# Patient Record
Sex: MALE | Race: WHITE | Employment: OTHER | URBAN - METROPOLITAN AREA
[De-identification: names, ages, dates, MRNs, and addresses within clinical notes are randomized per-mention and may not be internally consistent; named-entity substitution may affect disease eponyms.]

---

## 2017-01-01 ENCOUNTER — APPOINTMENT (OUTPATIENT)
Dept: LAB | Facility: CLINIC | Age: 72
End: 2017-01-01
Attending: INTERNAL MEDICINE
Payer: COMMERCIAL

## 2017-01-01 ENCOUNTER — ONCOLOGY VISIT (OUTPATIENT)
Dept: ONCOLOGY | Facility: CLINIC | Age: 72
End: 2017-01-01
Attending: INTERNAL MEDICINE
Payer: COMMERCIAL

## 2017-01-01 ENCOUNTER — INFUSION THERAPY VISIT (OUTPATIENT)
Dept: ONCOLOGY | Facility: CLINIC | Age: 72
End: 2017-01-01
Attending: PHYSICIAN ASSISTANT
Payer: COMMERCIAL

## 2017-01-01 ENCOUNTER — CARE COORDINATION (OUTPATIENT)
Dept: ONCOLOGY | Facility: CLINIC | Age: 72
End: 2017-01-01

## 2017-01-01 ENCOUNTER — APPOINTMENT (OUTPATIENT)
Dept: LAB | Facility: CLINIC | Age: 72
End: 2017-01-01
Attending: PHYSICIAN ASSISTANT
Payer: COMMERCIAL

## 2017-01-01 ENCOUNTER — TELEPHONE (OUTPATIENT)
Dept: ONCOLOGY | Facility: CLINIC | Age: 72
End: 2017-01-01

## 2017-01-01 VITALS
DIASTOLIC BLOOD PRESSURE: 58 MMHG | WEIGHT: 129 LBS | RESPIRATION RATE: 14 BRPM | SYSTOLIC BLOOD PRESSURE: 95 MMHG | HEIGHT: 66 IN | BODY MASS INDEX: 20.73 KG/M2 | TEMPERATURE: 97.6 F | HEART RATE: 100 BPM | OXYGEN SATURATION: 100 %

## 2017-01-01 VITALS
OXYGEN SATURATION: 99 % | DIASTOLIC BLOOD PRESSURE: 70 MMHG | RESPIRATION RATE: 16 BRPM | TEMPERATURE: 98.8 F | SYSTOLIC BLOOD PRESSURE: 111 MMHG | HEART RATE: 76 BPM

## 2017-01-01 VITALS
DIASTOLIC BLOOD PRESSURE: 53 MMHG | OXYGEN SATURATION: 100 % | RESPIRATION RATE: 16 BRPM | SYSTOLIC BLOOD PRESSURE: 122 MMHG | HEART RATE: 98 BPM | TEMPERATURE: 98.5 F

## 2017-01-01 VITALS
WEIGHT: 127 LBS | HEART RATE: 72 BPM | SYSTOLIC BLOOD PRESSURE: 102 MMHG | DIASTOLIC BLOOD PRESSURE: 57 MMHG | TEMPERATURE: 98.9 F | RESPIRATION RATE: 18 BRPM | OXYGEN SATURATION: 98 % | BODY MASS INDEX: 20.5 KG/M2

## 2017-01-01 VITALS
OXYGEN SATURATION: 99 % | DIASTOLIC BLOOD PRESSURE: 58 MMHG | TEMPERATURE: 98.4 F | SYSTOLIC BLOOD PRESSURE: 86 MMHG | WEIGHT: 135 LBS | HEART RATE: 99 BPM | BODY MASS INDEX: 21.8 KG/M2 | RESPIRATION RATE: 18 BRPM

## 2017-01-01 VITALS
WEIGHT: 135 LBS | HEART RATE: 97 BPM | DIASTOLIC BLOOD PRESSURE: 46 MMHG | BODY MASS INDEX: 21.8 KG/M2 | OXYGEN SATURATION: 99 % | RESPIRATION RATE: 16 BRPM | TEMPERATURE: 98.2 F | SYSTOLIC BLOOD PRESSURE: 103 MMHG

## 2017-01-01 VITALS
SYSTOLIC BLOOD PRESSURE: 118 MMHG | DIASTOLIC BLOOD PRESSURE: 67 MMHG | HEART RATE: 74 BPM | RESPIRATION RATE: 16 BRPM | OXYGEN SATURATION: 98 % | WEIGHT: 127 LBS | BODY MASS INDEX: 20.5 KG/M2 | TEMPERATURE: 98 F

## 2017-01-01 VITALS
SYSTOLIC BLOOD PRESSURE: 120 MMHG | BODY MASS INDEX: 20.66 KG/M2 | HEART RATE: 85 BPM | RESPIRATION RATE: 14 BRPM | DIASTOLIC BLOOD PRESSURE: 76 MMHG | TEMPERATURE: 98 F | OXYGEN SATURATION: 100 % | WEIGHT: 128 LBS

## 2017-01-01 VITALS
WEIGHT: 130 LBS | TEMPERATURE: 98.1 F | BODY MASS INDEX: 20.89 KG/M2 | SYSTOLIC BLOOD PRESSURE: 108 MMHG | OXYGEN SATURATION: 100 % | DIASTOLIC BLOOD PRESSURE: 64 MMHG | HEIGHT: 66 IN | HEART RATE: 87 BPM

## 2017-01-01 VITALS
SYSTOLIC BLOOD PRESSURE: 115 MMHG | WEIGHT: 135 LBS | BODY MASS INDEX: 21.8 KG/M2 | TEMPERATURE: 98.1 F | HEART RATE: 78 BPM | OXYGEN SATURATION: 100 % | DIASTOLIC BLOOD PRESSURE: 61 MMHG

## 2017-01-01 VITALS
OXYGEN SATURATION: 100 % | WEIGHT: 135.5 LBS | BODY MASS INDEX: 21.8 KG/M2 | DIASTOLIC BLOOD PRESSURE: 62 MMHG | HEART RATE: 106 BPM | BODY MASS INDEX: 21.88 KG/M2 | RESPIRATION RATE: 16 BRPM | SYSTOLIC BLOOD PRESSURE: 119 MMHG | DIASTOLIC BLOOD PRESSURE: 62 MMHG | SYSTOLIC BLOOD PRESSURE: 113 MMHG | TEMPERATURE: 98.1 F | OXYGEN SATURATION: 100 % | TEMPERATURE: 98.2 F | HEART RATE: 105 BPM | RESPIRATION RATE: 16 BRPM | WEIGHT: 135 LBS

## 2017-01-01 VITALS
BODY MASS INDEX: 21.64 KG/M2 | SYSTOLIC BLOOD PRESSURE: 103 MMHG | WEIGHT: 134 LBS | HEART RATE: 108 BPM | DIASTOLIC BLOOD PRESSURE: 56 MMHG | RESPIRATION RATE: 18 BRPM | OXYGEN SATURATION: 98 % | TEMPERATURE: 97.8 F

## 2017-01-01 VITALS
WEIGHT: 135 LBS | RESPIRATION RATE: 16 BRPM | HEART RATE: 90 BPM | SYSTOLIC BLOOD PRESSURE: 114 MMHG | BODY MASS INDEX: 21.8 KG/M2 | DIASTOLIC BLOOD PRESSURE: 50 MMHG | OXYGEN SATURATION: 99 % | TEMPERATURE: 98.8 F

## 2017-01-01 VITALS
HEART RATE: 108 BPM | DIASTOLIC BLOOD PRESSURE: 56 MMHG | WEIGHT: 134 LBS | BODY MASS INDEX: 21.64 KG/M2 | RESPIRATION RATE: 18 BRPM | OXYGEN SATURATION: 98 % | TEMPERATURE: 97.8 F | SYSTOLIC BLOOD PRESSURE: 103 MMHG

## 2017-01-01 VITALS
DIASTOLIC BLOOD PRESSURE: 64 MMHG | WEIGHT: 130 LBS | RESPIRATION RATE: 20 BRPM | SYSTOLIC BLOOD PRESSURE: 108 MMHG | BODY MASS INDEX: 20.99 KG/M2 | TEMPERATURE: 98.1 F | OXYGEN SATURATION: 100 % | HEART RATE: 87 BPM

## 2017-01-01 DIAGNOSIS — C79.51 METASTATIC BONE TUMOR (H): ICD-10-CM

## 2017-01-01 DIAGNOSIS — D70.1 AGRANULOCYTOSIS SECONDARY TO CANCER CHEMOTHERAPY (H): ICD-10-CM

## 2017-01-01 DIAGNOSIS — C79.51 PROSTATE CANCER METASTATIC TO BONE (H): Primary | ICD-10-CM

## 2017-01-01 DIAGNOSIS — C61 PROSTATE CANCER METASTATIC TO BONE (H): ICD-10-CM

## 2017-01-01 DIAGNOSIS — C61 PROSTATE CANCER METASTATIC TO BONE (H): Primary | ICD-10-CM

## 2017-01-01 DIAGNOSIS — C79.51 PROSTATE CANCER METASTATIC TO BONE (H): ICD-10-CM

## 2017-01-01 DIAGNOSIS — T45.1X5A AGRANULOCYTOSIS SECONDARY TO CANCER CHEMOTHERAPY (H): ICD-10-CM

## 2017-01-01 DIAGNOSIS — D64.9 ANEMIA, UNSPECIFIED TYPE: ICD-10-CM

## 2017-01-01 DIAGNOSIS — R17 ELEVATED BILIRUBIN: Primary | ICD-10-CM

## 2017-01-01 DIAGNOSIS — R06.02 SOB (SHORTNESS OF BREATH): ICD-10-CM

## 2017-01-01 DIAGNOSIS — D64.9 ANEMIA, UNSPECIFIED TYPE: Primary | ICD-10-CM

## 2017-01-01 DIAGNOSIS — I50.22 CHRONIC SYSTOLIC HEART FAILURE (H): ICD-10-CM

## 2017-01-01 DIAGNOSIS — D70.1 AGRANULOCYTOSIS SECONDARY TO CANCER CHEMOTHERAPY (H): Primary | ICD-10-CM

## 2017-01-01 DIAGNOSIS — T45.1X5A AGRANULOCYTOSIS SECONDARY TO CANCER CHEMOTHERAPY (H): Primary | ICD-10-CM

## 2017-01-01 DIAGNOSIS — I27.82 OTHER CHRONIC PULMONARY EMBOLISM WITHOUT ACUTE COR PULMONALE (H): ICD-10-CM

## 2017-01-01 DIAGNOSIS — E83.51 HYPOCALCEMIA: ICD-10-CM

## 2017-01-01 LAB
ABO + RH BLD: NORMAL
ALBUMIN SERPL-MCNC: 3.3 G/DL (ref 3.4–5)
ALBUMIN SERPL-MCNC: 3.5 G/DL (ref 3.4–5)
ALBUMIN SERPL-MCNC: 3.5 G/DL (ref 3.4–5)
ALBUMIN SERPL-MCNC: 3.6 G/DL (ref 3.4–5)
ALBUMIN SERPL-MCNC: 3.6 G/DL (ref 3.4–5)
ALBUMIN SERPL-MCNC: 3.8 G/DL (ref 3.4–5)
ALBUMIN SERPL-MCNC: 4 G/DL (ref 3.4–5)
ALBUMIN SERPL-MCNC: 4 G/DL (ref 3.4–5)
ALP SERPL-CCNC: 178 U/L (ref 40–150)
ALP SERPL-CCNC: 220 U/L (ref 40–150)
ALP SERPL-CCNC: 271 U/L (ref 40–150)
ALP SERPL-CCNC: 276 U/L (ref 40–150)
ALP SERPL-CCNC: 284 U/L (ref 40–150)
ALP SERPL-CCNC: 288 U/L (ref 40–150)
ALP SERPL-CCNC: 292 U/L (ref 40–150)
ALP SERPL-CCNC: 312 U/L (ref 40–150)
ALT SERPL W P-5'-P-CCNC: 22 U/L (ref 0–70)
ALT SERPL W P-5'-P-CCNC: 26 U/L (ref 0–70)
ALT SERPL W P-5'-P-CCNC: 37 U/L (ref 0–70)
ALT SERPL W P-5'-P-CCNC: 38 U/L (ref 0–70)
ALT SERPL W P-5'-P-CCNC: 40 U/L (ref 0–70)
ALT SERPL W P-5'-P-CCNC: 44 U/L (ref 0–70)
ALT SERPL W P-5'-P-CCNC: 44 U/L (ref 0–70)
ALT SERPL W P-5'-P-CCNC: 51 U/L (ref 0–70)
ANION GAP SERPL CALCULATED.3IONS-SCNC: 11 MMOL/L (ref 3–14)
ANION GAP SERPL CALCULATED.3IONS-SCNC: 11 MMOL/L (ref 3–14)
ANION GAP SERPL CALCULATED.3IONS-SCNC: 12 MMOL/L (ref 3–14)
ANION GAP SERPL CALCULATED.3IONS-SCNC: 12 MMOL/L (ref 3–14)
ANION GAP SERPL CALCULATED.3IONS-SCNC: 8 MMOL/L (ref 3–14)
ANION GAP SERPL CALCULATED.3IONS-SCNC: 8 MMOL/L (ref 3–14)
ANION GAP SERPL CALCULATED.3IONS-SCNC: 9 MMOL/L (ref 3–14)
ANION GAP SERPL CALCULATED.3IONS-SCNC: 9 MMOL/L (ref 3–14)
ANISOCYTOSIS BLD QL SMEAR: ABNORMAL
AST SERPL W P-5'-P-CCNC: 24 U/L (ref 0–45)
AST SERPL W P-5'-P-CCNC: 24 U/L (ref 0–45)
AST SERPL W P-5'-P-CCNC: 28 U/L (ref 0–45)
AST SERPL W P-5'-P-CCNC: 30 U/L (ref 0–45)
AST SERPL W P-5'-P-CCNC: 35 U/L (ref 0–45)
AST SERPL W P-5'-P-CCNC: 35 U/L (ref 0–45)
AST SERPL W P-5'-P-CCNC: 39 U/L (ref 0–45)
AST SERPL W P-5'-P-CCNC: 40 U/L (ref 0–45)
BASOPHILS # BLD AUTO: 0 10E9/L (ref 0–0.2)
BASOPHILS # BLD AUTO: 0.1 10E9/L (ref 0–0.2)
BASOPHILS # BLD AUTO: 0.1 10E9/L (ref 0–0.2)
BASOPHILS NFR BLD AUTO: 0 %
BASOPHILS NFR BLD AUTO: 0.2 %
BASOPHILS NFR BLD AUTO: 0.5 %
BASOPHILS NFR BLD AUTO: 0.6 %
BASOPHILS NFR BLD AUTO: 1 %
BILIRUB DIRECT SERPL-MCNC: 0.1 MG/DL (ref 0–0.2)
BILIRUB DIRECT SERPL-MCNC: 0.2 MG/DL (ref 0–0.2)
BILIRUB SERPL-MCNC: 0.9 MG/DL (ref 0.2–1.3)
BILIRUB SERPL-MCNC: 0.9 MG/DL (ref 0.2–1.3)
BILIRUB SERPL-MCNC: 1 MG/DL (ref 0.2–1.3)
BILIRUB SERPL-MCNC: 1.1 MG/DL (ref 0.2–1.3)
BILIRUB SERPL-MCNC: 1.1 MG/DL (ref 0.2–1.3)
BILIRUB SERPL-MCNC: 1.2 MG/DL (ref 0.2–1.3)
BILIRUB SERPL-MCNC: 1.4 MG/DL (ref 0.2–1.3)
BILIRUB SERPL-MCNC: 1.6 MG/DL (ref 0.2–1.3)
BLD GP AB SCN SERPL QL: NORMAL
BLD PROD TYP BPU: NORMAL
BLD UNIT ID BPU: 0
BLOOD BANK CMNT PATIENT-IMP: NORMAL
BLOOD PRODUCT CODE: NORMAL
BPU ID: NORMAL
BUN SERPL-MCNC: 15 MG/DL (ref 7–30)
BUN SERPL-MCNC: 20 MG/DL (ref 7–30)
BUN SERPL-MCNC: 20 MG/DL (ref 7–30)
BUN SERPL-MCNC: 21 MG/DL (ref 7–30)
BUN SERPL-MCNC: 22 MG/DL (ref 7–30)
BUN SERPL-MCNC: 22 MG/DL (ref 7–30)
BUN SERPL-MCNC: 25 MG/DL (ref 7–30)
BUN SERPL-MCNC: 27 MG/DL (ref 7–30)
CALCIUM SERPL-MCNC: 7.7 MG/DL (ref 8.5–10.1)
CALCIUM SERPL-MCNC: 8.5 MG/DL (ref 8.5–10.1)
CALCIUM SERPL-MCNC: 8.5 MG/DL (ref 8.5–10.1)
CALCIUM SERPL-MCNC: 8.6 MG/DL (ref 8.5–10.1)
CALCIUM SERPL-MCNC: 9.1 MG/DL (ref 8.5–10.1)
CALCIUM SERPL-MCNC: 9.1 MG/DL (ref 8.5–10.1)
CALCIUM SERPL-MCNC: 9.3 MG/DL (ref 8.5–10.1)
CALCIUM SERPL-MCNC: 9.8 MG/DL (ref 8.5–10.1)
CHLORIDE SERPL-SCNC: 103 MMOL/L (ref 94–109)
CHLORIDE SERPL-SCNC: 104 MMOL/L (ref 94–109)
CHLORIDE SERPL-SCNC: 108 MMOL/L (ref 94–109)
CHLORIDE SERPL-SCNC: 108 MMOL/L (ref 94–109)
CHLORIDE SERPL-SCNC: 110 MMOL/L (ref 94–109)
CHLORIDE SERPL-SCNC: 110 MMOL/L (ref 94–109)
CHLORIDE SERPL-SCNC: 112 MMOL/L (ref 94–109)
CHLORIDE SERPL-SCNC: 114 MMOL/L (ref 94–109)
CO2 SERPL-SCNC: 19 MMOL/L (ref 20–32)
CO2 SERPL-SCNC: 21 MMOL/L (ref 20–32)
CO2 SERPL-SCNC: 22 MMOL/L (ref 20–32)
CO2 SERPL-SCNC: 24 MMOL/L (ref 20–32)
CREAT SERPL-MCNC: 0.72 MG/DL (ref 0.66–1.25)
CREAT SERPL-MCNC: 0.74 MG/DL (ref 0.66–1.25)
CREAT SERPL-MCNC: 0.76 MG/DL (ref 0.66–1.25)
CREAT SERPL-MCNC: 0.81 MG/DL (ref 0.66–1.25)
CREAT SERPL-MCNC: 0.84 MG/DL (ref 0.66–1.25)
CREAT SERPL-MCNC: 0.88 MG/DL (ref 0.66–1.25)
CREAT SERPL-MCNC: 0.9 MG/DL (ref 0.66–1.25)
CREAT SERPL-MCNC: 0.98 MG/DL (ref 0.66–1.25)
DACRYOCYTES BLD QL SMEAR: SLIGHT
DIFFERENTIAL METHOD BLD: ABNORMAL
EOSINOPHIL # BLD AUTO: 0 10E9/L (ref 0–0.7)
EOSINOPHIL NFR BLD AUTO: 0 %
EOSINOPHIL NFR BLD AUTO: 0.1 %
EOSINOPHIL NFR BLD AUTO: 0.2 %
EOSINOPHIL NFR BLD AUTO: 0.2 %
EOSINOPHIL NFR BLD AUTO: 0.7 %
ERYTHROCYTE [DISTWIDTH] IN BLOOD BY AUTOMATED COUNT: 17.3 % (ref 10–15)
ERYTHROCYTE [DISTWIDTH] IN BLOOD BY AUTOMATED COUNT: 17.3 % (ref 10–15)
ERYTHROCYTE [DISTWIDTH] IN BLOOD BY AUTOMATED COUNT: 18.6 % (ref 10–15)
ERYTHROCYTE [DISTWIDTH] IN BLOOD BY AUTOMATED COUNT: 19 % (ref 10–15)
ERYTHROCYTE [DISTWIDTH] IN BLOOD BY AUTOMATED COUNT: 19.2 % (ref 10–15)
ERYTHROCYTE [DISTWIDTH] IN BLOOD BY AUTOMATED COUNT: 19.5 % (ref 10–15)
ERYTHROCYTE [DISTWIDTH] IN BLOOD BY AUTOMATED COUNT: 19.5 % (ref 10–15)
ERYTHROCYTE [DISTWIDTH] IN BLOOD BY AUTOMATED COUNT: 19.9 % (ref 10–15)
ERYTHROCYTE [DISTWIDTH] IN BLOOD BY AUTOMATED COUNT: 20.3 % (ref 10–15)
ERYTHROCYTE [DISTWIDTH] IN BLOOD BY AUTOMATED COUNT: 20.4 % (ref 10–15)
ERYTHROCYTE [DISTWIDTH] IN BLOOD BY AUTOMATED COUNT: 21.7 % (ref 10–15)
GFR SERPL CREATININE-BSD FRML MDRD: 75 ML/MIN/1.7M2
GFR SERPL CREATININE-BSD FRML MDRD: 83 ML/MIN/1.7M2
GFR SERPL CREATININE-BSD FRML MDRD: 84 ML/MIN/1.7M2
GFR SERPL CREATININE-BSD FRML MDRD: 90 ML/MIN/1.7M2
GFR SERPL CREATININE-BSD FRML MDRD: >90 ML/MIN/1.7M2
GLUCOSE SERPL-MCNC: 105 MG/DL (ref 70–99)
GLUCOSE SERPL-MCNC: 109 MG/DL (ref 70–99)
GLUCOSE SERPL-MCNC: 121 MG/DL (ref 70–99)
GLUCOSE SERPL-MCNC: 124 MG/DL (ref 70–99)
GLUCOSE SERPL-MCNC: 134 MG/DL (ref 70–99)
GLUCOSE SERPL-MCNC: 134 MG/DL (ref 70–99)
GLUCOSE SERPL-MCNC: 178 MG/DL (ref 70–99)
GLUCOSE SERPL-MCNC: 204 MG/DL (ref 70–99)
HCT VFR BLD AUTO: 22.4 % (ref 40–53)
HCT VFR BLD AUTO: 25.3 % (ref 40–53)
HCT VFR BLD AUTO: 26.1 % (ref 40–53)
HCT VFR BLD AUTO: 28.5 % (ref 40–53)
HCT VFR BLD AUTO: 29.5 % (ref 40–53)
HCT VFR BLD AUTO: 30.4 % (ref 40–53)
HCT VFR BLD AUTO: 30.7 % (ref 40–53)
HCT VFR BLD AUTO: 31.2 % (ref 40–53)
HCT VFR BLD AUTO: 32.5 % (ref 40–53)
HCT VFR BLD AUTO: 33 % (ref 40–53)
HCT VFR BLD AUTO: 33.5 % (ref 40–53)
HGB BLD-MCNC: 10.1 G/DL (ref 13.3–17.7)
HGB BLD-MCNC: 10.1 G/DL (ref 13.3–17.7)
HGB BLD-MCNC: 10.2 G/DL (ref 13.3–17.7)
HGB BLD-MCNC: 10.3 G/DL (ref 13.3–17.7)
HGB BLD-MCNC: 10.9 G/DL (ref 13.3–17.7)
HGB BLD-MCNC: 11.1 G/DL (ref 13.3–17.7)
HGB BLD-MCNC: 11.5 G/DL (ref 13.3–17.7)
HGB BLD-MCNC: 7.8 G/DL (ref 13.3–17.7)
HGB BLD-MCNC: 8.3 G/DL (ref 13.3–17.7)
HGB BLD-MCNC: 8.5 G/DL (ref 13.3–17.7)
HGB BLD-MCNC: 9.4 G/DL (ref 13.3–17.7)
IMM GRANULOCYTES # BLD: 0 10E9/L (ref 0–0.4)
IMM GRANULOCYTES # BLD: 0.1 10E9/L (ref 0–0.4)
IMM GRANULOCYTES # BLD: 0.3 10E9/L (ref 0–0.4)
IMM GRANULOCYTES # BLD: 0.3 10E9/L (ref 0–0.4)
IMM GRANULOCYTES # BLD: 0.4 10E9/L (ref 0–0.4)
IMM GRANULOCYTES # BLD: 0.4 10E9/L (ref 0–0.4)
IMM GRANULOCYTES # BLD: 0.5 10E9/L (ref 0–0.4)
IMM GRANULOCYTES NFR BLD: 1 %
IMM GRANULOCYTES NFR BLD: 1 %
IMM GRANULOCYTES NFR BLD: 2.9 %
IMM GRANULOCYTES NFR BLD: 3.1 %
IMM GRANULOCYTES NFR BLD: 3.3 %
IMM GRANULOCYTES NFR BLD: 4.7 %
IMM GRANULOCYTES NFR BLD: 5 %
LYMPHOCYTES # BLD AUTO: 0.4 10E9/L (ref 0.8–5.3)
LYMPHOCYTES # BLD AUTO: 0.5 10E9/L (ref 0.8–5.3)
LYMPHOCYTES # BLD AUTO: 0.6 10E9/L (ref 0.8–5.3)
LYMPHOCYTES # BLD AUTO: 0.6 10E9/L (ref 0.8–5.3)
LYMPHOCYTES # BLD AUTO: 0.7 10E9/L (ref 0.8–5.3)
LYMPHOCYTES # BLD AUTO: 0.9 10E9/L (ref 0.8–5.3)
LYMPHOCYTES # BLD AUTO: 1 10E9/L (ref 0.8–5.3)
LYMPHOCYTES # BLD AUTO: 1 10E9/L (ref 0.8–5.3)
LYMPHOCYTES # BLD AUTO: 1.1 10E9/L (ref 0.8–5.3)
LYMPHOCYTES # BLD AUTO: 1.9 10E9/L (ref 0.8–5.3)
LYMPHOCYTES # BLD AUTO: 2 10E9/L (ref 0.8–5.3)
LYMPHOCYTES NFR BLD AUTO: 10 %
LYMPHOCYTES NFR BLD AUTO: 12.4 %
LYMPHOCYTES NFR BLD AUTO: 4.6 %
LYMPHOCYTES NFR BLD AUTO: 5.6 %
LYMPHOCYTES NFR BLD AUTO: 6 %
LYMPHOCYTES NFR BLD AUTO: 7.6 %
LYMPHOCYTES NFR BLD AUTO: 8.2 %
LYMPHOCYTES NFR BLD AUTO: 9 %
LYMPHOCYTES NFR BLD AUTO: 9 %
LYMPHOCYTES NFR BLD AUTO: 9.2 %
LYMPHOCYTES NFR BLD AUTO: 9.9 %
MACROCYTES BLD QL SMEAR: PRESENT
MCH RBC QN AUTO: 31.1 PG (ref 26.5–33)
MCH RBC QN AUTO: 31.3 PG (ref 26.5–33)
MCH RBC QN AUTO: 31.6 PG (ref 26.5–33)
MCH RBC QN AUTO: 32 PG (ref 26.5–33)
MCH RBC QN AUTO: 32.5 PG (ref 26.5–33)
MCH RBC QN AUTO: 32.6 PG (ref 26.5–33)
MCH RBC QN AUTO: 33.3 PG (ref 26.5–33)
MCH RBC QN AUTO: 33.7 PG (ref 26.5–33)
MCH RBC QN AUTO: 34.3 PG (ref 26.5–33)
MCH RBC QN AUTO: 34.6 PG (ref 26.5–33)
MCH RBC QN AUTO: 35.3 PG (ref 26.5–33)
MCHC RBC AUTO-ENTMCNC: 32.4 G/DL (ref 31.5–36.5)
MCHC RBC AUTO-ENTMCNC: 32.6 G/DL (ref 31.5–36.5)
MCHC RBC AUTO-ENTMCNC: 32.8 G/DL (ref 31.5–36.5)
MCHC RBC AUTO-ENTMCNC: 33 G/DL (ref 31.5–36.5)
MCHC RBC AUTO-ENTMCNC: 33.5 G/DL (ref 31.5–36.5)
MCHC RBC AUTO-ENTMCNC: 33.6 G/DL (ref 31.5–36.5)
MCHC RBC AUTO-ENTMCNC: 34.2 G/DL (ref 31.5–36.5)
MCHC RBC AUTO-ENTMCNC: 34.3 G/DL (ref 31.5–36.5)
MCHC RBC AUTO-ENTMCNC: 34.8 G/DL (ref 31.5–36.5)
MCV RBC AUTO: 103 FL (ref 78–100)
MCV RBC AUTO: 104 FL (ref 78–100)
MCV RBC AUTO: 105 FL (ref 78–100)
MCV RBC AUTO: 107 FL (ref 78–100)
MCV RBC AUTO: 91 FL (ref 78–100)
MCV RBC AUTO: 93 FL (ref 78–100)
MCV RBC AUTO: 94 FL (ref 78–100)
MCV RBC AUTO: 94 FL (ref 78–100)
MCV RBC AUTO: 96 FL (ref 78–100)
MCV RBC AUTO: 97 FL (ref 78–100)
MCV RBC AUTO: 99 FL (ref 78–100)
METAMYELOCYTES # BLD: 0.1 10E9/L
METAMYELOCYTES # BLD: 0.1 10E9/L
METAMYELOCYTES # BLD: 0.6 10E9/L
METAMYELOCYTES NFR BLD MANUAL: 1 %
METAMYELOCYTES NFR BLD MANUAL: 1 %
METAMYELOCYTES NFR BLD MANUAL: 3 %
MONOCYTES # BLD AUTO: 0.2 10E9/L (ref 0–1.3)
MONOCYTES # BLD AUTO: 0.3 10E9/L (ref 0–1.3)
MONOCYTES # BLD AUTO: 0.4 10E9/L (ref 0–1.3)
MONOCYTES # BLD AUTO: 0.4 10E9/L (ref 0–1.3)
MONOCYTES # BLD AUTO: 0.6 10E9/L (ref 0–1.3)
MONOCYTES # BLD AUTO: 0.8 10E9/L (ref 0–1.3)
MONOCYTES # BLD AUTO: 0.8 10E9/L (ref 0–1.3)
MONOCYTES # BLD AUTO: 1.3 10E9/L (ref 0–1.3)
MONOCYTES # BLD AUTO: 1.6 10E9/L (ref 0–1.3)
MONOCYTES NFR BLD AUTO: 1.7 %
MONOCYTES NFR BLD AUTO: 10.1 %
MONOCYTES NFR BLD AUTO: 2.5 %
MONOCYTES NFR BLD AUTO: 3 %
MONOCYTES NFR BLD AUTO: 4.7 %
MONOCYTES NFR BLD AUTO: 5 %
MONOCYTES NFR BLD AUTO: 5.2 %
MONOCYTES NFR BLD AUTO: 8 %
MONOCYTES NFR BLD AUTO: 9 %
MONOCYTES NFR BLD AUTO: 9.4 %
MONOCYTES NFR BLD AUTO: 9.8 %
MYELOCYTES # BLD: 0.1 10E9/L
MYELOCYTES # BLD: 0.2 10E9/L
MYELOCYTES # BLD: 0.4 10E9/L
MYELOCYTES # BLD: 0.6 10E9/L
MYELOCYTES NFR BLD MANUAL: 1 %
MYELOCYTES NFR BLD MANUAL: 1 %
MYELOCYTES NFR BLD MANUAL: 3 %
MYELOCYTES NFR BLD MANUAL: 3 %
NEUTROPHILS # BLD AUTO: 12 10E9/L (ref 1.6–8.3)
NEUTROPHILS # BLD AUTO: 12.5 10E9/L (ref 1.6–8.3)
NEUTROPHILS # BLD AUTO: 15.5 10E9/L (ref 1.6–8.3)
NEUTROPHILS # BLD AUTO: 18 10E9/L (ref 1.6–8.3)
NEUTROPHILS # BLD AUTO: 3.3 10E9/L (ref 1.6–8.3)
NEUTROPHILS # BLD AUTO: 5.2 10E9/L (ref 1.6–8.3)
NEUTROPHILS # BLD AUTO: 6.1 10E9/L (ref 1.6–8.3)
NEUTROPHILS # BLD AUTO: 6.5 10E9/L (ref 1.6–8.3)
NEUTROPHILS # BLD AUTO: 9 10E9/L (ref 1.6–8.3)
NEUTROPHILS # BLD AUTO: 9.3 10E9/L (ref 1.6–8.3)
NEUTROPHILS # BLD AUTO: 9.8 10E9/L (ref 1.6–8.3)
NEUTROPHILS NFR BLD AUTO: 74.1 %
NEUTROPHILS NFR BLD AUTO: 76.3 %
NEUTROPHILS NFR BLD AUTO: 78 %
NEUTROPHILS NFR BLD AUTO: 78.1 %
NEUTROPHILS NFR BLD AUTO: 81 %
NEUTROPHILS NFR BLD AUTO: 82.4 %
NEUTROPHILS NFR BLD AUTO: 83 %
NEUTROPHILS NFR BLD AUTO: 85 %
NEUTROPHILS NFR BLD AUTO: 86.7 %
NEUTROPHILS NFR BLD AUTO: 87.8 %
NEUTROPHILS NFR BLD AUTO: 89.6 %
NRBC # BLD AUTO: 0 10*3/UL
NRBC # BLD AUTO: 0.2 10*3/UL
NRBC BLD AUTO-RTO: 0 /100
NRBC BLD AUTO-RTO: 1 /100
NUM BPU REQUESTED: 2
PLATELET # BLD AUTO: 104 10E9/L (ref 150–450)
PLATELET # BLD AUTO: 105 10E9/L (ref 150–450)
PLATELET # BLD AUTO: 108 10E9/L (ref 150–450)
PLATELET # BLD AUTO: 73 10E9/L (ref 150–450)
PLATELET # BLD AUTO: 74 10E9/L (ref 150–450)
PLATELET # BLD AUTO: 79 10E9/L (ref 150–450)
PLATELET # BLD AUTO: 81 10E9/L (ref 150–450)
PLATELET # BLD AUTO: 85 10E9/L (ref 150–450)
PLATELET # BLD AUTO: 90 10E9/L (ref 150–450)
PLATELET # BLD AUTO: 93 10E9/L (ref 150–450)
PLATELET # BLD AUTO: 97 10E9/L (ref 150–450)
POIKILOCYTOSIS BLD QL SMEAR: SLIGHT
POLYCHROMASIA BLD QL SMEAR: SLIGHT
POLYCHROMASIA BLD QL SMEAR: SLIGHT
POTASSIUM SERPL-SCNC: 3.5 MMOL/L (ref 3.4–5.3)
POTASSIUM SERPL-SCNC: 4 MMOL/L (ref 3.4–5.3)
POTASSIUM SERPL-SCNC: 4 MMOL/L (ref 3.4–5.3)
POTASSIUM SERPL-SCNC: 4.1 MMOL/L (ref 3.4–5.3)
POTASSIUM SERPL-SCNC: 4.2 MMOL/L (ref 3.4–5.3)
POTASSIUM SERPL-SCNC: 4.4 MMOL/L (ref 3.4–5.3)
POTASSIUM SERPL-SCNC: 4.5 MMOL/L (ref 3.4–5.3)
POTASSIUM SERPL-SCNC: 4.6 MMOL/L (ref 3.4–5.3)
PROT SERPL-MCNC: 6.6 G/DL (ref 6.8–8.8)
PROT SERPL-MCNC: 6.9 G/DL (ref 6.8–8.8)
PROT SERPL-MCNC: 7.2 G/DL (ref 6.8–8.8)
PROT SERPL-MCNC: 7.3 G/DL (ref 6.8–8.8)
PROT SERPL-MCNC: 7.4 G/DL (ref 6.8–8.8)
PROT SERPL-MCNC: 7.5 G/DL (ref 6.8–8.8)
PROT SERPL-MCNC: 8.2 G/DL (ref 6.8–8.8)
PROT SERPL-MCNC: 8.3 G/DL (ref 6.8–8.8)
PSA SERPL-MCNC: 389 UG/L (ref 0–4)
PSA SERPL-MCNC: 479 UG/L (ref 0–4)
PSA SERPL-MCNC: 480 UG/L (ref 0–4)
PSA SERPL-MCNC: 580 UG/L (ref 0–4)
PSA SERPL-MCNC: 655 UG/L (ref 0–4)
PSA SERPL-MCNC: 741 UG/L (ref 0–4)
RBC # BLD AUTO: 2.39 10E12/L (ref 4.4–5.9)
RBC # BLD AUTO: 2.48 10E12/L (ref 4.4–5.9)
RBC # BLD AUTO: 2.65 10E12/L (ref 4.4–5.9)
RBC # BLD AUTO: 2.66 10E12/L (ref 4.4–5.9)
RBC # BLD AUTO: 2.98 10E12/L (ref 4.4–5.9)
RBC # BLD AUTO: 3 10E12/L (ref 4.4–5.9)
RBC # BLD AUTO: 3.06 10E12/L (ref 4.4–5.9)
RBC # BLD AUTO: 3.16 10E12/L (ref 4.4–5.9)
RBC # BLD AUTO: 3.35 10E12/L (ref 4.4–5.9)
RBC # BLD AUTO: 3.51 10E12/L (ref 4.4–5.9)
RBC # BLD AUTO: 3.7 10E12/L (ref 4.4–5.9)
RBC MORPH BLD: NORMAL
SODIUM SERPL-SCNC: 136 MMOL/L (ref 133–144)
SODIUM SERPL-SCNC: 137 MMOL/L (ref 133–144)
SODIUM SERPL-SCNC: 138 MMOL/L (ref 133–144)
SODIUM SERPL-SCNC: 139 MMOL/L (ref 133–144)
SODIUM SERPL-SCNC: 140 MMOL/L (ref 133–144)
SODIUM SERPL-SCNC: 141 MMOL/L (ref 133–144)
SODIUM SERPL-SCNC: 142 MMOL/L (ref 133–144)
SODIUM SERPL-SCNC: 142 MMOL/L (ref 133–144)
SPECIMEN EXP DATE BLD: NORMAL
TRANSFUSION STATUS PATIENT QL: NORMAL
WBC # BLD AUTO: 10.9 10E9/L (ref 4–11)
WBC # BLD AUTO: 11.2 10E9/L (ref 4–11)
WBC # BLD AUTO: 11.2 10E9/L (ref 4–11)
WBC # BLD AUTO: 14 10E9/L (ref 4–11)
WBC # BLD AUTO: 14.8 10E9/L (ref 4–11)
WBC # BLD AUTO: 19.9 10E9/L (ref 4–11)
WBC # BLD AUTO: 21.2 10E9/L (ref 4–11)
WBC # BLD AUTO: 4.2 10E9/L (ref 4–11)
WBC # BLD AUTO: 6 10E9/L (ref 4–11)
WBC # BLD AUTO: 8.1 10E9/L (ref 4–11)
WBC # BLD AUTO: 8.7 10E9/L (ref 4–11)

## 2017-01-01 PROCEDURE — 96375 TX/PRO/DX INJ NEW DRUG ADDON: CPT

## 2017-01-01 PROCEDURE — 80076 HEPATIC FUNCTION PANEL: CPT | Performed by: INTERNAL MEDICINE

## 2017-01-01 PROCEDURE — 80048 BASIC METABOLIC PNL TOTAL CA: CPT | Performed by: INTERNAL MEDICINE

## 2017-01-01 PROCEDURE — 40000141 ZZH STATISTIC PERIPHERAL IV START W/O US GUIDANCE: Mod: ZF

## 2017-01-01 PROCEDURE — 36415 COLL VENOUS BLD VENIPUNCTURE: CPT

## 2017-01-01 PROCEDURE — 36430 TRANSFUSION BLD/BLD COMPNT: CPT

## 2017-01-01 PROCEDURE — 86923 COMPATIBILITY TEST ELECTRIC: CPT | Performed by: INTERNAL MEDICINE

## 2017-01-01 PROCEDURE — 96377 APPLICATON ON-BODY INJECTOR: CPT | Mod: 59

## 2017-01-01 PROCEDURE — 85025 COMPLETE CBC W/AUTO DIFF WBC: CPT | Performed by: INTERNAL MEDICINE

## 2017-01-01 PROCEDURE — 25000128 H RX IP 250 OP 636: Mod: ZF | Performed by: PHYSICIAN ASSISTANT

## 2017-01-01 PROCEDURE — 25000128 H RX IP 250 OP 636: Mod: ZF | Performed by: INTERNAL MEDICINE

## 2017-01-01 PROCEDURE — 86901 BLOOD TYPING SEROLOGIC RH(D): CPT | Performed by: PHYSICIAN ASSISTANT

## 2017-01-01 PROCEDURE — 96402 CHEMO HORMON ANTINEOPL SQ/IM: CPT

## 2017-01-01 PROCEDURE — 40000556 ZZH STATISTIC PERIPHERAL IV START W US GUIDANCE: Mod: ZF

## 2017-01-01 PROCEDURE — 99214 OFFICE O/P EST MOD 30 MIN: CPT | Mod: ZP | Performed by: INTERNAL MEDICINE

## 2017-01-01 PROCEDURE — 96372 THER/PROPH/DIAG INJ SC/IM: CPT | Mod: 59

## 2017-01-01 PROCEDURE — 85025 COMPLETE CBC W/AUTO DIFF WBC: CPT | Performed by: PHYSICIAN ASSISTANT

## 2017-01-01 PROCEDURE — 36415 COLL VENOUS BLD VENIPUNCTURE: CPT | Performed by: INTERNAL MEDICINE

## 2017-01-01 PROCEDURE — 99212 OFFICE O/P EST SF 10 MIN: CPT

## 2017-01-01 PROCEDURE — 96413 CHEMO IV INFUSION 1 HR: CPT

## 2017-01-01 PROCEDURE — 25000125 ZZHC RX 250: Mod: ZF | Performed by: RADIOLOGY

## 2017-01-01 PROCEDURE — 86900 BLOOD TYPING SEROLOGIC ABO: CPT | Performed by: INTERNAL MEDICINE

## 2017-01-01 PROCEDURE — 99212 OFFICE O/P EST SF 10 MIN: CPT | Mod: ZF

## 2017-01-01 PROCEDURE — P9016 RBC LEUKOCYTES REDUCED: HCPCS | Performed by: INTERNAL MEDICINE

## 2017-01-01 PROCEDURE — 84153 ASSAY OF PSA TOTAL: CPT | Performed by: INTERNAL MEDICINE

## 2017-01-01 PROCEDURE — 36592 COLLECT BLOOD FROM PICC: CPT

## 2017-01-01 PROCEDURE — 86901 BLOOD TYPING SEROLOGIC RH(D): CPT | Performed by: INTERNAL MEDICINE

## 2017-01-01 PROCEDURE — 86850 RBC ANTIBODY SCREEN: CPT | Performed by: INTERNAL MEDICINE

## 2017-01-01 PROCEDURE — 80053 COMPREHEN METABOLIC PANEL: CPT | Performed by: INTERNAL MEDICINE

## 2017-01-01 PROCEDURE — 99214 OFFICE O/P EST MOD 30 MIN: CPT | Mod: ZP | Performed by: PHYSICIAN ASSISTANT

## 2017-01-01 PROCEDURE — 99215 OFFICE O/P EST HI 40 MIN: CPT | Mod: ZP | Performed by: INTERNAL MEDICINE

## 2017-01-01 PROCEDURE — 96374 THER/PROPH/DIAG INJ IV PUSH: CPT

## 2017-01-01 PROCEDURE — 86900 BLOOD TYPING SEROLOGIC ABO: CPT | Performed by: PHYSICIAN ASSISTANT

## 2017-01-01 PROCEDURE — 25000128 H RX IP 250 OP 636: Mod: ZF

## 2017-01-01 PROCEDURE — 86850 RBC ANTIBODY SCREEN: CPT | Performed by: PHYSICIAN ASSISTANT

## 2017-01-01 PROCEDURE — 96361 HYDRATE IV INFUSION ADD-ON: CPT

## 2017-01-01 PROCEDURE — 96367 TX/PROPH/DG ADDL SEQ IV INF: CPT

## 2017-01-01 PROCEDURE — 86923 COMPATIBILITY TEST ELECTRIC: CPT | Performed by: PHYSICIAN ASSISTANT

## 2017-01-01 PROCEDURE — 96360 HYDRATION IV INFUSION INIT: CPT

## 2017-01-01 PROCEDURE — P9016 RBC LEUKOCYTES REDUCED: HCPCS | Performed by: PHYSICIAN ASSISTANT

## 2017-01-01 RX ORDER — METHYLPREDNISOLONE SODIUM SUCCINATE 125 MG/2ML
125 INJECTION, POWDER, LYOPHILIZED, FOR SOLUTION INTRAMUSCULAR; INTRAVENOUS
Status: CANCELLED
Start: 2017-01-01

## 2017-01-01 RX ORDER — PREDNISONE 5 MG/1
5 TABLET ORAL 2 TIMES DAILY
Qty: 42 TABLET | Refills: 0 | Status: SHIPPED | OUTPATIENT
Start: 2017-01-01 | End: 2017-01-01

## 2017-01-01 RX ORDER — EPINEPHRINE 1 MG/ML
0.3 INJECTION, SOLUTION, CONCENTRATE INTRAVENOUS EVERY 5 MIN PRN
Status: CANCELLED | OUTPATIENT
Start: 2017-01-01

## 2017-01-01 RX ORDER — LORAZEPAM 2 MG/ML
0.5 INJECTION INTRAMUSCULAR EVERY 4 HOURS PRN
Status: CANCELLED
Start: 2017-01-01

## 2017-01-01 RX ORDER — EPINEPHRINE 0.3 MG/.3ML
0.3 INJECTION SUBCUTANEOUS EVERY 5 MIN PRN
Status: CANCELLED | OUTPATIENT
Start: 2017-01-01

## 2017-01-01 RX ORDER — ALBUTEROL SULFATE 0.83 MG/ML
2.5 SOLUTION RESPIRATORY (INHALATION)
Status: CANCELLED | OUTPATIENT
Start: 2017-01-01

## 2017-01-01 RX ORDER — EPINEPHRINE 1 MG/ML
0.3 INJECTION INTRAMUSCULAR; INTRAVENOUS; SUBCUTANEOUS EVERY 5 MIN PRN
Status: CANCELLED | OUTPATIENT
Start: 2017-01-01

## 2017-01-01 RX ORDER — ALBUTEROL SULFATE 90 UG/1
1-2 AEROSOL, METERED RESPIRATORY (INHALATION)
Status: CANCELLED
Start: 2017-01-01

## 2017-01-01 RX ORDER — DEXAMETHASONE 4 MG/1
4 TABLET ORAL
Qty: 12 TABLET | Refills: 0 | Status: SHIPPED | OUTPATIENT
Start: 2017-01-01 | End: 2018-01-01

## 2017-01-01 RX ORDER — LORAZEPAM 0.5 MG/1
0.5 TABLET ORAL EVERY 4 HOURS PRN
Qty: 30 TABLET | Refills: 5 | Status: SHIPPED | OUTPATIENT
Start: 2017-01-01 | End: 2017-01-01

## 2017-01-01 RX ORDER — DEXAMETHASONE 4 MG/1
TABLET ORAL
Qty: 6 TABLET | Refills: 3 | Status: SHIPPED | OUTPATIENT
Start: 2017-01-01 | End: 2017-01-01

## 2017-01-01 RX ORDER — LORAZEPAM 0.5 MG/1
0.5 TABLET ORAL EVERY 4 HOURS PRN
Qty: 30 TABLET | Refills: 5 | Status: SHIPPED | OUTPATIENT
Start: 2017-01-01 | End: 2018-01-01

## 2017-01-01 RX ORDER — MEPERIDINE HYDROCHLORIDE 25 MG/ML
25 INJECTION INTRAMUSCULAR; INTRAVENOUS; SUBCUTANEOUS EVERY 30 MIN PRN
Status: CANCELLED | OUTPATIENT
Start: 2017-01-01

## 2017-01-01 RX ORDER — FUROSEMIDE 10 MG/ML
10 INJECTION INTRAMUSCULAR; INTRAVENOUS ONCE
Status: CANCELLED
Start: 2017-01-01 | End: 2017-01-01

## 2017-01-01 RX ORDER — DEXAMETHASONE 4 MG/1
TABLET ORAL
Qty: 4 TABLET | Refills: 9 | Status: SHIPPED | OUTPATIENT
Start: 2017-01-01 | End: 2017-01-01

## 2017-01-01 RX ORDER — DIPHENHYDRAMINE HYDROCHLORIDE 50 MG/ML
50 INJECTION INTRAMUSCULAR; INTRAVENOUS
Status: CANCELLED
Start: 2017-01-01

## 2017-01-01 RX ORDER — SODIUM CHLORIDE 9 MG/ML
1000 INJECTION, SOLUTION INTRAVENOUS CONTINUOUS PRN
Status: CANCELLED
Start: 2017-01-01

## 2017-01-01 RX ORDER — DEXAMETHASONE 4 MG/1
4 TABLET ORAL
Qty: 12 TABLET | Refills: 0 | Status: SHIPPED | OUTPATIENT
Start: 2017-01-01 | End: 2017-01-01

## 2017-01-01 RX ORDER — PROCHLORPERAZINE MALEATE 10 MG
5 TABLET ORAL EVERY 6 HOURS PRN
Qty: 30 TABLET | Refills: 5 | Status: SHIPPED | OUTPATIENT
Start: 2017-01-01 | End: 2017-01-01

## 2017-01-01 RX ORDER — FUROSEMIDE 10 MG/ML
10 INJECTION INTRAMUSCULAR; INTRAVENOUS ONCE
Status: DISCONTINUED | OUTPATIENT
Start: 2017-01-01 | End: 2017-01-01 | Stop reason: HOSPADM

## 2017-01-01 RX ORDER — CALCIUM CARBONATE 500(1250)
1 TABLET ORAL 2 TIMES DAILY
Qty: 120 TABLET | Refills: 3 | Status: ON HOLD | OUTPATIENT
Start: 2017-01-01 | End: 2018-01-01

## 2017-01-01 RX ORDER — FUROSEMIDE 10 MG/ML
10 INJECTION INTRAMUSCULAR; INTRAVENOUS ONCE
Status: COMPLETED | OUTPATIENT
Start: 2017-01-01 | End: 2017-01-01

## 2017-01-01 RX ORDER — HYDROCODONE BITARTRATE AND ACETAMINOPHEN 5; 325 MG/1; MG/1
1 TABLET ORAL EVERY 6 HOURS PRN
Qty: 30 TABLET | Refills: 0 | Status: SHIPPED | OUTPATIENT
Start: 2017-01-01 | End: 2018-01-01

## 2017-01-01 RX ORDER — PREDNISONE 5 MG/1
TABLET ORAL
Qty: 60 TABLET | Refills: 2 | OUTPATIENT
Start: 2017-01-01

## 2017-01-01 RX ORDER — PREDNISONE 5 MG/1
5 TABLET ORAL 2 TIMES DAILY
Qty: 42 TABLET | Refills: 3 | Status: ON HOLD | OUTPATIENT
Start: 2017-01-01 | End: 2018-01-01

## 2017-01-01 RX ADMIN — DEXAMETHASONE SODIUM PHOSPHATE 20 MG: 10 INJECTION, SOLUTION INTRAMUSCULAR; INTRAVENOUS at 14:21

## 2017-01-01 RX ADMIN — SODIUM CHLORIDE 250 ML: 9 INJECTION, SOLUTION INTRAVENOUS at 15:29

## 2017-01-01 RX ADMIN — DEXAMETHASONE SODIUM PHOSPHATE 20 MG: 10 INJECTION, SOLUTION INTRAMUSCULAR; INTRAVENOUS at 15:29

## 2017-01-01 RX ADMIN — SODIUM CHLORIDE 250 ML: 9 INJECTION, SOLUTION INTRAVENOUS at 11:20

## 2017-01-01 RX ADMIN — LIDOCAINE HYDROCHLORIDE 0.5 ML: 10 INJECTION, SOLUTION EPIDURAL; INFILTRATION; INTRACAUDAL; PERINEURAL at 10:40

## 2017-01-01 RX ADMIN — DOCETAXEL 106 MG: 20 INJECTION, SOLUTION, CONCENTRATE INTRAVENOUS at 16:01

## 2017-01-01 RX ADMIN — LIDOCAINE HYDROCHLORIDE 0.5 ML: 10 INJECTION, SOLUTION INFILTRATION; PERINEURAL at 12:30

## 2017-01-01 RX ADMIN — DOCETAXEL 106 MG: 80 INJECTION, SOLUTION, CONCENTRATE INTRAVENOUS at 14:42

## 2017-01-01 RX ADMIN — SODIUM CHLORIDE 250 ML: 9 INJECTION, SOLUTION INTRAVENOUS at 14:19

## 2017-01-01 RX ADMIN — DEXAMETHASONE SODIUM PHOSPHATE 20 MG: 10 INJECTION, SOLUTION INTRAMUSCULAR; INTRAVENOUS at 14:11

## 2017-01-01 RX ADMIN — LIDOCAINE HYDROCHLORIDE 0.5 ML: 10 INJECTION, SOLUTION EPIDURAL; INFILTRATION; INTRACAUDAL; PERINEURAL at 11:00

## 2017-01-01 RX ADMIN — LEUPROLIDE ACETATE 22.5 MG: KIT at 14:11

## 2017-01-01 RX ADMIN — DENOSUMAB 120 MG: 120 INJECTION SUBCUTANEOUS at 14:00

## 2017-01-01 RX ADMIN — DENOSUMAB 120 MG: 120 INJECTION SUBCUTANEOUS at 14:07

## 2017-01-01 RX ADMIN — SODIUM CHLORIDE 250 ML: 9 INJECTION, SOLUTION INTRAVENOUS at 14:10

## 2017-01-01 RX ADMIN — DOCETAXEL 106 MG: 80 INJECTION, SOLUTION, CONCENTRATE INTRAVENOUS at 13:30

## 2017-01-01 RX ADMIN — DOCETAXEL 106 MG: 80 INJECTION, SOLUTION INTRAVENOUS at 11:40

## 2017-01-01 RX ADMIN — DEXAMETHASONE SODIUM PHOSPHATE 20 MG: 10 INJECTION, SOLUTION INTRAMUSCULAR; INTRAVENOUS at 13:16

## 2017-01-01 RX ADMIN — LIDOCAINE HYDROCHLORIDE 0.5 ML: 10 INJECTION, SOLUTION INFILTRATION; PERINEURAL at 14:46

## 2017-01-01 RX ADMIN — SODIUM CHLORIDE 106 MG: 9 INJECTION, SOLUTION INTRAVENOUS at 11:04

## 2017-01-01 RX ADMIN — SODIUM CHLORIDE 500 ML: 9 INJECTION, SOLUTION INTRAVENOUS at 08:19

## 2017-01-01 RX ADMIN — PEGFILGRASTIM 6 MG: KIT SUBCUTANEOUS at 16:05

## 2017-01-01 RX ADMIN — SODIUM CHLORIDE 250 ML: 9 INJECTION, SOLUTION INTRAVENOUS at 13:12

## 2017-01-01 RX ADMIN — PEGFILGRASTIM 6 MG: KIT SUBCUTANEOUS at 11:59

## 2017-01-01 RX ADMIN — DEXAMETHASONE SODIUM PHOSPHATE 20 MG: 10 INJECTION, SOLUTION INTRAMUSCULAR; INTRAVENOUS at 11:20

## 2017-01-01 RX ADMIN — LEUPROLIDE ACETATE 22.5 MG: KIT at 14:12

## 2017-01-01 RX ADMIN — PEGFILGRASTIM 6 MG: KIT SUBCUTANEOUS at 15:39

## 2017-01-01 RX ADMIN — DOCETAXEL 106 MG: 80 INJECTION, SOLUTION, CONCENTRATE INTRAVENOUS at 14:46

## 2017-01-01 RX ADMIN — PEGFILGRASTIM 6 MG: KIT SUBCUTANEOUS at 15:43

## 2017-01-01 RX ADMIN — LIDOCAINE HYDROCHLORIDE 0.5 ML: 10 INJECTION, SOLUTION INFILTRATION; PERINEURAL at 13:13

## 2017-01-01 RX ADMIN — DEXAMETHASONE SODIUM PHOSPHATE 20 MG: 10 INJECTION, SOLUTION INTRAMUSCULAR; INTRAVENOUS at 10:44

## 2017-01-01 RX ADMIN — LIDOCAINE HYDROCHLORIDE 0.5 ML: 10 INJECTION, SOLUTION EPIDURAL; INFILTRATION; INTRACAUDAL; PERINEURAL at 08:43

## 2017-01-01 RX ADMIN — SODIUM CHLORIDE 500 ML: 9 INJECTION, SOLUTION INTRAVENOUS at 10:44

## 2017-01-01 RX ADMIN — PEGFILGRASTIM 6 MG: KIT SUBCUTANEOUS at 14:31

## 2017-01-01 RX ADMIN — FUROSEMIDE 10 MG: 10 INJECTION, SOLUTION INTRAMUSCULAR; INTRAVENOUS at 17:03

## 2017-01-01 RX ADMIN — LIDOCAINE HYDROCHLORIDE 0.5 ML: 10 INJECTION, SOLUTION INFILTRATION; PERINEURAL at 13:17

## 2017-01-01 RX ADMIN — PEGFILGRASTIM 6 MG: KIT SUBCUTANEOUS at 12:37

## 2017-01-01 ASSESSMENT — PAIN SCALES - GENERAL
PAINLEVEL: NO PAIN (0)
PAINLEVEL: MODERATE PAIN (4)
PAINLEVEL: NO PAIN (0)
PAINLEVEL: MODERATE PAIN (5)
PAINLEVEL: MILD PAIN (2)
PAINLEVEL: NO PAIN (0)
PAINLEVEL: MILD PAIN (3)
PAINLEVEL: NO PAIN (0)
PAINLEVEL: MILD PAIN (3)
PAINLEVEL: MODERATE PAIN (4)

## 2017-01-06 ENCOUNTER — OFFICE VISIT (OUTPATIENT)
Dept: UROLOGY | Facility: CLINIC | Age: 72
End: 2017-01-06

## 2017-01-06 VITALS — BODY MASS INDEX: 23.99 KG/M2 | WEIGHT: 153.2 LBS

## 2017-01-06 VITALS
SYSTOLIC BLOOD PRESSURE: 116 MMHG | HEIGHT: 67 IN | WEIGHT: 153.2 LBS | HEART RATE: 80 BPM | BODY MASS INDEX: 24.04 KG/M2 | DIASTOLIC BLOOD PRESSURE: 63 MMHG

## 2017-01-06 DIAGNOSIS — N20.1 CALCULUS OF URETER: Primary | ICD-10-CM

## 2017-01-06 DIAGNOSIS — C61 PROSTATE CANCER METASTATIC TO BONE (H): ICD-10-CM

## 2017-01-06 DIAGNOSIS — C79.51 PROSTATE CANCER METASTATIC TO BONE (H): ICD-10-CM

## 2017-01-06 LAB
BASOPHILS # BLD AUTO: 0.1 10E9/L (ref 0–0.2)
BASOPHILS NFR BLD AUTO: 0.9 %
DIFFERENTIAL METHOD BLD: ABNORMAL
EOSINOPHIL # BLD AUTO: 0.1 10E9/L (ref 0–0.7)
EOSINOPHIL NFR BLD AUTO: 1.7 %
ERYTHROCYTE [DISTWIDTH] IN BLOOD BY AUTOMATED COUNT: 18.2 % (ref 10–15)
HCT VFR BLD AUTO: 32.3 % (ref 40–53)
HGB BLD-MCNC: 10.3 G/DL (ref 13.3–17.7)
IMM GRANULOCYTES # BLD: 0.2 10E9/L (ref 0–0.4)
IMM GRANULOCYTES NFR BLD: 2.3 %
LYMPHOCYTES # BLD AUTO: 1.6 10E9/L (ref 0.8–5.3)
LYMPHOCYTES NFR BLD AUTO: 19.9 %
MCH RBC QN AUTO: 28.3 PG (ref 26.5–33)
MCHC RBC AUTO-ENTMCNC: 31.9 G/DL (ref 31.5–36.5)
MCV RBC AUTO: 89 FL (ref 78–100)
MONOCYTES # BLD AUTO: 0.8 10E9/L (ref 0–1.3)
MONOCYTES NFR BLD AUTO: 10.3 %
NEUTROPHILS # BLD AUTO: 5 10E9/L (ref 1.6–8.3)
NEUTROPHILS NFR BLD AUTO: 64.9 %
NRBC # BLD AUTO: 0 10*3/UL
NRBC BLD AUTO-RTO: 0 /100
PLATELET # BLD AUTO: 217 10E9/L (ref 150–450)
RBC # BLD AUTO: 3.64 10E12/L (ref 4.4–5.9)
WBC # BLD AUTO: 7.8 10E9/L (ref 4–11)

## 2017-01-06 PROCEDURE — 85025 COMPLETE CBC W/AUTO DIFF WBC: CPT | Performed by: INTERNAL MEDICINE

## 2017-01-06 ASSESSMENT — PAIN SCALES - GENERAL: PAINLEVEL: NO PAIN (0)

## 2017-01-06 NOTE — Clinical Note
1/6/2017       RE: Oswaldo Win  [address]  [facility] [address]     Dear Colleague,    Thank you for referring your patient, Oswaldo Win, to the [facility] UROLOGY AND INST FOR PROSTATE AND UROLOGIC CANCERS at [facility]. Please see a copy of my visit note below.    Office Visit Note      UROLOGIC DIAGNOSES:       CURRENT INTERVENTIONS:       HISTORY:     Patient with prostate cancer found to have large obstructing ureteral calculus. Required stent placement and had delay to URS due to patient's multiple medical issues. Had URS.   CT scan showed no residual stones and no evidence of hydronephrosis.       PAST MEDICAL HISTORY:   Past Medical History   Diagnosis Date     ASCVD (arteriosclerotic cardiovascular disease) 4/10/2014     ST elevation MI (STEMI) (H) 4/5/2014     Closed L4 vertebral fracture (H) 2005     traumatic fracture     Fx tib/fib fol insrt ortho implnt/prosth/bone plt, right leg 2005     rt tib/fib fracture s/p ORIF     Musculoskeletal leg pain      from old leg injuries     Hyperlipidemia LDL goal < 70      Hypertension      Stented coronary artery      Dyspnea on exertion      Other chronic pain      Arthritis      Prostate cancer 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)      COPD (chronic obstructive pulmonary disease) (H)      Gastro-oesophageal reflux disease      Thrombosis of leg 10/2015     Influenza A 3/27/2016       PAST SURGICAL HISTORY:   Past Surgical History   Procedure Laterality Date     Tonsillectomy & adenoidectomy  age 18     Herniorrhaphy inguinal  1968     RT     Open reduction internal fixation tibia  2005     RT Tib/Fib ORIF     Colonoscopy       Davinci bypass artery coronary  7/1/2014     Procedure: DAVINCI BYPASS ARTERY CORONARY;  Surgeon: Kike Coley MD;  [facility]     Herniorrhaphy inguinal Left 11/18/2014     Procedure: HERNIORRHAPHY INGUINAL;  Surgeon: Max Garza MD;  [facility]     Laser holmium  lithotripsy ureter(s), insert stent, combined N/A 7/27/2015     Procedure: COMBINED CYSTOSCOPY, URETEROSCOPY, LASER HOLMIUM LITHOTRIPSY URETER(S), INSERT STENT;  Surgeon: Maame Ramirez MD;  [facility]     Anesthesia out of or percutaneous mitral valve repair N/A 10/16/2015     Procedure: ANESTHESIA OUT OF OR PERCUTANEOUS MITRAL VALVE REPAIR;  Surgeon: GENERIC ANESTHESIA PROVIDER;  [facility]     Combined cystoscopy, retrogrades, exchange stent ureter(s) Left 12/14/2015     Procedure: COMBINED CYSTOSCOPY, RETROGRADES, EXCHANGE STENT URETER(S);  Surgeon: Maame Ramirez MD;  [facility]     Excise mass lower extremity Right 2/5/2016     Procedure: EXCISE MASS LOWER EXTREMITY;  Surgeon: Miquel Figueredo MD;  [facility]     Laser holmium lithotripsy ureter(s), insert stent, combined Left 9/19/2016     Procedure: COMBINED CYSTOSCOPY, URETEROSCOPY, LASER HOLMIUM LITHOTRIPSY URETER(S), INSERT STENT;  Surgeon: Maame Ramirez MD;  [facility]       FAMILY HISTORY:   Family History   Problem Relation Age of Onset     HEART DISEASE Mother 50     CAB, pacemaker     HEART DISEASE Father 41     MI     HEART DISEASE Maternal Uncle      Breast Cancer Paternal Grandmother      Breast Cancer Paternal Aunt      CEREBROVASCULAR DISEASE No family hx of      DIABETES No family hx of        SOCIAL HISTORY:   Social History   Substance Use Topics     Smoking status: Former Smoker -- 0.50 packs/day for 40 years     Types: Cigarettes     Quit date: 02/01/2000     Smokeless tobacco: Former User     Alcohol Use: 0.0 oz/week     0 Standard drinks or equivalent per week      Comment: rare       Current Outpatient Prescriptions   Medication     metoprolol (TOPROL-XL) 25 MG 24 hr tablet     Cholecalciferol (VITAMIN D) 2000 UNITS tablet     rivaroxaban ANTICOAGULANT (XARELTO) 20 MG TABS tablet     simvastatin (ZOCOR) 40 MG tablet     abiraterone (ZYTIGA) 250 MG tablet     tamsulosin (FLOMAX)  "0.4 MG 24 hr capsule     predniSONE (DELTASONE) 5 MG tablet     HYDROcodone-acetaminophen (NORCO) 5-325 MG per tablet     mirtazapine (REMERON) 15 MG tablet     aspirin 81 MG tablet     calcium carbonate (OS-RODNEY 500 MG [place]. [place]) 500 MG tablet     multivitamin, therapeutic with minerals (MULTI-VITAMIN) TABS     [DISCONTINUED] simvastatin (ZOCOR) 40 MG tablet     No current facility-administered medications for this visit.     Facility-Administered Medications Ordered in Other Visits   Medication     hydrocortisone sodium succinate (solu-CORTEF) injection     glycopyrrolate (ROBINUL) injection     neostigmine (PROSTIGMINE) injection     protamine injection     albuterol (PROAIR HFA, PROVENTIL HFA, VENTOLIN HFA) inhaler         PHYSICAL EXAM:    /63 mmHg  Pulse 80  Ht 1.702 m (5' 7\")  Wt 69.49 kg (153 lb 3.2 oz)  BMI 23.99 kg/m2    HEENT: Normocephalic and atraumatic   Cardiac: Not done  Back/Flank: Not done  CNS/PNS: Not done  Respiratory: Normal non-labored breathing  Abdomen: Soft nontender and nondistended  Peripheral Vascular: Not done  Mental Status: Not done    Penis: Not done  Scrotal Skin: Not done  Testicles: Not done  Epididymis: Not done  Digital Rectal Exam:     Cystoscopy: Not done    Imaging: None    Urinalysis: [facility] RESULTS:  Recent Labs   Lab Test  09/14/16   1039   COLOR  Yellow   APPEARANCE  Cloudy   URINEGLC  Negative   URINEBILI  Negative   URINEKETONE  Negative   SG  1.013   UBLD  Large*   URINEPH  5.0   PROTEIN  100*   NITRITE  Negative   LEUKEST  Small*   RBCU  >182*   WBCU  21*       PSA:     Post Void Residual:     Other labs: None today      IMPRESSION:  Patient with prostate cancer with ureteral calculus, s/p ureteroscopy     PLAN:  CT scan in one year   Follow up in one year   Offered patient stone risk assessment but patient deferred     Total Time: 15 minutes                                       Total in Consultation: greater than 50%         Again, thank you for allowing me to " participate in the care of your patient.      Sincerely,    Maame Ramirez MD

## 2017-01-06 NOTE — NURSING NOTE
"Chief Complaint   Patient presents with     RECHECK     Kidney stone follow up       Blood pressure 116/63, pulse 80, height 1.702 m (5' 7\"), weight 69.49 kg (153 lb 3.2 oz). Body mass index is 23.99 kg/(m^2).    Patient Active Problem List   Diagnosis     Advanced directives, counseling/discussion     CARDIOVASCULAR SCREENING; LDL GOAL LESS THAN 160     HL (hearing loss)     ASCVD (arteriosclerotic cardiovascular disease)     Musculoskeletal leg pain     Hyperlipidemia with target LDL less than 70     S/P coronary artery bypass graft x 1     NSTEMI (non-ST elevated myocardial infarction) (H)     History of non-ST elevation myocardial infarction (NSTEMI)     Inguinal hernia, incarcerated     Metastatic bone tumor (H)     Prostate cancer 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)     SOB (shortness of breath)     Nausea vomiting and diarrhea     Chronic systolic heart failure (HCC)     HFrEF (heart failure with reduced ejection fraction) (H)     Mitral valve prolapse     Mitral regurgitation     Kidney stone     Long-term (current) use of anticoagulants [Z79.01]     Deep vein thrombosis (DVT) ([facility]) [I82.409]     Influenza     Examination of participant in clinical trial     Pulmonary embolism (H)       No Known Allergies    Current Outpatient Prescriptions   Medication Sig Dispense Refill     metoprolol (TOPROL-XL) 25 MG 24 hr tablet Take 1 tablet (25 mg) by mouth daily AM 90 tablet 4     Cholecalciferol (VITAMIN D) 2000 UNITS tablet Take 1 tablet by mouth daily 100 tablet 3     rivaroxaban ANTICOAGULANT (XARELTO) 20 MG TABS tablet Take 1 tablet (20 mg) by mouth daily (with dinner) 30 tablet 11     simvastatin (ZOCOR) 40 MG tablet TAKE ONE TABLET BY MOUTH AT BEDTIME 90 tablet 3     abiraterone (ZYTIGA) 250 MG tablet Take 4 tablets (1,000 mg) by mouth every morning (before breakfast) 120 tablet 1     tamsulosin (FLOMAX) 0.4 MG 24 hr capsule Take 1 capsule (0.4 mg) by mouth daily AM 90 capsule 3     predniSONE (DELTASONE) 5 MG tablet Take " 1 tablet (5 mg) by mouth 2 times daily 60 tablet 3     HYDROcodone-acetaminophen (NORCO) 5-325 MG per tablet Take 1 tablet by mouth every 6 hours as needed for moderate to severe pain       mirtazapine (REMERON) 15 MG tablet Take 1 tablet (15 mg) by mouth At Bedtime 30 tablet 2     aspirin 81 MG tablet Take 1 tablet (81 mg) by mouth daily AM 90 tablet 3     calcium carbonate (OS-RODNEY 500 MG [place]. [place]) 500 MG tablet Take 2 tablets (1,000 mg) by mouth 3 times daily (with meals) (Patient taking differently: Take 1,000 mg by mouth 2 times daily ) 90 tablet 1     multivitamin, therapeutic with minerals (MULTI-VITAMIN) TABS Take 1 tablet by mouth every morning  100 tablet 3     [DISCONTINUED] simvastatin (ZOCOR) 40 MG tablet Take 1 tablet (40 mg) by mouth At Bedtime 30 tablet 0       Social History   Substance Use Topics     Smoking status: Former Smoker -- 0.50 packs/day for 40 years     Types: Cigarettes     Quit date: 02/01/2000     Smokeless tobacco: Former User     Alcohol Use: 0.0 oz/week     0 Standard drinks or equivalent per week      Comment: CHRISTA Pillai  1/6/2017  10:27 AM

## 2017-01-06 NOTE — PATIENT INSTRUCTIONS
Return in one year with a CT.    It was a pleasure meeting with you today.  Thank you for allowing me and my team the privilege of caring for you today.  YOU are the reason we are here, and I truly hope we provided you with the excellent service you deserve.  Please let us know if there is anything else we can do for you so that we can be sure you are leaving completely satisfied with your care experience.

## 2017-01-06 NOTE — PROGRESS NOTES
Office Visit Note      UROLOGIC DIAGNOSES:       CURRENT INTERVENTIONS:       HISTORY:     Patient with prostate cancer found to have large obstructing ureteral calculus. Required stent placement and had delay to URS due to patient's multiple medical issues. Had URS.   CT scan showed no residual stones and no evidence of hydronephrosis.       PAST MEDICAL HISTORY:   Past Medical History   Diagnosis Date     ASCVD (arteriosclerotic cardiovascular disease) 4/10/2014     ST elevation MI (STEMI) (H) 4/5/2014     Closed L4 vertebral fracture (H) 2005     traumatic fracture     Fx tib/fib fol insrt ortho implnt/prosth/bone plt, right leg 2005     rt tib/fib fracture s/p ORIF     Musculoskeletal leg pain      from old leg injuries     Hyperlipidemia LDL goal < 70      Hypertension      Stented coronary artery      Dyspnea on exertion      Other chronic pain      Arthritis      Prostate cancer 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)      COPD (chronic obstructive pulmonary disease) (H)      Gastro-oesophageal reflux disease      Thrombosis of leg 10/2015     Influenza A 3/27/2016       PAST SURGICAL HISTORY:   Past Surgical History   Procedure Laterality Date     Tonsillectomy & adenoidectomy  age 18     Herniorrhaphy inguinal  1968     RT     Open reduction internal fixation tibia  2005     RT Tib/Fib ORIF     Colonoscopy       Davinci bypass artery coronary  7/1/2014     Procedure: DAVINCI BYPASS ARTERY CORONARY;  Surgeon: Kike Coley MD;  [facility]     Herniorrhaphy inguinal Left 11/18/2014     Procedure: HERNIORRHAPHY INGUINAL;  Surgeon: Max Garza MD;  [facility]     Laser holmium lithotripsy ureter(s), insert stent, combined N/A 7/27/2015     Procedure: COMBINED CYSTOSCOPY, URETEROSCOPY, LASER HOLMIUM LITHOTRIPSY URETER(S), INSERT STENT;  Surgeon: Maame Ramirez MD;  [facility]     Anesthesia out of or percutaneous mitral valve repair N/A 10/16/2015     Procedure: ANESTHESIA OUT OF  OR PERCUTANEOUS MITRAL VALVE REPAIR;  Surgeon: GENERIC ANESTHESIA PROVIDER;  [facility]     Combined cystoscopy, retrogrades, exchange stent ureter(s) Left 12/14/2015     Procedure: COMBINED CYSTOSCOPY, RETROGRADES, EXCHANGE STENT URETER(S);  Surgeon: Maame Ramirez MD;  [facility]     Excise mass lower extremity Right 2/5/2016     Procedure: EXCISE MASS LOWER EXTREMITY;  Surgeon: Miquel Figueredo MD;  [facility]     Laser holmium lithotripsy ureter(s), insert stent, combined Left 9/19/2016     Procedure: COMBINED CYSTOSCOPY, URETEROSCOPY, LASER HOLMIUM LITHOTRIPSY URETER(S), INSERT STENT;  Surgeon: Maame Ramirez MD;  [facility]       FAMILY HISTORY:   Family History   Problem Relation Age of Onset     HEART DISEASE Mother 50     CAB, pacemaker     HEART DISEASE Father 41     MI     HEART DISEASE Maternal Uncle      Breast Cancer Paternal Grandmother      Breast Cancer Paternal Aunt      CEREBROVASCULAR DISEASE No family hx of      DIABETES No family hx of        SOCIAL HISTORY:   Social History   Substance Use Topics     Smoking status: Former Smoker -- 0.50 packs/day for 40 years     Types: Cigarettes     Quit date: 02/01/2000     Smokeless tobacco: Former User     Alcohol Use: 0.0 oz/week     0 Standard drinks or equivalent per week      Comment: rare       Current Outpatient Prescriptions   Medication     metoprolol (TOPROL-XL) 25 MG 24 hr tablet     Cholecalciferol (VITAMIN D) 2000 UNITS tablet     rivaroxaban ANTICOAGULANT (XARELTO) 20 MG TABS tablet     simvastatin (ZOCOR) 40 MG tablet     abiraterone (ZYTIGA) 250 MG tablet     tamsulosin (FLOMAX) 0.4 MG 24 hr capsule     predniSONE (DELTASONE) 5 MG tablet     HYDROcodone-acetaminophen (NORCO) 5-325 MG per tablet     mirtazapine (REMERON) 15 MG tablet     aspirin 81 MG tablet     calcium carbonate (OS-RODNEY 500 MG [place]. [place]) 500 MG tablet     multivitamin, therapeutic with minerals (MULTI-VITAMIN) TABS      "[DISCONTINUED] simvastatin (ZOCOR) 40 MG tablet     No current facility-administered medications for this visit.     Facility-Administered Medications Ordered in Other Visits   Medication     hydrocortisone sodium succinate (solu-CORTEF) injection     glycopyrrolate (ROBINUL) injection     neostigmine (PROSTIGMINE) injection     protamine injection     albuterol (PROAIR HFA, PROVENTIL HFA, VENTOLIN HFA) inhaler         PHYSICAL EXAM:    /63 mmHg  Pulse 80  Ht 1.702 m (5' 7\")  Wt 69.49 kg (153 lb 3.2 oz)  BMI 23.99 kg/m2    HEENT: Normocephalic and atraumatic   Cardiac: Not done  Back/Flank: Not done  CNS/PNS: Not done  Respiratory: Normal non-labored breathing  Abdomen: Soft nontender and nondistended  Peripheral Vascular: Not done  Mental Status: Not done    Penis: Not done  Scrotal Skin: Not done  Testicles: Not done  Epididymis: Not done  Digital Rectal Exam:     Cystoscopy: Not done    Imaging: None    Urinalysis: [facility] RESULTS:  Recent Labs   Lab Test  09/14/16   1039   COLOR  Yellow   APPEARANCE  Cloudy   URINEGLC  Negative   URINEBILI  Negative   URINEKETONE  Negative   SG  1.013   UBLD  Large*   URINEPH  5.0   PROTEIN  100*   NITRITE  Negative   LEUKEST  Small*   RBCU  >182*   WBCU  21*       PSA:     Post Void Residual:     Other labs: None today      IMPRESSION:  Patient with prostate cancer with ureteral calculus, s/p ureteroscopy     PLAN:  CT scan in one year   Follow up in one year   Offered patient stone risk assessment but patient deferred     Total Time: 15 minutes                                       Total in Consultation: greater than 50%                     "

## 2017-01-09 ENCOUNTER — DOCUMENTATION ONLY (OUTPATIENT)
Dept: ONCOLOGY | Facility: CLINIC | Age: 72
End: 2017-01-09

## 2017-01-10 ENCOUNTER — TELEPHONE (OUTPATIENT)
Dept: ONCOLOGY | Facility: CLINIC | Age: 72
End: 2017-01-10

## 2017-01-10 DIAGNOSIS — Z79.01 LONG TERM CURRENT USE OF ANTICOAGULANT THERAPY: Primary | ICD-10-CM

## 2017-01-10 NOTE — TELEPHONE ENCOUNTER
Patient reporting: bloody sputum (when he clears his throat) and blood in his stool and blood when wiping after a BM. Denies any bruising or bleeding gums. Paged Dr. Robles, per Dr. Robles: hold Xarelto today and Oswaldo will see Jessie Kay tomorrow for further assessment/labs. Called Oswaldo back and he informed me at that time he had already taken his Xarelto (instead of taking at dinner per script). Sent a message to Jessie Kay in regards to any lab orders she would like placed.

## 2017-01-11 ENCOUNTER — MEDICAL CORRESPONDENCE (OUTPATIENT)
Dept: HEALTH INFORMATION MANAGEMENT | Facility: CLINIC | Age: 72
End: 2017-01-11

## 2017-01-11 ENCOUNTER — HOSPITAL ENCOUNTER (INPATIENT)
Facility: CLINIC | Age: 72
LOS: 3 days | Discharge: HOME | DRG: 378 | End: 2017-01-14
Attending: INTERNAL MEDICINE | Admitting: INTERNAL MEDICINE
Payer: COMMERCIAL

## 2017-01-11 ENCOUNTER — ONCOLOGY VISIT (OUTPATIENT)
Dept: ONCOLOGY | Facility: CLINIC | Age: 72
DRG: 378 | End: 2017-01-11
Attending: PHYSICIAN ASSISTANT
Payer: COMMERCIAL

## 2017-01-11 ENCOUNTER — INFUSION THERAPY VISIT (OUTPATIENT)
Dept: ONCOLOGY | Facility: CLINIC | Age: 72
DRG: 378 | End: 2017-01-11
Attending: INTERNAL MEDICINE
Payer: COMMERCIAL

## 2017-01-11 VITALS — DIASTOLIC BLOOD PRESSURE: 39 MMHG | HEART RATE: 82 BPM | OXYGEN SATURATION: 99 % | SYSTOLIC BLOOD PRESSURE: 114 MMHG

## 2017-01-11 VITALS
SYSTOLIC BLOOD PRESSURE: 97 MMHG | RESPIRATION RATE: 18 BRPM | BODY MASS INDEX: 24.44 KG/M2 | HEIGHT: 67 IN | HEART RATE: 100 BPM | WEIGHT: 155.7 LBS | TEMPERATURE: 97.3 F | DIASTOLIC BLOOD PRESSURE: 51 MMHG | OXYGEN SATURATION: 97 %

## 2017-01-11 DIAGNOSIS — C61 PROSTATE CANCER METASTATIC TO BONE (H): Primary | ICD-10-CM

## 2017-01-11 DIAGNOSIS — R07.9 CHEST PAIN, UNSPECIFIED TYPE: ICD-10-CM

## 2017-01-11 DIAGNOSIS — I26.99 OTHER ACUTE PULMONARY EMBOLISM WITHOUT ACUTE COR PULMONALE (H): ICD-10-CM

## 2017-01-11 DIAGNOSIS — C79.51 PROSTATE CANCER METASTATIC TO BONE (H): Primary | ICD-10-CM

## 2017-01-11 DIAGNOSIS — Z79.01 LONG TERM CURRENT USE OF ANTICOAGULANT THERAPY: ICD-10-CM

## 2017-01-11 DIAGNOSIS — Z86.718 PERSONAL HISTORY OF VENOUS THROMBOSIS AND EMBOLISM: ICD-10-CM

## 2017-01-11 DIAGNOSIS — E83.51 HYPOCALCEMIA: ICD-10-CM

## 2017-01-11 DIAGNOSIS — D62 ANEMIA DUE TO BLOOD LOSS, ACUTE: ICD-10-CM

## 2017-01-11 PROBLEM — K92.2 GI BLEED: Status: ACTIVE | Noted: 2017-01-11

## 2017-01-11 LAB
ALBUMIN SERPL-MCNC: 3.4 G/DL (ref 3.4–5)
ALP SERPL-CCNC: 630 U/L (ref 40–150)
ALT SERPL W P-5'-P-CCNC: 15 U/L (ref 0–70)
ANION GAP SERPL CALCULATED.3IONS-SCNC: 8 MMOL/L (ref 3–14)
AST SERPL W P-5'-P-CCNC: 20 U/L (ref 0–45)
BASOPHILS # BLD AUTO: 0 10E9/L (ref 0–0.2)
BASOPHILS NFR BLD AUTO: 0.5 %
BILIRUB SERPL-MCNC: 1.1 MG/DL (ref 0.2–1.3)
BUN SERPL-MCNC: 29 MG/DL (ref 7–30)
CALCIUM SERPL-MCNC: 8.5 MG/DL (ref 8.5–10.1)
CHLORIDE SERPL-SCNC: 110 MMOL/L (ref 94–109)
CO2 SERPL-SCNC: 22 MMOL/L (ref 20–32)
CREAT SERPL-MCNC: 0.94 MG/DL (ref 0.66–1.25)
DIFFERENTIAL METHOD BLD: ABNORMAL
EOSINOPHIL # BLD AUTO: 0.1 10E9/L (ref 0–0.7)
EOSINOPHIL NFR BLD AUTO: 1.5 %
ERYTHROCYTE [DISTWIDTH] IN BLOOD BY AUTOMATED COUNT: 19.6 % (ref 10–15)
GFR SERPL CREATININE-BSD FRML MDRD: 79 ML/MIN/1.7M2
GLUCOSE SERPL-MCNC: 118 MG/DL (ref 70–99)
HCT VFR BLD AUTO: 24.5 % (ref 40–53)
HGB BLD-MCNC: 7.6 G/DL (ref 13.3–17.7)
IMM GRANULOCYTES # BLD: 0.2 10E9/L (ref 0–0.4)
IMM GRANULOCYTES NFR BLD: 2.8 %
INR PPP: 1.25 (ref 0.86–1.14)
LYMPHOCYTES # BLD AUTO: 1.4 10E9/L (ref 0.8–5.3)
LYMPHOCYTES NFR BLD AUTO: 23.1 %
MCH RBC QN AUTO: 27.8 PG (ref 26.5–33)
MCHC RBC AUTO-ENTMCNC: 31 G/DL (ref 31.5–36.5)
MCV RBC AUTO: 90 FL (ref 78–100)
MONOCYTES # BLD AUTO: 0.6 10E9/L (ref 0–1.3)
MONOCYTES NFR BLD AUTO: 10.4 %
NEUTROPHILS # BLD AUTO: 3.7 10E9/L (ref 1.6–8.3)
NEUTROPHILS NFR BLD AUTO: 61.7 %
NRBC # BLD AUTO: 0.1 10*3/UL
NRBC BLD AUTO-RTO: 1 /100
PLATELET # BLD AUTO: 172 10E9/L (ref 150–450)
POTASSIUM SERPL-SCNC: 4.2 MMOL/L (ref 3.4–5.3)
PROT SERPL-MCNC: 6.6 G/DL (ref 6.8–8.8)
RBC # BLD AUTO: 2.73 10E12/L (ref 4.4–5.9)
SODIUM SERPL-SCNC: 139 MMOL/L (ref 133–144)
TROPONIN I SERPL-MCNC: 0.02 UG/L (ref 0–0.04)
TROPONIN I SERPL-MCNC: NORMAL UG/L (ref 0–0.04)
WBC # BLD AUTO: 6 10E9/L (ref 4–11)

## 2017-01-11 PROCEDURE — 99214 OFFICE O/P EST MOD 30 MIN: CPT | Mod: ZP | Performed by: PHYSICIAN ASSISTANT

## 2017-01-11 PROCEDURE — 86901 BLOOD TYPING SEROLOGIC RH(D): CPT | Performed by: PHYSICIAN ASSISTANT

## 2017-01-11 PROCEDURE — 99212 OFFICE O/P EST SF 10 MIN: CPT | Mod: ZF

## 2017-01-11 PROCEDURE — 40000556 ZZH STATISTIC PERIPHERAL IV START W US GUIDANCE: Mod: ZF

## 2017-01-11 PROCEDURE — 25000125 ZZHC RX 250: Performed by: INTERNAL MEDICINE

## 2017-01-11 PROCEDURE — 86923 COMPATIBILITY TEST ELECTRIC: CPT | Performed by: PHYSICIAN ASSISTANT

## 2017-01-11 PROCEDURE — 25000125 ZZHC RX 250: Mod: ZF | Performed by: INTERNAL MEDICINE

## 2017-01-11 PROCEDURE — 12000001 ZZH R&B MED SURG/OB UMMC

## 2017-01-11 PROCEDURE — 25000128 H RX IP 250 OP 636: Mod: ZF | Performed by: PHYSICIAN ASSISTANT

## 2017-01-11 PROCEDURE — 85025 COMPLETE CBC W/AUTO DIFF WBC: CPT | Performed by: PHYSICIAN ASSISTANT

## 2017-01-11 PROCEDURE — 25000132 ZZH RX MED GY IP 250 OP 250 PS 637: Performed by: STUDENT IN AN ORGANIZED HEALTH CARE EDUCATION/TRAINING PROGRAM

## 2017-01-11 PROCEDURE — 84484 ASSAY OF TROPONIN QUANT: CPT | Performed by: PHYSICIAN ASSISTANT

## 2017-01-11 PROCEDURE — 25000128 H RX IP 250 OP 636: Performed by: STUDENT IN AN ORGANIZED HEALTH CARE EDUCATION/TRAINING PROGRAM

## 2017-01-11 PROCEDURE — 36415 COLL VENOUS BLD VENIPUNCTURE: CPT | Performed by: STUDENT IN AN ORGANIZED HEALTH CARE EDUCATION/TRAINING PROGRAM

## 2017-01-11 PROCEDURE — 86850 RBC ANTIBODY SCREEN: CPT | Performed by: PHYSICIAN ASSISTANT

## 2017-01-11 PROCEDURE — 40000141 ZZH STATISTIC PERIPHERAL IV START W/O US GUIDANCE

## 2017-01-11 PROCEDURE — 85610 PROTHROMBIN TIME: CPT | Performed by: STUDENT IN AN ORGANIZED HEALTH CARE EDUCATION/TRAINING PROGRAM

## 2017-01-11 PROCEDURE — 86900 BLOOD TYPING SEROLOGIC ABO: CPT | Performed by: PHYSICIAN ASSISTANT

## 2017-01-11 PROCEDURE — 80053 COMPREHEN METABOLIC PANEL: CPT | Performed by: PHYSICIAN ASSISTANT

## 2017-01-11 PROCEDURE — 84484 ASSAY OF TROPONIN QUANT: CPT | Performed by: STUDENT IN AN ORGANIZED HEALTH CARE EDUCATION/TRAINING PROGRAM

## 2017-01-11 RX ORDER — SODIUM CHLORIDE 9 MG/ML
INJECTION, SOLUTION INTRAVENOUS CONTINUOUS
Status: DISCONTINUED | OUTPATIENT
Start: 2017-01-11 | End: 2017-01-12

## 2017-01-11 RX ORDER — TAMSULOSIN HYDROCHLORIDE 0.4 MG/1
0.4 CAPSULE ORAL DAILY
Status: DISCONTINUED | OUTPATIENT
Start: 2017-01-12 | End: 2017-01-14 | Stop reason: HOSPADM

## 2017-01-11 RX ORDER — PREDNISONE 5 MG/1
5 TABLET ORAL DAILY
Status: DISCONTINUED | OUTPATIENT
Start: 2017-01-12 | End: 2017-01-14 | Stop reason: HOSPADM

## 2017-01-11 RX ORDER — SIMVASTATIN 20 MG
40 TABLET ORAL AT BEDTIME
Status: DISCONTINUED | OUTPATIENT
Start: 2017-01-11 | End: 2017-01-14 | Stop reason: HOSPADM

## 2017-01-11 RX ORDER — HYDROCODONE BITARTRATE AND ACETAMINOPHEN 5; 325 MG/1; MG/1
1 TABLET ORAL EVERY 6 HOURS PRN
Status: DISCONTINUED | OUTPATIENT
Start: 2017-01-11 | End: 2017-01-14 | Stop reason: HOSPADM

## 2017-01-11 RX ORDER — NALOXONE HYDROCHLORIDE 0.4 MG/ML
.1-.4 INJECTION, SOLUTION INTRAMUSCULAR; INTRAVENOUS; SUBCUTANEOUS
Status: DISCONTINUED | OUTPATIENT
Start: 2017-01-11 | End: 2017-01-14 | Stop reason: HOSPADM

## 2017-01-11 RX ORDER — CALCIUM CARBONATE 500(1250)
1 TABLET ORAL 2 TIMES DAILY WITH MEALS
Status: DISCONTINUED | OUTPATIENT
Start: 2017-01-12 | End: 2017-01-14 | Stop reason: HOSPADM

## 2017-01-11 RX ORDER — ABIRATERONE ACETATE 250 MG/1
1000 TABLET ORAL
Status: DISCONTINUED | OUTPATIENT
Start: 2017-01-12 | End: 2017-01-14 | Stop reason: HOSPADM

## 2017-01-11 RX ORDER — MIRTAZAPINE 15 MG/1
15 TABLET, FILM COATED ORAL AT BEDTIME
Status: DISCONTINUED | OUTPATIENT
Start: 2017-01-11 | End: 2017-01-14 | Stop reason: HOSPADM

## 2017-01-11 RX ADMIN — SODIUM CHLORIDE: 9 INJECTION, SOLUTION INTRAVENOUS at 22:29

## 2017-01-11 RX ADMIN — LIDOCAINE HYDROCHLORIDE 0.5 ML: 10 INJECTION, SOLUTION INFILTRATION; PERINEURAL at 13:15

## 2017-01-11 RX ADMIN — SIMVASTATIN 40 MG: 20 TABLET, FILM COATED ORAL at 22:28

## 2017-01-11 RX ADMIN — SODIUM CHLORIDE 1000 ML: 9 INJECTION, SOLUTION INTRAVENOUS at 12:32

## 2017-01-11 RX ADMIN — LIDOCAINE HYDROCHLORIDE 0.5 ML: 10 INJECTION, SOLUTION EPIDURAL; INFILTRATION; INTRACAUDAL; PERINEURAL at 10:00

## 2017-01-11 RX ADMIN — MIRTAZAPINE 15 MG: 15 TABLET, FILM COATED ORAL at 22:28

## 2017-01-11 ASSESSMENT — PAIN SCALES - GENERAL: PAINLEVEL: MILD PAIN (2)

## 2017-01-11 NOTE — IP AVS SNAPSHOT
MRN:[id number]                      After Visit Summary   1/11/2017    Oswaldo Win    MRN: [id number]           Thank you!     Thank you for choosing [place] for your care. Our goal is always to provide you with excellent care. Hearing back from our patients is one way we can continue to improve our services. Please take a few minutes to complete the written survey that you may receive in the mail after you visit with us. Thank you!        Patient Information     Date Of Birth          1945        About your hospital stay     You were admitted on:  January 11, 2017 You last received care in the:  [facility]    You were discharged on:  January 14, 2017        Reason for your hospital stay       Low hemoglobin concerning for GI bleed                  Who to Call     For medical emergencies, please call 911.  For non-urgent questions about your medical care, please call your primary care provider or clinic, [phone]  For questions related to your surgery, please call your surgery clinic        Attending Provider     Provider    Susanna Prabhakar MD       Primary Care Provider Office Phone # Fax #    Lucrecia Collier [phone]       [facility] [address] [place] [address]         When to contact your care team       Please call the [facility] at [phone] (Monday - Friday; 8 AM - 4:30 PM) for temperature above 100.4, shortness of breath, chest pain, headaches, vision changes, bleeding, uncontrolled nausea, vomiting, diarrhea, or pain.    For weekends or after hours, call the main hospital number at [phone], and request to speak with the on-call hematology/oncology physician.                  After Care Instructions     Activity       Your activity upon discharge: activity as tolerated            Diet       Follow this diet upon discharge: normal                  Follow-up  Appointments     Follow Up and recommended labs and tests       Appointments as below.                  Your next 10 appointments already scheduled     Jan 20, 2017 11:15 AM   Masonic Lab Draw with  MASONIC LAB DRAW   [facility] Mason[facility] Lab Draw ([facility])    [address] [place] [address]   [phone]            Jan 20, 2017 11:40 AM   (Arrive by 11:25 AM)   Return Visit with POLI Bridges [facility] ([facility])    [address] [place] [address]   [phone]            Feb 01, 2017 10:30 AM   Masonic Lab Draw with  MASONIC LAB DRAW   [facility] Draw ([facility])    [address] [place] [address]   [phone]            Feb 08, 2017  8:15 AM   Masonic Lab Draw with  MASONIC LAB DRAW   [facility] Draw ([facility])    [address] [place] [address]   [phone]            Feb 08, 2017  8:40 AM   (Arrive by 8:25 AM)   Return Visit with POLI Bridges [facility] ([facility])    [address] [place] [address]   [phone]            Feb 08, 2017 11:00 AM   NM RADIUM 223 XOFIGO THERAPY with UUNMINJ2   [facility], [place], Nuclear Medicine ([facility], [facility])    [address] [facility] [address]   [phone]           Please bring a list of your medicines to the exam. (Include vitamins, minerals and over-the-counter drugs.) You should wear comfortable clothes. Leave your valuables at home. Please bring related prior results and films.  Tell your doctor:   If you are breastfeeding or may be pregnant.   If you have had a barium test within the past few days. Barium may change the results of certain exams.   If you  think you may need sedation (medicine to help you relax).  You may eat and drink as normal.  Please call your Imaging Department at your exam site with any questions.            Mar 01, 2017 10:30 AM   Masonic Lab Draw with  MASONIC LAB DRAW   [facility] Masonic Lab Draw ([facility])    [address] [place] [address]   [phone]            Mar 08, 2017  8:45 AM   Masonic Lab Draw with  MASONIC LAB DRAW   [facility] Masonic Lab Draw ([facility])    [address] [place] [address]   [phone]            Mar 08, 2017  9:15 AM   (Arrive by 9:00 AM)   Return Visit with Bentley Robles MD   [facility] ([facility])    [address] [place] [address]   [phone]            Mar 08, 2017 11:00 AM   NM RADIUM 223 XOFIGO THERAPY with UUNMINJ1   [facility] [place], Nuclear Medicine ([facility])    [address] [facility] [address] [phone]           Please bring a list of your medicines to the exam. (Include vitamins, minerals and over-the-counter drugs.) You should wear comfortable clothes. Leave your valuables at home. Please bring related prior results and films.  Tell your doctor:   If you are breastfeeding or may be pregnant.   If you have had a barium test within the past few days. Barium may change the results of certain exams.   If you think you may need sedation (medicine to help you relax).  You may eat and drink as normal.  Please call your Imaging Department at your exam site with any questions.              Additional Services     Medication Therapy Management Referral       Reason for referral:  on more than 5 medications and managing chronic disease    This service is designed to help you get the most from your medications.  A specially trained pharmacist will work  "closely with you and your doctors  to solve any problems related to your medications and to help you get the   best results from taking them.      The Medication Therapy Management staff will call you to schedule an appointment.                  Future tests that were ordered for you     CBC with platelets       Last Lab Result: HEMOGLOBIN (g/dL)       Date                     Value                 01/14/2017               8.4*             ----------            Comprehensive metabolic panel                 Pending Results     No orders found from 1/10/2017 to 1/12/2017.            Statement of Approval     Ordered          01/14/17 1234  I have reviewed and agree with all the recommendations and orders detailed in this document.   EFFECTIVE NOW     Approved and electronically signed by:  Hannah Haro PA-C             Admission Information        Provider Department Dept Phone    1/11/2017 Susanna Prabhakar MD  U5a [phone]      Your Vitals Were     Blood Pressure Pulse Temperature    127/52 mmHg 71 99.3  F (37.4  C) (Oral)    Respirations Height Weight    16 1.689 m (5' 6.5\") 69.4 kg (153 lb)    BMI (Body Mass Index) Pulse Oximetry       24.33 kg/m2 98%       MyChart Information     [facility] gives you secure access to your electronic health record. If you see a primary care provider, you can also send messages to your care team and make appointments. If you have questions, please call your primary care clinic.  If you do not have a primary care provider, please call [phone] and they will assist you.        Care EveryWhere ID     This is your Care EveryWhere ID. This could be used by other organizations to access your [place] medical records  [id number]           Review of your medicines      START taking        Dose / Directions    enoxaparin 120 MG/0.8ML injection   Commonly known as:  enoxaparin   Used for:  Personal history of venous thrombosis and embolism        Dose:  105 mg "   Inject 0.7 mLs (105 mg) Subcutaneous daily   Quantity:  21 mL   Refills:  0         CONTINUE these medicines which have NOT CHANGED        Dose / Directions    abiraterone 250 MG tablet   Commonly known as:  ZYTIGA   Used for:  Prostate cancer 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)        Dose:  1000 mg   Take 4 tablets (1,000 mg) by mouth every morning (before breakfast)   Quantity:  120 tablet   Refills:  1       aspirin 81 MG tablet        Dose:  81 mg   Take 81 mg by mouth daily   Refills:  0       calcium carbonate 500 MG tablet   Commonly known as:  OS-RODNEY 500 mg [place]. [place]   Used for:  Hypocalcemia        Dose:  1000 mg   Take 2 tablets (1,000 mg) by mouth 2 times daily   Refills:  0       HYDROcodone-acetaminophen 5-325 MG per tablet   Commonly known as:  NORCO        Dose:  1 tablet   Take 1 tablet by mouth every 6 hours as needed for moderate to severe pain   Refills:  0       metoprolol 25 MG 24 hr tablet   Commonly known as:  TOPROL-XL   Used for:  (HFpEF) heart failure with preserved ejection fraction (H)        Dose:  25 mg   Take 1 tablet (25 mg) by mouth daily AM   Quantity:  90 tablet   Refills:  4       mirtazapine 15 MG tablet   Commonly known as:  REMERON   Used for:  Insomnia        Dose:  15 mg   Take 1 tablet (15 mg) by mouth At Bedtime   Quantity:  30 tablet   Refills:  2       Multi-vitamin Tabs tablet        Dose:  1 tablet   Take 1 tablet by mouth every morning   Quantity:  100 tablet   Refills:  3       predniSONE 5 MG tablet   Commonly known as:  DELTASONE   Used for:  Prostate cancer 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), Other acute pulmonary embolism without acute cor pulmonale (H)        Dose:  5 mg   Take 1 tablet (5 mg) by mouth daily   Refills:  0       simvastatin 40 MG tablet   Commonly known as:  ZOCOR   Used for:  Hyperlipidemia LDL goal <130        TAKE ONE TABLET BY MOUTH AT BEDTIME   Quantity:  90 tablet   Refills:  3       tamsulosin 0.4 MG capsule   Commonly known as:  FLOMAX   Used for:  Ureteral  stone        Dose:  0.4 mg   Take 1 capsule (0.4 mg) by mouth daily AM   Quantity:  90 capsule   Refills:  3       vitamin D 2000 UNITS tablet   Used for:  Vitamin D deficiency        Dose:  1 tablet   Take 1 tablet by mouth daily   Quantity:  100 tablet   Refills:  3            Where to get your medicines      These medications were sent to [facility] - [place], [place] - [address] [facility]  [address] [facility], [facility] [address]     Phone:  [phone]    - enoxaparin 120 MG/0.8ML injection             Protect others around you: Learn how to safely use, store and throw away your medicines at www.disposemymeds.org.             Medication List: This is a list of all your medications and when to take them. Check marks below indicate your daily home schedule. Keep this list as a reference.      Medications           Morning Afternoon Evening Bedtime As Needed    abiraterone 250 MG tablet   Commonly known as:  ZYTIGA   Take 4 tablets (1,000 mg) by mouth every morning (before breakfast)   Last time this was given:  1,000 mg on 1/14/2017  7:01 AM                                aspirin 81 MG tablet   Take 81 mg by mouth daily                                calcium carbonate 500 MG tablet   Commonly known as:  OS-RODNEY 500 mg [place]. [place]   Take 2 tablets (1,000 mg) by mouth 2 times daily   Last time this was given:  500 mg on 1/14/2017  9:04 AM                                enoxaparin 120 MG/0.8ML injection   Commonly known as:  enoxaparin   Inject 0.7 mLs (105 mg) Subcutaneous daily                                HYDROcodone-acetaminophen 5-325 MG per tablet   Commonly known as:  NORCO   Take 1 tablet by mouth every 6 hours as needed for moderate to severe pain                                metoprolol 25 MG 24 hr tablet   Commonly known as:  TOPROL-XL   Take 1 tablet (25 mg) by mouth daily AM                                mirtazapine 15 MG tablet   Commonly known as:  REMERON   Take 1 tablet (15 mg) by  mouth At Bedtime   Last time this was given:  15 mg on 1/13/2017  9:25 PM                                Multi-vitamin Tabs tablet   Take 1 tablet by mouth every morning                                predniSONE 5 MG tablet   Commonly known as:  DELTASONE   Take 1 tablet (5 mg) by mouth daily   Last time this was given:  5 mg on 1/14/2017  9:04 AM                                simvastatin 40 MG tablet   Commonly known as:  ZOCOR   TAKE ONE TABLET BY MOUTH AT BEDTIME   Last time this was given:  40 mg on 1/13/2017  9:25 PM                                tamsulosin 0.4 MG capsule   Commonly known as:  FLOMAX   Take 1 capsule (0.4 mg) by mouth daily AM   Last time this was given:  0.4 mg on 1/14/2017  9:03 AM                                vitamin D 2000 UNITS tablet   Take 1 tablet by mouth daily   Last time this was given:  2,000 Units on 1/14/2017  9:04 AM

## 2017-01-11 NOTE — NURSING NOTE
Chief Complaint   Patient presents with     Blood Draw     Labs drawn and vital signs checked follow up for Prostate Ca   Tracy Juarez RN, OCN

## 2017-01-11 NOTE — Clinical Note
1/11/2017      RE: Oswaldo Win  [address]  [facility] [address]       Oncology/Hematology Visit Note  Jan 11, 2017    DIAGNOSIS:  Mr. Win is a 71 year old male with a hx of CHF, CKD, CAD w/ hx of STEMI and CABG. He met with Dr. Robles at the end of July for consultation regarding metastatic prostate cancer. His story begins in May 2015 when he saw his PCP for back pain, present since Feb. He also had poor appetite, weight loss of 8lb and poor energy.  CT showed diffuse bony mets. CT Chest showed sclerotic mets throughout the bones. He was given degarelix on 7/22 in Urology. He was having pelvic pain and was evaluated by Dr Cavazos would did not feel XRT was appropriate.  He started on docetaxel 7/31. From 8/7-8/14 he was admitted with colitis, CHAR, elevated BNP, lactis acidosis, and poor appetite. He was discharged to a TCU and unfortunately, it sounds like he was not given his Lasix.  His BUBBA worsened and his breathing became compromised.  He was seen by Chelsi Corral on 8/21 and eventually transferred to the ED and admitted 8/21-8/24 for acute on chronic CHF.  At our last visit we discussed not pursuing further Taxotere at this time and discussed starting Zytiga when he was ready.  We pursued PT, OT, home lymphedema for supportive care at home in attempt for further rehabilitation. He was again admitted and discharged - 10/20/15.  He never started the Zytiga as he was still actively recovering from his cardiac issues.  His PSA remained stable, thus no intervention was pursued.  December 2015, [facility] met with Dr Robles and since his ECOG was back to a 1, they discussed re-challenging the Taxotere at a lower dose. 9/26/2016 he started Provenge off study. He continues on lupron every 3 mos and XGEVA. 9/26/2016 he started Provenge off study. He received 3 doses of Provenge (last 10/28), on 3rd dose had fevers, chills, myalgias, was bedbound for a week due to these symptoms. During this time he held his coumadin for 5  days prior to a central line removal 10/31. Later than week he had sudden onset of right pleuritic chest pain and hemoptysis. Diagnosed with acute segmental/subsegmental PE on 11/8 and prescribed Lovenox. He was briefly on Zytiga and prednisone. Due to progressive disease, it was decided to switch him to Xofigo. He comes in today for routine follow up prior to starting on Xofigo. He called in yesterday with blood in his sputum and stool.    INTERVAL HISTORY:  Patient reports that his muscles feel fatigued and he wonders if he is dehydrated. He has some dyspnea on exertion. He has a mild headache and mild fatigue. He denies a racing heart. He feels his sleep quality has been fair lately. He is drinking about 30 oz fluid/day, which is typical for him. He did noticed dark red stools since 1/6 or 1/7. The last stool was yesterday and was loose. He denies any dark, tarry stools. He does not recall ever having a colonoscopy previously. He also has been coughing up blood mixed with mucus in the mornings. He denies any fevers, chills, nausea, vomiting, or current chest pain. He had 5 minutes of chest pressure that night that resolved on its own. He does not have nitro at home. He reports that his last MI was in September 2013. He denies any other bleeding, no nosebleeds, blood in urine, or vomiting up blood. He last took aspirin this morning. He last took Xarelto yesterday morning. He denies other concerns.     MEDICATIONS:   Current Outpatient Prescriptions   Medication Sig     metoprolol (TOPROL-XL) 25 MG 24 hr tablet Take 1 tablet (25 mg) by mouth daily AM     Cholecalciferol (VITAMIN D) 2000 UNITS tablet Take 1 tablet by mouth daily     simvastatin (ZOCOR) 40 MG tablet TAKE ONE TABLET BY MOUTH AT BEDTIME     abiraterone (ZYTIGA) 250 MG tablet Take 4 tablets (1,000 mg) by mouth every morning (before breakfast)     tamsulosin (FLOMAX) 0.4 MG 24 hr capsule Take 1 capsule (0.4 mg) by mouth daily AM     predniSONE  "(DELTASONE) 5 MG tablet Take 1 tablet (5 mg) by mouth 2 times daily (Patient taking differently: Take 5 mg by mouth daily )     mirtazapine (REMERON) 15 MG tablet Take 1 tablet (15 mg) by mouth At Bedtime     aspirin 81 MG tablet Take 1 tablet (81 mg) by mouth daily AM     calcium carbonate (OS-RODNEY 500 MG [place]. [place]) 500 MG tablet Take 2 tablets (1,000 mg) by mouth 3 times daily (with meals) (Patient taking differently: Take 1,000 mg by mouth 2 times daily )     rivaroxaban ANTICOAGULANT (XARELTO) 20 MG TABS tablet Take 1 tablet (20 mg) by mouth daily (with dinner)     HYDROcodone-acetaminophen (NORCO) 5-325 MG per tablet Take 1 tablet by mouth every 6 hours as needed for moderate to severe pain     multivitamin, therapeutic with minerals (MULTI-VITAMIN) TABS Take 1 tablet by mouth every morning      No current facility-administered medications for this visit.     Facility-Administered Medications Ordered in Other Visits   Medication     hydrocortisone sodium succinate (solu-CORTEF) injection     glycopyrrolate (ROBINUL) injection     neostigmine (PROSTIGMINE) injection     protamine injection     albuterol (PROAIR HFA, PROVENTIL HFA, VENTOLIN HFA) inhaler          ALLERGIES:  No Known Allergies    PHYSICAL EXAMINATION:  Vitals: BP 97/51 mmHg  Pulse 100  Temp(Src) 97.3  F (36.3  C) (Oral)  Resp 18  Ht 1.702 m (5' 7.01\")  Wt 70.625 kg (155 lb 11.2 oz)  BMI 24.38 kg/m2  SpO2 97%  GENERAL:  A pleasant person in no acute distress.   HEENT:  Sclerae are nonicteric.  Mouth is without mucositis or thrush.   NECK:  Supple.   LYMPH NODES:  No peripheral lymphadenopathy noted in the supraclavicular or cervical regions.   LUNGS:  Clear to auscultation bilaterally.   HEART:  Regular rate and rhythm   ABDOMEN:  Bowel sounds are active.  Soft and nontender.  No hepatosplenomegaly or other masses appreciated.  LOWER EXTREMITIES:  Without pitting edema to the knees bilaterally.   NEUROLOGICAL:  Alert/orientated/able to answer " all questions.  CN grossly intact.     LAB:    1/6/2017 09:07 1/11/2017 09:12   WBC 7.8 6.0   Hemoglobin 10.3 (L) 7.6 (L)   Hematocrit 32.3 (L) 24.5 (L)   Platelet Count 217 172   RBC Count 3.64 (L) 2.73 (L)   MCV 89 90   MCH 28.3 27.8   MCHC 31.9 31.0 (L)   RDW 18.2 (H) 19.6 (H)   Diff Method Automated Method Automated Method   % Neutrophils 64.9 61.7   % Lymphocytes 19.9 23.1   % Monocytes 10.3 10.4   % Eosinophils 1.7 1.5   % Basophils 0.9 0.5   % Immature Granulocytes 2.3 2.8   Nucleated RBCs 0 1 (H)   Absolute Neutrophil 5.0 3.7   Absolute Lymphocytes 1.6 1.4   Absolute Monocytes 0.8 0.6   Absolute Eosinophils 0.1 0.1   Absolute Basophils 0.1 0.0   Abs Immature Granulocytes 0.2 0.2   Absolute Nucleated RBC 0.0 0.1       IMPRESSION/PLAN:   1. Acute blood loss anemia. Patient is primarily losing blood through his stool, but also has some blood in his sputum. He is symptomatic from the anemia. He has been on Xarelto, for a history of DVT and more recently PE in November 2016. He developed the PE while on subtherapeutic Coumadin dosing. He was placed on Lovenox, but transitioned to Xarelto due to the high out of pocket cost of Lovenox. He last took Xarelto yesterday morning. He last took aspirin this morning. I have discussed his case with Dr. Robles and will plan to direct admit the patient to a tele bed. He does have a strong cardiac history. Patient does not recall ever having a colonoscopy. Will plan to send to the ED if he becomes unstable at any point while waiting for a bed.      2. Metastatic prostate cancer.  -Was due to start Xofigo today, discussed with nuclear medicine team and with Dr. Robles. Will hold today to manage acute issues, as above. If hospitalized for a short period of time, could consider giving Xofigo immediately after hospital discharge. Xofigo has a half life of 11 days, so the dose we have available for him could be given in a couple of days.     3.  Bone metastases.  We plan to continue the  Xgeva every 6 weeks and his next injection will be due in February.     4. Mild dehydration.   -Will give 1L IV NS while waiting for a bed. CMP is pending.    Jessie Kay PA-C  [facility] [address] [address]  [phone]    Addendum: Patient developed chest pain/feeling of indigestion while in infusion. An EKG was performed, which shows possible ischemia in the lateral leads and a troponin was ordered. Pain resolved on its own after 10 minutes. Discussed if pain returns and persists or if troponin returns elevated, will send to the ED.    Addendum: Troponin is normal. Will continue to monitor while waiting for a bed.    Jessie Kay PA-C

## 2017-01-11 NOTE — PROGRESS NOTES
Infusion Nursing Note:  Oswaldo Win presents today for IVF, awaiting hospital admission.    Patient seen by provider today: Yes: ISABELLE Paulino    Note: At 1420 when IVF was running, patient called out stating he was having epigastric pain similar to the past two times that he has had heart attacks. ECG and Troponin ordered and ISABELLE Paulino came to assess patient. VSS. Within approximately 10 minutes patient's symptoms subsided. Troponin level came back normal. Patient to continue to await hospital bed. If he experiences pain again he will again notify us.     Intravenous Access:  Peripheral IV placed via ultrasound machine by WATSON Cardoso -- first one infiltrated.     Treatment Conditions:  HGB      7.6   1/11/2017  WBC      6.0   1/11/2017   ANEU      3.7   1/11/2017  PLT      172   1/11/2017  PLT      119   8/19/2015     NA      139   1/11/2017                POTASSIUM      4.2   1/11/2017        MAG      2.1   9/7/2016         CR     0.94   1/11/2017                RODNEY      8.5   1/11/2017             BILITOTAL      1.1   1/11/2017        ALBUMIN      3.4   1/11/2017                 ALT       15   1/11/2017        AST       20   1/11/2017      Post Infusion Assessment:  Patient tolerated infusion without incident.  Blood return noted pre and post infusion.  Site patent and intact, free from redness, edema or discomfort.  No evidence of extravasations.    Discharge Plan:   Patient declined prescription refills.  Patient and/or family verbalized understanding of discharge instructions and all questions answered.  Patient discharged in stable condition accompanied by: self.  Departure Mode: Wheelchair.    S-(situation): Acute blood loss anemia. Patient is primarily losing blood through his stool, but also has some blood in his sputum. He is symptomatic from the anemia. He has been on Xarelto, for a history of DVT and more recently PE in November 2016.    B-(background): Hx of prostate cancer. Strong cardiac  history.     A-(assessment): Patient resting comfortable while waiting for a hospital bed. VSS. PIV intact in left forearm.     R-(recommendations): Admit to hospital. If there is not a bed ready, patient to transfer from clinic to ER. To be discussed with ISABELLE López at 1815.      Jennie Milligan RN

## 2017-01-11 NOTE — NURSING NOTE
"Oswaldo Win is a 71 year old male who presents for:  Chief Complaint   Patient presents with     Blood Draw     Labs drawn and vital signs checked follow up for Prostate Ca     Oncology Clinic Visit     Prostate Ca        Initial Vitals:  BP 97/51 mmHg  Pulse 100  Temp(Src) 97.3  F (36.3  C) (Oral)  Resp 18  Ht 1.702 m (5' 7.01\")  Wt 70.625 kg (155 lb 11.2 oz)  BMI 24.38 kg/m2  SpO2 97% Estimated body mass index is 24.38 kg/(m^2) as calculated from the following:    Height as of this encounter: 1.702 m (5' 7.01\").    Weight as of this encounter: 70.625 kg (155 lb 11.2 oz).. Body surface area is 1.83 meters squared. BP completed using cuff size: regular  Mild Pain (2) No LMP for male patient. Allergies and medications reviewed.     Medications: Medication refills not needed today.  Pharmacy name entered into [facility]: [facility] [place], [place] - [address]    Comments: Pt c/o muscle fatigue, lightheadedness and sob. He believed he was possibly dehydrated. Provided Pt with a glass of water. Pt also reported that he has had some bleeding issues that he was going to discuss with the provider.  Because of these symptoms, he has not taken his Xarelto today.     7inutes for nursing intake (face to face time)   Lynette Turner LPN          "

## 2017-01-11 NOTE — Clinical Note
1/11/2017       RE: Oswaldo Win  [address]  [facility] [address]     Dear Colleague,    Thank you for referring your patient, Oswaldo Win, to the [facility]. Please see a copy of my visit note below.    Oncology/Hematology Visit Note  Jan 11, 2017    DIAGNOSIS:  Mr. Win is a 71 year old male with a hx of CHF, CKD, CAD w/ hx of STEMI and CABG. He met with Dr. Robles at the end of July for consultation regarding metastatic prostate cancer. His story begins in May 2015 when he saw his PCP for back pain, present since Feb. He also had poor appetite, weight loss of 8lb and poor energy.  CT showed diffuse bony mets. CT Chest showed sclerotic mets throughout the bones. He was given degarelix on 7/22 in Urology. He was having pelvic pain and was evaluated by Dr Cavazos would did not feel XRT was appropriate.  He started on docetaxel 7/31. From 8/7-8/14 he was admitted with colitis, CHAR, elevated BNP, lactis acidosis, and poor appetite. He was discharged to a TCU and unfortunately, it sounds like he was not given his Lasix.  His BUBBA worsened and his breathing became compromised.  He was seen by Chelsi Corral on 8/21 and eventually transferred to the ED and admitted 8/21-8/24 for acute on chronic CHF.  At our last visit we discussed not pursuing further Taxotere at this time and discussed starting Zytiga when he was ready.  We pursued PT, OT, home lymphedema for supportive care at home in attempt for further rehabilitation. He was again admitted and discharged - 10/20/15.  He never started the Zytiga as he was still actively recovering from his cardiac issues.  His PSA remained stable, thus no intervention was pursued.  December 2015, Mainor met with Dr Robles and since his ECOG was back to a 1, they discussed re-challenging the Taxotere at a lower dose. 9/26/2016 he started Provenge off study. He continues on lupron every 3 mos and XGEVA. 9/26/2016 he started Provenge off study. He received 3 doses of  Provenge (last 10/28), on 3rd dose had fevers, chills, myalgias, was bedbound for a week due to these symptoms. During this time he held his coumadin for 5 days prior to a central line removal 10/31. Later than week he had sudden onset of right pleuritic chest pain and hemoptysis. Diagnosed with acute segmental/subsegmental PE on 11/8 and prescribed Lovenox.     INTERVAL HISTORY:    MEDICATIONS:   Current Outpatient Prescriptions   Medication Sig     metoprolol (TOPROL-XL) 25 MG 24 hr tablet Take 1 tablet (25 mg) by mouth daily AM     Cholecalciferol (VITAMIN D) 2000 UNITS tablet Take 1 tablet by mouth daily     rivaroxaban ANTICOAGULANT (XARELTO) 20 MG TABS tablet Take 1 tablet (20 mg) by mouth daily (with dinner)     simvastatin (ZOCOR) 40 MG tablet TAKE ONE TABLET BY MOUTH AT BEDTIME     abiraterone (ZYTIGA) 250 MG tablet Take 4 tablets (1,000 mg) by mouth every morning (before breakfast)     tamsulosin (FLOMAX) 0.4 MG 24 hr capsule Take 1 capsule (0.4 mg) by mouth daily AM     predniSONE (DELTASONE) 5 MG tablet Take 1 tablet (5 mg) by mouth 2 times daily     HYDROcodone-acetaminophen (NORCO) 5-325 MG per tablet Take 1 tablet by mouth every 6 hours as needed for moderate to severe pain     mirtazapine (REMERON) 15 MG tablet Take 1 tablet (15 mg) by mouth At Bedtime     aspirin 81 MG tablet Take 1 tablet (81 mg) by mouth daily AM     calcium carbonate (OS-RODNEY 500 MG [place]. [place]) 500 MG tablet Take 2 tablets (1,000 mg) by mouth 3 times daily (with meals) (Patient taking differently: Take 1,000 mg by mouth 2 times daily )     multivitamin, therapeutic with minerals (MULTI-VITAMIN) TABS Take 1 tablet by mouth every morning      No current facility-administered medications for this visit.     Facility-Administered Medications Ordered in Other Visits   Medication     hydrocortisone sodium succinate (solu-CORTEF) injection     glycopyrrolate (ROBINUL) injection     neostigmine (PROSTIGMINE) injection     protamine  injection     albuterol (PROAIR HFA, PROVENTIL HFA, VENTOLIN HFA) inhaler          ALLERGIES:  No Known Allergies    PHYSICAL EXAMINATION:  Vitals: There were no vitals taken for this visit.  GENERAL:  A pleasant person in no acute distress.   HEENT:  Sclerae are nonicteric.  Mouth is without mucositis or thrush.   NECK:  Supple.   LYMPH NODES:  No peripheral lymphadenopathy noted in the axillary, supraclavicular, or cervical regions.   LUNGS:  Clear to auscultation bilaterally.   HEART:  Regular rate and rhythm, with no murmur appreciated.   ABDOMEN:  Bowel sounds are active.  Soft and nontender.  No hepatosplenomegaly or other masses appreciated.  LOWER EXTREMITIES:  Without pitting edema to the knees bilaterally.   NEUROLOGICAL:  Alert/orientated/able to answer all questions.  CN grossly intact.     LAB:       IMPRESSION/PLAN:   1.  Metastatic prostate carcinoma.    - Rapidly progressive disease with mild pain  - No visceral disease  - Extensive skeletal metastasis with rapid elevation of his alkaline phosphatase  I will recommend radium 223 IV 50 kBq per kg body weight.      I reviewed the following options for him:  Chemotherapy with docetaxel  [place] 223    Alpharadin /Ra-223 /Xofigo (Clint FINE., Robyn COOK., Prosper BUITRAGO, et al. N Engl J Med 2013; 369:213-223) is a radioisotope that has chemical structure similar to calcium. Administered intravenously it gets deposited at the sites of bony disease. It releases high energy alpha particles that deliver fatal radiation dose over a very short distance. This treatment is similar to previously administered radioactive isotopes like samarium and strontium. In this phase III clinical trial it significantly improved OS in pts with CRPC with bone mets vs placebo (two-sided P = 0.48238; HR = 0.695; 95% CI, 0.552-0.875; median OS 14.0 mo vs 11.2 mo, respectively) and was very well tolerated. This is a significant improvement over previous agents like samarium used for  palliation which failed to improve survival. Patients can have nausea, vomiting, fatigue and diarrhea. It is excreted in stools. But since it emits alpha particle which have a pathlength of only 1 mm, it is remarkably safe and cannot cross the skin barrier. Care is needed for management of body fluids, but again any significant exposure to care givers from this route is not expected.     He has already completed chemotherapy with docetaxel in March.  We did this as a first line therapy as part of chemotherapy hormonal therapy.  I would be reluctant to use the same agent within the year.    My nurse Jessie Mancilla is not in today.  I have done extensive counseling on radium and additional teaching done by Pham in the absence of Jessie.    2.  Bone metastases.  We plan to continue the Xgeva every 6 weeks and his next injection will be due on 12/21.     3.  Pulmonary embolism.  Diagnosed in November 2016.  He has been on the Lovenox 60 mg twice daily for now.  He is getting tired of injecting himself with Lovenox.  I will try to get rivaroxaban approved for him.  I will not load him with 15 mg twice a day and rather start with 20 mg once a day dosing.    Jessie Kay PA-C  [facility]  [address] [place] [id number] [phone]      Again, thank you for allowing me to participate in the care of your patient.      Sincerely,    Jessie Kay PA-C

## 2017-01-11 NOTE — PROGRESS NOTES
Oncology/Hematology Visit Note  Jan 11, 2017    DIAGNOSIS:  Mr. Win is a 71 year old male with a hx of CHF, CKD, CAD w/ hx of STEMI and CABG. He met with Dr. Robles at the end of July for consultation regarding metastatic prostate cancer. His story begins in May 2015 when he saw his PCP for back pain, present since Feb. He also had poor appetite, weight loss of 8lb and poor energy.  CT showed diffuse bony mets. CT Chest showed sclerotic mets throughout the bones. He was given degarelix on 7/22 in Urology. He was having pelvic pain and was evaluated by Dr Cavazos would did not feel XRT was appropriate.  He started on docetaxel 7/31. From 8/7-8/14 he was admitted with colitis, CHAR, elevated BNP, lactis acidosis, and poor appetite. He was discharged to a TCU and unfortunately, it sounds like he was not given his Lasix.  His BUBBA worsened and his breathing became compromised.  He was seen by Chelsi Corral on 8/21 and eventually transferred to the ED and admitted 8/21-8/24 for acute on chronic CHF.  At our last visit we discussed not pursuing further Taxotere at this time and discussed starting Zytiga when he was ready.  We pursued PT, OT, home lymphedema for supportive care at home in attempt for further rehabilitation. He was again admitted and discharged - 10/20/15.  He never started the Zytiga as he was still actively recovering from his cardiac issues.  His PSA remained stable, thus no intervention was pursued.  December 2015, Mainor met with Dr Robles and since his ECOG was back to a 1, they discussed re-challenging the Taxotere at a lower dose. 9/26/2016 he started Provenge off study. He continues on lupron every 3 mos and XGEVA. 9/26/2016 he started Provenge off study. He received 3 doses of Provenge (last 10/28), on 3rd dose had fevers, chills, myalgias, was bedbound for a week due to these symptoms. During this time he held his coumadin for 5 days prior to a central line removal 10/31. Later than week he had sudden onset  of right pleuritic chest pain and hemoptysis. Diagnosed with acute segmental/subsegmental PE on 11/8 and prescribed Lovenox. He was briefly on Zytiga and prednisone. Due to progressive disease, it was decided to switch him to Xofigo. He comes in today for routine follow up prior to starting on Xofigo. He called in yesterday with blood in his sputum and stool.    INTERVAL HISTORY:  Patient reports that his muscles feel fatigued and he wonders if he is dehydrated. He has some dyspnea on exertion. He has a mild headache and mild fatigue. He denies a racing heart. He feels his sleep quality has been fair lately. He is drinking about 30 oz fluid/day, which is typical for him. He did noticed dark red stools since 1/6 or 1/7. The last stool was yesterday and was loose. He denies any dark, tarry stools. He does not recall ever having a colonoscopy previously. He also has been coughing up blood mixed with mucus in the mornings. He denies any fevers, chills, nausea, vomiting, or current chest pain. He had 5 minutes of chest pressure that night that resolved on its own. He does not have nitro at home. He reports that his last MI was in September 2013. He denies any other bleeding, no nosebleeds, blood in urine, or vomiting up blood. He last took aspirin this morning. He last took Xarelto yesterday morning. He denies other concerns.     MEDICATIONS:   Current Outpatient Prescriptions   Medication Sig     metoprolol (TOPROL-XL) 25 MG 24 hr tablet Take 1 tablet (25 mg) by mouth daily AM     Cholecalciferol (VITAMIN D) 2000 UNITS tablet Take 1 tablet by mouth daily     simvastatin (ZOCOR) 40 MG tablet TAKE ONE TABLET BY MOUTH AT BEDTIME     abiraterone (ZYTIGA) 250 MG tablet Take 4 tablets (1,000 mg) by mouth every morning (before breakfast)     tamsulosin (FLOMAX) 0.4 MG 24 hr capsule Take 1 capsule (0.4 mg) by mouth daily AM     predniSONE (DELTASONE) 5 MG tablet Take 1 tablet (5 mg) by mouth 2 times daily (Patient taking  "differently: Take 5 mg by mouth daily )     mirtazapine (REMERON) 15 MG tablet Take 1 tablet (15 mg) by mouth At Bedtime     aspirin 81 MG tablet Take 1 tablet (81 mg) by mouth daily AM     calcium carbonate (OS-RODNEY 500 MG [place]. [place]) 500 MG tablet Take 2 tablets (1,000 mg) by mouth 3 times daily (with meals) (Patient taking differently: Take 1,000 mg by mouth 2 times daily )     rivaroxaban ANTICOAGULANT (XARELTO) 20 MG TABS tablet Take 1 tablet (20 mg) by mouth daily (with dinner)     HYDROcodone-acetaminophen (NORCO) 5-325 MG per tablet Take 1 tablet by mouth every 6 hours as needed for moderate to severe pain     multivitamin, therapeutic with minerals (MULTI-VITAMIN) TABS Take 1 tablet by mouth every morning      No current facility-administered medications for this visit.     Facility-Administered Medications Ordered in Other Visits   Medication     hydrocortisone sodium succinate (solu-CORTEF) injection     glycopyrrolate (ROBINUL) injection     neostigmine (PROSTIGMINE) injection     protamine injection     albuterol (PROAIR HFA, PROVENTIL HFA, VENTOLIN HFA) inhaler          ALLERGIES:  No Known Allergies    PHYSICAL EXAMINATION:  Vitals: BP 97/51 mmHg  Pulse 100  Temp(Src) 97.3  F (36.3  C) (Oral)  Resp 18  Ht 1.702 m (5' 7.01\")  Wt 70.625 kg (155 lb 11.2 oz)  BMI 24.38 kg/m2  SpO2 97%  GENERAL:  A pleasant person in no acute distress.   HEENT:  Sclerae are nonicteric.  Mouth is without mucositis or thrush.   NECK:  Supple.   LYMPH NODES:  No peripheral lymphadenopathy noted in the supraclavicular or cervical regions.   LUNGS:  Clear to auscultation bilaterally.   HEART:  Regular rate and rhythm   ABDOMEN:  Bowel sounds are active.  Soft and nontender.  No hepatosplenomegaly or other masses appreciated.  LOWER EXTREMITIES:  Without pitting edema to the knees bilaterally.   NEUROLOGICAL:  Alert/orientated/able to answer all questions.  CN grossly intact.     LAB:    1/6/2017 09:07 1/11/2017 09:12   WBC " 7.8 6.0   Hemoglobin 10.3 (L) 7.6 (L)   Hematocrit 32.3 (L) 24.5 (L)   Platelet Count 217 172   RBC Count 3.64 (L) 2.73 (L)   MCV 89 90   MCH 28.3 27.8   MCHC 31.9 31.0 (L)   RDW 18.2 (H) 19.6 (H)   Diff Method Automated Method Automated Method   % Neutrophils 64.9 61.7   % Lymphocytes 19.9 23.1   % Monocytes 10.3 10.4   % Eosinophils 1.7 1.5   % Basophils 0.9 0.5   % Immature Granulocytes 2.3 2.8   Nucleated RBCs 0 1 (H)   Absolute Neutrophil 5.0 3.7   Absolute Lymphocytes 1.6 1.4   Absolute Monocytes 0.8 0.6   Absolute Eosinophils 0.1 0.1   Absolute Basophils 0.1 0.0   Abs Immature Granulocytes 0.2 0.2   Absolute Nucleated RBC 0.0 0.1       IMPRESSION/PLAN:   1. Acute blood loss anemia. Patient is primarily losing blood through his stool, but also has some blood in his sputum. He is symptomatic from the anemia. He has been on Xarelto, for a history of DVT and more recently PE in November 2016. He developed the PE while on subtherapeutic Coumadin dosing. He was placed on Lovenox, but transitioned to Xarelto due to the high out of pocket cost of Lovenox. He last took Xarelto yesterday morning. He last took aspirin this morning. I have discussed his case with Dr. Robles and will plan to direct admit the patient to a tele bed. He does have a strong cardiac history. Patient does not recall ever having a colonoscopy. Will plan to send to the ED if he becomes unstable at any point while waiting for a bed.      2. Metastatic prostate cancer.  -Was due to start Xofigo today, discussed with nuclear medicine team and with Dr. Robles. Will hold today to manage acute issues, as above. If hospitalized for a short period of time, could consider giving Xofigo immediately after hospital discharge. Xofigo has a half life of 11 days, so the dose we have available for him could be given in a couple of days.     3.  Bone metastases.  We plan to continue the Xgeva every 6 weeks and his next injection will be due in February.     4. Mild  dehydration.   -Will give 1L IV NS while waiting for a bed. CMP is pending.    Jessie Kay PA-C  [facility]  [address] [place] [address]  [phone]    Addendum: Patient developed chest pain/feeling of indigestion while in infusion. An EKG was performed, which shows possible ischemia in the lateral leads and a troponin was ordered. Pain resolved on its own after 10 minutes. Discussed if pain returns and persists or if troponin returns elevated, will send to the ED.    Addendum: Troponin is normal. Will continue to monitor while waiting for a bed.

## 2017-01-11 NOTE — IP AVS SNAPSHOT
[address] [address]    Phone:  [phone]                                       After Visit Summary   1/11/2017    Oswaldo Win    MRN: [id number]           After Visit Summary Signature Page     I have received my discharge instructions, and my questions have been answered. I have discussed any challenges I see with this plan with the nurse or doctor.    ..........................................................................................................................................  Patient/Patient Representative Signature      ..........................................................................................................................................  Patient Representative Print Name and Relationship to Patient    ..................................................               ................................................  Date                                            Time    ..........................................................................................................................................  Reviewed by Signature/Title    ...................................................              ..............................................  Date                                                            Time

## 2017-01-12 LAB
ABO + RH BLD: NORMAL
ABO + RH BLD: NORMAL
ANION GAP SERPL CALCULATED.3IONS-SCNC: 11 MMOL/L (ref 3–14)
ANION GAP SERPL CALCULATED.3IONS-SCNC: 8 MMOL/L (ref 3–14)
BLD GP AB SCN SERPL QL: NORMAL
BLD PROD TYP BPU: NORMAL
BLD UNIT ID BPU: 0
BLD UNIT ID BPU: 0
BLOOD BANK CMNT PATIENT-IMP: NORMAL
BLOOD PRODUCT CODE: NORMAL
BLOOD PRODUCT CODE: NORMAL
BPU ID: NORMAL
BPU ID: NORMAL
BUN SERPL-MCNC: 19 MG/DL (ref 7–30)
BUN SERPL-MCNC: 22 MG/DL (ref 7–30)
CALCIUM SERPL-MCNC: 7.3 MG/DL (ref 8.5–10.1)
CALCIUM SERPL-MCNC: 7.7 MG/DL (ref 8.5–10.1)
CHLORIDE SERPL-SCNC: 114 MMOL/L (ref 94–109)
CHLORIDE SERPL-SCNC: 114 MMOL/L (ref 94–109)
CO2 SERPL-SCNC: 19 MMOL/L (ref 20–32)
CO2 SERPL-SCNC: 19 MMOL/L (ref 20–32)
CREAT SERPL-MCNC: 0.86 MG/DL (ref 0.66–1.25)
CREAT SERPL-MCNC: 0.87 MG/DL (ref 0.66–1.25)
ERYTHROCYTE [DISTWIDTH] IN BLOOD BY AUTOMATED COUNT: 18.1 % (ref 10–15)
ERYTHROCYTE [DISTWIDTH] IN BLOOD BY AUTOMATED COUNT: 18.3 % (ref 10–15)
ERYTHROCYTE [DISTWIDTH] IN BLOOD BY AUTOMATED COUNT: 18.9 % (ref 10–15)
GFR SERPL CREATININE-BSD FRML MDRD: 86 ML/MIN/1.7M2
GFR SERPL CREATININE-BSD FRML MDRD: 87 ML/MIN/1.7M2
GLUCOSE SERPL-MCNC: 127 MG/DL (ref 70–99)
GLUCOSE SERPL-MCNC: 97 MG/DL (ref 70–99)
HCT VFR BLD AUTO: 23.1 % (ref 40–53)
HCT VFR BLD AUTO: 24.9 % (ref 40–53)
HCT VFR BLD AUTO: 27.1 % (ref 40–53)
HGB BLD-MCNC: 6 G/DL (ref 13.3–17.7)
HGB BLD-MCNC: 7.6 G/DL (ref 13.3–17.7)
HGB BLD-MCNC: 7.9 G/DL (ref 13.3–17.7)
HGB BLD-MCNC: 8.9 G/DL (ref 13.3–17.7)
INTERPRETATION ECG - MUSE: NORMAL
MAGNESIUM SERPL-MCNC: 2.2 MG/DL (ref 1.6–2.3)
MAGNESIUM SERPL-MCNC: 2.3 MG/DL (ref 1.6–2.3)
MCH RBC QN AUTO: 28.7 PG (ref 26.5–33)
MCH RBC QN AUTO: 28.8 PG (ref 26.5–33)
MCH RBC QN AUTO: 29.2 PG (ref 26.5–33)
MCHC RBC AUTO-ENTMCNC: 31.7 G/DL (ref 31.5–36.5)
MCHC RBC AUTO-ENTMCNC: 32.8 G/DL (ref 31.5–36.5)
MCHC RBC AUTO-ENTMCNC: 32.9 G/DL (ref 31.5–36.5)
MCV RBC AUTO: 87 FL (ref 78–100)
MCV RBC AUTO: 89 FL (ref 78–100)
MCV RBC AUTO: 91 FL (ref 78–100)
NUM BPU REQUESTED: 2
PHOSPHATE SERPL-MCNC: 1.9 MG/DL (ref 2.5–4.5)
PHOSPHATE SERPL-MCNC: 2.5 MG/DL (ref 2.5–4.5)
PHOSPHATE SERPL-MCNC: 3 MG/DL (ref 2.5–4.5)
PLATELET # BLD AUTO: 100 10E9/L (ref 150–450)
PLATELET # BLD AUTO: 106 10E9/L (ref 150–450)
PLATELET # BLD AUTO: 108 10E9/L (ref 150–450)
POTASSIUM SERPL-SCNC: 4 MMOL/L (ref 3.4–5.3)
POTASSIUM SERPL-SCNC: 4.1 MMOL/L (ref 3.4–5.3)
RBC # BLD AUTO: 2.6 10E12/L (ref 4.4–5.9)
RBC # BLD AUTO: 2.74 10E12/L (ref 4.4–5.9)
RBC # BLD AUTO: 3.1 10E12/L (ref 4.4–5.9)
SODIUM SERPL-SCNC: 142 MMOL/L (ref 133–144)
SODIUM SERPL-SCNC: 144 MMOL/L (ref 133–144)
SPECIMEN EXP DATE BLD: NORMAL
TRANSFUSION STATUS PATIENT QL: NORMAL
TROPONIN I SERPL-MCNC: 0.11 UG/L (ref 0–0.04)
TROPONIN I SERPL-MCNC: 0.11 UG/L (ref 0–0.04)
TROPONIN I SERPL-MCNC: 0.13 UG/L (ref 0–0.04)
WBC # BLD AUTO: 5.6 10E9/L (ref 4–11)
WBC # BLD AUTO: 5.8 10E9/L (ref 4–11)
WBC # BLD AUTO: 6 10E9/L (ref 4–11)

## 2017-01-12 PROCEDURE — 85027 COMPLETE CBC AUTOMATED: CPT | Performed by: NURSE PRACTITIONER

## 2017-01-12 PROCEDURE — 40000556 ZZH STATISTIC PERIPHERAL IV START W US GUIDANCE

## 2017-01-12 PROCEDURE — 25000125 ZZHC RX 250: Performed by: NURSE PRACTITIONER

## 2017-01-12 PROCEDURE — 84100 ASSAY OF PHOSPHORUS: CPT | Performed by: INTERNAL MEDICINE

## 2017-01-12 PROCEDURE — 84484 ASSAY OF TROPONIN QUANT: CPT | Performed by: INTERNAL MEDICINE

## 2017-01-12 PROCEDURE — 25000125 ZZHC RX 250: Performed by: STUDENT IN AN ORGANIZED HEALTH CARE EDUCATION/TRAINING PROGRAM

## 2017-01-12 PROCEDURE — 84484 ASSAY OF TROPONIN QUANT: CPT | Performed by: NURSE PRACTITIONER

## 2017-01-12 PROCEDURE — 99223 1ST HOSP IP/OBS HIGH 75: CPT | Mod: AI | Performed by: INTERNAL MEDICINE

## 2017-01-12 PROCEDURE — 80048 BASIC METABOLIC PNL TOTAL CA: CPT | Performed by: NURSE PRACTITIONER

## 2017-01-12 PROCEDURE — 25000132 ZZH RX MED GY IP 250 OP 250 PS 637: Performed by: NURSE PRACTITIONER

## 2017-01-12 PROCEDURE — 85027 COMPLETE CBC AUTOMATED: CPT | Performed by: STUDENT IN AN ORGANIZED HEALTH CARE EDUCATION/TRAINING PROGRAM

## 2017-01-12 PROCEDURE — 80048 BASIC METABOLIC PNL TOTAL CA: CPT | Performed by: STUDENT IN AN ORGANIZED HEALTH CARE EDUCATION/TRAINING PROGRAM

## 2017-01-12 PROCEDURE — 85018 HEMOGLOBIN: CPT | Performed by: STUDENT IN AN ORGANIZED HEALTH CARE EDUCATION/TRAINING PROGRAM

## 2017-01-12 PROCEDURE — 36415 COLL VENOUS BLD VENIPUNCTURE: CPT | Performed by: NURSE PRACTITIONER

## 2017-01-12 PROCEDURE — 25000132 ZZH RX MED GY IP 250 OP 250 PS 637: Performed by: STUDENT IN AN ORGANIZED HEALTH CARE EDUCATION/TRAINING PROGRAM

## 2017-01-12 PROCEDURE — 84100 ASSAY OF PHOSPHORUS: CPT | Performed by: NURSE PRACTITIONER

## 2017-01-12 PROCEDURE — 25000128 H RX IP 250 OP 636: Performed by: NURSE PRACTITIONER

## 2017-01-12 PROCEDURE — 36415 COLL VENOUS BLD VENIPUNCTURE: CPT | Performed by: INTERNAL MEDICINE

## 2017-01-12 PROCEDURE — 83735 ASSAY OF MAGNESIUM: CPT | Performed by: STUDENT IN AN ORGANIZED HEALTH CARE EDUCATION/TRAINING PROGRAM

## 2017-01-12 PROCEDURE — 84100 ASSAY OF PHOSPHORUS: CPT | Performed by: STUDENT IN AN ORGANIZED HEALTH CARE EDUCATION/TRAINING PROGRAM

## 2017-01-12 PROCEDURE — P9016 RBC LEUKOCYTES REDUCED: HCPCS | Performed by: PHYSICIAN ASSISTANT

## 2017-01-12 PROCEDURE — 12000001 ZZH R&B MED SURG/OB UMMC

## 2017-01-12 PROCEDURE — 83735 ASSAY OF MAGNESIUM: CPT | Performed by: NURSE PRACTITIONER

## 2017-01-12 PROCEDURE — 36415 COLL VENOUS BLD VENIPUNCTURE: CPT | Performed by: STUDENT IN AN ORGANIZED HEALTH CARE EDUCATION/TRAINING PROGRAM

## 2017-01-12 RX ORDER — POTASSIUM CHLORIDE 750 MG/1
20-40 TABLET, EXTENDED RELEASE ORAL
Status: DISCONTINUED | OUTPATIENT
Start: 2017-01-12 | End: 2017-01-14 | Stop reason: HOSPADM

## 2017-01-12 RX ORDER — HEPARIN SODIUM 10000 [USP'U]/100ML
0-3500 INJECTION, SOLUTION INTRAVENOUS CONTINUOUS
Status: DISCONTINUED | OUTPATIENT
Start: 2017-01-12 | End: 2017-01-12

## 2017-01-12 RX ORDER — MAGNESIUM SULFATE HEPTAHYDRATE 40 MG/ML
4 INJECTION, SOLUTION INTRAVENOUS EVERY 4 HOURS PRN
Status: DISCONTINUED | OUTPATIENT
Start: 2017-01-12 | End: 2017-01-14 | Stop reason: HOSPADM

## 2017-01-12 RX ORDER — POTASSIUM CHLORIDE 1.5 G/1.58G
20-40 POWDER, FOR SOLUTION ORAL
Status: DISCONTINUED | OUTPATIENT
Start: 2017-01-12 | End: 2017-01-14 | Stop reason: HOSPADM

## 2017-01-12 RX ORDER — POTASSIUM CHLORIDE 7.45 MG/ML
10 INJECTION INTRAVENOUS
Status: DISCONTINUED | OUTPATIENT
Start: 2017-01-12 | End: 2017-01-14 | Stop reason: HOSPADM

## 2017-01-12 RX ORDER — POTASSIUM CHLORIDE 29.8 MG/ML
20 INJECTION INTRAVENOUS
Status: DISCONTINUED | OUTPATIENT
Start: 2017-01-12 | End: 2017-01-14 | Stop reason: HOSPADM

## 2017-01-12 RX ADMIN — ABIRATERONE ACETATE 1000 MG: 250 TABLET ORAL at 06:33

## 2017-01-12 RX ADMIN — SODIUM PHOSPHATE, MONOBASIC, MONOHYDRATE AND SODIUM PHOSPHATE, DIBASIC, ANHYDROUS 20 MMOL: 276; 142 INJECTION, SOLUTION INTRAVENOUS at 15:09

## 2017-01-12 RX ADMIN — CALCIUM 500 MG: 500 TABLET ORAL at 09:11

## 2017-01-12 RX ADMIN — VITAMIN D, TAB 1000IU (100/BT) 2000 UNITS: 25 TAB at 09:10

## 2017-01-12 RX ADMIN — TAMSULOSIN HYDROCHLORIDE 0.4 MG: 0.4 CAPSULE ORAL at 09:11

## 2017-01-12 RX ADMIN — MIRTAZAPINE 15 MG: 15 TABLET, FILM COATED ORAL at 21:18

## 2017-01-12 RX ADMIN — CALCIUM 500 MG: 500 TABLET ORAL at 17:55

## 2017-01-12 RX ADMIN — PANTOPRAZOLE SODIUM 40 MG: 40 INJECTION, POWDER, FOR SOLUTION INTRAVENOUS at 15:01

## 2017-01-12 RX ADMIN — PANTOPRAZOLE SODIUM 40 MG: 40 INJECTION, POWDER, FOR SOLUTION INTRAVENOUS at 19:56

## 2017-01-12 RX ADMIN — PREDNISONE 5 MG: 5 TABLET ORAL at 09:11

## 2017-01-12 RX ADMIN — SIMVASTATIN 40 MG: 20 TABLET, FILM COATED ORAL at 21:18

## 2017-01-12 RX ADMIN — POLYETHYLENE GLYCOL 3350, SODIUM SULFATE ANHYDROUS, SODIUM BICARBONATE, SODIUM CHLORIDE, POTASSIUM CHLORIDE 4000 ML: 236; 22.74; 6.74; 5.86; 2.97 POWDER, FOR SOLUTION ORAL at 17:10

## 2017-01-12 RX ADMIN — SODIUM CHLORIDE: 9 INJECTION, SOLUTION INTRAVENOUS at 11:40

## 2017-01-12 NOTE — PLAN OF CARE
"Problem: Goal Outcome Summary  Goal: Goal Outcome Summary    /55 mmHg  Pulse 77  Temp(Src) 98.4  F (36.9  C) (Oral)  Resp 18  Ht 1.689 m (5' 6.5\")  Wt 69.854 kg (154 lb)  BMI 24.49 kg/m2  SpO2 98%    Pt A&Ox4, VSS on RA, up ab ina. Denies pain and/or nausea. NS infusing via PIV at 75mL/hr, 2nd PIV SL. No BM this shift. Hgb recheck was 7.9, per orders to transfuse if under 8, 1u PRBCs administered and recheck at 6pm. Gi consulted and plan for Endo & Colonoscopy tomorrow tentatively around 0900, GoLytely ordered and initiated, goal for pt to have clear output by 1900. Pt on clear liquid diet for procedure tomorrow. Phos low at 1.9 and replaced per protocol. Continue to monitor and with POC.         "

## 2017-01-12 NOTE — PLAN OF CARE
Problem: Goal Outcome Summary  Goal: Goal Outcome Summary  Pt A&O. VSS on RA, except low diastolic BP, ranging from 40s-50s.  No c/o pain this shift.  Independent, chest pain and dyspnea on exertion, resolved with rest.  Last BM 1/11.  L PIV infusing NS @ 125 ml/hr.  R PIV added this shift for blood transfusing.  Pt received 1 unit this shift for Hgb = 6.0, recheck in am.  NPO for possible procedure tomorrow.  Continue to monitor and follow the POC.    Pt admitted this deon from [facility] for concern of GI bleed. A&O. VSS on RA besides low diastolic BP, ranging high 30s-40s. C/o some midsternal & pain in ankles after ambulating. Denies need for pain meds. Denies nausea. Up indp, also reporting some dyspnea w/ exertion, resolved w/ rest. Last BM reported yesterday, urinating w/out difficulty. LPIV infusing NS@ 125ml/hr. On tele. NPO for possible procedure tmrw. Calls appropriately. Continue to monitor and follow POC.

## 2017-01-12 NOTE — H&P
[facility] History and Physical    Oswaldo Win MRN# [id number]   Age: 71 year old YOB: 1945     Date of Admission:  1/11/2017    Primary care provider: Lucrecia Collier          Assessment and Plan:   Assessment:   Mr. Win is a 71 year old male with a hx of CHF, CKD, CAD w/ hx of STEMI, CABG, and metastatic prostate cancer admitted from clinic with concern for GIB on rivaroxaban.      Plan:   1. GIB and hemoptysis - hgb drop of 3g and initial symptoms of chest pain, shortness of breath.  Patient reports hematochezia and hemoptysis have stopped with no recurrence of chest pain.  He has been holding rivaroxaban for 2 days now.  - repeat Hgb, transfuse < 7 or if recurrence of chest pain  - serial hgbs overnight  - obtain second IV given tachycardia, hold antihypertensives (metoprolol)  - discussed with GI, will hold off on PPI for now given absence of melena, resolving symptoms, and findings of hemorrhoids on exam.  Full consult in AM.  - NPO overnight in case scope is needed  - consider chest imaging if hemoptysis is not resolved in 1-2 days    2. Chest pain - typical for patient and similar to past pain, EKG with possible lateral ischemia in clinic.  Troponin negative and has not had recurrence of CP  - repeat troponin now  - stat EKG if recurrence of pain    3. Metastatic prostate cancer - followed by oncology.  Planning for xofigo in near future  - continue prednisone and zytiga    FEN: NS at 125 overnight  Code: Full  PPx: SCDs    To be staffed in AM.    Andre Bond MD  [facility]            Chief Complaint:   Bloody stools     History is obtained from the patient          History of Present Illness:   Mr. Win is a 71 year old male with a hx of CHF, CKD, CAD w/ hx of STEMI, CABG, and metastatic prostate cancer admitted from clinic with concern for GIB on rivaroxaban.  The patient reports that he started taking rivaroxaban for the last few weeks.  Shortly thereafter, he developed  bright red stools that were happening every 1-2 days.  He also had small amounts of bright red blood in his sputum.  He reports that he has felt more fatigued over the last week.  This morning, he reports some mild shortness of breath.  He did develop some chest pain in clinic, but work up with labs and EKG were negative and his pain resolved.  He has not had recurrence.  He denies any history of melena or ulcers in the past.  He has never had an endoscopy or colonoscopy in the past.  He denies fevers, chills, sweats, rash, abdominal pain, vomiting, or dysuria.  No hematuria.  Has had some lightheadedness when standing, no syncope.            Past Medical History:     Past Medical History   Diagnosis Date     ASCVD (arteriosclerotic cardiovascular disease) 4/10/2014     ST elevation MI (STEMI) (H) 4/5/2014     Closed L4 vertebral fracture (H) 2005     traumatic fracture     Fx tib/fib fol insrt ortho implnt/prosth/bone plt, right leg 2005     rt tib/fib fracture s/p ORIF     Musculoskeletal leg pain      from old leg injuries     Hyperlipidemia LDL goal < 70      Hypertension      Stented coronary artery      Dyspnea on exertion      Other chronic pain      Arthritis      Prostate cancer 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)      COPD (chronic obstructive pulmonary disease) (H)      Gastro-oesophageal reflux disease      Thrombosis of leg 10/2015     Influenza A 3/27/2016             Past Surgical History:      Past Surgical History   Procedure Laterality Date     Tonsillectomy & adenoidectomy  age 18     Herniorrhaphy inguinal  1968     RT     Open reduction internal fixation tibia  2005     RT Tib/Fib ORIF     Colonoscopy       Davinci bypass artery coronary  7/1/2014     Procedure: DAVINCI BYPASS ARTERY CORONARY;  Surgeon: Kike Coley MD;  Location: [facility]     Herniorrhaphy inguinal Left 11/18/2014     Procedure: HERNIORRHAPHY INGUINAL;  Surgeon: Max Garza MD;  [facility]: [facility]     Laser holmium  lithotripsy ureter(s), insert stent, combined N/A 7/27/2015     Procedure: COMBINED CYSTOSCOPY, URETEROSCOPY, LASER HOLMIUM LITHOTRIPSY URETER(S), INSERT STENT;  Surgeon: Maame Ramirez MD;  Location: [facility]     Anesthesia out of or percutaneous mitral valve repair N/A 10/16/2015     Procedure: ANESTHESIA OUT OF OR PERCUTANEOUS MITRAL VALVE REPAIR;  Surgeon: GENERIC ANESTHESIA PROVIDER;  Location: [facility]     Combined cystoscopy, retrogrades, exchange stent ureter(s) Left 12/14/2015     Procedure: COMBINED CYSTOSCOPY, RETROGRADES, EXCHANGE STENT URETER(S);  Surgeon: Maame Ramirez MD;  Location: [facility]     Excise mass lower extremity Right 2/5/2016     Procedure: EXCISE MASS LOWER EXTREMITY;  Surgeon: Miquel Figueredo MD;  [facility]     Laser holmium lithotripsy ureter(s), insert stent, combined Left 9/19/2016     Procedure: COMBINED CYSTOSCOPY, URETEROSCOPY, LASER HOLMIUM LITHOTRIPSY URETER(S), INSERT STENT;  Surgeon: Maame Ramirez MD;  Location: [facility]             Social History:     Social History   Substance Use Topics     Smoking status: Former Smoker -- 0.50 packs/day for 40 years     Types: Cigarettes     Quit date: 02/01/2000     Smokeless tobacco: Former User     Alcohol Use: 0.0 oz/week     0 Standard drinks or equivalent per week      Comment: rare             Family History:     Family History   Problem Relation Age of Onset     HEART DISEASE Mother 50     CAB, pacemaker     HEART DISEASE Father 41     MI     HEART DISEASE Maternal Uncle      Breast Cancer Paternal Grandmother      Breast Cancer Paternal Aunt      CEREBROVASCULAR DISEASE No family hx of      DIABETES No family hx of      Family history reviewed and updated in [facility]          Immunizations:   Immunization status is unknown          Allergies:   No Known Allergies          Medications:     Prescriptions prior to admission   Medication Sig Dispense Refill Last Dose     metoprolol (TOPROL-XL) 25 MG 24 hr  tablet Take 1 tablet (25 mg) by mouth daily AM 90 tablet 4 1/11/2017 at Unknown time     Cholecalciferol (VITAMIN D) 2000 UNITS tablet Take 1 tablet by mouth daily 100 tablet 3 1/10/2017 at Unknown time     simvastatin (ZOCOR) 40 MG tablet TAKE ONE TABLET BY MOUTH AT BEDTIME 90 tablet 3 1/10/2017 at Unknown time     abiraterone (ZYTIGA) 250 MG tablet Take 4 tablets (1,000 mg) by mouth every morning (before breakfast) 120 tablet 1 1/11/2017 at Unknown time     tamsulosin (FLOMAX) 0.4 MG 24 hr capsule Take 1 capsule (0.4 mg) by mouth daily AM 90 capsule 3 1/11/2017 at Unknown time     predniSONE (DELTASONE) 5 MG tablet Take 1 tablet (5 mg) by mouth 2 times daily (Patient taking differently: Take 5 mg by mouth daily ) 60 tablet 3 1/11/2017 at Unknown time     mirtazapine (REMERON) 15 MG tablet Take 1 tablet (15 mg) by mouth At Bedtime 30 tablet 2 1/10/2017 at Unknown time     aspirin 81 MG tablet Take 1 tablet (81 mg) by mouth daily AM 90 tablet 3 1/11/2017 at Unknown time     calcium carbonate (OS-RODNEY 500 MG [place]. CA) 500 MG tablet Take 2 tablets (1,000 mg) by mouth 3 times daily (with meals) (Patient taking differently: Take 1,000 mg by mouth 2 times daily ) 90 tablet 1 1/11/2017 at Unknown time     rivaroxaban ANTICOAGULANT (XARELTO) 20 MG TABS tablet Take 1 tablet (20 mg) by mouth daily (with dinner) 30 tablet 11 Not Taking     HYDROcodone-acetaminophen (NORCO) 5-325 MG per tablet Take 1 tablet by mouth every 6 hours as needed for moderate to severe pain   More than a month at Unknown time     multivitamin, therapeutic with minerals (MULTI-VITAMIN) TABS Take 1 tablet by mouth every morning  100 tablet 3 Unknown at Unknown time             Review of Systems:   The Review of Systems is negative other than noted in the HPI           Physical Exam:   Vitals were reviewed  Temp: 97.9  F (36.6  C) Temp src: Oral BP: 111/40 mmHg Pulse: 95   Resp: 18 SpO2: 98 % O2 Device: None (Room air)    Constitutional:   awake, alert,  cooperative, no apparent distress, and appears stated age     Eyes:   Lids and lashes normal, pupils equal, round and reactive to light, extra ocular muscles intact, sclera clear, conjunctiva normal     Hematologic / Lymphatic:   no cervical lymphadenopathy     Lungs:   No increased work of breathing, good air exchange, clear to auscultation bilaterally, no crackles, rare wheeze     Cardiovascular:   Normal apical impulse, regular rate and rhythm, normal S1 and S2, no S3 or S4, and no murmur noted     Abdomen:   normal bowel sounds, soft, non-distended, non-tender, no masses palpated, no hepatosplenomegally     Musculoskeletal:   There is no redness, warmth, or swelling of the joints.  Full range of motion noted.  Motor strength is 5 out of 5 all extremities bilaterally.  Tone is normal.     Skin:   no bruising or bleeding and no rashes     Additional findings:   Rectal examination with internal hemorrhoids and scant blood, multiple demi-anal skin tags present             Data:   All laboratory data reviewed   All cardiac studies reviewed by me.   -   Andre Bond MD

## 2017-01-12 NOTE — PLAN OF CARE
Problem: Goal Outcome Summary  Goal: Goal Outcome Summary  Pt admitted this deon from [facility] for concern of GI bleed. A&O. VSS on RA besides low diastolic BP, ranging high 30s-40s. C/o some midsternal & pain in ankles after ambulating. Denies need for pain meds. Denies nausea. Up indp, also reporting some dyspnea w/ exertion, resolved w/ rest. Last BM reported yesterday, urinating w/out difficulty. LPIV infusing NS@ 125ml/hr. On tele. NPO for possible procedure tmrw. Calls appropriately. Continue to monitor and follow POC.

## 2017-01-12 NOTE — PLAN OF CARE
Problem: Goal Outcome Summary  Goal: Goal Outcome Summary  Critical lab troponin 0.128. MD paged.

## 2017-01-12 NOTE — CONSULTS
GASTROENTEROLOGY CONSULTATION      Date of Admission:  1/11/2017          Chief Complaint:   We were asked by Lakia Cavanaugh CNP from oncology to evaluate this patient with hematochezia and hgb drop of 4 grams         ASSESSMENT AND RECOMMENDATIONS:   Assessment:  Oswaldo Win is a 71 year old man with PMHx significant for CKD, CHF, CAD, STEMI, CABG, DVT 10/2015, PE in 11/2016, former smoker, metastatic prostate cancer on prednisone and zytiga. Now admitted with maroon stools and hgb drop of 4grams in the setting of being on Xarelto that was started on 1/5/17. Hgb is 7.9 and 1.25; anticoagulation is currently on hold. Never had colonoscopy or EGD. Troponin is trending up, 0.106.    Given red to maroon stools, the patient likely has lower GI bleed s/t diverticula, angiodysplasia or hemorrhoids. Given his BUN it is unlikely that he has upper GI bleed, but maroon stools can be related to fast upper GI bleed s/t angiodysplasia, dieulafoy, Cristina ballard tears, or from malignancy.       Recommendations  -- Monitor troponin   -- Given cardiac history, monitor electrolytes closely and replace as needed   -- Clear liquid and no red color, NPO after MN  -- 4 liters Golytely and please give additional prep if not clear by 7 pm (no straw w/prep)  -- PPI twice daily    Gastroenterology follow up recommendations: EGD/Colonoscopy tomorrow 1/13/17    Pt care plan discussed with Dr. Hawkins, GI staff physician. Thank you for involving us in this patient's care. Please do not hesitate to contact the GI service with any questions or concerns.     Tricia Barton CNP  -------------------------------------------------------------------------------------------------------------------     History is obtained from the patient and the medical record.          History of Present Illness:   Oswaldo Win is a 71 year old man with PMHx significant for CKD, CHF, CAD, STEMI, CABG, DVT 10/2015, PE in 11/2016, former smoker,  metastatic prostate cancer on prednisone and zytiga. Now admitted with maroon stools and hgb drop of 4grams in the setting of being on Xarelto that was started on 1/5/17. Hgb is 7.9 and 1.25; anticoagulation is currently on hold. Never had colonoscopy or EGD. Troponin is trending up, 0.106.    Patient was placed on xarelto on Thursday and started bleeding this past Saturday. Stools have been alternating red to maroon, and he has been spitting small amount of red blood occasionally. Stool has slowed down since his xarelto has been held. Patient is former smoker of 1/2 to 1ppd and stopped tobacco use 15 years ago. He was also a heavy drinker in his twenties but has not used any ETOH for many years. No NSAID use. No history of GI bleed. No family history of colon cancer but sister and paternal grandmother had breast cancer.         Past Medical History:   Reviewed and edited as appropriate  Past Medical History   Diagnosis Date     ASCVD (arteriosclerotic cardiovascular disease) 4/10/2014     ST elevation MI (STEMI) (H) 4/5/2014     Closed L4 vertebral fracture (H) 2005     traumatic fracture     Fx tib/fib fol insrt ortho implnt/prosth/bone plt, right leg 2005     rt tib/fib fracture s/p ORIF     Musculoskeletal leg pain      from old leg injuries     Hyperlipidemia LDL goal < 70      Hypertension      Stented coronary artery      Dyspnea on exertion      Other chronic pain      Arthritis      Prostate cancer 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)      COPD (chronic obstructive pulmonary disease) (H)      Gastro-oesophageal reflux disease      Thrombosis of leg 10/2015     October 2015     Influenza A 3/27/2016     Pulmonary embolism (H) 11/8/16 November 2016            Past Surgical History:   Reviewed and edited as appropriate   Past Surgical History   Procedure Laterality Date     Tonsillectomy & adenoidectomy  age 18     Herniorrhaphy inguinal Left 1968     Open reduction internal fixation tibia  2005     RT Tib/Fib ORIF      Colonoscopy       Davinci bypass artery coronary  7/1/2014     Procedure: DAVINCI BYPASS ARTERY CORONARY;  Surgeon: Kike Coley MD;  [facility]: [facility]     Herniorrhaphy inguinal Left 11/18/2014     Procedure: HERNIORRHAPHY INGUINAL;  Surgeon: Max Garza MD;  [facility]     Laser holmium lithotripsy ureter(s), insert stent, combined N/A 7/27/2015     Procedure: COMBINED CYSTOSCOPY, URETEROSCOPY, LASER HOLMIUM LITHOTRIPSY URETER(S), INSERT STENT;  Surgeon: Maame Ramirez MD;  [facility]     Anesthesia out of or percutaneous mitral valve repair N/A 10/16/2015     Procedure: ANESTHESIA OUT OF OR PERCUTANEOUS MITRAL VALVE REPAIR;  Surgeon: GENERIC ANESTHESIA PROVIDER;  [facility]: [facility]     Combined cystoscopy, retrogrades, exchange stent ureter(s) Left 12/14/2015     Procedure: COMBINED CYSTOSCOPY, RETROGRADES, EXCHANGE STENT URETER(S);  Surgeon: Maame Ramirez MD;  [facility]     Excise mass lower extremity Right 2/5/2016     Procedure: EXCISE MASS LOWER EXTREMITY;  Surgeon: Miquel iFgueredo MD;  [facility]     Laser holmium lithotripsy ureter(s), insert stent, combined Left 9/19/2016     Procedure: COMBINED CYSTOSCOPY, URETEROSCOPY, LASER HOLMIUM LITHOTRIPSY URETER(S), INSERT STENT;  Surgeon: Maame Ramirez MD;  [facility]            Previous Endoscopy:   No results found for this or any previous visit.         Social History:   Reviewed and edited as appropriate  Social History     Social History     Marital Status:      Spouse Name: N/A     Number of Children: 2     Years of Education: N/A     Occupational History      Retired           Social History Main Topics     Smoking status: Former Smoker -- 0.50 packs/day for 40 years     Types: Cigarettes     Quit date: 02/01/2000     Smokeless tobacco: Never Used     Alcohol Use: 0.0 oz/week     0 Standard drinks or equivalent per week      Comment: rare      Drug Use: No     Sexual Activity:     Partners: Female     Other Topics Concern     Parent/Sibling W/ Cabg, Mi Or Angioplasty Before 65f 55m? Yes     father at age 44yrs      Social History Narrative    Mom  at age 93 from CHF    Dad  at age 41 from congenital heart defect    Sibling:  Sister 74 years ago in good health    Children two adult sons alive and well        Occupation:  , taught electronics and , and .    Musician             Family History:   Reviewed and edited as appropriate  Family History   Problem Relation Age of Onset     HEART DISEASE Mother 50     CAB, pacemaker     HEART DISEASE Father 41     MI     HEART DISEASE Maternal Uncle      Breast Cancer Paternal Grandmother      Breast Cancer Paternal Aunt      CEREBROVASCULAR DISEASE No family hx of      DIABETES No family hx of            Allergies:   Reviewed and edited as appropriate   No Known Allergies         Medications:     Prescriptions prior to admission   Medication Sig Dispense Refill Last Dose     aspirin 81 MG tablet Take 81 mg by mouth daily   2017 at Unknown time     metoprolol (TOPROL-XL) 25 MG 24 hr tablet Take 1 tablet (25 mg) by mouth daily AM 90 tablet 4 2017 at Unknown time     Cholecalciferol (VITAMIN D) 2000 UNITS tablet Take 1 tablet by mouth daily 100 tablet 3 1/10/2017 at Unknown time     rivaroxaban ANTICOAGULANT (XARELTO) 20 MG TABS tablet Take 1 tablet (20 mg) by mouth daily (with dinner) 30 tablet 11 1/10/2017 at Unknown time     simvastatin (ZOCOR) 40 MG tablet TAKE ONE TABLET BY MOUTH AT BEDTIME 90 tablet 3 1/10/2017 at Unknown time     abiraterone (ZYTIGA) 250 MG tablet Take 4 tablets (1,000 mg) by mouth every morning (before breakfast) 120 tablet 1 2017 at Unknown time     tamsulosin (FLOMAX) 0.4 MG 24 hr capsule Take 1 capsule (0.4 mg) by mouth daily AM 90 capsule 3 2017 at Unknown time     predniSONE (DELTASONE) 5 MG tablet Take 1 tablet (5 mg) by  "mouth 2 times daily (Patient taking differently: Take 5 mg by mouth daily ) 60 tablet 3 1/11/2017 at Unknown time     mirtazapine (REMERON) 15 MG tablet Take 1 tablet (15 mg) by mouth At Bedtime 30 tablet 2 1/10/2017 at Unknown time     calcium carbonate (OS-RODNEY 500 MG [place]. CA) 500 MG tablet Take 2 tablets (1,000 mg) by mouth 3 times daily (with meals) (Patient taking differently: Take 1,000 mg by mouth 2 times daily ) 90 tablet 1 1/11/2017 at Unknown time     HYDROcodone-acetaminophen (NORCO) 5-325 MG per tablet Take 1 tablet by mouth every 6 hours as needed for moderate to severe pain   More than a month at Unknown time     multivitamin, therapeutic with minerals (MULTI-VITAMIN) TABS Take 1 tablet by mouth every morning  100 tablet 3 Unknown at Unknown time             Review of Systems:   A complete review of systems was performed and is negative except as noted in the HPI           Physical Exam:   /49 mmHg  Pulse 86  Temp(Src) 98  F (36.7  C) (Oral)  Resp 18  Ht 1.689 m (5' 6.5\")  Wt 69.854 kg (154 lb)  BMI 24.49 kg/m2  SpO2 99%  Wt:   Wt Readings from Last 2 Encounters:   01/11/17 69.854 kg (154 lb)   01/11/17 70.625 kg (155 lb 11.2 oz)      Constitutional: cooperative, pleasant, not dyspneic/diaphoretic, no acute distress  Eyes: Sclera anicteric/injected  Ears/nose/mouth/throat: Normal oropharynx without ulcers or exudate, mucus membranes moist, hearing intact  Neck: supple, thyroid normal size  CV: RRR. No edema in LE.   Respiratory: Unlabored breathing. CTA bilaterally.  Lymph: No axillary, submandibular, supraclavicular or inguinal lymphadenopathy  Abd: Nondistended, +bs, no hepatosplenomegaly, nontender, no peritoneal signs  Rectal exam: Normal sphincter tone. Maroon stool with small blood clot; and internal hemorrhoids noted.   Skin: warm, perfused, no jaundice  Neuro: AAO x 3, No asterixis  Psych: Normal affect  MSK: Normal gait         Data:   Labs and imaging below were independently " reviewed and interpreted    BMP  Recent Labs  Lab 01/12/17  0758 01/11/17  1300    139   POTASSIUM 4.0 4.2   CHLORIDE 114* 110*   RODNEY 7.3* 8.5   CO2 19* 22   BUN 22 29   CR 0.87 0.94   GLC 97 118*     CBC  Recent Labs  Lab 01/12/17  0927 01/12/17  0758  01/11/17  0912 01/06/17  0907   WBC 5.8 5.6  --  6.0 7.8   RBC 2.74* 2.60*  --  2.73* 3.64*   HGB 7.9* 7.6*  < > 7.6* 10.3*   HCT 24.9* 23.1*  --  24.5* 32.3*   MCV 91 89  --  90 89   MCH 28.8 29.2  --  27.8 28.3   MCHC 31.7 32.9  --  31.0* 31.9   RDW 18.3* 18.1*  --  19.6* 18.2*   * 106*  --  172 217   < > = values in this interval not displayed.  INR  Recent Labs  Lab 01/11/17  2207   INR 1.25*     LFTs  Recent Labs  Lab 01/11/17  1300   ALKPHOS 630*   AST 20   ALT 15   BILITOTAL 1.1   PROTTOTAL 6.6*   ALBUMIN 3.4      PANCNo lab results found in last 7 days.

## 2017-01-12 NOTE — PROGRESS NOTES
[facility], [place]    Hematology / Oncology Progress Note    Date of Admission: 1/11/2017  Hospital Day #: 1   Date of Service (when I saw the patient): 01/12/2017     Assessment and Plan  Oswaldo Win is a 71 year old man with PMHx significant for CKD, CHF, CAD, STEMI, CABG, DVT (10/2015), PE (11/2016), and metastatic prostate cancer. He is admitted with concern for GI or other bleeding as e/b 3g drop in hgb and hematochezia.     #Acute anemia.  #Hematochezia.   Mainor has been on baby aspirin for cardiac hx and therapeutic lovenox for recent PE. Lovenox was stopped last week on 1/4 and he started xarelto on 1/5. Three days after starting xarelto, he noted blood in phlegm in mornings and some hematochezia/maroon stools. He stopped the xarelto on 1/10 and has not had bleeding since that day. He also has not had a BM since that day. He presented with SOB, general and muscle fatigue, and chest pain. He was found to have Hgb 6.0. He received 1U RBC on admission and hgb is now up to 7.6. He is feeling much better with improved fatigue, SOB, and resolution of CP. Suspect anemia and other sx 2/2 GI bleed but that seems to have stopped now.  -Hold baby ASA for now. D/c'ed xarelto.   -Repeat CBC this AM. Transfuse prn hgb <8 d/t cardiac hx.  -Appreciate GI following. No plan for scope at this time unless pt were to have further active bleeding.   -Continue IVF, decrease rate to 75cc/hr given CHF hx.   -Suspect blood in phlegm is 2/2 dryness in setting of AC. Monitor, can try saline nasal spray or mouth rinses if needed.   -Would like to trial heparin drip while inpt and monitor for bleeding. If no bleeding, would plan to transition to lovenox for discharge to continue to tx PE.     #Chest pain. Suspect 2/2 anemia. Pain has now resolved. EKG done in clinic with possible lateral ischemia and this is in setting of cardiac hx so will monitor closely.   -Trend troponins: <0.015--> 0.023--> 0.106 -->  recheck at 1330.  -Transfuse to keep hgb >8.      #H/o PE in 11/2016. Developed PE on subtherapeutic coumadin. Was managed with lovenox until last week, at which point pt requested switch to other agent as did not like bid self injections (d/t bruising etc.). Was switched to xarelto on 1/5 but then stopped on 1/10 d/t bleeding noted above.   -As above, would like to trial heparin drip then transition back to tx dose lovenox if no further bleeding and hgb stable.     #Metastatic prostate cancer. Continue prednisone and zytiga. Planning for xofigo in near future.     FEN:  -NS at 75cc/hr  -PRN lyte replacement  -NPO for now    Code status: FULL   Disposition: Unclear at this time. Pending resolution of bleeding, stable hgb, and AC plan.     Lakia Cavanaugh DNP, APRN, CNP  Hematology/Oncology  Pager: [phone]    Interval History  Mainor reports feeling better today than yesterday. Was already up walking around halls this AM. General and muscle fatigue and SOB/MOURA much improved. Denies chest pain. Last blood in phlegm and stool noted day before yesterday when he also stopped xarelto. Denies fever, leg swelling, other pain or issues at this time. Last BM was Tuesday, which is not unusual for him. Feels he may have a BM today and is anxious to see if there's blood in stool this time.     Physical Exam  Temp: 98  F (36.7  C) Temp src: Oral BP: 117/49 mmHg Pulse: 86   Resp: 18 SpO2: 99 % O2 Device: None (Room air)    Filed Vitals:    01/11/17 1929   Weight: 69.854 kg (154 lb)     Vital Signs with Ranges  Temp:  [97.9  F (36.6  C)-99.4  F (37.4  C)] 98  F (36.7  C)  Pulse:  [82-97] 86  Resp:  [16-18] 18  BP: ()/(39-62) 117/49 mmHg  SpO2:  [95 %-100 %] 99 %  I/O last 3 completed shifts:  In: 240 [P.O.:240]  Out: -     Constitutional: Pleasant man seen resting comfortably in bed in NAD. Alert and interactive.   HEENT: NCAT. PERRL, EOMI, anicteric sclera. MMM.  Hematologic / Lymphatic: No overt bleeding at this time.    Respiratory: Non-labored breathing on RA, good air exchange, lungs clear to auscultation bilaterally other than very faint scattered wheeze. No cough or crackles.   Cardiovascular: Regular rate and rhythm. No murmur or rub.   GI: Normoactive bowel sounds. Abdomen soft, non-distended, and non-tender. Per o/n MD exam: rectal exam with internal hemorrhoids and scant blood, multiple perianal skin tags present.   Skin: Pale, warm and dry.  Musculoskeletal: Extremities grossly normal, non-tender, no edema. Strong peripheral pulses. Good strength and ROM in bed.   Neurologic: A&O, speech normal, no focal deficits.   Neuropsychiatric: Mentation and affect appear normal/appropriate.    Medications  Current Facility-Administered Medications   Medication     potassium chloride SA (K-DUR/KLOR-CON M) CR tablet 20-40 mEq     potassium chloride (KLOR-CON) Packet 20-40 mEq     potassium chloride 10 mEq in 100 mL intermittent infusion     potassium chloride 10 mEq in 100 mL intermittent infusion with 10 mg lidocaine     potassium chloride 20 mEq in 50 mL intermittent infusion     magnesium sulfate 4 g in 100 mL sterile water (premade)     sodium phosphate 15 mmol in D5W intermittent infusion     sodium phosphate 20 mmol in D5W intermittent infusion     sodium phosphate 25 mmol in D5W intermittent infusion     influenza Vac Split High-Dose (FLUZONE) injection 0.5 mL     abiraterone (ZYTIGA) tablet 1,000 mg     calcium carbonate (OS-RODNEY 500 mg [place]. Ca) tablet 500 mg     cholecalciferol (vitamin D) tablet 2,000 Units     HYDROcodone-acetaminophen (NORCO) 5-325 MG per tablet 1 tablet     mirtazapine (REMERON) tablet 15 mg     predniSONE (DELTASONE) tablet 5 mg     simvastatin (ZOCOR) tablet 40 mg     tamsulosin (FLOMAX) capsule 0.4 mg     Medication Instruction     0.9% sodium chloride infusion     naloxone (NARCAN) injection 0.1-0.4 mg     Facility-Administered Medications Ordered in Other Encounters   Medication     INFUSION  HYPERSENSITIVITY     hydrocortisone sodium succinate (solu-CORTEF) injection     glycopyrrolate (ROBINUL) injection     neostigmine (PROSTIGMINE) injection     protamine injection     albuterol (PROAIR HFA, PROVENTIL HFA, VENTOLIN HFA) inhaler       Data  CBC  Recent Labs  Lab 01/12/17  0927 01/12/17  0758 01/12/17  0030 01/11/17  0912 01/06/17  0907   WBC 5.8 5.6  --  6.0 7.8   RBC 2.74* 2.60*  --  2.73* 3.64*   HGB 7.9* 7.6* 6.0* 7.6* 10.3*   HCT 24.9* 23.1*  --  24.5* 32.3*   MCV 91 89  --  90 89   MCH 28.8 29.2  --  27.8 28.3   MCHC 31.7 32.9  --  31.0* 31.9   RDW 18.3* 18.1*  --  19.6* 18.2*   * 106*  --  172 217     CMP  Recent Labs  Lab 01/12/17  0758 01/11/17  1300    139   POTASSIUM 4.0 4.2   CHLORIDE 114* 110*   CO2 19* 22   ANIONGAP 11 8   GLC 97 118*   BUN 22 29   CR 0.87 0.94   GFRESTIMATED 86 79   GFRESTBLACK >90African American GFR Calc >90African American GFR Calc   RODNEY 7.3* 8.5   MAG 2.3  --    PHOS 1.9*  --    PROTTOTAL  --  6.6*   ALBUMIN  --  3.4   BILITOTAL  --  1.1   ALKPHOS  --  630*   AST  --  20   ALT  --  15     INR  Recent Labs  Lab 01/11/17  2207   INR 1.25*       Results for orders placed or performed in visit on 01/06/17   CT Abdomen pelvis w/o contrast    Narrative    CT ABDOMEN PELVIS W/O CONTRAST 1/6/2017 8:33 AM    History: Calculus of ureter    Comparison: CT of the chest, abdomen, and pelvis from 9/7/2016    Technique: Helical CT images were obtained from the dome of the liver  to the symphysis pubis without contrast.  Images were reconstructed in  multiple planes using multiple reconstruction algorithms. Low dose  renal stone protocol was employed.    Findings:  Lung bases: Mild posterior atelectasis or fibrosis in the right middle  lobe. Mild atelectasis or scarring in the posterior right lower lobe.    Abdomen and pelvis: Previously visualized left double-J ureteral stent  has been removed. No hydronephrosis or hydroureter. No nephrolithiasis  is identified. No  dilated loops of small or large bowel. Noncontrast  evaluation of the remainder of the abdominal and pelvic viscera is  unremarkable. Postoperative evidence of left inguinal hernia repair.  No adenopathy in the abdomen or pelvis.    Subcutaneous soft tissues: Soft tissue density nodules over the  anterior suprapubic region, favored to be related to subcutaneous  injections.    Bones: Extensive sclerotic metastatic disease involving almost the  entire visualized axial and appendicular skeleton. Overall metastatic  burden appears similar to slightly increased when compared to  9/7/2016. There is a severe compression deformity of the L4 vertebral  body, with some associated retropulsion and destruction of the central  portion of the vertebral body in the setting of severe metastatic  disease, which does not appear significantly changed when compared to  9/7/2016. This is also similar when compared to 4/20/2016.      Impression    Impression:   1. No evidence for nephrolithiasis, hydronephrosis, hydroureter, or  bladder stone. Previously visualized left double-J ureteral stent has  been removed, with apparent resolution of the stone visualized in the  proximal left ureter on 9/7/2016.  2. Extensive bony metastatic disease in the entire visualized  skeleton, not significantly changed in overall extent since 9/7/2016,  consistent with history of prostate cancer. This includes a severe  compression deformity of the L4 vertebral body, which appears similar.         I have personally reviewed the examination and initial interpretation  and I agree with the findings.    MARIANNE JIMÉNEZ MD

## 2017-01-12 NOTE — PHARMACY-ADMISSION MEDICATION HISTORY
Pharmacy Admission Medication History    Admission medication history interview status for the 1/11/2017 admission is complete.   See EPIC admission navigator for allergy information, prior to admission medications and immunization status.   Medication history interview source(s): Patient    Medication history resources (including written lists, pill bottles, clinic record): None    Medication history source reliability: Good    Primary pharmacy: [facility], [place], [place]. [phone]    Pneumococcal and influenza vaccine history documented: no    Actions taken by pharmacist (provider contacted, medication changes, etc):None     Changes made to medication history:None    Additional medication history information: None    Medication reconciliation/reorder completed by provider prior to medication history? No    Time spent in this activity: 20 minutes    Prior to Admission medications    Medication Sig Last Dose Taking? Auth Provider   aspirin 81 MG tablet Take 81 mg by mouth daily 1/11/2017 at Unknown time Yes Unknown, Entered By History   metoprolol (TOPROL-XL) 25 MG 24 hr tablet Take 1 tablet (25 mg) by mouth daily AM 1/11/2017 at Unknown time Yes Jill Juarez, NP   Cholecalciferol (VITAMIN D) 2000 UNITS tablet Take 1 tablet by mouth daily 1/10/2017 at Unknown time Yes Bentley Robles MD   rivaroxaban ANTICOAGULANT (XARELTO) 20 MG TABS tablet Take 1 tablet (20 mg) by mouth daily (with dinner) 1/10/2017 at Unknown time Yes Bentley Robles MD   simvastatin (ZOCOR) 40 MG tablet TAKE ONE TABLET BY MOUTH AT BEDTIME 1/10/2017 at Unknown time Yes Rich Kim MD   abiraterone (ZYTIGA) 250 MG tablet Take 4 tablets (1,000 mg) by mouth every morning (before breakfast) 1/11/2017 at Unknown time Yes Robyn Rao PA-C   tamsulosin (FLOMAX) 0.4 MG 24 hr capsule Take 1 capsule (0.4 mg) by mouth daily AM 1/11/2017 at Unknown time Yes Wegener, Joel Daniel Irwin, MD   predniSONE (DELTASONE) 5  MG tablet Take 1 tablet (5 mg) by mouth 2 times daily  Patient taking differently: Take 5 mg by mouth daily  1/11/2017 at Unknown time Yes Bentley Robles MD   mirtazapine (REMERON) 15 MG tablet Take 1 tablet (15 mg) by mouth At Bedtime 1/10/2017 at Unknown time Yes Bentley Robles MD   calcium carbonate (OS-RODNEY 500 MG [place]. [place]) 500 MG tablet Take 2 tablets (1,000 mg) by mouth 3 times daily (with meals)  Patient taking differently: Take 1,000 mg by mouth 2 times daily  1/11/2017 at Unknown time Yes Ricardo Garcia MD   HYDROcodone-acetaminophen (NORCO) 5-325 MG per tablet Take 1 tablet by mouth every 6 hours as needed for moderate to severe pain More than a month at Unknown time  Reported, Patient   multivitamin, therapeutic with minerals (MULTI-VITAMIN) TABS Take 1 tablet by mouth every morning  Unknown at Unknown time  Lucrecia Collier MD

## 2017-01-13 ENCOUNTER — HOSPITAL ENCOUNTER (INPATIENT)
Dept: NUCLEAR MEDICINE | Facility: CLINIC | Age: 72
DRG: 378 | End: 2017-01-13
Attending: INTERNAL MEDICINE | Admitting: INTERNAL MEDICINE
Payer: COMMERCIAL

## 2017-01-13 DIAGNOSIS — C79.51 PROSTATE CANCER METASTATIC TO BONE (H): ICD-10-CM

## 2017-01-13 DIAGNOSIS — C61 PROSTATE CANCER METASTATIC TO BONE (H): ICD-10-CM

## 2017-01-13 DIAGNOSIS — C79.51 METASTATIC BONE TUMOR (H): ICD-10-CM

## 2017-01-13 LAB
ALBUMIN SERPL-MCNC: 3 G/DL (ref 3.4–5)
ALP SERPL-CCNC: 589 U/L (ref 40–150)
ALT SERPL W P-5'-P-CCNC: 14 U/L (ref 0–70)
ANION GAP SERPL CALCULATED.3IONS-SCNC: 10 MMOL/L (ref 3–14)
ANION GAP SERPL CALCULATED.3IONS-SCNC: 11 MMOL/L (ref 3–14)
AST SERPL W P-5'-P-CCNC: 21 U/L (ref 0–45)
BILIRUB DIRECT SERPL-MCNC: 0.3 MG/DL (ref 0–0.2)
BILIRUB SERPL-MCNC: 1.8 MG/DL (ref 0.2–1.3)
BUN SERPL-MCNC: 15 MG/DL (ref 7–30)
BUN SERPL-MCNC: 15 MG/DL (ref 7–30)
CALCIUM SERPL-MCNC: 6.7 MG/DL (ref 8.5–10.1)
CALCIUM SERPL-MCNC: 6.7 MG/DL (ref 8.5–10.1)
CHLORIDE SERPL-SCNC: 114 MMOL/L (ref 94–109)
CHLORIDE SERPL-SCNC: 115 MMOL/L (ref 94–109)
CO2 SERPL-SCNC: 21 MMOL/L (ref 20–32)
CO2 SERPL-SCNC: 22 MMOL/L (ref 20–32)
COLONOSCOPY: NORMAL
CREAT SERPL-MCNC: 0.97 MG/DL (ref 0.66–1.25)
CREAT SERPL-MCNC: 1.02 MG/DL (ref 0.66–1.25)
ERYTHROCYTE [DISTWIDTH] IN BLOOD BY AUTOMATED COUNT: 19.5 % (ref 10–15)
ERYTHROCYTE [DISTWIDTH] IN BLOOD BY AUTOMATED COUNT: 19.6 % (ref 10–15)
GFR SERPL CREATININE-BSD FRML MDRD: 72 ML/MIN/1.7M2
GFR SERPL CREATININE-BSD FRML MDRD: 76 ML/MIN/1.7M2
GLUCOSE SERPL-MCNC: 100 MG/DL (ref 70–99)
GLUCOSE SERPL-MCNC: 82 MG/DL (ref 70–99)
HCT VFR BLD AUTO: 25.8 % (ref 40–53)
HCT VFR BLD AUTO: 25.8 % (ref 40–53)
HGB BLD-MCNC: 8.4 G/DL (ref 13.3–17.7)
HGB BLD-MCNC: 8.5 G/DL (ref 13.3–17.7)
INTERPRETATION ECG - MUSE: NORMAL
MAGNESIUM SERPL-MCNC: 2 MG/DL (ref 1.6–2.3)
MAGNESIUM SERPL-MCNC: 2 MG/DL (ref 1.6–2.3)
MCH RBC QN AUTO: 28.4 PG (ref 26.5–33)
MCH RBC QN AUTO: 28.5 PG (ref 26.5–33)
MCHC RBC AUTO-ENTMCNC: 32.6 G/DL (ref 31.5–36.5)
MCHC RBC AUTO-ENTMCNC: 32.9 G/DL (ref 31.5–36.5)
MCV RBC AUTO: 86 FL (ref 78–100)
MCV RBC AUTO: 88 FL (ref 78–100)
PHOSPHATE SERPL-MCNC: 1.8 MG/DL (ref 2.5–4.5)
PHOSPHATE SERPL-MCNC: 2.9 MG/DL (ref 2.5–4.5)
PLATELET # BLD AUTO: 92 10E9/L (ref 150–450)
PLATELET # BLD AUTO: 94 10E9/L (ref 150–450)
POTASSIUM SERPL-SCNC: 3.5 MMOL/L (ref 3.4–5.3)
POTASSIUM SERPL-SCNC: 3.7 MMOL/L (ref 3.4–5.3)
PROT SERPL-MCNC: 5.8 G/DL (ref 6.8–8.8)
RBC # BLD AUTO: 2.95 10E12/L (ref 4.4–5.9)
RBC # BLD AUTO: 2.99 10E12/L (ref 4.4–5.9)
SODIUM SERPL-SCNC: 146 MMOL/L (ref 133–144)
SODIUM SERPL-SCNC: 146 MMOL/L (ref 133–144)
TROPONIN I SERPL-MCNC: 0.11 UG/L (ref 0–0.04)
UPPER GI ENDOSCOPY: NORMAL
WBC # BLD AUTO: 4.1 10E9/L (ref 4–11)
WBC # BLD AUTO: 5.5 10E9/L (ref 4–11)

## 2017-01-13 PROCEDURE — 0DJ08ZZ INSPECTION OF UPPER INTESTINAL TRACT, VIA NATURAL OR ARTIFICIAL OPENING ENDOSCOPIC: ICD-10-PCS | Performed by: INTERNAL MEDICINE

## 2017-01-13 PROCEDURE — 90662 IIV NO PRSV INCREASED AG IM: CPT | Performed by: INTERNAL MEDICINE

## 2017-01-13 PROCEDURE — 25000132 ZZH RX MED GY IP 250 OP 250 PS 637: Performed by: STUDENT IN AN ORGANIZED HEALTH CARE EDUCATION/TRAINING PROGRAM

## 2017-01-13 PROCEDURE — 36415 COLL VENOUS BLD VENIPUNCTURE: CPT | Performed by: NURSE PRACTITIONER

## 2017-01-13 PROCEDURE — 27210995 ZZH RX 272

## 2017-01-13 PROCEDURE — 25000128 H RX IP 250 OP 636: Performed by: INTERNAL MEDICINE

## 2017-01-13 PROCEDURE — 99232 SBSQ HOSP IP/OBS MODERATE 35: CPT | Performed by: INTERNAL MEDICINE

## 2017-01-13 PROCEDURE — 34400006 ZZH RX 344: Performed by: INTERNAL MEDICINE

## 2017-01-13 PROCEDURE — 80048 BASIC METABOLIC PNL TOTAL CA: CPT | Performed by: NURSE PRACTITIONER

## 2017-01-13 PROCEDURE — 25000132 ZZH RX MED GY IP 250 OP 250 PS 637: Performed by: INTERNAL MEDICINE

## 2017-01-13 PROCEDURE — 77790 RADIATION HANDLING: CPT

## 2017-01-13 PROCEDURE — 80076 HEPATIC FUNCTION PANEL: CPT | Performed by: NURSE PRACTITIONER

## 2017-01-13 PROCEDURE — 40000104 ZZH STATISTIC MODERATE SEDATION < 10 MIN: Performed by: INTERNAL MEDICINE

## 2017-01-13 PROCEDURE — 12000001 ZZH R&B MED SURG/OB UMMC

## 2017-01-13 PROCEDURE — 25000125 ZZHC RX 250: Performed by: INTERNAL MEDICINE

## 2017-01-13 PROCEDURE — 84100 ASSAY OF PHOSPHORUS: CPT | Performed by: NURSE PRACTITIONER

## 2017-01-13 PROCEDURE — 83735 ASSAY OF MAGNESIUM: CPT | Performed by: NURSE PRACTITIONER

## 2017-01-13 PROCEDURE — 85027 COMPLETE CBC AUTOMATED: CPT | Performed by: NURSE PRACTITIONER

## 2017-01-13 PROCEDURE — 43235 EGD DIAGNOSTIC BRUSH WASH: CPT | Performed by: INTERNAL MEDICINE

## 2017-01-13 PROCEDURE — 84484 ASSAY OF TROPONIN QUANT: CPT | Performed by: NURSE PRACTITIONER

## 2017-01-13 PROCEDURE — 0DJD8ZZ INSPECTION OF LOWER INTESTINAL TRACT, VIA NATURAL OR ARTIFICIAL OPENING ENDOSCOPIC: ICD-10-PCS | Performed by: INTERNAL MEDICINE

## 2017-01-13 PROCEDURE — 45378 DIAGNOSTIC COLONOSCOPY: CPT | Performed by: INTERNAL MEDICINE

## 2017-01-13 PROCEDURE — A9606 RADIUM RA223 DICHLORIDE THER: HCPCS | Performed by: INTERNAL MEDICINE

## 2017-01-13 PROCEDURE — 25000125 ZZHC RX 250: Performed by: NURSE PRACTITIONER

## 2017-01-13 RX ORDER — ACETAMINOPHEN 325 MG/1
650 TABLET ORAL EVERY 4 HOURS PRN
Status: DISCONTINUED | OUTPATIENT
Start: 2017-01-13 | End: 2017-01-14 | Stop reason: HOSPADM

## 2017-01-13 RX ORDER — FENTANYL CITRATE 50 UG/ML
INJECTION, SOLUTION INTRAMUSCULAR; INTRAVENOUS PRN
Status: DISCONTINUED | OUTPATIENT
Start: 2017-01-13 | End: 2017-01-13 | Stop reason: HOSPADM

## 2017-01-13 RX ORDER — PANTOPRAZOLE SODIUM 40 MG/1
40 TABLET, DELAYED RELEASE ORAL EVERY MORNING
Status: DISCONTINUED | OUTPATIENT
Start: 2017-01-14 | End: 2017-01-14 | Stop reason: HOSPADM

## 2017-01-13 RX ORDER — CALCIUM CARBONATE 500(1250)
1000 TABLET ORAL 2 TIMES DAILY
COMMUNITY
Start: 2017-01-13 | End: 2017-02-08

## 2017-01-13 RX ORDER — PREDNISONE 5 MG/1
5 TABLET ORAL DAILY
COMMUNITY
Start: 2017-01-13 | End: 2017-02-24

## 2017-01-13 RX ORDER — CALCIUM GLUCONATE 94 MG/ML
2 INJECTION, SOLUTION INTRAVENOUS ONCE
Status: DISCONTINUED | OUTPATIENT
Start: 2017-01-13 | End: 2017-01-13 | Stop reason: CLARIF

## 2017-01-13 RX ADMIN — MIRTAZAPINE 15 MG: 15 TABLET, FILM COATED ORAL at 21:25

## 2017-01-13 RX ADMIN — SIMVASTATIN 40 MG: 20 TABLET, FILM COATED ORAL at 21:25

## 2017-01-13 RX ADMIN — CALCIUM GLUCONATE 2 G: 94 INJECTION, SOLUTION INTRAVENOUS at 11:08

## 2017-01-13 RX ADMIN — PANTOPRAZOLE SODIUM 40 MG: 40 INJECTION, POWDER, FOR SOLUTION INTRAVENOUS at 07:52

## 2017-01-13 RX ADMIN — ABIRATERONE ACETATE 1000 MG: 250 TABLET ORAL at 05:55

## 2017-01-13 RX ADMIN — SODIUM PHOSPHATE, MONOBASIC, MONOHYDRATE AND SODIUM PHOSPHATE, DIBASIC, ANHYDROUS 20 MMOL: 276; 142 INJECTION, SOLUTION INTRAVENOUS at 12:12

## 2017-01-13 RX ADMIN — ENOXAPARIN SODIUM 70 MG: 80 INJECTION SUBCUTANEOUS at 12:08

## 2017-01-13 RX ADMIN — RADIUM RA 223 DICHLORIDE 95.1 UCI.: 30 INJECTION INTRAVENOUS at 10:41

## 2017-01-13 RX ADMIN — CALCIUM 500 MG: 500 TABLET ORAL at 18:16

## 2017-01-13 RX ADMIN — INFLUENZA A VIRUS A/CALIFORNIA/7/2009 X-179A (H1N1) ANTIGEN (FORMALDEHYDE INACTIVATED), INFLUENZA A VIRUS A/HONG KONG/4801/2014 X-263B (H3N2) ANTIGEN (FORMALDEHYDE INACTIVATED), AND INFLUENZA B VIRUS B/BRISBANE/60/2008 ANTIGEN (FORMALDEHYDE INACTIVATED) 0.5 ML: 60; 60; 60 INJECTION, SUSPENSION INTRAMUSCULAR at 11:07

## 2017-01-13 RX ADMIN — ENOXAPARIN SODIUM 70 MG: 80 INJECTION SUBCUTANEOUS at 22:46

## 2017-01-13 RX ADMIN — ACETAMINOPHEN 650 MG: 325 TABLET, FILM COATED ORAL at 23:11

## 2017-01-13 NOTE — PLAN OF CARE
Problem: Goal Outcome Summary  Goal: Goal Outcome Summary  Outcome: No Change  VSS, on RA. Pt had EGD and colonoscopy this AM. Flu shot given upon return and phosphorus replacement currently infusing. Phosphorus recheck scheduled at 1900. No complaints of pain, continue to monitor for s/s of bleeding.

## 2017-01-13 NOTE — PLAN OF CARE
Problem: Goal Outcome Summary  Goal: Goal Outcome Summary  Pt A&O. VSS on RA, low diastolic BPs in 50s.  No c/o pain.  Independent.  IVF d/c'ed, R PIV and L PIV are SL.  Troponin 0.114, EKG showed no changes. 1 unit of blood transfused yesterday, Hgb stable @ 8.9.  Phos: 2.5, replaced yesterday.  NPO @ midnight for EGD and colonoscopy today, bowel prep completed.  Continue to monitor and follow POC.

## 2017-01-13 NOTE — PLAN OF CARE
Problem: Goal Outcome Summary  Goal: Goal Outcome Summary  Pt A&O. VSS on RA, besides low diastolic in 50s. Denies pain/nausea. Up indp. NS d/c'ed, RPIV&LPIV SL. Critical lab, troponin 0.128 this deon, no chest pain/SOB per pt & no changes EKG. Hgb recheck stable@ 8.9. Phos recheck stable @3.0. CLD, completed bowel prep (polyethylene glycol) for EGD tmrw. NPO @midnight. Pt reported dark brown/maroon stools @beginning of prep, now clear. Calls appropriately &makes needs known. Continue to monitor and follow POC.    Addendum:  Lastest phos 2.5, still stable & no need to replace at this time.   Latest troponin 0.114.

## 2017-01-13 NOTE — PROGRESS NOTES
Followed Trop.  He has no cardiac symptoms today.  He has good exercise tolerance, can take a flight of stairs with minimal dyspnea and can walk few blocks without symptoms.   D/w cardiology, suspect this troponin leak represents subendocardial ischemia yesterday during severe anemia, and lack of any symptoms or EKG changes is reassuring.   Will simply monitor troponins to ensure the trend goes down. Recheck at 10 pm is lower.

## 2017-01-13 NOTE — OR NURSING
In patient EGD completed.  Pt tolerated well.  Colonoscopy completed.  Pt tolerated well.  Called report to pts floor.

## 2017-01-13 NOTE — PROGRESS NOTES
[facility], [place]    Hematology / Oncology Progress Note    Date of Admission: 1/11/2017  Hospital Day #: 2   Date of Service (when I saw the patient): 01/13/2017     Assessment and Plan  Oswaldo Win is a 71 year old man with PMHx significant for CKD, CHF, CAD, STEMI, CABG, DVT (10/2015), PE (11/2016), and metastatic prostate cancer. He was admitted with 4g drop in hgb and hematochezia concerning for GI bleeding.      #Acute anemia.  #Hematochezia/maroon stools.   Mainor has been on baby aspirin for cardiac hx and therapeutic lovenox for recent PE. Lovenox was stopped last week on 1/4 and he started xarelto on 1/5. Three days after starting xarelto, he noted blood in phlegm in mornings and some hematochezia/maroon stools. He stopped the xarelto on 1/10 and has not had bleeding since that day. He also has not had a BM since that day. He presented with SOB, general and muscle fatigue, and chest pain. He was found to have Hgb 6.0 down from ~10. He received RBC transfusion on admission and has had stable hgb and no bleeding since then. He is feeling much better with improved fatigue, SOB, and resolution of CP. Suspect anemia and other sx 2/2 GI bleed (?possible AVM as no nother source identified) but that seems to have stopped now.  -Appreciate GI consult. They did EGD and colonoscopy on 1/13; these did not identify source of bleeding. Feel it's possible pt had AVM that bled on anticoagulation.   -Resume AC with lovenox 1mg/kg sq bid. Will monitor overnight for recurrence of bleeding or hgb drop.   -D/c'ed IVF. Regular diet. PPI daily.   -Suspect blood in phlegm is 2/2 dryness in setting of AC. Monitor, can try saline nasal spray or mouth rinses if needed.     #Troponin leak. Suspect subendocardial ischemia 2/2 anemia. Chest pain and dyspnea have resolve. Pt ambulatory, back to physical baseline. EKG unremarkable. Troponins now trending down. Monitor hgb as above.     #Electrolyte imbalance.  Likely 2/2 stool losses with colon prep. Replace Ca and phos today. Recheck in afternoon.     #H/o PE in 11/2016. Developed PE on subtherapeutic coumadin. Was managed with lovenox until last week, at which point pt requested switch to other agent as did not like bid self injections (d/t bruising etc.). Was switched to xarelto on 1/5 but then stopped on 1/10 d/t bleeding noted above.   -As above, resume AC with lovenox 1mg/kg sq bid.     #Metastatic prostate cancer. Continue prednisone and zytiga. Received xofigo this AM 1/13.     FEN:  -PRN lyte replacement  -RDAT    Code status: FULL   Disposition: D/c home tomorrow if no bleeding and hgb stable with resumption of lovenox.    Lakia Cavanaugh DNP, APRN, CNP  Hematology/Oncology  Pager: [phone]    Interval History  Mainor reports feeling well. Just had EGD, colonoscopy, and xofigo and is doing fine, no complications. Fatigue, weakness, SOB/MOURA, and chest pain have all resolved. Good OOB activity. No active bleeding noted since 1/10 prior to admission. Agreeable with plan to restart lovenox today.     Physical Exam  Temp: 96.5  F (35.8  C) Temp src: Oral BP: 127/45 mmHg Pulse: 71 Heart Rate: 88 Resp: 16 SpO2: 98 % O2 Device: None (Room air) Oxygen Delivery: 2 LPM  Filed Vitals:    01/11/17 1929   Weight: 69.854 kg (154 lb)     Vital Signs with Ranges  Temp:  [96.5  F (35.8  C)-99  F (37.2  C)] 96.5  F (35.8  C)  Pulse:  [71-91] 71  Heart Rate:  [70-97] 88  Resp:  [9-26] 16  BP: ()/(43-64) 127/45 mmHg  SpO2:  [94 %-100 %] 98 %  I/O last 3 completed shifts:  In: 4802.92 [P.O.:3680; I.V.:1122.92]  Out: -     Constitutional: Pleasant man seen resting comfortably in bed in NAD. Alert and interactive.   HEENT: NCAT. PERRL, EOMI, anicteric sclera. MMM.  Hematologic / Lymphatic: No overt bleeding or major bruising.   Respiratory: Non-labored breathing on RA, good air exchange, lungs clear to auscultation bilaterally other than very faint scattered wheeze. No cough or  crackles.   Cardiovascular: Regular rate and rhythm. No murmur or rub.   GI: Normoactive bowel sounds. Abdomen soft, non-distended, and non-tender.   Skin: Warm, dry, well perfused.   Musculoskeletal: Extremities grossly normal, non-tender, no edema. Strong peripheral pulses. Good strength and ROM in bed.   Neurologic: A&O, speech normal, no focal deficits.   Neuropsychiatric: Mentation and affect appear normal/appropriate.    Medications  Current Facility-Administered Medications   Medication     enoxaparin (LOVENOX) injection 70 mg     [START ON 1/14/2017] pantoprazole (PROTONIX) EC tablet 40 mg     potassium chloride SA (K-DUR/KLOR-CON M) CR tablet 20-40 mEq     potassium chloride (KLOR-CON) Packet 20-40 mEq     potassium chloride 10 mEq in 100 mL intermittent infusion     potassium chloride 10 mEq in 100 mL intermittent infusion with 10 mg lidocaine     potassium chloride 20 mEq in 50 mL intermittent infusion     magnesium sulfate 4 g in 100 mL sterile water (premade)     sodium phosphate 15 mmol in D5W intermittent infusion     sodium phosphate 20 mmol in D5W intermittent infusion     sodium phosphate 25 mmol in D5W intermittent infusion     polyethylene glycol (GoLYTELY/NuLYTELY) suspension 4,000 mL     abiraterone (ZYTIGA) tablet 1,000 mg     calcium carbonate (OS-RODNEY 500 mg [place]. Ca) tablet 500 mg     cholecalciferol (vitamin D) tablet 2,000 Units     HYDROcodone-acetaminophen (NORCO) 5-325 MG per tablet 1 tablet     mirtazapine (REMERON) tablet 15 mg     predniSONE (DELTASONE) tablet 5 mg     simvastatin (ZOCOR) tablet 40 mg     tamsulosin (FLOMAX) capsule 0.4 mg     Medication Instruction     naloxone (NARCAN) injection 0.1-0.4 mg     Facility-Administered Medications Ordered in Other Encounters   Medication     INFUSION HYPERSENSITIVITY     hydrocortisone sodium succinate (solu-CORTEF) injection     glycopyrrolate (ROBINUL) injection     neostigmine (PROSTIGMINE) injection     protamine injection      albuterol (PROAIR HFA, PROVENTIL HFA, VENTOLIN HFA) inhaler       Data  CBC    Recent Labs  Lab 01/13/17  0801 01/12/17  1745 01/12/17  0927 01/12/17  0758   WBC 5.5 6.0 5.8 5.6   RBC 2.99* 3.10* 2.74* 2.60*   HGB 8.5* 8.9* 7.9* 7.6*   HCT 25.8* 27.1* 24.9* 23.1*   MCV 86 87 91 89   MCH 28.4 28.7 28.8 29.2   MCHC 32.9 32.8 31.7 32.9   RDW 19.6* 18.9* 18.3* 18.1*   PLT 92* 100* 108* 106*     CMP    Recent Labs  Lab 01/13/17  0801 01/12/17  2137 01/12/17  1745 01/12/17  0758 01/11/17  1300   *  --  142 144 139   POTASSIUM 3.7  --  4.1 4.0 4.2   CHLORIDE 114*  --  114* 114* 110*   CO2 21  --  19* 19* 22   ANIONGAP 11  --  8 11 8   GLC 82  --  127* 97 118*   BUN 15  --  19 22 29   CR 0.97  --  0.86 0.87 0.94   GFRESTIMATED 76  --  87 86 79   GFRESTBLACK >90African American GFR Calc  --  >90African American GFR Calc >90African American GFR Calc >90African American GFR Calc   RODNEY 6.7*  --  7.7* 7.3* 8.5   MAG 2.0  --  2.2 2.3  --    PHOS 1.8* 2.5 3.0 1.9*  --    PROTTOTAL 5.8*  --   --   --  6.6*   ALBUMIN 3.0*  --   --   --  3.4   BILITOTAL 1.8*  --   --   --  1.1   ALKPHOS 589*  --   --   --  630*   AST 21  --   --   --  20   ALT 14  --   --   --  15     INR    Recent Labs  Lab 01/11/17  2207   INR 1.25*       Results for orders placed or performed during the hospital encounter of 01/13/17   NM [place] 223 Xofigo Therapy    Narrative    Examination:  NM RADIUM 223 XOFIGO THERAPY  1/13/2017 11:06 AM     Comparison:  None.    History:  Painful bony metastasis, Malignant neoplasm of prostate,  Secondary malignant neoplasm of bone, Secondary malignant neoplasm of  bone    Additional Information: None.    Procedure: After confirming the identity of the patient, and after  consultation with the ordering physician Dr. Robles the risks and  benefits of [place]-223 therapy were discussed at length with the  patient, and all questions were answered. 95.1 uCi of Radium-223 were  given by IV to the patient without complications.  Radiation safety was  involved. The patient had been in the hospital for a GI bleed and  nursing radiation precautions were also taken.      Impression    Impression:    95.1 uCi Radium-223 therapeutic treatment for  metastatic prostate cancer.    MARIANNE JIMÉNEZ MD

## 2017-01-14 VITALS
HEIGHT: 67 IN | SYSTOLIC BLOOD PRESSURE: 114 MMHG | HEART RATE: 89 BPM | TEMPERATURE: 96.7 F | BODY MASS INDEX: 24.01 KG/M2 | OXYGEN SATURATION: 95 % | DIASTOLIC BLOOD PRESSURE: 53 MMHG | RESPIRATION RATE: 16 BRPM | WEIGHT: 153 LBS

## 2017-01-14 LAB
ALBUMIN SERPL-MCNC: 2.9 G/DL (ref 3.4–5)
ALP SERPL-CCNC: 528 U/L (ref 40–150)
ALT SERPL W P-5'-P-CCNC: 12 U/L (ref 0–70)
ANION GAP SERPL CALCULATED.3IONS-SCNC: 8 MMOL/L (ref 3–14)
AST SERPL W P-5'-P-CCNC: 18 U/L (ref 0–45)
BILIRUB DIRECT SERPL-MCNC: 0.3 MG/DL (ref 0–0.2)
BILIRUB SERPL-MCNC: 1.4 MG/DL (ref 0.2–1.3)
BUN SERPL-MCNC: 12 MG/DL (ref 7–30)
CA-I BLD-MCNC: 3.8 MG/DL (ref 4.4–5.2)
CALCIUM SERPL-MCNC: 6.8 MG/DL (ref 8.5–10.1)
CHLORIDE SERPL-SCNC: 113 MMOL/L (ref 94–109)
CO2 SERPL-SCNC: 21 MMOL/L (ref 20–32)
CREAT SERPL-MCNC: 0.91 MG/DL (ref 0.66–1.25)
ERYTHROCYTE [DISTWIDTH] IN BLOOD BY AUTOMATED COUNT: 19.4 % (ref 10–15)
GFR SERPL CREATININE-BSD FRML MDRD: 82 ML/MIN/1.7M2
GLUCOSE SERPL-MCNC: 88 MG/DL (ref 70–99)
HCT VFR BLD AUTO: 25.3 % (ref 40–53)
HGB BLD-MCNC: 8.4 G/DL (ref 13.3–17.7)
MAGNESIUM SERPL-MCNC: 2.1 MG/DL (ref 1.6–2.3)
MCH RBC QN AUTO: 28.9 PG (ref 26.5–33)
MCHC RBC AUTO-ENTMCNC: 33.2 G/DL (ref 31.5–36.5)
MCV RBC AUTO: 87 FL (ref 78–100)
PHOSPHATE SERPL-MCNC: 2 MG/DL (ref 2.5–4.5)
PLATELET # BLD AUTO: 77 10E9/L (ref 150–450)
POTASSIUM SERPL-SCNC: 3.5 MMOL/L (ref 3.4–5.3)
PROT SERPL-MCNC: 5.8 G/DL (ref 6.8–8.8)
RBC # BLD AUTO: 2.91 10E12/L (ref 4.4–5.9)
SODIUM SERPL-SCNC: 142 MMOL/L (ref 133–144)
WBC # BLD AUTO: 3.7 10E9/L (ref 4–11)

## 2017-01-14 PROCEDURE — 84100 ASSAY OF PHOSPHORUS: CPT | Performed by: NURSE PRACTITIONER

## 2017-01-14 PROCEDURE — 25000125 ZZHC RX 250: Performed by: NURSE PRACTITIONER

## 2017-01-14 PROCEDURE — 25000125 ZZHC RX 250: Performed by: STUDENT IN AN ORGANIZED HEALTH CARE EDUCATION/TRAINING PROGRAM

## 2017-01-14 PROCEDURE — 80076 HEPATIC FUNCTION PANEL: CPT | Performed by: NURSE PRACTITIONER

## 2017-01-14 PROCEDURE — 80048 BASIC METABOLIC PNL TOTAL CA: CPT | Performed by: NURSE PRACTITIONER

## 2017-01-14 PROCEDURE — 25000125 ZZHC RX 250: Performed by: PHYSICIAN ASSISTANT

## 2017-01-14 PROCEDURE — 25000132 ZZH RX MED GY IP 250 OP 250 PS 637: Performed by: NURSE PRACTITIONER

## 2017-01-14 PROCEDURE — 83735 ASSAY OF MAGNESIUM: CPT | Performed by: NURSE PRACTITIONER

## 2017-01-14 PROCEDURE — 82330 ASSAY OF CALCIUM: CPT | Performed by: NURSE PRACTITIONER

## 2017-01-14 PROCEDURE — 36415 COLL VENOUS BLD VENIPUNCTURE: CPT | Performed by: NURSE PRACTITIONER

## 2017-01-14 PROCEDURE — 25000132 ZZH RX MED GY IP 250 OP 250 PS 637: Performed by: STUDENT IN AN ORGANIZED HEALTH CARE EDUCATION/TRAINING PROGRAM

## 2017-01-14 PROCEDURE — 25000125 ZZHC RX 250: Performed by: INTERNAL MEDICINE

## 2017-01-14 PROCEDURE — 99238 HOSP IP/OBS DSCHRG MGMT 30/<: CPT | Performed by: INTERNAL MEDICINE

## 2017-01-14 PROCEDURE — 85027 COMPLETE CBC AUTOMATED: CPT | Performed by: NURSE PRACTITIONER

## 2017-01-14 RX ADMIN — ABIRATERONE ACETATE 1000 MG: 250 TABLET ORAL at 07:01

## 2017-01-14 RX ADMIN — CALCIUM 500 MG: 500 TABLET ORAL at 09:04

## 2017-01-14 RX ADMIN — TAMSULOSIN HYDROCHLORIDE 0.4 MG: 0.4 CAPSULE ORAL at 09:03

## 2017-01-14 RX ADMIN — VITAMIN D, TAB 1000IU (100/BT) 2000 UNITS: 25 TAB at 09:04

## 2017-01-14 RX ADMIN — PREDNISONE 5 MG: 5 TABLET ORAL at 09:04

## 2017-01-14 RX ADMIN — ENOXAPARIN SODIUM 70 MG: 80 INJECTION SUBCUTANEOUS at 12:00

## 2017-01-14 RX ADMIN — POTASSIUM PHOSPHATE, MONOBASIC AND POTASSIUM PHOSPHATE, DIBASIC 15 MMOL: 224; 236 INJECTION, SOLUTION INTRAVENOUS at 13:36

## 2017-01-14 RX ADMIN — PANTOPRAZOLE SODIUM 40 MG: 40 TABLET, DELAYED RELEASE ORAL at 09:04

## 2017-01-14 RX ADMIN — CALCIUM GLUCONATE 1 G: 94 INJECTION, SOLUTION INTRAVENOUS at 12:00

## 2017-01-14 NOTE — PLAN OF CARE
Problem: Goal Outcome Summary  Goal: Goal Outcome Summary  Outcome: Improving  [id number]:    Pt A&Ox4, VSS on RA - BPs running soft. No c/o pain. Pt had EGD and colonoscopy today to determine source of GI bleed - likely source is AVM.  L PIV SL. Troponin 0.111 today, pt on tele running NSR. Hgb 8.4 today. No reports of bloody stools/blood loss. Phos recheck up to 2.9. Pt independent, has been sleeping majority of shift. Regular diet.     Plan: d/c home tomorrow if no bleeding and hgb stable with resumption of lovenox    Continue to monitor and follow POC.

## 2017-01-14 NOTE — DISCHARGE SUMMARY
General acute hospital, [place]    Discharge Summary  Hematology / Oncology    Date of Admission:  1/11/2017  Date of Discharge:  01/14/2017  Discharging Provider: Hannah Haro  Date of Service (when I saw the patient): 01/14/2017    Discharge Diagnoses  Acute anemia  Electrolyte imbalance  Metastaic prostate cancer    History of Present Illness  Adapted from H&P:    Mr. Win is a 71 year old male with a hx of CHF, CKD, CAD w/ hx of STEMI, CABG, and metastatic prostate cancer admitted from clinic with concern for GIB on rivaroxaban.  The patient reports that he started taking rivaroxaban for the last few weeks.  Shortly thereafter, he developed bright red stools that were happening every 1-2 days.  He also had small amounts of bright red blood in his sputum.  He reports that he has felt more fatigued over the last week.  This morning, he reports some mild shortness of breath.  He did develop some chest pain in clinic, but work up with labs and EKG were negative and his pain resolved.  He has not had recurrence.  He denies any history of melena or ulcers in the past.  He has never had an endoscopy or colonoscopy in the past.  He denies fevers, chills, sweats, rash, abdominal pain, vomiting, or dysuria.  No hematuria.  Has had some lightheadedness when standing, no syncope.    Hospital Course  Oswaldo Win was admitted on 1/11/2017.  The following problems were addressed during his hospitalization:    #Acute anemia.  #Hematochezia/maroon stools.    Mainor has been on baby aspirin for cardiac hx and therapeutic lovenox for recent PE. Lovenox was stopped last week on 1/4 and he started xarelto on 1/5. Three days after starting xarelto, he noted blood in phlegm in mornings and some hematochezia/maroon stools. He stopped the xarelto on 1/10 and has not had bleeding since that day. He also has not had a BM since that day. He presented with SOB, general and muscle fatigue, and chest pain. He was found  to have Hgb 6.0 down from ~10. He received RBC transfusion on admission and has had stable hgb and no bleeding since then. He is feeling much better with improved fatigue, SOB, and resolution of CP. Suspect anemia and other sx 2/2 GI bleed (?possible AVM as no other source identified) but that seems to have stopped now. EGD and colonoscopy on 1/13; these did not identify source of bleeding. Feel it's possible pt had AVM that bled on anticoagulation. Hgb stable on the morning of discharge. Will continue lovenox at home.    #Troponin leak. Suspect subendocardial ischemia 2/2 anemia. Chest pain and dyspnea have resolve. Pt ambulatory, back to physical baseline. EKG unremarkable. Troponins now trending down.      #Electrolyte imbalance. Likely 2/2 stool losses with colon prep. Calcium and phosphorous replaced in the hospital. Increased dose of Ca supplement to 1,000 mg BID.    #H/o PE in 11/2016. Developed PE on subtherapeutic coumadin. Was managed with lovenox until last week, at which point pt requested switch to other agent as did not like bid self injections (d/t bruising etc.). Was switched to xarelto on 1/5 but then stopped on 1/10 d/t bleeding noted above.    -As above, resume AC with lovenox 1mg/kg sq bid.     #Metastatic prostate cancer. Continue prednisone and zytiga. Received xofigo 1/13.       Hannah Haro PA-C  Hematology/Oncology  Pager: [phone]    Code Status  Full Code    Primary Care Physician  Lucrecia Collier    Vital Signs with Ranges  Temp:  [96.7  F (35.9  C)-100.5  F (38.1  C)] 96.7  F (35.9  C)  Pulse:  [89] 89  Heart Rate:  [78-93] 78  Resp:  [16-18] 16  BP: ()/(41-53) 114/53 mmHg  SpO2:  [91 %-98 %] 95 %  153 lbs 0 oz    Physical Exam  General:  Pleasant and cooperative male lying in bed.  HEENT:  NC/AT.  Moist mucous membranes.   CV:  RRR, S1/S2. No murmurs, rubs, or extra sounds.  Lungs:  comfortable on room air, CTAB  Abdomen:  Soft, normoactive BS, non-distended,  non-tender upon palpation.  Skin: No bruising, bleeding, rashes or lesions.  MSK:  No extremity edema.     Discharge Disposition  Discharged to home  Condition at discharge: Stable    Consultations This Hospital Stay  VASCULAR ACCESS CARE ADULT IP CONSULT  VASCULAR ACCESS CARE ADULT IP CONSULT  MEDICATION HISTORY IP PHARMACY CONSULT  GI LUMINAL ADULT IP CONSULT  VASCULAR ACCESS CARE ADULT IP CONSULT    Discharge Orders    Comprehensive metabolic panel     CBC with platelets   Last Lab Result: HEMOGLOBIN (g/dL)      Date                     Value                01/14/2017               8.4*             ----------     Medication Therapy Management Referral     Reason for your hospital stay   Low hemoglobin concerning for GI bleed     Follow Up and recommended labs and tests   Appointments as below.     Activity   Your activity upon discharge: activity as tolerated     When to contact your care team   Please call the [facility] at [phone] (Monday - Friday; 8 AM - 4:30 PM) for temperature above 100.4, shortness of breath, chest pain, headaches, vision changes, bleeding, uncontrolled nausea, vomiting, diarrhea, or pain.    For weekends or after hours, call the main hospital number at [phone], and request to speak with the on-call hematology/oncology physician.     Full Code     Diet   Follow this diet upon discharge: normal       Discharge Medications  Current Discharge Medication List      START taking these medications    Details   enoxaparin (ENOXAPARIN) 120 MG/0.8ML injection Inject 0.7 mLs (105 mg) Subcutaneous daily  Qty: 21 mL, Refills: 0    Associated Diagnoses: Personal history of venous thrombosis and embolism         CONTINUE these medications which have CHANGED    Details   predniSONE (DELTASONE) 5 MG tablet Take 1 tablet (5 mg) by mouth daily    Associated Diagnoses: Prostate cancer 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); Other acute pulmonary embolism without acute  cor pulmonale (H)      calcium carbonate (OS-RODNEY 500 MG [place]. [place]) 500 MG tablet Take 2 tablets (1,000 mg) by mouth 2 times daily    Associated Diagnoses: Hypocalcemia         CONTINUE these medications which have NOT CHANGED    Details   aspirin 81 MG tablet Take 81 mg by mouth daily      metoprolol (TOPROL-XL) 25 MG 24 hr tablet Take 1 tablet (25 mg) by mouth daily AM  Qty: 90 tablet, Refills: 4    Associated Diagnoses: (HFpEF) heart failure with preserved ejection fraction (H)      Cholecalciferol (VITAMIN D) 2000 UNITS tablet Take 1 tablet by mouth daily  Qty: 100 tablet, Refills: 3    Comments: Please update your records to reflect new contact info for this provider: [facility] [address] [place] [address] [id number] [address]  Phone: [phone]  Fax: [phone]  Associated Diagnoses: Vitamin D deficiency      simvastatin (ZOCOR) 40 MG tablet TAKE ONE TABLET BY MOUTH AT BEDTIME  Qty: 90 tablet, Refills: 3    Associated Diagnoses: Hyperlipidemia LDL goal <130      abiraterone (ZYTIGA) 250 MG tablet Take 4 tablets (1,000 mg) by mouth every morning (before breakfast)  Qty: 120 tablet, Refills: 1    Associated Diagnoses: Prostate cancer 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)      tamsulosin (FLOMAX) 0.4 MG 24 hr capsule Take 1 capsule (0.4 mg) by mouth daily AM  Qty: 90 capsule, Refills: 3    Associated Diagnoses: Ureteral stone      mirtazapine (REMERON) 15 MG tablet Take 1 tablet (15 mg) by mouth At Bedtime  Qty: 30 tablet, Refills: 2    Associated Diagnoses: Insomnia      HYDROcodone-acetaminophen (NORCO) 5-325 MG per tablet Take 1 tablet by mouth every 6 hours as needed for moderate to severe pain      multivitamin, therapeutic with minerals (MULTI-VITAMIN) TABS Take 1 tablet by mouth every morning   Qty: 100 tablet, Refills: 3           Allergies  No Known Allergies    Data  Most Recent 3 CBC's:  Recent Labs   Lab Test  01/14/17   0808  01/13/17   1815  01/13/17   0801   WBC  3.7*  4.1  5.5   HGB   8.4*  8.4*  8.5*   MCV  87  88  86   PLT  77*  94*  92*      Most Recent 3 BMP's:  Recent Labs   Lab Test  01/14/17   0808  01/13/17   1815  01/13/17   0801   NA  142  146*  146*   POTASSIUM  3.5  3.5  3.7   CHLORIDE  113*  115*  114*   CO2  21  22  21   BUN  12  15  15   CR  0.91  1.02  0.97   ANIONGAP  8  10  11   RODNEY  6.8*  6.7*  6.7*   GLC  88  100*  82     Most Recent 2 LFT's:  Recent Labs   Lab Test  01/14/17   0808  01/13/17   0801   AST  18  21   ALT  12  14   ALKPHOS  528*  589*   BILITOTAL  1.4*  1.8*     Most Recent INR's and Anticoagulation Dosing History:  Anticoagulation Dose History     Recent Dosing and Labs Latest Ref Rng 9/19/2016 9/26/2016 10/5/2016 10/20/2016 10/28/2016 11/8/2016 1/11/2017    INR 0.86 - 1.14 1.26(H) 1.11 - - 2.45(H) 1.73(H) 1.25(H)    INR 0.86 - 1.14 - - 1.6(A) 2.0(A) - - -        Most Recent 3 Troponin's:  Recent Labs   Lab Test  01/13/17   0801  01/12/17   2137  01/12/17   1634   03/12/16   2026   04/05/14   1436   TROPI  0.111*  0.114*  0.128*   < >   --    < >   --    TROPONIN   --    --    --    --   0.02   --   0.06    < > = values in this interval not displayed.

## 2017-01-14 NOTE — PLAN OF CARE
Problem: Goal Outcome Summary  Goal: Goal Outcome Summary  Outcome: No Change  Pt. A/Ox4, VSS on RA. Temp 98.9 after PRN PO Tylenol. Tele NSR. Denies pain/discomfort. Pt up independently. Voiding, no BMs reported. L PIV SL. No s/s bleeding noted overnight. Mg and Phos recheck placed for AM. Plan for possible D/C home today if Hgb stable with resumption of lovenox. Nursing will continue to monitor and follow POC.

## 2017-01-14 NOTE — PLAN OF CARE
Problem: Individualization  Goal: Patient Preferences  A&Ox4. T max 99.3 orally. Hgb 8.4 today, the same as yesterday. Raúl 6.8, pt received 1 gm calcium gluconate IV. Phos is 2.0, pt is receiving potassium phos 20 mmol IV. Denied pain. Up independent to bathroom. Pt is eating lunch currently. Pt will be discharged once phos replacement is done( will be done by 5:30 pm).   Discharge  Discharge instruction was  revised with pt. Discharge paper work given to pt. Pt stated that he understood discharge instruction.Pt is waiting for phos replacement to be done so that he can go home.  Addendum   Phos replacement was completed. PIV removed,tip of catheter was intact. Pt left floor walking. Pt will  discharge meds from discharge pharmacy. Pt stated that he will drive his car that was [place] the [place] to his [place].

## 2017-01-14 NOTE — PLAN OF CARE
Problem: Individualization  Goal: Patient Preferences  Pt will have 1 gm calcium gluconate IV for calcium level of 6.8 and potassium phosphate 15 mmol IV for phos level of 2.0.

## 2017-01-16 ENCOUNTER — TELEPHONE (OUTPATIENT)
Dept: PHARMACY | Facility: OTHER | Age: 72
End: 2017-01-16

## 2017-01-16 ENCOUNTER — CARE COORDINATION (OUTPATIENT)
Dept: CARDIOLOGY | Facility: CLINIC | Age: 72
End: 2017-01-16

## 2017-01-16 ENCOUNTER — ALLIED HEALTH/NURSE VISIT (OUTPATIENT)
Dept: PHARMACY | Facility: CLINIC | Age: 72
End: 2017-01-16
Payer: COMMERCIAL

## 2017-01-16 DIAGNOSIS — I82.499 DEEP VEIN THROMBOSIS (DVT) OF OTHER VEIN OF LOWER EXTREMITY: ICD-10-CM

## 2017-01-16 DIAGNOSIS — I27.82 OTHER CHRONIC PULMONARY EMBOLISM WITHOUT ACUTE COR PULMONALE (H): Primary | ICD-10-CM

## 2017-01-16 DIAGNOSIS — K92.2 GASTROINTESTINAL HEMORRHAGE, UNSPECIFIED GASTROINTESTINAL HEMORRHAGE TYPE: ICD-10-CM

## 2017-01-16 LAB
BLD PROD TYP BPU: NORMAL
BLD UNIT ID BPU: 0
BLOOD PRODUCT CODE: NORMAL
BPU ID: NORMAL
TRANSFUSION STATUS PATIENT QL: NORMAL
TRANSFUSION STATUS PATIENT QL: NORMAL

## 2017-01-16 PROCEDURE — 99605 MTMS BY PHARM NP 15 MIN: CPT | Performed by: PHARMACIST

## 2017-01-16 NOTE — PROGRESS NOTES
SUBJECTIVE/OBJECTIVE:                                                    Oswaldo Win is a 71 year old male called for a transitions of care visit.  He was dischared from [facility] on 1/11/7 for GI bleed.     Chief Complaint: how to inject Lovenox - with or without air bubble    Allergies/ADRs: Reviewed in Epic  Tobacco: History of tobacco dependence - quit 2000   Alcohol: Less than 1 beverage / month    PMH: Reviewed in Epic    Medication Adherence: no issues reported    DVT/PE: Taking Lovenox 0.7 ml daily and ASA 81 mg daily.  Has been on this before and was advised to expel air bubble; this time someone told him to inject air bubble and he wonders why.      GI Bleed: Admitted for dark stools and Hgb 7.9.  Was on Xarelto at the time of admission.  The anticoagulant was stopped and symptoms quickly resolved.  Colonoscopy was negative.  PPI was determined to be not needed.    No other medication changes while admitted and he does not want to review his other medications.      Current labs include:  BP Readings from Last 3 Encounters:   01/14/17 114/53   01/11/17 114/39   01/11/17 97/51     A1C      5.7   6/2/2014.  CHOL      196   4/20/2016  TRIG      137   4/20/2016  HDL       56   4/20/2016  LDL      112   4/20/2016  ALT       12   1/14/2017  Lab Results   Component Value Date    UCRR 135 08/10/2015       Last Basic Metabolic Panel:  NA      142   1/14/2017   POTASSIUM      3.5   1/14/2017  CHLORIDE      113   1/14/2017  BUN       12   1/14/2017  CR     0.91   1/14/2017  GFR ESTIMATE   Date Value Ref Range Status   01/14/2017 82 >60 mL/min/1.7m2 Final     Comment:     Non  GFR Calc   01/13/2017 72 >60 mL/min/1.7m2 Final     Comment:     Non  GFR Calc   01/13/2017 76 >60 mL/min/1.7m2 Final     Comment:     Non  GFR Calc     GFR ESTIMATE IF BLACK   Date Value Ref Range Status   01/14/2017 >90   GFR Calc   >60 mL/min/1.7m2 Final   01/13/2017 87 >60  mL/min/1.7m2 Final     Comment:      GFR Calc   01/13/2017 >90   GFR Calc   >60 mL/min/1.7m2 Final     TSH   Date Value Ref Range Status   09/16/2015 3.18 0.40 - 4.00 mU/L Final   ]      Most Recent Immunizations   Administered Date(s) Administered     Influenza (High Dose) 3 valent vaccine 01/13/2017     Pneumococcal 23 valent 04/10/2014     ASSESSMENT:                                                       Current medications were reviewed today.      Medication Adherence: no issues identified      DVT/PE: per PI the air bubble should be injected, this is intentional to minimize medication leaking and bruising.  Pt understood directions and will continue with daily dosing.    GI Bleed: improved.  No symptoms at this time time.    PLAN:                                                      Post Discharge Medication Reconciliation Status: unable to reconcile discharge medications due to patient not interested.    1.  Lovenox injection review    I spent 15 minutes with this patient today.  I offer these suggestions with the understanding that I don't fully understand Oswaldo's past medical history and the complexity of his health conditions. Oswaldo should make no changes without the approval of his physician. A copy of the visit note was provided to the patient's primary care provider.    Will follow up as needed.    The patient declined a summary of these recommendations as an after visit summary.    Sidra Altamirano , Pharm D  [phone] (phone)  [phone] (pager)  Medication Therapy Management Pharmacist

## 2017-01-16 NOTE — TELEPHONE ENCOUNTER
MTM referral from: Transitions of Care (recent hospital discharge or ED visit)    MTM referral outreach attempt #1 on January 16, 2017 at 1:56 PM      Outcome: Patient scheduled for MTM appointment on 1/16/17    Amber Velazquez MTM Coordinator

## 2017-01-16 NOTE — PROGRESS NOTES
"[facility]  \"Hello, my name is Shelby Gold , and I am calling from the [facility].  I want to check in and see how you are doing, after leaving the hospital.  You may also receive a call from your Care Coordinator (care team), but I want to make sure you don t have any urgent needs.  I have a couple questions to review with you:     Post-Discharge Outreach                                                    Oswaldo Win is a 71 year old male         Follow-up Appointments      Follow Up and recommended labs and tests        Appointments as below.                       Your next 10 appointments already scheduled      Jan 20, 2017 11:15 AM   Masonic Lab Draw with  MASONIC LAB DRAW   [facility] Masonic Lab Draw ([facility])      [address] [place] [address]   [phone]               Jan 20, 2017 11:40 AM   (Arrive by 11:25 AM)   Return Visit with POLI Bridges [facility] ([facility])      [address] [place] [address]   [phone]               Feb 01, 2017 10:30 AM   Masonic Lab Draw with  MASONIC LAB DRAW   [facility] Masonic Lab Draw ([facility])      [address] [place] [address]   [phone]               Feb 08, 2017  8:15 AM   Masonic Lab Draw with  MASONIC LAB DRAW   [facility] Masonic Lab Draw ([facility])      [address] [place] [address]   [phone]               Feb 08, 2017  8:40 AM   (Arrive by 8:25 AM)   Return Visit with POLI Bridges [facility] ([facility])      [address] [place] [address]   [phone]               Feb 08, 2017 11:00 AM   NM RADIUM 223 XOFIGO THERAPY with [id number]   [facility] [place], Nuclear Medicine " ([facility])      [address] [facility] [address]   [phone]               Please bring a list of your medicines to the exam. (Include vitamins, minerals and over-the-counter drugs.) You should wear comfortable clothes. Leave your valuables at home. Please bring related prior results and films.  Tell your doctor:   If you are breastfeeding or may be pregnant.   If you have had a barium test within the past few days. Barium may change the results of certain exams.   If you think you may need sedation (medicine to help you relax).  You may eat and drink as normal.  Please call your Imaging Department at your exam site with any questions.               Mar 01, 2017 10:30 AM   Masonic Lab Draw with  [facility] LAB DRAW   [facility] ([facility])      [address] [place] [address]   [phone]               Mar 08, 2017  8:45 AM   Masonic Lab Draw with  [facility] LAB DRAW   [facility] ([facility])      [address] [place] [address]   [phone]               Mar 08, 2017  9:15 AM   (Arrive by 9:00 AM)   Return Visit with Bentley Robles MD   [facility] ([facility])      [address] [place] [address]   [phone]               Mar 08, 2017 11:00 AM   NM RADIUM 223 XOFIGO THERAPY with [id number]   [facility] [place], Nuclear Medicine ([facility])                Care Team:    Patient Care Team       Relationship Specialty Notifications Start End    Lucrecia Collier MD PCP - General Family Practice  5/2/14     Phone: [phone] Fax: [phone]         [facility] [address] [facility] [address]    Carmelo Ling MD MD Cardiology  6/8/15     Phone: [phone] Fax:  [phone]          [facility] [address] [facility] [address] [facility] [address] [facility] [address]    Idanai Hirsch, RN Nurse Coordinator Cardiology Admissions 6/10/15     Comment:  [phone] pager [phone]    Phone: [phone]         Sourav Gregory MD MD Urology  7/7/15     Phone: [phone] Fax: [phone]          [facility] [address] [facility] [address] [facility] [address]    Jessie Mancilla, RN Nurse Coordinator Oncology Admissions 7/15/15     Phone: [phone] Pager: [phone]        Alexia Carrasco, RN Nurse Coordinator Cardiology Admissions 9/18/15     Comment:  [facility] Nurse Coordinator    Phone: [phone] Pager: [phone]        Rich Kim MD MD Internal Medicine  10/8/15     Phone: [phone] Fax: [phone]          [facility] [address] [facility] [address] [facility] [address]    Bentley Robles MD MD Oncology Admissions 10/21/15     Phone: [phone] Fax: [phone]          [facility] [address] [facility] [address] [facility] [address]    Maame Ramirez MD MD Urology  12/1/15     Phone: [phone] Fax: [phone]         [address] [facility] [address]    Lucrecia Collier MD Referring Physician Family Practice  12/1/15     Phone: [phone] Fax: [phone]         [facility] [address] [facility] [address]    Amrita Mcclendon, RN Registered Nurse Urology  8/25/16             Transition of Care Review                                                      Did you have a surgery or procedure during your hospital visit? Yes   If yes, do you have any of the following:     Signs of infection:  No    Pain:  No     Pain Scale (0-10) 0/10     Location: NA    Wound/incision concerns? No    Do you have all of your medications/refills?  Yes    Are you having any side effects or questions about your medication(s)? Yes- Patient is wondering, there is an air bubble in his Lovenox and he is wondering if he is supposed to  inject that into him as well as med, and if yes, why would he want to inject himself with this air bubble?    Do you have any new or worsening symptoms?  No    Do you have any future appointments scheduled?   yes             Plan                                                      Thanks for your time.  Your Care Coordinator will follow-up within the next couple days.  In the meantime if you have questions, concerns or problems call your care team.        Shelby Gold

## 2017-01-17 DIAGNOSIS — G47.00 INSOMNIA: Primary | ICD-10-CM

## 2017-01-17 NOTE — TELEPHONE ENCOUNTER
Remeron  15 mg Tab      Last Written Prescription Date: 6/29/6  Last Fill Quantity: 30; # refills: 2  Last Office Visit with [facility], [facility] or  Health prescribing provider:  1/11/2017        Last PHQ-9 score on record=   PHQ-9 SCORE 9/3/2015   Total Score 4       AST       18   1/14/2017  ALT       12   1/14/2017

## 2017-01-19 RX ORDER — MIRTAZAPINE 15 MG/1
15 TABLET, FILM COATED ORAL AT BEDTIME
Qty: 30 TABLET | Refills: 2 | Status: SHIPPED | OUTPATIENT
Start: 2017-01-19 | End: 2017-02-08

## 2017-01-20 ENCOUNTER — ONCOLOGY VISIT (OUTPATIENT)
Dept: ONCOLOGY | Facility: CLINIC | Age: 72
End: 2017-01-20
Attending: PHYSICIAN ASSISTANT
Payer: COMMERCIAL

## 2017-01-20 ENCOUNTER — APPOINTMENT (OUTPATIENT)
Dept: LAB | Facility: CLINIC | Age: 72
End: 2017-01-20
Attending: PHYSICIAN ASSISTANT
Payer: COMMERCIAL

## 2017-01-20 VITALS
SYSTOLIC BLOOD PRESSURE: 105 MMHG | OXYGEN SATURATION: 100 % | WEIGHT: 146 LBS | BODY MASS INDEX: 22.91 KG/M2 | DIASTOLIC BLOOD PRESSURE: 40 MMHG | RESPIRATION RATE: 18 BRPM | HEIGHT: 67 IN | HEART RATE: 89 BPM | TEMPERATURE: 98.8 F

## 2017-01-20 DIAGNOSIS — E83.51 HYPOCALCEMIA: ICD-10-CM

## 2017-01-20 DIAGNOSIS — C61 PROSTATE CANCER METASTATIC TO BONE (H): ICD-10-CM

## 2017-01-20 DIAGNOSIS — C79.51 PROSTATE CANCER METASTATIC TO BONE (H): ICD-10-CM

## 2017-01-20 LAB
ALBUMIN SERPL-MCNC: 3.6 G/DL (ref 3.4–5)
ALP SERPL-CCNC: 712 U/L (ref 40–150)
ALT SERPL W P-5'-P-CCNC: 16 U/L (ref 0–70)
ANION GAP SERPL CALCULATED.3IONS-SCNC: 8 MMOL/L (ref 3–14)
AST SERPL W P-5'-P-CCNC: 19 U/L (ref 0–45)
BILIRUB SERPL-MCNC: 1 MG/DL (ref 0.2–1.3)
BUN SERPL-MCNC: 18 MG/DL (ref 7–30)
CALCIUM SERPL-MCNC: 8.3 MG/DL (ref 8.5–10.1)
CHLORIDE SERPL-SCNC: 110 MMOL/L (ref 94–109)
CO2 SERPL-SCNC: 24 MMOL/L (ref 20–32)
CREAT SERPL-MCNC: 0.98 MG/DL (ref 0.66–1.25)
ERYTHROCYTE [DISTWIDTH] IN BLOOD BY AUTOMATED COUNT: 18.3 % (ref 10–15)
GFR SERPL CREATININE-BSD FRML MDRD: 75 ML/MIN/1.7M2
GLUCOSE SERPL-MCNC: 127 MG/DL (ref 70–99)
HCT VFR BLD AUTO: 31.1 % (ref 40–53)
HGB BLD-MCNC: 10.4 G/DL (ref 13.3–17.7)
MCH RBC QN AUTO: 30.2 PG (ref 26.5–33)
MCHC RBC AUTO-ENTMCNC: 33.4 G/DL (ref 31.5–36.5)
MCV RBC AUTO: 90 FL (ref 78–100)
PLATELET # BLD AUTO: 127 10E9/L (ref 150–450)
POTASSIUM SERPL-SCNC: 3.8 MMOL/L (ref 3.4–5.3)
PROT SERPL-MCNC: 7.3 G/DL (ref 6.8–8.8)
RBC # BLD AUTO: 3.44 10E12/L (ref 4.4–5.9)
SODIUM SERPL-SCNC: 142 MMOL/L (ref 133–144)
WBC # BLD AUTO: 4.4 10E9/L (ref 4–11)

## 2017-01-20 PROCEDURE — 80053 COMPREHEN METABOLIC PANEL: CPT | Performed by: PHYSICIAN ASSISTANT

## 2017-01-20 PROCEDURE — 36415 COLL VENOUS BLD VENIPUNCTURE: CPT

## 2017-01-20 PROCEDURE — 85027 COMPLETE CBC AUTOMATED: CPT | Performed by: PHYSICIAN ASSISTANT

## 2017-01-20 PROCEDURE — 99214 OFFICE O/P EST MOD 30 MIN: CPT | Mod: ZP | Performed by: PHYSICIAN ASSISTANT

## 2017-01-20 PROCEDURE — 99212 OFFICE O/P EST SF 10 MIN: CPT

## 2017-01-20 RX ORDER — RIVAROXABAN 20 MG/1
TABLET, FILM COATED ORAL
COMMUNITY
Start: 2016-12-21 | End: 2017-01-20

## 2017-01-20 ASSESSMENT — PAIN SCALES - GENERAL: PAINLEVEL: MILD PAIN (3)

## 2017-01-20 NOTE — NURSING NOTE
"Oswaldo Win is a 71 year old male who presents for:  Chief Complaint   Patient presents with     Lab Only     blood draw     Oncology Clinic Visit     prostate cancer- follow up        Initial Vitals:  /40 mmHg  Pulse 89  Temp(Src) 98.8  F (37.1  C) (Oral)  Resp 18  Ht 1.689 m (5' 6.5\")  Wt 66.225 kg (146 lb)  BMI 23.21 kg/m2  SpO2 100% Estimated body mass index is 23.21 kg/(m^2) as calculated from the following:    Height as of this encounter: 1.689 m (5' 6.5\").    Weight as of this encounter: 66.225 kg (146 lb).. Body surface area is 1.76 meters squared. BP completed using cuff size: NA (Not Taken)  Mild Pain (3) No LMP for male patient. Allergies and medications reviewed.     Medications: Medication refills not needed today.  Pharmacy name entered into I-frontdesk: [facility] [place], [place] - [address]    Comments: vitals taken in lab today. Patient confirms med list as correct    7 minutes for nursing intake (face to face time)   Cassidy Burnham CMA          "

## 2017-01-20 NOTE — NURSING NOTE
Chief Complaint   Patient presents with     Lab Only     blood draw   Vitals done and labs drawn peripherally by RN from right hand

## 2017-01-20 NOTE — PROGRESS NOTES
Oncology/Hematology Visit Note  Jan 20, 2017    DIAGNOSIS:  Mr. Win is a 71 year old male with a hx of CHF, CKD, CAD w/ hx of STEMI and CABG. He met with Dr. Robles at the end of July for consultation regarding metastatic prostate cancer. His story begins in May 2015 when he saw his PCP for back pain, present since Feb. He also had poor appetite, weight loss of 8lb and poor energy.  CT showed diffuse bony mets. CT Chest showed sclerotic mets throughout the bones. He was given degarelix on 7/22 in Urology. He was having pelvic pain and was evaluated by Dr Cavazos would did not feel XRT was appropriate.  He started on docetaxel 7/31. From 8/7-8/14 he was admitted with colitis, CHAR, elevated BNP, lactis acidosis, and poor appetite. He was discharged to a TCU and unfortunately, it sounds like he was not given his Lasix.  His BUBBA worsened and his breathing became compromised.  He was seen by Chelsi Corral on 8/21 and eventually transferred to the ED and admitted 8/21-8/24 for acute on chronic CHF.  At our last visit we discussed not pursuing further Taxotere at this time and discussed starting Zytiga when he was ready.  We pursued PT, OT, home lymphedema for supportive care at home in attempt for further rehabilitation. He was again admitted and discharged - 10/20/15.  He never started the Zytiga as he was still actively recovering from his cardiac issues.  His PSA remained stable, thus no intervention was pursued.  December 2015, Mainor met with Dr Robles and since his ECOG was back to a 1, they discussed re-challenging the Taxotere at a lower dose. 9/26/2016 he started Provenge off study. He continues on lupron every 3 mos and XGEVA. 9/26/2016 he started Provenge off study. He received 3 doses of Provenge (last 10/28), on 3rd dose had fevers, chills, myalgias, was bedbound for a week due to these symptoms. During this time he held his coumadin for 5 days prior to a central line removal 10/31. Later than week he had sudden onset  of right pleuritic chest pain and hemoptysis. Diagnosed with acute segmental/subsegmental PE on 11/8 and prescribed Lovenox. He was briefly on Zytiga and prednisone. Due to progressive disease, it was decided to switch him to Xofigo.     On 12/28 he started xaralto and took his last pill on 1/10/17.  He stopped as he noticed melanotic stools and had some hemoptysis.  He was seen in clinic the next day with a hgb in th 6 range and was admitted.  Pan-scopes showed no active bleeding.  He was able to get his Xofigo in patient as well.  He was transitioned back to lovenox BID.     INTERVAL HISTORY:Mainor comes in today for hospital follow up today.  He has been feeling much better after his blood transfusions inpatient.  He reports no hemoptysis and no melanotic stools.  He is feeling just fine with no new issues today.       MEDICATIONS:   Current Outpatient Prescriptions   Medication Sig     mirtazapine (REMERON) 15 MG tablet Take 1 tablet (15 mg) by mouth At Bedtime     enoxaparin (ENOXAPARIN) 120 MG/0.8ML injection Inject 0.7 mLs (105 mg) Subcutaneous daily     predniSONE (DELTASONE) 5 MG tablet Take 1 tablet (5 mg) by mouth daily     calcium carbonate (OS-RODNEY 500 MG [place]. CA) 500 MG tablet Take 2 tablets (1,000 mg) by mouth 2 times daily     aspirin 81 MG tablet Take 81 mg by mouth daily     metoprolol (TOPROL-XL) 25 MG 24 hr tablet Take 1 tablet (25 mg) by mouth daily AM     Cholecalciferol (VITAMIN D) 2000 UNITS tablet Take 1 tablet by mouth daily     simvastatin (ZOCOR) 40 MG tablet TAKE ONE TABLET BY MOUTH AT BEDTIME     abiraterone (ZYTIGA) 250 MG tablet Take 4 tablets (1,000 mg) by mouth every morning (before breakfast)     tamsulosin (FLOMAX) 0.4 MG 24 hr capsule Take 1 capsule (0.4 mg) by mouth daily AM     HYDROcodone-acetaminophen (NORCO) 5-325 MG per tablet Take 1 tablet by mouth every 6 hours as needed for moderate to severe pain     multivitamin, therapeutic with minerals (MULTI-VITAMIN) TABS Take 1  "tablet by mouth every morning      No current facility-administered medications for this visit.     Facility-Administered Medications Ordered in Other Visits   Medication     hydrocortisone sodium succinate (solu-CORTEF) injection     glycopyrrolate (ROBINUL) injection     neostigmine (PROSTIGMINE) injection     protamine injection     albuterol (PROAIR HFA, PROVENTIL HFA, VENTOLIN HFA) inhaler          ALLERGIES:  No Known Allergies    PHYSICAL EXAMINATION:  Vitals: /40 mmHg  Pulse 89  Temp(Src) 98.8  F (37.1  C) (Oral)  Resp 18  Ht 1.689 m (5' 6.5\")  Wt 66.225 kg (146 lb)  BMI 23.21 kg/m2  SpO2 100%  GENERAL:  A pleasant person in no acute distress.   HEENT:  Sclerae are nonicteric.  Mouth is without mucositis or thrush.   NECK:  Supple.   LYMPH NODES:  No peripheral lymphadenopathy noted in the supraclavicular or cervical regions.   LUNGS:  Clear to auscultation bilaterally.   HEART:  Regular rate and rhythm   ABDOMEN:  Bowel sounds are active.  Soft and nontender.  No hepatosplenomegaly or other masses appreciated.  LOWER EXTREMITIES:  Without pitting edema to the knees bilaterally.   NEUROLOGICAL:  Alert/orientated/able to answer all questions.  CN grossly intact.     LAB:      1/13/2017 18:15 1/14/2017 08:08 1/20/2017 11:28   Sodium 146 (H) 142 142   Potassium 3.5 3.5 3.8   Chloride 115 (H) 113 (H) 110 (H)   Carbon Dioxide 22 21 24   Urea Nitrogen 15 12 18   Creatinine 1.02 0.91 0.98   GFR Estimate 72 82 75   GFR Estimate If Black 87 >90... >90...   Calcium 6.7 (L) 6.8 (L) 8.3 (L)   Anion Gap 10 8 8   Magnesium 2.0 2.1    Phosphorus 2.9 2.0 (L)    Albumin  2.9 (L) 3.6   Protein Total  5.8 (L) 7.3   Bilirubin Total  1.4 (H) 1.0   Alkaline Phosphatase  528 (H) 712 (H)   ALT  12 16   AST  18 19   Bilirubin Direct  0.3 (H)    Calcium Ionized Whole Blood  3.8 (L)    Glucose 100 (H) 88 127 (H)   WBC 4.1 3.7 (L) 4.4   Hemoglobin 8.4 (L) 8.4 (L) 10.4 (L)   Hematocrit 25.8 (L) 25.3 (L) 31.1 (L)   Platelet " Count 94 (L) 77 (L) 127 (L)   RBC Count 2.95 (L) 2.91 (L) 3.44 (L)   MCV 88 87 90     IMPRESSION/PLAN:   1. Acute blood loss anemia. -stopped Xarelto and transitioned back to lovenox.  They gave him daily lovenox at discharge, although with a PE last November he should be on BID dosing. I will discuss with the discharge physician and give Mainor a call.     2. Metastatic prostate cancer-continues with Zytiga/pred and Xofigo.  Was able to have his Xofigo dose inpatient.  Alk phos continues to rise.  Will see Mainor back on 2/8/17.     3.  Bone metastases.  We plan to continue the Xgeva every 6 weeks and his next injection will be due in February.     Sabrina Mcguire PA-C

## 2017-02-01 VITALS — BODY MASS INDEX: 22.74 KG/M2 | WEIGHT: 143 LBS

## 2017-02-01 DIAGNOSIS — C61 PROSTATE CANCER METASTATIC TO BONE (H): Primary | ICD-10-CM

## 2017-02-01 DIAGNOSIS — C79.51 PROSTATE CANCER METASTATIC TO BONE (H): Primary | ICD-10-CM

## 2017-02-01 LAB
ALBUMIN SERPL-MCNC: 3.8 G/DL (ref 3.4–5)
ALP SERPL-CCNC: 588 U/L (ref 40–150)
ALT SERPL W P-5'-P-CCNC: 17 U/L (ref 0–70)
ANION GAP SERPL CALCULATED.3IONS-SCNC: 12 MMOL/L (ref 3–14)
AST SERPL W P-5'-P-CCNC: 23 U/L (ref 0–45)
BASOPHILS # BLD AUTO: 0 10E9/L (ref 0–0.2)
BASOPHILS NFR BLD AUTO: 0.8 %
BILIRUB SERPL-MCNC: 1 MG/DL (ref 0.2–1.3)
BUN SERPL-MCNC: 16 MG/DL (ref 7–30)
CALCIUM SERPL-MCNC: 9 MG/DL (ref 8.5–10.1)
CHLORIDE SERPL-SCNC: 109 MMOL/L (ref 94–109)
CO2 SERPL-SCNC: 21 MMOL/L (ref 20–32)
CREAT SERPL-MCNC: 0.91 MG/DL (ref 0.66–1.25)
DIFFERENTIAL METHOD BLD: ABNORMAL
EOSINOPHIL # BLD AUTO: 0 10E9/L (ref 0–0.7)
EOSINOPHIL NFR BLD AUTO: 0.8 %
ERYTHROCYTE [DISTWIDTH] IN BLOOD BY AUTOMATED COUNT: 17.4 % (ref 10–15)
GFR SERPL CREATININE-BSD FRML MDRD: 81 ML/MIN/1.7M2
GLUCOSE SERPL-MCNC: 113 MG/DL (ref 70–99)
HCT VFR BLD AUTO: 32 % (ref 40–53)
HGB BLD-MCNC: 10.8 G/DL (ref 13.3–17.7)
IMM GRANULOCYTES # BLD: 0 10E9/L (ref 0–0.4)
IMM GRANULOCYTES NFR BLD: 1.2 %
LYMPHOCYTES # BLD AUTO: 0.6 10E9/L (ref 0.8–5.3)
LYMPHOCYTES NFR BLD AUTO: 24.9 %
MCH RBC QN AUTO: 30.3 PG (ref 26.5–33)
MCHC RBC AUTO-ENTMCNC: 33.8 G/DL (ref 31.5–36.5)
MCV RBC AUTO: 90 FL (ref 78–100)
MONOCYTES # BLD AUTO: 0.3 10E9/L (ref 0–1.3)
MONOCYTES NFR BLD AUTO: 11.8 %
NEUTROPHILS # BLD AUTO: 1.5 10E9/L (ref 1.6–8.3)
NEUTROPHILS NFR BLD AUTO: 60.5 %
NRBC # BLD AUTO: 0 10*3/UL
NRBC BLD AUTO-RTO: 0 /100
PLATELET # BLD AUTO: 189 10E9/L (ref 150–450)
POTASSIUM SERPL-SCNC: 3.8 MMOL/L (ref 3.4–5.3)
PROT SERPL-MCNC: 7.7 G/DL (ref 6.8–8.8)
PSA SERPL-MCNC: 748.87 UG/L (ref 0–4)
RBC # BLD AUTO: 3.56 10E12/L (ref 4.4–5.9)
SODIUM SERPL-SCNC: 142 MMOL/L (ref 133–144)
WBC # BLD AUTO: 2.5 10E9/L (ref 4–11)

## 2017-02-01 PROCEDURE — 36415 COLL VENOUS BLD VENIPUNCTURE: CPT

## 2017-02-01 PROCEDURE — 85025 COMPLETE CBC W/AUTO DIFF WBC: CPT | Performed by: INTERNAL MEDICINE

## 2017-02-01 PROCEDURE — 80053 COMPREHEN METABOLIC PANEL: CPT | Performed by: INTERNAL MEDICINE

## 2017-02-01 PROCEDURE — 84153 ASSAY OF PSA TOTAL: CPT | Performed by: INTERNAL MEDICINE

## 2017-02-01 NOTE — NURSING NOTE
Chief Complaint   Patient presents with     Labs Only     Blood Drawn for prostate cancer     Weight documented per patient (he weighed himself at home) per appointment notes - labs drawn peripherally from left hand by ultrasound RN

## 2017-02-03 ENCOUNTER — TELEPHONE (OUTPATIENT)
Dept: ONCOLOGY | Facility: CLINIC | Age: 72
End: 2017-02-03

## 2017-02-03 NOTE — TELEPHONE ENCOUNTER
"Oral Chemotherapy Monitoring Program    Primary Oncologist: Dr. Robles  Primary Oncology Clinic: [facility]  Cancer Diagnosis: Prostate    Therapy History: Confirmed patient is taking Zytiga 1000mg (4 x 250mg) QD continuously.  Start Date: 11/12/16    Drug Interaction Assessment: Clinically significant drug interactions found    Zytiga + Remeron = potential increased serum concentrations of Remeron via CYP2D6 inhibition (Category \"D\"). Patient has decided to discontinue Remeron as he did not see any benefit from the medication.    Zytiga + Metoprolol = potential increased serum concentrations of metoprolol via CYP2D6 inhibition (Category \"D\"). Per Dr. Robles, monitor BP's and for increased side effects of metoprolol.     Mainor is aware of these drug interactions and knows what changes to monitor for.     Lab Monitoring Plan  C1D1+   CMP, BP C2D1+ Call, CMP, BP C3D1+ Call, CMP, BP C4D1+ Call, CMP, BP C5D1+ Call, CMP, BP C6D1+ Call, CMP, BP   C1D8+  C2D8+  C3D8+  C4D8+  C5D8+  C6D8+    C1D15+ Call, CMP C2D15+ Call, CMP C3D15+  C4D15+  C5D15+  C6D15+    C1D22+  C2D22+  C3D22+  C4D22+  C5D22+  C6D22+      Subjective/Objective:  Oswaldo Win is a 71 year old male contacted by phone for a follow-up visit for oral chemotherapy. Mainor reports chronic diarrhea, which he attributes to his infusions. His appetite is good. He has hot flashes every once and a while. He denies edema, skin changes, rash, and nausea/vomiting.    ORAL CHEMOTHERAPY 11/14/2016 12/16/2016 2/3/2017   Drug Name Zytiga (Abiraterone) Zytiga (Abiraterone) Zytiga (Abiraterone)   Current Dosage 1000mg 1000mg 1000mg   Current Schedule Daily Daily Daily   Cycle Details Continuous Continuous Continuous   Start Date of Last Cycle 11/12/2016 12/12/2016 -   Planned next cycle start date - 1/11/2017 -   Doses missed in last 2 weeks 0 0 0   Adherence Assessment - - Adherent   Adverse Effects - Arthralgias -   Arthralgias - Grade 1 -   Pharmacist " "Intervention(arthralgias) - Yes -   Home BPs not done all BPs<140/90 -   Any new drug interactions? Yes No -     BP Readings from Last 1 Encounters:   01/20/17 105/40     Wt Readings from Last 1 Encounters:   02/01/17 64.864 kg (143 lb)     Estimated body surface area is 1.74 meters squared as calculated from the following:    Height as of 1/20/17: 1.689 m (5' 6.5\").    Weight as of 2/1/17: 64.864 kg (143 lb).    Labs:  NA      142   2/1/2017   POTASSIUM      3.8   2/1/2017  RODNEY      9.0   2/1/2017  ALBUMIN      3.8   2/1/2017  MAG      2.1   1/14/2017  PHOS      2.0   1/14/2017  BUN       16   2/1/2017  CR     0.91   2/1/2017    AST       23   2/1/2017  ALT       17   2/1/2017  BILITOTAL      1.0   2/1/2017    WBC      2.5   2/1/2017  HGB     10.8   2/1/2017  PLT      189   2/1/2017  PLT      119   8/19/2015  ANEU      1.5   2/1/2017    Assessment:  Mainor is tolerating Zytiga well with mild side effects.    Plan:  Continue Zytiga. Recommended Imodium PRN and staying adequately hydrated for diarrhea.     Follow-Up:  We will follow up in about 4 weeks to assess side effects. Mainor is scheduled to be seen at the [facility] with ISABELLE Cline on 2/8/17.    Ghazal Funes, Pharmacy Intern  Oral Chemotherapy Monitoring Program  [facility]  [phone]  "

## 2017-02-06 ENCOUNTER — TELEPHONE (OUTPATIENT)
Dept: ONCOLOGY | Facility: CLINIC | Age: 72
End: 2017-02-06

## 2017-02-08 ENCOUNTER — HOSPITAL ENCOUNTER (OUTPATIENT)
Facility: CLINIC | Age: 72
Setting detail: SPECIMEN
Discharge: HOME | End: 2017-02-08
Admitting: INTERNAL MEDICINE
Payer: COMMERCIAL

## 2017-02-08 ENCOUNTER — ONCOLOGY VISIT (OUTPATIENT)
Dept: ONCOLOGY | Facility: CLINIC | Age: 72
End: 2017-02-08
Attending: INTERNAL MEDICINE
Payer: COMMERCIAL

## 2017-02-08 ENCOUNTER — HOSPITAL ENCOUNTER (OUTPATIENT)
Dept: NUCLEAR MEDICINE | Facility: CLINIC | Age: 72
Setting detail: NUCLEAR MEDICINE
Discharge: HOME | End: 2017-02-08
Attending: INTERNAL MEDICINE | Admitting: INTERNAL MEDICINE
Payer: COMMERCIAL

## 2017-02-08 VITALS
SYSTOLIC BLOOD PRESSURE: 104 MMHG | RESPIRATION RATE: 14 BRPM | TEMPERATURE: 97.4 F | BODY MASS INDEX: 23.06 KG/M2 | OXYGEN SATURATION: 93 % | HEART RATE: 81 BPM | DIASTOLIC BLOOD PRESSURE: 56 MMHG | WEIGHT: 145 LBS

## 2017-02-08 DIAGNOSIS — C79.51 PROSTATE CANCER METASTATIC TO BONE (H): ICD-10-CM

## 2017-02-08 DIAGNOSIS — E83.51 HYPOCALCEMIA: ICD-10-CM

## 2017-02-08 DIAGNOSIS — C61 PROSTATE CANCER METASTATIC TO BONE (H): Primary | ICD-10-CM

## 2017-02-08 DIAGNOSIS — C79.51 METASTATIC BONE TUMOR (H): ICD-10-CM

## 2017-02-08 DIAGNOSIS — C61 PROSTATE CANCER METASTATIC TO BONE (H): ICD-10-CM

## 2017-02-08 DIAGNOSIS — C79.51 PROSTATE CANCER METASTATIC TO BONE (H): Primary | ICD-10-CM

## 2017-02-08 DIAGNOSIS — R19.7 DIARRHEA, UNSPECIFIED TYPE: ICD-10-CM

## 2017-02-08 LAB
BASOPHILS # BLD AUTO: 0 10E9/L (ref 0–0.2)
BASOPHILS NFR BLD AUTO: 0.5 %
DIFFERENTIAL METHOD BLD: ABNORMAL
EOSINOPHIL # BLD AUTO: 0 10E9/L (ref 0–0.7)
EOSINOPHIL NFR BLD AUTO: 0.3 %
ERYTHROCYTE [DISTWIDTH] IN BLOOD BY AUTOMATED COUNT: 17.2 % (ref 10–15)
HCT VFR BLD AUTO: 30.6 % (ref 40–53)
HGB BLD-MCNC: 10.1 G/DL (ref 13.3–17.7)
IMM GRANULOCYTES # BLD: 0.1 10E9/L (ref 0–0.4)
IMM GRANULOCYTES NFR BLD: 2.1 %
LYMPHOCYTES # BLD AUTO: 1.1 10E9/L (ref 0.8–5.3)
LYMPHOCYTES NFR BLD AUTO: 29.6 %
MCH RBC QN AUTO: 29.4 PG (ref 26.5–33)
MCHC RBC AUTO-ENTMCNC: 33 G/DL (ref 31.5–36.5)
MCV RBC AUTO: 89 FL (ref 78–100)
MONOCYTES # BLD AUTO: 0.5 10E9/L (ref 0–1.3)
MONOCYTES NFR BLD AUTO: 13 %
NEUTROPHILS # BLD AUTO: 2.1 10E9/L (ref 1.6–8.3)
NEUTROPHILS NFR BLD AUTO: 54.5 %
NRBC # BLD AUTO: 0 10*3/UL
NRBC BLD AUTO-RTO: 0 /100
PLATELET # BLD AUTO: 176 10E9/L (ref 150–450)
RBC # BLD AUTO: 3.43 10E12/L (ref 4.4–5.9)
WBC # BLD AUTO: 3.9 10E9/L (ref 4–11)

## 2017-02-08 PROCEDURE — 96402 CHEMO HORMON ANTINEOPL SQ/IM: CPT

## 2017-02-08 PROCEDURE — 99214 OFFICE O/P EST MOD 30 MIN: CPT | Mod: 25 | Performed by: PHYSICIAN ASSISTANT

## 2017-02-08 PROCEDURE — 96372 THER/PROPH/DIAG INJ SC/IM: CPT | Mod: 59

## 2017-02-08 PROCEDURE — 77790 RADIATION HANDLING: CPT

## 2017-02-08 PROCEDURE — 34400006 ZZH RX 344: Performed by: INTERNAL MEDICINE

## 2017-02-08 PROCEDURE — A9606 RADIUM RA223 DICHLORIDE THER: HCPCS | Performed by: INTERNAL MEDICINE

## 2017-02-08 PROCEDURE — 36592 COLLECT BLOOD FROM PICC: CPT

## 2017-02-08 RX ORDER — DIPHENOXYLATE HCL/ATROPINE 2.5-.025MG
1 TABLET ORAL 4 TIMES DAILY PRN
COMMUNITY
End: 2017-01-01

## 2017-02-08 RX ORDER — CALCIUM CARBONATE 500(1250)
1000 TABLET ORAL 2 TIMES DAILY
Qty: 120 TABLET | Refills: 3 | Status: SHIPPED | OUTPATIENT
Start: 2017-02-08 | End: 2017-01-01

## 2017-02-08 RX ORDER — LOPERAMIDE HYDROCHLORIDE 2 MG/1
TABLET ORAL
Qty: 60 TABLET | Refills: 1 | Status: SHIPPED | OUTPATIENT
Start: 2017-02-08 | End: 2017-02-10

## 2017-02-08 RX ADMIN — LEUPROLIDE ACETATE 22.5 MG: KIT at 09:42

## 2017-02-08 RX ADMIN — DENOSUMAB 120 MG: 120 INJECTION SUBCUTANEOUS at 09:42

## 2017-02-08 RX ADMIN — LIDOCAINE HYDROCHLORIDE 0.5 ML: 10 INJECTION, SOLUTION EPIDURAL; INFILTRATION; INTRACAUDAL; PERINEURAL at 08:30

## 2017-02-08 RX ADMIN — RADIUM RA 223 DICHLORIDE 101.6 UCI.: 30 INJECTION INTRAVENOUS at 11:02

## 2017-02-08 NOTE — NURSING NOTE
Chief Complaint   Patient presents with     Blood Draw     Labs Drawn      Peripheral IV started via vascular access nurse. Labs drawn from IV.     Lauren Schoen, RN

## 2017-02-08 NOTE — PROGRESS NOTES
Oncology/Hematology Visit Note  Feb 8, 2017    DIAGNOSIS:  Mr. Wni is a 71 year old male with a hx of CHF, CKD, CAD w/ hx of STEMI and CABG. He met with Dr. Robles at the end of July for consultation regarding metastatic prostate cancer. His story begins in May 2015 when he saw his PCP for back pain, present since Feb. He also had poor appetite, weight loss of 8lb and poor energy.  CT showed diffuse bony mets. CT Chest showed sclerotic mets throughout the bones. He was given degarelix on 7/22 in Urology. He was having pelvic pain and was evaluated by Dr Cavazos would did not feel XRT was appropriate.  He started on docetaxel 7/31. From 8/7-8/14 he was admitted with colitis, CHAR, elevated BNP, lactis acidosis, and poor appetite. He was discharged to a TCU and unfortunately, it sounds like he was not given his Lasix.  His BUBBA worsened and his breathing became compromised.  He was seen by Chelsi Corral on 8/21 and eventually transferred to the ED and admitted 8/21-8/24 for acute on chronic CHF.  At our last visit we discussed not pursuing further Taxotere at this time and discussed starting Zytiga when he was ready.  We pursued PT, OT, home lymphedema for supportive care at home in attempt for further rehabilitation. He was again admitted and discharged - 10/20/15.  He never started the Zytiga as he was still actively recovering from his cardiac issues.  His PSA remained stable, thus no intervention was pursued.  December 2015, Mainor met with Dr Robles and since his ECOG was back to a 1, they discussed re-challenging the Taxotere at a lower dose. 9/26/2016 he started Provenge off study. He continues on lupron every 3 mos and XGEVA. 9/26/2016 he started Provenge off study. He received 3 doses of Provenge (last 10/28), on 3rd dose had fevers, chills, myalgias, was bedbound for a week due to these symptoms. During this time he held his coumadin for 5 days prior to a central line removal 10/31. Later than week he had sudden onset of  right pleuritic chest pain and hemoptysis. Diagnosed with acute segmental/subsegmental PE on 11/8 and prescribed Lovenox.     Mainor was briefly on Zytiga and prednisone in November-December, however after one month his PSA went up, thus it was decided to add in Xofigo (1/11/17-- first).     On 12/28 he started xaralto and took his last pill on 1/10/17.  He stopped as he noticed melanotic stools and had some hemoptysis.  He was seen in clinic the next day with a hgb in th 6 range and was admitted.  Pan-scopes showed no active bleeding.  He was able to get his Xofigo in patient as well.  He was transitioned back to lovenox BID.     INTERVAL HISTORY:Mainor comes in today for hospital follow up today.  He has been feeling much better after stent in the hospital.  He reports no hemoptysis and no melanotic stools. He has been doing his lovenox BID, occasionally missing a dose.  He is feeling just fine with no new issues today. He is trying to cut back on eating as his weight has been climbing. He continues to have chronic intermittent joint issues from arthritis, but no new bone pains.  He feels his stamina has been stable- no new concerns in the last few months.     No new f/s/c. No chest pain/cough/dyspnea. No /GI issues. No rash.       MEDICATIONS:   Current Outpatient Prescriptions   Medication Sig     diphenoxylate-atropine (LOMOTIL) 2.5-0.025 MG per tablet Take 1 tablet by mouth 4 times daily as needed for diarrhea     enoxaparin (ENOXAPARIN) 120 MG/0.8ML injection Inject 0.7 mLs (105 mg) Subcutaneous daily     predniSONE (DELTASONE) 5 MG tablet Take 1 tablet (5 mg) by mouth daily     calcium carbonate (OS-RODNEY 500 MG [place]. CA) 500 MG tablet Take 2 tablets (1,000 mg) by mouth 2 times daily     aspirin 81 MG tablet Take 81 mg by mouth daily     metoprolol (TOPROL-XL) 25 MG 24 hr tablet Take 1 tablet (25 mg) by mouth daily AM     Cholecalciferol (VITAMIN D) 2000 UNITS tablet Take 1 tablet by mouth daily     simvastatin  (ZOCOR) 40 MG tablet TAKE ONE TABLET BY MOUTH AT BEDTIME     abiraterone (ZYTIGA) 250 MG tablet Take 4 tablets (1,000 mg) by mouth every morning (before breakfast)     tamsulosin (FLOMAX) 0.4 MG 24 hr capsule Take 1 capsule (0.4 mg) by mouth daily AM     HYDROcodone-acetaminophen (NORCO) 5-325 MG per tablet Take 1 tablet by mouth every 6 hours as needed for moderate to severe pain     multivitamin, therapeutic with minerals (MULTI-VITAMIN) TABS Take 1 tablet by mouth every morning      mirtazapine (REMERON) 15 MG tablet Take 1 tablet (15 mg) by mouth At Bedtime     Current Facility-Administered Medications   Medication     denosumab (XGEVA) injection 120 mg     leuprolide (LUPRON DEPOT) kit 22.5 mg     Facility-Administered Medications Ordered in Other Visits   Medication     hydrocortisone sodium succinate (solu-CORTEF) injection     glycopyrrolate (ROBINUL) injection     neostigmine (PROSTIGMINE) injection     protamine injection     albuterol (PROAIR HFA, PROVENTIL HFA, VENTOLIN HFA) inhaler          ALLERGIES:  No Known Allergies    PHYSICAL EXAMINATION:  Vitals: /56 mmHg  Pulse 81  Temp(Src) 97.4  F (36.3  C) (Oral)  Resp 14  Wt 65.772 kg (145 lb)  SpO2 93%  GENERAL:  A pleasant person in no acute distress.   HEENT:  Sclerae are nonicteric.  Mouth is without mucositis or thrush.   NECK:  Supple.   LYMPH NODES:  No peripheral lymphadenopathy noted in the supraclavicular or cervical regions.   LUNGS:  Clear to auscultation bilaterally.   HEART:  Regular rate and rhythm   ABDOMEN:  Bowel sounds are active.  Soft and nontender.  No hepatosplenomegaly or other masses appreciated.  LOWER EXTREMITIES:  Without pitting edema to the knees bilaterally.   NEUROLOGICAL:  Alert/orientated/able to answer all questions.  CN grossly intact.     LAB:      1/20/2017 11:28 2/1/2017 10:40 2/8/2017 08:33   WBC 4.4 2.5 (L) 3.9 (L)   Hemoglobin 10.4 (L) 10.8 (L) 10.1 (L)   Hematocrit 31.1 (L) 32.0 (L) 30.6 (L)   Platelet  Count 127 (L) 189 176   RBC Count 3.44 (L) 3.56 (L) 3.43 (L)   MCV 90 90 89   MCH 30.2 30.3 29.4   MCHC 33.4 33.8 33.0   RDW 18.3 (H) 17.4 (H) 17.2 (H)   Diff Method  Automated Method Automated Method   % Neutrophils  60.5 54.5   % Lymphocytes  24.9 29.6   % Monocytes  11.8 13.0   % Eosinophils  0.8 0.3   % Basophils  0.8 0.5   % Immature Granulocytes  1.2 2.1      1/20/2017 11:28 2/1/2017 10:40   Sodium 142 142   Potassium 3.8 3.8   Chloride 110 (H) 109   Carbon Dioxide 24 21   Urea Nitrogen 18 16   Creatinine 0.98 0.91   GFR Estimate 75 81   GFR Estimate If Black >90... >90...   Calcium 8.3 (L) 9.0   Anion Gap 8 12   Albumin 3.6 3.8   Protein Total 7.3 7.7   Bilirubin Total 1.0 1.0   Alkaline Phosphatase 712 (H) 588 (H)   ALT 16 17   AST 19 23      12/1/2016 12:47 12/21/2016 11:49 2/1/2017 10:40   .65 (H) 424.22 (H) 748.87 (H)     IMPRESSION/PLAN:   1.  Metastatic prostate cancer to the bone-continues with Zytiga/pred (started mid-November) and Xofigo (1/11, 2/8).    -reviewed Mainor's increasing PSA but decreasing alk phos, will continue same plan for now as clinically he is stable and alk phos likely early indication that the Xofigo is working.  -labs OK for C2 Xofigo today   -labs/recheck Dr Robles for C3 of Xofigo  -Lupron last 2/8    2.HEME: Mainor is on anti-coagulation for a PE diagnosed 11/8/16.  Had inpatient stent for GI bleed 1/11-1/14. -stopped Xarelto and transitioned back to lovenox BID.  No bleeding concerns    3.  Bone metastases.  We plan to continue the Xgeva every 6 weeks.  No new bone pains/concerns  -xgeva last 2/8  -oral calcium/vit D    4. CV: stable BP/pulse  -continue metoprolol, ASA, zocor      Sabrina Mcguire PA-C

## 2017-02-08 NOTE — Clinical Note
2/8/2017       RE: Oswaldo Win  [address]  [facility] [address]     Dear Colleague,    Thank you for referring your patient, Oswaldo Win, to the [facility]. Please see a copy of my visit note below.    Oncology/Hematology Visit Note  Feb 8, 2017    DIAGNOSIS:  Mr. Win is a 71 year old male with a hx of CHF, CKD, CAD w/ hx of STEMI and CABG. He met with Dr. Robles at the end of July for consultation regarding metastatic prostate cancer. His story begins in May 2015 when he saw his PCP for back pain, present since Feb. He also had poor appetite, weight loss of 8lb and poor energy.  CT showed diffuse bony mets. CT Chest showed sclerotic mets throughout the bones. He was given degarelix on 7/22 in Urology. He was having pelvic pain and was evaluated by Dr Cavazos would did not feel XRT was appropriate.  He started on docetaxel 7/31. From 8/7-8/14 he was admitted with colitis, CHAR, elevated BNP, lactis acidosis, and poor appetite. He was discharged to a TCU and unfortunately, it sounds like he was not given his Lasix.  His BUBBA worsened and his breathing became compromised.  He was seen by Chelsi Corral on 8/21 and eventually transferred to the ED and admitted 8/21-8/24 for acute on chronic CHF.  At our last visit we discussed not pursuing further Taxotere at this time and discussed starting Zytiga when he was ready.  We pursued PT, OT, home lymphedema for supportive care at home in attempt for further rehabilitation. He was again admitted and discharged - 10/20/15.  He never started the Zytiga as he was still actively recovering from his cardiac issues.  His PSA remained stable, thus no intervention was pursued.  December 2015, Mainor met with Dr Robles and since his ECOG was back to a 1, they discussed re-challenging the Taxotere at a lower dose. 9/26/2016 he started Provenge off study. He continues on lupron every 3 mos and XGEVA. 9/26/2016 he started Provenge off study. He received 3 doses of  Provenge (last 10/28), on 3rd dose had fevers, chills, myalgias, was bedbound for a week due to these symptoms. During this time he held his coumadin for 5 days prior to a central line removal 10/31. Later than week he had sudden onset of right pleuritic chest pain and hemoptysis. Diagnosed with acute segmental/subsegmental PE on 11/8 and prescribed Lovenox. He was briefly on Zytiga and prednisone. Due to progressive disease, it was decided to switch him to Xofigo (1/11/17-- first).     On 12/28 he started xaralto and took his last pill on 1/10/17.  He stopped as he noticed melanotic stools and had some hemoptysis.  He was seen in clinic the next day with a hgb in th 6 range and was admitted.  Pan-scopes showed no active bleeding.  He was able to get his Xofigo in patient as well.  He was transitioned back to lovenox BID.     INTERVAL HISTORY:Mainor comes in today for hospital follow up today.  He has been feeling much better after his blood transfusions inpatient.  He reports no hemoptysis and no melanotic stools.  He is feeling just fine with no new issues today.       MEDICATIONS:   Current Outpatient Prescriptions   Medication Sig     diphenoxylate-atropine (LOMOTIL) 2.5-0.025 MG per tablet Take 1 tablet by mouth 4 times daily as needed for diarrhea     enoxaparin (ENOXAPARIN) 120 MG/0.8ML injection Inject 0.7 mLs (105 mg) Subcutaneous daily     predniSONE (DELTASONE) 5 MG tablet Take 1 tablet (5 mg) by mouth daily     calcium carbonate (OS-RODNEY 500 MG [place]. [place]) 500 MG tablet Take 2 tablets (1,000 mg) by mouth 2 times daily     aspirin 81 MG tablet Take 81 mg by mouth daily     metoprolol (TOPROL-XL) 25 MG 24 hr tablet Take 1 tablet (25 mg) by mouth daily AM     Cholecalciferol (VITAMIN D) 2000 UNITS tablet Take 1 tablet by mouth daily     simvastatin (ZOCOR) 40 MG tablet TAKE ONE TABLET BY MOUTH AT BEDTIME     abiraterone (ZYTIGA) 250 MG tablet Take 4 tablets (1,000 mg) by mouth every morning (before breakfast)      tamsulosin (FLOMAX) 0.4 MG 24 hr capsule Take 1 capsule (0.4 mg) by mouth daily AM     HYDROcodone-acetaminophen (NORCO) 5-325 MG per tablet Take 1 tablet by mouth every 6 hours as needed for moderate to severe pain     multivitamin, therapeutic with minerals (MULTI-VITAMIN) TABS Take 1 tablet by mouth every morning      mirtazapine (REMERON) 15 MG tablet Take 1 tablet (15 mg) by mouth At Bedtime     Current Facility-Administered Medications   Medication     denosumab (XGEVA) injection 120 mg     leuprolide (LUPRON DEPOT) kit 22.5 mg     Facility-Administered Medications Ordered in Other Visits   Medication     hydrocortisone sodium succinate (solu-CORTEF) injection     glycopyrrolate (ROBINUL) injection     neostigmine (PROSTIGMINE) injection     protamine injection     albuterol (PROAIR HFA, PROVENTIL HFA, VENTOLIN HFA) inhaler          ALLERGIES:  No Known Allergies    PHYSICAL EXAMINATION:  Vitals: /56 mmHg  Pulse 81  Temp(Src) 97.4  F (36.3  C) (Oral)  Resp 14  Wt 65.772 kg (145 lb)  SpO2 93%  GENERAL:  A pleasant person in no acute distress.   HEENT:  Sclerae are nonicteric.  Mouth is without mucositis or thrush.   NECK:  Supple.   LYMPH NODES:  No peripheral lymphadenopathy noted in the supraclavicular or cervical regions.   LUNGS:  Clear to auscultation bilaterally.   HEART:  Regular rate and rhythm   ABDOMEN:  Bowel sounds are active.  Soft and nontender.  No hepatosplenomegaly or other masses appreciated.  LOWER EXTREMITIES:  Without pitting edema to the knees bilaterally.   NEUROLOGICAL:  Alert/orientated/able to answer all questions.  CN grossly intact.     LAB:      1/20/2017 11:28 2/1/2017 10:40 2/8/2017 08:33   WBC 4.4 2.5 (L) 3.9 (L)   Hemoglobin 10.4 (L) 10.8 (L) 10.1 (L)   Hematocrit 31.1 (L) 32.0 (L) 30.6 (L)   Platelet Count 127 (L) 189 176   RBC Count 3.44 (L) 3.56 (L) 3.43 (L)   MCV 90 90 89   MCH 30.2 30.3 29.4   MCHC 33.4 33.8 33.0   RDW 18.3 (H) 17.4 (H) 17.2 (H)   Diff Method   Automated Method Automated Method   % Neutrophils  60.5 54.5   % Lymphocytes  24.9 29.6   % Monocytes  11.8 13.0   % Eosinophils  0.8 0.3   % Basophils  0.8 0.5   % Immature Granulocytes  1.2 2.1     IMPRESSION/PLAN:   1. Acute blood loss anemia. -stopped Xarelto and transitioned back to lovenox.  They gave him daily lovenox at discharge, although with a PE last November he should be on BID dosing. I will discuss with the discharge physician and give Mainor a call.     2. Metastatic prostate cancer-continues with Zytiga/pred and Xofigo.  Was able to have his Xofigo dose inpatient.  Alk phos continues to rise.  Will see Mainor back on 2/8/17.     3.  Bone metastases.  We plan to continue the Xgeva every 6 weeks and his next injection will be due in February.     Sabrina Mcguire PA-C    Again, thank you for allowing me to participate in the care of your patient.      Sincerely,    Gabriela Mcguire PA-C

## 2017-02-08 NOTE — NURSING NOTE
"Oswaldo Win is a 71 year old male who presents for:  Chief Complaint   Patient presents with     Blood Draw     Labs Drawn      Oncology Clinic Visit     Prostate CA        Initial Vitals:  /56 mmHg  Pulse 81  Temp(Src) 97.4  F (36.3  C) (Oral)  Resp 14  Wt 65.772 kg (145 lb)  SpO2 93% Estimated body mass index is 23.06 kg/(m^2) as calculated from the following:    Height as of 1/20/17: 1.689 m (5' 6.5\").    Weight as of this encounter: 65.772 kg (145 lb).. There is no height on file to calculate BSA. BP completed using cuff size: regular  Data Unavailable No LMP for male patient. Allergies and medications reviewed.     Medications: MEDICATION REFILLS NEEDED TODAY.  Pharmacy name entered into [facility]: [facility] [place], [place] - [address]    Comments: Would like Rx for Lomotil    5 minutes for nursing intake (face to face time)   Netta Duran CMA          "

## 2017-02-09 ENCOUNTER — TELEPHONE (OUTPATIENT)
Dept: ONCOLOGY | Facility: CLINIC | Age: 72
End: 2017-02-09

## 2017-02-09 NOTE — TELEPHONE ENCOUNTER
Hello,    Please send a new rx for Mr. Win's Imodium.  Capsules are covered by his insurance.  The tablets that were ordered are not covered.    Thank you,  Catherine Randolph CPhT  On behalf of [facility]

## 2017-02-10 RX ORDER — ABIRATERONE ACETATE 250 MG/1
1000 TABLET ORAL DAILY
Qty: 120 TABLET | Refills: 2 | Status: SHIPPED | OUTPATIENT
Start: 2017-02-10 | End: 2017-04-05

## 2017-02-10 RX ORDER — PREDNISONE 5 MG/1
5 TABLET ORAL 2 TIMES DAILY
Qty: 60 TABLET | Refills: 2 | Status: SHIPPED | OUTPATIENT
Start: 2017-02-10 | End: 2017-01-01

## 2017-02-10 RX ORDER — LOPERAMIDE HCL 2 MG
CAPSULE ORAL
Qty: 60 CAPSULE | Refills: 1 | Status: SHIPPED | OUTPATIENT
Start: 2017-02-10 | End: 2017-01-01

## 2017-02-15 ENCOUNTER — CARE COORDINATION (OUTPATIENT)
Dept: ONCOLOGY | Facility: CLINIC | Age: 72
End: 2017-02-15

## 2017-02-15 DIAGNOSIS — Z79.01 LONG-TERM (CURRENT) USE OF ANTICOAGULANTS: ICD-10-CM

## 2017-02-15 DIAGNOSIS — I82.499 DEEP VEIN THROMBOSIS (DVT) OF OTHER VEIN OF LOWER EXTREMITY: Primary | ICD-10-CM

## 2017-02-15 NOTE — PROGRESS NOTES
Received phone call from pt requesting a refill on Lovenox. Pt states he is taking Lovenox 120mg/0.8ml: 0.7ml sq BID.   Sabrina Mcguire PA-C notified. Lovenox rx for 60mg BID sent to pt's [facility] pharmacy.  Advised pt to start taking new Lovenox dose. Will check heparin 10a level on 3/1 when he comes in for labs to check that Lovenox is therapeutic. Advised pt heparin 10a level will needs to be checked 4-6 hours after he takes his Lovenox on 3/1.  Pt states he will take his Lovenox sometime between 5AM-6AM on 3/1. Is scheduled to have labs drawn at 1030.

## 2017-02-23 ENCOUNTER — APPOINTMENT (OUTPATIENT)
Dept: GENERAL RADIOLOGY | Facility: CLINIC | Age: 72
End: 2017-02-23
Attending: FAMILY MEDICINE
Payer: COMMERCIAL

## 2017-02-23 ENCOUNTER — HOSPITAL ENCOUNTER (OUTPATIENT)
Facility: CLINIC | Age: 72
Setting detail: OBSERVATION
Discharge: HOME | End: 2017-02-24
Attending: FAMILY MEDICINE | Admitting: FAMILY MEDICINE
Payer: COMMERCIAL

## 2017-02-23 DIAGNOSIS — R07.9 CHEST PAIN, UNSPECIFIED: ICD-10-CM

## 2017-02-23 DIAGNOSIS — R07.89 CHEST DISCOMFORT: ICD-10-CM

## 2017-02-23 DIAGNOSIS — I50.22 CHRONIC SYSTOLIC HEART FAILURE (H): ICD-10-CM

## 2017-02-23 DIAGNOSIS — I25.10 ATHEROSCLEROSIS OF NATIVE CORONARY ARTERY OF NATIVE HEART WITHOUT ANGINA PECTORIS: ICD-10-CM

## 2017-02-23 DIAGNOSIS — Z79.01 LONG-TERM (CURRENT) USE OF ANTICOAGULANTS: ICD-10-CM

## 2017-02-23 DIAGNOSIS — Z95.1 POSTSURGICAL AORTOCORONARY BYPASS STATUS: ICD-10-CM

## 2017-02-23 DIAGNOSIS — Z79.01 LONG TERM (CURRENT) USE OF ANTICOAGULANTS: ICD-10-CM

## 2017-02-23 DIAGNOSIS — Z87.891 PERSONAL HISTORY OF TOBACCO USE, PRESENTING HAZARDS TO HEALTH: ICD-10-CM

## 2017-02-23 LAB
ALBUMIN SERPL-MCNC: 3.6 G/DL (ref 3.4–5)
ALP SERPL-CCNC: 514 U/L (ref 40–150)
ALT SERPL W P-5'-P-CCNC: 18 U/L (ref 0–70)
ANION GAP SERPL CALCULATED.3IONS-SCNC: 8 MMOL/L (ref 3–14)
AST SERPL W P-5'-P-CCNC: 18 U/L (ref 0–45)
BASOPHILS # BLD AUTO: 0 10E9/L (ref 0–0.2)
BASOPHILS NFR BLD AUTO: 0.4 %
BILIRUB SERPL-MCNC: 1.1 MG/DL (ref 0.2–1.3)
BUN SERPL-MCNC: 18 MG/DL (ref 7–30)
CALCIUM SERPL-MCNC: 8.2 MG/DL (ref 8.5–10.1)
CHLORIDE SERPL-SCNC: 110 MMOL/L (ref 94–109)
CO2 SERPL-SCNC: 21 MMOL/L (ref 20–32)
CREAT SERPL-MCNC: 0.9 MG/DL (ref 0.66–1.25)
DIFFERENTIAL METHOD BLD: ABNORMAL
EOSINOPHIL # BLD AUTO: 0 10E9/L (ref 0–0.7)
EOSINOPHIL NFR BLD AUTO: 0.8 %
ERYTHROCYTE [DISTWIDTH] IN BLOOD BY AUTOMATED COUNT: 17.1 % (ref 10–15)
GFR SERPL CREATININE-BSD FRML MDRD: 83 ML/MIN/1.7M2
GLUCOSE SERPL-MCNC: 95 MG/DL (ref 70–99)
HCT VFR BLD AUTO: 28.6 % (ref 40–53)
HGB BLD-MCNC: 9.7 G/DL (ref 13.3–17.7)
IMM GRANULOCYTES # BLD: 0 10E9/L (ref 0–0.4)
IMM GRANULOCYTES NFR BLD: 0.8 %
LYMPHOCYTES # BLD AUTO: 0.8 10E9/L (ref 0.8–5.3)
LYMPHOCYTES NFR BLD AUTO: 29.4 %
MCH RBC QN AUTO: 30 PG (ref 26.5–33)
MCHC RBC AUTO-ENTMCNC: 33.9 G/DL (ref 31.5–36.5)
MCV RBC AUTO: 89 FL (ref 78–100)
MONOCYTES # BLD AUTO: 0.2 10E9/L (ref 0–1.3)
MONOCYTES NFR BLD AUTO: 8.2 %
NEUTROPHILS # BLD AUTO: 1.5 10E9/L (ref 1.6–8.3)
NEUTROPHILS NFR BLD AUTO: 60.4 %
NRBC # BLD AUTO: 0 10*3/UL
NRBC BLD AUTO-RTO: 0 /100
PLATELET # BLD AUTO: 104 10E9/L (ref 150–450)
POTASSIUM SERPL-SCNC: 4.2 MMOL/L (ref 3.4–5.3)
PROT SERPL-MCNC: 7.1 G/DL (ref 6.8–8.8)
RBC # BLD AUTO: 3.23 10E12/L (ref 4.4–5.9)
SODIUM SERPL-SCNC: 140 MMOL/L (ref 133–144)
TROPONIN I BLD-MCNC: 0.01 UG/L (ref 0–0.1)
TROPONIN I SERPL-MCNC: NORMAL UG/L (ref 0–0.04)
WBC # BLD AUTO: 2.6 10E9/L (ref 4–11)

## 2017-02-23 PROCEDURE — 71020 XR CHEST 2 VW: CPT

## 2017-02-23 PROCEDURE — 84484 ASSAY OF TROPONIN QUANT: CPT | Performed by: FAMILY MEDICINE

## 2017-02-23 PROCEDURE — 93005 ELECTROCARDIOGRAM TRACING: CPT | Performed by: FAMILY MEDICINE

## 2017-02-23 PROCEDURE — 40000556 ZZH STATISTIC PERIPHERAL IV START W US GUIDANCE

## 2017-02-23 PROCEDURE — 27210995 ZZH RX 272

## 2017-02-23 PROCEDURE — 25000132 ZZH RX MED GY IP 250 OP 250 PS 637: Performed by: FAMILY MEDICINE

## 2017-02-23 PROCEDURE — 84484 ASSAY OF TROPONIN QUANT: CPT | Mod: 91

## 2017-02-23 PROCEDURE — 99285 EMERGENCY DEPT VISIT HI MDM: CPT | Mod: 25 | Performed by: FAMILY MEDICINE

## 2017-02-23 PROCEDURE — 93010 ELECTROCARDIOGRAM REPORT: CPT | Mod: Z6 | Performed by: FAMILY MEDICINE

## 2017-02-23 PROCEDURE — 85025 COMPLETE CBC W/AUTO DIFF WBC: CPT | Performed by: FAMILY MEDICINE

## 2017-02-23 PROCEDURE — 80053 COMPREHEN METABOLIC PANEL: CPT | Performed by: FAMILY MEDICINE

## 2017-02-23 RX ORDER — ASPIRIN 81 MG/1
324 TABLET, CHEWABLE ORAL ONCE
Status: COMPLETED | OUTPATIENT
Start: 2017-02-23 | End: 2017-02-23

## 2017-02-23 RX ADMIN — ASPIRIN 81 MG CHEWABLE TABLET 324 MG: 81 TABLET CHEWABLE at 20:28

## 2017-02-23 NOTE — IP AVS SNAPSHOT
MRN:[id number]                      After Visit Summary   2/23/2017    Oswaldo Win    MRN: [id number]           Thank you!     Thank you for choosing [facility] for your care. Our goal is always to provide you with excellent care. Hearing back from our patients is one way we can continue to improve our services. Please take a few minutes to complete the written survey that you may receive in the mail after you visit with us. Thank you!        Patient Information     Date Of Birth          1945        About your hospital stay     You were admitted on:  February 24, 2017 You last received care in the:  Unit 6D Observation [facility]    You were discharged on:  February 24, 2017        Reason for your hospital stay       The dizziness you came into the emergency room for does not appear to be cardiac cause. Your heart enzymes were normal which tells us there was no damage to the heart muscle. You stress test was abnormal but not different from 2014. I reviewed this with Cardiology who recommended you continue your home medications.      I recommend continuing your home medications and it will be very important to follow up with your primary care doctor early next week.    Your symptoms could be related to dehydration and we recommend you drink sufficient water. Go from sit to stand slowly and sit down immediately if you feel lightheaded/dizzy. Given that you are on Lovenox a fall could cause life thretening bleeding.     Your hemoglobin is low and I recommend you have your labs check early next week.                  Who to Call     For medical emergencies, please call 911.  For non-urgent questions about your medical care, please call your primary care provider or clinic, [phone]          Attending Provider     Provider Specialty    Deana, Dwayne SALES MD Emergency Medicine    [facility] real [facility]       Primary Care Provider Office Phone # Fax #    Lucrecia Collier MD  [phone]       [facility] [address]  [facility] [address]         When to contact your care team       Return to the ED with fever, uncontrolled nausea, vomiting, unrelieved pain, bleeding not relieved with pressure, dizziness, chest pain, shortness of breath, loss of consciousness, and any new or concerning symptoms.      Please go to your nearest emergency room If you were to have a change in the type of chest pain i.e more severe, lasting longer or radiating to your shoulder, arm, neck, jaw or back, shortness of breath or increased pain with breathing, coughing up blood, feel dizzy or lightheaded, or notice swelling in one leg.                  After Care Instructions     Activity       Your activity upon discharge: activity as tolerated and no driving for today            Diet       Follow this diet upon discharge:      Low Saturated Fat Na <2400 mg                  Follow-up Appointments     Adult [facility] Follow-up and recommended labs and tests       1. Follow up with primary care provider, Lucrecia Collier, within 2 days for hospital follow- up.  The following labs/tests are recommended: CBC .    2. Cardiology within 2 weeks         Appointments on [place] and/or [facility] (with [facility] or [facility] provider or service). Call [phone] if you haven't heard regarding these appointments within 7 days of discharge.                  Your next 10 appointments already scheduled     Mar 01, 2017 10:30 AM CST   Masonic Lab Draw with  MASONIC LAB DRAW   [facility] Lab Draw ([facility])    [address] [address]   [phone]            Mar 08, 2017  8:45 AM CST   Masonic Lab Draw with  MASONIC LAB DRAW   [facility] Lab Draw ([facility])    [address] [address]   [phone]            Mar 08, 2017  9:15 AM CST   (Arrive by 9:00 AM)    Return Visit with Bentley Robles MD   [facility] ([facility])    [address] [address]   [phone]            Mar 08, 2017 11:00 AM CST   NM RADIUM 223 XOFIGO THERAPY with [id number]   [facility], Nuclear Medicine ([facility])    [address] [facility] [id number] [phone]           Please bring a list of your medicines to the exam. (Include vitamins, minerals and over-the-counter drugs.) You should wear comfortable clothes. Leave your valuables at home. Please bring related prior results and films.  Tell your doctor:   If you are breastfeeding or may be pregnant.   If you have had a barium test within the past few days. Barium may change the results of certain exams.   If you think you may need sedation (medicine to help you relax).  You may eat and drink as normal.  Please call your Imaging Department at your exam site with any questions.            Mar 29, 2017 10:30 AM CDT   [facility] Lab Draw with  M[facility]oto LAB DRAW   [facility])    [address] [address]   [phone]            Apr 05, 2017  9:00 AM CDT   Masonic Lab Draw with  MASONIC LAB DRAW   [facility])    [address] [address]   [phone]            Apr 05, 2017  9:30 AM CDT   (Arrive by 9:15 AM)   Return Visit with Gabriela Mcguire PA-C   [facility] ([facility])    [address] [facility]  [address] [facility] [address]   [phone]            Apr 05, 2017 11:00 AM CDT   NM RADIUM 223 XOFIGO THERAPY with UUNMINJ1   [facility] ([facility])    [address] [facility] [address]  "  [phone]           Please bring a list of your medicines to the exam. (Include vitamins, minerals and over-the-counter drugs.) You should wear comfortable clothes. Leave your valuables at home. Please bring related prior results and films.  Tell your doctor:   If you are breastfeeding or may be pregnant.   If you have had a barium test within the past few days. Barium may change the results of certain exams.   If you think you may need sedation (medicine to help you relax).  You may eat and drink as normal.  Please call your Imaging Department at your exam site with any questions.            Apr 20, 2017  1:30 PM CDT   Lab with  LAB   [facility] ([facility])    [address] [place] [address] [phone]            Apr 20, 2017  2:00 PM CDT   (Arrive by 1:45 PM)   CORE RETURN with Jill Juarez NP   [facility] ([facility])    [address] [place] [address] [phone]              Pending Results     No orders found from 2/21/2017 to 2/24/2017.            Statement of Approval     Ordered          02/24/17 1748  I have reviewed and agree with all the recommendations and orders detailed in this document.  EFFECTIVE NOW     Approved and electronically signed by:  Nandnii Cruz APRN CNP             Admission Information     Date & Time Department Dept. Phone    2/23/2017 Unit 6D Observation [facility] [place] [phone]      Your Vitals Were     Blood Pressure Pulse Temperature Respirations Height Weight    110/81 (BP Location: Right arm) 71 98.1  F (36.7  C) (Oral) 16 1.689 m (5' 6.5\") 64 kg (141 lb)    Pulse Oximetry BMI (Body Mass Index)                98% 22.42 kg/m2          [facility] Information     [facility] gives you secure access to your electronic health record. If you see a primary care provider, you can also send messages to your care team and make appointments. If you " have questions, please call your primary care clinic.  If you do not have a primary care provider, please call [phone] and they will assist you.        Care EveryWhere ID     This is your Care EveryWhere ID. This could be used by other organizations to access your [facility] medical records  [id number]           Review of your medicines      CONTINUE these medicines which have NOT CHANGED        Dose / Directions    abiraterone 250 MG tablet   Commonly known as:  ZYTIGA   Used for:  Prostate cancer 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), Metastatic bone tumor (H)        Dose:  1000 mg   Take 4 tablets (1,000 mg) by mouth daily Take on empty stomach.   Quantity:  120 tablet   Refills:  2       aspirin 81 MG tablet        Dose:  81 mg   Take 81 mg by mouth daily   Refills:  0       calcium carbonate 500 MG tablet   Commonly known as:  OS-RODNEY 500 mg [place]. [place]   Used for:  Hypocalcemia        Dose:  1000 mg   Take 2 tablets (1,000 mg) by mouth 2 times daily   Quantity:  120 tablet   Refills:  3       diphenoxylate-atropine 2.5-0.025 MG per tablet   Commonly known as:  LOMOTIL        Dose:  1 tablet   Take 1 tablet by mouth 4 times daily as needed for diarrhea   Refills:  0       enoxaparin 60 MG/0.6ML injection   Commonly known as:  LOVENOX   Used for:  Deep vein thrombosis (DVT) of other vein of lower extremity (H)        Dose:  60 mg   Inject 0.6 mLs (60 mg) Subcutaneous 2 times daily   Quantity:  60 Syringe   Refills:  3       HYDROcodone-acetaminophen 5-325 MG per tablet   Commonly known as:  NORCO        Dose:  1 tablet   Take 1 tablet by mouth every 6 hours as needed for moderate to severe pain   Refills:  0       loperamide 2 MG capsule   Commonly known as:  IMODIUM   Used for:  Diarrhea, unspecified type        Use 2 caps PO after first loose stool.  Repeat 1 cap after each subsequent loose stool.  Max 8/24 hours.   Quantity:  60 capsule   Refills:  1       metoprolol 25 MG 24 hr tablet   Commonly known as:  TOPROL-XL    Used for:  (HFpEF) heart failure with preserved ejection fraction (H)        Dose:  25 mg   Take 1 tablet (25 mg) by mouth daily AM   Quantity:  90 tablet   Refills:  4       Multi-vitamin Tabs tablet        Dose:  1 tablet   Take 1 tablet by mouth every morning   Quantity:  100 tablet   Refills:  3       predniSONE 5 MG tablet   Commonly known as:  DELTASONE   Used for:  Prostate cancer 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), Metastatic bone tumor (H)        Dose:  5 mg   Take 1 tablet (5 mg) by mouth 2 times daily   Quantity:  60 tablet   Refills:  2       simvastatin 40 MG tablet   Commonly known as:  ZOCOR   Used for:  Hyperlipidemia LDL goal <130        TAKE ONE TABLET BY MOUTH AT BEDTIME   Quantity:  90 tablet   Refills:  3       tamsulosin 0.4 MG capsule   Commonly known as:  FLOMAX   Used for:  Ureteral stone        Dose:  0.4 mg   Take 1 capsule (0.4 mg) by mouth daily AM   Quantity:  90 capsule   Refills:  3       vitamin D 2000 UNITS tablet   Used for:  Vitamin D deficiency        Dose:  1 tablet   Take 1 tablet by mouth daily   Quantity:  100 tablet   Refills:  3                Protect others around you: Learn how to safely use, store and throw away your medicines at www.disposemymeds.org.             Medication List: This is a list of all your medications and when to take them. Check marks below indicate your daily home schedule. Keep this list as a reference.      Medications           Morning Afternoon Evening Bedtime As Needed    abiraterone 250 MG tablet   Commonly known as:  ZYTIGA   Take 4 tablets (1,000 mg) by mouth daily Take on empty stomach.                                aspirin 81 MG tablet   Take 81 mg by mouth daily                                calcium carbonate 500 MG tablet   Commonly known as:  OS-RODNEY 500 mg [place]. Ca   Take 2 tablets (1,000 mg) by mouth 2 times daily   Last time this was given:  500 mg on 2/24/2017  8:16 AM                                diphenoxylate-atropine 2.5-0.025 MG per  tablet   Commonly known as:  LOMOTIL   Take 1 tablet by mouth 4 times daily as needed for diarrhea                                enoxaparin 60 MG/0.6ML injection   Commonly known as:  LOVENOX   Inject 0.6 mLs (60 mg) Subcutaneous 2 times daily   Last time this was given:  60 mg on 2/24/2017  5:23 PM                                HYDROcodone-acetaminophen 5-325 MG per tablet   Commonly known as:  NORCO   Take 1 tablet by mouth every 6 hours as needed for moderate to severe pain                                loperamide 2 MG capsule   Commonly known as:  IMODIUM   Use 2 caps PO after first loose stool.  Repeat 1 cap after each subsequent loose stool.  Max 8/24 hours.                                metoprolol 25 MG 24 hr tablet   Commonly known as:  TOPROL-XL   Take 1 tablet (25 mg) by mouth daily AM                                Multi-vitamin Tabs tablet   Take 1 tablet by mouth every morning                                predniSONE 5 MG tablet   Commonly known as:  DELTASONE   Take 1 tablet (5 mg) by mouth 2 times daily   Last time this was given:  5 mg on 2/24/2017  8:16 AM                                simvastatin 40 MG tablet   Commonly known as:  ZOCOR   TAKE ONE TABLET BY MOUTH AT BEDTIME   Last time this was given:  40 mg on 2/24/2017  2:43 AM                                tamsulosin 0.4 MG capsule   Commonly known as:  FLOMAX   Take 1 capsule (0.4 mg) by mouth daily AM   Last time this was given:  0.4 mg on 2/24/2017  8:16 AM                                vitamin D 2000 UNITS tablet   Take 1 tablet by mouth daily

## 2017-02-24 ENCOUNTER — APPOINTMENT (OUTPATIENT)
Dept: NUCLEAR MEDICINE | Facility: CLINIC | Age: 72
End: 2017-02-24
Attending: PHYSICIAN ASSISTANT
Payer: COMMERCIAL

## 2017-02-24 ENCOUNTER — TRANSFERRED RECORDS (OUTPATIENT)
Dept: CARDIOLOGY | Facility: CLINIC | Age: 72
End: 2017-02-24

## 2017-02-24 VITALS
BODY MASS INDEX: 22.13 KG/M2 | OXYGEN SATURATION: 98 % | SYSTOLIC BLOOD PRESSURE: 110 MMHG | DIASTOLIC BLOOD PRESSURE: 81 MMHG | HEART RATE: 71 BPM | WEIGHT: 141 LBS | HEIGHT: 67 IN | RESPIRATION RATE: 16 BRPM | TEMPERATURE: 98.1 F

## 2017-02-24 LAB
BASOPHILS # BLD AUTO: 0 10E9/L (ref 0–0.2)
BASOPHILS NFR BLD AUTO: 0.5 %
DIFFERENTIAL METHOD BLD: ABNORMAL
EOSINOPHIL # BLD AUTO: 0 10E9/L (ref 0–0.7)
EOSINOPHIL NFR BLD AUTO: 1.5 %
ERYTHROCYTE [DISTWIDTH] IN BLOOD BY AUTOMATED COUNT: 17.1 % (ref 10–15)
HBA1C MFR BLD: 4.8 % (ref 4.3–6)
HCT VFR BLD AUTO: 25.9 % (ref 40–53)
HGB BLD-MCNC: 8.6 G/DL (ref 13.3–17.7)
IMM GRANULOCYTES # BLD: 0 10E9/L (ref 0–0.4)
IMM GRANULOCYTES NFR BLD: 0.5 %
INTERPRETATION ECG - MUSE: NORMAL
LYMPHOCYTES # BLD AUTO: 0.6 10E9/L (ref 0.8–5.3)
LYMPHOCYTES NFR BLD AUTO: 30.5 %
MCH RBC QN AUTO: 29.6 PG (ref 26.5–33)
MCHC RBC AUTO-ENTMCNC: 33.2 G/DL (ref 31.5–36.5)
MCV RBC AUTO: 89 FL (ref 78–100)
MONOCYTES # BLD AUTO: 0.3 10E9/L (ref 0–1.3)
MONOCYTES NFR BLD AUTO: 12.8 %
NEUTROPHILS # BLD AUTO: 1.1 10E9/L (ref 1.6–8.3)
NEUTROPHILS NFR BLD AUTO: 54.2 %
NRBC # BLD AUTO: 0.1 10*3/UL
NRBC BLD AUTO-RTO: 3 /100
PLATELET # BLD AUTO: 119 10E9/L (ref 150–450)
RBC # BLD AUTO: 2.91 10E12/L (ref 4.4–5.9)
TROPONIN I SERPL-MCNC: NORMAL UG/L (ref 0–0.04)
TROPONIN I SERPL-MCNC: NORMAL UG/L (ref 0–0.04)
WBC # BLD AUTO: 2 10E9/L (ref 4–11)

## 2017-02-24 PROCEDURE — G0378 HOSPITAL OBSERVATION PER HR: HCPCS

## 2017-02-24 PROCEDURE — 25000128 H RX IP 250 OP 636: Performed by: INTERNAL MEDICINE

## 2017-02-24 PROCEDURE — 93017 CV STRESS TEST TRACING ONLY: CPT

## 2017-02-24 PROCEDURE — 34300033 ZZH RX 343: Performed by: FAMILY MEDICINE

## 2017-02-24 PROCEDURE — 83036 HEMOGLOBIN GLYCOSYLATED A1C: CPT | Performed by: PHYSICIAN ASSISTANT

## 2017-02-24 PROCEDURE — 93016 CV STRESS TEST SUPVJ ONLY: CPT | Performed by: INTERNAL MEDICINE

## 2017-02-24 PROCEDURE — 84484 ASSAY OF TROPONIN QUANT: CPT | Performed by: PHYSICIAN ASSISTANT

## 2017-02-24 PROCEDURE — 25000128 H RX IP 250 OP 636: Performed by: PHYSICIAN ASSISTANT

## 2017-02-24 PROCEDURE — 99219 ZZC INITIAL OBSERVATION CARE,LEVL II: CPT | Mod: Z6 | Performed by: PHYSICIAN ASSISTANT

## 2017-02-24 PROCEDURE — 93018 CV STRESS TEST I&R ONLY: CPT | Performed by: INTERNAL MEDICINE

## 2017-02-24 PROCEDURE — 25000128 H RX IP 250 OP 636: Performed by: NURSE PRACTITIONER

## 2017-02-24 PROCEDURE — 25000125 ZZHC RX 250: Performed by: PHYSICIAN ASSISTANT

## 2017-02-24 PROCEDURE — 96372 THER/PROPH/DIAG INJ SC/IM: CPT

## 2017-02-24 PROCEDURE — 36415 COLL VENOUS BLD VENIPUNCTURE: CPT | Performed by: PHYSICIAN ASSISTANT

## 2017-02-24 PROCEDURE — 78452 HT MUSCLE IMAGE SPECT MULT: CPT

## 2017-02-24 PROCEDURE — A9502 TC99M TETROFOSMIN: HCPCS | Performed by: FAMILY MEDICINE

## 2017-02-24 PROCEDURE — 25000132 ZZH RX MED GY IP 250 OP 250 PS 637: Performed by: PHYSICIAN ASSISTANT

## 2017-02-24 PROCEDURE — 85025 COMPLETE CBC W/AUTO DIFF WBC: CPT | Performed by: FAMILY MEDICINE

## 2017-02-24 RX ORDER — LIDOCAINE 40 MG/G
CREAM TOPICAL
Status: DISCONTINUED | OUTPATIENT
Start: 2017-02-24 | End: 2017-02-24 | Stop reason: HOSPADM

## 2017-02-24 RX ORDER — HYDROCODONE BITARTRATE AND ACETAMINOPHEN 5; 325 MG/1; MG/1
1 TABLET ORAL EVERY 6 HOURS PRN
Status: DISCONTINUED | OUTPATIENT
Start: 2017-02-24 | End: 2017-02-24 | Stop reason: HOSPADM

## 2017-02-24 RX ORDER — METOPROLOL SUCCINATE 25 MG/1
25 TABLET, EXTENDED RELEASE ORAL DAILY
Status: DISCONTINUED | OUTPATIENT
Start: 2017-02-24 | End: 2017-02-24

## 2017-02-24 RX ORDER — CALCIUM CARBONATE 500(1250)
1 TABLET ORAL 2 TIMES DAILY
Status: DISCONTINUED | OUTPATIENT
Start: 2017-02-24 | End: 2017-02-24 | Stop reason: HOSPADM

## 2017-02-24 RX ORDER — NALOXONE HYDROCHLORIDE 0.4 MG/ML
.1-.4 INJECTION, SOLUTION INTRAMUSCULAR; INTRAVENOUS; SUBCUTANEOUS
Status: DISCONTINUED | OUTPATIENT
Start: 2017-02-24 | End: 2017-02-24 | Stop reason: HOSPADM

## 2017-02-24 RX ORDER — ABIRATERONE ACETATE 250 MG/1
1000 TABLET ORAL DAILY
Status: DISCONTINUED | OUTPATIENT
Start: 2017-02-24 | End: 2017-02-24 | Stop reason: HOSPADM

## 2017-02-24 RX ORDER — TAMSULOSIN HYDROCHLORIDE 0.4 MG/1
0.4 CAPSULE ORAL DAILY
Status: DISCONTINUED | OUTPATIENT
Start: 2017-02-24 | End: 2017-02-24 | Stop reason: HOSPADM

## 2017-02-24 RX ORDER — METOPROLOL SUCCINATE 25 MG/1
25 TABLET, EXTENDED RELEASE ORAL DAILY
Status: DISCONTINUED | OUTPATIENT
Start: 2017-02-25 | End: 2017-02-24 | Stop reason: HOSPADM

## 2017-02-24 RX ORDER — NITROGLYCERIN 0.4 MG/1
0.4 TABLET SUBLINGUAL EVERY 5 MIN PRN
Status: DISCONTINUED | OUTPATIENT
Start: 2017-02-24 | End: 2017-02-24 | Stop reason: HOSPADM

## 2017-02-24 RX ORDER — ALUMINA, MAGNESIA, AND SIMETHICONE 2400; 2400; 240 MG/30ML; MG/30ML; MG/30ML
15-30 SUSPENSION ORAL EVERY 4 HOURS PRN
Status: DISCONTINUED | OUTPATIENT
Start: 2017-02-24 | End: 2017-02-24 | Stop reason: HOSPADM

## 2017-02-24 RX ORDER — ACETAMINOPHEN 650 MG/1
650 SUPPOSITORY RECTAL EVERY 4 HOURS PRN
Status: DISCONTINUED | OUTPATIENT
Start: 2017-02-24 | End: 2017-02-24 | Stop reason: HOSPADM

## 2017-02-24 RX ORDER — AMINOPHYLLINE 25 MG/ML
50 INJECTION, SOLUTION INTRAVENOUS
Status: DISCONTINUED | OUTPATIENT
Start: 2017-02-24 | End: 2017-02-24 | Stop reason: HOSPADM

## 2017-02-24 RX ORDER — REGADENOSON 0.08 MG/ML
0.4 INJECTION, SOLUTION INTRAVENOUS ONCE
Status: COMPLETED | OUTPATIENT
Start: 2017-02-24 | End: 2017-02-24

## 2017-02-24 RX ORDER — SIMVASTATIN 40 MG
40 TABLET ORAL AT BEDTIME
Status: DISCONTINUED | OUTPATIENT
Start: 2017-02-24 | End: 2017-02-24 | Stop reason: HOSPADM

## 2017-02-24 RX ORDER — ACETAMINOPHEN 325 MG/1
650 TABLET ORAL EVERY 4 HOURS PRN
Status: DISCONTINUED | OUTPATIENT
Start: 2017-02-24 | End: 2017-02-24 | Stop reason: HOSPADM

## 2017-02-24 RX ORDER — PREDNISONE 5 MG/1
5 TABLET ORAL 2 TIMES DAILY
Status: DISCONTINUED | OUTPATIENT
Start: 2017-02-24 | End: 2017-02-24 | Stop reason: HOSPADM

## 2017-02-24 RX ORDER — ASPIRIN 81 MG/1
81 TABLET ORAL DAILY
Status: DISCONTINUED | OUTPATIENT
Start: 2017-02-24 | End: 2017-02-24 | Stop reason: HOSPADM

## 2017-02-24 RX ADMIN — TETROFOSMIN 36 MCI.: 0.23 INJECTION, POWDER, LYOPHILIZED, FOR SOLUTION INTRAVENOUS at 11:05

## 2017-02-24 RX ADMIN — TETROFOSMIN 9.6 MCI.: 0.23 INJECTION, POWDER, LYOPHILIZED, FOR SOLUTION INTRAVENOUS at 09:15

## 2017-02-24 RX ADMIN — ENOXAPARIN SODIUM 60 MG: 60 INJECTION SUBCUTANEOUS at 17:23

## 2017-02-24 RX ADMIN — CALCIUM 500 MG: 500 TABLET ORAL at 08:16

## 2017-02-24 RX ADMIN — SODIUM CHLORIDE 1000 ML: 9 INJECTION, SOLUTION INTRAVENOUS at 12:37

## 2017-02-24 RX ADMIN — ENOXAPARIN SODIUM 60 MG: 60 INJECTION SUBCUTANEOUS at 02:43

## 2017-02-24 RX ADMIN — ASPIRIN 81 MG: 81 TABLET, COATED ORAL at 08:16

## 2017-02-24 RX ADMIN — TAMSULOSIN HYDROCHLORIDE 0.4 MG: 0.4 CAPSULE ORAL at 08:16

## 2017-02-24 RX ADMIN — SIMVASTATIN 40 MG: 40 TABLET, FILM COATED ORAL at 02:43

## 2017-02-24 RX ADMIN — AMINOPHYLLINE 50 MG: 25 INJECTION, SOLUTION INTRAVENOUS at 11:15

## 2017-02-24 RX ADMIN — PREDNISONE 5 MG: 5 TABLET ORAL at 08:16

## 2017-02-24 RX ADMIN — REGADENOSON 0.4 MG: 0.08 INJECTION, SOLUTION INTRAVENOUS at 11:05

## 2017-02-24 ASSESSMENT — ENCOUNTER SYMPTOMS
DIFFICULTY URINATING: 0
LIGHT-HEADEDNESS: 1
EYE REDNESS: 0
NECK STIFFNESS: 0
COLOR CHANGE: 0
SHORTNESS OF BREATH: 0
FEVER: 0
CONFUSION: 0
ABDOMINAL PAIN: 0
HEADACHES: 0
ARTHRALGIAS: 0

## 2017-02-24 NOTE — H&P
"[facility]   History & Physical    Oswaldo Win MRN# [id number]   Age: 71 year old YOB: 1945     Date of Admission: 2/23/2017    Primary Care Provider: Lucrecia Collier         Chief Complaint:   Pre-syncope         History of Present Illness:   Oswaldo Win is a 71 year old male with history of CAD with STEMI s/p BMS to pRCA in 4/2014, robotic LIMA-LAD in 7/2014, NSTEMI with late in-stent thrombosis of BMS in RCA s/p KIN, ischemic cardiomyopathy with most recent EF 45-50% in 12/2016, mitral valve regurgitation s/p mitral clip, HLD, and metastatic prostate cancer admitted for pre-syncope and diaphoresis concerning for anginal equivalent.  Mr. Win was pushing a cart in the grocery store today when he noticed feeling light-headed, diaphoretic, and \"off\" associated with L>R extremity tingling. Episode also associated with dyspnea, weakness, and nausea.  Subsequently sat down for 5-10 minutes with symptoms slowly subsiding but not disappearing completely.  Continues to feel weak and \"off\" but cannot further clarify these symptoms.  He does report these symptoms are identical to those presenting when he had a STEMI in 4/2014.  Denies significant weight changes, LE edema, orthopnea, anais chest pain or pressure.  Admits to increased dyspnea over past 2 months.      ED interventions: Aspirin 324 mg         Review of Systems:   A 10 point review of systems was performed and is negative unless otherwise noted in HPI.          Past Medical History:     Past Medical History   Diagnosis Date     Arthritis      ASCVD (arteriosclerotic cardiovascular disease) 4/10/2014     Closed L4 vertebral fracture (H) 2005     traumatic fracture     COPD (chronic obstructive pulmonary disease) (H)      Dyspnea on exertion      Fx tib/fib fol insrt ortho implnt/prosth/bone plt, right leg 2005     rt tib/fib fracture s/p ORIF     Gastro-oesophageal reflux disease      Hx of right coronary " artery stent placement 2014 4/2014 and redone 9/2014     Hyperlipidemia LDL goal < 70      Hypertension      Influenza A 3/27/2016     Musculoskeletal leg pain      from old leg injuries     Other chronic pain      Prostate cancer 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)      Pulmonary embolism (H) 11/8/16 November 2016     ST elevation MI (STEMI) (H) 4/5/2014     Stented coronary artery      Thrombosis of leg 10/2015     October 2015             Past Surgical History:      Past Surgical History   Procedure Laterality Date     Tonsillectomy & adenoidectomy  age 18     Herniorrhaphy inguinal Left 1968     Open reduction internal fixation tibia  2005     RT Tib/Fib ORIF, crushed LT calc     Colonoscopy       Davinci bypass artery coronary  7/1/2014     Procedure: DAVINCI BYPASS ARTERY CORONARY;  Surgeon: Kike Coley MD;  [facility]     Herniorrhaphy inguinal Left 11/18/2014     Procedure: HERNIORRHAPHY INGUINAL;  Surgeon: Max Garza MD;  [facility]     Laser holmium lithotripsy ureter(s), insert stent, combined N/A 7/27/2015     Procedure: COMBINED CYSTOSCOPY, URETEROSCOPY, LASER HOLMIUM LITHOTRIPSY URETER(S), INSERT STENT;  Surgeon: Maame Ramirez MD;  [facility]     Anesthesia out of or percutaneous mitral valve repair N/A 10/16/2015     Procedure: ANESTHESIA OUT OF OR PERCUTANEOUS MITRAL VALVE REPAIR;  Surgeon: GENERIC ANESTHESIA PROVIDER;  [facility]     Combined cystoscopy, retrogrades, exchange stent ureter(s) Left 12/14/2015     Procedure: COMBINED CYSTOSCOPY, RETROGRADES, EXCHANGE STENT URETER(S);  Surgeon: Maame Ramirez MD;  [facility]     Excise mass lower extremity Right 2/5/2016     Procedure: EXCISE MASS LOWER EXTREMITY;  Surgeon: Miquel Figueredo MD;  [facility]     Laser holmium lithotripsy ureter(s), insert stent, combined Left 9/19/2016     Procedure: COMBINED CYSTOSCOPY, URETEROSCOPY, LASER HOLMIUM LITHOTRIPSY URETER(S), INSERT  STENT;  Surgeon: Maame Ramirez MD;  [facility]     Esophagoscopy, gastroscopy, duodenoscopy (egd), combined N/A 1/13/2017     Procedure: COMBINED ESOPHAGOSCOPY, GASTROSCOPY, DUODENOSCOPY (EGD);  Surgeon: Telly Anderson MD;  Location:  GI     Colonoscopy N/A 1/13/2017     Procedure: COLONOSCOPY;  Surgeon: Telly Anderson MD;  [facility] GI             Family History:     Family History   Problem Relation Age of Onset     HEART DISEASE Mother 50     CAB, pacemaker     HEART DISEASE Father 41     MI     HEART DISEASE Maternal Uncle      Breast Cancer Paternal Grandmother      Breast Cancer Paternal Aunt      CEREBROVASCULAR DISEASE No family hx of      DIABETES No family hx of              Social History:     Social History   Substance Use Topics     Smoking status: Former Smoker     Packs/day: 0.50     Years: 40.00     Types: Cigarettes     Quit date: 2/1/2000     Smokeless tobacco: Never Used     Alcohol use 0.0 oz/week     0 Standard drinks or equivalent per week      Comment: rare             Medications:     Current Facility-Administered Medications on File Prior to Encounter:  hydrocortisone sodium succinate (solu-CORTEF) injection   glycopyrrolate (ROBINUL) injection   neostigmine (PROSTIGMINE) injection   protamine injection   albuterol (PROAIR HFA, PROVENTIL HFA, VENTOLIN HFA) inhaler     Current Outpatient Prescriptions on File Prior to Encounter:  enoxaparin (LOVENOX) 60 MG/0.6ML injection Inject 0.6 mLs (60 mg) Subcutaneous 2 times daily   loperamide (IMODIUM) 2 MG capsule Use 2 caps PO after first loose stool.  Repeat 1 cap after each subsequent loose stool.  Max 8/24 hours.   predniSONE (DELTASONE) 5 MG tablet Take 1 tablet (5 mg) by mouth 2 times daily   abiraterone (ZYTIGA) 250 MG tablet Take 4 tablets (1,000 mg) by mouth daily Take on empty stomach.   diphenoxylate-atropine (LOMOTIL) 2.5-0.025 MG per tablet Take 1 tablet by mouth 4 times daily as needed for diarrhea  "  calcium carbonate (OS-RODNEY 500 MG [place]. [place]) 500 MG tablet Take 2 tablets (1,000 mg) by mouth 2 times daily   predniSONE (DELTASONE) 5 MG tablet Take 1 tablet (5 mg) by mouth daily   aspirin 81 MG tablet Take 81 mg by mouth daily   metoprolol (TOPROL-XL) 25 MG 24 hr tablet Take 1 tablet (25 mg) by mouth daily AM   Cholecalciferol (VITAMIN D) 2000 UNITS tablet Take 1 tablet by mouth daily   simvastatin (ZOCOR) 40 MG tablet TAKE ONE TABLET BY MOUTH AT BEDTIME   tamsulosin (FLOMAX) 0.4 MG 24 hr capsule Take 1 capsule (0.4 mg) by mouth daily AM   HYDROcodone-acetaminophen (NORCO) 5-325 MG per tablet Take 1 tablet by mouth every 6 hours as needed for moderate to severe pain   multivitamin, therapeutic with minerals (MULTI-VITAMIN) TABS Take 1 tablet by mouth every morning             Allergies:   No Known Allergies          Physical Exam:   /59  Temp 97.4  F (36.3  C) (Oral)  Resp 12  Ht 1.689 m (5' 6.5\")  Wt 62.6 kg (138 lb)  SpO2 100%  BMI 21.94 kg/m2   GENERAL: Nontoxic appearing, no acute distress.     HEENT: Anicteric sclera. PERRL. Mucous dry.   CV: RRR. S1, S2. No murmurs appreciated.   RESPIRATORY: Effort normal. Lungs CTAB with no wheezing, rales, rhonchi.   GI: Abdomen soft and non distended with normoactive bowel sounds present in all quadrants. No tenderness, rebound, guarding.   MUSCULOSKELETAL: No joint swelling or tenderness. Moves all extremities.   NEUROLOGICAL: No focal deficits. Strength 5/5 bilaterally in upper and lower extremities.   EXTREMITIES: No peripheral edema. Intact bilateral pedal pulses.   SKIN: No jaundice. No rashes.   MENTAL: Alert and oriented to person, place, time.         Labs:   CBC:  Recent Labs   Lab Test  02/23/17 2203   WBC  2.6*   RBC  3.23*   HGB  9.7*   HCT  28.6*   MCV  89   MCH  30.0   MCHC  33.9   RDW  17.1*   PLT  104*       CMP:  Recent Labs   Lab Test  02/23/17 2203   NA  140   POTASSIUM  4.2   CHLORIDE  110*   RODNEY  8.2*   CO2  21   BUN  18   CR  0.90   GLC  " 95   AST  18   ALT  18   BILITOTAL  1.1   ALBUMIN  3.6   PROTTOTAL  7.1   ALKPHOS  514*       INR:   Recent Labs   Lab Test  01/11/17   2207   INR  1.25*   EKG: NSR, rate 77, T wave flattening in V4-V5.  Reviewed by myself.         Imaging:   Chest xray:  Findings:   EKG leads project over the chest. The cardiomediastinal silhouette and pulmonary vasculature are within normal limits. Mitral clips project over the heart. The lungs are clear. The costophrenic angles are sharp. No pneumothorax. Diffuse sclerotic metastases throughout the skeleton. These appear mildly increased in the humeral heads bilaterally.         Impression:   1. No acute pulmonary abnormality.  2. Diffuse sclerotic metastases, which are increased in the humeral heads.            Assessment and Plan:   Oswaldo Win is a 71 year old male with history of CAD with STEMI s/p BMS to pRCA in 4/2014, robotic LIMA-LAD in 7/2014, NSTEMI with late in-stent thrombosis of BMS in RCA s/p KIN, ischemic cardiomyopathy with most recent EF 45-50% in 12/2016, mitral valve regurgitation s/p mitral clip, HLD, and metastatic prostate cancer admitted for pre-syncope and diaphoresis concerning for anginal equivalent.    1. Pre-syncope, query anginal equivalent  2. CAD s/p BMS to pRCA 4/2014, robotic LIMA-LAD 7/2014, KIN to in-stent thrombosis of RCA  3. Ischemic cardiomyopathy with EF 45-50%  4. Mitral valve regurgitation s/p mitral clip  5. Dyslipidemia  --symptoms similar to previous MI in 2014 including pre-syncope, diaphoresis, arm tingling and with exertional component. EKG with possible flattening of T waves in V4-V5 (nonspecific and largely unchanged from previous).  Initial troponin negative    PLAN:  1. Serial troponins, telemetry  2. Continue ASA 81 mg/day,  Toprol XL, simvastatin  3. Cardiology consult and Lexiscan for AM (ER staff discussed with on-call cardiology staff)  4. Fasting lipid panel  5. Hemoglobin A1c  6. NTG PRN chest pain  7. Repeat EKG at  return of symptoms    6. Recurrent PE, provoked in setting of underlying malignancy  --no hypoxia or tachycardia and no missed doses of enoxaparin to suggest PE as cause of symptoms.  Continue PTA enoxaparin BID    7. Metastatic prostate cancer with mets to bones  --continue PTA Zytiga and prednisone.  Last received lupron injection and Xofigo (radium 223) on 2/8.  Will check with nuclear medicine to ensure no CI to Lexiscan with concurrent Xofigo use.  Elevated alkaline phosphatase consistent with known mets to bone  --PRN Norco for pain - patient rarely uses    8. BPH: continue tamsulosin    FEN: Cardiac, no caffeine, NPO p MN for stress test  Prophylaxis: on therapeutic enoxaparin for PE  Code Status: FULL, discussed.  Alternate decision-maker identified as his son if the patient were to be incapacitated.        Jennie Murphy PA-C

## 2017-02-24 NOTE — ED NOTES
"Pt presents with c/o an episode of nausea, dizziness, lightheadedness, sweating and \"chest tingling but not pain\" of sudden onset while at the grocery store [employer] at 1900. Pt came immediately to the ED and symptoms resolved before he left the grocery store. Pt reports he had similar symptoms prior to his MI in 2014. Pt has a hx of a stent at the time of MI 4/2014 to the RCA and it reoccluded and was replaced 9/2014.    Pt alert and oriented, in no acute distress. Denies pain or further symptoms.    Vital Signs - BP: 109/49 Patient Position: Sitting Heart Rate: 74 Pulse/Heart Rate Source: Monitor; Left SpO2: 100 % O2 Device: None (Room air) Temp: 97.4  F (36.3  C) Temp src: Oral General Capillary Refill: less than/equal to 3 secs Patient Currently in Pain: Denies HR/Pulse Review: 74  "

## 2017-02-24 NOTE — PROGRESS NOTES
Pt here for Lexiscan. Test, medication and side effects reviewed with patient. Lung sounds clear. Pt denies caffeine use. Lexiscan 0.4mg IV given over 15 seconds followed by 5cc NS flush. Pt reported nausea, chest pressure, and ekg changes noted. Given 50mg IV Aminophylline 9 minutes post Charisse injection with relief from chest pressure/nausea and EKG back to baseline. Escorted back to nuclear medicine via wheelchair for follow up imaging.

## 2017-02-24 NOTE — PROGRESS NOTES
[facility]  Daily Progress Note          Assessment & Plan:   Oswaldo Win is a 71 year old male with history of CAD with STEMI s/p BMS to pRCA in 4/2014, robotic LIMA-LAD in 7/2014, NSTEMI with late in-stent thrombosis of BMS in RCA s/p KIN, ischemic cardiomyopathy with most recent EF 45-50% in 12/2016, mitral valve regurgitation s/p mitral clip, HLD, and metastatic prostate cancer admitted for pre-syncope and diaphoresis concerning for anginal equivalent.     1. Pre-syncope, anginal equivalent:  History significant for CAD s/p BMS to pRCA 4/2014, robotic LIMA-LAD 7/2014, KIN to in-stent thrombosis of RCA, IIschemic cardiomyopathy with EF 45-50%,  Mitral valve regurgitation s/p mitral clip, and  Dyslipidemia. Presenting with pre-syncope, diaphoresis, arm tinging similar to MI in the past. EKG with possible flattening of T waves in V4-V5 (nonspecific and largely unchanged from previous). Troponin negative x 3. No chest pain this am.   -Cardiology consult   -TELE  -Continue ASA 81 mg/day. Hold Toprol XL given plan for stress test.   -Continue simvastatin  - Lexiscan for AM   -Check Fasting lipid panel an d  Hemoglobin A1c  -NTG PRN chest pain  -Check orthostatic vital signs        2. Recurrent PE, provoked in setting of underlying malignancy: no hypoxia or tachycardia and has not missed any doses of enoxaparin to suggest PE as cause of symptoms. Continue PTA enoxaparin BID     3. Metastatic prostate cancer with mets to bones  -continue PTA Zytiga and prednisone. Last received lupron injection and Xofigo (radium 223) on 2/8. Discussed with with nuclear medicine and safe to do Lexiscan with current therapy. Elevated alkaline phosphatase consistent with known mets to bone. Pancytopenia consistent with malignancy and current therapy.   -PRN Norco for pain    FEN: NPO  Lines: PIV  Prophylaxis: Home Lovenox          Consults:   Cardiology          Discharge Planning:   Pending  "Lexiscan results         Interval History:   Feeling back to baseline. No further pre-syncope or diaphoresis. No chest pain/pressure or palpitations.     ROS:   Constitutional: No fevers/chills. Tolerating diet.   Cardiovascular: No chest pain or palpitations.   Respiratory: No cough or SOB.   GI: No abdominal pain. No N/V.      : No urinary complaints.   Musculoskeletal: Denies pain.           Physical Exam:   BP 99/57  Pulse 71  Temp 98.3  F (36.8  C) (Oral)  Resp 16  Ht 1.689 m (5' 6.5\")  Wt 64 kg (141 lb)  SpO2 98%  BMI 22.42 kg/m2     GENERAL: Alert and oriented x 3. NAD.   HEENT: Anicteric sclera. Mucous membranes moist.   CV: RRR. S1, S2. No murmurs appreciated.   RESPIRATORY: Effort normal. Lungs CTAB with no wheezing, rales, rhonchi.   GI: Abdomen soft and non distended with normoactive bowel sounds present in all quadrants. No tenderness, rebound, guarding.   NEUROLOGICAL: No focal deficits. Moves all extremities.    EXTREMITIES: No peripheral edema. Intact bilateral pedal pulses.   SKIN: No jaundice. No rashes.     Medication list reviewed.   Today's labs and imaging were reviewed.     STEVE Silva, CNP  Emergency Department Observation Unit      "

## 2017-02-24 NOTE — CONSULTS
Cardiology History and Physical      Patient Name: Oswaldo Win MRN# [id number]   Age: 71 year old YOB: 1945     Date of Admission:2/23/2017             Assessment and Plan:     72 yo [facility] [place] [facility], robotic KING to LAD 7/214, NSTEMI with late stent thrombosis of BMS in RCA s/p KIN, Ischemic CM with recovered EF 45% Dec 2016, MVP s/p mitraclip 2015 with residual moderate MR, PE on lovenox now, GI bleeding in January, metastatic prostate cancer currently undergoing treatment with prednisone and Zytiga is admitted for constellation of atypical symptoms    1. Constellation of atypical symptoms  -I don't think this is cardiac in nature,  -primary team is getting a Lexiscan which showed no ischemia and nontransmural myocardial injury of apical inferior segment of LV (which is old). Based on this, there is no MI.     Mark Montes  General Cardiology Fellow, PGY4  Pager [phone]    Staffed with Dr. Stuart           Chief Complaint:   Atypical symptoms         HPI:   72 yo man with [facility] [place] [facility], robotic KING to LAD 7/214, NSTEMI with late stent thrombosis of BMS in RCA s/p KIN, Ischemic CM with recovered EF 45% Dec 2016, MVP s/p mitraclip 2015 with residual moderate MR, PE on lovenox now, GI bleeding in January, metastatic prostate cancer currently undergoing treatment with prednisone and Zytiga is admitted constellation of atypical symptoms.  While walking in grocery store yesterday he had malaise, nausea, tingling in shoulders bilaterally and mild chest discomfort. He had it a week before at restaurant. He denies chest pain with activity, dyspnea, orthopnea, wt gain or leg swelling.    BP /60, ECG old T wave flattening in lateral leads. tropx3 negative. Hgb 8.6 now from 10.4 in January. Last echo in Dec showed EF 45% and residual moderate MR         Past Medical History:     Past Medical History   Diagnosis Date      Arthritis      ASCVD (arteriosclerotic cardiovascular disease) 4/10/2014     Closed L4 vertebral fracture (H) 2005     traumatic fracture     COPD (chronic obstructive pulmonary disease) (H)      Dyspnea on exertion      Fx tib/fib fol insrt ortho implnt/prosth/bone plt, right leg 2005     rt tib/fib fracture s/p ORIF     Gastro-oesophageal reflux disease      Hx of right coronary artery stent placement 2014 4/2014 and redone 9/2014     Hyperlipidemia LDL goal < 70      Hypertension      Influenza A 3/27/2016     Musculoskeletal leg pain      from old leg injuries     Other chronic pain      Prostate cancer 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)      Pulmonary embolism (H) 11/8/16 November 2016     ST elevation MI (STEMI) (H) 4/5/2014     Stented coronary artery      Thrombosis of leg 10/2015     October 2015              Past Surgical History:     Past Surgical History   Procedure Laterality Date     Tonsillectomy & adenoidectomy  age 18     Herniorrhaphy inguinal Left 1968     Open reduction internal fixation tibia  2005     RT Tib/Fib ORIF, crushed LT calc     Colonoscopy       Davinci bypass artery coronary  7/1/2014     Procedure: DAVINCI BYPASS ARTERY CORONARY;  Surgeon: Kike Coley MD;  [facility]     Herniorrhaphy inguinal Left 11/18/2014     Procedure: HERNIORRHAPHY INGUINAL;  Surgeon: Max Garza MD;  [facility]     Laser holmium lithotripsy ureter(s), insert stent, combined N/A 7/27/2015     Procedure: COMBINED CYSTOSCOPY, URETEROSCOPY, LASER HOLMIUM LITHOTRIPSY URETER(S), INSERT STENT;  Surgeon: Maame Ramirez MD;  [facility]     Anesthesia out of or percutaneous mitral valve repair N/A 10/16/2015     Procedure: ANESTHESIA OUT OF OR PERCUTANEOUS MITRAL VALVE REPAIR;  Surgeon: GENERIC ANESTHESIA PROVIDER;  [facility]     Combined cystoscopy, retrogrades, exchange stent ureter(s) Left 12/14/2015     Procedure: COMBINED CYSTOSCOPY, RETROGRADES, EXCHANGE  STENT URETER(S);  Surgeon: Maame Ramirez MD;  [facility]     Excise mass lower extremity Right 2016     Procedure: EXCISE MASS LOWER EXTREMITY;  Surgeon: Miquel Figueredo MD;  [facility]     Laser holmium lithotripsy ureter(s), insert stent, combined Left 2016     Procedure: COMBINED CYSTOSCOPY, URETEROSCOPY, LASER HOLMIUM LITHOTRIPSY URETER(S), INSERT STENT;  Surgeon: Maame Ramirez MD;  [facility]     Esophagoscopy, gastroscopy, duodenoscopy (egd), combined N/A 2017     Procedure: COMBINED ESOPHAGOSCOPY, GASTROSCOPY, DUODENOSCOPY (EGD);  Surgeon: Telly Anderson MD;  [facility]     Colonoscopy N/A 2017     Procedure: COLONOSCOPY;  Surgeon: Telly Anderson MD;  [facility]              Social History:     Social History     Social History     Marital status:      Spouse name: N/A     Number of children: 2     Years of education: N/A     Occupational History      Retired           Social History Main Topics     Smoking status: Former Smoker     Packs/day: 0.50     Years: 40.00     Types: Cigarettes     Quit date: 2000     Smokeless tobacco: Never Used     Alcohol use 0.0 oz/week     0 Standard drinks or equivalent per week      Comment: rare     Drug use: No     Sexual activity: Yes     Partners: Female     Other Topics Concern     Parent/Sibling W/ Cabg, Mi Or Angioplasty Before 65f 55m? Yes     father at age 44yrs      Social History Narrative    Mom  at age 93 from CHF    Dad  at age 41 from congenital heart defect    Sibling:  Sister 74 years ago in good health    Children two adult sons alive and well        Occupation:  , taught electronics and , and .    Musician             Family History:     Family History   Problem Relation Age of Onset     HEART DISEASE Mother 50     CAB, pacemaker     HEART DISEASE Father 41     MI     HEART DISEASE Maternal  Uncle      Breast Cancer Paternal Grandmother      Breast Cancer Paternal Aunt      CEREBROVASCULAR DISEASE No family hx of      DIABETES No family hx of                 Allergies:    No Known Allergies         Medications:     Prescriptions Prior to Admission   Medication Sig Dispense Refill Last Dose     enoxaparin (LOVENOX) 60 MG/0.6ML injection Inject 0.6 mLs (60 mg) Subcutaneous 2 times daily 60 Syringe 3      loperamide (IMODIUM) 2 MG capsule Use 2 caps PO after first loose stool.  Repeat 1 cap after each subsequent loose stool.  Max 8/24 hours. 60 capsule 1      predniSONE (DELTASONE) 5 MG tablet Take 1 tablet (5 mg) by mouth 2 times daily 60 tablet 2      abiraterone (ZYTIGA) 250 MG tablet Take 4 tablets (1,000 mg) by mouth daily Take on empty stomach. 120 tablet 2      diphenoxylate-atropine (LOMOTIL) 2.5-0.025 MG per tablet Take 1 tablet by mouth 4 times daily as needed for diarrhea   Taking     calcium carbonate (OS-RODNEY 500 MG [place]. [place]) 500 MG tablet Take 2 tablets (1,000 mg) by mouth 2 times daily 120 tablet 3      aspirin 81 MG tablet Take 81 mg by mouth daily   Taking     metoprolol (TOPROL-XL) 25 MG 24 hr tablet Take 1 tablet (25 mg) by mouth daily AM 90 tablet 4 Taking     Cholecalciferol (VITAMIN D) 2000 UNITS tablet Take 1 tablet by mouth daily 100 tablet 3 Taking     simvastatin (ZOCOR) 40 MG tablet TAKE ONE TABLET BY MOUTH AT BEDTIME 90 tablet 3 Taking     tamsulosin (FLOMAX) 0.4 MG 24 hr capsule Take 1 capsule (0.4 mg) by mouth daily AM 90 capsule 3 Taking     HYDROcodone-acetaminophen (NORCO) 5-325 MG per tablet Take 1 tablet by mouth every 6 hours as needed for moderate to severe pain   Taking     multivitamin, therapeutic with minerals (MULTI-VITAMIN) TABS Take 1 tablet by mouth every morning  100 tablet 3 Taking             Review of Systems:   A 14 point ROS was completed and is negative other than what is stated in the HPI.          Physical Exam:   Blood pressure 99/57, pulse 71,  "temperature 98.3  F (36.8  C), temperature source Oral, resp. rate 16, height 1.689 m (5' 6.5\"), weight 64 kg (141 lb), SpO2 98 %.  Gen: in no acute distress   Head: atraumatic   Eyes: EOM intact   Mouth: no pharyngeal exudate   Lymph node: not enlarged on head or neck   CV: RRR, S1 & S2, holosystolic murmur at LLB and apex (more prominent at apex), JVP 6cm, no peripheral edema  Lungs: CTAB   Abd: soft, nontender, BS present   Pysch: alert and oriented      Tubes/Lines/Devices:   Peripheral IV 02/23/17 Left Lower forearm (Active)   Site Assessment WDL 2/24/2017 10:00 AM   Line Status Saline locked 2/24/2017 10:00 AM   Phlebitis Scale 0-->no symptoms 2/24/2017 10:00 AM   Infiltration Scale 0 2/24/2017 10:00 AM   Extravasation? No 2/24/2017 10:00 AM   Number of days:1            Data:   Laboratory data reviewed.     Cultures:   Invalid input(s): BC     No results for input(s): CULT in the last 168 hours.    No results for input(s): URC in the last 168 hours.    Unresulted Labs Ordered in the Past 30 Days of this Admission     No orders found for last 61 day(s).          Imaging/Studies reviewed.              I have seen and examined the patient with the [facility] team. I agree with the assessment and plan of the note above.I have reviewed pertinent labs.     Moiz Stuart MD  Interventional Cardiology  Pager: [phone]    "

## 2017-02-24 NOTE — PLAN OF CARE
"Problem: Discharge Planning  Goal: Discharge Planning (Adult, OB, Behavioral, Peds)  - Serial troponins complete: yes negative x 3  - Stress test complete: No, scheduled for this morning  - Seen and cleared by consultant if applicable: No  - Adequate pain control on oral analgesia: Yes, patient denies pain   - Vital signs normal or at patient baseline: Yes   - Safe disposition plan has been identified: Yes  BP 99/57  Pulse 71  Temp 98.3  F (36.8  C) (Oral)  Resp 16  Ht 1.689 m (5' 6.5\")  Wt 64 kg (141 lb)  SpO2 98%  BMI 22.42 kg/m2            "

## 2017-02-24 NOTE — PLAN OF CARE
"Problem: Discharge Planning  Goal: Discharge Planning (Adult, OB, Behavioral, Peds)  - Serial troponins complete: yes negative x 3  - Stress test complete: Yes  - Seen and cleared by consultant if applicable: No  - Adequate pain control on oral analgesia: Yes, patient denies pain   - Vital signs normal or at patient baseline: Yes, positive for orthostatic BP, MARTY notified   - Safe disposition plan has been identified: Yes  /54  Pulse 71  Temp 98.1  F (36.7  C)  Resp 16  Ht 1.689 m (5' 6.5\")  Wt 64 kg (141 lb)  SpO2 100%  BMI 22.42 kg/m2                                         "

## 2017-02-24 NOTE — DISCHARGE SUMMARY
"ED Observation Discharge Summary    Oswaldo Win   MRN# [id number]  Age: 71 year old   YOB: 1945            Date of Admission:  2/23/2017    Date of Discharge:  2/24/2017  Admitting Physician:  Dwayne Leblanc MD  Discharge Physician: Dr. Hope MD/Nandini Cruz CNP      DISCHARGE DIAGNOSIS:   1. Pre-syncope. Anginal equivalent; resolved   2. HFrEF, Chronic systolic HF, STEMI  3. Mitral regurgitation  4. DVT/PE  5. GI Bleed  6. Prostate Cancer, mets to bones    INTERVAL HISTORY: VSS, afebrile. No further lightheaded/dizziness, diaphoresis. Feeling back to baseline. Reviewed stress test. Patient feels ready to discharge to home. No fevers, chills, headaches, dizziness,  cough, SOB, wheezing, chest pain/pressure, abdominal pain, N/V, melena/hematochezia, diarrhea, extremity swelling/weakness/numbness/tingling, or signs of active bleeding. Patient seen by Cardiology and cleared for discharge to home.           PHYSICAL EXAM:   Blood pressure 110/81, pulse 71, temperature 98.1  F (36.7  C), temperature source Oral, resp. rate 16, height 1.689 m (5' 6.5\"), weight 64 kg (141 lb), SpO2 98 %.   GENERAL: Alert and oriented x 3. NAD.   HEENT: Anicteric sclera. Mucous membranes moist.   CV: RRR. S1, S2. No murmurs appreciated.   RESPIRATORY: Effort normal. Lungs CTAB with no wheezing, rales, rhonchi.   GI: Abdomen soft and non distended with normoactive bowel sounds present in all quadrants. No tenderness, rebound, guarding.   NEUROLOGICAL: No focal deficits. Moves all extremities.   EXTREMITIES: No peripheral edema. Intact bilateral pedal pulses.   SKIN: No jaundice. No rashes.     PROCEDURES AND IMAGING:   Results for orders placed or performed during the hospital encounter of 02/23/17 (from the past 24 hour(s))   EKG 12-lead, tracing only   Result Value Ref Range    Interpretation ECG Click View Image link to view waveform and result    Chest XR,  PA & LAT    Narrative    Study: XR CHEST 2 VW 2/23/2017 9:48 " PM    History: chest pain - look for pneumonia or pneumothorax    Comparison: Chest CT on 11/8/2016. Chest x-ray 11/8/2016.    Findings:   EKG leads project over the chest. The cardiomediastinal silhouette and  pulmonary vasculature are within normal limits. Mitral clips project  over the heart. The lungs are clear. The costophrenic angles are  sharp. No pneumothorax. Diffuse sclerotic metastases throughout the  skeleton. These appear mildly increased in the humeral heads  bilaterally.      Impression    Impression:   1. No acute pulmonary abnormality.  2. Diffuse sclerotic metastases, which are increased in the humeral  heads.    I have personally reviewed the examination and initial interpretation  and I agree with the findings.    ALONDRA VU MD   Troponin POCT   Result Value Ref Range    Troponin I 0.01 0.00 - 0.10 ug/L   CBC with platelets differential   Result Value Ref Range    WBC 2.6 (L) 4.0 - 11.0 10e9/L    RBC Count 3.23 (L) 4.4 - 5.9 10e12/L    Hemoglobin 9.7 (L) 13.3 - 17.7 g/dL    Hematocrit 28.6 (L) 40.0 - 53.0 %    MCV 89 78 - 100 fl    MCH 30.0 26.5 - 33.0 pg    MCHC 33.9 31.5 - 36.5 g/dL    RDW 17.1 (H) 10.0 - 15.0 %    Platelet Count 104 (L) 150 - 450 10e9/L    Diff Method Automated Method     % Neutrophils 60.4 %    % Lymphocytes 29.4 %    % Monocytes 8.2 %    % Eosinophils 0.8 %    % Basophils 0.4 %    % Immature Granulocytes 0.8 %    Nucleated RBCs 0 0 /100    Absolute Neutrophil 1.5 (L) 1.6 - 8.3 10e9/L    Absolute Lymphocytes 0.8 0.8 - 5.3 10e9/L    Absolute Monocytes 0.2 0.0 - 1.3 10e9/L    Absolute Eosinophils 0.0 0.0 - 0.7 10e9/L    Absolute Basophils 0.0 0.0 - 0.2 10e9/L    Abs Immature Granulocytes 0.0 0 - 0.4 10e9/L    Absolute Nucleated RBC 0.0    Comprehensive metabolic panel   Result Value Ref Range    Sodium 140 133 - 144 mmol/L    Potassium 4.2 3.4 - 5.3 mmol/L    Chloride 110 (H) 94 - 109 mmol/L    Carbon Dioxide 21 20 - 32 mmol/L    Anion Gap 8 3 - 14 mmol/L    Glucose 95 70  - 99 mg/dL    Urea Nitrogen 18 7 - 30 mg/dL    Creatinine 0.90 0.66 - 1.25 mg/dL    GFR Estimate 83 >60 mL/min/1.7m2    GFR Estimate If Black >90   GFR Calc   >60 mL/min/1.7m2    Calcium 8.2 (L) 8.5 - 10.1 mg/dL    Bilirubin Total 1.1 0.2 - 1.3 mg/dL    Albumin 3.6 3.4 - 5.0 g/dL    Protein Total 7.1 6.8 - 8.8 g/dL    Alkaline Phosphatase 514 (H) 40 - 150 U/L    ALT 18 0 - 70 U/L    AST 18 0 - 45 U/L   Troponin I   Result Value Ref Range    Troponin I ES  0.000 - 0.045 ug/L     <0.015  The 99th percentile for upper reference range is 0.045 ug/L.  Troponin values in   the range of 0.045 - 0.120 ug/L may be associated with risks of adverse   clinical events.     Hemoglobin A1c   Result Value Ref Range    Hemoglobin A1C 4.8 4.3 - 6.0 %   Troponin I - Now then in 6 hours x 2     Result Value Ref Range    Troponin I ES  0.000 - 0.045 ug/L     <0.015  The 99th percentile for upper reference range is 0.045 ug/L.  Troponin values in   the range of 0.045 - 0.120 ug/L may be associated with risks of adverse   clinical events.     Troponin I - Now then in 6 hours x 2     Result Value Ref Range    Troponin I ES  0.000 - 0.045 ug/L     <0.015  The 99th percentile for upper reference range is 0.045 ug/L.  Troponin values in   the range of 0.045 - 0.120 ug/L may be associated with risks of adverse   clinical events.     CBC with platelets differential   Result Value Ref Range    WBC 2.0 (L) 4.0 - 11.0 10e9/L    RBC Count 2.91 (L) 4.4 - 5.9 10e12/L    Hemoglobin 8.6 (L) 13.3 - 17.7 g/dL    Hematocrit 25.9 (L) 40.0 - 53.0 %    MCV 89 78 - 100 fl    MCH 29.6 26.5 - 33.0 pg    MCHC 33.2 31.5 - 36.5 g/dL    RDW 17.1 (H) 10.0 - 15.0 %    Platelet Count 119 (L) 150 - 450 10e9/L    Diff Method Automated Method     % Neutrophils 54.2 %    % Lymphocytes 30.5 %    % Monocytes 12.8 %    % Eosinophils 1.5 %    % Basophils 0.5 %    % Immature Granulocytes 0.5 %    Nucleated RBCs 3 (H) 0 /100    Absolute Neutrophil 1.1 (L) 1.6 -  8.3 10e9/L    Absolute Lymphocytes 0.6 (L) 0.8 - 5.3 10e9/L    Absolute Monocytes 0.3 0.0 - 1.3 10e9/L    Absolute Eosinophils 0.0 0.0 - 0.7 10e9/L    Absolute Basophils 0.0 0.0 - 0.2 10e9/L    Abs Immature Granulocytes 0.0 0 - 0.4 10e9/L    Absolute Nucleated RBC 0.1    Cardiology IP Consult: Patient to be seen: Routine - within 24 hours; CP, history of STEMI; Consultant may enter orders: Yes    Mark Shay MD     2/24/2017  3:40 PM  Cardiology History and Physical      Patient Name: Oswaldo Win MRN# [id number]   Age: 71 year old YOB: 1945     Date of Admission:2/23/2017             Assessment and Plan:     72 [facility] [place] [facility]   [facility], robotic KING to LAD 7/214, NSTEMI with late stent   thrombosis of BMS in RCA s/p KIN, Ischemic CM with recovered EF   45% Dec 2016, MVP s/p mitraclip 2015 with residual moderate MR,   PE on lovenox now, GI bleeding in January, metastatic prostate   cancer currently undergoing treatment with prednisone and Zytiga   is admitted for constellation of atypical symptoms    1. Constellation of atypical symptoms  -I don't think this is cardiac in nature,  -primary team is getting a Lexiscan which showed no ischemia and   nontransmural myocardial injury of apical inferior segment of LV   (which is old). Based on this, there is no MI.     Mark Montes  General Cardiology Fellow, PGY4  Pager [phone]    Staffed with Dr. Stuart           Chief Complaint:   Atypical symptoms         HPI:   72 yo man with PMH of STEMI s/p BMS in pRCA4/2014, [place] at that   time, robotic KING to LAD 7/214, NSTEMI with late stent   thrombosis of BMS in RCA s/p KIN, Ischemic CM with recovered EF   45% Dec 2016, MVP s/p mitraclip 2015 with residual moderate MR,   PE on lovenox now, GI bleeding in January, metastatic prostate   cancer currently undergoing treatment with prednisone and Zytiga   is admitted constellation of atypical  symptoms.  While walking in grocery store yesterday he had malaise, nausea,   tingling in shoulders bilaterally and mild chest discomfort. He   had it a week before at restaurant. He denies chest pain with   activity, dyspnea, orthopnea, wt gain or leg swelling.    BP /60, ECG old T wave flattening in lateral leads. tropx3   negative. Hgb 8.6 now from 10.4 in January. Last echo in Dec   showed EF 45% and residual moderate MR         Past Medical History:     Past Medical History   Diagnosis Date     Arthritis      ASCVD (arteriosclerotic cardiovascular disease) 4/10/2014     Closed L4 vertebral fracture (H) 2005     traumatic fracture     COPD (chronic obstructive pulmonary disease) (H)      Dyspnea on exertion      Fx tib/fib fol insrt ortho implnt/prosth/bone plt, right leg   2005     rt tib/fib fracture s/p ORIF     Gastro-oesophageal reflux disease      Hx of right coronary artery stent placement 2014 4/2014 and redone 9/2014     Hyperlipidemia LDL goal < 70      Hypertension      Influenza A 3/27/2016     Musculoskeletal leg pain      from old leg injuries     Other chronic pain      Prostate cancer 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)      Pulmonary embolism (H) 11/8/16 November 2016     ST elevation MI (STEMI) (H) 4/5/2014     Stented coronary artery      Thrombosis of leg 10/2015     October 2015              Past Surgical History:     Past Surgical History   Procedure Laterality Date     Tonsillectomy & adenoidectomy  age 18     Herniorrhaphy inguinal Left 1968     Open reduction internal fixation tibia  2005     RT Tib/Fib ORIF, crushed LT calc     Colonoscopy       Davinci bypass artery coronary  7/1/2014     Procedure: DAVINCI BYPASS ARTERY CORONARY;  Surgeon: Kike Coley MD;  Location: [facility]     Herniorrhaphy inguinal Left 11/18/2014     Procedure: HERNIORRHAPHY INGUINAL;  Surgeon: Max Garza MD;  Location: [facility]     Laser holmium lithotripsy ureter(s), insert stent,  combined N/A   7/27/2015     Procedure: COMBINED CYSTOSCOPY, URETEROSCOPY, LASER HOLMIUM   LITHOTRIPSY URETER(S), INSERT STENT;  Surgeon: Maame Ramirez MD;  Location: [facility] OR     Anesthesia out of or percutaneous mitral valve repair N/A   10/16/2015     Procedure: ANESTHESIA OUT OF OR PERCUTANEOUS MITRAL VALVE   REPAIR;  Surgeon: GENERIC ANESTHESIA PROVIDER;  Location: [facility]     Combined cystoscopy, retrogrades, exchange stent ureter(s) Left   12/14/2015     Procedure: COMBINED CYSTOSCOPY, RETROGRADES, EXCHANGE STENT   URETER(S);  Surgeon: Maame Ramirez MD;  Location: [facility]   OR     Excise mass lower extremity Right 2/5/2016     Procedure: EXCISE MASS LOWER EXTREMITY;  Surgeon: Miquel Figueredo MD;  [facility]     Laser holmium lithotripsy ureter(s), insert stent, combined   Left 9/19/2016     Procedure: COMBINED CYSTOSCOPY, URETEROSCOPY, LASER HOLMIUM   LITHOTRIPSY URETER(S), INSERT STENT;  Surgeon: Maame Ramirez MD;  Location: [facility]     Esophagoscopy, gastroscopy, duodenoscopy (egd), combined N/A   1/13/2017     Procedure: COMBINED ESOPHAGOSCOPY, GASTROSCOPY, DUODENOSCOPY   (EGD);  Surgeon: Telly Anderson MD;  [facility]     Colonoscopy N/A 1/13/2017     Procedure: COLONOSCOPY;  Surgeon: Telly Anderson MD;    Location: [facility]              Social History:     Social History     Social History     Marital status:      Spouse name: N/A     Number of children: 2     Years of education: N/A     Occupational History      Retired           Social History Main Topics     Smoking status: Former Smoker     Packs/day: 0.50     Years: 40.00     Types: Cigarettes     Quit date: 2/1/2000     Smokeless tobacco: Never Used     Alcohol use 0.0 oz/week     0 Standard drinks or equivalent per week      Comment: rare     Drug use: No     Sexual activity: Yes     Partners: Female     Other Topics Concern     Parent/Sibling W/ Cabg, Mi Or  Angioplasty Before 65f 55m? Yes     father at age 44yrs      Social History Narrative    Mom  at age 93 from CHF    Dad  at age 41 from congenital heart defect    Sibling:  Sister 74 years ago in good health    Children two adult sons alive and well        Occupation:  , taught electronics and , and   .    Musician             Family History:     Family History   Problem Relation Age of Onset     HEART DISEASE Mother 50     CAB, pacemaker     HEART DISEASE Father 41     MI     HEART DISEASE Maternal Uncle      Breast Cancer Paternal Grandmother      Breast Cancer Paternal Aunt      CEREBROVASCULAR DISEASE No family hx of      DIABETES No family hx of                 Allergies:    No Known Allergies         Medications:     Prescriptions Prior to Admission   Medication Sig Dispense Refill Last Dose     enoxaparin (LOVENOX) 60 MG/0.6ML injection Inject 0.6 mLs (60   mg) Subcutaneous 2 times daily 60 Syringe 3      loperamide (IMODIUM) 2 MG capsule Use 2 caps PO after first   loose stool.  Repeat 1 cap after each subsequent loose stool.    Max 8/24 hours. 60 capsule 1      predniSONE (DELTASONE) 5 MG tablet Take 1 tablet (5 mg) by   mouth 2 times daily 60 tablet 2      abiraterone (ZYTIGA) 250 MG tablet Take 4 tablets (1,000 mg) by   mouth daily Take on empty stomach. 120 tablet 2      diphenoxylate-atropine (LOMOTIL) 2.5-0.025 MG per tablet Take 1   tablet by mouth 4 times daily as needed for diarrhea   Taking     calcium carbonate (OS-RODNEY 500 MG [place]. [place]) 500 MG tablet Take 2   tablets (1,000 mg) by mouth 2 times daily 120 tablet 3      aspirin 81 MG tablet Take 81 mg by mouth daily   Taking     metoprolol (TOPROL-XL) 25 MG 24 hr tablet Take 1 tablet (25 mg)   by mouth daily AM 90 tablet 4 Taking     Cholecalciferol (VITAMIN D) 2000 UNITS tablet Take 1 tablet by   mouth daily 100 tablet 3 Taking     simvastatin (ZOCOR) 40 MG tablet TAKE ONE TABLET BY MOUTH AT   BEDTIME 90  "tablet 3 Taking     tamsulosin (FLOMAX) 0.4 MG 24 hr capsule Take 1 capsule (0.4   mg) by mouth daily AM 90 capsule 3 Taking     HYDROcodone-acetaminophen (NORCO) 5-325 MG per tablet Take 1   tablet by mouth every 6 hours as needed for moderate to severe   pain   Taking     multivitamin, therapeutic with minerals (MULTI-VITAMIN) TABS   Take 1 tablet by mouth every morning  100 tablet 3 Taking             Review of Systems:   A 14 point ROS was completed and is negative other than what is   stated in the HPI.          Physical Exam:   Blood pressure 99/57, pulse 71, temperature 98.3  F (36.8  C),   temperature source Oral, resp. rate 16, height 1.689 m (5' 6.5\"),   weight 64 kg (141 lb), SpO2 98 %.  Gen: in no acute distress   Head: atraumatic   Eyes: EOM intact   Mouth: no pharyngeal exudate   Lymph node: not enlarged on head or neck   CV: RRR, S1 & S2, holosystolic murmur at LLB and apex (more   prominent at apex), JVP 6cm, no peripheral edema  Lungs: CTAB   Abd: soft, nontender, BS present   Pysch: alert and oriented      Tubes/Lines/Devices:   Peripheral IV 02/23/17 Left Lower forearm (Active)   Site Assessment WDL 2/24/2017 10:00 AM   Line Status Saline locked 2/24/2017 10:00 AM   Phlebitis Scale 0-->no symptoms 2/24/2017 10:00 AM   Infiltration Scale 0 2/24/2017 10:00 AM   Extravasation? No 2/24/2017 10:00 AM   Number of days:1            Data:   Laboratory data reviewed.     Cultures:   Invalid input(s): BC     No results for input(s): CULT in the last 168 hours.    No results for input(s): URC in the last 168 hours.    Unresulted Labs Ordered in the Past 30 Days of this Admission     No orders found for last 61 day(s).          Imaging/Studies reviewed.                 NM Lexiscan stress test (nuc card)    Narrative    Examination:   NM MPI WITH LEXISCAN   Code:   [id number]   Date:  2/24/2017 12:14 PM     Indication:  pre-syncope, coronary artery disease     Previous Study: No previous Myocardial Perfusion " studies for  comparison.    Additional Information:  Previous myocardial perfusion study from  11/12/2014 was available for comparison..     Protocol:    Rest and stress myocardial perfusion imaging was performed using 9.6  and 36 mCi of Tc-99m tetrofosmin. Pharmacological stress was performed  with 0.4  mg of Lexiscan.     Findings:  1. Overall quality of the study: Limited due to motion.   2. Left ventricular cavity is normal in size on the rest and stress  studies.  3. SPECT images demonstrate a small area of perfusion reduction  involving the apical inferior segment of the left ventricle. When  compared to the previous study this is no different than in 2014..  These results suggest a high post-scan likelihood of angiographically  significant coronary artery disease. The summed stress score is 5,  (previously 4).   4. Left ventricular ejection fraction is 58%. Left ventricular  end-diastolic volume is 155 mL. End-systolic volume is 65 mL.  5. Baseline EKG  findings reported separately in EPIC.      Impression    Impression:  1. Abnormal myocardial SPECT study with a summed stress score of 5.  This score and pattern is no different than on the study of  11/12/2014.  2. No evidence of ischemia. Nontransmural myocardial injury of the  apical inferior segment of the left ventricle.  3. Findings are consistent with at least single vessel coronary artery  disease.    DONIS ERAZO MD     DISCHARGE MEDICATIONS:   Current Discharge Medication List      CONTINUE these medications which have NOT CHANGED    Details   enoxaparin (LOVENOX) 60 MG/0.6ML injection Inject 0.6 mLs (60 mg) Subcutaneous 2 times daily  Qty: 60 Syringe, Refills: 3    Associated Diagnoses: Deep vein thrombosis (DVT) of other vein of lower extremity (H)      loperamide (IMODIUM) 2 MG capsule Use 2 caps PO after first loose stool.  Repeat 1 cap after each subsequent loose stool.  Max 8/24 hours.  Qty: 60 capsule, Refills: 1    Associated Diagnoses:  Diarrhea, unspecified type      predniSONE (DELTASONE) 5 MG tablet Take 1 tablet (5 mg) by mouth 2 times daily  Qty: 60 tablet, Refills: 2    Associated Diagnoses: Prostate cancer 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); Metastatic bone tumor (H)      abiraterone (ZYTIGA) 250 MG tablet Take 4 tablets (1,000 mg) by mouth daily Take on empty stomach.  Qty: 120 tablet, Refills: 2    Associated Diagnoses: Prostate cancer 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); Metastatic bone tumor (H)      diphenoxylate-atropine (LOMOTIL) 2.5-0.025 MG per tablet Take 1 tablet by mouth 4 times daily as needed for diarrhea      calcium carbonate (OS-RODNEY 500 MG [place]. [place]) 500 MG tablet Take 2 tablets (1,000 mg) by mouth 2 times daily  Qty: 120 tablet, Refills: 3    Associated Diagnoses: Hypocalcemia      aspirin 81 MG tablet Take 81 mg by mouth daily      metoprolol (TOPROL-XL) 25 MG 24 hr tablet Take 1 tablet (25 mg) by mouth daily AM  Qty: 90 tablet, Refills: 4    Associated Diagnoses: (HFpEF) heart failure with preserved ejection fraction (H)      Cholecalciferol (VITAMIN D) 2000 UNITS tablet Take 1 tablet by mouth daily  Qty: 100 tablet, Refills: 3    Comments: Please update your records to reflect new contact info for this provider: [facility] [address] [place] [address] Mail code [id number] [place] [address]  Phone: [phone]  Fax: [phone]  Associated Diagnoses: Vitamin D deficiency      simvastatin (ZOCOR) 40 MG tablet TAKE ONE TABLET BY MOUTH AT BEDTIME  Qty: 90 tablet, Refills: 3    Associated Diagnoses: Hyperlipidemia LDL goal <130      tamsulosin (FLOMAX) 0.4 MG 24 hr capsule Take 1 capsule (0.4 mg) by mouth daily AM  Qty: 90 capsule, Refills: 3    Associated Diagnoses: Ureteral stone      HYDROcodone-acetaminophen (NORCO) 5-325 MG per tablet Take 1 tablet by mouth every 6 hours as needed for moderate to severe pain      multivitamin, therapeutic with minerals (MULTI-VITAMIN) TABS Take 1 tablet by mouth every morning   Qty: 100 tablet, Refills:  "3               CONSULTATIONS:   Consultation during this admission received from:  Cardiology     BRIEF HISTORY OF PRESENT ILLNESS:   (Adopted from admission H&P).    \"Oswaldo Win is a 71 year old male with history of CAD with STEMI s/p BMS to pRCA in 4/2014, robotic LIMA-LAD in 7/2014, NSTEMI with late in-stent thrombosis of BMS in RCA s/p KIN, ischemic cardiomyopathy with most recent EF 45-50% in 12/2016, mitral valve regurgitation s/p mitral clip, HLD, and metastatic prostate cancer admitted for pre-syncope and diaphoresis concerning for anginal equivalent. Mr. Win was pushing a cart in the grocery store today when he noticed feeling light-headed, diaphoretic, and \"off\" associated with L>R extremity tingling. Episode also associated with dyspnea, weakness, and nausea. Subsequently sat down for 5-10 minutes with symptoms slowly subsiding but not disappearing completely. Continues to feel weak and \"off\" but cannot further clarify these symptoms. He does report these symptoms are identical to those presenting when he had a STEMI in 4/2014. Denies significant weight changes, LE edema, orthopnea, anais chest pain or pressure. Admits to increased dyspnea over past 2 months.      ED interventions: Aspirin 324 mg\"     ED OBSERVATION COURSE: Oswaldo Win is a 71 year old male with history of CAD with STEMI s/p BMS to pRCA in 4/2014, robotic LIMA-LAD in 7/2014, NSTEMI with late in-stent thrombosis of BMS in RCA s/p KIN, ischemic cardiomyopathy with most recent EF 45-50% in 12/2016, mitral valve regurgitation s/p mitral clip, HLD, and metastatic prostate cancer admitted for pre-syncope and diaphoresis concerning for anginal equivalent.      1. Pre-syncope, anginal equivalent: Seems more consistent with dehydration as he does admit to poor PO intake prior to admission. History significant for CAD s/p BMS to pRCA 4/2014, robotic LIMA-LAD 7/2014, KIN to in-stent thrombosis of RCA, IIschemic cardiomyopathy with EF 45-50%,  " "Mitral valve regurgitation s/p mitral clip, and  Dyslipidemia. Presenting with pre-syncope, diaphoresis, arm tinging similar to MI in the past. EKG with possible flattening of T waves in V4-V5 (nonspecific and largely unchanged from previous). Troponin negative x 3. No chest pain, diaphoresis during admission. TELE shows NSR. He underwent Lexiscan this am, which showed  \"1. Abnormal myocardial SPECT study with a summed stress score of 5. This score and pattern is no different than on the study of 11/12/2014. 2. No evidence of ischemia. Nontransmural myocardial injury of the apical inferior segment of the left ventricle. 3. Findings are consistent with at least single vessel coronary artery disease.\" This was discussed with Cardiology and they do not believe this represents MI. They recommended discharge to home with PCP/Oncology follow-up and Cardiology PRN. Orthostatics were checked and positive. He was given gentle hydration given his history of heart failure. Prior to discharge he was able to ambulate without difficulty. Discussed need to go from sit to stand slowly.  He will resume continue ASA 81 mg/day, Toprol XL, and Simvastatin. He was instructed to return to the ED with recurrence of symptoms, chest pain/pressure, SOB, as discussed below.          2. Recurrent PE, provoked in setting of underlying malignancy: no hypoxia or tachycardia and has not missed any doses of enoxaparin to suggest PE as cause of symptoms. Continue PTA enoxaparin BID      3. Metastatic prostate cancer with mets to bones  -continue PTA Zytiga and prednisone. Last received lupron injection and Xofigo (radium 223) on 2/8. Discussed with with nuclear medicine and safe to do Lexiscan with current therapy. Elevated alkaline phosphatase consistent with known mets to bone. Pancytopenia consistent with malignancy and current therapy.   -PRN [place] for pain  -Close follow-up with Oncology.     DISCHARGE DISPOSITION:   Discharged to home. "     DISCHARGE INSTRUCTIONS AND FOLLOW-UP:    Discharge Procedure Orders  Activity   Order Comments: Your activity upon discharge: activity as tolerated and no driving for today   Order Specific Question Answer Comments   Is discharge order? Yes      When to contact your care team   Order Comments: Return to the ED with fever, uncontrolled nausea, vomiting, unrelieved pain, bleeding not relieved with pressure, dizziness, chest pain, shortness of breath, loss of consciousness, and any new or concerning symptoms.     Please go to your nearest emergency room If you were to have a change in the type of chest pain i.e more severe, lasting longer or radiating to your shoulder, arm, neck, jaw or back, shortness of breath or increased pain with breathing, coughing up blood, feel dizzy or lightheaded, or notice swelling in one leg.     Adult [facility] Follow-up and recommended labs and tests   Order Comments: 1. Follow up with primary care provider, Lucrecia Collier, within 2 days for hospital follow- up.  The following labs/tests are recommended: CBC .    2. Cardiology within 2 weeks        Appointments on [place] and/or [facility] (with [facility] or [facility] provider or service). Call [phone] if you haven't heard regarding these appointments within 7 days of discharge.     Reason for your hospital stay   Order Comments: The dizziness you came into the emergency room for does not appear to be cardiac cause. Your heart enzymes were normal which tells us there was no damage to the heart muscle. You stress test was abnormal but not different from 2014. I reviewed this with Cardiology who recommended you continue your home medications.     I recommend continuing your home medications and it will be very important to follow up with your primary care doctor early next week.    Your symptoms could be related to dehydration and we recommend you drink sufficient water. Go from sit to stand slowly and sit down immediately if you  feel lightheaded/dizzy. Given that you are on Lovenox a fall could cause life thretening bleeding.     Your hemoglobin is low and I recommend you have your labs check early next week.     Diet   Order Comments: Follow this diet upon discharge:     Low Saturated Fat Na <2400 mg   Order Specific Question Answer Comments   Is discharge order? Yes         Attestation:   I have reviewed today's vital signs, notes, medications, labs and imaging.      STEVE Silva, CNP  Emergency Department Observation Unit

## 2017-02-24 NOTE — PROGRESS NOTES
Pt admitted from [facility] ED for ACS r/o; MARTY notified of admission.  Pt placed on telemetry; pt monitor tech notified of admission.

## 2017-02-24 NOTE — PLAN OF CARE
Problem: Discharge Planning  Goal: Discharge Planning (Adult, OB, Behavioral, Peds)  - Serial troponins complete: No, 2/3 normal   - Stress test complete: No, scheduled for this morning  - Seen and cleared by consultant if applicable: No  - Adequate pain control on oral analgesia: Yes, patient denies pain   - Vital signs normal or at patient baseline: Yes   - Safe disposition plan has been identified: Yes

## 2017-02-24 NOTE — ED PROVIDER NOTES
"    [place] EMERGENCY DEPARTMENT ([facility])  February 23, 2017  ED 6 8:10 PM   History     Chief Complaint   Patient presents with     Chest Pain     sweating and \"chest tingling not pain\" hx of MI     Nausea     Dizziness     HPI  Oswaldo Win is a 71 year old male with complex medical history significant for ASCVD, ST elevation MI, stented coronary artery in 2014, dyspnea on exertion, prostate cancer to bone, COPD,and PE who presents to the ED today complaining of chest tingling, nausea, and dizziness. Patient states he was at the grocery store today when he developed sudden onset of chest tingling, light headedness with \" floating sensation\", some mild shortness of breath and syncopal sensation. Patient notes dizziness was not vertiginous in nature. He also reports some associated tingling in arms as well as sweats and nausea during the episode. Patient states he sat down and symptoms resolved after about 10 minutes. Patient reports a similar episode last week lasting 10 minutes and notes he has been abnormally fatigued since then. Patient doesn't have any active chest pain currently and no ongoing complaints of SOB.  Patient is on warfarin for PE in October 2016 after Coumadin was withheld for a central line removal.  Patient was then started on Xaralto, but then developed a GI bleed and was switched to Lovenox twice a day since.        I have reviewed the Medications, Allergies, Past Medical and Surgical History, and Social History in the [employer] system.    PAST MEDICAL HISTORY:   Past Medical History   Diagnosis Date     Arthritis      ASCVD (arteriosclerotic cardiovascular disease) 4/10/2014     Closed L4 vertebral fracture (H) 2005     traumatic fracture     COPD (chronic obstructive pulmonary disease) (H)      Dyspnea on exertion      Fx tib/fib fol insrt ortho implnt/prosth/bone plt, right leg 2005     rt tib/fib fracture s/p ORIF     Gastro-oesophageal reflux disease      Hx of right coronary " artery stent placement 2014 4/2014 and redone 9/2014     Hyperlipidemia LDL goal < 70      Hypertension      Influenza A 3/27/2016     Musculoskeletal leg pain      from old leg injuries     Other chronic pain      Prostate cancer 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)      Pulmonary embolism (H) 11/8/16 November 2016     ST elevation MI (STEMI) (H) 4/5/2014     Stented coronary artery      Thrombosis of leg 10/2015     October 2015       PAST SURGICAL HISTORY:   Past Surgical History   Procedure Laterality Date     Tonsillectomy & adenoidectomy  age 18     Herniorrhaphy inguinal Left 1968     Open reduction internal fixation tibia  2005     RT Tib/Fib ORIF, crushed LT calc     Colonoscopy       Davinci bypass artery coronary  7/1/2014     Procedure: DAVINCI BYPASS ARTERY CORONARY;  Surgeon: Kike Coley MD;  Location: [facility] OR     Herniorrhaphy inguinal Left 11/18/2014     Procedure: HERNIORRHAPHY INGUINAL;  Surgeon: Max Garza MD;  Location: [facility] OR     Laser holmium lithotripsy ureter(s), insert stent, combined N/A 7/27/2015     Procedure: COMBINED CYSTOSCOPY, URETEROSCOPY, LASER HOLMIUM LITHOTRIPSY URETER(S), INSERT STENT;  Surgeon: Maame Ramirez MD;  Location: [facility] OR     Anesthesia out of or percutaneous mitral valve repair N/A 10/16/2015     Procedure: ANESTHESIA OUT OF OR PERCUTANEOUS MITRAL VALVE REPAIR;  Surgeon: GENERIC ANESTHESIA PROVIDER;  Location: [facility] OR     Combined cystoscopy, retrogrades, exchange stent ureter(s) Left 12/14/2015     Procedure: COMBINED CYSTOSCOPY, RETROGRADES, EXCHANGE STENT URETER(S);  Surgeon: Maame Ramirez MD;  Location: [facility] OR     Excise mass lower extremity Right 2/5/2016     Procedure: EXCISE MASS LOWER EXTREMITY;  Surgeon: Miquel Figueredo MD;  [facility] OR     Laser holmium lithotripsy ureter(s), insert stent, combined Left 9/19/2016     Procedure: COMBINED CYSTOSCOPY, URETEROSCOPY, LASER HOLMIUM LITHOTRIPSY URETER(S), INSERT STENT;   Surgeon: Maame Ramirez MD;  Location:  OR     Esophagoscopy, gastroscopy, duodenoscopy (egd), combined N/A 1/13/2017     Procedure: COMBINED ESOPHAGOSCOPY, GASTROSCOPY, DUODENOSCOPY (EGD);  Surgeon: Telly Anderson MD;  Location:  GI     Colonoscopy N/A 1/13/2017     Procedure: COLONOSCOPY;  Surgeon: Telly Anderson MD;  [facility]       FAMILY HISTORY:   Family History   Problem Relation Age of Onset     HEART DISEASE Mother 50     CAB, pacemaker     HEART DISEASE Father 41     MI     HEART DISEASE Maternal Uncle      Breast Cancer Paternal Grandmother      Breast Cancer Paternal Aunt      CEREBROVASCULAR DISEASE No family hx of      DIABETES No family hx of        SOCIAL HISTORY:   Social History   Substance Use Topics     Smoking status: Former Smoker     Packs/day: 0.50     Years: 40.00     Types: Cigarettes     Quit date: 2/1/2000     Smokeless tobacco: Never Used     Alcohol use 0.0 oz/week     0 Standard drinks or equivalent per week      Comment: rare     Current Facility-Administered Medications   Medication     aspirin tablet 81 mg     enoxaparin (LOVENOX) injection 60 mg     HYDROcodone-acetaminophen (NORCO) 5-325 MG per tablet 1 tablet     metoprolol (TOPROL-XL) 24 hr tablet 25 mg     predniSONE (DELTASONE) tablet 5 mg     simvastatin (ZOCOR) tablet 40 mg     tamsulosin (FLOMAX) capsule 0.4 mg     abiraterone (ZYTIGA) tablet 1,000 mg     lidocaine 1 % 1 mL     lidocaine (LMX4) kit     sodium chloride (PF) 0.9% PF flush 3 mL     sodium chloride (PF) 0.9% PF flush 3 mL     nitroglycerin (NITROSTAT) sublingual tablet 0.4 mg     alum & mag hydroxide-simethicone (MYLANTA ES/MAALOX  ES) suspension 15-30 mL     acetaminophen (TYLENOL) tablet 650 mg     acetaminophen (TYLENOL) Suppository 650 mg     naloxone (NARCAN) injection 0.1-0.4 mg     sodium chloride (PF) 0.9% PF flush 1-10 mL     sodium chloride (PF) 0.9% PF flush 10 mL     HOLD: Caffeine containing medications 12 hours  "prior to the procedure     HOLD: dipyridamole (PERSANTINE) or aspirin/dipyridamole (AGGRENOX) 48 hours prior to the procedure     HOLD: Phosphodiesterase-5 (PDE-5) inhibitor medications -vardenafil (LEVITRA/STAXYN), sildenafil (VIAGRA/REVATIO), tadalafil (CIALIS/ADCIRCA), avanafil (STENDRA) - 48 hours prior to the procedure     HOLD: theophylline or aminophylline 12 hours prior to the procedure     IF patient diabetic - HOLD: ALL ORAL HYPOGLYCEMICS and include: glipizide, glyburide, glimepiride, gliclazide, metformin, any metformin containing medication, on day of the procedure     Facility-Administered Medications Ordered in Other Encounters   Medication     hydrocortisone sodium succinate (solu-CORTEF) injection     glycopyrrolate (ROBINUL) injection     neostigmine (PROSTIGMINE) injection     protamine injection     albuterol (PROAIR HFA, PROVENTIL HFA, VENTOLIN HFA) inhaler      No Known Allergies      Review of Systems   Constitutional: Negative for fever.   HENT: Negative for congestion.    Eyes: Negative for redness.   Respiratory: Negative for shortness of breath.    Cardiovascular: Positive for chest pain (tingling).   Gastrointestinal: Negative for abdominal pain.   Genitourinary: Negative for difficulty urinating.   Musculoskeletal: Negative for arthralgias and neck stiffness.   Skin: Negative for color change.   Neurological: Positive for light-headedness. Negative for headaches.   Psychiatric/Behavioral: Negative for confusion.       Physical Exam   BP: 109/49  Heart Rate: 74  Temp: 97.4  F (36.3  C)  Height: 168.9 cm (5' 6.5\")  Weight: 62.6 kg (138 lb)  SpO2: 100 %  Physical Exam   Constitutional: He is oriented to person, place, and time. He appears well-developed. No distress.   HENT:   Head: Normocephalic and atraumatic.   Mouth/Throat: Oropharynx is clear and moist. No oropharyngeal exudate.   Eyes: Conjunctivae and EOM are normal. Pupils are equal, round, and reactive to light. No scleral icterus. "   Neck: Normal range of motion. Neck supple.   Cardiovascular: Normal rate, regular rhythm, normal heart sounds and intact distal pulses.    Pulmonary/Chest: Effort normal and breath sounds normal. No respiratory distress.   Abdominal: Soft. Bowel sounds are normal. There is no tenderness.   Musculoskeletal: Normal range of motion. He exhibits no edema or tenderness.   Neurological: He is alert and oriented to person, place, and time.   Skin: Skin is warm. No rash noted. He is not diaphoretic.       ED Course     ED Course     Procedures       8:15 PM  The patient was seen and examined by Dr. Leblanc in Room 6.          EKG Interpretation:      Interpreted by Dwayne Leblanc  Time reviewed: 20:02:04  Symptoms at time of EKG: chest pain, nausea, dizziness  Rhythm: normal sinus   Rate: 77  Axis: Normal  Ectopy: none  Conduction: normal  ST Segments/ T Waves: Non-specific ST-T wave changes  Q Waves: none  Comparison to prior: Unchanged from 01/12/2017    Clinical Impression: no acute changes                 Critical Care time:  none               Labs Ordered and Resulted from Time of ED Arrival Up to the Time of Departure from the ED   CBC WITH PLATELETS DIFFERENTIAL - Abnormal; Notable for the following:        Result Value    WBC 2.6 (*)     RBC Count 3.23 (*)     Hemoglobin 9.7 (*)     Hematocrit 28.6 (*)     RDW 17.1 (*)     Platelet Count 104 (*)     Absolute Neutrophil 1.5 (*)     All other components within normal limits   COMPREHENSIVE METABOLIC PANEL - Abnormal; Notable for the following:     Chloride 110 (*)     Calcium 8.2 (*)     Alkaline Phosphatase 514 (*)     All other components within normal limits   TROPONIN I   ISTAT TROPONIN NURSING POCT   TROPONIN POCT       Assessments & Plan (with Medical Decision Making)   1. Chest discomfort/ Tingling  2.  Coronary artery disease by history 3.  Mitraclip repair 10/15/15  Patient is a 71-year-old male with a history of coronary artery disease with STEMI status post  BMS to the posterior RCA in April 2014, had a robotic LIMA to LAD in July 2014, a non-STEMI late stent thrombosis of venous RCA status post drug-eluting stent, ischemic cardiomyopathy, mitral valve prolapse, and recent diagnosis of metastatic prostate cancer.  This patient presented with lightheadedness, chest tingling, bilateral hand numbness and diaphoresis very similar to the type of chest tingling he had when he had his 1st MI.  He had an episode 1 week ago that was very similar.  EKG here shows no acute ST segment changes.  Patient s i-STAT troponin was normal and lab troponin was less than 0.015.  EKG shows a normal sinus rhythm without any acute changes and no change compared to EKGs from 01/12/2017.  Patient s chemistries show a normal creatinine 0.83 with GFR greater than 90.  He does have what appears to be persistent leukopenia at 2600 along with thrombocytopenia.  Patient s currently on Lovenox injections.  Chest x-ray shows no acute pulmonary infiltrates.  He does have diffuse sclerotic metastasis throughout the bony structure especially at the humeral and shoulder areas.  During the ER stay the patient was given 4 baby aspirin.  I spoke to Dr. Td Pulido (cardiology) regarding the patient s exam findings as well as x-ray and lab results.  Patient was found to have a Lexiscan done in November 2014 which didn t show any ischemic changes.  It was felt that the patient was more appropriate for a Lexiscan for tomorrow.  It was felt that the patient was appropriate for admission to Obs at this time.  Patient is currently on Lovenox twice a day.  Given patient's presenting symptoms and discussion with cardiology Lexiscan scheduled for tomorrow.  Currently I doubt that the patient has a PE given the fact he is on Lovenox and not presenting with hypoxia, sinus tachycardia or significant shortness of breath.  Patient will be admitted to Obs 6D under emergency medicine. Cardiology to be contacted if Lexiscan is  abnormal, otherwise discharge to home if negative.   This part of the document was transcribed by Sully Edwards, Medical Scribe.       I have reviewed the nursing notes.    I have reviewed the findings, diagnosis, plan and need for follow up with the patient.    Current Discharge Medication List          Final diagnoses:   Chest discomfort   Long-term (current) use of anticoagulants [Z79.01]   Chronic systolic heart failure (HCC)     I,Sully Edwards , am serving as a trained medical scribe to document services personally performed by Dwayne Leblanc MD, based on the provider's statements to me.   IDwayne MD, was physically present and have reviewed and verified the accuracy of this note documented by Sully Edwards.     2/23/2017   [facility], [place], EMERGENCY DEPARTMENT     Dwayne Leblanc MD  02/24/17 0116

## 2017-02-24 NOTE — PLAN OF CARE
"Problem: Discharge Planning  Goal: Discharge Planning (Adult, OB, Behavioral, Peds)  Problem: Discharge Planning  Goal: Discharge Planning (Adult, OB, Behavioral, Peds)  - Serial troponins complete: yes negative x 3  - Stress test complete: Yes  - Seen and cleared by consultant if applicable: No  - Adequate pain control on oral analgesia: Yes, patient denies pain   - Vital signs normal or at patient baseline: Yes, positive for orthostatic BP, MARTY notified   - Safe disposition plan has been identified: Yes  /81 (BP Location: Right arm)  Pulse 71  Temp 98.1  F (36.7  C) (Oral)  Resp 16  Ht 1.689 m (5' 6.5\")  Wt 64 kg (141 lb)  SpO2 98%  BMI 22.42 kg/m2   Ambulated in swain with staff, denies dizziness. Tolerated regular diet . No nausea.Voiding without difficulty, no BM today.                      "

## 2017-02-25 NOTE — PROGRESS NOTES
Discharge instructions reviewed, understood, and signed by patient. VSS, PIV removed, new medications reviewed and understood, patient has all belongings. Patient waiting on transportation with a wheelchair.

## 2017-03-01 DIAGNOSIS — Z79.01 LONG-TERM (CURRENT) USE OF ANTICOAGULANTS: ICD-10-CM

## 2017-03-01 DIAGNOSIS — C79.51 PROSTATE CANCER METASTATIC TO BONE (H): ICD-10-CM

## 2017-03-01 DIAGNOSIS — I82.499 DEEP VEIN THROMBOSIS (DVT) OF OTHER VEIN OF LOWER EXTREMITY: ICD-10-CM

## 2017-03-01 DIAGNOSIS — C61 PROSTATE CANCER METASTATIC TO BONE (H): ICD-10-CM

## 2017-03-01 LAB
BASOPHILS # BLD AUTO: 0 10E9/L (ref 0–0.2)
BASOPHILS NFR BLD AUTO: 0.9 %
DIFFERENTIAL METHOD BLD: ABNORMAL
EOSINOPHIL # BLD AUTO: 0 10E9/L (ref 0–0.7)
EOSINOPHIL NFR BLD AUTO: 1.8 %
ERYTHROCYTE [DISTWIDTH] IN BLOOD BY AUTOMATED COUNT: 16.9 % (ref 10–15)
HCT VFR BLD AUTO: 27.7 % (ref 40–53)
HGB BLD-MCNC: 9.3 G/DL (ref 13.3–17.7)
IMM GRANULOCYTES # BLD: 0 10E9/L (ref 0–0.4)
IMM GRANULOCYTES NFR BLD: 0.4 %
LMWH PPP CHRO-ACNC: 1.19 IU/ML
LYMPHOCYTES # BLD AUTO: 0.7 10E9/L (ref 0.8–5.3)
LYMPHOCYTES NFR BLD AUTO: 30.7 %
MCH RBC QN AUTO: 30 PG (ref 26.5–33)
MCHC RBC AUTO-ENTMCNC: 33.6 G/DL (ref 31.5–36.5)
MCV RBC AUTO: 89 FL (ref 78–100)
MONOCYTES # BLD AUTO: 0.3 10E9/L (ref 0–1.3)
MONOCYTES NFR BLD AUTO: 14.7 %
NEUTROPHILS # BLD AUTO: 1.2 10E9/L (ref 1.6–8.3)
NEUTROPHILS NFR BLD AUTO: 51.5 %
NRBC # BLD AUTO: 0 10*3/UL
NRBC BLD AUTO-RTO: 0 /100
PLATELET # BLD AUTO: 134 10E9/L (ref 150–450)
RBC # BLD AUTO: 3.1 10E12/L (ref 4.4–5.9)
WBC # BLD AUTO: 2.3 10E9/L (ref 4–11)

## 2017-03-01 PROCEDURE — 85025 COMPLETE CBC W/AUTO DIFF WBC: CPT | Performed by: INTERNAL MEDICINE

## 2017-03-01 PROCEDURE — 85520 HEPARIN ASSAY: CPT | Performed by: INTERNAL MEDICINE

## 2017-03-01 NOTE — NURSING NOTE
Chief Complaint   Patient presents with     Blood Draw     Labs drawn peripherally from right hand    Kristen Thomas LPN

## 2017-03-02 ENCOUNTER — TELEPHONE (OUTPATIENT)
Dept: ONCOLOGY | Facility: CLINIC | Age: 72
End: 2017-03-02

## 2017-03-02 NOTE — TELEPHONE ENCOUNTER
I called pt's cell with no answer, left a message asking him to call back with an update on his status since ED visit 2/23-2/24/17 for dizziness/lightheadedness, diaphoresis. ED r/o cardiac issues, DC'ed to home.    Pt had labs drawn 3/1/17, Hep 10a 1.19 (Sabrina Mcguire PA-C recommends that pt change dose to 50mg BID Lovenox - but we need to confirm if Hep 10a was drawn within 4-6hr window after last Lovenox injection - awaiting pt's confirmation).    Per Sabrina, due to ED visit, we would like to see pt sooner than the current 3/8 return visit. Awaiting pt's call back, left my name & [facility] Triage#. If I'm not available, pt instructed to speak to Triage RN.

## 2017-03-03 ENCOUNTER — CARE COORDINATION (OUTPATIENT)
Dept: ONCOLOGY | Facility: CLINIC | Age: 72
End: 2017-03-03

## 2017-03-03 NOTE — PROGRESS NOTES
LVM request call back to determine the time he administered lovenox prior to lab draw.  Left detailed call back information.

## 2017-03-04 ENCOUNTER — TELEPHONE (OUTPATIENT)
Dept: ONCOLOGY | Facility: CLINIC | Age: 72
End: 2017-03-04

## 2017-03-04 NOTE — TELEPHONE ENCOUNTER
"Oral Chemotherapy Monitoring Program    Primary Oncologist: Dr. Robles  Primary Oncology Clinic: [facility]  Cancer Diagnosis: Prostate Cancer    Therapy History:   Confirmed patient is taking Zytiga 1000mg (4 x 250mg) QD continuously, Start Date 11/12/16     Drug Interaction Assessment:   1. Zytiga + Metoprolol = potential increased serum concentrations of metoprolol via CYP2D6 inhibition (Category \"D\"). Per Dr. Robles, monitor BP's and for increased side effects of metoprolol.      Mainor is aware of these drug interactions and knows what changes to monitor for.    Lab Monitoring Plan  C1D1+   CMP, BP C2D1+ Call, CMP, BP C3D1+ Call, CMP, BP C4D1+ Call, CMP, BP C5D1+ Call, CMP, BP C6D1+ Call, CMP, BP   C1D8+  C2D8+  C3D8+  C4D8+  C5D8+  C6D8+    C1D15+ Call, CMP C2D15+ Call, CMP C3D15+  C4D15+  C5D15+  C6D15+    C1D22+  C2D22+  C3D22+  C4D22+  C5D22+  C6D22+      Subjective/Objective:  Oswaldo Win is a 71 year old male contacted by phone for a follow-up visit for oral chemotherapy.  Patient states that he is doing well on abiraterone (Zytiga). He notes that he has been experiencing some nausea and vomiting. States it only happens if he waits too long to eat food and then after a bite of food, he throws up. After he vomits is he able to resume eating the rest of his meal within 5-10 minutes with no difficulties. Does not have nausea or vomiting at any other time. States that this will occur between 3-4 times a week. He does not take any anti-nausea medication and states he 'just let's nature run its course.' Patient also notes that about 1-2 times per day he will get a hot flash but that it is manageable and does not interfere with his quality of life. Patient notes that he is trying to increase his fluid intake, states he drinks about 40oz a day. Notes that he has not been monitoring his blood pressure. Patient denies edema, rash and diarrhea/constipation.     When asked about his recent ED visit, patient noted that he is " "feeling much better. States he takes his enoxaparin at 9am and 9pm. When we went to the ED on 2/23/17 around 7pm, he had taken his 9am dose of enoxaparin but missed his 9pm dose.     ORAL CHEMOTHERAPY 11/14/2016 12/16/2016 2/3/2017 3/4/2017   Drug Name Zytiga (Abiraterone) Zytiga (Abiraterone) Zytiga (Abiraterone) Zytiga (Abiraterone)   Current Dosage 1000mg 1000mg 1000mg 1000mg   Current Schedule Daily Daily Daily Daily   Cycle Details Continuous Continuous Continuous Continuous   Start Date of Last Cycle 11/12/2016 12/12/2016 - 11/12/2016   Planned next cycle start date - 1/11/2017 - -   Doses missed in last 2 weeks 0 0 0 0   Adherence Assessment - - Adherent Adherent   Adverse Effects - Arthralgias - -   Arthralgias - Grade 1 - -   Pharmacist Intervention(arthralgias) - Yes - -   Home BPs not done all BPs<140/90 - -   Any new drug interactions? Yes No - -       Vitals:  BP:   BP Readings from Last 1 Encounters:   02/24/17 110/81     Wt Readings from Last 1 Encounters:   02/24/17 64 kg (141 lb)     Estimated body surface area is 1.73 meters squared as calculated from the following:    Height as of 2/23/17: 1.689 m (5' 6.5\").    Weight as of 2/24/17: 64 kg (141 lb).    Labs:  Lab Results   Component Value Date     02/23/2017      Lab Results   Component Value Date    POTASSIUM 4.2 02/23/2017     Lab Results   Component Value Date    RODNEY 8.2 02/23/2017     Lab Results   Component Value Date    ALBUMIN 3.6 02/23/2017     Lab Results   Component Value Date    MAG 2.1 01/14/2017     Lab Results   Component Value Date    PHOS 2.0 01/14/2017     Lab Results   Component Value Date    BUN 18 02/23/2017     Lab Results   Component Value Date    CR 0.90 02/23/2017       Lab Results   Component Value Date    AST 18 02/23/2017     Lab Results   Component Value Date    ALT 18 02/23/2017     Lab Results   Component Value Date    BILITOTAL 1.1 02/23/2017       Lab Results   Component Value Date    WBC 2.3 03/01/2017     Lab " Results   Component Value Date    HGB 9.3 03/01/2017     Lab Results   Component Value Date     03/01/2017     08/19/2015     Lab Results   Component Value Date    ANEU 1.2 03/01/2017       Assessment:  Oswaldo is tolerating abiraterone 1000mg PO QD therapy well. Nausea and vomiting uncontrolled, however, is not currently bothering Oswaldo. Could utilize pharmacological options to control nausea and vomiting if it begins to bother him. Hot flashes are controlled and are not negatively impacting Oswaldo.   Given recent diagnosis of dehydration, it would be beneficial for Oswaldo to increase his fluid intake.    Plan:  1. Continue abiraterone 1000mg PO QD; monitor for worsening of nausea/ vomiting and hot flashes or new side effects.  2. Encouraged adequate fluid intake of 8 glasses of 8oz of water a day.    Follow-Up:  Follow up by phone after lab draw on 3/8 with results.    Kelvin Jay, Pharmacy Intern  [facility]

## 2017-03-06 ENCOUNTER — PRE VISIT (OUTPATIENT)
Dept: CARDIOLOGY | Facility: CLINIC | Age: 72
End: 2017-03-06

## 2017-03-06 DIAGNOSIS — I50.22 CHRONIC SYSTOLIC HEART FAILURE (H): Primary | ICD-10-CM

## 2017-03-08 ENCOUNTER — APPOINTMENT (OUTPATIENT)
Dept: LAB | Facility: CLINIC | Age: 72
End: 2017-03-08
Attending: PHYSICIAN ASSISTANT
Payer: COMMERCIAL

## 2017-03-08 ENCOUNTER — HOSPITAL ENCOUNTER (OUTPATIENT)
Dept: NUCLEAR MEDICINE | Facility: CLINIC | Age: 72
Setting detail: NUCLEAR MEDICINE
Discharge: HOME | End: 2017-03-08
Attending: INTERNAL MEDICINE | Admitting: INTERNAL MEDICINE
Payer: COMMERCIAL

## 2017-03-08 ENCOUNTER — ONCOLOGY VISIT (OUTPATIENT)
Dept: ONCOLOGY | Facility: CLINIC | Age: 72
End: 2017-03-08
Attending: PHYSICIAN ASSISTANT
Payer: COMMERCIAL

## 2017-03-08 VITALS
HEART RATE: 77 BPM | OXYGEN SATURATION: 98 % | DIASTOLIC BLOOD PRESSURE: 39 MMHG | BODY MASS INDEX: 24.54 KG/M2 | TEMPERATURE: 97.6 F | WEIGHT: 152.7 LBS | HEIGHT: 66 IN | RESPIRATION RATE: 16 BRPM | SYSTOLIC BLOOD PRESSURE: 110 MMHG

## 2017-03-08 DIAGNOSIS — C61 PROSTATE CANCER METASTATIC TO BONE (H): ICD-10-CM

## 2017-03-08 DIAGNOSIS — I82.499 DEEP VEIN THROMBOSIS (DVT) OF OTHER VEIN OF LOWER EXTREMITY: Primary | ICD-10-CM

## 2017-03-08 DIAGNOSIS — I50.22 CHRONIC SYSTOLIC HEART FAILURE (H): ICD-10-CM

## 2017-03-08 DIAGNOSIS — C79.51 PROSTATE CANCER METASTATIC TO BONE (H): ICD-10-CM

## 2017-03-08 DIAGNOSIS — C79.51 METASTATIC BONE TUMOR (H): ICD-10-CM

## 2017-03-08 LAB
ANION GAP SERPL CALCULATED.3IONS-SCNC: 12 MMOL/L (ref 3–14)
BASOPHILS # BLD AUTO: 0 10E9/L (ref 0–0.2)
BASOPHILS NFR BLD AUTO: 1.3 %
BUN SERPL-MCNC: 13 MG/DL (ref 7–30)
CALCIUM SERPL-MCNC: 8.5 MG/DL (ref 8.5–10.1)
CHLORIDE SERPL-SCNC: 110 MMOL/L (ref 94–109)
CO2 SERPL-SCNC: 21 MMOL/L (ref 20–32)
CREAT SERPL-MCNC: 0.92 MG/DL (ref 0.66–1.25)
DIFFERENTIAL METHOD BLD: ABNORMAL
EOSINOPHIL # BLD AUTO: 0 10E9/L (ref 0–0.7)
EOSINOPHIL NFR BLD AUTO: 1.3 %
ERYTHROCYTE [DISTWIDTH] IN BLOOD BY AUTOMATED COUNT: 16.7 % (ref 10–15)
GFR SERPL CREATININE-BSD FRML MDRD: 81 ML/MIN/1.7M2
GLUCOSE SERPL-MCNC: 149 MG/DL (ref 70–99)
HCT VFR BLD AUTO: 30.1 % (ref 40–53)
HGB BLD-MCNC: 9.8 G/DL (ref 13.3–17.7)
IMM GRANULOCYTES # BLD: 0 10E9/L (ref 0–0.4)
IMM GRANULOCYTES NFR BLD: 1.8 %
LYMPHOCYTES # BLD AUTO: 0.7 10E9/L (ref 0.8–5.3)
LYMPHOCYTES NFR BLD AUTO: 30.7 %
MCH RBC QN AUTO: 30.2 PG (ref 26.5–33)
MCHC RBC AUTO-ENTMCNC: 32.6 G/DL (ref 31.5–36.5)
MCV RBC AUTO: 93 FL (ref 78–100)
MONOCYTES # BLD AUTO: 0.2 10E9/L (ref 0–1.3)
MONOCYTES NFR BLD AUTO: 9.6 %
NEUTROPHILS # BLD AUTO: 1.3 10E9/L (ref 1.6–8.3)
NEUTROPHILS NFR BLD AUTO: 55.3 %
NRBC # BLD AUTO: 0 10*3/UL
NRBC BLD AUTO-RTO: 0 /100
PLATELET # BLD AUTO: 158 10E9/L (ref 150–450)
POTASSIUM SERPL-SCNC: 3.6 MMOL/L (ref 3.4–5.3)
RBC # BLD AUTO: 3.25 10E12/L (ref 4.4–5.9)
SODIUM SERPL-SCNC: 144 MMOL/L (ref 133–144)
WBC # BLD AUTO: 2.3 10E9/L (ref 4–11)

## 2017-03-08 PROCEDURE — A9606 RADIUM RA223 DICHLORIDE THER: HCPCS | Performed by: INTERNAL MEDICINE

## 2017-03-08 PROCEDURE — 77790 RADIATION HANDLING: CPT

## 2017-03-08 PROCEDURE — 34400006 ZZH RX 344: Performed by: INTERNAL MEDICINE

## 2017-03-08 PROCEDURE — 99214 OFFICE O/P EST MOD 30 MIN: CPT | Mod: ZP | Performed by: INTERNAL MEDICINE

## 2017-03-08 RX ADMIN — RADIUM RA 223 DICHLORIDE 97.6 UCI.: 30 INJECTION INTRAVENOUS at 11:05

## 2017-03-08 ASSESSMENT — PAIN SCALES - GENERAL: PAINLEVEL: NO PAIN (0)

## 2017-03-08 NOTE — NURSING NOTE
"Oswaldo Win is a 72 year old male who presents for:  Chief Complaint   Patient presents with     Blood Draw     venipuncture     Oncology Clinic Visit     Prostate Ca        Initial Vitals:  BP (!) 110/39  Pulse 77  Temp 97.6  F (36.4  C) (Oral)  Resp 16  Ht 1.689 m (5' 6.5\")  Wt 69.3 kg (152 lb 11.2 oz)  SpO2 98%  BMI 24.28 kg/m2 Estimated body mass index is 24.28 kg/(m^2) as calculated from the following:    Height as of this encounter: 1.689 m (5' 6.5\").    Weight as of this encounter: 69.3 kg (152 lb 11.2 oz).. Body surface area is 1.8 meters squared. BP completed using cuff size: NA (Not Taken)  No Pain (0) No LMP for male patient. Allergies and medications reviewed.     Medications: Medication refills not needed today.  Pharmacy name entered into [employer] System: [facility] [place], [place] - [id number]    Comments: Vitals completed in lab.    7 minutes for nursing intake (face to face time)   Lynette Turner LPN        "

## 2017-03-08 NOTE — LETTER
3/8/2017       RE: Oswaldo Win  [address]  [facility] [address]     Dear Colleague,    Thank you for referring your patient, Oswaldo Win, to the [facility]. Please see a copy of my visit note below.    Oncology/Hematology Visit Note  Mar 8, 2017    DIAGNOSIS:  Mr. Win is a 71 year old male with a hx of CHF, CKD, CAD w/ hx of STEMI and CABG. He met with Dr. Robles at the end of July for consultation regarding metastatic prostate cancer. His story begins in May 2015 when he saw his PCP for back pain, present since Feb. He also had poor appetite, weight loss of 8lb and poor energy.  CT showed diffuse bony mets. CT Chest showed sclerotic mets throughout the bones. He was given degarelix on 7/22 in Urology. He was having pelvic pain and was evaluated by Dr Cavazos would did not feel XRT was appropriate.  He started on docetaxel 7/31. From 8/7-8/14 he was admitted with colitis, CHAR, elevated BNP, lactis acidosis, and poor appetite. He was discharged to a TCU and unfortunately, it sounds like he was not given his Lasix.  His BUBBA worsened and his breathing became compromised.  He was seen by Chelsi Corral on 8/21 and eventually transferred to the ED and admitted 8/21-8/24 for acute on chronic CHF.  At our last visit we discussed not pursuing further Taxotere at this time and discussed starting Zytiga when he was ready.  We pursued PT, OT, home lymphedema for supportive care at home in attempt for further rehabilitation. He was again admitted and discharged - 10/20/15.  He never started the Zytiga as he was still actively recovering from his cardiac issues.  His PSA remained stable, thus no intervention was pursued.  December 2015, Mainor met with Dr Robles and since his ECOG was back to a 1, they discussed re-challenging the Taxotere at a lower dose. 9/26/2016 he started Provenge off study. He continues on lupron every 3 mos and XGEVA. 9/26/2016 he started Provenge off study. He received 3 doses of  Provenge (last 10/28), on 3rd dose had fevers, chills, myalgias, was bedbound for a week due to these symptoms. During this time he held his coumadin for 5 days prior to a central line removal 10/31. Later than week he had sudden onset of right pleuritic chest pain and hemoptysis. Diagnosed with acute segmental/subsegmental PE on 11/8 and prescribed Lovenox.     Mainor was briefly on Zytiga and prednisone in November-December, however after one month his PSA went up, thus it was decided to add in Xofigo (1/11/17-- first).     On 12/28 he started xaralto and took his last pill on 1/10/17.  He stopped as he noticed melanotic stools and had some hemoptysis.  He was seen in clinic the next day with a hgb in th 6 range and was admitted.  Pan-scopes showed no active bleeding.  He was able to get his Xofigo in patient as well.  He was transitioned back to lovenox BID.     INTERVAL HISTORY:Mainor comes in today for follow up today.      He notes that last time he received lupron, denosumab, the same day as his radium. He felt poorly for next few days. He is not sure if it was due to radium or the combination.     He does not drink fluids enough and was dehydrated when he recently presented to the ED on 2/23 with presyncope.     No new f/s/c. No chest pain/cough/dyspnea. No /GI issues. No rash.       MEDICATIONS:   Current Outpatient Prescriptions   Medication Sig     enoxaparin (LOVENOX) 60 MG/0.6ML injection Inject 0.6 mLs (60 mg) Subcutaneous 2 times daily     predniSONE (DELTASONE) 5 MG tablet Take 1 tablet (5 mg) by mouth 2 times daily     abiraterone (ZYTIGA) 250 MG tablet Take 4 tablets (1,000 mg) by mouth daily Take on empty stomach.     calcium carbonate (OS-RODNEY 500 MG [place]. [place]) 500 MG tablet Take 2 tablets (1,000 mg) by mouth 2 times daily     aspirin 81 MG tablet Take 81 mg by mouth daily     metoprolol (TOPROL-XL) 25 MG 24 hr tablet Take 1 tablet (25 mg) by mouth daily AM     Cholecalciferol (VITAMIN D) 2000 UNITS  "tablet Take 1 tablet by mouth daily     simvastatin (ZOCOR) 40 MG tablet TAKE ONE TABLET BY MOUTH AT BEDTIME     tamsulosin (FLOMAX) 0.4 MG 24 hr capsule Take 1 capsule (0.4 mg) by mouth daily AM     loperamide (IMODIUM) 2 MG capsule Use 2 caps PO after first loose stool.  Repeat 1 cap after each subsequent loose stool.  Max 8/24 hours. (Patient not taking: Reported on 3/8/2017)     diphenoxylate-atropine (LOMOTIL) 2.5-0.025 MG per tablet Take 1 tablet by mouth 4 times daily as needed for diarrhea Reported on 3/8/2017     HYDROcodone-acetaminophen (NORCO) 5-325 MG per tablet Take 1 tablet by mouth every 6 hours as needed for moderate to severe pain Reported on 3/8/2017     multivitamin, therapeutic with minerals (MULTI-VITAMIN) TABS Take 1 tablet by mouth every morning Reported on 3/8/2017     No current facility-administered medications for this visit.      Facility-Administered Medications Ordered in Other Visits   Medication     hydrocortisone sodium succinate (solu-CORTEF) injection     glycopyrrolate (ROBINUL) injection     neostigmine (PROSTIGMINE) injection     protamine injection     albuterol (PROAIR HFA, PROVENTIL HFA, VENTOLIN HFA) inhaler          ALLERGIES:  No Known Allergies    PHYSICAL EXAMINATION:  Vitals: BP (!) 110/39  Pulse 77  Temp 97.6  F (36.4  C) (Oral)  Resp 16  Ht 1.689 m (5' 6.5\")  Wt 69.3 kg (152 lb 11.2 oz)  SpO2 98%  BMI 24.28 kg/m2  GENERAL:  A pleasant person in no acute distress.   HEENT:  Sclerae are nonicteric.  Mouth is without mucositis or thrush.   NECK:  Supple.   LYMPH NODES:  No peripheral lymphadenopathy noted in the supraclavicular or cervical regions.   LUNGS:  Clear to auscultation bilaterally.   HEART:  Regular rate and rhythm   ABDOMEN:  Bowel sounds are active.  Soft and nontender.  No hepatosplenomegaly or other masses appreciated.  LOWER EXTREMITIES:  Without pitting edema to the knees bilaterally.   NEUROLOGICAL:  Alert/orientated/able to answer all questions.  CN " grossly intact.     LAB:   Recent Labs   Lab Test  02/23/17   2203  02/01/17   1040  01/20/17   1128  01/14/17   0808  01/13/17   1815   NA  140  142  142  142  146*   POTASSIUM  4.2  3.8  3.8  3.5  3.5   CHLORIDE  110*  109  110*  113*  115*   CO2  21  21  24  21  22   ANIONGAP  8  12  8  8  10   BUN  18  16  18  12  15   CR  0.90  0.91  0.98  0.91  1.02   GLC  95  113*  127*  88  100*   RODNEY  8.2*  9.0  8.3*  6.8*  6.7*     Recent Labs   Lab Test  01/14/17   0808  01/13/17   1815  01/13/17   0801  01/12/17   2137  01/12/17   1745  01/12/17   0758   MAG  2.1  2.0  2.0   --   2.2  2.3   PHOS  2.0*  2.9  1.8*  2.5  3.0  1.9*     Recent Labs   Lab Test  03/08/17   0842  03/01/17   1034  02/24/17   0550  02/23/17   2203  02/08/17   0833   WBC  2.3*  2.3*  2.0*  2.6*  3.9*   HGB  9.8*  9.3*  8.6*  9.7*  10.1*   PLT  158  134*  119*  104*  176   MCV  93  89  89  89  89   NEUTROPHIL  55.3  51.5  54.2  60.4  54.5     Recent Labs   Lab Test  02/23/17 2203  02/01/17   1040  01/20/17   1128   12/21/16   1149  12/01/16   1247   10/28/16   0942   BILITOTAL  1.1  1.0  1.0   < >  0.6  0.8   < >  0.9   ALKPHOS  514*  588*  712*   < >  673*  649*   < >  299*   ALT  18  17  16   < >  18  16   < >  13   AST  18  23  19   < >  24  22   < >  24   ALBUMIN  3.6  3.8  3.6   < >  3.5  3.8   < >  3.4   LDH   --    --    --    --   321*  348*   --   386*    < > = values in this interval not displayed.     TSH   Date Value Ref Range Status   09/16/2015 3.18 0.40 - 4.00 mU/L Final     No results for input(s): CEA in the last 05703 hours.  Results for orders placed or performed during the hospital encounter of 02/23/17   Chest XR,  PA & LAT    Narrative    Study: XR CHEST 2 VW 2/23/2017 9:48 PM    History: chest pain - look for pneumonia or pneumothorax    Comparison: Chest CT on 11/8/2016. Chest x-ray 11/8/2016.    Findings:   EKG leads project over the chest. The cardiomediastinal silhouette and  pulmonary vasculature are within normal  limits. Mitral clips project  over the heart. The lungs are clear. The costophrenic angles are  sharp. No pneumothorax. Diffuse sclerotic metastases throughout the  skeleton. These appear mildly increased in the humeral heads  bilaterally.      Impression    Impression:   1. No acute pulmonary abnormality.  2. Diffuse sclerotic metastases, which are increased in the humeral  heads.    I have personally reviewed the examination and initial interpretation  and I agree with the findings.    MD CARROL SALAZAR Lexiscan stress test (nuc card)    Narrative    Examination:   NM MPI WITH LEXISCAN   Code:   [id number]   Date:  2/24/2017 12:14 PM     Indication:  pre-syncope, coronary artery disease     Previous Study: No previous Myocardial Perfusion studies for  comparison.    Additional Information:  Previous myocardial perfusion study from  11/12/2014 was available for comparison..     Protocol:    Rest and stress myocardial perfusion imaging was performed using 9.6  and 36 mCi of Tc-99m tetrofosmin. Pharmacological stress was performed  with 0.4  mg of Lexiscan.     Findings:  1. Overall quality of the study: Limited due to motion.   2. Left ventricular cavity is normal in size on the rest and stress  studies.  3. SPECT images demonstrate a small area of perfusion reduction  involving the apical inferior segment of the left ventricle. When  compared to the previous study this is no different than in 2014..  These results suggest a high post-scan likelihood of angiographically  significant coronary artery disease. The summed stress score is 5,  (previously 4).   4. Left ventricular ejection fraction is 58%. Left ventricular  end-diastolic volume is 155 mL. End-systolic volume is 65 mL.  5. Baseline EKG  findings reported separately in EPIC.      Impression    Impression:  1. Abnormal myocardial SPECT study with a summed stress score of 5.  This score and pattern is no different than on the study of  11/12/2014.  2. No  evidence of ischemia. Nontransmural myocardial injury of the  apical inferior segment of the left ventricle.  3. Findings are consistent with at least single vessel coronary artery  disease.    DONIS ERAZO MD         IMPRESSION/PLAN:   1.  Metastatic prostate cancer to the bone-continues with Zytiga/pred (started mid-November) and Xofigo (1/11, 2/8).    -reviewed Mainor's increasing PSA but decreasing alk phos, will continue same plan for now as clinically he is stable and alk phos was declining. This has correlated with response on the clinical trial. I will try and get a PSA added to his labs from today.   I explained him that we would not be too worried by a single PSA value. He has been feeling well and has no discomfort for now.   However if he truly has progressive disease, we would have to consider enzalutamide. I reviewed this medication with him. I would still proceed to complete therapy with radium.   -Lupron last 2/8/17    He had questions on genetic testing. We had several approved and effective agents so far. I would get foundation one testing done as part of the study for rucaparib. The study would then cover the cost of genetic testing and will also provide with the drug if he does indeed have one of the mutations involving the DNA repair enzymes.     2.HEME: Mainor is on anti-coagulation for a PE diagnosed 11/8/16.  Had inpatient stent for GI bleed 1/11-1/14. -stopped Xarelto and transitioned back to lovenox BID.  No bleeding concerns    3.  Bone metastases.  We plan to continue the Xgeva every 6 weeks.  No new bone pains/concerns  -xgeva last 2/8  -oral calcium/vit D  * He will be due at the next visit in 4 weeks.     4. CV:  His blood pressures have been low  I would check with Dr. Kim as he has low pressures and feels dizzy possibly also contributed by dehydration; but metoprolol limits the compensatory response. It has been recommended for his CAD. He is meeting Jill in cardiology tomorrow and I  will have him review it at that visit.     Over 25 min of direct face to face time spent with patient with more than 50% time spent in counseling and coordinating care.      Again, thank you for allowing me to participate in the care of your patient.      Sincerely,    Bentley Robles MD

## 2017-03-08 NOTE — PROGRESS NOTES
Oncology/Hematology Visit Note  Mar 8, 2017    DIAGNOSIS:  Mr. Win is a 71 year old male with a hx of CHF, CKD, CAD w/ hx of STEMI and CABG. He met with Dr. Robles at the end of July for consultation regarding metastatic prostate cancer. His story begins in May 2015 when he saw his PCP for back pain, present since Feb. He also had poor appetite, weight loss of 8lb and poor energy.  CT showed diffuse bony mets. CT Chest showed sclerotic mets throughout the bones. He was given degarelix on 7/22 in Urology. He was having pelvic pain and was evaluated by Dr Cavazos would did not feel XRT was appropriate.  He started on docetaxel 7/31. From 8/7-8/14 he was admitted with colitis, CHAR, elevated BNP, lactis acidosis, and poor appetite. He was discharged to a TCU and unfortunately, it sounds like he was not given his Lasix.  His BUBBA worsened and his breathing became compromised.  He was seen by Chelsi Corral on 8/21 and eventually transferred to the ED and admitted 8/21-8/24 for acute on chronic CHF.  At our last visit we discussed not pursuing further Taxotere at this time and discussed starting Zytiga when he was ready.  We pursued PT, OT, home lymphedema for supportive care at home in attempt for further rehabilitation. He was again admitted and discharged - 10/20/15.  He never started the Zytiga as he was still actively recovering from his cardiac issues.  His PSA remained stable, thus no intervention was pursued.  December 2015, Mainor met with Dr Robles and since his ECOG was back to a 1, they discussed re-challenging the Taxotere at a lower dose. 9/26/2016 he started Provenge off study. He continues on lupron every 3 mos and XGEVA. 9/26/2016 he started Provenge off study. He received 3 doses of Provenge (last 10/28), on 3rd dose had fevers, chills, myalgias, was bedbound for a week due to these symptoms. During this time he held his coumadin for 5 days prior to a central line removal 10/31. Later than week he had sudden onset of  right pleuritic chest pain and hemoptysis. Diagnosed with acute segmental/subsegmental PE on 11/8 and prescribed Lovenox.     Mainor was briefly on Zytiga and prednisone in November-December, however after one month his PSA went up, thus it was decided to add in Xofigo (1/11/17-- first).     On 12/28 he started xaralto and took his last pill on 1/10/17.  He stopped as he noticed melanotic stools and had some hemoptysis.  He was seen in clinic the next day with a hgb in th 6 range and was admitted.  Pan-scopes showed no active bleeding.  He was able to get his Xofigo in patient as well.  He was transitioned back to lovenox BID.     INTERVAL HISTORY:Mainor comes in today for follow up today.      He notes that last time he received lupron, denosumab, the same day as his radium. He felt poorly for next few days. He is not sure if it was due to radium or the combination.     He does not drink fluids enough and was dehydrated when he recently presented to the ED on 2/23 with presyncope.     No new f/s/c. No chest pain/cough/dyspnea. No /GI issues. No rash.       MEDICATIONS:   Current Outpatient Prescriptions   Medication Sig     enoxaparin (LOVENOX) 60 MG/0.6ML injection Inject 0.6 mLs (60 mg) Subcutaneous 2 times daily     predniSONE (DELTASONE) 5 MG tablet Take 1 tablet (5 mg) by mouth 2 times daily     abiraterone (ZYTIGA) 250 MG tablet Take 4 tablets (1,000 mg) by mouth daily Take on empty stomach.     calcium carbonate (OS-RODNEY 500 MG [place]. [place]) 500 MG tablet Take 2 tablets (1,000 mg) by mouth 2 times daily     aspirin 81 MG tablet Take 81 mg by mouth daily     metoprolol (TOPROL-XL) 25 MG 24 hr tablet Take 1 tablet (25 mg) by mouth daily AM     Cholecalciferol (VITAMIN D) 2000 UNITS tablet Take 1 tablet by mouth daily     simvastatin (ZOCOR) 40 MG tablet TAKE ONE TABLET BY MOUTH AT BEDTIME     tamsulosin (FLOMAX) 0.4 MG 24 hr capsule Take 1 capsule (0.4 mg) by mouth daily AM     loperamide (IMODIUM) 2 MG capsule Use 2  "caps PO after first loose stool.  Repeat 1 cap after each subsequent loose stool.  Max 8/24 hours. (Patient not taking: Reported on 3/8/2017)     diphenoxylate-atropine (LOMOTIL) 2.5-0.025 MG per tablet Take 1 tablet by mouth 4 times daily as needed for diarrhea Reported on 3/8/2017     HYDROcodone-acetaminophen (NORCO) 5-325 MG per tablet Take 1 tablet by mouth every 6 hours as needed for moderate to severe pain Reported on 3/8/2017     multivitamin, therapeutic with minerals (MULTI-VITAMIN) TABS Take 1 tablet by mouth every morning Reported on 3/8/2017     No current facility-administered medications for this visit.      Facility-Administered Medications Ordered in Other Visits   Medication     hydrocortisone sodium succinate (solu-CORTEF) injection     glycopyrrolate (ROBINUL) injection     neostigmine (PROSTIGMINE) injection     protamine injection     albuterol (PROAIR HFA, PROVENTIL HFA, VENTOLIN HFA) inhaler          ALLERGIES:  No Known Allergies    PHYSICAL EXAMINATION:  Vitals: BP (!) 110/39  Pulse 77  Temp 97.6  F (36.4  C) (Oral)  Resp 16  Ht 1.689 m (5' 6.5\")  Wt 69.3 kg (152 lb 11.2 oz)  SpO2 98%  BMI 24.28 kg/m2  GENERAL:  A pleasant person in no acute distress.   HEENT:  Sclerae are nonicteric.  Mouth is without mucositis or thrush.   NECK:  Supple.   LYMPH NODES:  No peripheral lymphadenopathy noted in the supraclavicular or cervical regions.   LUNGS:  Clear to auscultation bilaterally.   HEART:  Regular rate and rhythm   ABDOMEN:  Bowel sounds are active.  Soft and nontender.  No hepatosplenomegaly or other masses appreciated.  LOWER EXTREMITIES:  Without pitting edema to the knees bilaterally.   NEUROLOGICAL:  Alert/orientated/able to answer all questions.  CN grossly intact.     LAB:   Recent Labs   Lab Test  02/23/17   2203  02/01/17   1040  01/20/17   1128  01/14/17   0808  01/13/17   1815   NA  140  142  142  142  146*   POTASSIUM  4.2  3.8  3.8  3.5  3.5   CHLORIDE  110*  109  110*  113*  " 115*   CO2  21  21  24  21  22   ANIONGAP  8  12  8  8  10   BUN  18  16  18  12  15   CR  0.90  0.91  0.98  0.91  1.02   GLC  95  113*  127*  88  100*   RODNEY  8.2*  9.0  8.3*  6.8*  6.7*     Recent Labs   Lab Test  01/14/17   0808  01/13/17   1815  01/13/17   0801  01/12/17   2137  01/12/17   1745  01/12/17   0758   MAG  2.1  2.0  2.0   --   2.2  2.3   PHOS  2.0*  2.9  1.8*  2.5  3.0  1.9*     Recent Labs   Lab Test  03/08/17   0842  03/01/17   1034  02/24/17   0550  02/23/17   2203  02/08/17   0833   WBC  2.3*  2.3*  2.0*  2.6*  3.9*   HGB  9.8*  9.3*  8.6*  9.7*  10.1*   PLT  158  134*  119*  104*  176   MCV  93  89  89  89  89   NEUTROPHIL  55.3  51.5  54.2  60.4  54.5     Recent Labs   Lab Test  02/23/17   2203  02/01/17   1040  01/20/17   1128   12/21/16   1149  12/01/16   1247   10/28/16   0942   BILITOTAL  1.1  1.0  1.0   < >  0.6  0.8   < >  0.9   ALKPHOS  514*  588*  712*   < >  673*  649*   < >  299*   ALT  18  17  16   < >  18  16   < >  13   AST  18  23  19   < >  24  22   < >  24   ALBUMIN  3.6  3.8  3.6   < >  3.5  3.8   < >  3.4   LDH   --    --    --    --   321*  348*   --   386*    < > = values in this interval not displayed.     TSH   Date Value Ref Range Status   09/16/2015 3.18 0.40 - 4.00 mU/L Final     No results for input(s): CEA in the last 20777 hours.  Results for orders placed or performed during the hospital encounter of 02/23/17   Chest XR,  PA & LAT    Narrative    Study: XR CHEST 2 VW 2/23/2017 9:48 PM    History: chest pain - look for pneumonia or pneumothorax    Comparison: Chest CT on 11/8/2016. Chest x-ray 11/8/2016.    Findings:   EKG leads project over the chest. The cardiomediastinal silhouette and  pulmonary vasculature are within normal limits. Mitral clips project  over the heart. The lungs are clear. The costophrenic angles are  sharp. No pneumothorax. Diffuse sclerotic metastases throughout the  skeleton. These appear mildly increased in the humeral heads  bilaterally.       Impression    Impression:   1. No acute pulmonary abnormality.  2. Diffuse sclerotic metastases, which are increased in the humeral  heads.    I have personally reviewed the examination and initial interpretation  and I agree with the findings.    ALONDRA VU MD   NM Lexiscan stress test (nuc card)    Narrative    Examination:   NM MPI WITH LEXISCAN   Code:   [id number]   Date:  2/24/2017 12:14 PM     Indication:  pre-syncope, coronary artery disease     Previous Study: No previous Myocardial Perfusion studies for  comparison.    Additional Information:  Previous myocardial perfusion study from  11/12/2014 was available for comparison..     Protocol:    Rest and stress myocardial perfusion imaging was performed using 9.6  and 36 mCi of Tc-99m tetrofosmin. Pharmacological stress was performed  with 0.4  mg of Lexiscan.     Findings:  1. Overall quality of the study: Limited due to motion.   2. Left ventricular cavity is normal in size on the rest and stress  studies.  3. SPECT images demonstrate a small area of perfusion reduction  involving the apical inferior segment of the left ventricle. When  compared to the previous study this is no different than in 2014..  These results suggest a high post-scan likelihood of angiographically  significant coronary artery disease. The summed stress score is 5,  (previously 4).   4. Left ventricular ejection fraction is 58%. Left ventricular  end-diastolic volume is 155 mL. End-systolic volume is 65 mL.  5. Baseline EKG  findings reported separately in EPIC.      Impression    Impression:  1. Abnormal myocardial SPECT study with a summed stress score of 5.  This score and pattern is no different than on the study of  11/12/2014.  2. No evidence of ischemia. Nontransmural myocardial injury of the  apical inferior segment of the left ventricle.  3. Findings are consistent with at least single vessel coronary artery  disease.    DONIS ERAZO MD         IMPRESSION/PLAN:   1.   Metastatic prostate cancer to the bone-continues with Zytiga/pred (started mid-November) and Xofigo (1/11, 2/8).    -reviewed Mainor's increasing PSA but decreasing alk phos, will continue same plan for now as clinically he is stable and alk phos was declining. This has correlated with response on the clinical trial. I will try and get a PSA added to his labs from today.   I explained him that we would not be too worried by a single PSA value. He has been feeling well and has no discomfort for now.   However if he truly has progressive disease, we would have to consider enzalutamide. I reviewed this medication with him. I would still proceed to complete therapy with radium.   -Lupron last 2/8/17    He had questions on genetic testing. We had several approved and effective agents so far. I would get foundation one testing done as part of the study for rucaparib. The study would then cover the cost of genetic testing and will also provide with the drug if he does indeed have one of the mutations involving the DNA repair enzymes.     2.HEME: Mainor is on anti-coagulation for a PE diagnosed 11/8/16.  Had inpatient stent for GI bleed 1/11-1/14. -stopped Xarelto and transitioned back to lovenox BID.  No bleeding concerns    3.  Bone metastases.  We plan to continue the Xgeva every 6 weeks.  No new bone pains/concerns  -xgeva last 2/8  -oral calcium/vit D  * He will be due at the next visit in 4 weeks.     4. CV:  His blood pressures have been low  I would check with Dr. Kim as he has low pressures and feels dizzy possibly also contributed by dehydration; but metoprolol limits the compensatory response. It has been recommended for his CAD. He is meeting Jill in cardiology tomorrow and I will have him review it at that visit.     Over 25 min of direct face to face time spent with patient with more than 50% time spent in counseling and coordinating care.

## 2017-03-08 NOTE — NURSING NOTE
Chief Complaint   Patient presents with     Blood Draw     venipuncture     Vitals done, IV placed by vascular access and labs drawn, see flow sheets.  NABEEL PERALES, CMA

## 2017-03-08 NOTE — MR AVS SNAPSHOT
After Visit Summary   3/8/2017    Oswaldo Win    MRN: [id number]           Patient Information     Date Of Birth          1945        Visit Information        Provider Department      3/8/2017 9:15 AM Bentley Robles MD [facility]        Today's Diagnoses     Deep vein thrombosis (DVT) of other vein of lower extremity (H)    -  1    Prostate cancer 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)        Chronic systolic heart failure (H)           Follow-ups after your visit        Your next 10 appointments already scheduled     Mar 08, 2017 11:00 AM CST   NM RADIUM 223 XOFIGO THERAPY with UUNMINJ1   [facility] [place], Nuclear Medicine ([facility], [facility])    [address] [facility] [id number] [phone]           Please bring a list of your medicines to the exam. (Include vitamins, minerals and over-the-counter drugs.) You should wear comfortable clothes. Leave your valuables at home. Please bring related prior results and films.  Tell your doctor:   If you are breastfeeding or may be pregnant.   If you have had a barium test within the past few days. Barium may change the results of certain exams.   If you think you may need sedation (medicine to help you relax).  You may eat and drink as normal.  Please call your Imaging Department at your exam site with any questions.            Mar 09, 2017  1:00 PM CST   (Arrive by 12:45 PM)   CORE RETURN with Jill Juarez NP   [facility] ([facility])    [address] [place] [address]  [address] [facility] [address] [phone]            Mar 29, 2017 10:30 AM [facility]   Masonic Lab Draw with  MASONIC LAB DRAW   [facility] Masonic Lab Draw ([facility])    [address] [place] [address]   [phone]            Apr 05, 2017  9:00 AM CDT   Masonic Lab Draw with  MASONIC LAB DRAW   [facility] Masonic Lab Draw ([facility]  [facility])    [address] [place] [address]  [address] [facility] [address]   [phone]            Apr 05, 2017  9:30 AM CDT   (Arrive by 9:15 AM)   Return Visit with Gabriela Mcguire PA-C   [facility] ([facility])    [address] [place] [address]  [address] [facility] [address]   [phone]            Apr 05, 2017 11:00 AM CDT   NM RADIUM 223 XOFIGO THERAPY with UUNMINJ1   [facility], Nuclear Medicine ([facility], [facility])    [address] [facility] [address]   [phone]           Please bring a list of your medicines to the exam. (Include vitamins, minerals and over-the-counter drugs.) You should wear comfortable clothes. Leave your valuables at home. Please bring related prior results and films.  Tell your doctor:   If you are breastfeeding or may be pregnant.   If you have had a barium test within the past few days. Barium may change the results of certain exams.   If you think you may need sedation (medicine to help you relax).  You may eat and drink as normal.  Please call your Imaging Department at your exam site with any questions.            Apr 20, 2017  1:30 PM CDT   Lab with  LAB   [facility] ([facility])    [address] [place] [address]  [address] [facility] [address]   [phone]            Apr 20, 2017  2:00 PM CDT   (Arrive by 1:45 PM)   CORE RETURN with Jill Juarez NP   [facility] ([facility])    [address] [place] [address]  [address] [facility] [address]   [phone]            Apr 26, 2017 10:30 AM CDT   Masonic Lab Draw with  MASONIC LAB DRAW   [facility] Masonic Lab Draw ([facility])    [address] [place] [address]  [address] [facility] [address]   [phone]            May 03, 2017 11:00 AM CDT   NM RADIUM 223 XOFIGO THERAPY with UUNMINJ2   [facility], Nuclear Medicine  ([facility])    [address] [facility] [address] [phone]           Please bring a list of your medicines to the exam. (Include vitamins, minerals and over-the-counter drugs.) You should wear comfortable clothes. Leave your valuables at home. Please bring related prior results and films.  Tell your doctor:   If you are breastfeeding or may be pregnant.   If you have had a barium test within the past few days. Barium may change the results of certain exams.   If you think you may need sedation (medicine to help you relax).  You may eat and drink as normal.  Please call your Imaging Department at your exam site with any questions.              Future tests that were ordered for you today     Open Standing Orders        Priority Remaining Interval Expires Ordered    PSA tumor marker Routine 25/25  3/8/2018 3/8/2017    Comprehensive metabolic panel Routine 25/25  3/8/2018 3/8/2017            Who to contact     If you have questions or need follow up information about today's clinic visit or your schedule please contact [facility] directly at [phone].  Normal or non-critical lab and imaging results will be communicated to you by "[employer]hart, letter or phone within 4 business days after the clinic has received the results. If you do not hear from us within 7 days, please contact the clinic through [employer] or phone. If you have a critical or abnormal lab result, we will notify you by phone as soon as possible.  Submit refill requests through [employer] or call your pharmacy and they will forward the refill request to us. Please allow 3 business days for your refill to be completed.          Additional Information About Your Visit        "[employer]hart Information     [employer] gives you secure access to your electronic health record. If you see a primary care provider, you can also send messages to your care team and make appointments. If you have  "questions, please call your primary care clinic.  If you do not have a primary care provider, please call [phone] and they will assist you.        Care EveryWhere ID     This is your Care EveryWhere ID. This could be used by other organizations to access your [facility] medical records  [id number]        Your Vitals Were     Pulse Temperature Respirations Height Pulse Oximetry BMI (Body Mass Index)    77 97.6  F (36.4  C) (Oral) 16 1.689 m (5' 6.5\") 98% 24.28 kg/m2       Blood Pressure from Last 3 Encounters:   03/08/17 (!) 110/39   02/24/17 110/81   02/08/17 104/56    Weight from Last 3 Encounters:   03/08/17 69.3 kg (152 lb 11.2 oz)   02/24/17 64 kg (141 lb)   02/08/17 65.8 kg (145 lb)              We Performed the Following     *CBC with platelets differential     Basic metabolic panel     PSA tumor marker        Primary Care Provider Office Phone # Fax #    Lucrecia Collier [phone]       [facility] [address] [facility] [address]        Thank you!     Thank you for choosing [facility]  for your care. Our goal is always to provide you with excellent care. Hearing back from our patients is one way we can continue to improve our services. Please take a few minutes to complete the written survey that you may receive in the mail after your visit with us. Thank you!             Your Updated Medication List - Protect others around you: Learn how to safely use, store and throw away your medicines at www.disposemymeds.org.          This list is accurate as of: 3/8/17  9:37 AM.  Always use your most recent med list.                   Brand Name Dispense Instructions for use    abiraterone 250 MG tablet    ZYTIGA    120 tablet    Take 4 tablets (1,000 mg) by mouth daily Take on empty stomach.       aspirin 81 MG tablet      Take 81 mg by mouth daily       calcium carbonate 500 MG tablet    OS-RODNEY 500 mg [place]. Ca    120 tablet    Take 2 tablets (1,000 mg) by " mouth 2 times daily       diphenoxylate-atropine 2.5-0.025 MG per tablet    LOMOTIL     Take 1 tablet by mouth 4 times daily as needed for diarrhea Reported on 3/8/2017       enoxaparin 60 MG/0.6ML injection    LOVENOX    60 Syringe    Inject 0.6 mLs (60 mg) Subcutaneous 2 times daily       HYDROcodone-acetaminophen 5-325 MG per tablet    NORCO     Take 1 tablet by mouth every 6 hours as needed for moderate to severe pain Reported on 3/8/2017       loperamide 2 MG capsule    IMODIUM    60 capsule    Use 2 caps PO after first loose stool.  Repeat 1 cap after each subsequent loose stool.  Max 8/24 hours.       metoprolol 25 MG 24 hr tablet    TOPROL-XL    90 tablet    Take 1 tablet (25 mg) by mouth daily AM       Multi-vitamin Tabs tablet     100 tablet    Take 1 tablet by mouth every morning Reported on 3/8/2017       predniSONE 5 MG tablet    DELTASONE    60 tablet    Take 1 tablet (5 mg) by mouth 2 times daily       simvastatin 40 MG tablet    ZOCOR    90 tablet    TAKE ONE TABLET BY MOUTH AT BEDTIME       tamsulosin 0.4 MG capsule    FLOMAX    90 capsule    Take 1 capsule (0.4 mg) by mouth daily AM       vitamin D 2000 UNITS tablet     100 tablet    Take 1 tablet by mouth daily

## 2017-03-09 ENCOUNTER — OFFICE VISIT (OUTPATIENT)
Dept: CARDIOLOGY | Facility: CLINIC | Age: 72
End: 2017-03-09
Attending: NURSE PRACTITIONER
Payer: COMMERCIAL

## 2017-03-09 VITALS
OXYGEN SATURATION: 99 % | WEIGHT: 139 LBS | BODY MASS INDEX: 21.82 KG/M2 | HEIGHT: 67 IN | HEART RATE: 65 BPM | SYSTOLIC BLOOD PRESSURE: 114 MMHG | DIASTOLIC BLOOD PRESSURE: 72 MMHG

## 2017-03-09 DIAGNOSIS — I50.30 (HFPEF) HEART FAILURE WITH PRESERVED EJECTION FRACTION (H): ICD-10-CM

## 2017-03-09 PROCEDURE — 99212 OFFICE O/P EST SF 10 MIN: CPT | Mod: ZF

## 2017-03-09 PROCEDURE — 99214 OFFICE O/P EST MOD 30 MIN: CPT | Mod: ZP | Performed by: NURSE PRACTITIONER

## 2017-03-09 RX ORDER — METOPROLOL SUCCINATE 25 MG/1
12.5 TABLET, EXTENDED RELEASE ORAL DAILY
Qty: 45 TABLET | Refills: 3 | Status: SHIPPED | OUTPATIENT
Start: 2017-03-09 | End: 2017-01-01

## 2017-03-09 ASSESSMENT — PAIN SCALES - GENERAL: PAINLEVEL: NO PAIN (0)

## 2017-03-09 NOTE — MR AVS SNAPSHOT
"              After Visit Summary   3/9/2017    Oswaldo Win    MRN: [id number]           Patient Information     Date Of Birth          1945        Visit Information        Provider Department      3/9/2017 1:00 PM Jill Juarez NP [facility]        Today's Diagnoses     (HFpEF) heart failure with preserved ejection fraction (H)          Care Instructions    You were seen today in the Cardiovascular Clinic at the [facility].     Cardiology Providers you saw during your visit: STEVE Vance      1.  Decrease your Metoprolol XL to 12.5 mg daily.  2.  Please drink more fluid.  Your fluid restriction is 2 1/2 Liters daily.      Please limit your salt intake to 2 grams a day or less.    If you gain 2# in 24 hours or 5# in one week call Alexia Carrasco RN so we can adjust your medications as needed over the phone.    Please feel free to call me with any questions or concerns.      Alexia Carrasco RN BSN [facility]  [facility]  Cardiology Care Coordinator-Heart Failure Clinic    Questions and [phone].   First press #1 for the [employer] and then press #3 for \"Medical Questions\" to reach us Cardiology Nurses.     On Call Cardiologist for after hours or on weekends: [phone]   option #4 and ask to speak to the on-call Cardiologist. Inform them you are a CORE/heart failure patient at the [place].        If you need a medication refill please contact your pharmacy.  Please allow 3 business days for your refill to be completed.  _______________________________________________________  C.O.R.E. CLINIC Cardiomyopathy, Optimization, Rehabilitation, Education   The C.O.R.E. CLINIC is a heart failure specialty clinic within the [facility] where you will work with specialized nurse practitioners dedicated to helping patients with heart failure carefully adjust medications, receive education, and learn who and when " to call if symptoms develop. They specialize in helping you better understand your condition, slow the progression of your disease, improve the length and quality of your life, help you detect future heart problems before they become life threatening, and avoid hospitalizations.  As always, thank you for trusting us with your health care needs!              Follow-ups after your visit        Your next 10 appointments already scheduled     Mar 29, 2017 10:30 AM CDT   Masonic Lab Draw with  MASONIC LAB DRAW   [facility] Masonic Lab Draw ([facility])    [address] [place] [address]   [phone]            Apr 05, 2017  9:00 AM CDT   Masonic Lab Draw with  MASONIC LAB DRAW   [facility] Masonic Lab Draw ([facility])    [address] [place] [address]   [phone]            Apr 05, 2017  9:30 AM CDT   (Arrive by 9:15 AM)   Return Visit with Gabriela Mcguire PA-C   [facility] ([facility])    [address] [place] [address]   [phone]            Apr 05, 2017 11:00 AM CDT   NM RADIUM 223 XOFIGO THERAPY with UUNMINJ1   [facility] [place], Nuclear Medicine ([facility] [place])    [address] [facility] [address] [phone]           Please bring a list of your medicines to the exam. (Include vitamins, minerals and over-the-counter drugs.) You should wear comfortable clothes. Leave your valuables at home. Please bring related prior results and films.  Tell your doctor:   If you are breastfeeding or may be pregnant.   If you have had a barium test within the past few days. Barium may change the results of certain exams.   If you think you may need sedation (medicine to help you relax).  You may eat and drink as normal.  Please call your Imaging Department at your exam site with any  questions.            Apr 20, 2017  1:30 PM CDT   Lab with  LAB    [facility] ([facility])    [address]  1st Floor  [facility] [address]   [phone]            Apr 20, 2017  2:00 PM CDT   (Arrive by 1:45 PM)   CORE RETURN with Jill Juarez NP   [facility] ([facility])    [address]  3rd Floor  [facility] [address]   [phone]            Apr 26, 2017 10:30 AM CDT   Masonic Lab Draw with  MASONIC LAB DRAW   [facility] Masonic Lab Draw ([facility])    [address]  2nd Floor  [facility] [address]   [phone]            May 03, 2017  9:00 AM CDT   Masonic Lab Draw with  MASONIC LAB DRAW   [facility] Masonic Lab Draw ([facility])    [address]  [address]nd [employer] [address]   [phone]            May 03, 2017  9:30 AM CDT   (Arrive by 9:15 AM)   Return Visit with Gabriela Mcguire PA-C   [facility] ([facility])    [address]  2nd Floor  [facility] [address]   [phone]            May 03, 2017 11:00 AM CDT   NM RADIUM 223 XOFIGO THERAPY with UUNMINJ2   [facility] [place], Nuclear Medicine ([facility])    [address] [facility] [address]   [phone]           Please bring a list of your medicines to the exam. (Include vitamins, minerals and over-the-counter drugs.) You should wear comfortable clothes. Leave your valuables at home. Please bring related prior results and films.  Tell your doctor:   If you are breastfeeding or may be pregnant.   If you have had a barium test within the past few days. Barium may change the results of certain exams.   If you think you may need sedation (medicine to help you relax).  You may eat and drink as normal.  Please call your Imaging Department at your exam site with any  "questions.              Future tests that were ordered for you today     Open Standing Orders        Priority Remaining Interval Expires Ordered    PSA tumor marker Routine 25/25  3/8/2018 3/8/2017    Comprehensive metabolic panel Routine 25/25  3/8/2018 3/8/2017            Who to contact     If you have questions or need follow up information about today's clinic visit or your schedule please contact [facility] directly at [phone].  Normal or non-critical lab and imaging results will be communicated to you by [employer], letter or phone within 4 business days after the clinic has received the results. If you do not hear from us within 7 days, please contact the clinic through "[employer] or phone. If you have a critical or abnormal lab result, we will notify you by phone as soon as possible.  Submit refill requests through "[employer] or call your pharmacy and they will forward the refill request to us. Please allow 3 business days for your refill to be completed.          Additional Information About Your Visit        [employer] Information     "[employer] gives you secure access to your electronic health record. If you see a primary care provider, you can also send messages to your care team and make appointments. If you have questions, please call your primary care clinic.  If you do not have a primary care provider, please call [phone] and they will assist you.        Care EveryWhere ID     This is your Care EveryWhere ID. This could be used by other organizations to access your [place] medical records  [id number]        Your Vitals Were     Pulse Height Pulse Oximetry BMI (Body Mass Index)          65 1.689 m (5' 6.5\") 99% 22.1 kg/m2         Blood Pressure from Last 3 Encounters:   03/09/17 114/72   03/08/17 (!) 110/39   02/24/17 110/81    Weight from Last 3 Encounters:   03/09/17 63 kg (139 lb)   03/08/17 69.3 kg (152 lb 11.2 oz)   02/24/17 64 kg (141 lb)              Today, you had the following     No " orders found for display         Today's Medication Changes          These changes are accurate as of: 3/9/17  1:31 PM.  If you have any questions, ask your nurse or doctor.               These medicines have changed or have updated prescriptions.        Dose/Directions    metoprolol 25 MG 24 hr tablet   Commonly known as:  TOPROL-XL   This may have changed:  how much to take   Used for:  (HFpEF) heart failure with preserved ejection fraction (H)   Changed by:  Jill Juarez NP        Dose:  12.5 mg   Take 0.5 tablets (12.5 mg) by mouth daily AM   Quantity:  45 tablet   Refills:  3            Where to get your medicines      These medications were sent to [facility] [address]  [address] [address]     Phone:  [phone]     metoprolol 25 MG 24 hr tablet                Primary Care Provider Office Phone # Fax #    Lucrecia Collier [phone]       [facility] [address]  [facility] [address]        Thank you!     Thank you for choosing [facility]  for your care. Our goal is always to provide you with excellent care. Hearing back from our patients is one way we can continue to improve our services. Please take a few minutes to complete the written survey that you may receive in the mail after your visit with us. Thank you!             Your Updated Medication List - Protect others around you: Learn how to safely use, store and throw away your medicines at www.disposemymeds.org.          This list is accurate as of: 3/9/17  1:31 PM.  Always use your most recent med list.                   Brand Name Dispense Instructions for use    abiraterone 250 MG tablet    ZYTIGA    120 tablet    Take 4 tablets (1,000 mg) by mouth daily Take on empty stomach.       aspirin 81 MG tablet      Take 81 mg by mouth daily       calcium carbonate 500 MG tablet    OS-RODNEY 500 mg [place]. Ca    120 tablet    Take 2 tablets (1,000 mg)  by mouth 2 times daily       diphenoxylate-atropine 2.5-0.025 MG per tablet    LOMOTIL     Take 1 tablet by mouth 4 times daily as needed for diarrhea Reported on 3/8/2017       enoxaparin 60 MG/0.6ML injection    LOVENOX    60 Syringe    Inject 0.6 mLs (60 mg) Subcutaneous 2 times daily       HYDROcodone-acetaminophen 5-325 MG per tablet    NORCO     Take 1 tablet by mouth every 6 hours as needed for moderate to severe pain Reported on 3/8/2017       loperamide 2 MG capsule    IMODIUM    60 capsule    Use 2 caps PO after first loose stool.  Repeat 1 cap after each subsequent loose stool.  Max 8/24 hours.       metoprolol 25 MG 24 hr tablet    TOPROL-XL    45 tablet    Take 0.5 tablets (12.5 mg) by mouth daily AM       Multi-vitamin Tabs tablet     100 tablet    Take 1 tablet by mouth every morning Reported on 3/8/2017       predniSONE 5 MG tablet    DELTASONE    60 tablet    Take 1 tablet (5 mg) by mouth 2 times daily       simvastatin 40 MG tablet    ZOCOR    90 tablet    TAKE ONE TABLET BY MOUTH AT BEDTIME       tamsulosin 0.4 MG capsule    FLOMAX    90 capsule    Take 1 capsule (0.4 mg) by mouth daily AM       vitamin D 2000 UNITS tablet     100 tablet    Take 1 tablet by mouth daily

## 2017-03-09 NOTE — LETTER
3/9/2017      RE: Oswaldo Win  [address]  [facility] [address]       Dear Colleague,    Thank you for the opportunity to participate in the care of your patient, Oswaldo Win, at the [facility]. Please see a copy of my visit note below.    HPI:   Mr. Oswaldo Win is a 71 year old man with a past medical history including CAD with inferior STEMI (4/15) for which he received a BMS to posterior RCA and required IABP and vasopressor support for cardiogenic shock and amiodarone for VT. His EF by MRI at this point was down to 42% with mild MR. 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 Unfortunately, he returned with NSTEMI in September 2014 with in-stent thrombosis of the RCA BMS. A KIN was placed. He stabilized from a cardiac perspective but was diagnosed with metastatic prostate cancer for which he's undergoing hormone therapy. He returns to [facility] clinic for follow up.  Mainor has noted recent lightheaded and was seen in oncology where he appeared orthostatic and dehydrated. His blood pressures have been hovering around 100 systolic. He is short of breath walking quickly, climbing any incline. No orthopnea, PND, chest pain, palpitations, or edema. Appetite is fair though he notes intermittent nausea.    PMH  Past Medical History:   Diagnosis Date     Arthritis      ASCVD (arteriosclerotic cardiovascular disease) 4/10/2014     Closed L4 vertebral fracture (H) 2005    traumatic fracture     COPD (chronic obstructive pulmonary disease) (H)      Dyspnea on exertion      Fx tib/fib fol insrt ortho implnt/prosth/bone plt, right leg 2005    rt tib/fib fracture s/p ORIF     Gastro-oesophageal reflux disease      Hx of right coronary artery stent placement 2014 4/2014 and redone 9/2014     Hyperlipidemia LDL goal < 70      Hypertension      Influenza A 3/27/2016      Musculoskeletal leg pain     from old leg injuries     Other chronic pain      Prostate cancer 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)      Pulmonary embolism (H) 16     ST elevation MI (STEMI) (H) 2014     Stented coronary artery      Thrombosis of leg 10/2015    2015     Social History     Social History     Marital status:      Spouse name: N/A     Number of children: 2     Years of education: N/A     Occupational History      Retired           Social History Main Topics     Smoking status: Former Smoker     Packs/day: 0.50     Years: 40.00     Types: Cigarettes     Quit date: 2000     Smokeless tobacco: Never Used     Alcohol use 0.0 oz/week     0 Standard drinks or equivalent per week      Comment: rare     Drug use: No     Sexual activity: Yes     Partners: Female     Other Topics Concern     Parent/Sibling W/ Cabg, Mi Or Angioplasty Before 65f 55m? Yes     father at age 44yrs      Social History Narrative    Mom  at age 93 from CHF    Dad  at age 41 from congenital heart defect    Sibling:  Sister 74 years ago in good health    Children two adult sons alive and well        Occupation:  , taught electronics and , and .    Musician      Family History   Problem Relation Age of Onset     HEART DISEASE Mother 50     CAB, pacemaker     HEART DISEASE Father 41     MI     HEART DISEASE Maternal Uncle      Breast Cancer Paternal Grandmother      Breast Cancer Paternal Aunt      CEREBROVASCULAR DISEASE No family hx of      DIABETES No family hx of      CURRENT MEDICATIONS:  Outpatient Medications Prior to Visit   Medication Sig Dispense Refill     enoxaparin (LOVENOX) 60 MG/0.6ML injection Inject 0.6 mLs (60 mg) Subcutaneous 2 times daily 60 Syringe 3     loperamide (IMODIUM) 2 MG capsule Use 2 caps PO after first loose stool.  Repeat 1 cap after each subsequent loose stool.  Max 8/24 hours. (Patient not taking:  Reported on 6/19/2017) 60 capsule 1     predniSONE (DELTASONE) 5 MG tablet Take 1 tablet (5 mg) by mouth 2 times daily 60 tablet 2     abiraterone (ZYTIGA) 250 MG tablet Take 4 tablets (1,000 mg) by mouth daily Take on empty stomach. (Patient not taking: Reported on 4/5/2017) 120 tablet 2     diphenoxylate-atropine (LOMOTIL) 2.5-0.025 MG per tablet Take 1 tablet by mouth 4 times daily as needed for diarrhea Reported on 5/22/2017       calcium carbonate (OS-RODNEY 500 MG [place]. [place]) 500 MG tablet Take 2 tablets (1,000 mg) by mouth 2 times daily 120 tablet 3     aspirin 81 MG tablet Take 81 mg by mouth daily       Cholecalciferol (VITAMIN D) 2000 UNITS tablet Take 1 tablet by mouth daily 100 tablet 3     simvastatin (ZOCOR) 40 MG tablet TAKE ONE TABLET BY MOUTH AT BEDTIME 90 tablet 3     tamsulosin (FLOMAX) 0.4 MG 24 hr capsule Take 1 capsule (0.4 mg) by mouth daily AM 90 capsule 3     HYDROcodone-acetaminophen (NORCO) 5-325 MG per tablet Take 1 tablet by mouth every 6 hours as needed for moderate to severe pain Reported on 5/22/2017       multivitamin, therapeutic with minerals (MULTI-VITAMIN) TABS Take 1 tablet by mouth every morning Reported on 4/5/2017 100 tablet 3     metoprolol (TOPROL-XL) 25 MG 24 hr tablet Take 1 tablet (25 mg) by mouth daily AM 90 tablet 4     Facility-Administered Medications Prior to Visit   Medication Dose Route Frequency Provider Last Rate Last Dose     hydrocortisone sodium succinate (solu-CORTEF) injection    PRN Krissy Foss APRN CRNA   150 mg at 02/05/16 0942     glycopyrrolate (ROBINUL) injection   Intravenous PRN Rd Diamond MD   0.6 mg at 10/16/15 1300     neostigmine (PROSTIGMINE) injection    PRN Rd Diamond MD   3 mg at 10/16/15 1300     protamine injection   Intravenous PRN Rd Diamond MD   25 mg at 10/16/15 1216     albuterol (PROAIR HFA, PROVENTIL HFA, VENTOLIN HFA) inhaler    PRN Rd Diamond MD   5 puff at 10/16/15 1303     ROS:  Constitutional: No fever,  "chills, or sweats. Weights loss of almost 10 pounds in last 3-4 months  ENT: No visual disturbance, ear ache, epistaxis, sore throat.   Allergies/Immunologic: Negative.   Respiratory: per HPI, recent PE and hemoptysis.   Cardiovascular: Per HPI.   GI: No nausea, vomiting, hematemesis, melena, or hematochezia.    Psychiatric: Negative.   Neuro: Negative.   Musculoskeletal: No myalgias      PHYSICAL EXAM  /72 (BP Location: Left arm, Patient Position: Chair, Cuff Size: Adult Regular)  Pulse 65  Ht 1.689 m (5' 6.5\")  Wt 63 kg (139 lb)  SpO2 99%  BMI 22.1 kg/m2   General: pale man in no acute distress, ambulatory, alert, and articulate  HEENT: Sclera anicteric, EOMI. Dentition intact. Right jaw prominence along the mandible is soft, nontender and without redness or warmth  Chest: Respirations even and unlabored. Lungs clear throughout  CV: Regular rate and rhythm with normal S1 and S2 with 2/6 systolic murmur at the apex. No JVD   Abd: Flat, nondistended, nontender  Extremities: warm without edema   Psych: appropriate and intact    Last Basic Metabolic Panel:  Lab Results   Component Value Date     03/08/2017      Lab Results   Component Value Date    POTASSIUM 3.6 03/08/2017     Lab Results   Component Value Date    CHLORIDE 110 03/08/2017     Lab Results   Component Value Date    RODNEY 8.5 03/08/2017     Lab Results   Component Value Date    CO2 21 03/08/2017     Lab Results   Component Value Date    BUN 13 03/08/2017     Lab Results   Component Value Date    CR 0.92 03/08/2017     Lab Results   Component Value Date     03/08/2017       ASSESSMENT AND PLAN:  Mr. Win is a pleasant 71 year old man with a history of CAD s/p robotic CABG, BMS to RCA with in-stent thrombosis and subsequent KIN placement, ICM (EF 30-35%), metastatic prostate cancer, and status post MitraClip placement and recent PE who appears well from a heart failure standpoint. He will reduce Toprol to 12.5 mg daily and push " fluids.  1. Chronic Systolic heart failure; NYHA Class II  ACE/ARB: NO-stopped d/t hypotension and declining renal function  Beta blocker: Yes--reducing today  Aldost Antag: No- h/o hyperkalemia  ICD / CRT: No-Not indicated. Echo 5/5/16 showed EF 45-50%  Volume Status: euvolemic  2. CAD: Continue statin. Continue beta. Plavix (Mitraclip) has been discontinued with the addition of warfarin. Continues aspirin  3. Mitral Regurgitation S/P MitraClip placement. On ASA and follow up with Dr. Kim 1 year  4. Provoked DVT (cancer) and recent PE: Continues Lovenox for now.  5. Prostate CA with bony mets: per oncology.     20 minutes was spent in direct patient contact of which >50% was spent counseling.    Jill Juarez NP

## 2017-03-09 NOTE — PROGRESS NOTES
HPI:   Mr. Oswaldo Win is a 71 year old man with a past medical history including CAD with inferior STEMI (4/15) for which he received a BMS to posterior RCA and required IABP and vasopressor support for cardiogenic shock and amiodarone for VT. His EF by MRI at this point was down to 42% with mild MR. 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 Unfortunately, he returned with NSTEMI in September 2014 with in-stent thrombosis of the RCA BMS. A KIN was placed. He stabilized from a cardiac perspective but was diagnosed with metastatic prostate cancer for which he's undergoing hormone therapy. He returns to [facility] clinic for follow up.  Mainor has noted recent lightheaded and was seen in oncology where he appeared orthostatic and dehydrated. His blood pressures have been hovering around 100 systolic. He is short of breath walking quickly, climbing any incline. No orthopnea, PND, chest pain, palpitations, or edema. Appetite is fair though he notes intermittent nausea.    PMH  Past Medical History:   Diagnosis Date     Arthritis      ASCVD (arteriosclerotic cardiovascular disease) 4/10/2014     Closed L4 vertebral fracture (H) 2005    traumatic fracture     COPD (chronic obstructive pulmonary disease) (H)      Dyspnea on exertion      Fx tib/fib fol insrt ortho implnt/prosth/bone plt, right leg 2005    rt tib/fib fracture s/p ORIF     Gastro-oesophageal reflux disease      Hx of right coronary artery stent placement 2014 4/2014 and redone 9/2014     Hyperlipidemia LDL goal < 70      Hypertension      Influenza A 3/27/2016     Musculoskeletal leg pain     from old leg injuries     Other chronic pain      Prostate cancer 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)      Pulmonary embolism (H) 11/8/16 November 2016     ST elevation MI (STEMI) (H) 4/5/2014     Stented coronary artery      Thrombosis of leg 10/2015    October 2015     Social History     Social  History     Marital status:      Spouse name: N/A     Number of children: 2     Years of education: N/A     Occupational History      Retired           Social History Main Topics     Smoking status: Former Smoker     Packs/day: 0.50     Years: 40.00     Types: Cigarettes     Quit date: 2000     Smokeless tobacco: Never Used     Alcohol use 0.0 oz/week     0 Standard drinks or equivalent per week      Comment: rare     Drug use: No     Sexual activity: Yes     Partners: Female     Other Topics Concern     Parent/Sibling W/ Cabg, Mi Or Angioplasty Before 65f 55m? Yes     father at age 44yrs      Social History Narrative    Mom  at age 93 from CHF    Dad  at age 41 from congenital heart defect    Sibling:  Sister 74 years ago in good health    Children two adult sons alive and well        Occupation:  , taught electronics and , and .    Musician      Family History   Problem Relation Age of Onset     HEART DISEASE Mother 50     CAB, pacemaker     HEART DISEASE Father 41     MI     HEART DISEASE Maternal Uncle      Breast Cancer Paternal Grandmother      Breast Cancer Paternal Aunt      CEREBROVASCULAR DISEASE No family hx of      DIABETES No family hx of      CURRENT MEDICATIONS:  Outpatient Medications Prior to Visit   Medication Sig Dispense Refill     enoxaparin (LOVENOX) 60 MG/0.6ML injection Inject 0.6 mLs (60 mg) Subcutaneous 2 times daily 60 Syringe 3     loperamide (IMODIUM) 2 MG capsule Use 2 caps PO after first loose stool.  Repeat 1 cap after each subsequent loose stool.  Max 8/24 hours. (Patient not taking: Reported on 2017) 60 capsule 1     predniSONE (DELTASONE) 5 MG tablet Take 1 tablet (5 mg) by mouth 2 times daily 60 tablet 2     abiraterone (ZYTIGA) 250 MG tablet Take 4 tablets (1,000 mg) by mouth daily Take on empty stomach. (Patient not taking: Reported on 2017) 120 tablet 2     diphenoxylate-atropine  (LOMOTIL) 2.5-0.025 MG per tablet Take 1 tablet by mouth 4 times daily as needed for diarrhea Reported on 5/22/2017       calcium carbonate (OS-RODNEY 500 MG [place]. [place]) 500 MG tablet Take 2 tablets (1,000 mg) by mouth 2 times daily 120 tablet 3     aspirin 81 MG tablet Take 81 mg by mouth daily       Cholecalciferol (VITAMIN D) 2000 UNITS tablet Take 1 tablet by mouth daily 100 tablet 3     simvastatin (ZOCOR) 40 MG tablet TAKE ONE TABLET BY MOUTH AT BEDTIME 90 tablet 3     tamsulosin (FLOMAX) 0.4 MG 24 hr capsule Take 1 capsule (0.4 mg) by mouth daily AM 90 capsule 3     HYDROcodone-acetaminophen (NORCO) 5-325 MG per tablet Take 1 tablet by mouth every 6 hours as needed for moderate to severe pain Reported on 5/22/2017       multivitamin, therapeutic with minerals (MULTI-VITAMIN) TABS Take 1 tablet by mouth every morning Reported on 4/5/2017 100 tablet 3     metoprolol (TOPROL-XL) 25 MG 24 hr tablet Take 1 tablet (25 mg) by mouth daily AM 90 tablet 4     Facility-Administered Medications Prior to Visit   Medication Dose Route Frequency Provider Last Rate Last Dose     hydrocortisone sodium succinate (solu-CORTEF) injection    PRN Krissy Foss APRN CRNA   150 mg at 02/05/16 0942     glycopyrrolate (ROBINUL) injection   Intravenous PRN Rd Diamond MD   0.6 mg at 10/16/15 1300     neostigmine (PROSTIGMINE) injection    PRN Rd Diamond MD   3 mg at 10/16/15 1300     protamine injection   Intravenous PRN Rd Diamond MD   25 mg at 10/16/15 1216     albuterol (PROAIR HFA, PROVENTIL HFA, VENTOLIN HFA) inhaler    PRN Rd Diamond MD   5 puff at 10/16/15 1303     ROS:  Constitutional: No fever, chills, or sweats. Weights loss of almost 10 pounds in last 3-4 months  ENT: No visual disturbance, ear ache, epistaxis, sore throat.   Allergies/Immunologic: Negative.   Respiratory: per HPI, recent PE and hemoptysis.   Cardiovascular: Per HPI.   GI: No nausea, vomiting, hematemesis, melena, or hematochezia.   "  Psychiatric: Negative.   Neuro: Negative.   Musculoskeletal: No myalgias      PHYSICAL EXAM  /72 (BP Location: Left arm, Patient Position: Chair, Cuff Size: Adult Regular)  Pulse 65  Ht 1.689 m (5' 6.5\")  Wt 63 kg (139 lb)  SpO2 99%  BMI 22.1 kg/m2   General: pale man in no acute distress, ambulatory, alert, and articulate  HEENT: Sclera anicteric, EOMI. Dentition intact. Right jaw prominence along the mandible is soft, nontender and without redness or warmth  Chest: Respirations even and unlabored. Lungs clear throughout  CV: Regular rate and rhythm with normal S1 and S2 with 2/6 systolic murmur at the apex. No JVD   Abd: Flat, nondistended, nontender  Extremities: warm without edema   Psych: appropriate and intact    Last Basic Metabolic Panel:  Lab Results   Component Value Date     03/08/2017      Lab Results   Component Value Date    POTASSIUM 3.6 03/08/2017     Lab Results   Component Value Date    CHLORIDE 110 03/08/2017     Lab Results   Component Value Date    RODNEY 8.5 03/08/2017     Lab Results   Component Value Date    CO2 21 03/08/2017     Lab Results   Component Value Date    BUN 13 03/08/2017     Lab Results   Component Value Date    CR 0.92 03/08/2017     Lab Results   Component Value Date     03/08/2017       ASSESSMENT AND PLAN:  Mr. Win is a pleasant 71 year old man with a history of CAD s/p robotic CABG, BMS to RCA with in-stent thrombosis and subsequent KIN placement, ICM (EF 30-35%), metastatic prostate cancer, and status post MitraClip placement and recent PE who appears well from a heart failure standpoint. He will reduce Toprol to 12.5 mg daily and push fluids.  1. Chronic Systolic heart failure; NYHA Class II  ACE/ARB: NO-stopped d/t hypotension and declining renal function  Beta blocker: Yes--reducing today  Aldost Antag: No- h/o hyperkalemia  ICD / CRT: No-Not indicated. Echo 5/5/16 showed EF 45-50%  Volume Status: euvolemic  2. CAD: Continue statin. Continue " beta. Plavix (Mitraclip) has been discontinued with the addition of warfarin. Continues aspirin  3. Mitral Regurgitation S/P MitraClip placement. On ASA and follow up with Dr. Kim 1 year  4. Provoked DVT (cancer) and recent PE: Continues Lovenox for now.  5. Prostate CA with bony mets: per oncology.     20 minutes was spent in direct patient contact of which >50% was spent counseling.

## 2017-03-09 NOTE — NURSING NOTE
Med Reconcile: Reviewed and verified all current medications with the patient. The updated medication list was printed and given to the patient.    Return Appointment: Patient given instructions regarding scheduling next clinic visit. Patient demonstrated understanding of this information and agreed to call with further questions or concerns.    Medication Change: Metoprolol Xl decreased to 12.5 mg daily. Patient was educated regarding prescribed medication change, including discussion of the indication, administration, side effects, and when to report to MD or RN. Patient demonstrated understanding of this information and agreed to call with further questions or concerns.    Patient stated he understood all health information given and agreed to call with further questions or concerns.    Kelvin Mcclendon, RN  RN Care Coordinator  [facility]  [phone]

## 2017-03-09 NOTE — PATIENT INSTRUCTIONS
"You were seen today in the Cardiovascular Clinic at the [facility].     Cardiology Providers you saw during your visit: Jill Fairbanks, STEVE      1.  Decrease your Metoprolol XL to 12.5 mg daily.  2.  Please drink more fluid.  Your fluid restriction is 2 1/2 Liters daily.      Please limit your salt intake to 2 grams a day or less.    If you gain 2# in 24 hours or 5# in one week call Alexia Carrasco RN so we can adjust your medications as needed over the phone.    Please feel free to call me with any questions or concerns.      Alexia Carrasco RN BSN CHFN  [facility]  Cardiology Care Coordinator-Heart Failure Clinic    Questions and [phone].   First press #1 for the University and then press #3 for \"Medical Questions\" to reach us Cardiology Nurses.     On Call Cardiologist for after hours or on weekends: [phone]   option #4 and ask to speak to the on-call Cardiologist. Inform them you are a [facility]/heart failure patient at the [facility].        If you need a medication refill please contact your pharmacy.  Please allow 3 business days for your refill to be completed.  _______________________________________________________  [facility]IN[facility] Cardiomyopathy, Optimization, Rehabilitation, Education   The C.[facility].R.E. CLINIC is a heart failure specialty clinic within the [facility] where you will work with specialized nurse practitioners dedicated to helping patients with heart failure carefully adjust medications, receive education, and learn who and when to call if symptoms develop. They specialize in helping you better understand your condition, slow the progression of your disease, improve the length and quality of your life, help you detect future heart problems before they become life threatening, and avoid hospitalizations.  As always, thank you for trusting us with your health care needs!        "

## 2017-03-09 NOTE — NURSING NOTE
Chief Complaint   Patient presents with     Follow Up For     Return CORE appt; 71yr old male with a h/o chronic systolic heart failure presenting for follow-up post hospitalization with labs the day before.

## 2017-03-15 ENCOUNTER — CARE COORDINATION (OUTPATIENT)
Dept: ONCOLOGY | Facility: CLINIC | Age: 72
End: 2017-03-15

## 2017-03-15 ENCOUNTER — ONCOLOGY VISIT (OUTPATIENT)
Dept: ONCOLOGY | Facility: CLINIC | Age: 72
End: 2017-03-15
Attending: INTERNAL MEDICINE
Payer: COMMERCIAL

## 2017-03-15 VITALS
OXYGEN SATURATION: 100 % | RESPIRATION RATE: 16 BRPM | WEIGHT: 147.3 LBS | TEMPERATURE: 97.7 F | SYSTOLIC BLOOD PRESSURE: 76 MMHG | DIASTOLIC BLOOD PRESSURE: 51 MMHG | BODY MASS INDEX: 23.42 KG/M2 | HEART RATE: 98 BPM

## 2017-03-15 VITALS — SYSTOLIC BLOOD PRESSURE: 112 MMHG | HEART RATE: 88 BPM | DIASTOLIC BLOOD PRESSURE: 54 MMHG

## 2017-03-15 DIAGNOSIS — C79.51 PROSTATE CANCER METASTATIC TO BONE (H): Primary | ICD-10-CM

## 2017-03-15 DIAGNOSIS — C79.51 PROSTATE CANCER METASTATIC TO BONE (H): ICD-10-CM

## 2017-03-15 DIAGNOSIS — I50.22 CHRONIC SYSTOLIC HEART FAILURE (H): ICD-10-CM

## 2017-03-15 DIAGNOSIS — E86.0 DEHYDRATION: ICD-10-CM

## 2017-03-15 DIAGNOSIS — C61 PROSTATE CANCER METASTATIC TO BONE (H): Primary | ICD-10-CM

## 2017-03-15 DIAGNOSIS — R19.7 DIARRHEA, UNSPECIFIED TYPE: ICD-10-CM

## 2017-03-15 DIAGNOSIS — C61 PROSTATE CANCER METASTATIC TO BONE (H): ICD-10-CM

## 2017-03-15 LAB
ALBUMIN SERPL-MCNC: 3.9 G/DL (ref 3.4–5)
ALP SERPL-CCNC: 546 U/L (ref 40–150)
ALT SERPL W P-5'-P-CCNC: 17 U/L (ref 0–70)
ANION GAP SERPL CALCULATED.3IONS-SCNC: 12 MMOL/L (ref 3–14)
AST SERPL W P-5'-P-CCNC: 24 U/L (ref 0–45)
BASOPHILS # BLD AUTO: 0 10E9/L (ref 0–0.2)
BASOPHILS NFR BLD AUTO: 0.6 %
BILIRUB SERPL-MCNC: 1.4 MG/DL (ref 0.2–1.3)
BUN SERPL-MCNC: 18 MG/DL (ref 7–30)
CALCIUM SERPL-MCNC: 9 MG/DL (ref 8.5–10.1)
CHLORIDE SERPL-SCNC: 108 MMOL/L (ref 94–109)
CO2 SERPL-SCNC: 19 MMOL/L (ref 20–32)
CREAT SERPL-MCNC: 0.9 MG/DL (ref 0.66–1.25)
DIFFERENTIAL METHOD BLD: ABNORMAL
EOSINOPHIL # BLD AUTO: 0 10E9/L (ref 0–0.7)
EOSINOPHIL NFR BLD AUTO: 0.3 %
ERYTHROCYTE [DISTWIDTH] IN BLOOD BY AUTOMATED COUNT: 16.5 % (ref 10–15)
GFR SERPL CREATININE-BSD FRML MDRD: 83 ML/MIN/1.7M2
GLUCOSE SERPL-MCNC: 128 MG/DL (ref 70–99)
HCT VFR BLD AUTO: 28.1 % (ref 40–53)
HGB BLD-MCNC: 9.7 G/DL (ref 13.3–17.7)
IMM GRANULOCYTES # BLD: 0 10E9/L (ref 0–0.4)
IMM GRANULOCYTES NFR BLD: 0.9 %
LYMPHOCYTES # BLD AUTO: 0.6 10E9/L (ref 0.8–5.3)
LYMPHOCYTES NFR BLD AUTO: 16.5 %
MAGNESIUM SERPL-MCNC: 2.4 MG/DL (ref 1.6–2.3)
MCH RBC QN AUTO: 30.8 PG (ref 26.5–33)
MCHC RBC AUTO-ENTMCNC: 34.5 G/DL (ref 31.5–36.5)
MCV RBC AUTO: 89 FL (ref 78–100)
MONOCYTES # BLD AUTO: 0.3 10E9/L (ref 0–1.3)
MONOCYTES NFR BLD AUTO: 9.3 %
NEUTROPHILS # BLD AUTO: 2.4 10E9/L (ref 1.6–8.3)
NEUTROPHILS NFR BLD AUTO: 72.4 %
NRBC # BLD AUTO: 0 10*3/UL
NRBC BLD AUTO-RTO: 0 /100
PLATELET # BLD AUTO: 148 10E9/L (ref 150–450)
POTASSIUM SERPL-SCNC: 4 MMOL/L (ref 3.4–5.3)
PROT SERPL-MCNC: 7.5 G/DL (ref 6.8–8.8)
PSA SERPL-MCNC: 676.74 UG/L (ref 0–4)
RBC # BLD AUTO: 3.15 10E12/L (ref 4.4–5.9)
SODIUM SERPL-SCNC: 139 MMOL/L (ref 133–144)
WBC # BLD AUTO: 3.3 10E9/L (ref 4–11)

## 2017-03-15 PROCEDURE — 25000125 ZZHC RX 250: Performed by: RADIOLOGY

## 2017-03-15 PROCEDURE — 83735 ASSAY OF MAGNESIUM: CPT | Performed by: INTERNAL MEDICINE

## 2017-03-15 PROCEDURE — 99214 OFFICE O/P EST MOD 30 MIN: CPT | Mod: ZP | Performed by: PHYSICIAN ASSISTANT

## 2017-03-15 PROCEDURE — 87506 IADNA-DNA/RNA PROBE TQ 6-11: CPT | Performed by: PHYSICIAN ASSISTANT

## 2017-03-15 PROCEDURE — 84153 ASSAY OF PSA TOTAL: CPT | Performed by: INTERNAL MEDICINE

## 2017-03-15 PROCEDURE — 40000141 ZZH STATISTIC PERIPHERAL IV START W/O US GUIDANCE

## 2017-03-15 PROCEDURE — 87493 C DIFF AMPLIFIED PROBE: CPT | Performed by: PHYSICIAN ASSISTANT

## 2017-03-15 PROCEDURE — 80053 COMPREHEN METABOLIC PANEL: CPT | Performed by: INTERNAL MEDICINE

## 2017-03-15 PROCEDURE — 85025 COMPLETE CBC W/AUTO DIFF WBC: CPT | Performed by: INTERNAL MEDICINE

## 2017-03-15 PROCEDURE — 96360 HYDRATION IV INFUSION INIT: CPT

## 2017-03-15 PROCEDURE — 25000128 H RX IP 250 OP 636: Mod: ZF | Performed by: PHYSICIAN ASSISTANT

## 2017-03-15 RX ADMIN — SODIUM CHLORIDE 1000 ML: 9 INJECTION, SOLUTION INTRAVENOUS at 14:27

## 2017-03-15 RX ADMIN — LIDOCAINE HYDROCHLORIDE 0.5 ML: 10 INJECTION, SOLUTION EPIDURAL; INFILTRATION; INTRACAUDAL; PERINEURAL at 14:42

## 2017-03-15 ASSESSMENT — PAIN SCALES - GENERAL: PAINLEVEL: NO PAIN (0)

## 2017-03-15 NOTE — PROGRESS NOTES
Oncology/Hematology Visit Note  Mar 15, 2017    DIAGNOSIS:  Mr. Win is a 71 year old male with a hx of CHF, CKD, CAD w/ hx of STEMI and CABG. He met with Dr. Robles at the end of July for consultation regarding metastatic prostate cancer. His story begins in May 2015 when he saw his PCP for back pain, present since Feb. He also had poor appetite, weight loss of 8lb and poor energy.  CT showed diffuse bony mets. CT Chest showed sclerotic mets throughout the bones. He was given degarelix on 7/22 in Urology. He was having pelvic pain and was evaluated by Dr Cavazos would did not feel XRT was appropriate.  He started on docetaxel 7/31. From 8/7-8/14 he was admitted with colitis, CHAR, elevated BNP, lactis acidosis, and poor appetite. He was discharged to a TCU and unfortunately, it sounds like he was not given his Lasix.  His BUBBA worsened and his breathing became compromised.  He was seen by Chelsi Corral on 8/21 and eventually transferred to the ED and admitted 8/21-8/24 for acute on chronic CHF.  At our last visit we discussed not pursuing further Taxotere at this time and discussed starting Zytiga when he was ready.  We pursued PT, OT, home lymphedema for supportive care at home in attempt for further rehabilitation. He was again admitted and discharged - 10/20/15.  He never started the Zytiga as he was still actively recovering from his cardiac issues.  His PSA remained stable, thus no intervention was pursued.  December 2015, Mainor met with Dr Robles and since his ECOG was back to a 1, they discussed re-challenging the Taxotere at a lower dose. 9/26/2016 he started Provenge off study. He continues on lupron every 3 mos and XGEVA. 9/26/2016 he started Provenge off study. He received 3 doses of Provenge (last 10/28), on 3rd dose had fevers, chills, myalgias, was bedbound for a week due to these symptoms. During this time he held his coumadin for 5 days prior to a central line removal 10/31. Later than week he had sudden onset  of right pleuritic chest pain and hemoptysis. Diagnosed with acute segmental/subsegmental PE on 11/8 and prescribed Lovenox.     Mainor was briefly on Zytiga and prednisone in November-December, however after one month his PSA went up, thus it was decided to add in Xofigo (1/11/17-- first).     On 12/28 he started xaralto and took his last pill on 1/10/17.  He stopped as he noticed melanotic stools and had some hemoptysis.  He was seen in clinic the next day with a hgb in th 6 range and was admitted.  Pan-scopes showed no active bleeding.  He was able to get his Xofigo in patient as well.  He was transitioned back to lovenox BID.     INTERVAL HISTORY:Mainor comes in today as our RN called him to follow up on some labs and he reported he was having nausea and a few episodes of emesis this last ten days, but mostly 4-5 episodes of diarrhea daily.  He was feeling tired and washed out.     He does not drink fluids enough and was dehydrated when he recently presented to the ED on 2/23 with presyncope.     No new f/s/c. No chest pain/cough/dyspnea. No /GI issues. No rash.       MEDICATIONS:   Current Outpatient Prescriptions   Medication Sig     metoprolol (TOPROL-XL) 25 MG 24 hr tablet Take 0.5 tablets (12.5 mg) by mouth daily AM     enoxaparin (LOVENOX) 60 MG/0.6ML injection Inject 0.6 mLs (60 mg) Subcutaneous 2 times daily     loperamide (IMODIUM) 2 MG capsule Use 2 caps PO after first loose stool.  Repeat 1 cap after each subsequent loose stool.  Max 8/24 hours.     predniSONE (DELTASONE) 5 MG tablet Take 1 tablet (5 mg) by mouth 2 times daily     abiraterone (ZYTIGA) 250 MG tablet Take 4 tablets (1,000 mg) by mouth daily Take on empty stomach.     diphenoxylate-atropine (LOMOTIL) 2.5-0.025 MG per tablet Take 1 tablet by mouth 4 times daily as needed for diarrhea Reported on 3/8/2017     calcium carbonate (OS-RODNEY 500 MG [place]. [place]) 500 MG tablet Take 2 tablets (1,000 mg) by mouth 2 times daily     aspirin 81 MG tablet Take  81 mg by mouth daily     Cholecalciferol (VITAMIN D) 2000 UNITS tablet Take 1 tablet by mouth daily     simvastatin (ZOCOR) 40 MG tablet TAKE ONE TABLET BY MOUTH AT BEDTIME     tamsulosin (FLOMAX) 0.4 MG 24 hr capsule Take 1 capsule (0.4 mg) by mouth daily AM     HYDROcodone-acetaminophen (NORCO) 5-325 MG per tablet Take 1 tablet by mouth every 6 hours as needed for moderate to severe pain Reported on 3/8/2017     multivitamin, therapeutic with minerals (MULTI-VITAMIN) TABS Take 1 tablet by mouth every morning Reported on 3/8/2017     No current facility-administered medications for this visit.      Facility-Administered Medications Ordered in Other Visits   Medication     0.9% sodium chloride BOLUS     hydrocortisone sodium succinate (solu-CORTEF) injection     glycopyrrolate (ROBINUL) injection     neostigmine (PROSTIGMINE) injection     protamine injection     albuterol (PROAIR HFA, PROVENTIL HFA, VENTOLIN HFA) inhaler          ALLERGIES:  No Known Allergies    PHYSICAL EXAMINATION:  Vitals: BP (!) 76/51 (BP Location: Right arm, Patient Position: Chair, Cuff Size: Adult Regular)  Pulse 98  Temp 97.7  F (36.5  C) (Oral)  Resp 16  Wt 66.8 kg (147 lb 4.8 oz)  SpO2 100%  BMI 23.42 kg/m2  GENERAL:  A pleasant person in no acute distress.   HEENT:  Sclerae are nonicteric.  Mouth is without mucositis or thrush.   NECK:  Supple.   LYMPH NODES:  No peripheral lymphadenopathy noted in the supraclavicular or cervical regions.   LUNGS:  Clear to auscultation bilaterally.   HEART:  Regular rate and rhythm   ABDOMEN:  Bowel sounds are active.  Soft and nontender.  No hepatosplenomegaly or other masses appreciated.  LOWER EXTREMITIES:  Without pitting edema to the knees bilaterally.   NEUROLOGICAL:  Alert/orientated/able to answer all questions.  CN grossly intact.     LAB:      3/15/2017 14:10   Sodium 139   Potassium 4.0   Chloride 108   Carbon Dioxide 19 (L)   Urea Nitrogen 18   Creatinine 0.90   GFR Estimate 83   GFR  Estimate If Black >90...   Calcium 9.0   Anion Gap 12   Magnesium 2.4 (H)   Albumin 3.9   Protein Total 7.5   Bilirubin Total 1.4 (H)   Alkaline Phosphatase 546 (H)   ALT 17   AST 24   .74 (H)   Glucose 128 (H)   WBC 3.3 (L)   Hemoglobin 9.7 (L)   Hematocrit 28.1 (L)   Platelet Count 148 (L)   RBC Count 3.15 (L)   MCV 89      12/1/2016 12:47 12/21/2016 11:49 2/1/2017 10:40 3/15/2017 14:10   .65 (H) 424.22 (H) 748.87 (H) 676.74 (H)     IMPRESSION/PLAN:   1.  Metastatic prostate cancer to the bone-continues with Zytiga/pred (started mid-November) and Xofigo (1/11, 2/8).    -reviewed Mainor's PSA which finally has declined and his decreasing alk phos, will continue same plan for now as clinically he is stable and alk phos was declining. He is doing well with no new bone pains.  -Lupron last 2/8/17    2. Diarrhea- new last ten days. He felt it was from Xofigo- however I have not had patients that have had this intense of diarrhea. I think we need to look for infection.  Will order C Diff and SSCE today. Lytes/creat ok, was given 1 L of hydration as he was hypotensive although minimally symptomatic.  Encouraged Powerade at home.   -RTC on Friday for IVF if diarrhea continues     3.HEME: Mainor is on anti-coagulation for a PE diagnosed 11/8/16.  Had inpatient stent for GI bleed 1/11-1/14. -stopped Xarelto and transitioned back to lovenox BID.  No bleeding concerns  -need to get a Xa on Friday    3.  Bone metastases.  We plan to continue the Xgeva every 6 weeks.  No new bone pains/concerns  -xgeva last 2/8  -oral calcium/vit D      4. CV: Low BP today, but was better after decreasing BB at last cards visit.      Over 25 min of direct face to face time spent with patient with more than 50% time spent in counseling and coordinating care.      Mainor will RTC Friday for repeat IVF.     Sabrina Kerr PA-C

## 2017-03-15 NOTE — NURSING NOTE
Labs drawn peripherally.  VS done.  IV inserted.  Pt brought directly to infusion due to low BPs and Sabrina Mcguire notified of this.      Dimple Nieto  RN

## 2017-03-15 NOTE — PATIENT INSTRUCTIONS
Contact Numbers  [facility]: [phone]  Triage: [phone] (Monday-Friday 8-4:30)  After Hours:  [phone]    Please call the [facility] Triage line if you experience a temperature greater than or equal to 100.5, shaking chills, have uncontrolled nausea, vomiting and/or diarrhea, dizziness, shortness of breath, chest pain, bleeding, unexplained bruising, or if you have any other new/concerning symptoms, questions or concerns.     If it is after hours, weekends, or holidays, please call [phone] to speak to someone regarding your questions or concerns.    If you are having any concerning symptoms or wish to speak to a provider before your next infusion visit, please call your care coordinator or triage to notify them so we can adequately serve you.     If you need a refill on a narcotic prescription or other medication, please call triage before your infusion appointment.             March 2017 Sunday Monday Tuesday Wednesday Thursday Friday Saturday                  1     [facility] DRAW   10:30 AM   (15 min.)   [facility]   [facility] Lab Draw 2     3     4       5     6     7  Happy Birthday!     8     [facility] DRAW    8:45 AM   (15 min.)   [facility] D[facility]   [facility] Lab Draw     P RETURN    9:00 AM   (30 min.)   Bentley Robles MD   [facility]     NM RADIUM 223 XOFIGO THERAPY   11:00 AM   (30 min.)   [id number]   [facility], [place], Nuclear Medicine 9     P CORE RETURN   12:45 PM   (30 min.)   Jill Juarez NP   [facility] 10     11       12     13     14     15     [facility] DRAW    1:45 PM   (15 min.)    [facility] DRAW   [facility] Lab Draw     [facility] RETURN    1:55 PM   (50 min.)   Gabriela Mcguire PA-C   [facility] ONC INFUSION 120    3:30 PM   (120 min.)    ONCOLOGY INFUSION   [facility] 16     17     [facility] ONC INFUSION 120    2:30 PM   (120 min.)     ONCOLOGY INFUSION   [facility] 18       19     20     21     22     23     24     25       26     27     28     29     [facility] DRAW   10:30 AM   (15 min.)   [facility] DRAW   [facility] Lab Draw 30 31 April 2017 Sunday Monday Tuesday Wednesday Thursday Friday Saturday                                 1       2     3     4     5     [facility] DRAW    9:00 AM   (15 min.)   [facility] DRAW   [facility] Lab Draw     [facility] RETURN    9:15 AM   (50 min.)   Gabriela Mcguire PA-C   [facility]     NM RADIUM 223 XOFIGO THERAPY   11:00 AM   (30 min.)   [id number]   [facility], [place], Nuclear Medicine 6     7     8       9     10     11     12     13     14     15       16     17     18     19     20     LAB WITH  CLINIC    1:30 PM   (15 min.)    LAB   [facility] Lab     [facility] CORE RETURN    1:45 PM   (30 min.)   Jill Juarez NP   [facility] 21     22       23     24     25     26     [facility] DRAW   10:30 AM   (15 min.)   [facility] DRAW   [facility] Lab Draw 27 28     29       30                                                Lab Results:  Recent Results (from the past 12 hour(s))   PSA tumor marker    Collection Time: 03/15/17  2:10 PM   Result Value Ref Range    .74 (H) 0 - 4 ug/L   Comprehensive metabolic panel    Collection Time: 03/15/17  2:10 PM   Result Value Ref Range    Sodium 139 133 - 144 mmol/L    Potassium 4.0 3.4 - 5.3 mmol/L    Chloride 108 94 - 109 mmol/L    Carbon Dioxide 19 (L) 20 - 32 mmol/L    Anion Gap 12 3 - 14 mmol/L    Glucose 128 (H) 70 - 99 mg/dL    Urea Nitrogen 18 7 - 30 mg/dL    Creatinine 0.90 0.66 - 1.25 mg/dL    GFR Estimate 83 >60 mL/min/1.7m2    GFR Estimate If Black >90   GFR Calc   >60 mL/min/1.7m2    Calcium 9.0 8.5 - 10.1 mg/dL    Bilirubin Total 1.4 (H) 0.2 - 1.3 mg/dL    Albumin 3.9 3.4 - 5.0 g/dL    Protein Total 7.5 6.8 - 8.8 g/dL     Alkaline Phosphatase 546 (H) 40 - 150 U/L    ALT 17 0 - 70 U/L    AST 24 0 - 45 U/L   *CBC with platelets differential    Collection Time: 03/15/17  2:10 PM   Result Value Ref Range    WBC 3.3 (L) 4.0 - 11.0 10e9/L    RBC Count 3.15 (L) 4.4 - 5.9 10e12/L    Hemoglobin 9.7 (L) 13.3 - 17.7 g/dL    Hematocrit 28.1 (L) 40.0 - 53.0 %    MCV 89 78 - 100 fl    MCH 30.8 26.5 - 33.0 pg    MCHC 34.5 31.5 - 36.5 g/dL    RDW 16.5 (H) 10.0 - 15.0 %    Platelet Count 148 (L) 150 - 450 10e9/L    Diff Method Automated Method     % Neutrophils 72.4 %    % Lymphocytes 16.5 %    % Monocytes 9.3 %    % Eosinophils 0.3 %    % Basophils 0.6 %    % Immature Granulocytes 0.9 %    Nucleated RBCs 0 0 /100    Absolute Neutrophil 2.4 1.6 - 8.3 10e9/L    Absolute Lymphocytes 0.6 (L) 0.8 - 5.3 10e9/L    Absolute Monocytes 0.3 0.0 - 1.3 10e9/L    Absolute Eosinophils 0.0 0.0 - 0.7 10e9/L    Absolute Basophils 0.0 0.0 - 0.2 10e9/L    Abs Immature Granulocytes 0.0 0 - 0.4 10e9/L    Absolute Nucleated RBC 0.0    Magnesium    Collection Time: 03/15/17  2:10 PM   Result Value Ref Range    Magnesium 2.4 (H) 1.6 - 2.3 mg/dL

## 2017-03-15 NOTE — MR AVS SNAPSHOT
After Visit Summary   3/15/2017    Oswaldo Win    MRN: [id number]           Patient Information     Date Of Birth          1945        Visit Information        Provider Department      3/15/2017 3:30 PM  25 ATC;  ONCOLOGY INFUSION [facility]        Today's Diagnoses     Prostate cancer 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)    -  1    Diarrhea, unspecified type          Care Instructions    Contact Numbers  [facility]: [phone]  Triage: [phone] (Monday-Friday 8-4:30)  After Hours:  [phone]    Please call the [facility] Triage line if you experience a temperature greater than or equal to 100.5, shaking chills, have uncontrolled nausea, vomiting and/or diarrhea, dizziness, shortness of breath, chest pain, bleeding, unexplained bruising, or if you have any other new/concerning symptoms, questions or concerns.     If it is after hours, weekends, or holidays, please call [phone] to speak to someone regarding your questions or concerns.    If you are having any concerning symptoms or wish to speak to a provider before your next infusion visit, please call your care coordinator or triage to notify them so we can adequately serve you.     If you need a refill on a narcotic prescription or other medication, please call triage before your infusion appointment.             March 2017 Sunday Monday Tuesday Wednesday Thursday Friday Saturday                  1     [facility] MASONIC LAB DRAW   10:30 AM   (15 min.)   [facility]   [facility] 2     3     4       5     6     7  Happy Birthday!     8     [facility] MASONIC LAB DRAW    8:45 AM   (15 min.)    [facility] LAB [facility]RAW   [facility]     [facility] RETURN    9:00 AM   (30 min.)   Bentley Robles MD   [facility]     NM RADIUM 223 XOFIGO THERAPY   11:00 AM   (30 min.)   UUNMINJ1   [facility] [place], Nuclear Medicine 9     [facility] CORE RETURN   12:45 PM   (30 min.)   Jill Juarez NP     [facility] Heart [facility] 10     11       12     13     14     15     [facility] MASONIC LAB DRAW    1:45 PM   (15 min.)    MASONIC LAB DRAW   [facility] Masonic Lab Draw     [facility] RETURN    1:55 PM   (50 min.)   Gabriela Mcguire PA-C [facility]     [facility] ONC INFUSION 120    3:30 PM   (120 min.)    ONCOLOGY INFUSION   [facility] 16     17     [facility] ONC INFUSION 120    2:30 PM   (120 min.)    ONCOLOGY INFUSION   [facility] 18       19     20     21     22     23     24     25       26     27     28     29     [facility] MASONIC LAB DRAW   10:30 AM   (15 min.)    MASONIC LAB DRAW   [facility] Draw 30     31 April 2017 Sunday Monday Tuesday Wednesday Thursday Friday Saturday                                 1       2     3     4     5     P MASONIC LAB DRAW    9:00 AM   (15 min.)    [facility]   [facility]     [facility] RETURN    9:15 AM   (50 min.)   Gabriela Mcguire PA-C [facility]     NM RADIUM 223 XOFIGO THERAPY   11:00 AM   (30 min.)   UUNMINJ1   [facility] [place], Nuclear Medicine 6     7     8       9     10     11     12     13     14     15       16     17     18     19     20     LAB WITH  CLINIC    1:30 PM   (15 min.)    LAB   [facility] Lab     P CORE RETURN    1:45 PM   (30 min.)   Jill Juarez NP   [facility] 21     22       23     24     25     26     [facility] MASONIC LAB DRAW   10:30 AM   (15 min.)    MASONIC LAB DRAW   [facility] Masonic Lab Draw 27     28     29       30                                                Lab Results:  Recent Results (from the past 12 hour(s))   PSA tumor marker    Collection Time: 03/15/17  2:10 PM   Result Value Ref Range    .74 (H) 0 - 4 ug/L   Comprehensive metabolic panel    Collection Time: 03/15/17  2:10 PM   Result Value Ref Range    Sodium 139 133 - 144 mmol/L    Potassium 4.0 3.4 - 5.3 mmol/L    Chloride 108 94 - 109 mmol/L     Carbon Dioxide 19 (L) 20 - 32 mmol/L    Anion Gap 12 3 - 14 mmol/L    Glucose 128 (H) 70 - 99 mg/dL    Urea Nitrogen 18 7 - 30 mg/dL    Creatinine 0.90 0.66 - 1.25 mg/dL    GFR Estimate 83 >60 mL/min/1.7m2    GFR Estimate If Black >90   GFR Calc   >60 mL/min/1.7m2    Calcium 9.0 8.5 - 10.1 mg/dL    Bilirubin Total 1.4 (H) 0.2 - 1.3 mg/dL    Albumin 3.9 3.4 - 5.0 g/dL    Protein Total 7.5 6.8 - 8.8 g/dL    Alkaline Phosphatase 546 (H) 40 - 150 U/L    ALT 17 0 - 70 U/L    AST 24 0 - 45 U/L   *CBC with platelets differential    Collection Time: 03/15/17  2:10 PM   Result Value Ref Range    WBC 3.3 (L) 4.0 - 11.0 10e9/L    RBC Count 3.15 (L) 4.4 - 5.9 10e12/L    Hemoglobin 9.7 (L) 13.3 - 17.7 g/dL    Hematocrit 28.1 (L) 40.0 - 53.0 %    MCV 89 78 - 100 fl    MCH 30.8 26.5 - 33.0 pg    MCHC 34.5 31.5 - 36.5 g/dL    RDW 16.5 (H) 10.0 - 15.0 %    Platelet Count 148 (L) 150 - 450 10e9/L    Diff Method Automated Method     % Neutrophils 72.4 %    % Lymphocytes 16.5 %    % Monocytes 9.3 %    % Eosinophils 0.3 %    % Basophils 0.6 %    % Immature Granulocytes 0.9 %    Nucleated RBCs 0 0 /100    Absolute Neutrophil 2.4 1.6 - 8.3 10e9/L    Absolute Lymphocytes 0.6 (L) 0.8 - 5.3 10e9/L    Absolute Monocytes 0.3 0.0 - 1.3 10e9/L    Absolute Eosinophils 0.0 0.0 - 0.7 10e9/L    Absolute Basophils 0.0 0.0 - 0.2 10e9/L    Abs Immature Granulocytes 0.0 0 - 0.4 10e9/L    Absolute Nucleated RBC 0.0    Magnesium    Collection Time: 03/15/17  2:10 PM   Result Value Ref Range    Magnesium 2.4 (H) 1.6 - 2.3 mg/dL            Follow-ups after your visit        Your next 10 appointments already scheduled     Mar 17, 2017  2:30 PM CDT   Infusion 120 with  ONCOLOGY INFUSION, UC 19 ATC   [facility] ([facility])    [address] [place] [address]   [phone]            Mar 29, 2017 10:30 AM CDT   [facility]   [facility]  Lab Draw ([facility])    [address]  2nd [facility] [address]   [phone]            Apr 05, 2017  9:00 AM CDT   [employer] Lab Draw with  [employer] LAB DRAW   [facility]    [address]  [address] [facility] [address]   [phone]            Apr 05, 2017  9:30 AM CDT   (Arrive by 9:15 AM)   Return Visit with Gabriela Mcguire PA-C   [facility] ([facility])    [address] [place] [address]  2nd [facility] [address]   [phone]            Apr 05, 2017 11:00 AM CDT   NM RADIUM 223 XOFIGO THERAPY with UUNMINJ1   [facility] [place], Nuclear Medicine ([facility])    [address] [facility] [address] [phone]           Please bring a list of your medicines to the exam. (Include vitamins, minerals and over-the-counter drugs.) You should wear comfortable clothes. Leave your valuables at home. Please bring related prior results and films.  Tell your doctor:   If you are breastfeeding or may be pregnant.   If you have had a barium test within the past few days. Barium may change the results of certain exams.   If you think you may need sedation (medicine to help you relax).  You may eat and drink as normal.  Please call your Imaging Department at your exam site with any questions.            Apr 20, 2017  1:30 PM CDT   Lab with  LAB    [facility] ([facility])    [address]  1st Floor  [facility] [address]   [phone]            Apr 20, 2017  2:00 PM CDT   (Arrive by 1:45 PM)   CORE RETURN with Jill Juarez NP   [facility] ([facility])    [address] [place] [address]  3rd [facility] [address]   [phone]            Apr 26, 2017 10:30 AM CDT   Masonic Lab Draw with  MASONIC LAB DRAW   [facility] [employer] [facility]  Draw ([facility])    [address] [facility] [facility]  2nd [facility] [id number] [phone]            May 03, 2017  9:00 AM T   Masonic Lab Draw with  MASONIC LAB DRAW   [facility] Lab Draw ([facility])    [address] [address] [phone]              Future tests that were ordered for you today     Open Future Orders        Priority Expected Expires Ordered    Enteric Bacteria and Virus Panel by BRUNO Stool Routine  3/15/2018 3/15/2017    Clostridium difficile toxin B PCR Routine  3/15/2018 3/15/2017            Who to contact     If you have questions or need follow up information about today's clinic visit or your schedule please contact [facility] directly at [phone].  Normal or non-critical lab and imaging results will be communicated to you by MyChart, letter or phone within 4 business days after the clinic has received the results. If you do not hear from us within 7 days, please contact the clinic through EasyCopayhart or phone. If you have a critical or abnormal lab result, we will notify you by phone as soon as possible.  Submit refill requests through [employer] or call your pharmacy and they will forward the refill request to us. Please allow 3 business days for your refill to be completed.          Additional Information About Your Visit        Easy[employer] Information     [employer] gives you secure access to your electronic health record. If you see a primary care provider, you can also send messages to your care team and make appointments. If you have questions, please call your primary care clinic.  If you do not have a primary care provider, please call [phone] and they will assist you.        Care EveryWhere ID     This is your Care EveryWhere ID. This could be used by other organizations to access your [facility] medical records  [id number]        Your Vitals Were     Pulse                    88            Blood Pressure from Last 3 Encounters:   03/15/17 112/54   03/15/17 (!) 76/51   03/09/17 114/72    Weight from Last 3 Encounters:   03/15/17 66.8 kg (147 lb 4.8 oz)   03/09/17 63 kg (139 lb)   03/08/17 69.3 kg (152 lb 11.2 oz)               Primary Care Provider Office Phone # Fax #    Lucrecia Collier [phone]       [facility] [address] [facility] [address]        Thank you!     Thank you for choosing [facility] CANCER [facility]  for your care. Our goal is always to provide you with excellent care. Hearing back from our patients is one way we can continue to improve our services. Please take a few minutes to complete the written survey that you may receive in the mail after your visit with us. Thank you!             Your Updated Medication List - Protect others around you: Learn how to safely use, store and throw away your medicines at www.disposemymeds.org.          This list is accurate as of: 3/15/17  4:41 PM.  Always use your most recent med list.                   Brand Name Dispense Instructions for use    abiraterone 250 MG tablet    ZYTIGA    120 tablet    Take 4 tablets (1,000 mg) by mouth daily Take on empty stomach.       aspirin 81 MG tablet      Take 81 mg by mouth daily       calcium carbonate 500 MG tablet    OS-RODNEY 500 mg [place]. Ca    120 tablet    Take 2 tablets (1,000 mg) by mouth 2 times daily       diphenoxylate-atropine 2.5-0.025 MG per tablet    LOMOTIL     Take 1 tablet by mouth 4 times daily as needed for diarrhea Reported on 3/8/2017       enoxaparin 60 MG/0.6ML injection    LOVENOX    60 Syringe    Inject 0.6 mLs (60 mg) Subcutaneous 2 times daily       HYDROcodone-acetaminophen 5-325 MG per tablet    NORCO     Take 1 tablet by mouth every 6 hours as needed for moderate to severe pain Reported on 3/8/2017       loperamide 2 MG capsule    IMODIUM    60 capsule    Use 2 caps PO after first loose stool.  Repeat 1 cap after  each subsequent loose stool.  Max 8/24 hours.       metoprolol 25 MG 24 hr tablet    TOPROL-XL    45 tablet    Take 0.5 tablets (12.5 mg) by mouth daily AM       Multi-vitamin Tabs tablet     100 tablet    Take 1 tablet by mouth every morning Reported on 3/8/2017       predniSONE 5 MG tablet    DELTASONE    60 tablet    Take 1 tablet (5 mg) by mouth 2 times daily       simvastatin 40 MG tablet    ZOCOR    90 tablet    TAKE ONE TABLET BY MOUTH AT BEDTIME       tamsulosin 0.4 MG capsule    FLOMAX    90 capsule    Take 1 capsule (0.4 mg) by mouth daily AM       vitamin D 2000 UNITS tablet     100 tablet    Take 1 tablet by mouth daily

## 2017-03-15 NOTE — MR AVS SNAPSHOT
After Visit Summary   3/15/2017    Oswaldo Win    MRN: [id number]           Patient Information     Date Of Birth          1945        Visit Information        Provider Department      3/15/2017 2:10 PM Gabriela Mcguire PA-C [facility]        Today's Diagnoses     Prostate cancer 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)        Chronic systolic heart failure (H)        Dehydration           Follow-ups after your visit        Your next 10 appointments already scheduled     Mar 17, 2017  2:30 PM CDT   Infusion 120 with  ONCOLOGY INFUSION, UC 19 ATC   [facility] ([facility])    [address] [place] [address]   [phone]            Mar 29, 2017 10:30 AM CDT   Masonic Lab Draw with  MASONIC LAB DRAW   [facility] Masonic Lab Draw ([facility])    [address] [place] [address]   [phone]            Apr 05, 2017  9:00 AM CDT   Masonic Lab Draw with  MASONIC LAB DRAW   [facility] Masonic Lab Draw ([facility])    [address] [place] [address]   [phone]            Apr 05, 2017  9:30 AM CDT   (Arrive by 9:15 AM)   Return Visit with POLI Bridges [facility] ([facility])    [address] [place] [address]   [phone]            Apr 05, 2017 11:00 AM CDT   NM RADIUM 223 XOFIGO THERAPY with UUNMINJ1   [facility] [place], Nuclear Medicine ([facility] [place])    [address] [facility] [address]   [phone]           Please bring a list of your medicines to the exam. (Include vitamins, minerals and over-the-counter drugs.) You should wear comfortable clothes. Leave your valuables at home. Please bring related prior results and films.  Tell your doctor:   If you are breastfeeding or may  be pregnant.   If you have had a barium test within the past few days. Barium may change the results of certain exams.   If you think you may need sedation (medicine to help you relax).  You may eat and drink as normal.  Please call your Imaging Department at your exam site with any questions.            Apr 20, 2017  1:30 PM CDT   Lab with  LAB   [facility] ([facility])    [address]  1st Floor  [facility] [address] [phone]            Apr 20, 2017  2:00 PM CDT   (Arrive by 1:45 PM)   CORE RETURN with Jill Juarez NP   [facility] Care ([facility])    [address]  3rd Floor  [facility] [address] [phone]            Apr 26, 2017 10:30 AM CDT   Masonic Lab Draw with  MASONIC LAB DRAW   [facility] Masonic Lab Draw ([facility])    [address]  2nd [facility] [address] [phone]            May 03, 2017  9:00 AM CDT   Masonic Lab Draw with  MASONIC LAB DRAW   [facility] Masonic Lab Draw ([facility])    [address]  2nd Floor  [facility] [address] [phone]              Future tests that were ordered for you today     Open Future Orders        Priority Expected Expires Ordered    Enteric Bacteria and Virus Panel by BRUNO Stool Routine  3/15/2018 3/15/2017    Clostridium difficile toxin B PCR Routine  3/15/2018 3/15/2017            Who to contact     If you have questions or need follow up information about today's clinic visit or your schedule please contact [facility] directly at [phone].  Normal or non-critical lab and imaging results will be communicated to you by MyChart, letter or phone within 4 business days after the clinic has received the results. If you do not hear from us within 7 days, please contact the clinic through MyChart or phone. If you have a critical or abnormal lab result, we will  notify you by phone as soon as possible.  Submit refill requests through [employer]uServe or call your pharmacy and they will forward the refill request to us. Please allow 3 business days for your refill to be completed.          Additional Information About Your Visit        [employer] Information     [employer] gives you secure access to your electronic health record. If you see a primary care provider, you can also send messages to your care team and make appointments. If you have questions, please call your primary care clinic.  If you do not have a primary care provider, please call [phone] and they will assist you.        Care EveryWhere ID     This is your Care EveryWhere ID. This could be used by other organizations to access your [facility] medical records  [id number]        Your Vitals Were     Pulse Temperature Respirations Pulse Oximetry BMI (Body Mass Index)       98 97.7  F (36.5  C) (Oral) 16 100% 23.42 kg/m2        Blood Pressure from Last 3 Encounters:   03/15/17 112/54   03/15/17 (!) 76/51   03/09/17 114/72    Weight from Last 3 Encounters:   03/15/17 66.8 kg (147 lb 4.8 oz)   03/09/17 63 kg (139 lb)   03/08/17 69.3 kg (152 lb 11.2 oz)              We Performed the Following     *CBC with platelets differential     Comprehensive metabolic panel     Magnesium     PSA tumor marker        Primary Care Provider Office Phone # Fax #    Lucrecia Collier [phone]       [facility] [address] [facility] [address]        Thank you!     Thank you for choosing [facility] CANCER [facility]  for your care. Our goal is always to provide you with excellent care. Hearing back from our patients is one way we can continue to improve our services. Please take a few minutes to complete the written survey that you may receive in the mail after your visit with us. Thank you!             Your Updated Medication List - Protect others around you: Learn how to safely use, store and throw  away your medicines at www.disposemymeds.org.          This list is accurate as of: 3/15/17 11:59 PM.  Always use your most recent med list.                   Brand Name Dispense Instructions for use    abiraterone 250 MG tablet    ZYTIGA    120 tablet    Take 4 tablets (1,000 mg) by mouth daily Take on empty stomach.       aspirin 81 MG tablet      Take 81 mg by mouth daily       calcium carbonate 500 MG tablet    OS-RODNEY 500 mg [place]. Ca    120 tablet    Take 2 tablets (1,000 mg) by mouth 2 times daily       diphenoxylate-atropine 2.5-0.025 MG per tablet    LOMOTIL     Take 1 tablet by mouth 4 times daily as needed for diarrhea Reported on 3/8/2017       enoxaparin 60 MG/0.6ML injection    LOVENOX    60 Syringe    Inject 0.6 mLs (60 mg) Subcutaneous 2 times daily       HYDROcodone-acetaminophen 5-325 MG per tablet    NORCO     Take 1 tablet by mouth every 6 hours as needed for moderate to severe pain Reported on 3/8/2017       loperamide 2 MG capsule    IMODIUM    60 capsule    Use 2 caps PO after first loose stool.  Repeat 1 cap after each subsequent loose stool.  Max 8/24 hours.       metoprolol 25 MG 24 hr tablet    TOPROL-XL    45 tablet    Take 0.5 tablets (12.5 mg) by mouth daily AM       Multi-vitamin Tabs tablet     100 tablet    Take 1 tablet by mouth every morning Reported on 3/8/2017       predniSONE 5 MG tablet    DELTASONE    60 tablet    Take 1 tablet (5 mg) by mouth 2 times daily       simvastatin 40 MG tablet    ZOCOR    90 tablet    TAKE ONE TABLET BY MOUTH AT BEDTIME       tamsulosin 0.4 MG capsule    FLOMAX    90 capsule    Take 1 capsule (0.4 mg) by mouth daily AM       vitamin D 2000 UNITS tablet     100 tablet    Take 1 tablet by mouth daily

## 2017-03-15 NOTE — PROGRESS NOTES
Infusion Nursing Note:  Oswaldo Win presents today for IV fluids.    Patient seen by provider today: Yes: ISABELLE Cline saw patient prior to infusion   present during visit today: Not Applicable.    Note:  Patient blood pressure 76/51 in lab today.  TORB ISABELLE Cline/Sabiha Main RN at 1600  OK for patient to discharge once systolic BP is at least 95.  Send patient home with stool collection supplies if he is unable to produce specimen while here.  Schedule patient for IV fluids on Friday.  Patient does not need labs.  Nurse to page Sabrina with an update regarding patient status on Friday.    Patient unable to produce stool specimen today.  Sent with supplies to collect specimen.  Intravenous Access:  Peripheral IV placed.    Treatment Conditions:  Lab Results   Component Value Date    HGB 9.7 03/15/2017     Lab Results   Component Value Date    WBC 3.3 03/15/2017      Lab Results   Component Value Date    ANEU 2.4 03/15/2017     Lab Results   Component Value Date     03/15/2017     08/19/2015      Lab Results   Component Value Date     03/15/2017                   Lab Results   Component Value Date    POTASSIUM 4.0 03/15/2017           Lab Results   Component Value Date    MAG 2.4 03/15/2017            Lab Results   Component Value Date    CR 0.90 03/15/2017                   Lab Results   Component Value Date    RODNEY 9.0 03/15/2017                Lab Results   Component Value Date    BILITOTAL 1.4 03/15/2017           Lab Results   Component Value Date    ALBUMIN 3.9 03/15/2017                    Lab Results   Component Value Date    ALT 17 03/15/2017           Lab Results   Component Value Date    AST 24 03/15/2017         Post Infusion Assessment:  Patient tolerated infusion without incident.  Blood pressure 112/54 at the end of infusion.  Blood return noted pre and post infusion.  Site patent and intact, free from redness, edema or discomfort.  No evidence of  extravasations.  Access discontinued per protocol.    Discharge Plan:   Patient declined prescription refills.  Discharge instructions reviewed with: Patient.  Patient and/or family verbalized understanding of discharge instructions and all questions answered.  AVS to patient via [employer].  Patient will return 3/17/17 for next appointment.   Patient discharged in stable condition accompanied by: self.  Departure Mode: Ambulatory.    Sabiha Main RN

## 2017-03-15 NOTE — PROGRESS NOTES
Called pt to follow up how he is doing.    Pt states he has been having nausea and diarrhea since he received the radium last Wednesday. Having 3-4 episodes of diarrhea per day. Diarrhea is worse in the morning. Has not been taking imodium and does not have any anti-emetics at home.  Reports muscle fatigue and occasional lightheadedness.  Is trying to drink 40oz of fluids daily. Feels dehydrated and skin is tenting.  Denies any fevers or chills; does have occasional hot flashes.  States will vomit after he takes a few bites of food then will be able to eat his entire meal after he vomits.  Has had vomiting for the past 3-4 days. Last emesis was last night.    Sabrina Mcguire PA-C notified. VO given for pt to see her today with CBC, CMP, Mg and 1 liter NS.  Called pt. He will check in around 1:45pm for labs and see Sabrina Mcguire PA-C at 2:10pm with IVFs at 3:30pm.

## 2017-03-17 ENCOUNTER — INFUSION THERAPY VISIT (OUTPATIENT)
Dept: ONCOLOGY | Facility: CLINIC | Age: 72
End: 2017-03-17
Attending: INTERNAL MEDICINE
Payer: COMMERCIAL

## 2017-03-17 VITALS
SYSTOLIC BLOOD PRESSURE: 120 MMHG | TEMPERATURE: 97.4 F | OXYGEN SATURATION: 100 % | HEART RATE: 79 BPM | RESPIRATION RATE: 18 BRPM | DIASTOLIC BLOOD PRESSURE: 43 MMHG

## 2017-03-17 DIAGNOSIS — R11.2 NAUSEA VOMITING AND DIARRHEA: Primary | ICD-10-CM

## 2017-03-17 DIAGNOSIS — R19.7 NAUSEA VOMITING AND DIARRHEA: Primary | ICD-10-CM

## 2017-03-17 DIAGNOSIS — C79.51 PROSTATE CANCER METASTATIC TO BONE (H): ICD-10-CM

## 2017-03-17 DIAGNOSIS — C61 PROSTATE CANCER METASTATIC TO BONE (H): ICD-10-CM

## 2017-03-17 DIAGNOSIS — R19.7 DIARRHEA, UNSPECIFIED TYPE: ICD-10-CM

## 2017-03-17 LAB
C DIFF TOX B STL QL: NORMAL
CAMPYLOBACTER GROUP BY NAT: NOT DETECTED
ENTERIC PATHOGEN COMMENT: NORMAL
NOROVIRUS I AND II BY NAT: NOT DETECTED
ROTAVIRUS A BY NAT: NOT DETECTED
SALMONELLA SPECIES BY NAT: NOT DETECTED
SHIGA TOXIN 1 GENE BY NAT: NOT DETECTED
SHIGA TOXIN 2 GENE BY NAT: NOT DETECTED
SHIGELLA SP+EIEC IPAH STL QL NAA+PROBE: NOT DETECTED
SPECIMEN SOURCE: NORMAL
VIBRIO GROUP BY NAT: NOT DETECTED
YERSINIA ENTEROCOLITICA BY NAT: NOT DETECTED

## 2017-03-17 PROCEDURE — 25000128 H RX IP 250 OP 636: Mod: ZF | Performed by: PHYSICIAN ASSISTANT

## 2017-03-17 PROCEDURE — 96361 HYDRATE IV INFUSION ADD-ON: CPT

## 2017-03-17 PROCEDURE — 96360 HYDRATION IV INFUSION INIT: CPT

## 2017-03-17 PROCEDURE — 40000141 ZZH STATISTIC PERIPHERAL IV START W/O US GUIDANCE: Mod: ZF

## 2017-03-17 PROCEDURE — 25000125 ZZHC RX 250: Mod: ZF | Performed by: RADIOLOGY

## 2017-03-17 RX ADMIN — SODIUM CHLORIDE 1000 ML: 9 INJECTION, SOLUTION INTRAVENOUS at 15:02

## 2017-03-17 RX ADMIN — LIDOCAINE HYDROCHLORIDE 0.5 ML: 10 INJECTION, SOLUTION INFILTRATION; PERINEURAL at 15:00

## 2017-03-17 NOTE — PROGRESS NOTES
Infusion Nursing Note:  Oswaldo Win presents today for IVF.    Patient seen by provider today: No    Note: Patient reports dizziness on ambulation today but does report feeling slightly better since Wednesday.  Reports improvement with his loose stools reporting having only one per day both yesterday and today.  Patient aloso reports stools have been more soft and formed.  Patient did drop of stool samples today in lab.  Sabrina WALTON updated on patient's status today.  Sabrina did give an order for prn iv Zofran and p.o. Zofran for home but patient denies need for either form of Zofran at this time.  patient reminded to call clinic or go to the ED if dizziness, hypotension and dehydration increase.  Patient verbalizes understanding.    Intravenous Access:  Peripheral IV placed by vascular access.      Treatment Conditions:  Lab Results   Component Value Date    HGB 9.7 03/15/2017     Lab Results   Component Value Date    WBC 3.3 03/15/2017      Lab Results   Component Value Date    ANEU 2.4 03/15/2017     Lab Results   Component Value Date     03/15/2017     08/19/2015      Lab Results   Component Value Date     03/15/2017                   Lab Results   Component Value Date    POTASSIUM 4.0 03/15/2017           Lab Results   Component Value Date    MAG 2.4 03/15/2017            Lab Results   Component Value Date    CR 0.90 03/15/2017                   Lab Results   Component Value Date    RODNEY 9.0 03/15/2017                Lab Results   Component Value Date    BILITOTAL 1.4 03/15/2017           Lab Results   Component Value Date    ALBUMIN 3.9 03/15/2017                    Lab Results   Component Value Date    ALT 17 03/15/2017           Lab Results   Component Value Date    AST 24 03/15/2017     Results reviewed, labs MET treatment parameters, ok to proceed with treatment.      Post Infusion Assessment:  Patient tolerated infusion without incident.  Blood return noted pre and post  infusion.  Site patent and intact, free from redness, edema or discomfort.  No evidence of extravasations.  Access discontinued per protocol.    Discharge Plan:   Patient declined prescription refills.  Discharge instructions reviewed with: Patient.  Patient and/or family verbalized understanding of discharge instructions and all questions answered.  AVS to patient via [employer]The[employer].  Patient will return 3/29/17 for next appointment.   Patient discharged in stable condition accompanied by: self.  Departure Mode: Ambulatory.    Kristen Sherwood RN

## 2017-03-17 NOTE — MR AVS SNAPSHOT
After Visit Summary   3/17/2017    Oswaldo Win    MRN: [id number]           Patient Information     Date Of Birth          1945        Visit Information        Provider Department      3/17/2017 2:30 PM  19 ATC;  ONCOLOGY INFUSION [facility]        Today's Diagnoses     Nausea vomiting and diarrhea    -  1      Care Instructions    Contact Numbers    [facility] Main Line: [phone]    Triage Nurse Line: [phone]      Please call the [facility] Nurse Triage line if you experience a temperature greater than or equal to 100.5, shaking chills, have uncontrolled nausea, vomiting and/or diarrhea, dizziness, shortness of breath, bleeding not relieved with pressure, or if you have any other questions or concerns.     If it is after hours, weekends, or holidays, please call the main hospital  at  [phone] and ask to speak to the adult Oncology doctor on call.     If you are having any concerning symptoms or wish to speak to a provider before your next infusion visit, please call your care coordinator or triage them so we can adequately serve you.      If you need to refill your narcotic prescription or other medication, please call triage before your infusion appointment.        March 2017 Sunday Monday Tuesday Wednesday Thursday Friday Saturday                  1     [facility]ON[facility] LAB DRAW   10:30 AM   (15 min.)   [facility] LAB DRAW   [facility] Lab Draw 2     3     4       5     6     7  Happy Birthday!     8     [facility] MAS[facility] LAB DRAW    8:45 AM   (15 min.)   [facility] DRAW   [facility] Lab Draw     [facility] RETURN    9:00 AM   (30 min.)   Bentley Robles MD   [facility] Cancer [facility]     NM RADIUM 223 XOFIGO THERAPY   11:00 AM   (30 min.)   [email or web address]   [facility] [place], Nuclear Medicine 9     [facility] CORE RETURN   12:45 PM   (30 min.)   Jill Juarez NP   [facility] 10     11       12     13     14     15     [facility]  MASONIC LAB DRAW    1:45 PM   (15 min.)    M[facility]ONIC LAB DRAW   [facility] Masonic Lab Draw     [facility] RETURN    1:55 PM   (50 min.)   Gabriela Mcguire PA-C [facility] ONC INFUSION 120    3:30 PM   (120 min.)   UC ONCOLOGY INFUSION   [facility] 16     17     LAB WITH HB CLINIC    2:15 PM   (15 min.)    LAB   [facility] Lab     [facility] ONC INFUSION 120    2:30 PM   (120 min.)    ONCOLOGY INFUSION   [facility] 18       19     20     21     22     23     24     25       26     27     28     29     [facility] MASONIC LAB DRAW   10:30 AM   (15 min.)    [facility] LAB [facility]RAW   [facility] 30 31 April 2017 Sunday Monday Tuesday Wednesday Thursday Friday Saturday                                 1       2     3     4     5     [facility] MASONIC LAB DRAW    9:00 AM   (15 min.)    [facility] LAB DRAW   [facility]     [facility] RETURN    9:15 AM   (50 min.)   Gabriela Mcguire PA-C   [facility]     NM RADIUM 223 XOFIGO THERAPY   11:00 AM   (30 min.)   [email or web address]   [facility] [place], Nuclear Medicine 6     7     8       9     10     11     12     13     14     15       16     17     18     19     20     LAB WITH  CLINIC    1:30 PM   (15 min.)    LAB   [facility] Lab     [facility] CORE RETURN    1:45 PM   (30 min.)   Jill Juarez NP   [facility] 21     22       23     24     25     26     [facility] MASONIC LAB DRAW   10:30 AM   (15 min.)    MASONIC LAB DRAW   [facility] Masonic Lab Draw 27     28     29       30                                             No results found for this or any previous visit (from the past 24 hour(s)).            Follow-ups after your visit        Your next 10 appointments already scheduled     Mar 29, 2017 10:30 AM CDT   M[facility]ic Lab Draw with  MASONIC LAB DRAW   [facility] Masonic Lab Draw ([facility])    [address]  [address]   [phone]            Apr 05, 2017  9:00 AM CDT   Masonic Lab Draw with  MASONIC LAB DRAW   [facility] Masonic Lab Draw ([facility])    [address] [facility]  2nd [facility] [address]   [phone]            Apr 05, 2017  9:30 AM CDT   (Arrive by 9:15 AM)   Return Visit with Gabriela Mcguire PA-C   [facility] ([facility])    [address] [place] [address]   [phone]            Apr 05, 2017 11:00 AM CDT   NM RADIUM 223 XOFIGO THERAPY with UUNMINJ1   [facility] [place], Nuclear Medicine ([facility])    [address] [facility] [address]   [phone]           Please bring a list of your medicines to the exam. (Include vitamins, minerals and over-the-counter drugs.) You should wear comfortable clothes. Leave your valuables at home. Please bring related prior results and films.  Tell your doctor:   If you are breastfeeding or may be pregnant.   If you have had a barium test within the past few days. Barium may change the results of certain exams.   If you think you may need sedation (medicine to help you relax).  You may eat and drink as normal.  Please call your Imaging Department at your exam site with any questions.            Apr 20, 2017  1:30 PM CDT   Lab with  LAB    "[employer] Lab ([facility])    [address] [place] [address]  1st Floor  [facility] [address]   [phone]            Apr 20, 2017  2:00 PM CDT   (Arrive by 1:45 PM)   CORE RETURN with Jill Juarez NP   [facility] ([facility])    [address] [place] [address]  3rd [facility] [address]   [phone]            Apr 26, 2017 10:30 AM CDT   Masonic Lab Draw with  MASONIC LAB DRAW   [facility] Masonic Lab Draw ([facility])    [address] [place] [address]  [address]   [phone]            May 03, 2017  9:00 AM CDT   Masonic Lab Draw with  MASONIC LAB DRAW   [facility] Lab Draw ([facility])    [address] [place] [address]   [phone]            May 03, 2017  9:30 AM CDT   (Arrive by 9:15 AM)   Return Visit with Gabriela Mcguire PA-C   [facility] ([facility])    [address] [place] [address]   [phone]            May 03, 2017 11:00 AM CDT   NM RADIUM 223 XOFIGO THERAPY with UUNMINJ2   [facility] [place], Nuclear Medicine ([facility])    [address] [facility] [address] [phone]           Please bring a list of your medicines to the exam. (Include vitamins, minerals and over-the-counter drugs.) You should wear comfortable clothes. Leave your valuables at home. Please bring related prior results and films.  Tell your doctor:   If you are breastfeeding or may be pregnant.   If you have had a barium test within the past few days. Barium may change the results of certain exams.   If you think you may need sedation (medicine to help you relax).  You may eat and drink as normal.  Please call your Imaging Department at your exam site with any questions.              Who to contact     If you have questions or need follow up information about today's clinic visit or your schedule please contact [facility] directly at [phone].  Normal or non-critical lab and imaging results will be communicated to you by MyChart, letter or phone within 4 business days after the clinic has received the results. If you do not hear from us within 7 days, please contact the clinic through MyChart or phone. If you have a critical or abnormal lab result, we will notify you by phone as soon as possible.  Submit refill requests through [employer] or call your pharmacy and  they will forward the refill request to us. Please allow 3 business days for your refill to be completed.          Additional Information About Your Visit        Audit Verifyhart Information     [employer] gives you secure access to your electronic health record. If you see a primary care provider, you can also send messages to your care team and make appointments. If you have questions, please call your primary care clinic.  If you do not have a primary care provider, please call [phone] and they will assist you.        Care EveryWhere ID     This is your Care EveryWhere ID. This could be used by other organizations to access your [place] medical records  [id number]        Your Vitals Were     Pulse Temperature Respirations Pulse Oximetry          79 97.4  F (36.3  C) (Tympanic) 18 100%         Blood Pressure from Last 3 Encounters:   03/17/17 120/43   03/15/17 112/54   03/15/17 (!) 76/51    Weight from Last 3 Encounters:   03/15/17 66.8 kg (147 lb 4.8 oz)   03/09/17 63 kg (139 lb)   03/08/17 69.3 kg (152 lb 11.2 oz)              Today, you had the following     No orders found for display       Primary Care Provider Office Phone # Fax #    Lucrecia Collier [phone]       [facility] [address] [facility] [address]        Thank you!     Thank you for choosing [facility]  for your care. Our goal is always to provide you with excellent care. Hearing back from our patients is one way we can continue to improve our services. Please take a few minutes to complete the written survey that you may receive in the mail after your visit with us. Thank you!             Your Updated Medication List - Protect others around you: Learn how to safely use, store and throw away your medicines at www.disposemymeds.org.          This list is accurate as of: 3/17/17  5:33 PM.  Always use your most recent med list.                   Brand Name Dispense Instructions for use     abiraterone 250 MG tablet    ZYTIGA    120 tablet    Take 4 tablets (1,000 mg) by mouth daily Take on empty stomach.       aspirin 81 MG tablet      Take 81 mg by mouth daily       calcium carbonate 500 MG tablet    OS-RODNEY 500 mg [place]. Ca    120 tablet    Take 2 tablets (1,000 mg) by mouth 2 times daily       diphenoxylate-atropine 2.5-0.025 MG per tablet    LOMOTIL     Take 1 tablet by mouth 4 times daily as needed for diarrhea Reported on 3/8/2017       enoxaparin 60 MG/0.6ML injection    LOVENOX    60 Syringe    Inject 0.6 mLs (60 mg) Subcutaneous 2 times daily       HYDROcodone-acetaminophen 5-325 MG per tablet    NORCO     Take 1 tablet by mouth every 6 hours as needed for moderate to severe pain Reported on 3/8/2017       loperamide 2 MG capsule    IMODIUM    60 capsule    Use 2 caps PO after first loose stool.  Repeat 1 cap after each subsequent loose stool.  Max 8/24 hours.       metoprolol 25 MG 24 hr tablet    TOPROL-XL    45 tablet    Take 0.5 tablets (12.5 mg) by mouth daily AM       Multi-vitamin Tabs tablet     100 tablet    Take 1 tablet by mouth every morning Reported on 3/8/2017       predniSONE 5 MG tablet    DELTASONE    60 tablet    Take 1 tablet (5 mg) by mouth 2 times daily       simvastatin 40 MG tablet    ZOCOR    90 tablet    TAKE ONE TABLET BY MOUTH AT BEDTIME       tamsulosin 0.4 MG capsule    FLOMAX    90 capsule    Take 1 capsule (0.4 mg) by mouth daily AM       vitamin D 2000 UNITS tablet     100 tablet    Take 1 tablet by mouth daily

## 2017-03-17 NOTE — PATIENT INSTRUCTIONS
Contact Numbers    [facility] Main Line: [phone]    Triage Nurse Line: [phone]      Please call the [facility] Nurse Triage line if you experience a temperature greater than or equal to 100.5, shaking chills, have uncontrolled nausea, vomiting and/or diarrhea, dizziness, shortness of breath, bleeding not relieved with pressure, or if you have any other questions or concerns.     If it is after hours, weekends, or holidays, please call the main hospital  at  [phone] and ask to speak to the adult Oncology doctor on call.     If you are having any concerning symptoms or wish to speak to a provider before your next infusion visit, please call your care coordinator or triage them so we can adequately serve you.      If you need to refill your narcotic prescription or other medication, please call triage before your infusion appointment.        March 2017 Sunday Monday Tuesday Wednesday Thursday Friday Saturday                  1     [facility] MASONIC LAB DRAW   10:30 AM   (15 min.)   [facility] LAB DRAW   [facility] Lab Draw 2     3     4       5     6     7  Happy Birthday!     8     [facility] MASONIC LAB DRAW    8:45 AM   (15 min.)   [facility] LAB DRAW   [facility] Lab Draw     [facility] RETURN    9:00 AM   (30 min.)   Bentley Robles MD   [facility]     NM RADIUM 223 XOFIGO THERAPY   11:00 AM   (30 min.)   [email or web address]   [facility] [place], Nuclear Medicine 9     P CORE RETURN   12:45 PM   (30 min.)   Jill Juarez NP   [facility] 10     11       12     13     14     15     [facility] MASONIC LAB DRAW    1:45 PM   (15 min.)   [facility] LAB DRAW   [facility] Lab Draw     [facility] RETURN    1:55 PM   (50 min.)   Gabriela Mcguire PA-C   [facility] ONC INFUSION 120    3:30 PM   (120 min.)    ONCOLOGY INFUSION   [facility] 16     17     LAB WITH  CLINIC    2:15 PM   (15 min.)    LAB   [facility] Lab     [facility]  ONC INFUSION 120    2:30 PM   (120 min.)    ONCOLOGY INFUSION   [facility] 18       19     20     21     22     23     24     25       26     27     28     29     [facility] MASONIC LAB DRAW   10:30 AM   (15 min.)    MASONIC LAB DRAW   [facility] Lab Draw 30 31 April 2017 Sunday Monday Tuesday Wednesday Thursday Friday Saturday                                 1       2     3     4     5     [facility] MASONIC LAB DRAW    9:00 AM   (15 min.)   [facility] LAB DRAW   [facility] Lab Draw     [facility] RETURN    9:15 AM   (50 min.)   Gabriela Mcguire PA-C   [facility]     NM RADIUM 223 XOFIGO THERAPY   11:00 AM   (30 min.)   [email or web address]   [facility] [place], Nuclear Medicine 6     7     8       9     10     11     12     13     14     15       16     17     18     19     20     LAB WITH  CLINIC    1:30 PM   (15 min.)    LAB   [facility] Lab     P CORE RETURN    1:45 PM   (30 min.)   Jill Juarez NP   [facility] 21     22       23     24     25     26     [facility] MASONIC LAB DRAW   10:30 AM   (15 min.)   [facility] LAB DRAW   [facility] Lab Draw 27     28     29       30                                             No results found for this or any previous visit (from the past 24 hour(s)).

## 2017-03-23 ENCOUNTER — CARE COORDINATION (OUTPATIENT)
Dept: ONCOLOGY | Facility: CLINIC | Age: 72
End: 2017-03-23

## 2017-03-23 DIAGNOSIS — C61 PROSTATE CANCER METASTATIC TO BONE (H): Primary | ICD-10-CM

## 2017-03-23 DIAGNOSIS — C79.51 PROSTATE CANCER METASTATIC TO BONE (H): Primary | ICD-10-CM

## 2017-03-23 NOTE — PROGRESS NOTES
Informed pt Sabrina Mcguire PA-C is OK refilling Lovenox. Would like to check heparin 10a level next Wednesday when he comes in for labs. Pt will take lovenox injection at 6 AM on Wednesday and have labs drawn at 1030. Informed pt he has refills left on his current rx for Lovenox. Will call [facility] pharmacy and request medication is refilled instead of sending new rx. Pt verbalized understanding.     Called [facility] pharmacy and requested Lovenox refill. Pharmacy will contact pt once rx is ready for .

## 2017-03-23 NOTE — PROGRESS NOTES
"Called pt to follow up how he is doing. Stool cultures from 3/15 were negative.  Pt states his diarrhea has improved. Is having one loose stool daily. Stool is not as watery as it was before. Has been pushing fluids at home and does not feel dehydrated. Yesterday his stomach felt a little \"queasy.\" This lasted about 5 minutes and then improved. Denies any N/V, lightheadedness or dizziness. Encouraged him to continue to push fluids and contact clinic with any new sx. Pt verbalized understanding. Requesting refill on Lovenox 60mg BID. Message sent to Sabrina Mcguire PA-C with refill request.  "

## 2017-03-24 ENCOUNTER — TELEPHONE (OUTPATIENT)
Dept: ONCOLOGY | Facility: CLINIC | Age: 72
End: 2017-03-24

## 2017-03-24 NOTE — TELEPHONE ENCOUNTER
Prior Authorization Approval    Authorization Effective Date: 3/24/2017  Authorization Expiration Date: 3/24/2018  Medication: enoxaparin (LOVENOX) 60 MG/0.6ML injection - Approved  Approved Dose/Quantity:   Reference #:     Insurance Company: [employer] - Phone [phone] Fax [phone]  Expected CoPay: $359.94   Tier exception requested will know determination within 72hrs.  CoPay Card Available:      Foundation Assistance Needed:    Which Pharmacy is filling the prescription (Not needed for infusion/clinic administered): [facility] [place], [place] - [address]  Pharmacy Notified: Yes  Patient Notified: not yet

## 2017-03-24 NOTE — TELEPHONE ENCOUNTER
[facility] Prior Authorization Team   Phone: [phone]  Fax: [phone]    PA Initiation    Medication: enoxaparin (LOVENOX) 60 MG/0.6ML injection -   Insurance Company: [employer] - Phone [phone] Fax [phone]  Pharmacy Filling the Rx: [place], [place] - [address]  Filling Pharmacy Phone: [phone]  Filling Pharmacy Fax:    Start Date: 3/24/2017

## 2017-03-27 NOTE — TELEPHONE ENCOUNTER
Tier Exception Approval    Authorization Effective Date: 3/24/2017  Authorization Expiration Date: 3/24/2018  Medication: enoxaparin (LOVENOX) 60 MG/0.6ML injection - Tier Exception Approved  Approved Dose/Quantity:   Reference #:     Insurance Company: [employer] - Phone [phone] Fax [phone]  Expected CoPay: Tier Exception Approved $47.00     CoPay Card Available:      Foundation Assistance Needed:    Which Pharmacy is filling the prescription (Not needed for infusion/clinic administered): [facility] [place], [place] - [address]  Pharmacy Notified: Yes  Patient Notified: yes

## 2017-03-29 ENCOUNTER — CARE COORDINATION (OUTPATIENT)
Dept: ONCOLOGY | Facility: CLINIC | Age: 72
End: 2017-03-29

## 2017-03-29 ENCOUNTER — INFUSION THERAPY VISIT (OUTPATIENT)
Dept: ONCOLOGY | Facility: CLINIC | Age: 72
End: 2017-03-29
Attending: INTERNAL MEDICINE
Payer: COMMERCIAL

## 2017-03-29 ENCOUNTER — TELEPHONE (OUTPATIENT)
Dept: ONCOLOGY | Facility: CLINIC | Age: 72
End: 2017-03-29

## 2017-03-29 VITALS
OXYGEN SATURATION: 96 % | HEART RATE: 82 BPM | SYSTOLIC BLOOD PRESSURE: 85 MMHG | DIASTOLIC BLOOD PRESSURE: 31 MMHG | WEIGHT: 149.6 LBS | RESPIRATION RATE: 16 BRPM | TEMPERATURE: 97.6 F | BODY MASS INDEX: 23.78 KG/M2

## 2017-03-29 VITALS — SYSTOLIC BLOOD PRESSURE: 112 MMHG | HEART RATE: 72 BPM | DIASTOLIC BLOOD PRESSURE: 46 MMHG

## 2017-03-29 DIAGNOSIS — C79.51 PROSTATE CANCER METASTATIC TO BONE (H): Primary | ICD-10-CM

## 2017-03-29 DIAGNOSIS — I50.22 CHRONIC SYSTOLIC HEART FAILURE (H): ICD-10-CM

## 2017-03-29 DIAGNOSIS — C61 PROSTATE CANCER METASTATIC TO BONE (H): Primary | ICD-10-CM

## 2017-03-29 DIAGNOSIS — C79.51 PROSTATE CANCER METASTATIC TO BONE (H): ICD-10-CM

## 2017-03-29 DIAGNOSIS — C61 PROSTATE CANCER METASTATIC TO BONE (H): ICD-10-CM

## 2017-03-29 DIAGNOSIS — I26.99 PULMONARY EMBOLISM (H): Primary | ICD-10-CM

## 2017-03-29 DIAGNOSIS — I82.499 DEEP VEIN THROMBOSIS (DVT) OF OTHER VEIN OF LOWER EXTREMITY: ICD-10-CM

## 2017-03-29 LAB
ALBUMIN SERPL-MCNC: 3.7 G/DL (ref 3.4–5)
ALP SERPL-CCNC: 531 U/L (ref 40–150)
ALT SERPL W P-5'-P-CCNC: 18 U/L (ref 0–70)
ANION GAP SERPL CALCULATED.3IONS-SCNC: 10 MMOL/L (ref 3–14)
AST SERPL W P-5'-P-CCNC: 20 U/L (ref 0–45)
BASOPHILS # BLD AUTO: 0 10E9/L (ref 0–0.2)
BASOPHILS NFR BLD AUTO: 0.5 %
BILIRUB SERPL-MCNC: 1.2 MG/DL (ref 0.2–1.3)
BUN SERPL-MCNC: 13 MG/DL (ref 7–30)
CALCIUM SERPL-MCNC: 8.6 MG/DL (ref 8.5–10.1)
CHLORIDE SERPL-SCNC: 110 MMOL/L (ref 94–109)
CO2 SERPL-SCNC: 22 MMOL/L (ref 20–32)
CREAT SERPL-MCNC: 0.9 MG/DL (ref 0.66–1.25)
DIFFERENTIAL METHOD BLD: ABNORMAL
EOSINOPHIL # BLD AUTO: 0 10E9/L (ref 0–0.7)
EOSINOPHIL NFR BLD AUTO: 1.5 %
ERYTHROCYTE [DISTWIDTH] IN BLOOD BY AUTOMATED COUNT: 17 % (ref 10–15)
GFR SERPL CREATININE-BSD FRML MDRD: 83 ML/MIN/1.7M2
GLUCOSE SERPL-MCNC: 116 MG/DL (ref 70–99)
HCT VFR BLD AUTO: 27.1 % (ref 40–53)
HGB BLD-MCNC: 9.2 G/DL (ref 13.3–17.7)
IMM GRANULOCYTES # BLD: 0 10E9/L (ref 0–0.4)
IMM GRANULOCYTES NFR BLD: 1 %
LMWH PPP CHRO-ACNC: 1.38 IU/ML
LYMPHOCYTES # BLD AUTO: 0.6 10E9/L (ref 0.8–5.3)
LYMPHOCYTES NFR BLD AUTO: 30.1 %
MCH RBC QN AUTO: 31.2 PG (ref 26.5–33)
MCHC RBC AUTO-ENTMCNC: 33.9 G/DL (ref 31.5–36.5)
MCV RBC AUTO: 92 FL (ref 78–100)
MONOCYTES # BLD AUTO: 0.2 10E9/L (ref 0–1.3)
MONOCYTES NFR BLD AUTO: 11.2 %
NEUTROPHILS # BLD AUTO: 1.1 10E9/L (ref 1.6–8.3)
NEUTROPHILS NFR BLD AUTO: 55.7 %
NRBC # BLD AUTO: 0 10*3/UL
NRBC BLD AUTO-RTO: 0 /100
PLATELET # BLD AUTO: 127 10E9/L (ref 150–450)
POTASSIUM SERPL-SCNC: 3.7 MMOL/L (ref 3.4–5.3)
PROT SERPL-MCNC: 7.3 G/DL (ref 6.8–8.8)
PSA SERPL-MCNC: 574.98 UG/L (ref 0–4)
RBC # BLD AUTO: 2.95 10E12/L (ref 4.4–5.9)
SODIUM SERPL-SCNC: 142 MMOL/L (ref 133–144)
WBC # BLD AUTO: 2 10E9/L (ref 4–11)

## 2017-03-29 PROCEDURE — 85520 HEPARIN ASSAY: CPT | Performed by: INTERNAL MEDICINE

## 2017-03-29 PROCEDURE — 84153 ASSAY OF PSA TOTAL: CPT | Performed by: INTERNAL MEDICINE

## 2017-03-29 PROCEDURE — 25000128 H RX IP 250 OP 636: Mod: ZF | Performed by: PHYSICIAN ASSISTANT

## 2017-03-29 PROCEDURE — 25000125 ZZHC RX 250: Mod: ZF | Performed by: RADIOLOGY

## 2017-03-29 PROCEDURE — 85025 COMPLETE CBC W/AUTO DIFF WBC: CPT | Performed by: INTERNAL MEDICINE

## 2017-03-29 PROCEDURE — 96360 HYDRATION IV INFUSION INIT: CPT

## 2017-03-29 PROCEDURE — 40000141 ZZH STATISTIC PERIPHERAL IV START W/O US GUIDANCE: Mod: ZF

## 2017-03-29 PROCEDURE — 80053 COMPREHEN METABOLIC PANEL: CPT | Performed by: INTERNAL MEDICINE

## 2017-03-29 RX ADMIN — SODIUM CHLORIDE 1000 ML: 9 INJECTION, SOLUTION INTRAVENOUS at 11:57

## 2017-03-29 RX ADMIN — LIDOCAINE HYDROCHLORIDE 0.5 ML: 10 INJECTION, SOLUTION INFILTRATION; PERINEURAL at 12:30

## 2017-03-29 NOTE — TELEPHONE ENCOUNTER
"Call from Kristen in lab.  Pt with low BP, lightheadedness.  Writer assessed pt in person- he is lightheaded \"but not so much that I'm falling down.\" He has had some diarrhea issues in the past and thought they were getting better, but \"today, they came back with a vengeance.\"  He has had 3 episodes so far today, and one yesterday.  Denies fever.      Per ISABELLE Cline, ok for 1 L IVF today.  Test for C diff and stool culture ordered.  Pt to return Friday for labs, MARTY, and possible infusion.      Pt informed of plan.  Message sent to scheduling to add him on to schedule on Friday.   "

## 2017-03-29 NOTE — PROGRESS NOTES
Informed pt heparin 10a is not therapeutic (1.38). Sabrina Mcguire PA-C would like him to decrease his Lovenox from 60mg BID to 50mg BID and check heparin 10a level on 4/4.   Pt verbalized understanding. Has Lovenox 60mg syringes at home. Will start giving himself 50mg (0.5mls) BID.  Pt will call the [facility] clinic to schedule lab appointment on 4/4 for 4-6 hours after he takes his Lovenox that day.

## 2017-03-29 NOTE — PATIENT INSTRUCTIONS
Contact Numbers  [facility]: [phone]    After Hours:  [phone]  Triage: [phone]    Please call the [facility] Triage line if you experience a temperature greater than or equal to 100.5, shaking chills, have uncontrolled nausea, vomiting and/or diarrhea, dizziness, shortness of breath, chest pain, bleeding, unexplained bruising, or if you have any other new/concerning symptoms, questions or concerns.     If it is after hours, weekends, or holidays, please call the main hospital  at  [phone] and ask to speak to the Oncology doctor on call.     If you are having any concerning symptoms or wish to speak to a provider before your next infusion visit, please call your care coordinator or triage to notify them so we can adequately serve you.     If you need a refill on a narcotic prescription or other medication, please call triage before your infusion appointment.         March 2017 Sunday Monday Tuesday Wednesday Thursday Friday Saturday                  1     [facility] LAB DRAW   10:30 AM   (15 min.)   [facility] LAB DRAW   [facility] Lab Draw 2     3     4       5     6     7  Happy Birthday!     8     [facility] LAB DRAW    8:45 AM   (15 min.)   [facility] LAB DRAW   [facility] Lab Draw     [facility] RETURN    9:00 AM   (30 min.)   Bentley Robles MD   [facility]     NM RADIUM 223 XOFIGO THERAPY   11:00 AM   (30 min.)   [email or web address]   [facility] [place], Nuclear Medicine 9     P CORE RETURN   12:45 PM   (30 min.)   Jill Juarez NP   [facility] 10     11       12     13     14     15     [facility] LAB DRAW    1:45 PM   (15 min.)    [facility] LAB DRAW   [facility] Lab Draw     [facility] RETURN    1:55 PM   (50 min.)   Gabriela Mcguire PA-C   [facility] ONC INFUSION 120    3:30 PM   (120 min.)    ONCOLOGY INFUSION   [facility] 16     17     LAB WITH  CLINIC    2:15 PM   (15 min.)     LAB   [facility]     [facility] ONC INFUSION 120    2:30 PM   (120 min.)    ONCOLOGY INFUSION   [facility] 18       19     20     21     22     23     24     25       26     27     28     29     [facility] LAB DRAW   10:30 AM   (15 min.)   [facility] DRAW   [facility] Lab Draw     [facility] ONC INFUSION 120   11:30 AM   (120 min.)    ONCOLOGY INFUSION   [facility] 30 31 April 2017 Sunday Monday Tuesday Wednesday Thursday Friday Saturday                                 1       2     3     4     5     [facility] LAB DRAW    9:00 AM   (15 min.)   [facility] LAB DRAW   [facility] Lab Draw     [facility] RETURN    9:15 AM   (50 min.)   Gabriela Mcguire PA-C   [facility] Cancer [facility]     NM RADIUM 223 XOFIGO THERAPY   11:00 AM   (30 min.)   [email or web address]   [facility] [place], Nuclear Medicine 6     7     8       9     10     11     12     13     14     15       16     17     18     19     20     LAB WITH  CLINIC    1:30 PM   (15 min.)    LAB   [facility]     [facility] CORE RETURN    1:45 PM   (30 min.)   Jill Juarez NP   [facility] 21     22       23     24     25     26     [facility] LAB DRAW   10:30 AM   (15 min.)   [facility] LAB DRAW   [facility] Lab Draw 27     28     29       30                                               Recent Results (from the past 24 hour(s))   *CBC with platelets differential    Collection Time: 03/29/17 10:54 AM   Result Value Ref Range    WBC 2.0 (L) 4.0 - 11.0 10e9/L    RBC Count 2.95 (L) 4.4 - 5.9 10e12/L    Hemoglobin 9.2 (L) 13.3 - 17.7 g/dL    Hematocrit 27.1 (L) 40.0 - 53.0 %    MCV 92 78 - 100 fl    MCH 31.2 26.5 - 33.0 pg    MCHC 33.9 31.5 - 36.5 g/dL    RDW 17.0 (H) 10.0 - 15.0 %    Platelet Count 127 (L) 150 - 450 10e9/L    Diff Method Automated Method     % Neutrophils 55.7 %    % Lymphocytes 30.1 %    % Monocytes 11.2 %    % Eosinophils 1.5 %    % Basophils 0.5 %    % Immature  Granulocytes 1.0 %    Nucleated RBCs 0 0 /100    Absolute Neutrophil 1.1 (L) 1.6 - 8.3 10e9/L    Absolute Lymphocytes 0.6 (L) 0.8 - 5.3 10e9/L    Absolute Monocytes 0.2 0.0 - 1.3 10e9/L    Absolute Eosinophils 0.0 0.0 - 0.7 10e9/L    Absolute Basophils 0.0 0.0 - 0.2 10e9/L    Abs Immature Granulocytes 0.0 0 - 0.4 10e9/L    Absolute Nucleated RBC 0.0    PSA tumor marker    Collection Time: 03/29/17 10:54 AM   Result Value Ref Range    .98 (H) 0 - 4 ug/L   Comprehensive metabolic panel    Collection Time: 03/29/17 10:54 AM   Result Value Ref Range    Sodium 142 133 - 144 mmol/L    Potassium 3.7 3.4 - 5.3 mmol/L    Chloride 110 (H) 94 - 109 mmol/L    Carbon Dioxide 22 20 - 32 mmol/L    Anion Gap 10 3 - 14 mmol/L    Glucose 116 (H) 70 - 99 mg/dL    Urea Nitrogen 13 7 - 30 mg/dL    Creatinine 0.90 0.66 - 1.25 mg/dL    GFR Estimate 83 >60 mL/min/1.7m2    GFR Estimate If Black >90   GFR Calc   >60 mL/min/1.7m2    Calcium 8.6 8.5 - 10.1 mg/dL    Bilirubin Total 1.2 0.2 - 1.3 mg/dL    Albumin 3.7 3.4 - 5.0 g/dL    Protein Total 7.3 6.8 - 8.8 g/dL    Alkaline Phosphatase 531 (H) 40 - 150 U/L    ALT 18 0 - 70 U/L    AST 20 0 - 45 U/L

## 2017-03-29 NOTE — NURSING NOTE
Chief Complaint   Patient presents with     Blood Draw     Vitals done and labs drawn peripherally from right arm.    Kristen Thomas, KIPN

## 2017-03-29 NOTE — MR AVS SNAPSHOT
After Visit Summary   3/29/2017    Oswaldo Win    MRN: [id number]           Patient Information     Date Of Birth          1945        Visit Information        Provider Department      3/29/2017 11:30 AM  30 ATC;  ONCOLOGY INFUSION [facility]        Today's Diagnoses     Prostate cancer 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)    -  1      Care Instructions    Contact Numbers  [facility]: [phone]    After Hours:  [phone]  Triage: [phone]    Please call the [facility] Triage line if you experience a temperature greater than or equal to 100.5, shaking chills, have uncontrolled nausea, vomiting and/or diarrhea, dizziness, shortness of breath, chest pain, bleeding, unexplained bruising, or if you have any other new/concerning symptoms, questions or concerns.     If it is after hours, weekends, or holidays, please call the main hospital  at  [phone] and ask to speak to the Oncology doctor on call.     If you are having any concerning symptoms or wish to speak to a provider before your next infusion visit, please call your care coordinator or triage to notify them so we can adequately serve you.     If you need a refill on a narcotic prescription or other medication, please call triage before your infusion appointment.         March 2017 Sunday Monday Tuesday Wednesday Thursday Friday Saturday                  1     [facility] LAB DRAW   10:30 AM   (15 min.)   [facility] DRAW   [facility] Lab Draw 2     3     4       5     6     7  Happy Birthday!     8     [facility] LAB DRAW    8:45 AM   (15 min.)   [facility] DRAW   [facility] Lab Draw     [facility] RETURN    9:00 AM   (30 min.)   Bentley Robles MD   [facility]     NM RADIUM 223 XOFIGO THERAPY   11:00 AM   (30 min.)   [email or web address]   [facility] [place], Nuclear Medicine 9     [facility] CORE RETURN   12:45 PM   (30 min.)   Jill Juarez NP   [facility] 10     11        12     13     14     15     [facility] LAB DRAW    1:45 PM   (15 min.)    [facility] LAB DRAW   [facility] Lab Draw     [facility] RETURN    1:55 PM   (50 min.)   Gabriela Mcguire PA-C [facility]     [facility] ONC INFUSION 120    3:30 PM   (120 min.)   [facility] ONCOLOGY INFUSION   [facility] 16     17     LAB WITH [facility] CLINIC    2:15 PM   (15 min.)    LAB   [facility]     [facility] ONC INFUSION 120    2:30 PM   (120 min.)    ONCOLOGY INFUSION   [facility] 18       19     20     21     22     23     24     25       26     27     28     29     [facility] LAB DRAW   10:30 AM   (15 min.)    [facility] LAB DRAW   [facility] Lab Draw     [facility] ONC INFUSION 120   11:30 AM   (120 min.)    ONCOLOGY INFUSION   [facility] 30 31 April 2017 Sunday Monday Tuesday Wednesday Thursday Friday Saturday                                 1       2     3     4     5     P [facility] LAB DRAW    9:00 AM   (15 min.)    [facility] LAB DRAW   [facility] Lab Draw     [facility] RETURN    9:15 AM   (50 min.)   Gabriela Mcguire PA-C   [facility]     NM RADIUM 223 XOFIGO THERAPY   11:00 AM   (30 min.)   [email or web address]   [facility] [place], Nuclear Medicine 6     7     8       9     10     11     12     13     14     15       16     17     18     19     20     LAB WITH  CLINIC    1:30 PM   (15 min.)    LAB   [facility]     [facility] CORE RETURN    1:45 PM   (30 min.)   Jill Juarez NP   [facility] 21     22       23     24     25     26     [facility] LAB DRAW   10:30 AM   (15 min.)    [facility] LAB DRAW   [facility] Lab Draw 27     28     29       30                                               Recent Results (from the past 24 hour(s))   *CBC with platelets differential    Collection Time: 03/29/17 10:54 AM   Result Value Ref Range    WBC 2.0 (L) 4.0 - 11.0 10e9/L    RBC Count 2.95 (L) 4.4 - 5.9 10e12/L     Hemoglobin 9.2 (L) 13.3 - 17.7 g/dL    Hematocrit 27.1 (L) 40.0 - 53.0 %    MCV 92 78 - 100 fl    MCH 31.2 26.5 - 33.0 pg    MCHC 33.9 31.5 - 36.5 g/dL    RDW 17.0 (H) 10.0 - 15.0 %    Platelet Count 127 (L) 150 - 450 10e9/L    Diff Method Automated Method     % Neutrophils 55.7 %    % Lymphocytes 30.1 %    % Monocytes 11.2 %    % Eosinophils 1.5 %    % Basophils 0.5 %    % Immature Granulocytes 1.0 %    Nucleated RBCs 0 0 /100    Absolute Neutrophil 1.1 (L) 1.6 - 8.3 10e9/L    Absolute Lymphocytes 0.6 (L) 0.8 - 5.3 10e9/L    Absolute Monocytes 0.2 0.0 - 1.3 10e9/L    Absolute Eosinophils 0.0 0.0 - 0.7 10e9/L    Absolute Basophils 0.0 0.0 - 0.2 10e9/L    Abs Immature Granulocytes 0.0 0 - 0.4 10e9/L    Absolute Nucleated RBC 0.0    PSA tumor marker    Collection Time: 03/29/17 10:54 AM   Result Value Ref Range    .98 (H) 0 - 4 ug/L   Comprehensive metabolic panel    Collection Time: 03/29/17 10:54 AM   Result Value Ref Range    Sodium 142 133 - 144 mmol/L    Potassium 3.7 3.4 - 5.3 mmol/L    Chloride 110 (H) 94 - 109 mmol/L    Carbon Dioxide 22 20 - 32 mmol/L    Anion Gap 10 3 - 14 mmol/L    Glucose 116 (H) 70 - 99 mg/dL    Urea Nitrogen 13 7 - 30 mg/dL    Creatinine 0.90 0.66 - 1.25 mg/dL    GFR Estimate 83 >60 mL/min/1.7m2    GFR Estimate If Black >90   GFR Calc   >60 mL/min/1.7m2    Calcium 8.6 8.5 - 10.1 mg/dL    Bilirubin Total 1.2 0.2 - 1.3 mg/dL    Albumin 3.7 3.4 - 5.0 g/dL    Protein Total 7.3 6.8 - 8.8 g/dL    Alkaline Phosphatase 531 (H) 40 - 150 U/L    ALT 18 0 - 70 U/L    AST 20 0 - 45 U/L               Follow-ups after your visit        Your next 10 appointments already scheduled     Apr 05, 2017  9:00 AM CDT   [facility] Lab Draw with  [facility] LAB DRAW   [facility] Lab Draw ([facility] [place])    [address] [place] [address]   [phone]            Apr 05, 2017  9:30 AM CDT   (Arrive by 9:15 AM)   Return Visit with Gabriela MAHARAJ  POLI Mcguire   [facility] ([facility])    [address] [place] [address]  [address] [facility] [address]   [phone]            Apr 05, 2017 11:00 AM CDT   NM RADIUM 223 XOFIGO THERAPY with UUNMINJ1   [facility] [place], Nuclear Medicine ([facility] [place])    [address] [facility] [address] [phone]           Please bring a list of your medicines to the exam. (Include vitamins, minerals and over-the-counter drugs.) You should wear comfortable clothes. Leave your valuables at home. Please bring related prior results and films.  Tell your doctor:   If you are breastfeeding or may be pregnant.   If you have had a barium test within the past few days. Barium may change the results of certain exams.   If you think you may need sedation (medicine to help you relax).  You may eat and drink as normal.  Please call your Imaging Department at your exam site with any questions.            Apr 20, 2017  1:30 PM CDT   Lab with  LAB   [facility] Lab ([facility])    [address] [place] [address]  [address]st [facility] [address]   [phone]            Apr 20, 2017  2:00 PM CDT   (Arrive by 1:45 PM)   CORE RETURN with Jill Juarez NP   [facility] ([facility])    [address] [place] [address]  3rd [facility] [address]   [phone]            Apr 26, 2017 10:30 AM CDT   [facility] Lab Draw with  M[facility] LAB DRAW   [facility] Lab Draw ([facility])    [address] [place] [address]   [phone]            May 03, 2017  9:00 AM CDT   [facility] Lab Draw with  MASONIC LAB DRAW   [facility] Lab Draw ([facility])    [address] [place] [address]   [phone]            May 03, 2017  9:30 AM CDT   (Arrive by 9:15 AM)   Return Visit with Gabriela MAHARAJ  POLI Mcguire   [facility] ([facility])    [address] [id number] [phone]            May 03, 2017 11:00 AM CDT   NM RADIUM 223 XOFIGO THERAPY with UUNMINJ2   [facility] [place], Nuclear Medicine ([facility] [place])    [address] [facility] [id number] [phone]           Please bring a list of your medicines to the exam. (Include vitamins, minerals and over-the-counter drugs.) You should wear comfortable clothes. Leave your valuables at home. Please bring related prior results and films.  Tell your doctor:   If you are breastfeeding or may be pregnant.   If you have had a barium test within the past few days. Barium may change the results of certain exams.   If you think you may need sedation (medicine to help you relax).  You may eat and drink as normal.  Please call your Imaging Department at your exam site with any questions.            May 24, 2017 10:30 AM CDT   [facility] Lab Draw with  [employer] LAB DRAW   [facility] Lab Draw ([facility])    [address] [place] [id number] [phone]              Future tests that were ordered for you today     Open Future Orders        Priority Expected Expires Ordered    Clostridium difficile toxin B PCR Routine 3/29/2017 3/29/2018 3/29/2017    Enteric Bacteria and Virus Panel by BRUNO Stool Routine 3/29/2017 3/29/2018 3/29/2017            Who to contact     If you have questions or need follow up information about today's clinic visit or your schedule please contact [facility] directly at [phone].  Normal or non-critical lab and imaging results will be communicated to you by MyChart, letter or phone within 4 business days after the clinic has received the results. If you do not hear from us within 7 days, please contact the clinic through [employer] or  phone. If you have a critical or abnormal lab result, we will notify you by phone as soon as possible.  Submit refill requests through [employer] or call your pharmacy and they will forward the refill request to us. Please allow 3 business days for your refill to be completed.          Additional Information About Your Visit        [employer] Information     [employer] gives you secure access to your electronic health record. If you see a primary care provider, you can also send messages to your care team and make appointments. If you have questions, please call your primary care clinic.  If you do not have a primary care provider, please call [phone] and they will assist you.        Care EveryWhere ID     This is your Care EveryWhere ID. This could be used by other organizations to access your [place] medical records  [id number]        Your Vitals Were     Pulse                   70            Blood Pressure from Last 3 Encounters:   03/29/17 (!) 111/34   03/29/17 (!) 85/31   03/17/17 120/43    Weight from Last 3 Encounters:   03/29/17 67.9 kg (149 lb 9.6 oz)   03/15/17 66.8 kg (147 lb 4.8 oz)   03/09/17 63 kg (139 lb)              Today, you had the following     No orders found for display       Primary Care Provider Office Phone # Fax #    Lucrecia Collier [phone]       [facility] [address] [facility] [address]        Thank you!     Thank you for choosing [facility]  for your care. Our goal is always to provide you with excellent care. Hearing back from our patients is one way we can continue to improve our services. Please take a few minutes to complete the written survey that you may receive in the mail after your visit with us. Thank you!             Your Updated Medication List - Protect others around you: Learn how to safely use, store and throw away your medicines at www.disposemymeds.org.          This list is accurate as of: 3/29/17  1:11 PM.   Always use your most recent med list.                   Brand Name Dispense Instructions for use    abiraterone 250 MG tablet    ZYTIGA    120 tablet    Take 4 tablets (1,000 mg) by mouth daily Take on empty stomach.       aspirin 81 MG tablet      Take 81 mg by mouth daily       calcium carbonate 500 MG tablet    OS-RODNEY 500 mg [place]. Ca    120 tablet    Take 2 tablets (1,000 mg) by mouth 2 times daily       diphenoxylate-atropine 2.5-0.025 MG per tablet    LOMOTIL     Take 1 tablet by mouth 4 times daily as needed for diarrhea Reported on 3/8/2017       enoxaparin 60 MG/0.6ML injection    LOVENOX    60 Syringe    Inject 0.6 mLs (60 mg) Subcutaneous 2 times daily       HYDROcodone-acetaminophen 5-325 MG per tablet    NORCO     Take 1 tablet by mouth every 6 hours as needed for moderate to severe pain Reported on 3/8/2017       loperamide 2 MG capsule    IMODIUM    60 capsule    Use 2 caps PO after first loose stool.  Repeat 1 cap after each subsequent loose stool.  Max 8/24 hours.       metoprolol 25 MG 24 hr tablet    TOPROL-XL    45 tablet    Take 0.5 tablets (12.5 mg) by mouth daily AM       Multi-vitamin Tabs tablet     100 tablet    Take 1 tablet by mouth every morning Reported on 3/8/2017       predniSONE 5 MG tablet    DELTASONE    60 tablet    Take 1 tablet (5 mg) by mouth 2 times daily       simvastatin 40 MG tablet    ZOCOR    90 tablet    TAKE ONE TABLET BY MOUTH AT BEDTIME       tamsulosin 0.4 MG capsule    FLOMAX    90 capsule    Take 1 capsule (0.4 mg) by mouth daily AM       vitamin D 2000 UNITS tablet     100 tablet    Take 1 tablet by mouth daily

## 2017-03-29 NOTE — PROGRESS NOTES
"  Infusion Nursing Note:  Oswaldo Win presents today for 1 L NS.    Patient seen by provider today: No    Treatment Conditions:  Lab Results   Component Value Date    HGB 9.2 03/29/2017     Lab Results   Component Value Date    WBC 2.0 03/29/2017      Lab Results   Component Value Date    ANEU 1.1 03/29/2017     Lab Results   Component Value Date     03/29/2017      Lab Results   Component Value Date     03/29/2017                   Lab Results   Component Value Date    POTASSIUM 3.7 03/29/2017           Lab Results   Component Value Date                  Lab Results   Component Value Date    CR 0.90 03/29/2017                   Lab Results   Component Value Date    RODNEY 8.6 03/29/2017                Lab Results   Component Value Date    BILITOTAL 1.2 03/29/2017           Lab Results   Component Value Date    ALBUMIN 3.7 03/29/2017                    Lab Results   Component Value Date    ALT 18 03/29/2017           Lab Results   Component Value Date    AST 20 03/29/2017       Intravenous Access:  Peripheral IV placed by Vascular Access.  Access dc'd at time of discharge.      Note:   Pt was scheduled for a lab draw today and was hypotensive upon arrival.  Pt stating he has had diarrhea \"for 3-4 weeks and it comes and goes, not necessarily worse today\".  Per ISABELLE Cline pt to receive IVF and stool CX.  BP and HR improved post 1 L NS.  Pt reporting \"I only want one bag\".  Unable to provide a stool CX, pt aware to  a stool CX kit in the lab on the way out today.  Results reviewed, copy given to patient.    Copy of AVS given to patient. + Blood return from PIV pre and post infusion.  Tolerated infusion without incident. No Prescriptions filled today.   D/C in care of self.  Pt will return 3/31 for labs/provider/possible infusion.       Aydee Luis RN    Administrations This Visit     0.9% sodium chloride BOLUS     Admin Date Action Dose Rate Route Administered By          03/29/2017 " New Bag 1000 mL 500 mL/hr Intravenous Aydee Luis RN                   lidocaine 1 % 0.5 mL     Admin Date Action Dose Route Administered By             03/29/2017 Given 0.5 mL Intradermal Janae Mcginnis, RN

## 2017-03-31 ENCOUNTER — INFUSION THERAPY VISIT (OUTPATIENT)
Dept: ONCOLOGY | Facility: CLINIC | Age: 72
End: 2017-03-31
Attending: INTERNAL MEDICINE
Payer: COMMERCIAL

## 2017-03-31 VITALS
DIASTOLIC BLOOD PRESSURE: 43 MMHG | RESPIRATION RATE: 16 BRPM | WEIGHT: 136 LBS | BODY MASS INDEX: 21.62 KG/M2 | OXYGEN SATURATION: 97 % | SYSTOLIC BLOOD PRESSURE: 80 MMHG | HEART RATE: 97 BPM

## 2017-03-31 VITALS
DIASTOLIC BLOOD PRESSURE: 40 MMHG | WEIGHT: 136 LBS | HEART RATE: 77 BPM | RESPIRATION RATE: 16 BRPM | OXYGEN SATURATION: 100 % | SYSTOLIC BLOOD PRESSURE: 136 MMHG | TEMPERATURE: 97.6 F | BODY MASS INDEX: 21.62 KG/M2

## 2017-03-31 DIAGNOSIS — R19.5 LOOSE STOOLS: ICD-10-CM

## 2017-03-31 DIAGNOSIS — E83.39 HYPOPHOSPHATEMIA: ICD-10-CM

## 2017-03-31 DIAGNOSIS — D64.9 ANEMIA, UNSPECIFIED TYPE: ICD-10-CM

## 2017-03-31 DIAGNOSIS — C79.51 PROSTATE CANCER METASTATIC TO BONE (H): Primary | ICD-10-CM

## 2017-03-31 DIAGNOSIS — R11.0 NAUSEA: ICD-10-CM

## 2017-03-31 DIAGNOSIS — C61 PROSTATE CANCER METASTATIC TO BONE (H): Primary | ICD-10-CM

## 2017-03-31 DIAGNOSIS — I05.9 MITRAL VALVE DISORDER: ICD-10-CM

## 2017-03-31 DIAGNOSIS — R17 ELEVATED BILIRUBIN: ICD-10-CM

## 2017-03-31 LAB
ALBUMIN SERPL-MCNC: 3.7 G/DL (ref 3.4–5)
ALP SERPL-CCNC: 472 U/L (ref 40–150)
ALT SERPL W P-5'-P-CCNC: 16 U/L (ref 0–70)
ANION GAP SERPL CALCULATED.3IONS-SCNC: 13 MMOL/L (ref 3–14)
AST SERPL W P-5'-P-CCNC: 22 U/L (ref 0–45)
BASOPHILS # BLD AUTO: 0 10E9/L (ref 0–0.2)
BASOPHILS NFR BLD AUTO: 0.5 %
BILIRUB DIRECT SERPL-MCNC: 0.3 MG/DL (ref 0–0.2)
BILIRUB SERPL-MCNC: 2.6 MG/DL (ref 0.2–1.3)
BUN SERPL-MCNC: 9 MG/DL (ref 7–30)
CALCIUM SERPL-MCNC: 7.7 MG/DL (ref 8.5–10.1)
CHLORIDE SERPL-SCNC: 108 MMOL/L (ref 94–109)
CO2 SERPL-SCNC: 19 MMOL/L (ref 20–32)
CREAT SERPL-MCNC: 0.88 MG/DL (ref 0.66–1.25)
DAT POLY-SP REAG RBC QL: NORMAL
DIFFERENTIAL METHOD BLD: ABNORMAL
EOSINOPHIL # BLD AUTO: 0 10E9/L (ref 0–0.7)
EOSINOPHIL NFR BLD AUTO: 1.4 %
ERYTHROCYTE [DISTWIDTH] IN BLOOD BY AUTOMATED COUNT: 16.8 % (ref 10–15)
GFR SERPL CREATININE-BSD FRML MDRD: 85 ML/MIN/1.7M2
GLUCOSE SERPL-MCNC: 98 MG/DL (ref 70–99)
HCT VFR BLD AUTO: 24.3 % (ref 40–53)
HGB BLD-MCNC: 8.2 G/DL (ref 13.3–17.7)
IMM GRANULOCYTES # BLD: 0 10E9/L (ref 0–0.4)
IMM GRANULOCYTES NFR BLD: 0.5 %
LDH SERPL L TO P-CCNC: 409 U/L (ref 85–227)
LYMPHOCYTES # BLD AUTO: 0.6 10E9/L (ref 0.8–5.3)
LYMPHOCYTES NFR BLD AUTO: 30 %
MAGNESIUM SERPL-MCNC: 2.2 MG/DL (ref 1.6–2.3)
MCH RBC QN AUTO: 31.1 PG (ref 26.5–33)
MCHC RBC AUTO-ENTMCNC: 33.7 G/DL (ref 31.5–36.5)
MCV RBC AUTO: 92 FL (ref 78–100)
MONOCYTES # BLD AUTO: 0.4 10E9/L (ref 0–1.3)
MONOCYTES NFR BLD AUTO: 17.4 %
NEUTROPHILS # BLD AUTO: 1 10E9/L (ref 1.6–8.3)
NEUTROPHILS NFR BLD AUTO: 50.2 %
NRBC # BLD AUTO: 0 10*3/UL
NRBC BLD AUTO-RTO: 0 /100
PHOSPHATE SERPL-MCNC: 1.6 MG/DL (ref 2.5–4.5)
PLATELET # BLD AUTO: 122 10E9/L (ref 150–450)
POTASSIUM SERPL-SCNC: 3.8 MMOL/L (ref 3.4–5.3)
PROT SERPL-MCNC: 7.1 G/DL (ref 6.8–8.8)
RBC # BLD AUTO: 2.64 10E12/L (ref 4.4–5.9)
RETICS # AUTO: 106.9 10E9/L (ref 25–95)
RETICS/RBC NFR AUTO: 4.1 % (ref 0.5–2)
SODIUM SERPL-SCNC: 140 MMOL/L (ref 133–144)
WBC # BLD AUTO: 2.1 10E9/L (ref 4–11)

## 2017-03-31 PROCEDURE — 80053 COMPREHEN METABOLIC PANEL: CPT | Performed by: PHYSICIAN ASSISTANT

## 2017-03-31 PROCEDURE — 85025 COMPLETE CBC W/AUTO DIFF WBC: CPT | Performed by: PHYSICIAN ASSISTANT

## 2017-03-31 PROCEDURE — 82248 BILIRUBIN DIRECT: CPT | Performed by: PHYSICIAN ASSISTANT

## 2017-03-31 PROCEDURE — 96361 HYDRATE IV INFUSION ADD-ON: CPT

## 2017-03-31 PROCEDURE — 84100 ASSAY OF PHOSPHORUS: CPT | Performed by: PHYSICIAN ASSISTANT

## 2017-03-31 PROCEDURE — 99214 OFFICE O/P EST MOD 30 MIN: CPT | Mod: ZP | Performed by: PHYSICIAN ASSISTANT

## 2017-03-31 PROCEDURE — 40000611 ZZHCL STATISTIC MORPHOLOGY W/INTERP HEMEPATH TC 85060: Performed by: PHYSICIAN ASSISTANT

## 2017-03-31 PROCEDURE — 83615 LACTATE (LD) (LDH) ENZYME: CPT | Performed by: PHYSICIAN ASSISTANT

## 2017-03-31 PROCEDURE — 25000125 ZZHC RX 250: Mod: ZF | Performed by: RADIOLOGY

## 2017-03-31 PROCEDURE — 86880 COOMBS TEST DIRECT: CPT | Performed by: PHYSICIAN ASSISTANT

## 2017-03-31 PROCEDURE — 96360 HYDRATION IV INFUSION INIT: CPT

## 2017-03-31 PROCEDURE — 25000125 ZZHC RX 250: Mod: ZF | Performed by: PHYSICIAN ASSISTANT

## 2017-03-31 PROCEDURE — 40000556 ZZH STATISTIC PERIPHERAL IV START W US GUIDANCE: Mod: ZF

## 2017-03-31 PROCEDURE — 25000128 H RX IP 250 OP 636: Mod: ZF | Performed by: PHYSICIAN ASSISTANT

## 2017-03-31 PROCEDURE — 83735 ASSAY OF MAGNESIUM: CPT | Performed by: PHYSICIAN ASSISTANT

## 2017-03-31 PROCEDURE — 85045 AUTOMATED RETICULOCYTE COUNT: CPT | Performed by: PHYSICIAN ASSISTANT

## 2017-03-31 RX ORDER — PROCHLORPERAZINE MALEATE 5 MG
5 TABLET ORAL EVERY 6 HOURS PRN
Qty: 30 TABLET | Refills: 1 | Status: SHIPPED | OUTPATIENT
Start: 2017-03-31 | End: 2017-01-01

## 2017-03-31 RX ORDER — ONDANSETRON 8 MG/1
8 TABLET, ORALLY DISINTEGRATING ORAL ONCE
Status: COMPLETED | OUTPATIENT
Start: 2017-03-31 | End: 2017-03-31

## 2017-03-31 RX ADMIN — SODIUM CHLORIDE 1000 ML: 9 INJECTION, SOLUTION INTRAVENOUS at 14:20

## 2017-03-31 RX ADMIN — ONDANSETRON 8 MG: 8 TABLET, ORALLY DISINTEGRATING ORAL at 17:52

## 2017-03-31 RX ADMIN — LIDOCAINE HYDROCHLORIDE 0.5 ML: 10 INJECTION, SOLUTION INFILTRATION; PERINEURAL at 14:00

## 2017-03-31 ASSESSMENT — PAIN SCALES - GENERAL: PAINLEVEL: NO PAIN (0)

## 2017-03-31 NOTE — PATIENT INSTRUCTIONS
Contact Numbers    [facility] [place]: [phone]  [facility] [place]:  [phone]    Call triage with chills and/or temperature greater than or equal to 100.5, uncontrolled nausea/vomiting, diarrhea, constipation, dizziness, shortness of breath, chest pain, bleeding, unexplained bruising, or any new/concerning symptoms, questions/concerns.     If you are having any concerning symptoms or wish to speak to a provider before your next infusion visit, please call your care coordinator or triage to notify them so we can adequately serve you.       After Hours: [phone]    If after hours, weekends, or holidays, call main hospital  and ask for Oncology doctor on call.         March 2017 Sunday Monday Tuesday Wednesday Thursday Friday Saturday                  1     [facility] LAB DRAW   10:30 AM   (15 min.)    [facility]   [facility] 2     3     4       5     6     7  Happy Birthday!     8     P [facility] LAB DRAW    8:45 AM   (15 min.)    [facility]   [facility]     UMP RETURN    9:00 AM   (30 min.)   Bentley Robles MD   [facility]     NM RADIUM 223 XOFIGO THERAPY   11:00 AM   (30 min.)   [email or web address]   [facility] [place], Nuclear Medicine 9     P CORE RETURN   12:45 PM   (30 min.)   Jill Juarez NP   [facility] 10     11       12     13     14     15     P [facility] LAB DRAW    1:45 PM   (15 min.)    [facility]RAW   [facility]     UMP RETURN    1:55 PM   (50 min.)   Gabriela Mcguire PA-C   [facility]     UMP ONC INFUSION 120    3:30 PM   (120 min.)   UC ONCOLOGY INFUSION   [facility] 16     17     LAB WITH  CLINIC    2:15 PM   (15 min.)   UC LAB   [facility] Lab     UMP ONC INFUSION 120    2:30 PM   (120 min.)    ONCOLOGY INFUSION   [facility] 18       19     20     21     22     23     24     25       26     27     28     29     [facility] LAB  DRAW   10:30 AM   (15 min.)   UC [facility] LAB DRAW   [facility] Lab Draw     UMP ONC INFUSION 120   11:30 AM   (120 min.)    ONCOLOGY INFUSION   [facility] 30     31     UMP [facility] LAB DRAW    2:00 PM   (15 min.)    [facility] LAB DRAW   [facility] Lab Draw     UMP RETURN    2:15 PM   (50 min.)   Chelsi Knapp PA-C   [facility]     UMP ONC INFUSION 120    3:00 PM   (120 min.)    ONCOLOGY INFUSION   [facility]                 April 2017 Sunday Monday Tuesday Wednesday Thursday Friday Saturday                                 1       2     3     [facility] LAB DRAW   10:45 AM   (15 min.)   UC [facility] LAB DRAW   [facility] Lab Draw     P ONC INFUSION 240   11:30 AM   (240 min.)    ONCOLOGY INFUSION   [facility] 4     LAB   12:00 PM   (15 min.)    LAB   [facility] 5     UMP RETURN    9:15 AM   (50 min.)   Gabriela Mcguire PA-C   [facility]     NM RADIUM 223 XOFIGO THERAPY   11:00 AM   (30 min.)   [email or web address]   [facility] [place], Nuclear Medicine 6     7     8       9     10     11     12     13     14     15       16     17     18     19     20     LAB WITH HB CLINIC    1:30 PM   (15 min.)    LAB   [facility] Lab     P CORE RETURN    1:45 PM   (30 min.)   Jill Juarez NP   [facility] 21     22       23     24     25     26     [facility] LAB DRAW   10:30 AM   (15 min.)   UC [facility] LAB DRAW   [facility] Lab Draw 27     28     29       30                                                Lab Results:  Recent Results (from the past 12 hour(s))   *CBC with platelets differential    Collection Time: 03/31/17  2:11 PM   Result Value Ref Range    WBC 2.1 (L) 4.0 - 11.0 10e9/L    RBC Count 2.64 (L) 4.4 - 5.9 10e12/L    Hemoglobin 8.2 (L) 13.3 - 17.7 g/dL    Hematocrit 24.3 (L) 40.0 - 53.0 %    MCV 92 78 - 100 fl    MCH 31.1 26.5 - 33.0 pg    MCHC 33.7 31.5  - 36.5 g/dL    RDW 16.8 (H) 10.0 - 15.0 %    Platelet Count 122 (L) 150 - 450 10e9/L    Diff Method Automated Method     % Neutrophils 50.2 %    % Lymphocytes 30.0 %    % Monocytes 17.4 %    % Eosinophils 1.4 %    % Basophils 0.5 %    % Immature Granulocytes 0.5 %    Nucleated RBCs 0 0 /100    Absolute Neutrophil 1.0 (L) 1.6 - 8.3 10e9/L    Absolute Lymphocytes 0.6 (L) 0.8 - 5.3 10e9/L    Absolute Monocytes 0.4 0.0 - 1.3 10e9/L    Absolute Eosinophils 0.0 0.0 - 0.7 10e9/L    Absolute Basophils 0.0 0.0 - 0.2 10e9/L    Abs Immature Granulocytes 0.0 0 - 0.4 10e9/L    Absolute Nucleated RBC 0.0    Comprehensive metabolic panel    Collection Time: 03/31/17  2:11 PM   Result Value Ref Range    Sodium 140 133 - 144 mmol/L    Potassium 3.8 3.4 - 5.3 mmol/L    Chloride 108 94 - 109 mmol/L    Carbon Dioxide 19 (L) 20 - 32 mmol/L    Anion Gap 13 3 - 14 mmol/L    Glucose 98 70 - 99 mg/dL    Urea Nitrogen 9 7 - 30 mg/dL    Creatinine 0.88 0.66 - 1.25 mg/dL    GFR Estimate 85 >60 mL/min/1.7m2    GFR Estimate If Black >90   GFR Calc   >60 mL/min/1.7m2    Calcium 7.7 (L) 8.5 - 10.1 mg/dL    Bilirubin Total 2.6 (H) 0.2 - 1.3 mg/dL    Albumin 3.7 3.4 - 5.0 g/dL    Protein Total 7.1 6.8 - 8.8 g/dL    Alkaline Phosphatase 472 (H) 40 - 150 U/L    ALT 16 0 - 70 U/L    AST 22 0 - 45 U/L   Magnesium    Collection Time: 03/31/17  2:11 PM   Result Value Ref Range    Magnesium 2.2 1.6 - 2.3 mg/dL   Phosphorus    Collection Time: 03/31/17  2:11 PM   Result Value Ref Range    Phosphorus 1.6 (L) 2.5 - 4.5 mg/dL   Bilirubin direct    Collection Time: 03/31/17  2:11 PM   Result Value Ref Range    Bilirubin Direct 0.3 (H) 0.0 - 0.2 mg/dL   Reticulocyte count    Collection Time: 03/31/17  2:11 PM   Result Value Ref Range    % Retic 4.1 (H) 0.5 - 2.0 %    Absolute Retic 106.9 (H) 25 - 95 10e9/L   Direct antiglobulin test    Collection Time: 03/31/17  2:11 PM   Result Value Ref Range    IRIS  Broad Spectrum Pending    Lactate  Dehydrogenase    Collection Time: 03/31/17  2:11 PM   Result Value Ref Range    Lactate Dehydrogenase 409 (H) 85 - 227 U/L

## 2017-03-31 NOTE — MR AVS SNAPSHOT
After Visit Summary   3/31/2017    Oswaldo Win    MRN: [id number]           Patient Information     Date Of Birth          1945        Visit Information        Provider Department      3/31/2017 3:00 PM  32 ATC;  ONCOLOGY INFUSION [facility]        Today's Diagnoses     Prostate cancer 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)    -  1      Care Instructions    Contact Numbers    [facility] [place]: [phone]  [facility] [place]:  [phone]    Call triage with chills and/or temperature greater than or equal to 100.5, uncontrolled nausea/vomiting, diarrhea, constipation, dizziness, shortness of breath, chest pain, bleeding, unexplained bruising, or any new/concerning symptoms, questions/concerns.     If you are having any concerning symptoms or wish to speak to a provider before your next infusion visit, please call your care coordinator or triage to notify them so we can adequately serve you.       After Hours: [phone]    If after hours, weekends, or holidays, call main hospital  and ask for Oncology doctor on call.         March 2017 Sunday Monday Tuesday Wednesday Thursday Friday Saturday                  1     [facility] LAB DRAW   10:30 AM   (15 min.)    MASONIC LAB DRAW   [facility] Lab Draw 2     3     4       5     6     7  Happy Birthday!     8     P [facility] LAB DRAW    8:45 AM   (15 min.)    [facility] LAB DRAW   [facility] Lab Draw     P RETURN    9:00 AM   (30 min.)   Bentley Robles MD   [facility]     NM RADIUM 223 XOFIGO THERAPY   11:00 AM   (30 min.)   [email or web address]   [facility] [place], Nuclear Medicine 9     P CORE RETURN   12:45 PM   (30 min.)   Jill Juarez NP   [facility] 10     11       12     13     14     15     [facility] LAB DRAW    1:45 PM   (15 min.)    M[facility] LAB DRAW   [facility] Lab Draw     P RETURN    1:55 PM   (50 min.)   Gabriela Mcguire PA-C   [facility]  ONC INFUSION 120    3:30 PM   (120 min.)   UC ONCOLOGY INFUSION   [facility] 16     17     LAB WITH  CLINIC    2:15 PM   (15 min.)    LAB   [facility] Lab     UMP ONC INFUSION 120    2:30 PM   (120 min.)    ONCOLOGY INFUSION   [facility] 18       19     20     21     22     23     24     25       26     27     28     29     UMP [facility] LAB DRAW   10:30 AM   (15 min.)   UC [facility] LAB DRAW   [facility] Lab Draw     UMP ONC INFUSION 120   11:30 AM   (120 min.)    ONCOLOGY INFUSION   [facility] 30     31     UMP [facility] LAB DRAW    2:00 PM   (15 min.)   UC [facility] LAB DRAW   [facility] Lab Draw     UMP RETURN    2:15 PM   (50 min.)   Chelsi Knapp PA-C   [facility]     UMP ONC INFUSION 120    3:00 PM   (120 min.)    ONCOLOGY INFUSION   [facility]                 April 2017 Sunday Monday Tuesday Wednesday Thursday Friday Saturday                                 1       2     3     P [facility] LAB DRAW   10:45 AM   (15 min.)   UC [facility] LAB DRAW   [facility] Lab Draw     [email or web address] ONC INFUSION 240   11:30 AM   (240 min.)    ONCOLOGY INFUSION   [facility] 4     LAB   12:00 PM   (15 min.)    LAB   [facility] 5     UMP RETURN    9:15 AM   (50 min.)   Gabriela Mcguire PA-C   [facility]     NM RADIUM 223 XOFIGO THERAPY   11:00 AM   (30 min.)   [email or web address]   [facility] [place], Nuclear Medicine 6     7     8       9     10     11     12     13     14     15       16     17     18     19     20     LAB WITH HB CLINIC    1:30 PM   (15 min.)    LAB   [facility] Lab     P CORE RETURN    1:45 PM   (30 min.)   iJll Juarez NP   [facility] 21     22       23     24     25     26     P [facility] LAB DRAW   10:30 AM   (15 min.)    [facility] LAB DRAW   [facility] Lab Draw 27     28     29       30                                                 Lab Results:  Recent Results (from the past 12 hour(s))   *CBC with platelets differential    Collection Time: 03/31/17  2:11 PM   Result Value Ref Range    WBC 2.1 (L) 4.0 - 11.0 10e9/L    RBC Count 2.64 (L) 4.4 - 5.9 10e12/L    Hemoglobin 8.2 (L) 13.3 - 17.7 g/dL    Hematocrit 24.3 (L) 40.0 - 53.0 %    MCV 92 78 - 100 fl    MCH 31.1 26.5 - 33.0 pg    MCHC 33.7 31.5 - 36.5 g/dL    RDW 16.8 (H) 10.0 - 15.0 %    Platelet Count 122 (L) 150 - 450 10e9/L    Diff Method Automated Method     % Neutrophils 50.2 %    % Lymphocytes 30.0 %    % Monocytes 17.4 %    % Eosinophils 1.4 %    % Basophils 0.5 %    % Immature Granulocytes 0.5 %    Nucleated RBCs 0 0 /100    Absolute Neutrophil 1.0 (L) 1.6 - 8.3 10e9/L    Absolute Lymphocytes 0.6 (L) 0.8 - 5.3 10e9/L    Absolute Monocytes 0.4 0.0 - 1.3 10e9/L    Absolute Eosinophils 0.0 0.0 - 0.7 10e9/L    Absolute Basophils 0.0 0.0 - 0.2 10e9/L    Abs Immature Granulocytes 0.0 0 - 0.4 10e9/L    Absolute Nucleated RBC 0.0    Comprehensive metabolic panel    Collection Time: 03/31/17  2:11 PM   Result Value Ref Range    Sodium 140 133 - 144 mmol/L    Potassium 3.8 3.4 - 5.3 mmol/L    Chloride 108 94 - 109 mmol/L    Carbon Dioxide 19 (L) 20 - 32 mmol/L    Anion Gap 13 3 - 14 mmol/L    Glucose 98 70 - 99 mg/dL    Urea Nitrogen 9 7 - 30 mg/dL    Creatinine 0.88 0.66 - 1.25 mg/dL    GFR Estimate 85 >60 mL/min/1.7m2    GFR Estimate If Black >90   GFR Calc   >60 mL/min/1.7m2    Calcium 7.7 (L) 8.5 - 10.1 mg/dL    Bilirubin Total 2.6 (H) 0.2 - 1.3 mg/dL    Albumin 3.7 3.4 - 5.0 g/dL    Protein Total 7.1 6.8 - 8.8 g/dL    Alkaline Phosphatase 472 (H) 40 - 150 U/L    ALT 16 0 - 70 U/L    AST 22 0 - 45 U/L   Magnesium    Collection Time: 03/31/17  2:11 PM   Result Value Ref Range    Magnesium 2.2 1.6 - 2.3 mg/dL   Phosphorus    Collection Time: 03/31/17  2:11 PM   Result Value Ref Range    Phosphorus 1.6 (L) 2.5 - 4.5 mg/dL   Bilirubin direct    Collection Time:  03/31/17  2:11 PM   Result Value Ref Range    Bilirubin Direct 0.3 (H) 0.0 - 0.2 mg/dL   Reticulocyte count    Collection Time: 03/31/17  2:11 PM   Result Value Ref Range    % Retic 4.1 (H) 0.5 - 2.0 %    Absolute Retic 106.9 (H) 25 - 95 10e9/L   Direct antiglobulin test    Collection Time: 03/31/17  2:11 PM   Result Value Ref Range    IRIS  Broad Spectrum Pending    Lactate Dehydrogenase    Collection Time: 03/31/17  2:11 PM   Result Value Ref Range    Lactate Dehydrogenase 409 (H) 85 - 227 U/L             Follow-ups after your visit        Your next 10 appointments already scheduled     Apr 03, 2017 10:45 AM CDT   [facility]   [facility] Lab Draw ([facility])    [address] [place] [id number] [phone]            Apr 03, 2017 11:30 AM CDT   Infusion 240 with  ONCOLOGY INFUSION, UC 11 ATC   [facility] ([facility])    [address] [place] [id number] [phone]            Apr 04, 2017 12:00 PM CDT   LAB with  LAB   [facility]    [address] [place] [id number] [phone]           Patient must bring picture ID.  Patient should be prepared to give a urine specimen  Please do not eat 10-12 hours before your appointment if you are coming in fasting for labs on lipids, cholesterol, or glucose (sugar).  Pregnant women should follow their Care Team instructions. Water with medications is okay. Do not drink coffee or other fluids.   If you have concerns about taking  your medications, please ask at office or if scheduling via [employer], send a message by clicking on Secure Messaging, Message Your Care Team.            Apr 05, 2017  9:30 AM CDT   (Arrive by 9:15 AM)   Return Visit with Gabriela Mcguire PA-C   [facility] ([facility])    [address] [place] [address]  [facility]  2nd [facility] [address]   [phone]            Apr 05, 2017 11:00 AM CDT   NM RADIUM 223 XOFIGO THERAPY with [email or web address]   [facility] [place], Nuclear Medicine ([facility])    [address] [facility] [address]   [phone]           Please bring a list of your medicines to the exam. (Include vitamins, minerals and over-the-counter drugs.) You should wear comfortable clothes. Leave your valuables at home. Please bring related prior results and films.  Tell your doctor:   If you are breastfeeding or may be pregnant.   If you have had a barium test within the past few days. Barium may change the results of certain exams.   If you think you may need sedation (medicine to help you relax).  You may eat and drink as normal.  Please call your Imaging Department at your exam site with any questions.            Apr 20, 2017  1:30 PM CDT   Lab with  LAB   [facility] ([facility])    [address] [place] [address]  1st [facility] [id number] [phone]            Apr 20, 2017  2:00 PM CDT   (Arrive by 1:45 PM)   CORE RETURN with Jill Juarez NP   [facility] ([facility])    [address] [place] [address]  3rd [facility] [address]   [phone]            Apr 26, 2017 10:30 AM CDT   [facility] Draw with  [facility] LAB DRAW   [facility] Draw ([facility])    [address] [place] [address]   [phone]            May 03, 2017  9:00 AM CDT   [facility] Lab Draw with  [facility] LAB DRAW   [facility] Lab Draw ([facility])    [address] [address]   [phone]              Future tests that were ordered for you today     Open Future Orders        Priority Expected Expires Ordered    Haptoglobin Routine  3/31/2018 3/31/2017    Direct antiglobulin test  Routine  3/31/2018 3/31/2017    Reticulocyte count Routine  3/31/2018 3/31/2017            Who to contact     If you have questions or need follow up information about today's clinic visit or your schedule please contact [facility] directly at [phone].  Normal or non-critical lab and imaging results will be communicated to you by MyChart, letter or phone within 4 business days after the clinic has received the results. If you do not hear from us within 7 days, please contact the clinic through [employer] or phone. If you have a critical or abnormal lab result, we will notify you by phone as soon as possible.  Submit refill requests through [employer] or call your pharmacy and they will forward the refill request to us. Please allow 3 business days for your refill to be completed.          Additional Information About Your Visit        MyChart Information     [employer] gives you secure access to your electronic health record. If you see a primary care provider, you can also send messages to your care team and make appointments. If you have questions, please call your primary care clinic.  If you do not have a primary care provider, please call [phone] and they will assist you.        Care EveryWhere ID     This is your Care EveryWhere ID. This could be used by other organizations to access your [place] medical records  [id number]        Your Vitals Were     Pulse Temperature Respirations Pulse Oximetry BMI (Body Mass Index)       77 97.6  F (36.4  C) (Tympanic) 16 100% 21.62 kg/m2        Blood Pressure from Last 3 Encounters:   03/31/17 136/40   03/31/17 (!) 80/43   03/29/17 112/46    Weight from Last 3 Encounters:   03/31/17 61.7 kg (136 lb)   03/31/17 61.7 kg (136 lb)   03/29/17 67.9 kg (149 lb 9.6 oz)              Today, you had the following     No orders found for display       Primary Care Provider Office Phone # Fax #    Lucrecia Collier [phone]       [place]  [facility] [address]  [facility] [address]        Thank you!     Thank you for choosing [facility]  for your care. Our goal is always to provide you with excellent care. Hearing back from our patients is one way we can continue to improve our services. Please take a few minutes to complete the written survey that you may receive in the mail after your visit with us. Thank you!             Your Updated Medication List - Protect others around you: Learn how to safely use, store and throw away your medicines at www.disposemymeds.org.          This list is accurate as of: 3/31/17  5:07 PM.  Always use your most recent med list.                   Brand Name Dispense Instructions for use    abiraterone 250 MG tablet    ZYTIGA    120 tablet    Take 4 tablets (1,000 mg) by mouth daily Take on empty stomach.       aspirin 81 MG tablet      Take 81 mg by mouth daily       calcium carbonate 500 MG tablet    OS-RODNEY 500 mg [place]. Ca    120 tablet    Take 2 tablets (1,000 mg) by mouth 2 times daily       diphenoxylate-atropine 2.5-0.025 MG per tablet    LOMOTIL     Take 1 tablet by mouth 4 times daily as needed for diarrhea Reported on 3/8/2017       HYDROcodone-acetaminophen 5-325 MG per tablet    NORCO     Take 1 tablet by mouth every 6 hours as needed for moderate to severe pain Reported on 3/8/2017       loperamide 2 MG capsule    IMODIUM    60 capsule    Use 2 caps PO after first loose stool.  Repeat 1 cap after each subsequent loose stool.  Max 8/24 hours.       LOVENOX 60 MG/0.6ML injection   Generic drug:  enoxaparin      Inject 0.5 mLs (50 mg) Subcutaneous 2 times daily       metoprolol 25 MG 24 hr tablet    TOPROL-XL    45 tablet    Take 0.5 tablets (12.5 mg) by mouth daily AM       Multi-vitamin Tabs tablet     100 tablet    Take 1 tablet by mouth every morning Reported on 3/8/2017       predniSONE 5 MG tablet    DELTASONE    60 tablet    Take 1 tablet (5 mg) by mouth 2 times daily        simvastatin 40 MG tablet    ZOCOR    90 tablet    TAKE ONE TABLET BY MOUTH AT BEDTIME       tamsulosin 0.4 MG capsule    FLOMAX    90 capsule    Take 1 capsule (0.4 mg) by mouth daily AM       vitamin D 2000 UNITS tablet     100 tablet    Take 1 tablet by mouth daily

## 2017-03-31 NOTE — PROGRESS NOTES
Oncology/Hematology Visit Note  Mar 31, 2017    DIAGNOSIS:  Mr. Win is a 71 year old male with a hx of CHF, CKD, CAD w/ hx of STEMI and CABG. He met with Dr. Robles at the end of July for consultation regarding metastatic prostate cancer. His story begins in May 2015 when he saw his PCP for back pain, present since Feb. He also had poor appetite, weight loss of 8lb and poor energy.  CT showed diffuse bony mets. CT Chest showed sclerotic mets throughout the bones. He was given degarelix on 7/22 in Urology. He was having pelvic pain and was evaluated by Dr Cavazos would did not feel XRT was appropriate.  He started on docetaxel 7/31. From 8/7-8/14 he was admitted with colitis, CHAR, elevated BNP, lactis acidosis, and poor appetite. He was discharged to a TCU and unfortunately, it sounds like he was not given his Lasix.  His BUBBA worsened and his breathing became compromised.  He was seen by Chelsi Corral on 8/21 and eventually transferred to the ED and admitted 8/21-8/24 for acute on chronic CHF.  At our last visit we discussed not pursuing further Taxotere at this time and discussed starting Zytiga when he was ready.  We pursued PT, OT, home lymphedema for supportive care at home in attempt for further rehabilitation. He was again admitted and discharged - 10/20/15.  He never started the Zytiga as he was still actively recovering from his cardiac issues.  His PSA remained stable, thus no intervention was pursued.  December 2015, Mainor met with Dr Robles and since his ECOG was back to a 1, they discussed re-challenging the Taxotere at a lower dose. 9/26/2016 he started Provenge off study. He continues on lupron every 3 mos and XGEVA. 9/26/2016 he started Provenge off study. He received 3 doses of Provenge (last 10/28), on 3rd dose had fevers, chills, myalgias, was bedbound for a week due to these symptoms. During this time he held his coumadin for 5 days prior to a central line removal 10/31. Later than week he had sudden onset  of right pleuritic chest pain and hemoptysis. Diagnosed with acute segmental/subsegmental PE on 11/8 and prescribed Lovenox.     Mainor was briefly on Zytiga and prednisone in November-December, however after one month his PSA went up, thus it was decided to add in Xofigo (1/11/17-- first).     On 12/28 he started xaralto and took his last pill on 1/10/17.  He stopped as he noticed melanotic stools and had some hemoptysis.  He was seen in clinic the next day with a hgb in th 6 range and was admitted.  Pan-scopes showed no active bleeding.  He was able to get his Xofigo in patient as well.  He was transitioned back to lovenox BID.     INTERVAL HISTORY:  Mr. Win is here today for f/u. He has been having on/off diarrhea now for about 4 weeks. He notes his stools were improving, but then seemed to acutely worsen earlier in the week. He had up to 10 stools yesterday-they have been watery. However, today he has only had 3 so far. He has not had any abdominal pain, cramping, bloating. No fevers/chills. He has had some on/off nausea as well. He has not been taking anything to slow down his stools or for his nausea. He has been drinking fluids but states he has had some positional dizziness. No falls. He takes meoprolol in the morning and often checks his BP. This morning it was 106/50s. He was surprised to hear it was much lower in clinic. He generally feels well outside of diarrhea, some nausea and fatigue secondary to this process. He feels weak. He is without any black/tarry or bloody stools. He remains on zytiga, but has not taken his prednisone for one week as he ran out. He has not been eating much due to the intermittent nausea. He ate some crackers earlier today. Denies any urinary changes.     MEDICATIONS:   Current Outpatient Prescriptions   Medication Sig     enoxaparin (LOVENOX) 60 MG/0.6ML injection Inject 0.5 mLs (50 mg) Subcutaneous 2 times daily     metoprolol (TOPROL-XL) 25 MG 24 hr tablet Take 0.5 tablets  (12.5 mg) by mouth daily AM     loperamide (IMODIUM) 2 MG capsule Use 2 caps PO after first loose stool.  Repeat 1 cap after each subsequent loose stool.  Max 8/24 hours.     predniSONE (DELTASONE) 5 MG tablet Take 1 tablet (5 mg) by mouth 2 times daily     abiraterone (ZYTIGA) 250 MG tablet Take 4 tablets (1,000 mg) by mouth daily Take on empty stomach.     diphenoxylate-atropine (LOMOTIL) 2.5-0.025 MG per tablet Take 1 tablet by mouth 4 times daily as needed for diarrhea Reported on 3/8/2017     calcium carbonate (OS-RODNEY 500 MG [place]) 500 MG tablet Take 2 tablets (1,000 mg) by mouth 2 times daily     aspirin 81 MG tablet Take 81 mg by mouth daily     Cholecalciferol (VITAMIN D) 2000 UNITS tablet Take 1 tablet by mouth daily     simvastatin (ZOCOR) 40 MG tablet TAKE ONE TABLET BY MOUTH AT BEDTIME     tamsulosin (FLOMAX) 0.4 MG 24 hr capsule Take 1 capsule (0.4 mg) by mouth daily AM     HYDROcodone-acetaminophen (NORCO) 5-325 MG per tablet Take 1 tablet by mouth every 6 hours as needed for moderate to severe pain Reported on 3/8/2017     multivitamin, therapeutic with minerals (MULTI-VITAMIN) TABS Take 1 tablet by mouth every morning Reported on 3/8/2017     No current facility-administered medications for this visit.      Facility-Administered Medications Ordered in Other Visits   Medication     hydrocortisone sodium succinate (solu-CORTEF) injection     glycopyrrolate (ROBINUL) injection     neostigmine (PROSTIGMINE) injection     protamine injection     albuterol (PROAIR HFA, PROVENTIL HFA, VENTOLIN HFA) inhaler          ALLERGIES:  No Known Allergies    PHYSICAL EXAMINATION:  Vitals: BP (!) 80/43 (BP Location: Right arm, Patient Position: Chair, Cuff Size: Adult Regular)  Pulse 97  Resp 16  Wt 61.7 kg (136 lb)  SpO2 97%  BMI 21.62 kg/m2   Wt Readings from Last 10 Encounters:   03/31/17 61.7 kg (136 lb)   03/31/17 61.7 kg (136 lb)   03/29/17 67.9 kg (149 lb 9.6 oz)   03/15/17 66.8 kg (147 lb 4.8 oz)    03/09/17 63 kg (139 lb)   03/08/17 69.3 kg (152 lb 11.2 oz)   02/24/17 64 kg (141 lb)   02/08/17 65.8 kg (145 lb)   02/01/17 64.9 kg (143 lb)   01/20/17 66.2 kg (146 lb)       GENERAL:  A pleasant person in no acute distress. Appears quite well.   HEENT:  Sclerae are nonicteric.  Mouth is without mucositis or thrush.   NECK:  Supple.   LYMPH NODES:  No peripheral lymphadenopathy noted in the supraclavicular or cervical regions.   LUNGS:  Clear to auscultation bilaterally.   HEART:  Regular rate and rhythm   ABDOMEN:  Bowel sounds are active.  Soft and nontender.  No hepatosplenomegaly or other masses appreciated.  LOWER EXTREMITIES:  Without pitting edema to the knees bilaterally.   NEUROLOGICAL:  Alert/orientated/able to answer all questions.  CN grossly intact.     LAB:   Results for USAMA MANRIQUE (MRN [id number]) as of 4/2/2017 20:30   Ref. Range 3/29/2017 10:54 3/31/2017 14:11   Sodium Latest Ref Range: 133 - 144 mmol/L 142 140   Potassium Latest Ref Range: 3.4 - 5.3 mmol/L 3.7 3.8   Chloride Latest Ref Range: 94 - 109 mmol/L 110 (H) 108   Carbon Dioxide Latest Ref Range: 20 - 32 mmol/L 22 19 (L)   Urea Nitrogen Latest Ref Range: 7 - 30 mg/dL 13 9   Creatinine Latest Ref Range: 0.66 - 1.25 mg/dL 0.90 0.88   GFR Estimate Latest Ref Range: >60 mL/min/1.7m2 83 85   GFR Estimate If Black Latest Ref Range: >60 mL/min/1.7m2 >90... >90...   Calcium Latest Ref Range: 8.5 - 10.1 mg/dL 8.6 7.7 (L)   Anion Gap Latest Ref Range: 3 - 14 mmol/L 10 13   Magnesium Latest Ref Range: 1.6 - 2.3 mg/dL  2.2   Phosphorus Latest Ref Range: 2.5 - 4.5 mg/dL  1.6 (L)   Albumin Latest Ref Range: 3.4 - 5.0 g/dL 3.7 3.7   Protein Total Latest Ref Range: 6.8 - 8.8 g/dL 7.3 7.1   Bilirubin Total Latest Ref Range: 0.2 - 1.3 mg/dL 1.2 2.6 (H)   Alkaline Phosphatase Latest Ref Range: 40 - 150 U/L 531 (H) 472 (H)   ALT Latest Ref Range: 0 - 70 U/L 18 16   AST Latest Ref Range: 0 - 45 U/L 20 22   Bilirubin Direct Latest Ref Range: 0.0 - 0.2  mg/dL  0.3 (H)   Lactate Dehydrogenase Latest Ref Range: 85 - 227 U/L  409 (H)   Results for USAMA MANRIQUE (MRN [id number]) as of 4/2/2017 20:30   Ref. Range 3/29/2017 10:54 3/31/2017 14:11   WBC Latest Ref Range: 4.0 - 11.0 10e9/L 2.0 (L) 2.1 (L)   Hemoglobin Latest Ref Range: 13.3 - 17.7 g/dL 9.2 (L) 8.2 (L)   Hematocrit Latest Ref Range: 40.0 - 53.0 % 27.1 (L) 24.3 (L)   Platelet Count Latest Ref Range: 150 - 450 10e9/L 127 (L) 122 (L)   RBC Count Latest Ref Range: 4.4 - 5.9 10e12/L 2.95 (L) 2.64 (L)   MCV Latest Ref Range: 78 - 100 fl 92 92   MCH Latest Ref Range: 26.5 - 33.0 pg 31.2 31.1   MCHC Latest Ref Range: 31.5 - 36.5 g/dL 33.9 33.7   RDW Latest Ref Range: 10.0 - 15.0 % 17.0 (H) 16.8 (H)   Diff Method Unknown Automated Method Automated Method   % Neutrophils Latest Units: % 55.7 50.2   % Lymphocytes Latest Units: % 30.1 30.0   % Monocytes Latest Units: % 11.2 17.4   % Eosinophils Latest Units: % 1.5 1.4   % Basophils Latest Units: % 0.5 0.5   % Immature Granulocytes Latest Units: % 1.0 0.5   Nucleated RBCs Latest Ref Range: 0 /100 0 0   Absolute Neutrophil Latest Ref Range: 1.6 - 8.3 10e9/L 1.1 (L) 1.0 (L)   Absolute Lymphocytes Latest Ref Range: 0.8 - 5.3 10e9/L 0.6 (L) 0.6 (L)   Absolute Monocytes Latest Ref Range: 0.0 - 1.3 10e9/L 0.2 0.4   Absolute Eosinophils Latest Ref Range: 0.0 - 0.7 10e9/L 0.0 0.0   Absolute Basophils Latest Ref Range: 0.0 - 0.2 10e9/L 0.0 0.0   Abs Immature Granulocytes Latest Ref Range: 0 - 0.4 10e9/L 0.0 0.0   Absolute Nucleated RBC Unknown 0.0 0.0   % Retic Latest Ref Range: 0.5 - 2.0 %  4.1 (H)   Absolute Retic Latest Ref Range: 25 - 95 10e9/L  106.9 (H)       IMPRESSION/PLAN:   Metastatic prostate cancer: on zytiga/pred and xofigo and xgeva. Having on/off loose stools for about 4 weeks. Infectious studies were recently negative-but we will repeat. I am concerned, however, that these are related to zytiga. He will place this drug on hold at this time. He will return on  Monday for lab recheck and possible fluids and/or blood. He will return again next Wednesday for a f/u visit with Sabrina Mcguire.   -his PSA has declined on this regimen. He has no aches/pains.     Loose stools: on/off for almost 4 weeks. Previous C diff and SSCE negative. Now again with runny loose stools this week-up to 10x yesterday, but much better today with only 3 stools. No cramping/abdominal pain/fevers. Has some nausea.   -Recommended we check again for c diff and enteric and viral bacteria. He was not able to provide a sample in clinic so he was given a kit to take home. Because his stools are much better today I will not empirically start him on anything for the stools, but he is neutropenic with ANC 1.0-so if stools worsen again over the weekend he is to call and we may consider empirically treating for c diff. Due to chronicity I suspect his may be due to zytiga. I have asked him to hold this for now.   -His BP is low today-combo lesser fluid intake from nausea and fluid loss through stools. He was given 1L of fluid with improvement in his BP. I asked he hold metoprolol for BP with systolic less than 120 in the AM. He will push fluids over the weekend and I provided him compazine for nausea.       Anemia: elevated bili at 2.6 with elevated retic at 4.1 and hgb drop of 1 g in past two days. No bleeding complaints. Seems to have some element of hemolysis (bili is indirect primarily). No clear reason. He is usually on 5mg pred BID for prostate cancer but ran out this past week. He will resume this at this time. IRIS is negative. Smear in process. Haptoglobin to be drawn on Monday. LD elevated-but at typical level historically. Monitor. Will repeat hgb and retic on Monday with possible blood transfusion. Consent form signed.   -ANC at 1.0-discussed neutropenic precautions.     FEN: 1L NS given with improvement in BP. His fluid intake has been okay, but low appetite with intermittent nausea. Prescribed  compazine. Discussed BRAT diet and bland foods. Calcium slightly low-to resume 2 tabs BID-he has not taken this the past few days. Also phos low-combo of stools and prior xgeva. Will start phos supplementation. To recheck Monday.     Heme: on lovenox for PE dx 11/2016. Had inpatient stay for GI bleed 1/11-1/14. Stopped xarelto and transitioned back to lovenox BID. No bleeding concerns. Recently dropped from 60mg BID to 50mg BID due to high Xa level. Should plan to recheck on Wed 4/5.     Bone mets: on xgeva Q 6 weeks. Last given on 2/8. Hold with low calcium and phos at this time.     CV: hx of CHF. On metoprolol 12.5mg daily, but with ongoing recent low BPs provided parameters <120 systolic in the morning to hold dose.         It is my privilege to be involved in the care of the above patient.     Chelsi Knapp PA-C  [facility]  [address] [place] [id number] [phone]

## 2017-03-31 NOTE — MR AVS SNAPSHOT
After Visit Summary   3/31/2017    Oswaldo Win    MRN: [id number]           Patient Information     Date Of Birth          1945        Visit Information        Provider Department      3/31/2017 2:30 PM Chelsi Knapp PA-C [facility]        Today's Diagnoses     Prostate cancer 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)    -  1    Mitral valve disorder        Elevated bilirubin        Anemia, unspecified type        Nausea        Hypophosphatemia        Loose stools           Follow-ups after your visit        Your next 10 appointments already scheduled     Apr 04, 2017 12:00 PM CDT   LAB with  LAB   [facility] ([facility])    [address] [place] [address] [phone]           Patient must bring picture ID.  Patient should be prepared to give a urine specimen  Please do not eat 10-12 hours before your appointment if you are coming in fasting for labs on lipids, cholesterol, or glucose (sugar).  Pregnant women should follow their Care Team instructions. Water with medications is okay. Do not drink coffee or other fluids.   If you have concerns about taking  your medications, please ask at office or if scheduling via Office Center, send a message by clicking on Secure Messaging, Message Your Care Team.            Apr 05, 2017  9:30 AM CDT   (Arrive by 9:15 AM)   Return Visit with POLI Bridges [facility] ([facility])    [address] [id number] [phone]            Apr 05, 2017 11:00 AM CDT   NM RADIUM 223 XOFIGO THERAPY with UUNMINJ1   [facility], Nuclear Medicine ([facility] [place])    [address] [facility] [id number] [phone]           Please bring a list of your medicines to the exam. (Include vitamins, minerals and over-the-counter drugs.) You should wear comfortable clothes.  Leave your valuables at home. Please bring related prior results and films.  Tell your doctor:   If you are breastfeeding or may be pregnant.   If you have had a barium test within the past few days. Barium may change the results of certain exams.   If you think you may need sedation (medicine to help you relax).  You may eat and drink as normal.  Please call your Imaging Department at your exam site with any questions.            Apr 20, 2017  1:30 PM CDT   Lab with  LAB   [facility] ([facility])    [address] [place] [address]  [address] [facility] [address]   [phone]            Apr 20, 2017  2:00 PM CDT   (Arrive by 1:45 PM)   CORE RETURN with Jill Juarez NP   [facility] ([facility])    [address] [place] [address]  3rd [facility] [address]   [phone]            Apr 26, 2017 10:30 AM CDT   [facility] Lab Draw with  MASONIC LAB DRAW   [facility] Draw ([facility])    [address] [place] [address]   [phone]            May 03, 2017  9:00 AM CDT   [facility] Lab Draw with  M[facility] LAB DRAW   [facility] Draw ([facility])    [address] [place] [address]   [phone]            May 03, 2017  9:30 AM CDT   (Arrive by 9:15 AM)   Return Visit with Gabriela Mcguire PA-C   [facility] ([facility])    [address] [place] [address]   [phone]            May 03, 2017 11:00 AM CDT   NM RADIUM 223 XOFIGO THERAPY with UUNMINJ2   [facility] [place], Nuclear Medicine ([facility] [place])    [address] [facility] [address] [phone]           Please bring a list of your medicines to the exam. (Include vitamins, minerals and over-the-counter drugs.) You should wear comfortable clothes. Leave  your valuables at home. Please bring related prior results and films.  Tell your doctor:   If you are breastfeeding or may be pregnant.   If you have had a barium test within the past few days. Barium may change the results of certain exams.   If you think you may need sedation (medicine to help you relax).  You may eat and drink as normal.  Please call your Imaging Department at your exam site with any questions.            May 24, 2017 10:30 AM CDT   [facility] Lab Draw with  [employer] (PRIMP) LAB DRAW   [facility] Draw ([facility])    [address] [facility]  2nd Floor  [facility] [address] [phone]              Future tests that were ordered for you today     Open Standing Orders        Priority Remaining Interval Expires Ordered    Red blood cell prepare order unit Routine 99/100 CONDITIONAL (SPECIFY) BLOOD  4/3/2017            Who to contact     If you have questions or need follow up information about today's clinic visit or your schedule please contact [facility] directly at [phone].  Normal or non-critical lab and imaging results will be communicated to you by [employer], letter or phone within 4 business days after the clinic has received the results. If you do not hear from us within 7 days, please contact the clinic through [employer] or phone. If you have a critical or abnormal lab result, we will notify you by phone as soon as possible.  Submit refill requests through [employer] or call your pharmacy and they will forward the refill request to us. Please allow 3 business days for your refill to be completed.          Additional Information About Your Visit        [employer] Information     [employer] gives you secure access to your electronic health record. If you see a primary care provider, you can also send messages to your care team and make appointments. If you have questions, please call your primary care clinic.  If you do not have a primary care  provider, please call [phone] and they will assist you.        Care EveryWhere ID     This is your Care EveryWhere ID. This could be used by other organizations to access your [facility] medical records  [id number]        Your Vitals Were     Pulse Respirations Pulse Oximetry BMI (Body Mass Index)          97 16 97% 21.62 kg/m2         Blood Pressure from Last 3 Encounters:   03/31/17 136/40   03/31/17 (!) 80/43   03/29/17 112/46    Weight from Last 3 Encounters:   03/31/17 61.7 kg (136 lb)   03/31/17 61.7 kg (136 lb)   03/29/17 67.9 kg (149 lb 9.6 oz)              We Performed the Following     *CBC with platelets differential     Bilirubin direct     Blood morphology pathology review     Comprehensive metabolic panel     Direct antiglobulin test     Echocardiogram Complete     Lactate Dehydrogenase     Magnesium     Phosphorus     Reticulocyte count          Today's Medication Changes          These changes are accurate as of: 3/31/17 11:59 PM.  If you have any questions, ask your nurse or doctor.               Start taking these medicines.        Dose/Directions    phosphorus tablet 250 mg 250 MG per tablet   Commonly known as:  K PHOS NEUTRAL   Used for:  Hypophosphatemia   Started by:  Chelsi Knapp PA-C        Dose:  500 mg   Take 2 tablets (500 mg) by mouth 3 times daily   Quantity:  30 tablet   Refills:  1       prochlorperazine 5 MG tablet   Commonly known as:  COMPAZINE   Used for:  Nausea   Started by:  Chelsi Knapp PA-C        Dose:  5 mg   Take 1 tablet (5 mg) by mouth every 6 hours as needed for nausea or vomiting   Quantity:  30 tablet   Refills:  1            Where to get your medicines      These medications were sent to [facility] [place], [place] - [address] [facility] [address]  [address] [place] [address] [facility] [address]    Hours:  TRANSPLANT PHONE NUMBER [phone] Phone:  [phone]     phosphorus tablet 250 mg 250 MG per tablet     prochlorperazine 5 MG tablet                Primary Care Provider Office Phone # Fax #    Lucrecia Collier [phone]       [facility] [address] [facility] [address]        Thank you!     Thank you for choosing [facility]  for your care. Our goal is always to provide you with excellent care. Hearing back from our patients is one way we can continue to improve our services. Please take a few minutes to complete the written survey that you may receive in the mail after your visit with us. Thank you!             Your Updated Medication List - Protect others around you: Learn how to safely use, store and throw away your medicines at www.disposemymeds.org.          This list is accurate as of: 3/31/17 11:59 PM.  Always use your most recent med list.                   Brand Name Dispense Instructions for use    abiraterone 250 MG tablet    ZYTIGA    120 tablet    Take 4 tablets (1,000 mg) by mouth daily Take on empty stomach.       aspirin 81 MG tablet      Take 81 mg by mouth daily       calcium carbonate 500 MG tablet    OS-RODNEY 500 mg [place]    120 tablet    Take 2 tablets (1,000 mg) by mouth 2 times daily       diphenoxylate-atropine 2.5-0.025 MG per tablet    LOMOTIL     Take 1 tablet by mouth 4 times daily as needed for diarrhea Reported on 3/8/2017       HYDROcodone-acetaminophen 5-325 MG per tablet    NORCO     Take 1 tablet by mouth every 6 hours as needed for moderate to severe pain Reported on 3/8/2017       loperamide 2 MG capsule    IMODIUM    60 capsule    Use 2 caps PO after first loose stool.  Repeat 1 cap after each subsequent loose stool.  Max 8/24 hours.       LOVENOX 60 MG/0.6ML injection   Generic drug:  enoxaparin      Inject 0.5 mLs (50 mg) Subcutaneous 2 times daily       metoprolol 25 MG 24 hr tablet    TOPROL-XL    45 tablet    Take 0.5 tablets (12.5 mg) by mouth daily AM       Multi-vitamin Tabs tablet     100 tablet    Take 1 tablet  by mouth every morning Reported on 3/8/2017       phosphorus tablet 250 mg 250 MG per tablet    K PHOS NEUTRAL    30 tablet    Take 2 tablets (500 mg) by mouth 3 times daily       predniSONE 5 MG tablet    DELTASONE    60 tablet    Take 1 tablet (5 mg) by mouth 2 times daily       prochlorperazine 5 MG tablet    COMPAZINE    30 tablet    Take 1 tablet (5 mg) by mouth every 6 hours as needed for nausea or vomiting       simvastatin 40 MG tablet    ZOCOR    90 tablet    TAKE ONE TABLET BY MOUTH AT BEDTIME       tamsulosin 0.4 MG capsule    FLOMAX    90 capsule    Take 1 capsule (0.4 mg) by mouth daily AM       vitamin D 2000 UNITS tablet     100 tablet    Take 1 tablet by mouth daily

## 2017-03-31 NOTE — LETTER
3/31/2017      RE: Oswaldo Win  [address]  [facility] [address]       Oncology/Hematology Visit Note  Mar 31, 2017    DIAGNOSIS:  Mr. Win is a 71 year old male with a hx of CHF, CKD, CAD w/ hx of STEMI and CABG. He met with Dr. Robles at the end of July for consultation regarding metastatic prostate cancer. His story begins in May 2015 when he saw his PCP for back pain, present since Feb. He also had poor appetite, weight loss of 8lb and poor energy.  CT showed diffuse bony mets. CT Chest showed sclerotic mets throughout the bones. He was given degarelix on 7/22 in Urology. He was having pelvic pain and was evaluated by Dr Cavazos would did not feel XRT was appropriate.  He started on docetaxel 7/31. From 8/7-8/14 he was admitted with colitis, CHAR, elevated BNP, lactis acidosis, and poor appetite. He was discharged to a TCU and unfortunately, it sounds like he was not given his Lasix.  His BUBBA worsened and his breathing became compromised.  He was seen by Chelsi Corral on 8/21 and eventually transferred to the ED and admitted 8/21-8/24 for acute on chronic CHF.  At our last visit we discussed not pursuing further Taxotere at this time and discussed starting Zytiga when he was ready.  We pursued PT, OT, home lymphedema for supportive care at home in attempt for further rehabilitation. He was again admitted and discharged - 10/20/15.  He never started the Zytiga as he was still actively recovering from his cardiac issues.  His PSA remained stable, thus no intervention was pursued.  December 2015, Mainor met with Dr Robles and since his ECOG was back to a 1, they discussed re-challenging the Taxotere at a lower dose. 9/26/2016 he started Provenge off study. He continues on lupron every 3 mos and XGEVA. 9/26/2016 he started Provenge off study. He received 3 doses of Provenge (last 10/28), on 3rd dose had fevers, chills, myalgias, was bedbound for a week due to these symptoms. During this time he held his coumadin for 5  days prior to a central line removal 10/31. Later than week he had sudden onset of right pleuritic chest pain and hemoptysis. Diagnosed with acute segmental/subsegmental PE on 11/8 and prescribed Lovenox.     Mainor was briefly on Zytiga and prednisone in November-December, however after one month his PSA went up, thus it was decided to add in Xofigo (1/11/17-- first).     On 12/28 he started xaralto and took his last pill on 1/10/17.  He stopped as he noticed melanotic stools and had some hemoptysis.  He was seen in clinic the next day with a hgb in th 6 range and was admitted.  Pan-scopes showed no active bleeding.  He was able to get his Xofigo in patient as well.  He was transitioned back to lovenox BID.     INTERVAL HISTORY:  Mr. Win is here today for f/u. He has been having on/off diarrhea now for about 4 weeks. He notes his stools were improving, but then seemed to acutely worsen earlier in the week. He had up to 10 stools yesterday-they have been watery. However, today he has only had 3 so far. He has not had any abdominal pain, cramping, bloating. No fevers/chills. He has had some on/off nausea as well. He has not been taking anything to slow down his stools or for his nausea. He has been drinking fluids but states he has had some positional dizziness. No falls. He takes meoprolol in the morning and often checks his BP. This morning it was 106/50s. He was surprised to hear it was much lower in clinic. He generally feels well outside of diarrhea, some nausea and fatigue secondary to this process. He feels weak. He is without any black/tarry or bloody stools. He remains on zytiga, but has not taken his prednisone for one week as he ran out. He has not been eating much due to the intermittent nausea. He ate some crackers earlier today. Denies any urinary changes.     MEDICATIONS:   Current Outpatient Prescriptions   Medication Sig     enoxaparin (LOVENOX) 60 MG/0.6ML injection Inject 0.5 mLs (50 mg) Subcutaneous  2 times daily     metoprolol (TOPROL-XL) 25 MG 24 hr tablet Take 0.5 tablets (12.5 mg) by mouth daily AM     loperamide (IMODIUM) 2 MG capsule Use 2 caps PO after first loose stool.  Repeat 1 cap after each subsequent loose stool.  Max 8/24 hours.     predniSONE (DELTASONE) 5 MG tablet Take 1 tablet (5 mg) by mouth 2 times daily     abiraterone (ZYTIGA) 250 MG tablet Take 4 tablets (1,000 mg) by mouth daily Take on empty stomach.     diphenoxylate-atropine (LOMOTIL) 2.5-0.025 MG per tablet Take 1 tablet by mouth 4 times daily as needed for diarrhea Reported on 3/8/2017     calcium carbonate (OS-RODNEY 500 MG [place]. [place]) 500 MG tablet Take 2 tablets (1,000 mg) by mouth 2 times daily     aspirin 81 MG tablet Take 81 mg by mouth daily     Cholecalciferol (VITAMIN D) 2000 UNITS tablet Take 1 tablet by mouth daily     simvastatin (ZOCOR) 40 MG tablet TAKE ONE TABLET BY MOUTH AT BEDTIME     tamsulosin (FLOMAX) 0.4 MG 24 hr capsule Take 1 capsule (0.4 mg) by mouth daily AM     HYDROcodone-acetaminophen (NORCO) 5-325 MG per tablet Take 1 tablet by mouth every 6 hours as needed for moderate to severe pain Reported on 3/8/2017     multivitamin, therapeutic with minerals (MULTI-VITAMIN) TABS Take 1 tablet by mouth every morning Reported on 3/8/2017     No current facility-administered medications for this visit.      Facility-Administered Medications Ordered in Other Visits   Medication     hydrocortisone sodium succinate (solu-CORTEF) injection     glycopyrrolate (ROBINUL) injection     neostigmine (PROSTIGMINE) injection     protamine injection     albuterol (PROAIR HFA, PROVENTIL HFA, VENTOLIN HFA) inhaler          ALLERGIES:  No Known Allergies    PHYSICAL EXAMINATION:  Vitals: BP (!) 80/43 (BP Location: Right arm, Patient Position: Chair, Cuff Size: Adult Regular)  Pulse 97  Resp 16  Wt 61.7 kg (136 lb)  SpO2 97%  BMI 21.62 kg/m2   Wt Readings from Last 10 Encounters:   03/31/17 61.7 kg (136 lb)   03/31/17 61.7 kg (136  lb)   03/29/17 67.9 kg (149 lb 9.6 oz)   03/15/17 66.8 kg (147 lb 4.8 oz)   03/09/17 63 kg (139 lb)   03/08/17 69.3 kg (152 lb 11.2 oz)   02/24/17 64 kg (141 lb)   02/08/17 65.8 kg (145 lb)   02/01/17 64.9 kg (143 lb)   01/20/17 66.2 kg (146 lb)       GENERAL:  A pleasant person in no acute distress. Appears quite well.   HEENT:  Sclerae are nonicteric.  Mouth is without mucositis or thrush.   NECK:  Supple.   LYMPH NODES:  No peripheral lymphadenopathy noted in the supraclavicular or cervical regions.   LUNGS:  Clear to auscultation bilaterally.   HEART:  Regular rate and rhythm   ABDOMEN:  Bowel sounds are active.  Soft and nontender.  No hepatosplenomegaly or other masses appreciated.  LOWER EXTREMITIES:  Without pitting edema to the knees bilaterally.   NEUROLOGICAL:  Alert/orientated/able to answer all questions.  CN grossly intact.     LAB:   Results for USAMA MANRIQUE (MRN [id number]) as of 4/2/2017 20:30   Ref. Range 3/29/2017 10:54 3/31/2017 14:11   Sodium Latest Ref Range: 133 - 144 mmol/L 142 140   Potassium Latest Ref Range: 3.4 - 5.3 mmol/L 3.7 3.8   Chloride Latest Ref Range: 94 - 109 mmol/L 110 (H) 108   Carbon Dioxide Latest Ref Range: 20 - 32 mmol/L 22 19 (L)   Urea Nitrogen Latest Ref Range: 7 - 30 mg/dL 13 9   Creatinine Latest Ref Range: 0.66 - 1.25 mg/dL 0.90 0.88   GFR Estimate Latest Ref Range: >60 mL/min/1.7m2 83 85   GFR Estimate If Black Latest Ref Range: >60 mL/min/1.7m2 >90... >90...   Calcium Latest Ref Range: 8.5 - 10.1 mg/dL 8.6 7.7 (L)   Anion Gap Latest Ref Range: 3 - 14 mmol/L 10 13   Magnesium Latest Ref Range: 1.6 - 2.3 mg/dL  2.2   Phosphorus Latest Ref Range: 2.5 - 4.5 mg/dL  1.6 (L)   Albumin Latest Ref Range: 3.4 - 5.0 g/dL 3.7 3.7   Protein Total Latest Ref Range: 6.8 - 8.8 g/dL 7.3 7.1   Bilirubin Total Latest Ref Range: 0.2 - 1.3 mg/dL 1.2 2.6 (H)   Alkaline Phosphatase Latest Ref Range: 40 - 150 U/L 531 (H) 472 (H)   ALT Latest Ref Range: 0 - 70 U/L 18 16   AST Latest Ref  Range: 0 - 45 U/L 20 22   Bilirubin Direct Latest Ref Range: 0.0 - 0.2 mg/dL  0.3 (H)   Lactate Dehydrogenase Latest Ref Range: 85 - 227 U/L  409 (H)   Results for USAMA MANRIQUE (MRN [id number]) as of 4/2/2017 20:30   Ref. Range 3/29/2017 10:54 3/31/2017 14:11   WBC Latest Ref Range: 4.0 - 11.0 10e9/L 2.0 (L) 2.1 (L)   Hemoglobin Latest Ref Range: 13.3 - 17.7 g/dL 9.2 (L) 8.2 (L)   Hematocrit Latest Ref Range: 40.0 - 53.0 % 27.1 (L) 24.3 (L)   Platelet Count Latest Ref Range: 150 - 450 10e9/L 127 (L) 122 (L)   RBC Count Latest Ref Range: 4.4 - 5.9 10e12/L 2.95 (L) 2.64 (L)   MCV Latest Ref Range: 78 - 100 fl 92 92   MCH Latest Ref Range: 26.5 - 33.0 pg 31.2 31.1   MCHC Latest Ref Range: 31.5 - 36.5 g/dL 33.9 33.7   RDW Latest Ref Range: 10.0 - 15.0 % 17.0 (H) 16.8 (H)   Diff Method Unknown Automated Method Automated Method   % Neutrophils Latest Units: % 55.7 50.2   % Lymphocytes Latest Units: % 30.1 30.0   % Monocytes Latest Units: % 11.2 17.4   % Eosinophils Latest Units: % 1.5 1.4   % Basophils Latest Units: % 0.5 0.5   % Immature Granulocytes Latest Units: % 1.0 0.5   Nucleated RBCs Latest Ref Range: 0 /100 0 0   Absolute Neutrophil Latest Ref Range: 1.6 - 8.3 10e9/L 1.1 (L) 1.0 (L)   Absolute Lymphocytes Latest Ref Range: 0.8 - 5.3 10e9/L 0.6 (L) 0.6 (L)   Absolute Monocytes Latest Ref Range: 0.0 - 1.3 10e9/L 0.2 0.4   Absolute Eosinophils Latest Ref Range: 0.0 - 0.7 10e9/L 0.0 0.0   Absolute Basophils Latest Ref Range: 0.0 - 0.2 10e9/L 0.0 0.0   Abs Immature Granulocytes Latest Ref Range: 0 - 0.4 10e9/L 0.0 0.0   Absolute Nucleated RBC Unknown 0.0 0.0   % Retic Latest Ref Range: 0.5 - 2.0 %  4.1 (H)   Absolute Retic Latest Ref Range: 25 - 95 10e9/L  106.9 (H)       IMPRESSION/PLAN:   Metastatic prostate cancer: on zytiga/pred and xofigo and xgeva. Having on/off loose stools for about 4 weeks. Infectious studies were recently negative-but we will repeat. I am concerned, however, that these are related to  zytiga. He will place this drug on hold at this time. He will return on Monday for lab recheck and possible fluids and/or blood. He will return again next Wednesday for a f/u visit with Sabrina Mcguire.   -his PSA has declined on this regimen. He has no aches/pains.     Loose stools: on/off for almost 4 weeks. Previous C diff and SSCE negative. Now again with runny loose stools this week-up to 10x yesterday, but much better today with only 3 stools. No cramping/abdominal pain/fevers. Has some nausea.   -Recommended we check again for c diff and enteric and viral bacteria. He was not able to provide a sample in clinic so he was given a kit to take home. Because his stools are much better today I will not empirically start him on anything for the stools, but he is neutropenic with ANC 1.0-so if stools worsen again over the weekend he is to call and we may consider empirically treating for c diff. Due to chronicity I suspect his may be due to zytiga. I have asked him to hold this for now.   -His BP is low today-combo lesser fluid intake from nausea and fluid loss through stools. He was given 1L of fluid with improvement in his BP. I asked he hold metoprolol for BP with systolic less than 120 in the AM. He will push fluids over the weekend and I provided him compazine for nausea.       Anemia: elevated bili at 2.6 with elevated retic at 4.1 and hgb drop of 1 g in past two days. No bleeding complaints. Seems to have some element of hemolysis (bili is indirect primarily). No clear reason. He is usually on 5mg pred BID for prostate cancer but ran out this past week. He will resume this at this time. IRIS is negative. Smear in process. Haptoglobin to be drawn on Monday. LD elevated-but at typical level historically. Monitor. Will repeat hgb and retic on Monday with possible blood transfusion. Consent form signed.   -ANC at 1.0-discussed neutropenic precautions.     FEN: 1L NS given with improvement in BP. His fluid intake has  been okay, but low appetite with intermittent nausea. Prescribed compazine. Discussed BRAT diet and bland foods. Calcium slightly low-to resume 2 tabs BID-he has not taken this the past few days. Also phos low-combo of stools and prior xgeva. Will start phos supplementation. To recheck Monday.     Heme: on lovenox for PE dx 11/2016. Had inpatient stay for GI bleed 1/11-1/14. Stopped xarelto and transitioned back to lovenox BID. No bleeding concerns. Recently dropped from 60mg BID to 50mg BID due to high Xa level. Should plan to recheck on Wed 4/5.     Bone mets: on xgeva Q 6 weeks. Last given on 2/8. Hold with low calcium and phos at this time.     CV: hx of CHF. On metoprolol 12.5mg daily, but with ongoing recent low BPs provided parameters <120 systolic in the morning to hold dose.         It is my privilege to be involved in the care of the above patient.     Chelsi Knapp PA-C  [facility]  [address] [place] [id number]  [phone]

## 2017-03-31 NOTE — NURSING NOTE
Labs drawn peripherally.  IV inserted for IVF.  VS done.  BP low and pt reporting lightheadedness.  Brought directly to infusion.      Dimple Nieto  RN

## 2017-04-02 PROBLEM — D64.9 ANEMIA: Status: ACTIVE | Noted: 2017-04-02

## 2017-04-03 ENCOUNTER — CARE COORDINATION (OUTPATIENT)
Dept: ONCOLOGY | Facility: CLINIC | Age: 72
End: 2017-04-03

## 2017-04-03 LAB — COPATH REPORT: NORMAL

## 2017-04-03 NOTE — PROGRESS NOTES
Received call from pt stating he is feeling better. BPs have been 115-120s/55-65. Energy is returning and food tastes good again. Has been pushing fluids and does not feel dehydrated. Has not had any diarrhea since Friday. Denies any N/V. Took his last dose of Zytiga on Friday. Pt requesting to cancel his appointments for labs and IVFs today. He is going to go to the [facility] lab tomorrow to have heparin 10a level checked and will keep that appointment as scheduled.    Sabrina Mcguire PA-C notified and is OK with pt canceling his appointments today and checking labs tomorrow.  Informed scheduling to cancel appointments today.

## 2017-04-04 ENCOUNTER — CARE COORDINATION (OUTPATIENT)
Dept: ONCOLOGY | Facility: CLINIC | Age: 72
End: 2017-04-04

## 2017-04-04 VITALS — DIASTOLIC BLOOD PRESSURE: 56 MMHG | HEART RATE: 88 BPM | SYSTOLIC BLOOD PRESSURE: 90 MMHG

## 2017-04-04 DIAGNOSIS — R19.5 LOOSE STOOLS: ICD-10-CM

## 2017-04-04 DIAGNOSIS — D64.9 ANEMIA, UNSPECIFIED TYPE: ICD-10-CM

## 2017-04-04 DIAGNOSIS — C61 PROSTATE CANCER METASTATIC TO BONE (H): ICD-10-CM

## 2017-04-04 DIAGNOSIS — R17 ELEVATED BILIRUBIN: ICD-10-CM

## 2017-04-04 DIAGNOSIS — C79.51 PROSTATE CANCER METASTATIC TO BONE (H): ICD-10-CM

## 2017-04-04 DIAGNOSIS — I26.99 PULMONARY EMBOLISM (H): ICD-10-CM

## 2017-04-04 PROCEDURE — 86901 BLOOD TYPING SEROLOGIC RH(D): CPT | Performed by: PHYSICIAN ASSISTANT

## 2017-04-04 PROCEDURE — 86900 BLOOD TYPING SEROLOGIC ABO: CPT | Performed by: PHYSICIAN ASSISTANT

## 2017-04-04 PROCEDURE — 86850 RBC ANTIBODY SCREEN: CPT | Performed by: PHYSICIAN ASSISTANT

## 2017-04-04 NOTE — PROGRESS NOTES
"Received call from pt stating he went to the [facility] lab today and was informed that the lab could not draw a heparin 10a level. Also, pt reports while he was at the clinic he felt \"overheated\" and lightheaded.  BP was taken and showed:  Vital Signs 4/4/2017 4/4/2017 4/4/2017   Systolic 99 79 90   Diastolic 53 55 56     Pt states when he got home he took his BP and it was 126/65. States he is feeling better. Denies any lightheadedness or flushing. States he has been drinking fluids although is unsure how much he is drinking.    Sabrina Mcguire PA-C notified. Advised pt to come in at 0900 tomorrow for labs and will see Sabrina Mcguire PA-C at 0930 as scheduled.  Pt verbalized understanding of plan.  Had no further questions.  "

## 2017-04-05 ENCOUNTER — HOSPITAL ENCOUNTER (OUTPATIENT)
Dept: NUCLEAR MEDICINE | Facility: CLINIC | Age: 72
Setting detail: NUCLEAR MEDICINE
Discharge: HOME | End: 2017-04-05
Attending: INTERNAL MEDICINE | Admitting: INTERNAL MEDICINE
Payer: COMMERCIAL

## 2017-04-05 ENCOUNTER — INFUSION THERAPY VISIT (OUTPATIENT)
Dept: ONCOLOGY | Facility: CLINIC | Age: 72
End: 2017-04-05
Attending: INTERNAL MEDICINE
Payer: COMMERCIAL

## 2017-04-05 ENCOUNTER — APPOINTMENT (OUTPATIENT)
Dept: LAB | Facility: CLINIC | Age: 72
End: 2017-04-05
Attending: INTERNAL MEDICINE
Payer: COMMERCIAL

## 2017-04-05 VITALS
BODY MASS INDEX: 23.29 KG/M2 | TEMPERATURE: 98.2 F | OXYGEN SATURATION: 100 % | WEIGHT: 146.5 LBS | DIASTOLIC BLOOD PRESSURE: 44 MMHG | SYSTOLIC BLOOD PRESSURE: 92 MMHG | HEART RATE: 101 BPM | RESPIRATION RATE: 18 BRPM

## 2017-04-05 VITALS
HEART RATE: 83 BPM | OXYGEN SATURATION: 99 % | RESPIRATION RATE: 16 BRPM | SYSTOLIC BLOOD PRESSURE: 120 MMHG | DIASTOLIC BLOOD PRESSURE: 55 MMHG | TEMPERATURE: 98.9 F

## 2017-04-05 DIAGNOSIS — I26.99 PULMONARY EMBOLISM (H): Primary | ICD-10-CM

## 2017-04-05 DIAGNOSIS — R19.5 LOOSE STOOLS: ICD-10-CM

## 2017-04-05 DIAGNOSIS — C61 PROSTATE CANCER METASTATIC TO BONE (H): ICD-10-CM

## 2017-04-05 DIAGNOSIS — C79.51 METASTATIC BONE TUMOR (H): ICD-10-CM

## 2017-04-05 DIAGNOSIS — R17 ELEVATED BILIRUBIN: ICD-10-CM

## 2017-04-05 DIAGNOSIS — D64.9 ANEMIA, UNSPECIFIED TYPE: ICD-10-CM

## 2017-04-05 DIAGNOSIS — C79.51 PROSTATE CANCER METASTATIC TO BONE (H): ICD-10-CM

## 2017-04-05 DIAGNOSIS — D64.9 ANEMIA, UNSPECIFIED TYPE: Primary | ICD-10-CM

## 2017-04-05 LAB
ABO + RH BLD: NORMAL
ABO + RH BLD: NORMAL
ALBUMIN SERPL-MCNC: 3.6 G/DL (ref 3.4–5)
ALP SERPL-CCNC: 401 U/L (ref 40–150)
ALT SERPL W P-5'-P-CCNC: 14 U/L (ref 0–70)
ANION GAP SERPL CALCULATED.3IONS-SCNC: 10 MMOL/L (ref 3–14)
AST SERPL W P-5'-P-CCNC: 20 U/L (ref 0–45)
BASOPHILS # BLD AUTO: 0 10E9/L (ref 0–0.2)
BASOPHILS NFR BLD AUTO: 0.9 %
BILIRUB SERPL-MCNC: 1.1 MG/DL (ref 0.2–1.3)
BLD GP AB SCN SERPL QL: NORMAL
BLD PROD TYP BPU: NORMAL
BLD PROD TYP BPU: NORMAL
BLD UNIT ID BPU: 0
BLOOD BANK CMNT PATIENT-IMP: NORMAL
BLOOD PRODUCT CODE: NORMAL
BPU ID: NORMAL
BUN SERPL-MCNC: 16 MG/DL (ref 7–30)
CALCIUM SERPL-MCNC: 8.6 MG/DL (ref 8.5–10.1)
CHLORIDE SERPL-SCNC: 111 MMOL/L (ref 94–109)
CO2 SERPL-SCNC: 21 MMOL/L (ref 20–32)
CREAT SERPL-MCNC: 0.89 MG/DL (ref 0.66–1.25)
DIFFERENTIAL METHOD BLD: ABNORMAL
EOSINOPHIL # BLD AUTO: 0 10E9/L (ref 0–0.7)
EOSINOPHIL NFR BLD AUTO: 1.3 %
ERYTHROCYTE [DISTWIDTH] IN BLOOD BY AUTOMATED COUNT: 16.7 % (ref 10–15)
GFR SERPL CREATININE-BSD FRML MDRD: 84 ML/MIN/1.7M2
GLUCOSE SERPL-MCNC: 127 MG/DL (ref 70–99)
HCT VFR BLD AUTO: 24 % (ref 40–53)
HGB BLD-MCNC: 8 G/DL (ref 13.3–17.7)
IMM GRANULOCYTES # BLD: 0 10E9/L (ref 0–0.4)
IMM GRANULOCYTES NFR BLD: 0.9 %
LMWH PPP CHRO-ACNC: NORMAL IU/ML
LYMPHOCYTES # BLD AUTO: 0.6 10E9/L (ref 0.8–5.3)
LYMPHOCYTES NFR BLD AUTO: 25.4 %
MAGNESIUM SERPL-MCNC: 2 MG/DL (ref 1.6–2.3)
MCH RBC QN AUTO: 31.1 PG (ref 26.5–33)
MCHC RBC AUTO-ENTMCNC: 33.3 G/DL (ref 31.5–36.5)
MCV RBC AUTO: 93 FL (ref 78–100)
MONOCYTES # BLD AUTO: 0.3 10E9/L (ref 0–1.3)
MONOCYTES NFR BLD AUTO: 13.4 %
NEUTROPHILS # BLD AUTO: 1.3 10E9/L (ref 1.6–8.3)
NEUTROPHILS NFR BLD AUTO: 58.1 %
NRBC # BLD AUTO: 0 10*3/UL
NRBC BLD AUTO-RTO: 0 /100
NUM BPU REQUESTED: 1
PHOSPHATE SERPL-MCNC: 2.3 MG/DL (ref 2.5–4.5)
PLATELET # BLD AUTO: 116 10E9/L (ref 150–450)
POTASSIUM SERPL-SCNC: 4 MMOL/L (ref 3.4–5.3)
PROT SERPL-MCNC: 6.9 G/DL (ref 6.8–8.8)
RBC # BLD AUTO: 2.57 10E12/L (ref 4.4–5.9)
RETICS # AUTO: 98.7 10E9/L (ref 25–95)
RETICS/RBC NFR AUTO: 3.8 % (ref 0.5–2)
SODIUM SERPL-SCNC: 143 MMOL/L (ref 133–144)
SPECIMEN EXP DATE BLD: NORMAL
TRANSFUSION STATUS PATIENT QL: NORMAL
TRANSFUSION STATUS PATIENT QL: NORMAL
WBC # BLD AUTO: 2.2 10E9/L (ref 4–11)

## 2017-04-05 PROCEDURE — 86901 BLOOD TYPING SEROLOGIC RH(D): CPT | Performed by: PHYSICIAN ASSISTANT

## 2017-04-05 PROCEDURE — 85025 COMPLETE CBC W/AUTO DIFF WBC: CPT | Performed by: PHYSICIAN ASSISTANT

## 2017-04-05 PROCEDURE — 85045 AUTOMATED RETICULOCYTE COUNT: CPT | Performed by: PHYSICIAN ASSISTANT

## 2017-04-05 PROCEDURE — P9016 RBC LEUKOCYTES REDUCED: HCPCS | Performed by: PHYSICIAN ASSISTANT

## 2017-04-05 PROCEDURE — 86850 RBC ANTIBODY SCREEN: CPT | Performed by: PHYSICIAN ASSISTANT

## 2017-04-05 PROCEDURE — 80053 COMPREHEN METABOLIC PANEL: CPT | Performed by: PHYSICIAN ASSISTANT

## 2017-04-05 PROCEDURE — 86900 BLOOD TYPING SEROLOGIC ABO: CPT | Performed by: PHYSICIAN ASSISTANT

## 2017-04-05 PROCEDURE — A9606 RADIUM RA223 DICHLORIDE THER: HCPCS | Performed by: INTERNAL MEDICINE

## 2017-04-05 PROCEDURE — 77790 RADIATION HANDLING: CPT

## 2017-04-05 PROCEDURE — 86923 COMPATIBILITY TEST ELECTRIC: CPT | Performed by: PHYSICIAN ASSISTANT

## 2017-04-05 PROCEDURE — 84100 ASSAY OF PHOSPHORUS: CPT | Performed by: PHYSICIAN ASSISTANT

## 2017-04-05 PROCEDURE — 34400006 ZZH RX 344: Performed by: INTERNAL MEDICINE

## 2017-04-05 PROCEDURE — 96372 THER/PROPH/DIAG INJ SC/IM: CPT

## 2017-04-05 PROCEDURE — 36430 TRANSFUSION BLD/BLD COMPNT: CPT

## 2017-04-05 PROCEDURE — 25000128 H RX IP 250 OP 636: Mod: ZF | Performed by: INTERNAL MEDICINE

## 2017-04-05 PROCEDURE — 99212 OFFICE O/P EST SF 10 MIN: CPT | Mod: ZF

## 2017-04-05 PROCEDURE — 83735 ASSAY OF MAGNESIUM: CPT | Performed by: PHYSICIAN ASSISTANT

## 2017-04-05 PROCEDURE — 83010 ASSAY OF HAPTOGLOBIN QUANT: CPT | Performed by: PHYSICIAN ASSISTANT

## 2017-04-05 PROCEDURE — 99214 OFFICE O/P EST MOD 30 MIN: CPT | Mod: ZP | Performed by: PHYSICIAN ASSISTANT

## 2017-04-05 RX ORDER — RIVAROXABAN 20 MG/1
TABLET, FILM COATED ORAL
COMMUNITY
Start: 2016-12-21 | End: 2017-04-05

## 2017-04-05 RX ADMIN — LIDOCAINE HYDROCHLORIDE 0.5 ML: 10 INJECTION, SOLUTION INFILTRATION; PERINEURAL at 10:00

## 2017-04-05 RX ADMIN — DENOSUMAB 120 MG: 120 INJECTION SUBCUTANEOUS at 11:11

## 2017-04-05 RX ADMIN — RADIUM RA 223 DICHLORIDE 104.5 UCI.: 30 INJECTION INTRAVENOUS at 12:30

## 2017-04-05 ASSESSMENT — PAIN SCALES - GENERAL: PAINLEVEL: NO PAIN (0)

## 2017-04-05 NOTE — MR AVS SNAPSHOT
After Visit Summary   4/5/2017    Oswaldo Win    MRN: [id number]           Patient Information     Date Of Birth          1945        Visit Information        Provider Department      4/5/2017 12:00 PM  13 ATC;  ONCOLOGY INFUSION [facility]        Today's Diagnoses     Anemia, unspecified type    -  1      Care Instructions    Contact Numbers    [facility] Main Line: [phone]  [facility] Triage:  [phone]    Call triage with chills and/or temperature greater than or equal to 100.5, uncontrolled nausea/vomiting, diarrhea, constipation, dizziness, shortness of breath, chest pain, bleeding, unexplained bruising, or any new/concerning symptoms, questions/concerns.     If you are having any concerning symptoms or wish to speak to a provider before your next infusion visit, please call your care coordinator or triage to notify them so we can adequately serve you.       After Hours: [phone]    If after hours, weekends, or holidays, call main hospital  and ask for Oncology doctor on call.         April 2017 Sunday Monday Tuesday Wednesday Thursday Friday Saturday                                 1       2     3     4     LAB   12:00 PM   (15 min.)    LAB   [facility] 5     [facility] LAB DRAW    9:00 AM   (15 min.)   [facility]   [facility] Lab Draw     [facility] RETURN    9:15 AM   (50 min.)   Gabriela Mcguire PA-C   [facility]     NM RADIUM 223 XOFIGO THERAPY   11:00 AM   (30 min.)   UUNMINJ1   [facility], Nuclear Medicine     [facility] ONC INFUSION 180   12:00 PM   (180 min.)    ONCOLOGY INFUSION   [facility] 6     7     8       9     10     11     12     13     14     15       16     17     18     19     20     LAB WITH [facility] CLINIC    1:30 PM   (15 min.)    LAB   [facility]     P CORE RETURN    1:45 PM   (30 min.)   Jill Juarez NP   [facility] 21     22       23     24     25      26     [facility] LAB DRAW   10:30 AM   (15 min.)    [facility]RAW   [facility] 27     28     29       30                                              May 2017   Ghanshyam Monday Tuesday Wednesday Thursday Friday Saturday        1     2     3     [facility] LAB DRAW    9:00 AM   (15 min.)    [facility] DRAW   [facility]     [facility] RETURN    9:15 AM   (50 min.)   Gabriela Mcguire PA-C   [facility]     NM RADIUM 223 XOFIGO THERAPY   11:00 AM   (30 min.)   UUNMINJ2   [facility] 4     5     6       7     8     9     10     11     12     13       14     15     16     17     18     19     20       21     22     23     24     [facility] LAB DRAW   10:30 AM   (15 min.)    [facility] DRAW   [facility] 25     26     27       28     29     30     31     [facility] LAB DRAW    8:45 AM   (15 min.)    [facility] DRAW   [facility]     [facility] RETURN    9:00 AM   (30 min.)   Bentley Robles MD   [facility]     NM RADIUM 223 XOFIGO THERAPY   11:00 AM   (30 min.)   UUNMINJ1   [facility] Nuclear Medicine                             Lab Results:  Recent Results (from the past 12 hour(s))   Reticulocyte count    Collection Time: 04/05/17  9:41 AM   Result Value Ref Range    % Retic 3.8 (H) 0.5 - 2.0 %    Absolute Retic 98.7 (H) 25 - 95 10e9/L   Comprehensive metabolic panel (BMP + Alb, Alk Phos, ALT, AST, Total. Bili, TP)    Collection Time: 04/05/17  9:41 AM   Result Value Ref Range    Sodium 143 133 - 144 mmol/L    Potassium 4.0 3.4 - 5.3 mmol/L    Chloride 111 (H) 94 - 109 mmol/L    Carbon Dioxide 21 20 - 32 mmol/L    Anion Gap 10 3 - 14 mmol/L    Glucose 127 (H) 70 - 99 mg/dL    Urea Nitrogen 16 7 - 30 mg/dL    Creatinine 0.89 0.66 - 1.25 mg/dL    GFR Estimate 84 >60 mL/min/1.7m2    GFR Estimate If Black >90   GFR Calc   >60 mL/min/1.7m2    Calcium 8.6 8.5 - 10.1 mg/dL     Bilirubin Total 1.1 0.2 - 1.3 mg/dL    Albumin 3.6 3.4 - 5.0 g/dL    Protein Total 6.9 6.8 - 8.8 g/dL    Alkaline Phosphatase 401 (H) 40 - 150 U/L    ALT 14 0 - 70 U/L    AST 20 0 - 45 U/L   ABO/Rh type and screen    Collection Time: 04/05/17  9:41 AM   Result Value Ref Range    Units Ordered 1     ABO B     RH(D)  Pos     Antibody Screen Neg     Test Valid Only At       [facility]    Specimen Expires 04/08/2017     Crossmatch Red Blood Cells    Phosphorus    Collection Time: 04/05/17  9:41 AM   Result Value Ref Range    Phosphorus 2.3 (L) 2.5 - 4.5 mg/dL   Magnesium    Collection Time: 04/05/17  9:41 AM   Result Value Ref Range    Magnesium 2.0 1.6 - 2.3 mg/dL   CBC with platelets and differential    Collection Time: 04/05/17  9:41 AM   Result Value Ref Range    WBC 2.2 (L) 4.0 - 11.0 10e9/L    RBC Count 2.57 (L) 4.4 - 5.9 10e12/L    Hemoglobin 8.0 (L) 13.3 - 17.7 g/dL    Hematocrit 24.0 (L) 40.0 - 53.0 %    MCV 93 78 - 100 fl    MCH 31.1 26.5 - 33.0 pg    MCHC 33.3 31.5 - 36.5 g/dL    RDW 16.7 (H) 10.0 - 15.0 %    Platelet Count 116 (L) 150 - 450 10e9/L    Diff Method Automated Method     % Neutrophils 58.1 %    % Lymphocytes 25.4 %    % Monocytes 13.4 %    % Eosinophils 1.3 %    % Basophils 0.9 %    % Immature Granulocytes 0.9 %    Nucleated RBCs 0 0 /100    Absolute Neutrophil 1.3 (L) 1.6 - 8.3 10e9/L    Absolute Lymphocytes 0.6 (L) 0.8 - 5.3 10e9/L    Absolute Monocytes 0.3 0.0 - 1.3 10e9/L    Absolute Eosinophils 0.0 0.0 - 0.7 10e9/L    Absolute Basophils 0.0 0.0 - 0.2 10e9/L    Abs Immature Granulocytes 0.0 0 - 0.4 10e9/L    Absolute Nucleated RBC 0.0    Blood component    Collection Time: 04/05/17  9:41 AM   Result Value Ref Range    Unit Number [id number]     Blood Component Type Red Blood Cells Leukocyte Reduced     Division Number 00     Status of Unit Released to care unit 04/05/2017 1229     Blood Product Code [id number]     Unit Status ISS               Follow-ups after your visit        Your next 10 appointments already scheduled     Apr 20, 2017  1:30 PM CDT   Lab with  LAB    Health Lab ([facility])    [address]  1st Floor  [facility] [address]   [phone]            Apr 20, 2017  2:00 PM CDT   (Arrive by 1:45 PM)   CORE RETURN with Jill Juarez NP   [facility] ([facility])    [address] [place] [address]  3rd Floor  [facility] [address]   [phone]            Apr 26, 2017 10:30 AM CDT   [facility] Lab Draw with  [facility] LAB DRAW   [facility] Draw ([facility])    [address] [place] [address]  2nd Floor  [facility] [address]   [phone]            May 03, 2017  9:00 AM CDT   [facility] Lab Draw with  [facility] LAB DRAW   [facility] Lab Draw ([facility])    [address] [place] [address]  2nd [facility] [address]   [phone]            May 03, 2017  9:30 AM CDT   (Arrive by 9:15 AM)   Return Visit with Gabriela Mcguire PA-C   [facility] ([facility])    [address] [place] [address]  [address] [facility] [address]   [phone]            May 03, 2017 11:00 AM CDT   NM RADIUM 223 XOFIGO THERAPY with UUNMINJ2   [facility] [place], Nuclear Medicine ([facility] [place])    [address] [address] [phone]           Please bring a list of your medicines to the exam. (Include vitamins, minerals and over-the-counter drugs.) You should wear comfortable clothes. Leave your valuables at home. Please bring related prior results and films.  Tell your doctor:   If you are breastfeeding or may be pregnant.   If you have had a barium test within the past few days. Barium may change the results of certain exams.   If you think you may need sedation (medicine to help you relax).  You may eat and drink as normal.  Please  call your Imaging Department at your exam site with any questions.            May 24, 2017 10:30 AM CDT   [facility] Lab Draw with  MASONIC LAB DRAW   [facility] Lab Draw ([facility])    [address] [place] [address]   [phone]            May 31, 2017  8:45 AM CDT   [facility] Lab Draw with  [facility] LAB DRAW   [facility])    [address] [place] [address]   [phone]            May 31, 2017  9:15 AM CDT   (Arrive by 9:00 AM)   Return Visit with Bentley Robles MD   [facility] ([facility])    [address] [place] [address]   [phone]            May 31, 2017 11:00 AM CDT   NM RADIUM 223 XOFIGO THERAPY with UUNMINJ1   [facility] [place], Nuclear Medicine ([facility], [facility])    [address] [facility] [address] [phone]           Please bring a list of your medicines to the exam. (Include vitamins, minerals and over-the-counter drugs.) You should wear comfortable clothes. Leave your valuables at home. Please bring related prior results and films.  Tell your doctor:   If you are breastfeeding or may be pregnant.   If you have had a barium test within the past few days. Barium may change the results of certain exams.   If you think you may need sedation (medicine to help you relax).  You may eat and drink as normal.  Please call your Imaging Department at your exam site with any questions.              Future tests that were ordered for you today     Open Standing Orders        Priority Remaining Interval Expires Ordered    Transfuse red blood cell unit Routine 99/100 TRANSFUSE 1 UNIT  4/5/2017    Red blood cell prepare order unit Routine 99/100 CONDITIONAL (SPECIFY) BLOOD  4/5/2017            Who to contact     If you have questions or need  follow up information about today's clinic visit or your schedule please contact [facility] directly at [phone].  Normal or non-critical lab and imaging results will be communicated to you by MyChart, letter or phone within 4 business days after the clinic has received the results. If you do not hear from us within 7 days, please contact the clinic through [employer] or phone. If you have a critical or abnormal lab result, we will notify you by phone as soon as possible.  Submit refill requests through [employer] or call your pharmacy and they will forward the refill request to us. Please allow 3 business days for your refill to be completed.          Additional Information About Your Visit        [employer] Information     [employer] gives you secure access to your electronic health record. If you see a primary care provider, you can also send messages to your care team and make appointments. If you have questions, please call your primary care clinic.  If you do not have a primary care provider, please call [phone] and they will assist you.        Care EveryWhere ID     This is your Care EveryWhere ID. This could be used by other organizations to access your [place] medical records  [id number]        Your Vitals Were     Pulse Temperature Respirations Pulse Oximetry          83 98.3  F (36.8  C) (Oral) 16 100%         Blood Pressure from Last 3 Encounters:   04/05/17 115/55   04/05/17 92/44   04/04/17 90/56    Weight from Last 3 Encounters:   04/05/17 66.5 kg (146 lb 8 oz)   03/31/17 61.7 kg (136 lb)   03/31/17 61.7 kg (136 lb)              We Performed the Following     Heparin 10a Level     Red blood cell prepare order unit     Transfuse red blood cell unit        Primary Care Provider Office Phone # Fax #    Lucrecia Collier [phone]       [facility] [address]  [facility] [address]        Thank you!     Thank you for choosing [facility]  CLINIC  for your care. Our goal is always to provide you with excellent care. Hearing back from our patients is one way we can continue to improve our services. Please take a few minutes to complete the written survey that you may receive in the mail after your visit with us. Thank you!             Your Updated Medication List - Protect others around you: Learn how to safely use, store and throw away your medicines at www.disposemymeds.org.          This list is accurate as of: 4/5/17  2:52 PM.  Always use your most recent med list.                   Brand Name Dispense Instructions for use    aspirin 81 MG tablet      Take 81 mg by mouth daily       calcium carbonate 500 MG tablet    OS-RODNEY 500 mg [place]. Ca    120 tablet    Take 2 tablets (1,000 mg) by mouth 2 times daily       diphenoxylate-atropine 2.5-0.025 MG per tablet    LOMOTIL     Take 1 tablet by mouth 4 times daily as needed for diarrhea Reported on 4/5/2017       HYDROcodone-acetaminophen 5-325 MG per tablet    NORCO     Take 1 tablet by mouth every 6 hours as needed for moderate to severe pain Reported on 4/5/2017       loperamide 2 MG capsule    IMODIUM    60 capsule    Use 2 caps PO after first loose stool.  Repeat 1 cap after each subsequent loose stool.  Max 8/24 hours.       LOVENOX 60 MG/0.6ML injection   Generic drug:  enoxaparin      Inject 0.5 mLs (50 mg) Subcutaneous 2 times daily       metoprolol 25 MG 24 hr tablet    TOPROL-XL    45 tablet    Take 0.5 tablets (12.5 mg) by mouth daily AM       Multi-vitamin Tabs tablet     100 tablet    Take 1 tablet by mouth every morning Reported on 4/5/2017       phosphorus tablet 250 mg 250 MG per tablet    K PHOS NEUTRAL    30 tablet    Take 2 tablets (500 mg) by mouth 3 times daily       predniSONE 5 MG tablet    DELTASONE    60 tablet    Take 1 tablet (5 mg) by mouth 2 times daily       prochlorperazine 5 MG tablet    COMPAZINE    30 tablet    Take 1 tablet (5 mg) by mouth every 6 hours as needed for  nausea or vomiting       simvastatin 40 MG tablet    ZOCOR    90 tablet    TAKE ONE TABLET BY MOUTH AT BEDTIME       tamsulosin 0.4 MG capsule    FLOMAX    90 capsule    Take 1 capsule (0.4 mg) by mouth daily AM       vitamin D 2000 UNITS tablet     100 tablet    Take 1 tablet by mouth daily

## 2017-04-05 NOTE — NURSING NOTE
"Oswaldo Win is a 72 year old male who presents for:  Chief Complaint   Patient presents with     Oncology Clinic Visit     overview about zytega? after previouse event and after discontiuing it     Blood Draw     labs drawn by RN from IV start.  vs taken.        Initial Vitals:  BP 92/44 (BP Location: Left arm, Patient Position: Chair, Cuff Size: Adult Regular)  Pulse 101  Temp 98.2  F (36.8  C) (Oral)  Resp 18  Wt 66.5 kg (146 lb 8 oz)  SpO2 100%  BMI 23.29 kg/m2 Estimated body mass index is 23.29 kg/(m^2) as calculated from the following:    Height as of 3/9/17: 1.689 m (5' 6.5\").    Weight as of this encounter: 66.5 kg (146 lb 8 oz).. Body surface area is 1.77 meters squared. BP completed using cuff size: NA (Not Taken)  No Pain (0) No LMP for male patient. Allergies and medications reviewed.     Medications: Medication refills not needed today.  Pharmacy name entered into [employer]: [facility] [place], [place] - [address]    Comments: pt denies pain    5 minutes for nursing intake (face to face time)   Key Llanes CMA        "

## 2017-04-05 NOTE — PROGRESS NOTES
Oncology/Hematology Visit Note  Apr 5, 2017    DIAGNOSIS:  Mr. Win is a 71 year old male with a hx of CHF, CKD, CAD w/ hx of STEMI and CABG. He met with Dr. Robles at the end of July for consultation regarding metastatic prostate cancer. His story begins in May 2015 when he saw his PCP for back pain, present since Feb. He also had poor appetite, weight loss of 8lb and poor energy.  CT showed diffuse bony mets. CT Chest showed sclerotic mets throughout the bones. He was given degarelix on 7/22 in Urology. He was having pelvic pain and was evaluated by Dr Cavazos would did not feel XRT was appropriate.  He started on docetaxel 7/31. From 8/7-8/14 he was admitted with colitis, CHAR, elevated BNP, lactis acidosis, and poor appetite. He was discharged to a TCU and unfortunately, it sounds like he was not given his Lasix.  His BUBBA worsened and his breathing became compromised.  He was seen by Chelsi Corral on 8/21 and eventually transferred to the ED and admitted 8/21-8/24 for acute on chronic CHF.  At our last visit we discussed not pursuing further Taxotere at this time and discussed starting Zytiga when he was ready.  We pursued PT, OT, home lymphedema for supportive care at home in attempt for further rehabilitation. He was again admitted and discharged - 10/20/15.  He never started the Zytiga as he was still actively recovering from his cardiac issues.  His PSA remained stable, thus no intervention was pursued.  December 2015, Mainor met with Dr Robles and since his ECOG was back to a 1, they discussed re-challenging the Taxotere at a lower dose. 9/26/2016 he started Provenge off study. He continues on lupron every 3 mos and XGEVA. 9/26/2016 he started Provenge off study. He received 3 doses of Provenge (last 10/28), on 3rd dose had fevers, chills, myalgias, was bedbound for a week due to these symptoms. During this time he held his coumadin for 5 days prior to a central line removal 10/31. Later than week he had sudden onset of  right pleuritic chest pain and hemoptysis. Diagnosed with acute segmental/subsegmental PE on 11/8 and prescribed Lovenox.     Mainor was briefly on Zytiga and prednisone in November-December, however after one month his PSA went up, thus it was decided to add in Xofigo (1/11/17-- first).     On 12/28 he started xaralto and took his last pill on 1/10/17.  He stopped as he noticed melanotic stools and had some hemoptysis.  He was seen in clinic the next day with a hgb in th 6 range and was admitted.  Pan-scopes showed no active bleeding.  He was able to get his Xofigo in patient as well.  He was transitioned back to lovenox BID.     INTERVAL HISTORY:  Mr. Win is here today for f/u. Since seeing Chelsi Knapp last week he has been holding his Zytiga and his diarrhea has subsided.  He has no more nausea and has been eating and drinking well.  He did have another episode of light headedness yesterday while getting his labs drawn that was short lived.      No f/s/c. No chest pain. Breathing is stable. Soft stools the last few days, no diarrhea. No black stools. No new  issues.  No bleeding.     MEDICATIONS:   Current Outpatient Prescriptions   Medication Sig     phosphorus tablet 250 mg (K PHOS NEUTRAL) 250 MG per tablet Take 2 tablets (500 mg) by mouth 3 times daily     enoxaparin (LOVENOX) 60 MG/0.6ML injection Inject 0.5 mLs (50 mg) Subcutaneous 2 times daily     predniSONE (DELTASONE) 5 MG tablet Take 1 tablet (5 mg) by mouth 2 times daily     calcium carbonate (OS-RODNEY 500 MG [place]. [place]) 500 MG tablet Take 2 tablets (1,000 mg) by mouth 2 times daily     aspirin 81 MG tablet Take 81 mg by mouth daily     Cholecalciferol (VITAMIN D) 2000 UNITS tablet Take 1 tablet by mouth daily     simvastatin (ZOCOR) 40 MG tablet TAKE ONE TABLET BY MOUTH AT BEDTIME     tamsulosin (FLOMAX) 0.4 MG 24 hr capsule Take 1 capsule (0.4 mg) by mouth daily AM     prochlorperazine (COMPAZINE) 5 MG tablet Take 1 tablet (5 mg) by mouth every 6 hours  as needed for nausea or vomiting (Patient not taking: Reported on 4/5/2017)     metoprolol (TOPROL-XL) 25 MG 24 hr tablet Take 0.5 tablets (12.5 mg) by mouth daily AM (Patient not taking: Reported on 4/5/2017)     loperamide (IMODIUM) 2 MG capsule Use 2 caps PO after first loose stool.  Repeat 1 cap after each subsequent loose stool.  Max 8/24 hours. (Patient not taking: Reported on 4/5/2017)     diphenoxylate-atropine (LOMOTIL) 2.5-0.025 MG per tablet Take 1 tablet by mouth 4 times daily as needed for diarrhea Reported on 4/5/2017     HYDROcodone-acetaminophen (NORCO) 5-325 MG per tablet Take 1 tablet by mouth every 6 hours as needed for moderate to severe pain Reported on 4/5/2017     multivitamin, therapeutic with minerals (MULTI-VITAMIN) TABS Take 1 tablet by mouth every morning Reported on 4/5/2017     No current facility-administered medications for this visit.      Facility-Administered Medications Ordered in Other Visits   Medication     hydrocortisone sodium succinate (solu-CORTEF) injection     glycopyrrolate (ROBINUL) injection     neostigmine (PROSTIGMINE) injection     protamine injection     albuterol (PROAIR HFA, PROVENTIL HFA, VENTOLIN HFA) inhaler          ALLERGIES:  No Known Allergies    PHYSICAL EXAMINATION:  Vitals: BP 92/44 (BP Location: Left arm, Patient Position: Chair, Cuff Size: Adult Regular)  Pulse 101  Temp 98.2  F (36.8  C) (Oral)  Resp 18  Wt 66.5 kg (146 lb 8 oz)  SpO2 100%  BMI 23.29 kg/m2   Wt Readings from Last 10 Encounters:   04/05/17 66.5 kg (146 lb 8 oz)   03/31/17 61.7 kg (136 lb)   03/31/17 61.7 kg (136 lb)   03/29/17 67.9 kg (149 lb 9.6 oz)   03/15/17 66.8 kg (147 lb 4.8 oz)   03/09/17 63 kg (139 lb)   03/08/17 69.3 kg (152 lb 11.2 oz)   02/24/17 64 kg (141 lb)   02/08/17 65.8 kg (145 lb)   02/01/17 64.9 kg (143 lb)       GENERAL:  A pleasant person in no acute distress. Appears quite well.   HEENT:  Sclerae are nonicteric.  Mouth is without mucositis or thrush.    NECK:  Supple.   LYMPH NODES:  No peripheral lymphadenopathy noted in the supraclavicular or cervical regions.   LUNGS:  Clear to auscultation bilaterally.   HEART:  Regular rate and rhythm   ABDOMEN:  Bowel sounds are active.  Soft and nontender.  No hepatosplenomegaly or other masses appreciated.  LOWER EXTREMITIES:  Without pitting edema to the knees bilaterally.   NEUROLOGICAL:  Alert/orientated/able to answer all questions.  CN grossly intact.     LAB:   Results for USAMA MANRIQUE (MRN [id number]) as of 4/5/2017 14:57   3/15/2017 14:10 3/29/2017 10:54 3/31/2017 14:11 4/5/2017 09:41   Sodium 139 142 140 143   Potassium 4.0 3.7 3.8 4.0   Chloride 108 110 (H) 108 111 (H)   Carbon Dioxide 19 (L) 22 19 (L) 21   Urea Nitrogen 18 13 9 16   Creatinine 0.90 0.90 0.88 0.89   GFR Estimate 83 83 85 84   GFR Estimate If Black >90... >90... >90... >90...   Calcium 9.0 8.6 7.7 (L) 8.6   Anion Gap 12 10 13 10   Magnesium 2.4 (H)  2.2 2.0   Phosphorus   1.6 (L) 2.3 (L)   Albumin 3.9 3.7 3.7 3.6   Protein Total 7.5 7.3 7.1 6.9   Bilirubin Total 1.4 (H) 1.2 2.6 (H) 1.1   Alkaline Phosphatase 546 (H) 531 (H) 472 (H) 401 (H)   ALT 17 18 16 14   AST 24 20 22 20   Bilirubin Direct   0.3 (H)    Lactate Dehydrogenase   409 (H)    .74 (H) 574.98 (H)     Glucose 128 (H) 116 (H) 98 127 (H)   WBC 3.3 (L) 2.0 (L) 2.1 (L) 2.2 (L)   Hemoglobin 9.7 (L) 9.2 (L) 8.2 (L) 8.0 (L)   Hematocrit 28.1 (L) 27.1 (L) 24.3 (L) 24.0 (L)   Platelet Count 148 (L) 127 (L) 122 (L) 116 (L)   RBC Count 3.15 (L) 2.95 (L) 2.64 (L) 2.57 (L)   MCV 89 92 92 93   Absolute Neutrophil 2.4 1.1 (L) 1.0 (L) 1.3 (L)       IMPRESSION/PLAN:   Metastatic prostate cancer: on zytiga/pred and xofigo and xgeva. Having on/off loose stools for about 4 weeks. Infectious studies were recently negative.  He has stopped his Zytiga and continued on his prednisone and is feeling better.  Most of our patients do not have reactions to Zytiga and theoretically the radium could be the  cause too.  For now we will take a break off the Zytiga and hope for a better week.      Anemia: likely secondary to the radium as his platelets and ANC are low as well.  His bili is normal today, IRIS was negative, and smear was negative.  Since Mainor is still having light-headed episodes I think we should get him set up for PRBC, especially given his cardiac history. Will set up today.     Heme: on lovenox for PE dx 11/2016. Had inpatient stay for GI bleed 1/11-1/14. Stopped xarelto and transitioned back to lovenox BID. No bleeding concerns. Recently dropped from 60mg BID to 50mg BID due to high Xa level. Xa still elevated, thus will drop to 40 mg BID and recheck next week.    Bone mets: on xgeva Q 6 weeks. Last given on 2/8. Hold with low calcium and phos at this time.     CV: hx of CHF. On metoprolol 12.5mg daily, but with ongoing recent low BPs provided parameters <120 systolic in the morning to hold dose.       Sabrina Mcguire PA-C

## 2017-04-05 NOTE — PATIENT INSTRUCTIONS
Contact Numbers    [facility] [place] [phone]  [facility] [place]:  [phone]    Call triage with chills and/or temperature greater than or equal to 100.5, uncontrolled nausea/vomiting, diarrhea, constipation, dizziness, shortness of breath, chest pain, bleeding, unexplained bruising, or any new/concerning symptoms, questions/concerns.     If you are having any concerning symptoms or wish to speak to a provider before your next infusion visit, please call your care coordinator or triage to notify them so we can adequately serve you.       After Hours: [phone]    If after hours, weekends, or holidays, call main hospital  and ask for Oncology doctor on call.         April 2017 Sunday Monday Tuesday Wednesday Thursday Friday Saturday                                 1       2     3     4     LAB   12:00 PM   (15 min.)   [facility] 5     [facility] DRAW    9:00 AM   (15 min.)   [facility]   [facility]     [facility] RETURN    9:15 AM   (50 min.)   Gabriela Mcguire PA-C   [facility]     NM RADIUM 223 XOFIGO THERAPY   11:00 AM   (30 min.)   [id number]   [facility] [place], Nuclear Medicine     [facility] ONC INFUSION 180   12:00 PM   (180 min.)    ONCOLOGY INFUSION   [facility] 6     7     8       9     10     11     12     13     14     15       16     17     18     19     20     LAB WITH  CLINIC    1:30 PM   (15 min.)    LAB   [facility] Lab     [facility] CORE RETURN    1:45 PM   (30 min.)   Jill Juarez NP   [facility] 21     22       23     24     25     26     P [facility] LAB DRAW   10:30 AM   (15 min.)    [facility]RAW   [facility] 27     28     29       30                                              May 2017   Gh[place]hyam Monday Tuesday Wednesday Thursday Friday Saturday        1     2     3     [facility] DRAW    9:00 AM   (15 min.)   [facility]   [facility]     [facility]  RETURN    9:15 AM   (50 min.)   Gabriela Mcguire PA-C   [facility]     NM RADIUM 223 XOFIGO THERAPY   11:00 AM   (30 min.)   [id number]   [facility] 4     5     6       7     8     9     10     11     12     13       14     15     16     17     18     19     20       21     22     23     24     [facility]   10:30 AM   (15 min.)   [facility] LAB DRAW   [facility] Lab Draw 25     26     27       28     29     30     31     [facility]    8:45 AM   (15 min.)   [facility] LAB DRAW   [facility] Lab Draw     [facility] RETURN    9:00 AM   (30 min.)   Bentley Robels MD   [facility]     NM RADIUM 223 XOFIGO THERAPY   11:00 AM   (30 min.)   [id number]   [facility] Nuclear [facility]                             Lab Results:  Recent Results (from the past 12 hour(s))   Reticulocyte count    Collection Time: 04/05/17  9:41 AM   Result Value Ref Range    % Retic 3.8 (H) 0.5 - 2.0 %    Absolute Retic 98.7 (H) 25 - 95 10e9/L   Comprehensive metabolic panel (BMP + Alb, Alk Phos, ALT, AST, Total. Bili, TP)    Collection Time: 04/05/17  9:41 AM   Result Value Ref Range    Sodium 143 133 - 144 mmol/L    Potassium 4.0 3.4 - 5.3 mmol/L    Chloride 111 (H) 94 - 109 mmol/L    Carbon Dioxide 21 20 - 32 mmol/L    Anion Gap 10 3 - 14 mmol/L    Glucose 127 (H) 70 - 99 mg/dL    Urea Nitrogen 16 7 - 30 mg/dL    Creatinine 0.89 0.66 - 1.25 mg/dL    GFR Estimate 84 >60 mL/min/1.7m2    GFR Estimate If Black >90   GFR Calc   >60 mL/min/1.7m2    Calcium 8.6 8.5 - 10.1 mg/dL    Bilirubin Total 1.1 0.2 - 1.3 mg/dL    Albumin 3.6 3.4 - 5.0 g/dL    Protein Total 6.9 6.8 - 8.8 g/dL    Alkaline Phosphatase 401 (H) 40 - 150 U/L    ALT 14 0 - 70 U/L    AST 20 0 - 45 U/L   ABO/Rh type and screen    Collection Time: 04/05/17  9:41 AM   Result Value Ref Range    Units Ordered 1     ABO B     RH(D)  Pos     Antibody Screen Neg     Test Valid Only At        [facility]    Specimen Expires 04/08/2017     Crossmatch Red Blood Cells    Phosphorus    Collection Time: 04/05/17  9:41 AM   Result Value Ref Range    Phosphorus 2.3 (L) 2.5 - 4.5 mg/dL   Magnesium    Collection Time: 04/05/17  9:41 AM   Result Value Ref Range    Magnesium 2.0 1.6 - 2.3 mg/dL   CBC with platelets and differential    Collection Time: 04/05/17  9:41 AM   Result Value Ref Range    WBC 2.2 (L) 4.0 - 11.0 10e9/L    RBC Count 2.57 (L) 4.4 - 5.9 10e12/L    Hemoglobin 8.0 (L) 13.3 - 17.7 g/dL    Hematocrit 24.0 (L) 40.0 - 53.0 %    MCV 93 78 - 100 fl    MCH 31.1 26.5 - 33.0 pg    MCHC 33.3 31.5 - 36.5 g/dL    RDW 16.7 (H) 10.0 - 15.0 %    Platelet Count 116 (L) 150 - 450 10e9/L    Diff Method Automated Method     % Neutrophils 58.1 %    % Lymphocytes 25.4 %    % Monocytes 13.4 %    % Eosinophils 1.3 %    % Basophils 0.9 %    % Immature Granulocytes 0.9 %    Nucleated RBCs 0 0 /100    Absolute Neutrophil 1.3 (L) 1.6 - 8.3 10e9/L    Absolute Lymphocytes 0.6 (L) 0.8 - 5.3 10e9/L    Absolute Monocytes 0.3 0.0 - 1.3 10e9/L    Absolute Eosinophils 0.0 0.0 - 0.7 10e9/L    Absolute Basophils 0.0 0.0 - 0.2 10e9/L    Abs Immature Granulocytes 0.0 0 - 0.4 10e9/L    Absolute Nucleated RBC 0.0    Blood component    Collection Time: 04/05/17  9:41 AM   Result Value Ref Range    Unit Number [id number]     Blood Component Type Red Blood Cells Leukocyte Reduced     Division Number 00     Status of Unit Released to care unit 04/05/2017 1229     Blood Product Code [id number]     Unit Status ISS

## 2017-04-05 NOTE — NURSING NOTE
Chief Complaint   Patient presents with     Oncology Clinic Visit     overview about anaga? after previouse event and after discontiuing it     Blood Draw     labs drawn by RN from IV start.  vs taken.     Hx prostate ca.  Labs drawn from IV start by vascular RN.  Vs taken.  Pt checked in to next appt.  Jody Layton RN

## 2017-04-05 NOTE — Clinical Note
4/5/2017       RE: Oswaldo Win  [address]  [place] [address]     Dear Colleague,    Thank you for referring your patient, Oswaldo Win, to the [facility]. Please see a copy of my visit note below.    No notes on file    Again, thank you for allowing me to participate in the care of your patient.      Sincerely,    Gabriela Mcguire PA-C

## 2017-04-05 NOTE — PROGRESS NOTES
Infusion Nursing Note:  Oswaldo Win presents today for 1 unit PRBC.    Patient seen by provider today: Yes: Sabrina Mcguire PA-C   present during visit today: Not Applicable.    Note: Hgb 8.0 but patient is symptomatic so will receive 1 unit PRBC today per Sabrina Mcguire PA-C.    Intravenous Access:  Peripheral IV placed.    Treatment Conditions:  Lab Results   Component Value Date    HGB 8.0 04/05/2017     Lab Results   Component Value Date    WBC 2.2 04/05/2017      Lab Results   Component Value Date    ANEU 1.3 04/05/2017     Lab Results   Component Value Date     04/05/2017      Lab Results   Component Value Date     04/05/2017                   Lab Results   Component Value Date    POTASSIUM 4.0 04/05/2017           Lab Results   Component Value Date    MAG 2.0 04/05/2017            Lab Results   Component Value Date    CR 0.89 04/05/2017                   Lab Results   Component Value Date    RODNEY 8.6 04/05/2017                Lab Results   Component Value Date    BILITOTAL 1.1 04/05/2017           Lab Results   Component Value Date    ALBUMIN 3.6 04/05/2017                    Lab Results   Component Value Date    ALT 14 04/05/2017           Lab Results   Component Value Date    AST 20 04/05/2017         Post Infusion Assessment:  Patient tolerated infusion without incident.  Blood return noted pre and post infusion.  Site patent and intact, free from redness, edema or discomfort.  No evidence of extravasations.  Access discontinued per protocol.    Discharge Plan:   Patient declined prescription refills.  Discharge instructions reviewed with: Patient.  Patient and/or family verbalized understanding of discharge instructions and all questions answered.  Copy of AVS reviewed with patient and/or family.  Patient will return 4/26/17 for next appointment.  Patient discharged in stable condition accompanied by: self.  Departure Mode: Ambulatory.    Margarita Chacon,  RN

## 2017-04-05 NOTE — MR AVS SNAPSHOT
After Visit Summary   4/5/2017    Oswaldo Win    MRN: [id number]           Patient Information     Date Of Birth          1945        Visit Information        Provider Department      4/5/2017 9:30 AM Gabriela Mcguire PA-C [facility]        Today's Diagnoses     Pulmonary embolism (H)    -  1    Elevated bilirubin        Anemia, unspecified type        Prostate cancer 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)        Loose stools           Follow-ups after your visit        Your next 10 appointments already scheduled     Apr 20, 2017  1:30 PM CDT   Lab with  LAB   [facility] ([facility])    [address] [place] [address]  1st [facility] [address]   [phone]            Apr 20, 2017  2:00 PM CDT   (Arrive by 1:45 PM)   CORE RETURN with Jill Juarez NP   [facility] ([facility])    [address] [place] [address]  3rd [facility] [address]   [phone]            Apr 26, 2017 10:30 AM CDT   Masonic Lab Draw with  MASONIC LAB DRAW   [facility] Lab Draw ([facility])    [address] [place] [address]  2nd [facility] [address]   [phone]            May 03, 2017  9:00 AM CDT   Masonic Lab Draw with  MASONIC LAB DRAW   [facility] Lab Draw ([facility])    [address] [place] [address]   [phone]            May 03, 2017  9:30 AM CDT   (Arrive by 9:15 AM)   Return Visit with Gabriela Mcguire PA-C   [facility] ([facility])    [address] [place] [address]  2nd [facility] [address]   [phone]            May 03, 2017 11:00 AM CDT   NM RADIUM 223 XOFIGO THERAPY with UUNMINJ2   [facility] [place], Nuclear Medicine ([facility])    [address] [facility] [address]   [phone]           Please bring a list of your medicines  to the exam. (Include vitamins, minerals and over-the-counter drugs.) You should wear comfortable clothes. Leave your valuables at home. Please bring related prior results and films.  Tell your doctor:   If you are breastfeeding or may be pregnant.   If you have had a barium test within the past few days. Barium may change the results of certain exams.   If you think you may need sedation (medicine to help you relax).  You may eat and drink as normal.  Please call your Imaging Department at your exam site with any questions.            May 24, 2017 10:30 AM CDT   [place]ip[place]ic Lab Draw with  MASONIC LAB DRAW   [facility] Masonic Lab Draw ([facility])    [address] [place] [address]   [phone]            May 31, 2017  8:45 AM CDT   Masonic Lab Draw with  MASONIC LAB DRAW   [facility] Masonic Lab Draw ([facility])    [address] [place] [address]   [phone]            May 31, 2017  9:15 AM CDT   (Arrive by 9:00 AM)   Return Visit with Bentley Robles MD   [facility] ([facility])    [address] [place] [address]   [phone]            May 31, 2017 11:00 AM CDT   NM RADIUM 223 XOFIGO THERAPY with UUNMINJ1   [facility] [place], Nuclear Medicine ([facility] [place])    [address] [facility] [address]   [phone]           Please bring a list of your medicines to the exam. (Include vitamins, minerals and over-the-counter drugs.) You should wear comfortable clothes. Leave your valuables at home. Please bring related prior results and films.  Tell your doctor:   If you are breastfeeding or may be pregnant.   If you have had a barium test within the past few days. Barium may change the results of certain exams.   If you think you may need sedation (medicine to help you relax).  You may  eat and drink as normal.  Please call your Imaging Department at your exam site with any questions.              Who to contact     If you have questions or need follow up information about today's clinic visit or your schedule please contact [facility] directly at [phone].  Normal or non-critical lab and imaging results will be communicated to you by Anapsishart, letter or phone within 4 business days after the clinic has received the results. If you do not hear from us within 7 days, please contact the clinic through Anapsishart or phone. If you have a critical or abnormal lab result, we will notify you by phone as soon as possible.  Submit refill requests through [employer] or call your pharmacy and they will forward the refill request to us. Please allow 3 business days for your refill to be completed.          Additional Information About Your Visit        Anapsishart Information     [employer] gives you secure access to your electronic health record. If you see a primary care provider, you can also send messages to your care team and make appointments. If you have questions, please call your primary care clinic.  If you do not have a primary care provider, please call [phone] and they will assist you.        Care EveryWhere ID     This is your Care EveryWhere ID. This could be used by other organizations to access your [place] medical records  [id number]        Your Vitals Were     Pulse Temperature Respirations Pulse Oximetry BMI (Body Mass Index)       101 98.2  F (36.8  C) (Oral) 18 100% 23.29 kg/m2        Blood Pressure from Last 3 Encounters:   04/05/17 120/55   04/05/17 92/44   04/04/17 90/56    Weight from Last 3 Encounters:   04/05/17 66.5 kg (146 lb 8 oz)   03/31/17 61.7 kg (136 lb)   03/31/17 61.7 kg (136 lb)              We Performed the Following     ABO/Rh type and screen     Blood component     CBC with platelets and differential     Comprehensive metabolic panel (BMP + Alb, Alk  Phos, ALT, AST, Total. Bili, TP)     Magnesium     Phosphorus     Reticulocyte count        Primary Care Provider Office Phone # Fax #    Lucrecia Collier [phone]       [facility] [address]  [facility] [address]        Thank you!     Thank you for choosing [facility]  for your care. Our goal is always to provide you with excellent care. Hearing back from our patients is one way we can continue to improve our services. Please take a few minutes to complete the written survey that you may receive in the mail after your visit with us. Thank you!             Your Updated Medication List - Protect others around you: Learn how to safely use, store and throw away your medicines at www.disposemymeds.org.          This list is accurate as of: 4/5/17 11:59 PM.  Always use your most recent med list.                   Brand Name Dispense Instructions for use    aspirin 81 MG tablet      Take 81 mg by mouth daily       calcium carbonate 500 MG tablet    OS-RODNEY 500 mg [place]. Ca    120 tablet    Take 2 tablets (1,000 mg) by mouth 2 times daily       diphenoxylate-atropine 2.5-0.025 MG per tablet    LOMOTIL     Take 1 tablet by mouth 4 times daily as needed for diarrhea Reported on 4/5/2017       HYDROcodone-acetaminophen 5-325 MG per tablet    NORCO     Take 1 tablet by mouth every 6 hours as needed for moderate to severe pain Reported on 4/5/2017       loperamide 2 MG capsule    IMODIUM    60 capsule    Use 2 caps PO after first loose stool.  Repeat 1 cap after each subsequent loose stool.  Max 8/24 hours.       metoprolol 25 MG 24 hr tablet    TOPROL-XL    45 tablet    Take 0.5 tablets (12.5 mg) by mouth daily AM       Multi-vitamin Tabs tablet     100 tablet    Take 1 tablet by mouth every morning Reported on 4/5/2017       phosphorus tablet 250 mg 250 MG per tablet    K PHOS NEUTRAL    30 tablet    Take 2 tablets (500 mg) by mouth 3 times daily       predniSONE 5 MG  tablet    DELTASONE    60 tablet    Take 1 tablet (5 mg) by mouth 2 times daily       prochlorperazine 5 MG tablet    COMPAZINE    30 tablet    Take 1 tablet (5 mg) by mouth every 6 hours as needed for nausea or vomiting       simvastatin 40 MG tablet    ZOCOR    90 tablet    TAKE ONE TABLET BY MOUTH AT BEDTIME       tamsulosin 0.4 MG capsule    FLOMAX    90 capsule    Take 1 capsule (0.4 mg) by mouth daily AM       vitamin D 2000 UNITS tablet     100 tablet    Take 1 tablet by mouth daily

## 2017-04-06 ENCOUNTER — CARE COORDINATION (OUTPATIENT)
Dept: ONCOLOGY | Facility: CLINIC | Age: 72
End: 2017-04-06

## 2017-04-06 DIAGNOSIS — I27.82 OTHER CHRONIC PULMONARY EMBOLISM WITHOUT ACUTE COR PULMONALE (H): Primary | ICD-10-CM

## 2017-04-06 NOTE — PROGRESS NOTES
Informed pt his heparin 10a level was cancelled yesterday as the specimen was unsatisfactory. Informed pt Sabrina Mcguire PA-C is OK with him coming in this week or early next week to have heparin 10a level checked. Apologized to pt for the inconvenience.  Pt voiced understanding and will come in on Monday,4/10 at 1pm to have heparin 10a level drawn.

## 2017-04-10 DIAGNOSIS — I27.82 OTHER CHRONIC PULMONARY EMBOLISM WITHOUT ACUTE COR PULMONALE (H): ICD-10-CM

## 2017-04-10 LAB — LMWH PPP CHRO-ACNC: 1.39 IU/ML

## 2017-04-10 PROCEDURE — 36415 COLL VENOUS BLD VENIPUNCTURE: CPT | Performed by: PHYSICIAN ASSISTANT

## 2017-04-10 PROCEDURE — 85520 HEPARIN ASSAY: CPT | Performed by: PHYSICIAN ASSISTANT

## 2017-04-10 NOTE — NURSING NOTE
Chief Complaint   Patient presents with     Blood Draw     right hand draw      Yuki Mcclure, CMA

## 2017-04-11 ENCOUNTER — CARE COORDINATION (OUTPATIENT)
Dept: ONCOLOGY | Facility: CLINIC | Age: 72
End: 2017-04-11

## 2017-04-11 DIAGNOSIS — I82.499 DEEP VEIN THROMBOSIS (DVT) OF OTHER VEIN OF LOWER EXTREMITY: Primary | ICD-10-CM

## 2017-04-11 NOTE — PROGRESS NOTES
Informed pt Sabrina Mcguire PA-C reviewed his heparin 10a results. She would like him to decrease the lovenox to 40mg BID and will check another heparin 10a level when he comes in next week on 4/20 for his cardiology appointment.  Pt verbalized understanding.

## 2017-04-17 ENCOUNTER — PRE VISIT (OUTPATIENT)
Dept: CARDIOLOGY | Facility: CLINIC | Age: 72
End: 2017-04-17

## 2017-04-17 DIAGNOSIS — I50.22 CHRONIC SYSTOLIC HEART FAILURE (H): Primary | ICD-10-CM

## 2017-04-20 ENCOUNTER — CARE COORDINATION (OUTPATIENT)
Dept: ONCOLOGY | Facility: CLINIC | Age: 72
End: 2017-04-20

## 2017-04-20 DIAGNOSIS — I50.43 ACUTE ON CHRONIC COMBINED SYSTOLIC AND DIASTOLIC CONGESTIVE HEART FAILURE (H): ICD-10-CM

## 2017-04-20 DIAGNOSIS — I27.82 OTHER CHRONIC PULMONARY EMBOLISM WITHOUT ACUTE COR PULMONALE (H): Primary | ICD-10-CM

## 2017-04-20 NOTE — PROGRESS NOTES
Pt called scheduling and cancelled his lab appointment today due to not feeling well.  Called pt and LMVM to call back to discuss how he is feeling.

## 2017-04-21 DIAGNOSIS — I82.499 DEEP VEIN THROMBOSIS (DVT) OF OTHER VEIN OF LOWER EXTREMITY: ICD-10-CM

## 2017-04-21 DIAGNOSIS — I50.22 CHRONIC SYSTOLIC HEART FAILURE (H): ICD-10-CM

## 2017-04-21 LAB
ANION GAP SERPL CALCULATED.3IONS-SCNC: 11 MMOL/L (ref 3–14)
BUN SERPL-MCNC: 17 MG/DL (ref 7–30)
CALCIUM SERPL-MCNC: 8.5 MG/DL (ref 8.5–10.1)
CHLORIDE SERPL-SCNC: 103 MMOL/L (ref 94–109)
CO2 SERPL-SCNC: 22 MMOL/L (ref 20–32)
CREAT SERPL-MCNC: 0.94 MG/DL (ref 0.66–1.25)
GFR SERPL CREATININE-BSD FRML MDRD: 78 ML/MIN/1.7M2
GLUCOSE SERPL-MCNC: 129 MG/DL (ref 70–99)
LMWH PPP CHRO-ACNC: 0.88 IU/ML
POTASSIUM SERPL-SCNC: 3.8 MMOL/L (ref 3.4–5.3)
SODIUM SERPL-SCNC: 136 MMOL/L (ref 133–144)

## 2017-04-21 PROCEDURE — 80048 BASIC METABOLIC PNL TOTAL CA: CPT | Performed by: PHYSICIAN ASSISTANT

## 2017-04-21 PROCEDURE — 85520 HEPARIN ASSAY: CPT | Performed by: PHYSICIAN ASSISTANT

## 2017-04-21 PROCEDURE — 36415 COLL VENOUS BLD VENIPUNCTURE: CPT

## 2017-04-21 NOTE — PROGRESS NOTES
Called pt and LMVM to call back to discuss how he is feeling.  Pt returned call to RN. States he cancelled his lab appointment yesterday because he fell asleep and didn't have enough time to make it to clinic for lab draw. Reports he is otherwise doing OK. Systolic BPs have been ranging 105-115 so has been holding his metoprolol in the mornings as per Chelsi Knapp PA-C's request. Is drinking fluids and supplementing some meals with Ensure. States is having minimal amounts of loose stool 1-2 times per day. He will take his Lovenox now and come in around 1:30pm today to have heparin 10a level checked.

## 2017-04-21 NOTE — NURSING NOTE
Chief Complaint   Patient presents with     Blood Draw     Labs only     Vitals and labs done peripherally.  See doc flow sheets for details.  Virginia Reagan CMA

## 2017-04-21 NOTE — LETTER
"  May 2, 2017     TO: Oswaldo Win   [address]  [facility] [address]     Dear Mr. Win,    The results of your recent Laboratory tests.  Results for orders placed or performed in visit on 17   Heparin 10a Level   Result Value Ref Range    Heparin 10A Level 0.88 IU/mL   Basic metabolic panel   Result Value Ref Range    Sodium 136 133 - 144 mmol/L    Potassium 3.8 3.4 - 5.3 mmol/L    Chloride 103 94 - 109 mmol/L    Carbon Dioxide 22 20 - 32 mmol/L    Anion Gap 11 3 - 14 mmol/L    Glucose 129 (H) 70 - 99 mg/dL    Urea Nitrogen 17 7 - 30 mg/dL    Creatinine 0.94 0.66 - 1.25 mg/dL    GFR Estimate 78 >60 mL/min/1.7m2    GFR Estimate If Black >90   GFR Calc   >60 mL/min/1.7m2    Calcium 8.5 8.5 - 10.1 mg/dL     Your test results fall within the expected ranges.  Please continue with current treatment plan.  Please reschedule your CORE appt when you're ready.     Sincerely,  Alexia Carrasco RN BSN CHFN  Cardiology Care Coordinator - [facility]  Questions and [phone]  First press #1 for the [place] and then press #3 for \"Medical Advise\" to reach us Cardiology Nurses.    "

## 2017-04-24 ENCOUNTER — DOCUMENTATION ONLY (OUTPATIENT)
Dept: ONCOLOGY | Facility: CLINIC | Age: 72
End: 2017-04-24

## 2017-04-24 NOTE — PROGRESS NOTES
Informed pt his heparin 10a level is therapeutic, therefore, will have him continue taking Lovenox 40mg BID.   Pt requesting refill on Lovenox. States has only a few syringes left.  Rx for Lovenox 40mg sent to [facility] pharmacy. Reminded pt he will not need to waste any medication with new rx. Pt verbalized understanding. Had no further questions at this time.

## 2017-04-26 DIAGNOSIS — C79.51 PROSTATE CANCER METASTATIC TO BONE (H): Primary | ICD-10-CM

## 2017-04-26 DIAGNOSIS — C61 PROSTATE CANCER METASTATIC TO BONE (H): Primary | ICD-10-CM

## 2017-04-26 DIAGNOSIS — C79.51 METASTATIC BONE TUMOR (H): ICD-10-CM

## 2017-04-27 DIAGNOSIS — C79.51 PROSTATE CANCER METASTATIC TO BONE (H): ICD-10-CM

## 2017-04-27 DIAGNOSIS — C61 PROSTATE CANCER METASTATIC TO BONE (H): ICD-10-CM

## 2017-04-27 DIAGNOSIS — C79.51 METASTATIC BONE TUMOR (H): ICD-10-CM

## 2017-04-27 LAB
BASOPHILS # BLD AUTO: 0 10E9/L (ref 0–0.2)
BASOPHILS NFR BLD AUTO: 0.6 %
DIFFERENTIAL METHOD BLD: ABNORMAL
EOSINOPHIL # BLD AUTO: 0.3 10E9/L (ref 0–0.7)
EOSINOPHIL NFR BLD AUTO: 9.6 %
ERYTHROCYTE [DISTWIDTH] IN BLOOD BY AUTOMATED COUNT: 17.1 % (ref 10–15)
HCT VFR BLD AUTO: 28 % (ref 40–53)
HGB BLD-MCNC: 8.8 G/DL (ref 13.3–17.7)
LYMPHOCYTES # BLD AUTO: 0.6 10E9/L (ref 0.8–5.3)
LYMPHOCYTES NFR BLD AUTO: 17.4 %
MCH RBC QN AUTO: 30.2 PG (ref 26.5–33)
MCHC RBC AUTO-ENTMCNC: 31.4 G/DL (ref 31.5–36.5)
MCV RBC AUTO: 96 FL (ref 78–100)
MONOCYTES # BLD AUTO: 0.6 10E9/L (ref 0–1.3)
MONOCYTES NFR BLD AUTO: 16.3 %
NEUTROPHILS # BLD AUTO: 1.9 10E9/L (ref 1.6–8.3)
NEUTROPHILS NFR BLD AUTO: 56.1 %
PLATELET # BLD AUTO: 135 10E9/L (ref 150–450)
RBC # BLD AUTO: 2.91 10E12/L (ref 4.4–5.9)
WBC # BLD AUTO: 3.4 10E9/L (ref 4–11)

## 2017-04-27 PROCEDURE — 36415 COLL VENOUS BLD VENIPUNCTURE: CPT | Performed by: FAMILY MEDICINE

## 2017-04-27 PROCEDURE — 85025 COMPLETE CBC W/AUTO DIFF WBC: CPT | Performed by: FAMILY MEDICINE

## 2017-04-28 ENCOUNTER — ALLIED HEALTH/NURSE VISIT (OUTPATIENT)
Dept: NURSING | Facility: CLINIC | Age: 72
End: 2017-04-28
Payer: COMMERCIAL

## 2017-04-28 ENCOUNTER — CARE COORDINATION (OUTPATIENT)
Dept: ONCOLOGY | Facility: CLINIC | Age: 72
End: 2017-04-28

## 2017-04-28 ENCOUNTER — TELEPHONE (OUTPATIENT)
Dept: ONCOLOGY | Facility: CLINIC | Age: 72
End: 2017-04-28

## 2017-04-28 VITALS — WEIGHT: 138.5 LBS | BODY MASS INDEX: 22.02 KG/M2

## 2017-04-28 DIAGNOSIS — C79.51 PROSTATE CANCER METASTATIC TO BONE (H): Primary | ICD-10-CM

## 2017-04-28 DIAGNOSIS — C61 PROSTATE CANCER METASTATIC TO BONE (H): Primary | ICD-10-CM

## 2017-04-28 PROCEDURE — 99207 ZZC NO CHARGE NURSE ONLY: CPT

## 2017-04-28 NOTE — TELEPHONE ENCOUNTER
Prior Authorization Approval    Authorization Effective Date: 4/28/2017  Authorization Expiration Date: 4/28/2018  Medication: enoxaparin (LOVENOX) 40 MG/0.4ML injection  Approved Dose/Quantity:   Reference #: [id number]   Insurance Company: [employer] - Phone [phone] Fax [phone]  Expected CoPay: $238.35     CoPay Card Available:      Foundation Assistance Needed:    Which Pharmacy is filling the prescription (Not needed for infusion/clinic administered): [facility] [place] [address]  Pharmacy Notified: Yes  Patient Notified: Yes    CALLED [facility] REGARDING CO-PAY IT SEEMS NO ONE KNOWS WHAT'S GOING ON OR WHICH DEPT HANDLES THIS. I'VE BEEN TRANSFERRED SEVERAL TIMES AND STILL NO ANSWER. WILL TRY AGAIN.

## 2017-04-28 NOTE — PROGRESS NOTES
RN noticed pt did not have weight checked yesterday when he went to the [facility] for labs. Called pt and requested he has weight checked today so his radium can be ordered. Pt states he will go to the [facility] clinic sometime today for weight check.    Also, pt states he was informed by the [facility] pharmacy that a PA is needed for the Lovenox. Pt states he has 1.5 days left of Lovenox; then will run out of medication.    Called Prior Auth team and spoke with Junior. Informed Ka a PA is needed for Lovenox 40mg. Ka will complete PA and try to expedite request so pt does not run out of medication.

## 2017-04-28 NOTE — MR AVS SNAPSHOT
After Visit Summary   4/28/2017    Oswaldo Win    MRN: [id number]           Patient Information     Date Of Birth          1945        Visit Information        Provider Department      4/28/2017 2:30 PM  MEDICAL ASSISTANT [facility]        Today's Diagnoses     Prostate cancer 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)    -  1       Follow-ups after your visit        Your next 10 appointments already scheduled     May 03, 2017  9:00 AM CDT   Masonic Lab Draw with  L[employer]Amend LAB DRAW   [facility] Masonic Lab Draw ([facility])    [address] [place] [address] [phone]            May 03, 2017  9:30 AM CDT   (Arrive by 9:15 AM)   Return Visit with Gabriela Mcguire PA-C   [facility] ([facility])    [address] [place] [address] [phone]            May 03, 2017 11:00 AM CDT   NM RADIUM 223 XOFIGO THERAPY with UUNMINJ2   [facility] [place], Nuclear Medicine ([facility])    [address] [facility] [address] [phone]           Please bring a list of your medicines to the exam. (Include vitamins, minerals and over-the-counter drugs.) You should wear comfortable clothes. Leave your valuables at home. Please bring related prior results and films.  Tell your doctor:   If you are breastfeeding or may be pregnant.   If you have had a barium test within the past few days. Barium may change the results of certain exams.   If you think you may need sedation (medicine to help you relax).  You may eat and drink as normal.  Please call your Imaging Department at your exam site with any questions.            May 24, 2017 10:30 AM CDT   Masonic Lab Draw with  MASONIC LAB DRAW   [facility] Masonic Lab Draw ([facility])    [address] [place] [id number] [phone]             May 31, 2017  8:45 AM CDT   Masonic Lab Draw with  MASONIC LAB DRAW   [facility])    [address] [facility]  2nd [facility] [address] [phone]            May 31, 2017  9:15 AM CDT   (Arrive by 9:00 AM)   Return Visit with Bentley Robles MD   [facility] ([facility])    [address] [id number] [phone]            May 31, 2017 11:00 AM CDT   NM RADIUM 223 XOFIGO THERAPY with UUNMINJ1   [facility] [place], Nuclear Medicine ([facility])    [address] [facility] [address] [phone]           Please bring a list of your medicines to the exam. (Include vitamins, minerals and over-the-counter drugs.) You should wear comfortable clothes. Leave your valuables at home. Please bring related prior results and films.  Tell your doctor:   If you are breastfeeding or may be pregnant.   If you have had a barium test within the past few days. Barium may change the results of certain exams.   If you think you may need sedation (medicine to help you relax).  You may eat and drink as normal.  Please call your Imaging Department at your exam site with any questions.              Who to contact     If you have questions or need follow up information about today's clinic visit or your schedule please contact [facility] directly at [phone].  Normal or non-critical lab and imaging results will be communicated to you by MyChart, letter or phone within 4 business days after the clinic has received the results. If you do not hear from us within 7 days, please contact the clinic through [employer] or phone. If you have a critical or abnormal lab result, we will notify you by phone as soon as possible.  Submit refill requests through [employer] or call your pharmacy and they will forward the refill request to  us. Please allow 3 business days for your refill to be completed.          Additional Information About Your Visit        MyChart Information     [employer] gives you secure access to your electronic health record. If you see a primary care provider, you can also send messages to your care team and make appointments. If you have questions, please call your primary care clinic.  If you do not have a primary care provider, please call [phone] and they will assist you.        Care EveryWhere ID     This is your Care EveryWhere ID. This could be used by other organizations to access your [place] medical records  [id number]        Your Vitals Were     BMI (Body Mass Index)                   22.02 kg/m2            Blood Pressure from Last 3 Encounters:   04/05/17 120/55   04/05/17 92/44   04/04/17 90/56    Weight from Last 3 Encounters:   04/28/17 138 lb 8 oz (62.8 kg)   04/05/17 146 lb 8 oz (66.5 kg)   03/31/17 136 lb (61.7 kg)              Today, you had the following     No orders found for display       Primary Care Provider Office Phone # Fax #    Lucrecia Collier [phone]       [facility] [address] [address]        Thank you!     Thank you for choosing [facility]  for your care. Our goal is always to provide you with excellent care. Hearing back from our patients is one way we can continue to improve our services. Please take a few minutes to complete the written survey that you may receive in the mail after your visit with us. Thank you!             Your Updated Medication List - Protect others around you: Learn how to safely use, store and throw away your medicines at www.disposemymeds.org.          This list is accurate as of: 4/28/17  3:40 PM.  Always use your most recent med list.                   Brand Name Dispense Instructions for use    aspirin 81 MG tablet     90 tablet    Take 1 tablet (81 mg) by mouth daily       calcium carbonate  500 MG tablet    OS-RODNEY 500 mg [place]. Ca    120 tablet    Take 2 tablets (1,000 mg) by mouth 2 times daily       diphenoxylate-atropine 2.5-0.025 MG per tablet    LOMOTIL     Take 1 tablet by mouth 4 times daily as needed for diarrhea Reported on 4/5/2017       enoxaparin 40 MG/0.4ML injection    LOVENOX    60 Syringe    Inject 0.4 mLs (40 mg) Subcutaneous 2 times daily       HYDROcodone-acetaminophen 5-325 MG per tablet    NORCO     Take 1 tablet by mouth every 6 hours as needed for moderate to severe pain Reported on 4/5/2017       loperamide 2 MG capsule    IMODIUM    60 capsule    Use 2 caps PO after first loose stool.  Repeat 1 cap after each subsequent loose stool.  Max 8/24 hours.       metoprolol 25 MG 24 hr tablet    TOPROL-XL    45 tablet    Take 0.5 tablets (12.5 mg) by mouth daily AM       Multi-vitamin Tabs tablet     100 tablet    Take 1 tablet by mouth every morning Reported on 4/5/2017       phosphorus tablet 250 mg 250 MG per tablet    K PHOS NEUTRAL    30 tablet    Take 2 tablets (500 mg) by mouth 3 times daily       predniSONE 5 MG tablet    DELTASONE    60 tablet    Take 1 tablet (5 mg) by mouth 2 times daily       prochlorperazine 5 MG tablet    COMPAZINE    30 tablet    Take 1 tablet (5 mg) by mouth every 6 hours as needed for nausea or vomiting       simvastatin 40 MG tablet    ZOCOR    90 tablet    TAKE ONE TABLET BY MOUTH AT BEDTIME       tamsulosin 0.4 MG capsule    FLOMAX    90 capsule    Take 1 capsule (0.4 mg) by mouth daily AM       vitamin D 2000 UNITS tablet     100 tablet    Take 1 tablet by mouth daily

## 2017-04-28 NOTE — Clinical Note
- Pt decline to take off his shoes and jacket, he claim he took his weight and was 126.50lb. Weight check at  clinic, pt has shoes and jacket vest it was 138.50lb.

## 2017-04-28 NOTE — NURSING NOTE
"Chief Complaint   Patient presents with     Allied Health Visit     weight check       Initial Wt 138 lb 8 oz (62.8 kg)  BMI 22.02 kg/m2 Estimated body mass index is 22.02 kg/(m^2) as calculated from the following:    Height as of 3/9/17: 5' 6.5\" (1.689 m).    Weight as of this encounter: 138 lb 8 oz (62.8 kg).  Medication Reconciliation: unable or not appropriate to perform       - - Pt decline to take off his shoes and jacket, he claim he took his weight and was 126.50lb. Weight check at  clinic, pt has shoes and jacket vest it was 138.50lb.    Valentina Marroquin MA    "

## 2017-04-28 NOTE — TELEPHONE ENCOUNTER
[facility] Prior Authorization Team   Phone: [phone]  Fax: [phone]      PA Initiation    Medication: enoxaparin (LOVENOX) 40 MG/0.4ML injection  Insurance Company: [employer] - Phone [phone] Fax [phone]  Pharmacy Filling the Rx: [place], [place] - [address]  Filling Pharmacy Phone: [phone]  Filling Pharmacy Fax:    Start Date: 4/28/2017

## 2017-05-03 ENCOUNTER — HOSPITAL ENCOUNTER (OUTPATIENT)
Dept: NUCLEAR MEDICINE | Facility: CLINIC | Age: 72
Setting detail: NUCLEAR MEDICINE
Discharge: HOME | End: 2017-05-03
Attending: INTERNAL MEDICINE | Admitting: INTERNAL MEDICINE
Payer: COMMERCIAL

## 2017-05-03 ENCOUNTER — INFUSION THERAPY VISIT (OUTPATIENT)
Dept: ONCOLOGY | Facility: CLINIC | Age: 72
End: 2017-05-03
Attending: INTERNAL MEDICINE
Payer: COMMERCIAL

## 2017-05-03 ENCOUNTER — APPOINTMENT (OUTPATIENT)
Dept: LAB | Facility: CLINIC | Age: 72
End: 2017-05-03
Attending: FAMILY MEDICINE
Payer: COMMERCIAL

## 2017-05-03 VITALS
TEMPERATURE: 99.4 F | OXYGEN SATURATION: 97 % | DIASTOLIC BLOOD PRESSURE: 47 MMHG | HEART RATE: 85 BPM | SYSTOLIC BLOOD PRESSURE: 104 MMHG | RESPIRATION RATE: 16 BRPM

## 2017-05-03 VITALS
OXYGEN SATURATION: 100 % | TEMPERATURE: 98.1 F | BODY MASS INDEX: 22.1 KG/M2 | WEIGHT: 139 LBS | DIASTOLIC BLOOD PRESSURE: 59 MMHG | HEART RATE: 99 BPM | SYSTOLIC BLOOD PRESSURE: 87 MMHG

## 2017-05-03 DIAGNOSIS — D64.9 ANEMIA, UNSPECIFIED TYPE: ICD-10-CM

## 2017-05-03 DIAGNOSIS — C79.51 PROSTATE CANCER METASTATIC TO BONE (H): ICD-10-CM

## 2017-05-03 DIAGNOSIS — C61 PROSTATE CANCER METASTATIC TO BONE (H): ICD-10-CM

## 2017-05-03 DIAGNOSIS — D64.9 ANEMIA, UNSPECIFIED TYPE: Primary | ICD-10-CM

## 2017-05-03 DIAGNOSIS — I50.22 CHRONIC SYSTOLIC HEART FAILURE (H): ICD-10-CM

## 2017-05-03 DIAGNOSIS — C79.51 METASTATIC BONE TUMOR (H): ICD-10-CM

## 2017-05-03 LAB
ABO + RH BLD: NORMAL
ABO + RH BLD: NORMAL
ALBUMIN SERPL-MCNC: 3.3 G/DL (ref 3.4–5)
ALP SERPL-CCNC: 347 U/L (ref 40–150)
ALT SERPL W P-5'-P-CCNC: 32 U/L (ref 0–70)
ANION GAP SERPL CALCULATED.3IONS-SCNC: 11 MMOL/L (ref 3–14)
AST SERPL W P-5'-P-CCNC: 28 U/L (ref 0–45)
BASOPHILS # BLD AUTO: 0 10E9/L (ref 0–0.2)
BASOPHILS NFR BLD AUTO: 0.5 %
BILIRUB SERPL-MCNC: 1.3 MG/DL (ref 0.2–1.3)
BLD GP AB SCN SERPL QL: NORMAL
BLD PROD TYP BPU: NORMAL
BLD UNIT ID BPU: 0
BLD UNIT ID BPU: 0
BLOOD BANK CMNT PATIENT-IMP: NORMAL
BLOOD PRODUCT CODE: NORMAL
BLOOD PRODUCT CODE: NORMAL
BPU ID: NORMAL
BPU ID: NORMAL
BUN SERPL-MCNC: 24 MG/DL (ref 7–30)
CALCIUM SERPL-MCNC: 8.5 MG/DL (ref 8.5–10.1)
CHLORIDE SERPL-SCNC: 107 MMOL/L (ref 94–109)
CO2 SERPL-SCNC: 19 MMOL/L (ref 20–32)
CREAT SERPL-MCNC: 1.03 MG/DL (ref 0.66–1.25)
DIFFERENTIAL METHOD BLD: ABNORMAL
EOSINOPHIL # BLD AUTO: 0.3 10E9/L (ref 0–0.7)
EOSINOPHIL NFR BLD AUTO: 6.7 %
ERYTHROCYTE [DISTWIDTH] IN BLOOD BY AUTOMATED COUNT: 17.3 % (ref 10–15)
GFR SERPL CREATININE-BSD FRML MDRD: 71 ML/MIN/1.7M2
GLUCOSE SERPL-MCNC: 122 MG/DL (ref 70–99)
HCT VFR BLD AUTO: 23.3 % (ref 40–53)
HGB BLD-MCNC: 7.5 G/DL (ref 13.3–17.7)
IMM GRANULOCYTES # BLD: 0.1 10E9/L (ref 0–0.4)
IMM GRANULOCYTES NFR BLD: 3 %
LYMPHOCYTES # BLD AUTO: 0.6 10E9/L (ref 0.8–5.3)
LYMPHOCYTES NFR BLD AUTO: 15.6 %
MCH RBC QN AUTO: 30.1 PG (ref 26.5–33)
MCHC RBC AUTO-ENTMCNC: 32.2 G/DL (ref 31.5–36.5)
MCV RBC AUTO: 94 FL (ref 78–100)
MONOCYTES # BLD AUTO: 0.6 10E9/L (ref 0–1.3)
MONOCYTES NFR BLD AUTO: 16.7 %
NEUTROPHILS # BLD AUTO: 2.1 10E9/L (ref 1.6–8.3)
NEUTROPHILS NFR BLD AUTO: 57.5 %
NRBC # BLD AUTO: 0 10*3/UL
NRBC BLD AUTO-RTO: 0 /100
NUM BPU REQUESTED: 2
PLATELET # BLD AUTO: 135 10E9/L (ref 150–450)
POTASSIUM SERPL-SCNC: 4 MMOL/L (ref 3.4–5.3)
PROT SERPL-MCNC: 7.5 G/DL (ref 6.8–8.8)
PSA SERPL-MCNC: 688 UG/L (ref 0–4)
RBC # BLD AUTO: 2.49 10E12/L (ref 4.4–5.9)
SODIUM SERPL-SCNC: 137 MMOL/L (ref 133–144)
SPECIMEN EXP DATE BLD: NORMAL
TRANSFUSION STATUS PATIENT QL: NORMAL
WBC # BLD AUTO: 3.7 10E9/L (ref 4–11)

## 2017-05-03 PROCEDURE — 40000268 ZZH STATISTIC NO CHARGES: Mod: ZF

## 2017-05-03 PROCEDURE — 99214 OFFICE O/P EST MOD 30 MIN: CPT | Mod: ZP | Performed by: PHYSICIAN ASSISTANT

## 2017-05-03 PROCEDURE — 77790 RADIATION HANDLING: CPT

## 2017-05-03 PROCEDURE — A9606 RADIUM RA223 DICHLORIDE THER: HCPCS | Performed by: INTERNAL MEDICINE

## 2017-05-03 PROCEDURE — 36430 TRANSFUSION BLD/BLD COMPNT: CPT

## 2017-05-03 PROCEDURE — 34400006 ZZH RX 344: Performed by: INTERNAL MEDICINE

## 2017-05-03 PROCEDURE — 96374 THER/PROPH/DIAG INJ IV PUSH: CPT | Mod: 59

## 2017-05-03 RX ORDER — FUROSEMIDE 10 MG/ML
10 INJECTION INTRAMUSCULAR; INTRAVENOUS ONCE
Status: CANCELLED
Start: 2017-05-03 | End: 2017-05-03

## 2017-05-03 RX ORDER — FUROSEMIDE 10 MG/ML
10 INJECTION INTRAMUSCULAR; INTRAVENOUS ONCE
Status: COMPLETED | OUTPATIENT
Start: 2017-05-03 | End: 2017-05-03

## 2017-05-03 RX ADMIN — FUROSEMIDE 5 MG: 10 INJECTION, SOLUTION INTRAMUSCULAR; INTRAVENOUS at 15:29

## 2017-05-03 RX ADMIN — RADIUM RA 223 DICHLORIDE 88.6 UCI.: 30 INJECTION INTRAVENOUS at 12:11

## 2017-05-03 NOTE — Clinical Note
5/3/2017       RE: Oswaldo Win  [address]  [place] [address]     Dear Colleague,    Thank you for referring your patient, Oswaldo Win, to the [facility]. Please see a copy of my visit note below.    No notes on file    Again, thank you for allowing me to participate in the care of your patient.      Sincerely,    Gabriela Mcguire PA-C

## 2017-05-03 NOTE — MR AVS SNAPSHOT
After Visit Summary   5/3/2017    Oswaldo Win    MRN: [id number]           Patient Information     Date Of Birth          1945        Visit Information        Provider Department      5/3/2017 9:30 AM Gabriela Mcguire PA-C M [facility]        Today's Diagnoses     Prostate cancer 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)        Chronic systolic heart failure (H)        Anemia, unspecified type           Follow-ups after your visit        Additional Services     PHYSICAL THERAPY REFERRAL       *This therapy referral will be filtered to a centralized scheduling office at [facility] and the patient will receive a call to schedule an appointment at a [place] location most convenient for them. *     [facility] provides Physical Therapy evaluation and treatment and many specialty services across the [place] system.  If requesting a specialty program, please choose from the list below.    If you have not heard from the scheduling office within 2 business days, please call [phone] for all locations, with the exception of [place], please call [phone].  Treatment: Evaluation & Treatment  Special Instructions/Modalities: CANCER REHAB, ongoing fatigue/weakness from metastatic prostate CA  Special Programs: Cancer Rehabilitation (PT and OT Evaluate & Treat)    Please be aware that coverage of these services is subject to the terms and limitations of your health insurance plan.  Call member services at your health plan with any benefit or coverage questions.      **Note to Provider:  If you are referring outside of [place] for the therapy appointment, please list the name of the location in the  special instructions  above, print the referral and give to the patient to schedule the appointment.                  Your next 10 appointments already scheduled     May 11, 2017  2:30 PM CDT   Infusion 120 with  ONCOLOGY INFUSION,  27 ATC   [facility] ([facility])    [address] [address]   [phone]            May 15, 2017 10:30 AM CDT   Masonic Lab Draw with UC MASONIC LAB DRAW   [facility] Mason[facility] Lab Draw ([facility])    [address] [place] [address]   [phone]            May 15, 2017 11:00 AM CDT   (Arrive by 10:45 AM)   Return Visit with Gabriela Mcguire PA-C   [facility] ([facility])    [address] [place] [address]   [phone]            May 15, 2017 12:30 PM CDT   Infusion 120 with UC ONCOLOGY INFUSION, UC 30 ATC   [facility] ([facility])    [address] [place] [address]   [phone]            May 18, 2017 11:00 AM CDT   Infusion 120 with UC ONCOLOGY INFUSION, UC 25 ATC   [facility] ([facility])    [address] [place] [address]   [phone]            May 22, 2017 11:15 AM CDT   M[facility]ic Lab Draw with UC [facility] LAB DRAW   [facility])    [address] [place] [address]   [phone]            May 22, 2017 11:50 AM CDT   (Arrive by 11:35 AM)   Return Visit with Gabriela Mcguire PA-C   [facility] ([facility])    [address] [place] [id number] [phone]              Who to contact     If you have questions or need follow up information about today's clinic visit or your schedule please contact [facility]CER [facility] directly at [phone].  Normal or non-critical lab and imaging results will be communicated to you by MyChart, letter or phone within 4 business days after the clinic has received the results. If you do not hear from  us within 7 days, please contact the clinic through [employer] or phone. If you have a critical or abnormal lab result, we will notify you by phone as soon as possible.  Submit refill requests through [employer] or call your pharmacy and they will forward the refill request to us. Please allow 3 business days for your refill to be completed.          Additional Information About Your Visit        [employer] Information     [employer] gives you secure access to your electronic health record. If you see a primary care provider, you can also send messages to your care team and make appointments. If you have questions, please call your primary care clinic.  If you do not have a primary care provider, please call [phone] and they will assist you.        Care EveryWhere ID     This is your Care EveryWhere ID. This could be used by other organizations to access your [facility] medical records  [id number]        Your Vitals Were     Pulse Temperature Pulse Oximetry BMI (Body Mass Index)          99 98.1  F (36.7  C) (Oral) 100% 22.1 kg/m2         Blood Pressure from Last 3 Encounters:   05/08/17 109/60   05/03/17 104/47   05/03/17 (!) 87/59    Weight from Last 3 Encounters:   05/08/17 57.2 kg (126 lb)   05/03/17 63 kg (139 lb)   04/28/17 62.8 kg (138 lb 8 oz)              We Performed the Following     CBC with platelets differential     Comprehensive metabolic panel     PHYSICAL THERAPY REFERRAL     PSA tumor marker        Primary Care Provider Office Phone # Fax #    Lucrecia Collier [phone]       [facility] [address] [facility] [address]        Thank you!     Thank you for choosing [facility] CANCER [facility]  for your care. Our goal is always to provide you with excellent care. Hearing back from our patients is one way we can continue to improve our services. Please take a few minutes to complete the written survey that you may receive in the mail after your visit with us. Thank  you!             Your Updated Medication List - Protect others around you: Learn how to safely use, store and throw away your medicines at www.disposemymeds.org.          This list is accurate as of: 5/3/17 11:59 PM.  Always use your most recent med list.                   Brand Name Dispense Instructions for use    aspirin 81 MG tablet     90 tablet    Take 1 tablet (81 mg) by mouth daily       calcium carbonate 500 MG tablet    OS-RODNEY 500 mg [place]. Ca    120 tablet    Take 2 tablets (1,000 mg) by mouth 2 times daily       diphenoxylate-atropine 2.5-0.025 MG per tablet    LOMOTIL     Take 1 tablet by mouth 4 times daily as needed for diarrhea Reported on 4/5/2017       enoxaparin 40 MG/0.4ML injection    LOVENOX    60 Syringe    Inject 0.4 mLs (40 mg) Subcutaneous 2 times daily       HYDROcodone-acetaminophen 5-325 MG per tablet    NORCO     Take 1 tablet by mouth every 6 hours as needed for moderate to severe pain Reported on 5/3/2017       loperamide 2 MG capsule    IMODIUM    60 capsule    Use 2 caps PO after first loose stool.  Repeat 1 cap after each subsequent loose stool.  Max 8/24 hours.       metoprolol 25 MG 24 hr tablet    TOPROL-XL    45 tablet    Take 0.5 tablets (12.5 mg) by mouth daily AM       Multi-vitamin Tabs tablet     100 tablet    Take 1 tablet by mouth every morning Reported on 4/5/2017       phosphorus tablet 250 mg 250 MG per tablet    K PHOS NEUTRAL    30 tablet    Take 2 tablets (500 mg) by mouth 3 times daily       predniSONE 5 MG tablet    DELTASONE    60 tablet    Take 1 tablet (5 mg) by mouth 2 times daily       prochlorperazine 5 MG tablet    COMPAZINE    30 tablet    Take 1 tablet (5 mg) by mouth every 6 hours as needed for nausea or vomiting       simvastatin 40 MG tablet    ZOCOR    90 tablet    TAKE ONE TABLET BY MOUTH AT BEDTIME       tamsulosin 0.4 MG capsule    FLOMAX    90 capsule    Take 1 capsule (0.4 mg) by mouth daily AM       vitamin D 2000 UNITS tablet     100 tablet     Take 1 tablet by mouth daily

## 2017-05-03 NOTE — NURSING NOTE
Chief Complaint   Patient presents with     Blood Draw     vs/piv-labs by vascular access     Oncology Clinic Visit     Prostate cancer 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)   Provider notified by MELBA kimble in clinic of orthostatic BP in lab.   Sandie Posadas CMA

## 2017-05-03 NOTE — NURSING NOTE
"Oncology Rooming Note    May 3, 2017 9:41 AM   Oswaldo Win is a 72 year old male who presents for:    Chief Complaint   Patient presents with     Blood Draw     vs/piv-labs by vascular access     Oncology Clinic Visit     Prostate cancer 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)     Initial Vitals: BP (!) 87/59  Pulse 99  Temp 98.1  F (36.7  C) (Oral)  Wt 63 kg (139 lb)  SpO2 100%  BMI 22.1 kg/m2 Estimated body mass index is 22.1 kg/(m^2) as calculated from the following:    Height as of 3/9/17: 1.689 m (5' 6.5\").    Weight as of this encounter: 63 kg (139 lb). Body surface area is 1.72 meters squared.  Data Unavailable Comment: Data Unavailable   No LMP for male patient.  Allergies reviewed: Yes  Medications reviewed: Yes    Medications: Medication refills not needed today.  Pharmacy name entered into [employer]: [facility] [place], [place] - [address]    Clinical concerns: Bp was low while pt was at lab.   Gabriela Garner was notified.    6 minutes for nursing intake (face to face time)     Netta Duran CMA              "

## 2017-05-03 NOTE — PATIENT INSTRUCTIONS
Contact Numbers    [facility] Main Line: [phone]  [facility] Triage:  [phone]    Call triage with chills and/or temperature greater than or equal to 100.5, uncontrolled nausea/vomiting, diarrhea, constipation, dizziness, shortness of breath, chest pain, bleeding, unexplained bruising, or any new/concerning symptoms, questions/concerns.     If you are having any concerning symptoms or wish to speak to a provider before your next infusion visit, please call your care coordinator or triage to notify them so we can adequately serve you.       After Hours: [phone]    If after hours, weekends, or holidays, call main hospital  and ask for Oncology doctor on call.         May 2017   Ghanshyam Monday Tuesday Wednesday Thursday Friday Saturday        1     2     3     [facility] LAB DRAW    9:00 AM   (15 min.)    MASONIC LAB DRAW   [facility] Lab Draw     [facility] RETURN    9:15 AM   (50 min.)   Gabriela Mcguire PA-C M [facility]     NM RADIUM 223 XOFIGO THERAPY   11:00 AM   (30 min.)   [id number]   [facility] [place], Nuclear Medicine     P ONC INFUSION 240    1:00 PM   (240 min.)    ONCOLOGY INFUSION   [facility] 4     5     6       7     8     [facility] LAB DRAW    2:45 PM   (15 min.)    MASONIC LAB DRAW   [facility] Lab Draw     [facility] RETURN    2:55 PM   (50 min.)   Gabriela Mcguire PA-C M [facility] 9     10     11     [facility] ONC INFUSION 120    2:30 PM   (120 min.)    ONCOLOGY INFUSION   [facility] 12     13       14     15     [facility] LAB DRAW   10:30 AM   (15 min.)   [facility] LAB DRAW   [facility] Lab Draw     [facility] RETURN   10:45 AM   (50 min.)   Gabriela Mcguire PA-C M [facility]     [facility] ONC INFUSION 120   12:30 PM   (120 min.)    ONCOLOGY INFUSION   [facility] 16     17     18     [facility] ONC INFUSION 120   11:00 AM   (120 min.)    ONCOLOGY INFUSION   [facility] 19     20       21     22     [facility] MASONIC LAB DRAW   11:15 AM   (15 min.)    MASONIC LAB DRAW   [facility] Lab Draw     [facility] RETURN   11:35 AM   (50 min.)   Gabriela Mcguire PA-C   [facility]    1:00 PM   (120 min.)    ONCOLOGY INFUSION   [facility] 23     24     [facility] MASONIC LAB DRAW   10:30 AM   (15 min.)    MASONIC LAB DRAW   [facility] Lab Draw 25     [facility] ONC INFUSION 120   12:30 PM   (120 min.)    ONCOLOGY INFUSION   [facility] 26     27       28     29     30     31     [facility] MASONIC LAB DRAW    8:45 AM   (15 min.)    MASONIC LAB DRAW   [facility] Lab Draw     [facility] RETURN    9:00 AM   (30 min.)   Bentley Robles MD   [facility]     NM RADIUM 223 XOFIGO THERAPY   11:00 AM   (30 min.)   UUNMINJ1   [facility] [place], Nuclear Medicine                           June 2017 Sunday Monday Tuesday Wednesday Thursday Friday Saturday                       1     2     3       4     5     6     7     8     9     10       11     12     13     14     15     16     17       18     19     20     21     22     23     24       25     26     27     28     29     30                       Lab Results:  Recent Results (from the past 12 hour(s))   PSA tumor marker    Collection Time: 05/03/17  9:34 AM   Result Value Ref [facility]    .00 (H) 0 - 4 ug/L   Comprehensive metabolic panel    Collection Time: 05/03/17  9:34 AM   Result Value Ref [place]    Sodium 137 133 - 144 mmol/L    Potassium 4.0 3.4 - 5.3 mmol/L    Chloride 107 94 - 109 mmol/L    Carbon Dioxide 19 (L) 20 - 32 mmol/L    Anion Gap 11 3 - 14 mmol/L    Glucose 122 (H) 70 - 99 mg/dL    Urea Nitrogen 24 7 - 30 mg/dL    Creatinine 1.03 0.66 - 1.25 mg/dL    GFR Estimate 71 >60 mL/min/1.7m2    GFR Estimate If Black 86 >60 mL/min/1.7m2    Calcium 8.5 8.5 - 10.1 mg/dL    Bilirubin Total 1.3 0.2 - 1.3 mg/dL    Albumin 3.3 (L)  3.4 - 5.0 g/dL    Protein Total 7.5 6.8 - 8.8 g/dL    Alkaline Phosphatase 347 (H) 40 - 150 U/L    ALT 32 0 - 70 U/L    AST 28 0 - 45 U/L   CBC with platelets differential    Collection Time: 05/03/17  9:35 AM   Result Value Ref [place]    WBC 3.7 (L) 4.0 - 11.0 10e9/L    RBC Count 2.49 (L) 4.4 - 5.9 10e12/L    Hemoglobin 7.5 (L) 13.3 - 17.7 g/dL    Hematocrit 23.3 (L) 40.0 - 53.0 %    MCV 94 78 - 100 fl    MCH 30.1 26.5 - 33.0 pg    MCHC 32.2 31.5 - 36.5 g/dL    RDW 17.3 (H) 10.0 - 15.0 %    Platelet Count 135 (L) 150 - 450 10e9/L    Diff Method Automated Method     % Neutrophils 57.5 %    % Lymphocytes 15.6 %    % Monocytes 16.7 %    % Eosinophils 6.7 %    % Basophils 0.5 %    % Immature Granulocytes 3.0 %    Nucleated RBCs 0 0 /100    Absolute Neutrophil 2.1 1.6 - 8.3 10e9/L    Absolute Lymphocytes 0.6 (L) 0.8 - 5.3 10e9/L    Absolute Monocytes 0.6 0.0 - 1.3 10e9/L    Absolute Eosinophils 0.3 0.0 - 0.7 10e9/L    Absolute Basophils 0.0 0.0 - 0.2 10e9/L    Abs Immature Granulocytes 0.1 0 - 0.4 10e9/L    Absolute Nucleated RBC 0.0    ABO/Rh type and screen    Collection Time: 05/03/17  9:35 AM   Result Value Ref [place]    Units Ordered 2     ABO B     RH(D)  Pos     Antibody Screen Neg     Test Valid Only At       [facility]    Specimen Expires 05/06/2017     Crossmatch Red Blood Cells    Blood component    Collection Time: 05/03/17  9:35 AM   Result Value Ref [place]    Unit Number [id number]     Blood Component Type Red Blood Cells Leukocyte Reduced     Division Number 00     Status of Unit Released to care unit 05/03/2017 1249     Blood Product Code [id number]     Unit Status ISS    Blood component    Collection Time: 05/03/17  9:35 AM   Result Value Ref [place]    Unit Number [id number]     Blood Component Type Red Blood Cells Leukocyte Reduced     Division Number 00     Status of Unit Released to care unit 05/03/2017 1518     Blood Product Code [id number]     Unit Status  ISS

## 2017-05-03 NOTE — PROGRESS NOTES
"Infusion Nursing Note:  Oswaldo Win presents today for 2 units RBCs.    Patient seen by provider today: Yes: ISABELLE Cline    Note:  Patient presented to clinic with c/o fatigue and improving dizziness.  Patient expressed a lot of \"reservations\" to receiving lasix between RBC unit infusions, though, not open to explanation of recommendation from this RN.  TORB 5/3/2017 1324 ISABELLE Barry/TYLER Tierney RN: Administer 5mg lasix IV between units of RBCs.  Patient amenable to reduced dose; administered.      Intravenous Access:  Peripheral IV placed.    Treatment Conditions:  Lab Results   Component Value Date    HGB 7.5 05/03/2017     Lab Results   Component Value Date    WBC 3.7 05/03/2017      Lab Results   Component Value Date    ANEU 2.1 05/03/2017     Lab Results   Component Value Date     05/03/2017      Results reviewed, labs MET treatment parameters, ok to proceed with treatment.  Blood transfusion consent signed 3/31/2017.    Post Infusion Assessment:  Patient tolerated infusion without incident.  Blood return noted pre and post infusion.  Site patent and intact, free from redness, edema or discomfort.  No evidence of extravasations.  Access discontinued per protocol.    Discharge Plan:   Patient declined prescription refills.  Discharge instructions reviewed with: Patient.  Patient and/or family verbalized understanding of discharge instructions and all questions answered.  Departure Mode: Ambulatory.  Face to Face time: 5 minutes.    Helga Tierney RN                        "

## 2017-05-03 NOTE — MR AVS SNAPSHOT
After Visit Summary   5/3/2017    Oswaldo Win    MRN: [id number]           Patient Information     Date Of Birth          1945        Visit Information        Provider Department      5/3/2017 1:00 PM  17 ATC;  ONCOLOGY INFUSION [facility]        Today's Diagnoses     Anemia, unspecified type    -  1      Care Instructions    Contact Numbers    [facility] Main Line: [phone]  [facility] Triage:  [phone]    Call triage with chills and/or temperature greater than or equal to 100.5, uncontrolled nausea/vomiting, diarrhea, constipation, dizziness, shortness of breath, chest pain, bleeding, unexplained bruising, or any new/concerning symptoms, questions/concerns.     If you are having any concerning symptoms or wish to speak to a provider before your next infusion visit, please call your care coordinator or triage to notify them so we can adequately serve you.       After Hours: [phone]    If after hours, weekends, or holidays, call main hospital  and ask for Oncology doctor on call.         May 2017   Ghanshyam Monday Tuesday Wednesday Thursday Friday Saturday        1     2     3     P [facility] LAB DRAW    9:00 AM   (15 min.)    [facility] LAB DRAW   [facility] Lab Draw     UMP RETURN    9:15 AM   (50 min.)   Gabriela Mcguire PA-C [facility]     NM RADIUM 223 XOFIGO THERAPY   11:00 AM   (30 min.)   [id number]   [facility] [place], Nuclear Medicine     UMP ONC INFUSION 240    1:00 PM   (240 min.)    ONCOLOGY INFUSION   [facility] 4     5     6       7     8     P [facility] DRAW    2:45 PM   (15 min.)    [facility] LAB DRAW   [facility] Lab Draw     UMP RETURN    2:55 PM   (50 min.)   Gabriela Mcguire PA-C [facility] 9     10     11     UMP ONC INFUSION 120    2:30 PM   (120 min.)    ONCOLOGY INFUSION   [facility] 12     13       14     15     [facility] DRAW   10:30  AM   (15 min.)    [facility] LAB DRAW   [facility] Draw     UMP RETURN   10:45 AM   (50 min.)   Gabriela Mcguire PA-C   [facility]     UMP ONC INFUSION 120   12:30 PM   (120 min.)   UC ONCOLOGY INFUSION   [facility] 16     17     18     UMP ONC INFUSION 120   11:00 AM   (120 min.)    ONCOLOGY INFUSION   [facility] 19     20       21     22     [facility] LAB DRAW   11:15 AM   (15 min.)    [facility] LAB DRAW   [facility] Lab Draw     UMP RETURN   11:35 AM   (50 min.)   Gabriela Mcguire PA-C   [facility]     UMP ONC INFUSION 120    1:00 PM   (120 min.)    ONCOLOGY INFUSION   [facility] 23     24     [facility] DRAW   10:30 AM   (15 min.)    [facility] LAB DRAW   [facility] Draw 25     UMP ONC INFUSION 120   12:30 PM   (120 min.)    ONCOLOGY INFUSION   [facility] 26     27       28     29     30     31     [facility] DRAW    8:45 AM   (15 min.)    [facility] LAB DRAW   [facility] Draw     UMP RETURN    9:00 AM   (30 min.)   Bentley Robles MD   [facility]     NM RADIUM 223 XOFIGO THERAPY   11:00 AM   (30 min.)   [id number]   [facility] [place], Nuclear Medicine                           June 2017 Sunday Monday Tuesday Wednesday Thursday Friday Saturday                       1     2     3       4     5     6     7     8     9     10       11     12     13     14     15     16     17       18     19     20     21     22     23     24       25     26     27     28     29     30                       Lab Results:  Recent Results (from the past 12 hour(s))   PSA tumor marker    Collection Time: 05/03/17  9:34 AM   Result Value Ref Range    .00 (H) 0 - 4 ug/L   Comprehensive metabolic panel    Collection Time: 05/03/17  9:34 AM   Result Value Ref Range    Sodium 137 133 - 144 mmol/L    Potassium 4.0 3.4 - 5.3 mmol/L     Chloride 107 94 - 109 mmol/L    Carbon Dioxide 19 (L) 20 - 32 mmol/L    Anion Gap 11 3 - 14 mmol/L    Glucose 122 (H) 70 - 99 mg/dL    Urea Nitrogen 24 7 - 30 mg/dL    Creatinine 1.03 0.66 - 1.25 mg/dL    GFR Estimate 71 >60 mL/min/1.7m2    GFR Estimate If Black 86 >60 mL/min/1.7m2    Calcium 8.5 8.5 - 10.1 mg/dL    Bilirubin Total 1.3 0.2 - 1.3 mg/dL    Albumin 3.3 (L) 3.4 - 5.0 g/dL    Protein Total 7.5 6.8 - 8.8 g/dL    Alkaline Phosphatase 347 (H) 40 - 150 U/L    ALT 32 0 - 70 U/L    AST 28 0 - 45 U/L   CBC with platelets differential    Collection Time: 05/03/17  9:35 AM   Result Value Ref Range    WBC 3.7 (L) 4.0 - 11.0 10e9/L    RBC Count 2.49 (L) 4.4 - 5.9 10e12/L    Hemoglobin 7.5 (L) 13.3 - 17.7 g/dL    Hematocrit 23.3 (L) 40.0 - 53.0 %    MCV 94 78 - 100 fl    MCH 30.1 26.5 - 33.0 pg    MCHC 32.2 31.5 - 36.5 g/dL    RDW 17.3 (H) 10.0 - 15.0 %    Platelet Count 135 (L) 150 - 450 10e9/L    Diff Method Automated Method     % Neutrophils 57.5 %    % Lymphocytes 15.6 %    % Monocytes 16.7 %    % Eosinophils 6.7 %    % Basophils 0.5 %    % Immature Granulocytes 3.0 %    Nucleated RBCs 0 0 /100    Absolute Neutrophil 2.1 1.6 - 8.3 10e9/L    Absolute Lymphocytes 0.6 (L) 0.8 - 5.3 10e9/L    Absolute Monocytes 0.6 0.0 - 1.3 10e9/L    Absolute Eosinophils 0.3 0.0 - 0.7 10e9/L    Absolute Basophils 0.0 0.0 - 0.2 10e9/L    Abs Immature Granulocytes 0.1 0 - 0.4 10e9/L    Absolute Nucleated RBC 0.0    ABO/Rh type and screen    Collection Time: 05/03/17  9:35 AM   Result Value Ref Range    Units Ordered 2     ABO B     RH(D)  Pos     Antibody Screen Neg     Test Valid Only At       [facility]    Specimen Expires 05/06/2017     Crossmatch Red Blood Cells    Blood component    Collection Time: 05/03/17  9:35 AM   Result Value Ref Range    Unit Number [id number]     Blood Component Type Red Blood Cells Leukocyte Reduced     Division Number 00     Status of Unit Released to  care unit 05/03/2017 1249     Blood Product Code [id number]     Unit Status ISS    Blood component    Collection Time: 05/03/17  9:35 AM   Result Value Ref Range    Unit Number [id number]     Blood Component Type Red Blood Cells Leukocyte Reduced     Division Number 00     Status of Unit Released to care unit 05/03/2017 1518     Blood Product Code [id number]     Unit Status ISS              Follow-ups after your visit        Your next 10 appointments already scheduled     May 08, 2017  2:45 PM CDT   [facility] Lab Draw with UC [facility] LAB DRAW   [facility] Lab Draw ([facility])    [address] [address]   [phone]            May 08, 2017  3:10 PM CDT   (Arrive by 2:55 PM)   Return Visit with POLI [facility] ([facility])    [address] [place] [address]   [phone]            May 11, 2017  2:30 PM CDT   Infusion 120 with UC ONCOLOGY INFUSION, UC 27 ATC   [facility] ([facility])    [address] [place] [address]   [phone]            May 15, 2017 10:30 AM CDT   [facility] Lab Draw with UC [facility] LAB DRAW   [facility] Draw ([facility])    [address] [place] [address]   [phone]            May 15, 2017 11:00 AM CDT   (Arrive by 10:45 AM)   Return Visit with POLI Bridges [facility] ([facility])    [address] [place] [address]   [phone]            May 15, 2017 12:30 PM CDT   Infusion 120 with UC ONCOLOGY INFUSION, UC 30 ATC   [facility] ([facility])    [address] [place] [address]   [phone]            May 18, 2017 11:00 AM CDT   Infusion 120 with UC  ONCOLOGY INFUSION, UC 25 ATC   [facility] ([facility])    [address] [facility]  2nd Floor  [facility] [address] [phone]              Future tests that were ordered for you today     Open Standing Orders        Priority Remaining Interval Expires Ordered    Red blood cell prepare order unit Routine 99/100 CONDITIONAL (SPECIFY) BLOOD  5/3/2017            Who to contact     If you have questions or need follow up information about today's clinic visit or your schedule please contact [facility] directly at [phone].  Normal or non-critical lab and imaging results will be communicated to you by Zubiehart, letter or phone within 4 business days after the clinic has received the results. If you do not hear from us within 7 days, please contact the clinic through [employer] or phone. If you have a critical or abnormal lab result, we will notify you by phone as soon as possible.  Submit refill requests through "[employer] or call your pharmacy and they will forward the refill request to us. Please allow 3 business days for your refill to be completed.          Additional Information About Your Visit        Zubiehart Information     "[employer] gives you secure access to your electronic health record. If you see a primary care provider, you can also send messages to your care team and make appointments. If you have questions, please call your primary care clinic.  If you do not have a primary care provider, please call [phone] and they will assist you.        Care EveryWhere ID     This is your Care EveryWhere ID. This could be used by other organizations to access your [place] medical records  [id number]        Your Vitals Were     Pulse Temperature Respirations Pulse Oximetry          85 99.4  F (37.4  C) (Oral) 16 98%         Blood Pressure from Last 3 Encounters:   05/03/17 102/47   05/03/17 (!) 87/59   04/05/17 120/55    Weight from Last 3  Encounters:   05/03/17 63 kg (139 lb)   04/28/17 62.8 kg (138 lb 8 oz)   04/05/17 66.5 kg (146 lb 8 oz)              We Performed the Following     ABO/Rh type and screen     Blood component     Blood component     Red blood cell prepare order unit     Transfuse red blood cell unit     Transfuse red blood cell unit        Primary Care Provider Office Phone # Fax #    Lucrecia Collier [phone]       [facility] [address] [facility] [address]        Thank you!     Thank you for choosing [facility]  for your care. Our goal is always to provide you with excellent care. Hearing back from our patients is one way we can continue to improve our services. Please take a few minutes to complete the written survey that you may receive in the mail after your visit with us. Thank you!             Your Updated Medication List - Protect others around you: Learn how to safely use, store and throw away your medicines at www.disposemymeds.org.          This list is accurate as of: 5/3/17  5:09 PM.  Always use your most recent med list.                   Brand Name Dispense Instructions for use    aspirin 81 MG tablet     90 tablet    Take 1 tablet (81 mg) by mouth daily       calcium carbonate 500 MG tablet    OS-RODNEY 500 mg [place]. Ca    120 tablet    Take 2 tablets (1,000 mg) by mouth 2 times daily       diphenoxylate-atropine 2.5-0.025 MG per tablet    LOMOTIL     Take 1 tablet by mouth 4 times daily as needed for diarrhea Reported on 4/5/2017       enoxaparin 40 MG/0.4ML injection    LOVENOX    60 Syringe    Inject 0.4 mLs (40 mg) Subcutaneous 2 times daily       HYDROcodone-acetaminophen 5-325 MG per tablet    NORCO     Take 1 tablet by mouth every 6 hours as needed for moderate to severe pain Reported on 5/3/2017       loperamide 2 MG capsule    IMODIUM    60 capsule    Use 2 caps PO after first loose stool.  Repeat 1 cap after each subsequent loose stool.  Max 8/24  hours.       metoprolol 25 MG 24 hr tablet    TOPROL-XL    45 tablet    Take 0.5 tablets (12.5 mg) by mouth daily AM       Multi-vitamin Tabs tablet     100 tablet    Take 1 tablet by mouth every morning Reported on 4/5/2017       phosphorus tablet 250 mg 250 MG per tablet    K PHOS NEUTRAL    30 tablet    Take 2 tablets (500 mg) by mouth 3 times daily       predniSONE 5 MG tablet    DELTASONE    60 tablet    Take 1 tablet (5 mg) by mouth 2 times daily       prochlorperazine 5 MG tablet    COMPAZINE    30 tablet    Take 1 tablet (5 mg) by mouth every 6 hours as needed for nausea or vomiting       simvastatin 40 MG tablet    ZOCOR    90 tablet    TAKE ONE TABLET BY MOUTH AT BEDTIME       tamsulosin 0.4 MG capsule    FLOMAX    90 capsule    Take 1 capsule (0.4 mg) by mouth daily AM       vitamin D 2000 UNITS tablet     100 tablet    Take 1 tablet by mouth daily

## 2017-05-08 ENCOUNTER — ONCOLOGY VISIT (OUTPATIENT)
Dept: ONCOLOGY | Facility: CLINIC | Age: 72
End: 2017-05-08
Attending: PHYSICIAN ASSISTANT
Payer: COMMERCIAL

## 2017-05-08 ENCOUNTER — APPOINTMENT (OUTPATIENT)
Dept: LAB | Facility: CLINIC | Age: 72
End: 2017-05-08
Attending: PHYSICIAN ASSISTANT
Payer: COMMERCIAL

## 2017-05-08 VITALS
HEART RATE: 93 BPM | SYSTOLIC BLOOD PRESSURE: 109 MMHG | TEMPERATURE: 98.4 F | OXYGEN SATURATION: 100 % | WEIGHT: 126 LBS | BODY MASS INDEX: 20.03 KG/M2 | RESPIRATION RATE: 18 BRPM | DIASTOLIC BLOOD PRESSURE: 60 MMHG

## 2017-05-08 DIAGNOSIS — I50.22 CHRONIC SYSTOLIC HEART FAILURE (H): ICD-10-CM

## 2017-05-08 DIAGNOSIS — C79.51 PROSTATE CANCER METASTATIC TO BONE (H): ICD-10-CM

## 2017-05-08 DIAGNOSIS — C61 PROSTATE CANCER METASTATIC TO BONE (H): ICD-10-CM

## 2017-05-08 LAB
ALBUMIN SERPL-MCNC: 3.3 G/DL (ref 3.4–5)
ALP SERPL-CCNC: 370 U/L (ref 40–150)
ALT SERPL W P-5'-P-CCNC: 30 U/L (ref 0–70)
ANION GAP SERPL CALCULATED.3IONS-SCNC: 11 MMOL/L (ref 3–14)
AST SERPL W P-5'-P-CCNC: 30 U/L (ref 0–45)
BILIRUB SERPL-MCNC: 1.2 MG/DL (ref 0.2–1.3)
BUN SERPL-MCNC: 23 MG/DL (ref 7–30)
CALCIUM SERPL-MCNC: 9 MG/DL (ref 8.5–10.1)
CHLORIDE SERPL-SCNC: 105 MMOL/L (ref 94–109)
CO2 SERPL-SCNC: 21 MMOL/L (ref 20–32)
CREAT SERPL-MCNC: 0.8 MG/DL (ref 0.66–1.25)
GFR SERPL CREATININE-BSD FRML MDRD: ABNORMAL ML/MIN/1.7M2
GLUCOSE SERPL-MCNC: 110 MG/DL (ref 70–99)
POTASSIUM SERPL-SCNC: 4.1 MMOL/L (ref 3.4–5.3)
PROT SERPL-MCNC: 7.5 G/DL (ref 6.8–8.8)
PSA SERPL-MCNC: 626 UG/L (ref 0–4)
SODIUM SERPL-SCNC: 137 MMOL/L (ref 133–144)

## 2017-05-08 PROCEDURE — 99212 OFFICE O/P EST SF 10 MIN: CPT | Mod: ZF

## 2017-05-08 PROCEDURE — 36415 COLL VENOUS BLD VENIPUNCTURE: CPT

## 2017-05-08 PROCEDURE — 99214 OFFICE O/P EST MOD 30 MIN: CPT | Mod: ZP | Performed by: PHYSICIAN ASSISTANT

## 2017-05-08 PROCEDURE — 84153 ASSAY OF PSA TOTAL: CPT | Performed by: INTERNAL MEDICINE

## 2017-05-08 PROCEDURE — 80053 COMPREHEN METABOLIC PANEL: CPT | Performed by: INTERNAL MEDICINE

## 2017-05-08 NOTE — PROGRESS NOTES
Oncology/Hematology Visit Note  May 3, 2017    DIAGNOSIS:  Mr. Win is a 71 year old male with a hx of CHF, CKD, CAD w/ hx of STEMI and CABG. He met with Dr. Robles at the end of July for consultation regarding metastatic prostate cancer. His story begins in May 2015 when he saw his PCP for back pain, present since Feb. He also had poor appetite, weight loss of 8lb and poor energy.  CT showed diffuse bony mets. CT Chest showed sclerotic mets throughout the bones. He was given degarelix on 7/22 in Urology. He was having pelvic pain and was evaluated by Dr Cavazos would did not feel XRT was appropriate.  He started on docetaxel 7/31. From 8/7-8/14 he was admitted with colitis, CHAR, elevated BNP, lactis acidosis, and poor appetite. He was discharged to a TCU and unfortunately, it sounds like he was not given his Lasix.  His BUBBA worsened and his breathing became compromised.  He was seen by Chelsi Corral on 8/21 and eventually transferred to the ED and admitted 8/21-8/24 for acute on chronic CHF.  At our last visit we discussed not pursuing further Taxotere at this time and discussed starting Zytiga when he was ready.  We pursued PT, OT, home lymphedema for supportive care at home in attempt for further rehabilitation. He was again admitted and discharged - 10/20/15.  He never started the Zytiga as he was still actively recovering from his cardiac issues.  His PSA remained stable, thus no intervention was pursued.  December 2015, Mainor met with Dr Robles and since his ECOG was back to a 1, they discussed re-challenging the Taxotere at a lower dose. 9/26/2016 he started Provenge off study. He continues on lupron every 3 mos and XGEVA. 9/26/2016 he started Provenge off study. He received 3 doses of Provenge (last 10/28), on 3rd dose had fevers, chills, myalgias, was bedbound for a week due to these symptoms. During this time he held his coumadin for 5 days prior to a central line removal 10/31. Later than week he had sudden onset of  right pleuritic chest pain and hemoptysis. Diagnosed with acute segmental/subsegmental PE on 11/8 and prescribed Lovenox.     Mainor was briefly on Zytiga and prednisone in November-December, however after one month his PSA went up, thus it was decided to add in Xofigo (1/11/17-- first).     On 12/28 he started xaralto and took his last pill on 1/10/17.  He stopped as he noticed melanotic stools and had some hemoptysis.  He was seen in clinic the next day with a hgb in th 6 range and was admitted.  Pan-scopes showed no active bleeding.  He was able to get his Xofigo in patient as well.  He was transitioned back to lovenox BID.     INTERVAL HISTORY:  Mr. Win is here today for f/u. Mainor has been dealing with diarrhea and on and off hypotension for the last couple months. On 3/31 he stopped Zytiga and initially thought that helped, however the following week his diarrhea restarted again.  He now feels its the 2-3 weeks after the Xofigo that are his toughest time with the diarrhea. He was feeling so weak last week that he could barely walk into the [facility] for his labs. Had to rest multiple times.  No chest pain, but MOURA.  Friend is with today and states there was a week where Mainor didn't get out of bed much. Mainor is worried that his dose of radium has been too high.     No f/s/c. No chest pain. No black stools. No new  issues.  No bleeding.     MEDICATIONS:   Current Outpatient Prescriptions   Medication Sig     enoxaparin (LOVENOX) 40 MG/0.4ML injection Inject 0.4 mLs (40 mg) Subcutaneous 2 times daily     aspirin 81 MG tablet Take 1 tablet (81 mg) by mouth daily     phosphorus tablet 250 mg (K PHOS NEUTRAL) 250 MG per tablet Take 2 tablets (500 mg) by mouth 3 times daily     metoprolol (TOPROL-XL) 25 MG 24 hr tablet Take 0.5 tablets (12.5 mg) by mouth daily AM     predniSONE (DELTASONE) 5 MG tablet Take 1 tablet (5 mg) by mouth 2 times daily     diphenoxylate-atropine (LOMOTIL) 2.5-0.025 MG per tablet Take 1  tablet by mouth 4 times daily as needed for diarrhea Reported on 4/5/2017     calcium carbonate (OS-RODNEY 500 MG [place]. CA) 500 MG tablet Take 2 tablets (1,000 mg) by mouth 2 times daily     Cholecalciferol (VITAMIN D) 2000 UNITS tablet Take 1 tablet by mouth daily     simvastatin (ZOCOR) 40 MG tablet TAKE ONE TABLET BY MOUTH AT BEDTIME     tamsulosin (FLOMAX) 0.4 MG 24 hr capsule Take 1 capsule (0.4 mg) by mouth daily AM     multivitamin, therapeutic with minerals (MULTI-VITAMIN) TABS Take 1 tablet by mouth every morning Reported on 4/5/2017     prochlorperazine (COMPAZINE) 5 MG tablet Take 1 tablet (5 mg) by mouth every 6 hours as needed for nausea or vomiting     loperamide (IMODIUM) 2 MG capsule Use 2 caps PO after first loose stool.  Repeat 1 cap after each subsequent loose stool.  Max 8/24 hours.     HYDROcodone-acetaminophen (NORCO) 5-325 MG per tablet Take 1 tablet by mouth every 6 hours as needed for moderate to severe pain Reported on 5/3/2017     No current facility-administered medications for this visit.      Facility-Administered Medications Ordered in Other Visits   Medication     hydrocortisone sodium succinate (solu-CORTEF) injection     glycopyrrolate (ROBINUL) injection     neostigmine (PROSTIGMINE) injection     protamine injection     albuterol (PROAIR HFA, PROVENTIL HFA, VENTOLIN HFA) inhaler          ALLERGIES:  No Known Allergies    PHYSICAL EXAMINATION:  Vitals: BP (!) 87/59  Pulse 99  Temp 98.1  F (36.7  C) (Oral)  Wt 63 kg (139 lb)  SpO2 100%  BMI 22.1 kg/m2   Wt Readings from Last 10 Encounters:   05/08/17 57.2 kg (126 lb)   05/03/17 63 kg (139 lb)   04/28/17 62.8 kg (138 lb 8 oz)   04/05/17 66.5 kg (146 lb 8 oz)   03/31/17 61.7 kg (136 lb)   03/31/17 61.7 kg (136 lb)   03/29/17 67.9 kg (149 lb 9.6 oz)   03/15/17 66.8 kg (147 lb 4.8 oz)   03/09/17 63 kg (139 lb)   03/08/17 69.3 kg (152 lb 11.2 oz)       GENERAL:  A pleasant person in no acute distress. Appears tired and pale  today.  HEENT:  Sclerae are nonicteric.  Mouth is without mucositis or thrush.   NECK:  Supple.   LYMPH NODES:  No peripheral lymphadenopathy noted in the supraclavicular or cervical regions.   LUNGS:  Clear to auscultation bilaterally.   HEART:  Regular rate and rhythm   ABDOMEN:  Bowel sounds are active.  Soft and nontender.  No hepatosplenomegaly or other masses appreciated.  LOWER EXTREMITIES:  Without pitting edema to the knees bilaterally.   NEUROLOGICAL:  Alert/orientated/able to answer all questions.  CN grossly intact.     LAB:      5/3/2017 09:34   Sodium 137   Potassium 4.0   Chloride 107   Carbon Dioxide 19 (L)   Urea Nitrogen 24   Creatinine 1.03   GFR Estimate 71   GFR Estimate If Black 86   Calcium 8.5   Anion Gap 11   Albumin 3.3 (L)   Protein Total 7.5   Bilirubin Total 1.3   Alkaline Phosphatase 347 (H)   ALT 32   AST 28      2/1/2017 10:40 3/15/2017 14:10 3/29/2017 10:54 5/3/2017 09:34   .87 (H) 676.74 (H) 574.98 (H) 688.00 (H)      3/29/2017 10:54 3/31/2017 14:11 4/5/2017 09:41 4/27/2017 16:04 5/3/2017 09:35   WBC 2.0 (L) 2.1 (L) 2.2 (L) 3.4 (L) 3.7 (L)   Hemoglobin 9.2 (L) 8.2 (L) 8.0 (L) 8.8 (L) 7.5 (L)   Hematocrit 27.1 (L) 24.3 (L) 24.0 (L) 28.0 (L) 23.3 (L)   Platelet Count 127 (L) 122 (L) 116 (L) 135 (L) 135 (L)   RBC Count 2.95 (L) 2.64 (L) 2.57 (L) 2.91 (L) 2.49 (L)   MCV 92 92 93 96 94   Absolute Neutrophil 1.1 (L) 1.0 (L) 1.3 (L) 1.9 2.1     IMPRESSION/PLAN:   Metastatic prostate cancer: on zytiga/pred and xofigo and xgeva. Mainor has had ongoing on and off diarrhea, infection work up has been negative.  Zytiga was stopped and didn't help.  Mainor is feeling like his Xofigo dose is too high.  I talked to curtis in nuc med and his weight changes have all been within the 10% acceptable variation.  However, Curtis agreed to give him a smaller dose today.  Assuming things go well, we'll re-start the Zytiga.  For now, will set up with Cancer Rehab.     Anemia: likely secondary to the radium as  his platelets and ANC are low as well.  His bili is normal today, IRIS was negative, and smear was negative.  Will set up for 2 units.     Heme: on lovenox for PE dx 11/2016. Had inpatient stay for GI bleed 1/11-1/14. Stopped xarelto and transitioned back to lovenox BID. No bleeding concerns. Recently dropped from 60mg BID to 50mg BID due to high Xa level. Xa still elevated, thus will drop to 40 mg BID and recheck next week.    Bone mets: on xgeva Q 6 weeks. Last given on 2/8. Hold with low calcium and phos at this time.     CV: hx of CHF. On metoprolol 12.5mg daily, but with ongoing recent low BPs provided parameters <120 systolic in the morning to hold dose.       Sabrina Mcguire PA-C

## 2017-05-08 NOTE — NURSING NOTE
"Oncology Rooming Note    May 8, 2017 2:59 PM   Oswaldo Win is a 72 year old male who presents for:    Chief Complaint   Patient presents with     Blood Draw     Oncology Clinic Visit     Prostate cancer 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)     Initial Vitals: /60  Pulse 93  Temp 98.4  F (36.9  C) (Oral)  Resp 18  Wt 57.2 kg (126 lb)  SpO2 100%  BMI 20.03 kg/m2 Estimated body mass index is 20.03 kg/(m^2) as calculated from the following:    Height as of 3/9/17: 1.689 m (5' 6.5\").    Weight as of this encounter: 57.2 kg (126 lb). Body surface area is 1.64 meters squared.  Data Unavailable Comment: Data Unavailable   No LMP for male patient.  Allergies reviewed: Yes  Medications reviewed: Yes    Medications: Medication refills not needed today.  Pharmacy name entered into [facility]: [facility] [place], [place] - [address]    Clinical concerns: Patient states he feeling better since the decrease in chemo medication Gabriela Mcguire was notified.    5 minutes for nursing intake (face to face time)     Netta Duran CMA              "

## 2017-05-08 NOTE — NURSING NOTE
Chief Complaint   Patient presents with     Blood Draw       Leena Judge CMA May 8, 2017 2:46 PM  Labs and vitals done see flow sheets.

## 2017-05-08 NOTE — MR AVS SNAPSHOT
After Visit Summary   5/8/2017    Oswaldo Win    MRN: [id number]           Patient Information     Date Of Birth          1945        Visit Information        Provider Department      5/8/2017 3:10 PM Gabriela Mcguire PA-C [facility]        Today's Diagnoses     Prostate cancer 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)        Chronic systolic heart failure (H)           Follow-ups after your visit        Your next 10 appointments already scheduled     May 11, 2017  2:30 PM CDT   Infusion 120 with UC ONCOLOGY INFUSION, UC 27 ATC   [facility] ([facility])    [address] [place] [id number]   [phone]            May 15, 2017 10:30 AM CDT   [facility] Lab Draw with  [facility] LAB DRAW   [facility] ([facility])    [address] [place] [address]   [phone]            May 15, 2017 11:00 AM CDT   (Arrive by 10:45 AM)   Return Visit with Gabriela Mcguire PA-C   [facility] ([facility])    [address] [place] [address]   [phone]            May 15, 2017 12:30 PM CDT   Infusion 120 with UC ONCOLOGY INFUSION, UC 30 ATC   [facility] ([facility])    [address] [place] [address]   [phone]            May 18, 2017 11:00 AM CDT   Infusion 120 with UC ONCOLOGY INFUSION, UC 25 ATC   [facility] ([facility])    [address] [place] [address]   [phone]            May 22, 2017 11:15 AM CDT   [facility] Lab Draw with UC MASONIC LAB DRAW   [facility])    [address] [place] [address]   [phone]            May 22, 2017 11:50 AM CDT   (Arrive by 11:35 AM)   Return  Visit with Gabriela Mcguire PA-C   [facility] ([facility])    [address]  2nd Floor  [facility] [address] [phone]              Future tests that were ordered for you today     Open Future Orders        Priority Expected Expires Ordered    CBC with platelets differential Routine 5/15/2017 5/15/2017 5/8/2017    Comprehensive metabolic panel Routine 5/15/2017 5/15/2017 5/8/2017            Who to contact     If you have questions or need follow up information about today's clinic visit or your schedule please contact [facility] directly at [phone].  Normal or non-critical lab and imaging results will be communicated to you by MyChart, letter or phone within 4 business days after the clinic has received the results. If you do not hear from us within 7 days, please contact the clinic through [employer] or phone. If you have a critical or abnormal lab result, we will notify you by phone as soon as possible.  Submit refill requests through [employer] or call your pharmacy and they will forward the refill request to us. Please allow 3 business days for your refill to be completed.          Additional Information About Your Visit        MyChart Information     [employer] gives you secure access to your electronic health record. If you see a primary care provider, you can also send messages to your care team and make appointments. If you have questions, please call your primary care clinic.  If you do not have a primary care provider, please call [phone] and they will assist you.        Care EveryWhere ID     This is your Care EveryWhere ID. This could be used by other organizations to access your [facility] medical records  [id number]        Your Vitals Were     Pulse Temperature Respirations Pulse Oximetry BMI (Body Mass Index)       93 98.4  F (36.9  C) (Oral) 18 100% 20.03 kg/m2        Blood Pressure from Last 3 Encounters:   05/08/17  109/60   05/03/17 104/47   05/03/17 (!) 87/59    Weight from Last 3 Encounters:   05/08/17 57.2 kg (126 lb)   05/03/17 63 kg (139 lb)   04/28/17 62.8 kg (138 lb 8 oz)              We Performed the Following     Comprehensive metabolic panel     PSA tumor marker        Primary Care Provider Office Phone # Fax #    Lucrecia Collier [phone]       [facility] [address] [facility] [address]        Thank you!     Thank you for choosing [facility]  for your care. Our goal is always to provide you with excellent care. Hearing back from our patients is one way we can continue to improve our services. Please take a few minutes to complete the written survey that you may receive in the mail after your visit with us. Thank you!             Your Updated Medication List - Protect others around you: Learn how to safely use, store and throw away your medicines at www.disposemymeds.org.          This list is accurate as of: 5/8/17  4:44 PM.  Always use your most recent med list.                   Brand Name Dispense Instructions for use    aspirin 81 MG tablet     90 tablet    Take 1 tablet (81 mg) by mouth daily       calcium carbonate 500 MG tablet    OS-RODNEY 500 mg [place]. [place]    120 tablet    Take 2 tablets (1,000 mg) by mouth 2 times daily       diphenoxylate-atropine 2.5-0.025 MG per tablet    LOMOTIL     Take 1 tablet by mouth 4 times daily as needed for diarrhea Reported on 4/5/2017       enoxaparin 40 MG/0.4ML injection    LOVENOX    60 Syringe    Inject 0.4 mLs (40 mg) Subcutaneous 2 times daily       HYDROcodone-acetaminophen 5-325 MG per tablet    NORCO     Take 1 tablet by mouth every 6 hours as needed for moderate to severe pain Reported on 5/3/2017       loperamide 2 MG capsule    IMODIUM    60 capsule    Use 2 caps PO after first loose stool.  Repeat 1 cap after each subsequent loose stool.  Max 8/24 hours.       metoprolol 25 MG 24 hr tablet    TOPROL-XL     45 tablet    Take 0.5 tablets (12.5 mg) by mouth daily AM       Multi-vitamin Tabs tablet     100 tablet    Take 1 tablet by mouth every morning Reported on 4/5/2017       phosphorus tablet 250 mg 250 MG per tablet    K PHOS NEUTRAL    30 tablet    Take 2 tablets (500 mg) by mouth 3 times daily       predniSONE 5 MG tablet    DELTASONE    60 tablet    Take 1 tablet (5 mg) by mouth 2 times daily       prochlorperazine 5 MG tablet    COMPAZINE    30 tablet    Take 1 tablet (5 mg) by mouth every 6 hours as needed for nausea or vomiting       simvastatin 40 MG tablet    ZOCOR    90 tablet    TAKE ONE TABLET BY MOUTH AT BEDTIME       tamsulosin 0.4 MG capsule    FLOMAX    90 capsule    Take 1 capsule (0.4 mg) by mouth daily AM       vitamin D 2000 UNITS tablet     100 tablet    Take 1 tablet by mouth daily

## 2017-05-08 NOTE — PROGRESS NOTES
Oncology/Hematology Visit Note  May 8, 2017    DIAGNOSIS:  Mr. Win is a 71 year old male with a hx of CHF, CKD, CAD w/ hx of STEMI and CABG. He met with Dr. Robles at the end of July for consultation regarding metastatic prostate cancer. His story begins in May 2015 when he saw his PCP for back pain, present since Feb. He also had poor appetite, weight loss of 8lb and poor energy.  CT showed diffuse bony mets. CT Chest showed sclerotic mets throughout the bones. He was given degarelix on 7/22 in Urology. He was having pelvic pain and was evaluated by Dr Cavazos would did not feel XRT was appropriate.  He started on docetaxel 7/31. From 8/7-8/14 he was admitted with colitis, CHAR, elevated BNP, lactis acidosis, and poor appetite. He was discharged to a TCU and unfortunately, it sounds like he was not given his Lasix.  His BUBBA worsened and his breathing became compromised.  He was seen by Chelsi Corral on 8/21 and eventually transferred to the ED and admitted 8/21-8/24 for acute on chronic CHF.  At our last visit we discussed not pursuing further Taxotere at this time and discussed starting Zytiga when he was ready.  We pursued PT, OT, home lymphedema for supportive care at home in attempt for further rehabilitation. He was again admitted and discharged - 10/20/15.  He never started the Zytiga as he was still actively recovering from his cardiac issues.  His PSA remained stable, thus no intervention was pursued.  December 2015, Mainor met with Dr Robles and since his ECOG was back to a 1, they discussed re-challenging the Taxotere at a lower dose. 9/26/2016 he started Provenge off study. He continues on lupron every 3 mos and XGEVA. 9/26/2016 he started Provenge off study. He received 3 doses of Provenge (last 10/28), on 3rd dose had fevers, chills, myalgias, was bedbound for a week due to these symptoms. During this time he held his coumadin for 5 days prior to a central line removal 10/31. Later than week he had sudden onset of  right pleuritic chest pain and hemoptysis. Diagnosed with acute segmental/subsegmental PE on 11/8 and prescribed Lovenox.     Mainor was briefly on Zytiga and prednisone in November-December, however after one month his PSA went up, thus it was decided to add in Xofigo (1/11/17-- first).     On 12/28 he started xaralto and took his last pill on 1/10/17.  He stopped as he noticed melanotic stools and had some hemoptysis.  He was seen in clinic the next day with a hgb in th 6 range and was admitted.  Pan-scopes showed no active bleeding.  He was able to get his Xofigo in patient as well.  He was transitioned back to lovenox BID.     INTERVAL HISTORY:  Mr. Win is here today for f/u. Mainor has been dealing with diarrhea and on and off hypotension for the last couple months. On 3/31 he stopped Zytiga and initially thought that helped, however the following week his diarrhea restarted again.  He now feels its the 2-3 weeks after the Xofigo that are his toughest time with the diarrhea. Last week he was feeling so weak last week that he could barely walk into the [facility] for his labs. Had to rest multiple times.  No chest pain, but MOURA.  So last week we gave him a smaller dose of the Radium 223 and also 2 units of PRBC.      Today Mainor is feeling considerably better.  No dizziness, weakness or low BP.  He is hungry and eating 2 meals daily + 2 Boost daily.  He is not having diarrhea of nausea and actually feels good.     No f/s/c. No chest pain. No black stools. No new  issues.  No bleeding.     MEDICATIONS:   Current Outpatient Prescriptions   Medication Sig     enoxaparin (LOVENOX) 40 MG/0.4ML injection Inject 0.4 mLs (40 mg) Subcutaneous 2 times daily     aspirin 81 MG tablet Take 1 tablet (81 mg) by mouth daily     prochlorperazine (COMPAZINE) 5 MG tablet Take 1 tablet (5 mg) by mouth every 6 hours as needed for nausea or vomiting     phosphorus tablet 250 mg (K PHOS NEUTRAL) 250 MG per tablet Take 2 tablets (500  mg) by mouth 3 times daily     metoprolol (TOPROL-XL) 25 MG 24 hr tablet Take 0.5 tablets (12.5 mg) by mouth daily AM     loperamide (IMODIUM) 2 MG capsule Use 2 caps PO after first loose stool.  Repeat 1 cap after each subsequent loose stool.  Max 8/24 hours.     predniSONE (DELTASONE) 5 MG tablet Take 1 tablet (5 mg) by mouth 2 times daily     diphenoxylate-atropine (LOMOTIL) 2.5-0.025 MG per tablet Take 1 tablet by mouth 4 times daily as needed for diarrhea Reported on 4/5/2017     calcium carbonate (OS-RODNEY 500 MG [place]. [place]) 500 MG tablet Take 2 tablets (1,000 mg) by mouth 2 times daily     Cholecalciferol (VITAMIN D) 2000 UNITS tablet Take 1 tablet by mouth daily     simvastatin (ZOCOR) 40 MG tablet TAKE ONE TABLET BY MOUTH AT BEDTIME     tamsulosin (FLOMAX) 0.4 MG 24 hr capsule Take 1 capsule (0.4 mg) by mouth daily AM     HYDROcodone-acetaminophen (NORCO) 5-325 MG per tablet Take 1 tablet by mouth every 6 hours as needed for moderate to severe pain Reported on 5/3/2017     multivitamin, therapeutic with minerals (MULTI-VITAMIN) TABS Take 1 tablet by mouth every morning Reported on 4/5/2017     No current facility-administered medications for this visit.      Facility-Administered Medications Ordered in Other Visits   Medication     hydrocortisone sodium succinate (solu-CORTEF) injection     glycopyrrolate (ROBINUL) injection     neostigmine (PROSTIGMINE) injection     protamine injection     albuterol (PROAIR HFA, PROVENTIL HFA, VENTOLIN HFA) inhaler          ALLERGIES:  No Known Allergies    PHYSICAL EXAMINATION:  Vitals: /60  Pulse 93  Temp 98.4  F (36.9  C) (Oral)  Resp 18  Wt 57.2 kg (126 lb)  SpO2 100%  BMI 20.03 kg/m2   Wt Readings from Last 10 Encounters:   05/08/17 57.2 kg (126 lb)   05/03/17 63 kg (139 lb)   04/28/17 62.8 kg (138 lb 8 oz)   04/05/17 66.5 kg (146 lb 8 oz)   03/31/17 61.7 kg (136 lb)   03/31/17 61.7 kg (136 lb)   03/29/17 67.9 kg (149 lb 9.6 oz)   03/15/17 66.8 kg (147 lb 4.8  oz)   03/09/17 63 kg (139 lb)   03/08/17 69.3 kg (152 lb 11.2 oz)       GENERAL:  A pleasant person in no acute distress.    LYMPH NODES:  No peripheral lymphadenopathy noted in the supraclavicular or cervical regions.   LUNGS:  Clear to auscultation bilaterally.   HEART:  Regular rate and rhythm   ABDOMEN:  Bowel sounds are active.  Soft and nontender.  No hepatosplenomegaly or other masses appreciated.  LOWER EXTREMITIES:  Without pitting edema to the knees bilaterally.   NEUROLOGICAL:  Alert/orientated/able to answer all questions.  CN grossly intact.     LAB:      5/8/2017 14:34   Sodium 137   Potassium 4.1   Chloride 105   Carbon Dioxide 21   Urea Nitrogen 23   Creatinine 0.80   GFR Estimate >90...   GFR Estimate If Black >90...   Calcium 9.0   Anion Gap 11   Albumin 3.3 (L)   Protein Total 7.5   Bilirubin Total 1.2   Alkaline Phosphatase 370 (H)   ALT 30   AST 30   Glucose 110 (H)     IMPRESSION/PLAN:   Metastatic prostate cancer: on zytiga/pred and xofigo and xgeva. Mainor has had ongoing on and off diarrhea, infection work up has been negative.  Zytiga was stopped and didn't help.  Mainor is feeling like his Xofigo dose is too high.  Last week, I talked to curtis in nuc med and his weight changes have all been within the 10% acceptable variation.  However, Curtis agreed to give him a smaller dose.  It seems Mainor is doing MUCH better today. No low BP and no diarrhea and overall feeling at baseline.  No need for IVF today.  If he is feeling OK on Thursday, he will call and cancel IVF.   -labs/Sabrina on 5/15    Anemia: likely secondary to the radium as his platelets and ANC are low as well.  2 units helped tremendously.  Recheck CBC on Monday.     Heme: on lovenox for PE dx 11/2016. Had inpatient stay for GI bleed 1/11-1/14. Stopped xarelto and transitioned back to lovenox BID. No bleeding concerns. Recently dropped from 60mg BID to 50mg BID due to high Xa level. Continue at 40 mg BID    Bone mets: on xgeva Q 6 weeks. Last  given on 2/8. Reschedule for 5/15 as calcium levels better.     CV: hx of CHF. On metoprolol 12.5mg daily, but with ongoing recent low BPs provided parameters <120 systolic in the morning to hold dose.       Sabrina Mcguire PA-C

## 2017-05-11 ENCOUNTER — CARE COORDINATION (OUTPATIENT)
Dept: ONCOLOGY | Facility: CLINIC | Age: 72
End: 2017-05-11

## 2017-05-11 NOTE — PROGRESS NOTES
"Received call from pt stating he \"feels good\" and would like to cancel the appointment today for IVFs.  Denies any diarrhea, N/V, lightheadedness/dizziness, or muscles fatigue. Appetite has been OK and is maintaining his weight. He will keep appointment on 5/15 with Sabrina Mcguire PA-C for follow up. Message sent to scheduling to cancel IVF appointment today.   "

## 2017-05-15 ENCOUNTER — APPOINTMENT (OUTPATIENT)
Dept: LAB | Facility: CLINIC | Age: 72
End: 2017-05-15
Attending: PHYSICIAN ASSISTANT
Payer: COMMERCIAL

## 2017-05-15 ENCOUNTER — ONCOLOGY VISIT (OUTPATIENT)
Dept: ONCOLOGY | Facility: CLINIC | Age: 72
End: 2017-05-15
Attending: PHYSICIAN ASSISTANT
Payer: COMMERCIAL

## 2017-05-15 VITALS
DIASTOLIC BLOOD PRESSURE: 56 MMHG | SYSTOLIC BLOOD PRESSURE: 97 MMHG | WEIGHT: 126 LBS | OXYGEN SATURATION: 98 % | HEIGHT: 67 IN | BODY MASS INDEX: 19.78 KG/M2 | TEMPERATURE: 98.5 F | RESPIRATION RATE: 18 BRPM | HEART RATE: 96 BPM

## 2017-05-15 DIAGNOSIS — C61 PROSTATE CANCER METASTATIC TO BONE (H): Primary | ICD-10-CM

## 2017-05-15 DIAGNOSIS — C79.51 PROSTATE CANCER METASTATIC TO BONE (H): Primary | ICD-10-CM

## 2017-05-15 PROCEDURE — 25000128 H RX IP 250 OP 636: Mod: ZF | Performed by: INTERNAL MEDICINE

## 2017-05-15 PROCEDURE — 80053 COMPREHEN METABOLIC PANEL: CPT | Performed by: PHYSICIAN ASSISTANT

## 2017-05-15 PROCEDURE — 85025 COMPLETE CBC W/AUTO DIFF WBC: CPT | Performed by: PHYSICIAN ASSISTANT

## 2017-05-15 PROCEDURE — 40000268 ZZH STATISTIC NO CHARGES: Mod: ZF

## 2017-05-15 PROCEDURE — 96402 CHEMO HORMON ANTINEOPL SQ/IM: CPT

## 2017-05-15 PROCEDURE — 99214 OFFICE O/P EST MOD 30 MIN: CPT | Mod: ZP | Performed by: PHYSICIAN ASSISTANT

## 2017-05-15 PROCEDURE — 96372 THER/PROPH/DIAG INJ SC/IM: CPT | Mod: ZF

## 2017-05-15 RX ADMIN — LEUPROLIDE ACETATE 22.5 MG: KIT at 12:48

## 2017-05-15 RX ADMIN — DENOSUMAB 120 MG: 120 INJECTION SUBCUTANEOUS at 12:48

## 2017-05-15 ASSESSMENT — PAIN SCALES - GENERAL: PAINLEVEL: NO PAIN (0)

## 2017-05-15 NOTE — NURSING NOTE
1 Xgeva injection given in left arm subcutaneously. Patient tolerated injection.    Parrish Barnes CMA    1 Lupron injection given in left gluteus leonel IM. Patient tolerated injection.    Parrish Barnes CMA

## 2017-05-15 NOTE — NURSING NOTE
"Oncology Rooming Note    May 15, 2017 11:18 AM   Oswaldo Win is a 72 year old male who presents for:    Chief Complaint   Patient presents with     Blood Draw     Labs drawn by RN from T. VS taken.      Oncology Clinic Visit     return patient visit for possible infusion/follow up related to Prostate cancer 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)     Initial Vitals: BP 97/56  Pulse 96  Temp 98.5  F (36.9  C)  Resp 18  Ht 1.689 m (5' 6.5\")  Wt 57.2 kg (126 lb)  SpO2 98%  BMI 20.03 kg/m2 Estimated body mass index is 20.03 kg/(m^2) as calculated from the following:    Height as of this encounter: 1.689 m (5' 6.5\").    Weight as of this encounter: 57.2 kg (126 lb). Body surface area is 1.64 meters squared.  No Pain (0) Comment: Data Unavailable   No LMP for male patient.  Allergies reviewed: Yes  Medications reviewed: Yes    Medications: Medication refills not needed today.  Pharmacy name entered into [facility]: [facility] [place], [place] - [address]    Clinical concerns: none libra deleon was notified.    5 minutes for nursing intake (face to face time)     Parrish Barnes CMA              "

## 2017-05-15 NOTE — MR AVS SNAPSHOT
After Visit Summary   5/15/2017    Oswaldo Win    MRN: [id number]           Patient Information     Date Of Birth          1945        Visit Information        Provider Department      5/15/2017 11:00 AM Gabriela Mcguire PA-C [facility]        Today's Diagnoses     Prostate cancer 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)    -  1       Follow-ups after your visit        Your next 10 appointments already scheduled     May 22, 2017 11:15 AM CDT   [facility] Lab Draw with [facility] LAB DRAW   [facility] ([facility])    [address] [place] [id number]   [phone]            May 22, 2017 11:50 AM CDT   (Arrive by 11:35 AM)   Return Visit with Gabriela Mcguire PA-C   [facility] ([facility])    [address] [place] [address]   [phone]            May 22, 2017  1:00 PM CDT   Infusion 120 with UC ONCOLOGY INFUSION, UC 24 ATC   [facility] ([facility])    [address] [place] [address]   [phone]            May 24, 2017 10:30 AM CDT   Masonic Lab Draw with UC MASONIC LAB DRAW   [facility] Masonic Lab Draw ([facility])    [address] [place] [address]   [phone]            May 25, 2017 12:30 PM CDT   Infusion 120 with UC ONCOLOGY INFUSION, UC 30 ATC   [facility] ([facility])    [address] [place] [address]   [phone]            May 31, 2017  8:45 AM CDT   [facility] Lab Draw with [facility] MASONIC LAB DRAW   [facility]    [address] [place] [address]   [phone]            May 31, 2017  9:15 AM CDT   (Arrive by 9:00 AM)   Return Visit with Bentley Robles MD   [facility]  [facility] ([facility])    [address]  2nd Floor  [facility] [id number] [phone]            May 31, 2017 11:00 AM CDT   NM RADIUM 223 XOFIGO THERAPY with [id number]   [facility] [place] [employer], Nuclear Medicine ([facility] [place])    [address] [id number] [phone]           Please bring a list of your medicines to the exam. (Include vitamins, minerals and over-the-counter drugs.) You should wear comfortable clothes. Leave your valuables at home. Please bring related prior results and films.  Tell your doctor:   If you are breastfeeding or may be pregnant.   If you have had a barium test within the past few days. Barium may change the results of certain exams.   If you think you may need sedation (medicine to help you relax).  You may eat and drink as normal.  Please call your Imaging Department at your exam site with any questions.              Who to contact     If you have questions or need follow up information about today's clinic visit or your schedule please contact [facility] directly at [phone].  Normal or non-critical lab and imaging results will be communicated to you by MyChart, letter or phone within 4 business days after the clinic has received the results. If you do not hear from us within 7 days, please contact the clinic through [email or web address] or phone. If you have a critical or abnormal lab result, we will notify you by phone as soon as possible.  Submit refill requests through [employer] or call your pharmacy and they will forward the refill request to us. Please allow 3 business days for your refill to be completed.          Additional Information About Your Visit        MyChart Information     [employer] gives you secure access to your electronic health record. If you see a primary care provider, you can also send messages to your care team and make appointments. If you  "have questions, please call your primary care clinic.  If you do not have a primary care provider, please call [phone] and they will assist you.        Care EveryWhere ID     This is your Care EveryWhere ID. This could be used by other organizations to access your [facility] medical records  [id number]        Your Vitals Were     Pulse Temperature Respirations Height Pulse Oximetry BMI (Body Mass Index)    96 98.5  F (36.9  C) 18 1.689 m (5' 6.5\") 98% 20.03 kg/m2       Blood Pressure from Last 3 Encounters:   05/15/17 97/56   05/08/17 109/60   05/03/17 104/47    Weight from Last 3 Encounters:   05/15/17 57.2 kg (126 lb)   05/08/17 57.2 kg (126 lb)   05/03/17 63 kg (139 lb)              Today, you had the following     No orders found for display       Primary Care Provider Office Phone # Fax #    Lucrecia Collier [phone]       [address] [address]        Thank you!     Thank you for choosing [facility]  for your care. Our goal is always to provide you with excellent care. Hearing back from our patients is one way we can continue to improve our services. Please take a few minutes to complete the written survey that you may receive in the mail after your visit with us. Thank you!             Your Updated Medication List - Protect others around you: Learn how to safely use, store and throw away your medicines at www.disposemymeds.org.          This list is accurate as of: 5/15/17  1:18 PM.  Always use your most recent med list.                   Brand Name Dispense Instructions for use    aspirin 81 MG tablet     90 tablet    Take 1 tablet (81 mg) by mouth daily       calcium carbonate 500 MG tablet    OS-RODNEY 500 mg [place]. Ca    120 tablet    Take 2 tablets (1,000 mg) by mouth 2 times daily       diphenoxylate-atropine 2.5-0.025 MG per tablet    LOMOTIL     Take 1 tablet by mouth 4 times daily as needed for diarrhea Reported on " 4/5/2017       enoxaparin 40 MG/0.4ML injection    LOVENOX    60 Syringe    Inject 0.4 mLs (40 mg) Subcutaneous 2 times daily       HYDROcodone-acetaminophen 5-325 MG per tablet    NORCO     Take 1 tablet by mouth every 6 hours as needed for moderate to severe pain Reported on 5/15/2017       loperamide 2 MG capsule    IMODIUM    60 capsule    Use 2 caps PO after first loose stool.  Repeat 1 cap after each subsequent loose stool.  Max 8/24 hours.       metoprolol 25 MG 24 hr tablet    TOPROL-XL    45 tablet    Take 0.5 tablets (12.5 mg) by mouth daily AM       Multi-vitamin Tabs tablet     100 tablet    Take 1 tablet by mouth every morning Reported on 4/5/2017       phosphorus tablet 250 mg 250 MG per tablet    K PHOS NEUTRAL    30 tablet    Take 2 tablets (500 mg) by mouth 3 times daily       predniSONE 5 MG tablet    DELTASONE    60 tablet    Take 1 tablet (5 mg) by mouth 2 times daily       prochlorperazine 5 MG tablet    COMPAZINE    30 tablet    Take 1 tablet (5 mg) by mouth every 6 hours as needed for nausea or vomiting       simvastatin 40 MG tablet    ZOCOR    90 tablet    TAKE ONE TABLET BY MOUTH AT BEDTIME       tamsulosin 0.4 MG capsule    FLOMAX    90 capsule    Take 1 capsule (0.4 mg) by mouth daily AM       vitamin D 2000 UNITS tablet     100 tablet    Take 1 tablet by mouth daily

## 2017-05-15 NOTE — NURSING NOTE
Chief Complaint   Patient presents with     Blood Draw     Labs drawn by RN from VPT. VS taken.      YENNY TOLBERT RN

## 2017-05-16 NOTE — PROGRESS NOTES
Oncology/Hematology Visit Note  May 15, 2017    DIAGNOSIS:  Mr. Win is a 71 year old male with a hx of CHF, CKD, CAD w/ hx of STEMI and CABG. He met with Dr. Robles at the end of July for consultation regarding metastatic prostate cancer. His story begins in May 2015 when he saw his PCP for back pain, present since Feb. He also had poor appetite, weight loss of 8lb and poor energy.  CT showed diffuse bony mets. CT Chest showed sclerotic mets throughout the bones. He was given degarelix on 7/22 in Urology. He was having pelvic pain and was evaluated by Dr Cavazos would did not feel XRT was appropriate.  He started on docetaxel 7/31. From 8/7-8/14 he was admitted with colitis, CHAR, elevated BNP, lactis acidosis, and poor appetite. He was discharged to a TCU and unfortunately, it sounds like he was not given his Lasix.  His BUBBA worsened and his breathing became compromised.  He was seen by Chelsi Corral on 8/21 and eventually transferred to the ED and admitted 8/21-8/24 for acute on chronic CHF.  At our last visit we discussed not pursuing further Taxotere at this time and discussed starting Zytiga when he was ready.  We pursued PT, OT, home lymphedema for supportive care at home in attempt for further rehabilitation. He was again admitted and discharged - 10/20/15.  He never started the Zytiga as he was still actively recovering from his cardiac issues.  His PSA remained stable, thus no intervention was pursued.  December 2015, Mainor met with Dr Robles and since his ECOG was back to a 1, they discussed re-challenging the Taxotere at a lower dose. 9/26/2016 he started Provenge off study. He continues on lupron every 3 mos and XGEVA. 9/26/2016 he started Provenge off study. He received 3 doses of Provenge (last 10/28), on 3rd dose had fevers, chills, myalgias, was bedbound for a week due to these symptoms. During this time he held his coumadin for 5 days prior to a central line removal 10/31. Later than week he had sudden onset  of right pleuritic chest pain and hemoptysis. Diagnosed with acute segmental/subsegmental PE on 11/8 and prescribed Lovenox.     Mainor was briefly on Zytiga and prednisone in November-December, however after one month his PSA went up, thus it was decided to add in Xofigo (1/11/17-- first).     On 12/28 he started xaralto and took his last pill on 1/10/17.  He stopped as he noticed melanotic stools and had some hemoptysis.  He was seen in clinic the next day with a hgb in th 6 range and was admitted.  Pan-scopes showed no active bleeding.  He was able to get his Xofigo in patient as well.  He was transitioned back to lovenox BID.     Mainor developed diarrhea on and off in the spring of 2017, Zytiga was stopped on 3/31 as it was unsure what was contributing.  Mainor felt it was related to the Xofigo and that his dose was too high as his weight is always at least 10 pounds higher in clinic than at home.      INTERVAL HISTORY:  Mr. Win is here today for f/uJim has not had ANY N/V or diarrhea this cycle. He is feeling stronger and each week his stamina is better.  His appetite is better, he is trying to eat three times a day and add Boost in with his meals.  His weight was up at least a pound on his home scale. His BP was a little lower today, but no MOURA or dizziness with ambulation.    No f/s/c. No chest pain. No black stools. No new  issues.  No bleeding on the lovenox 40 mg BID.     MEDICATIONS:   Current Outpatient Prescriptions   Medication Sig     enoxaparin (LOVENOX) 40 MG/0.4ML injection Inject 0.4 mLs (40 mg) Subcutaneous 2 times daily     aspirin 81 MG tablet Take 1 tablet (81 mg) by mouth daily     phosphorus tablet 250 mg (K PHOS NEUTRAL) 250 MG per tablet Take 2 tablets (500 mg) by mouth 3 times daily     metoprolol (TOPROL-XL) 25 MG 24 hr tablet Take 0.5 tablets (12.5 mg) by mouth daily AM     predniSONE (DELTASONE) 5 MG tablet Take 1 tablet (5 mg) by mouth 2 times daily     diphenoxylate-atropine (LOMOTIL)  "2.5-0.025 MG per tablet Take 1 tablet by mouth 4 times daily as needed for diarrhea Reported on 4/5/2017     calcium carbonate (OS-RODNEY 500 MG [employer] [place]) 500 MG tablet Take 2 tablets (1,000 mg) by mouth 2 times daily     Cholecalciferol (VITAMIN D) 2000 UNITS tablet Take 1 tablet by mouth daily     simvastatin (ZOCOR) 40 MG tablet TAKE ONE TABLET BY MOUTH AT BEDTIME     tamsulosin (FLOMAX) 0.4 MG 24 hr capsule Take 1 capsule (0.4 mg) by mouth daily AM     multivitamin, therapeutic with minerals (MULTI-VITAMIN) TABS Take 1 tablet by mouth every morning Reported on 4/5/2017     prochlorperazine (COMPAZINE) 5 MG tablet Take 1 tablet (5 mg) by mouth every 6 hours as needed for nausea or vomiting (Patient not taking: Reported on 5/15/2017)     loperamide (IMODIUM) 2 MG capsule Use 2 caps PO after first loose stool.  Repeat 1 cap after each subsequent loose stool.  Max 8/24 hours. (Patient not taking: Reported on 5/15/2017)     HYDROcodone-acetaminophen (NORCO) 5-325 MG per tablet Take 1 tablet by mouth every 6 hours as needed for moderate to severe pain Reported on 5/15/2017     No current facility-administered medications for this visit.      Facility-Administered Medications Ordered in Other Visits   Medication     hydrocortisone sodium succinate (solu-CORTEF) injection     glycopyrrolate (ROBINUL) injection     neostigmine (PROSTIGMINE) injection     protamine injection     albuterol (PROAIR HFA, PROVENTIL HFA, VENTOLIN HFA) inhaler          ALLERGIES:  No Known Allergies    PHYSICAL EXAMINATION:  Vitals: BP 97/56  Pulse 96  Temp 98.5  F (36.9  C)  Resp 18  Ht 1.689 m (5' 6.5\")  Wt 57.2 kg (126 lb)  SpO2 98%  BMI 20.03 kg/m2   Wt Readings from Last 10 Encounters:   05/15/17 57.2 kg (126 lb)   05/08/17 57.2 kg (126 lb)   05/03/17 63 kg (139 lb)   04/28/17 62.8 kg (138 lb 8 oz)   04/05/17 66.5 kg (146 lb 8 oz)   03/31/17 61.7 kg (136 lb)   03/31/17 61.7 kg (136 lb)   03/29/17 67.9 kg (149 lb 9.6 oz)   03/15/17 66.8 " kg (147 lb 4.8 oz)   03/09/17 63 kg (139 lb)       GENERAL:  A pleasant person in no acute distress.    LYMPH NODES:  No peripheral lymphadenopathy noted in the supraclavicular or cervical regions.   LUNGS:  Clear to auscultation bilaterally.   HEART:  Regular rate and rhythm   ABDOMEN:  Bowel sounds are active.  Soft and nontender.  No hepatosplenomegaly or other masses appreciated.  LOWER EXTREMITIES:  Without pitting edema to the knees bilaterally.   NEUROLOGICAL:  Alert/orientated/able to answer all questions.  CN grossly intact.     LAB:    5/3/2017 09:35 5/15/2017 11:02   WBC 3.7 (L) 2.6 (L)   Hemoglobin 7.5 (L) 10.5 (L)   Hematocrit 23.3 (L) 31.8 (L)   Platelet Count 135 (L) 107 (L)   RBC Count 2.49 (L) 3.48 (L)   MCV 94 91   MCH 30.1 30.2   MCHC 32.2 33.0   RDW 17.3 (H) 15.6 (H)   Diff Method Automated Method Automated Method   % Neutrophils 57.5 57.8   % Lymphocytes 15.6 15.6   % Monocytes 16.7 16.0   % Eosinophils 6.7 8.2   % Basophils 0.5 1.2   % Immature Granulocytes 3.0 1.2   Nucleated RBCs 0 0   Absolute Neutrophil 2.1 1.5 (L)      5/8/2017 14:34 5/15/2017 11:02   Sodium 137 140   Potassium 4.1 3.9   Chloride 105 108   Carbon Dioxide 21 20   Urea Nitrogen 23 22   Creatinine 0.80 0.82   GFR Estimate >90... >90...   GFR Estimate If Black >90... >90...   Calcium 9.0 8.8   Anion Gap 11 12   Albumin 3.3 (L) 3.4   Protein Total 7.5 7.6   Bilirubin Total 1.2 0.9   Alkaline Phosphatase 370 (H) 398 (H)   ALT 30 29   AST 30 28      3/15/2017 14:10 3/29/2017 10:54 5/3/2017 09:34 5/8/2017 14:34   .74 (H) 574.98 (H) 688.00 (H) 626.00 (H)     IMPRESSION/PLAN:   Metastatic prostate cancer: on zytiga/pred and xofigo and xgeva. Mainor has had ongoing on and off diarrhea, infection work up has been negative.  Zytiga was stopped and didn't help.  Mainor is feeling like his Xofigo dose is too high. We spoke to curtis in nuc med and Ila weight changes have all been within the 10% acceptable variation.  However, Curtis agreed  to give him a smaller dose.  It seems Mainor is doing MUCH better with this cycle. No low BP and no diarrhea and overall feeling at baseline.  No need for IVF today or Thursday.      Mainor and I reviewed his PSA is bouncing around, but given the FTT and diarrhea were likely from the P[place], we should re-start his Zytiga.   -Lupron 5/15   -Sabrina 5/22    Anemia: likely secondary to the radium as his platelets and ANC are low as well.  2 units helped tremendously as Hgb 10.5 today.  No dizziness or MOURA    Heme: on lovenox for PE dx 11/2016. Had inpatient stay for GI bleed 1/11-1/14. Stopped xarelto and transitioned back to lovenox BID. No bleeding concerns. Recently dropped from 60mg BID to 50mg BID due to high Xa level. Continue at 40 mg BID    Bone mets: on xgeva Q 6 weeks.  -5/15    CV: hx of CHF. No clinical s/s of failure.  Off metoprolol due to BP being 100/60 range daily. Reschedule cards appt.       Sabrina Mcguire PA-C

## 2017-05-17 ENCOUNTER — TELEPHONE (OUTPATIENT)
Dept: ONCOLOGY | Facility: CLINIC | Age: 72
End: 2017-05-17

## 2017-05-17 NOTE — TELEPHONE ENCOUNTER
Oral Chemotherapy Monitoring Program   Placed call to patient in follow up of Zytiga (abiraterone) therapy.   Left message requesting call back.   No drug names were mentioned.    Saida Woods RN  [facility]  [phone]

## 2017-05-22 ENCOUNTER — ONCOLOGY VISIT (OUTPATIENT)
Dept: ONCOLOGY | Facility: CLINIC | Age: 72
End: 2017-05-22
Attending: PHYSICIAN ASSISTANT
Payer: COMMERCIAL

## 2017-05-22 ENCOUNTER — APPOINTMENT (OUTPATIENT)
Dept: LAB | Facility: CLINIC | Age: 72
End: 2017-05-22
Attending: PHYSICIAN ASSISTANT
Payer: COMMERCIAL

## 2017-05-22 VITALS
OXYGEN SATURATION: 97 % | HEART RATE: 97 BPM | SYSTOLIC BLOOD PRESSURE: 106 MMHG | TEMPERATURE: 98.3 F | DIASTOLIC BLOOD PRESSURE: 52 MMHG | RESPIRATION RATE: 16 BRPM

## 2017-05-22 DIAGNOSIS — C61 PROSTATE CANCER METASTATIC TO BONE (H): Primary | ICD-10-CM

## 2017-05-22 DIAGNOSIS — C79.51 PROSTATE CANCER METASTATIC TO BONE (H): Primary | ICD-10-CM

## 2017-05-22 PROCEDURE — 36415 COLL VENOUS BLD VENIPUNCTURE: CPT

## 2017-05-22 PROCEDURE — 80053 COMPREHEN METABOLIC PANEL: CPT | Performed by: INTERNAL MEDICINE

## 2017-05-22 PROCEDURE — 85025 COMPLETE CBC W/AUTO DIFF WBC: CPT | Performed by: INTERNAL MEDICINE

## 2017-05-22 PROCEDURE — 99214 OFFICE O/P EST MOD 30 MIN: CPT | Mod: ZP | Performed by: PHYSICIAN ASSISTANT

## 2017-05-22 PROCEDURE — 99212 OFFICE O/P EST SF 10 MIN: CPT | Mod: ZF

## 2017-05-22 ASSESSMENT — PAIN SCALES - GENERAL: PAINLEVEL: NO PAIN (0)

## 2017-05-22 NOTE — NURSING NOTE
"Oncology Rooming Note    May 22, 2017 12:01 PM   Oswaldo Win is a 72 year old male who presents for:    Chief Complaint   Patient presents with     Labs Only     Labs drawn via venipuncture     Oncology Clinic Visit     Prostate Ca      Initial Vitals: /52 (BP Location: Right arm, Patient Position: Chair, Cuff Size: Adult Regular)  Pulse 97  Temp 98.3  F (36.8  C) (Oral)  Resp 16  SpO2 97% Estimated body mass index is 20.03 kg/(m^2) as calculated from the following:    Height as of 5/15/17: 1.689 m (5' 6.5\").    Weight as of 5/15/17: 57.2 kg (126 lb). There is no height or weight on file to calculate BSA.  No Pain (0) Comment: Data Unavailable   No LMP for male patient.  Allergies reviewed: Yes  Medications reviewed: Yes    Medications: Medication refills not needed today.  Pharmacy name entered into [employer]: [facility] [place], [place] - [address]    Clinical concerns: no concerns  Sabrina was notified.    6 minutes for nursing intake (face to face time)     Jane Hickman MA              "

## 2017-05-22 NOTE — MR AVS SNAPSHOT
After Visit Summary   5/22/2017    Oswaldo Win    MRN: [id number]           Patient Information     Date Of Birth          1945        Visit Information        Provider Department      5/22/2017 11:50 AM Gabriela Mcguire PA-C [facility]         Follow-ups after your visit        Your next 10 appointments already scheduled     May 24, 2017 10:30 AM CDT   Masonic Lab Draw with  MASONIC LAB DRAW   [facility] Lab Draw ([facility])    [address] [place] [address]   [phone]            May 31, 2017  8:45 AM CDT   Masonic Lab Draw with  MASONIC LAB DRAW   [facility] Lab Draw ([facility])    [address] [place] [address]   [phone]            May 31, 2017  9:15 AM CDT   (Arrive by 9:00 AM)   Return Visit with Bentley Robles MD   [facility] ([facility])    [address] [place] [address]   [phone]            May 31, 2017 11:00 AM CDT   NM RADIUM 223 XOFIGO THERAPY with [id number]   [facility] [place], Nuclear Medicine ([facility], [facility])    [address] [address] [phone]           Please bring a list of your medicines to the exam. (Include vitamins, minerals and over-the-counter drugs.) You should wear comfortable clothes. Leave your valuables at home. Please bring related prior results and films.  Tell your doctor:   If you are breastfeeding or may be pregnant.   If you have had a barium test within the past few days. Barium may change the results of certain exams.   If you think you may need sedation (medicine to help you relax).  You may eat and drink as normal.  Please call your Imaging Department at your exam site with any questions.            Jun 05, 2017  2:45 PM CDT   i[facility] Lab Draw with [facility]  LAB DRAW   [facility] Lab Draw ([facility])    [address]  2nd Floor  [facility] [id number] [phone]            Jun 05, 2017  3:10 PM CDT   (Arrive by 2:55 PM)   Return Visit with Gabriela Mcguire PA-C   [facility] ([facility])    [address]  [address]nd [facility] [id number] [phone]            Jun 12, 2017  7:30 AM CDT   (Arrive by 7:15 AM)   CORE RETURN with Jill Juarez NP   [facility] ([facility])    [address]  3rd Floor  [facility] [id number] [phone]              Who to contact     If you have questions or need follow up information about today's clinic visit or your schedule please contact [facility] directly at [phone].  Normal or non-critical lab and imaging results will be communicated to you by [employer]on[employer]art, letter or phone within 4 business days after the clinic has received the results. If you do not hear from us within 7 days, please contact the clinic through [employer] or phone. If you have a critical or abnormal lab result, we will notify you by phone as soon as possible.  Submit refill requests through [employer] ([employer]) or call your pharmacy and they will forward the refill request to us. Please allow 3 business days for your refill to be completed.          Additional Information About Your Visit        Fonemeshhart Information     [employer] gives you secure access to your electronic health record. If you see a primary care provider, you can also send messages to your care team and make appointments. If you have questions, please call your primary care clinic.  If you do not have a primary care provider, please call [phone] and they will assist you.        Care EveryWhere ID     This is your Care EveryWhere ID. This could be used by other organizations to access your [facility] medical records  [id number]         Your Vitals Were     Pulse Temperature Respirations Pulse Oximetry          97 98.3  F (36.8  C) (Oral) 16 97%         Blood Pressure from Last 3 Encounters:   05/22/17 106/52   05/15/17 97/56   05/08/17 109/60    Weight from Last 3 Encounters:   05/15/17 57.2 kg (126 lb)   05/08/17 57.2 kg (126 lb)   05/03/17 63 kg (139 lb)              Today, you had the following     No orders found for display         Today's Medication Changes          These changes are accurate as of: 5/22/17  1:00 PM.  If you have any questions, ask your nurse or doctor.               Stop taking these medicines if you haven't already. Please contact your care team if you have questions.     Multi-vitamin Tabs tablet   Stopped by:  Gabriela Mcguire PA-C                    Primary Care Provider Office Phone # Fax #    Lucrecia Collier [phone]       [address] [address]        Thank you!     Thank you for choosing [facility]  for your care. Our goal is always to provide you with excellent care. Hearing back from our patients is one way we can continue to improve our services. Please take a few minutes to complete the written survey that you may receive in the mail after your visit with us. Thank you!             Your Updated Medication List - Protect others around you: Learn how to safely use, store and throw away your medicines at www.disposemymeds.org.          This list is accurate as of: 5/22/17  1:00 PM.  Always use your most recent med list.                   Brand Name Dispense Instructions for use    aspirin 81 MG tablet     90 tablet    Take 1 tablet (81 mg) by mouth daily       calcium carbonate 500 MG tablet    OS-RODNEY 500 mg [place]. [place]    120 tablet    Take 2 tablets (1,000 mg) by mouth 2 times daily       diphenoxylate-atropine 2.5-0.025 MG per tablet    LOMOTIL     Take 1 tablet by mouth 4 times daily as needed for diarrhea Reported on 5/22/2017        enoxaparin 40 MG/0.4ML injection    LOVENOX    60 Syringe    Inject 0.4 mLs (40 mg) Subcutaneous 2 times daily       HYDROcodone-acetaminophen 5-325 MG per tablet    NORCO     Take 1 tablet by mouth every 6 hours as needed for moderate to severe pain Reported on 5/22/2017       loperamide 2 MG capsule    IMODIUM    60 capsule    Use 2 caps PO after first loose stool.  Repeat 1 cap after each subsequent loose stool.  Max 8/24 hours.       metoprolol 25 MG 24 hr tablet    TOPROL-XL    45 tablet    Take 0.5 tablets (12.5 mg) by mouth daily AM       phosphorus tablet 250 mg 250 MG per tablet    K PHOS NEUTRAL    30 tablet    Take 2 tablets (500 mg) by mouth 3 times daily       predniSONE 5 MG tablet    DELTASONE    60 tablet    Take 1 tablet (5 mg) by mouth 2 times daily       prochlorperazine 5 MG tablet    COMPAZINE    30 tablet    Take 1 tablet (5 mg) by mouth every 6 hours as needed for nausea or vomiting       simvastatin 40 MG tablet    ZOCOR    90 tablet    TAKE ONE TABLET BY MOUTH AT BEDTIME       tamsulosin 0.4 MG capsule    FLOMAX    90 capsule    Take 1 capsule (0.4 mg) by mouth daily AM       vitamin D 2000 UNITS tablet     100 tablet    Take 1 tablet by mouth daily

## 2017-05-23 NOTE — PROGRESS NOTES
Oncology/Hematology Visit Note  May 22, 2017    DIAGNOSIS:  Mr. Win is a 71 year old male with a hx of CHF, CKD, CAD w/ hx of STEMI and CABG. He met with Dr. Robles at the end of July for consultation regarding metastatic prostate cancer. His story begins in May 2015 when he saw his PCP for back pain, present since Feb. He also had poor appetite, weight loss of 8lb and poor energy.  CT showed diffuse bony mets. CT Chest showed sclerotic mets throughout the bones. He was given degarelix on 7/22 in Urology. He was having pelvic pain and was evaluated by Dr Cavazos would did not feel XRT was appropriate.  He started on docetaxel 7/31. From 8/7-8/14 he was admitted with colitis, CHAR, elevated BNP, lactis acidosis, and poor appetite. He was discharged to a TCU and unfortunately, it sounds like he was not given his Lasix.  His BUBBA worsened and his breathing became compromised.  He was seen by Chelsi Corral on 8/21 and eventually transferred to the ED and admitted 8/21-8/24 for acute on chronic CHF.  At our last visit we discussed not pursuing further Taxotere at this time and discussed starting Zytiga when he was ready.  We pursued PT, OT, home lymphedema for supportive care at home in attempt for further rehabilitation. He was again admitted and discharged - 10/20/15.  He never started the Zytiga as he was still actively recovering from his cardiac issues.  His PSA remained stable, thus no intervention was pursued.  December 2015, Mainor met with Dr Robles and since his ECOG was back to a 1, they discussed re-challenging the Taxotere at a lower dose. 9/26/2016 he started Provenge off study. He continues on lupron every 3 mos and XGEVA. 9/26/2016 he started Provenge off study. He received 3 doses of Provenge (last 10/28), on 3rd dose had fevers, chills, myalgias, was bedbound for a week due to these symptoms. During this time he held his coumadin for 5 days prior to a central line removal 10/31. Later than week he had sudden onset  of right pleuritic chest pain and hemoptysis. Diagnosed with acute segmental/subsegmental PE on 11/8 and prescribed Lovenox.     Mainor was briefly on Zytiga and prednisone in November-December, however after one month his PSA went up, thus it was decided to add in Xofigo (1/11/17-- first).     On 12/28 he started xaralto and took his last pill on 1/10/17.  He stopped as he noticed melanotic stools and had some hemoptysis.  He was seen in clinic the next day with a hgb in th 6 range and was admitted.  Pan-scopes showed no active bleeding.  He was able to get his Xofigo in patient as well.  He was transitioned back to lovenox BID.     Mainor developed diarrhea on and off in the spring of 2017, Zytiga was stopped on 3/31 as it was unsure what was contributing.  Mainor felt it was related to the Xofigo and that his dose was too high as his weight is always at least 10 pounds higher in clinic than at home.      INTERVAL HISTORY:  Mr. Win is here today for f/uJim has not had ANY N/V or diarrhea this cycle. He is feeling stronger and each week his stamina is better.  His appetite is better, he is trying to eat three times a day and add Boost in with his meals.  His weight was up at least a pound on his home scale. His BP is stable and no MOURA or dizziness with ambulation.    No f/s/c. No chest pain. No black stools. No new  issues.  He is on lovenox 40 mg BID.  He did notice some hemoptysis a couple weeks ago when he woke up with phlegm in his throat, but blood tinged mucus.    MEDICATIONS:   Current Outpatient Prescriptions   Medication Sig     enoxaparin (LOVENOX) 40 MG/0.4ML injection Inject 0.4 mLs (40 mg) Subcutaneous 2 times daily     aspirin 81 MG tablet Take 1 tablet (81 mg) by mouth daily     predniSONE (DELTASONE) 5 MG tablet Take 1 tablet (5 mg) by mouth 2 times daily     calcium carbonate (OS-RODNEY 500 MG [place]. [place]) 500 MG tablet Take 2 tablets (1,000 mg) by mouth 2 times daily     Cholecalciferol (VITAMIN D) 2000 UNITS  tablet Take 1 tablet by mouth daily     simvastatin (ZOCOR) 40 MG tablet TAKE ONE TABLET BY MOUTH AT BEDTIME     tamsulosin (FLOMAX) 0.4 MG 24 hr capsule Take 1 capsule (0.4 mg) by mouth daily AM     prochlorperazine (COMPAZINE) 5 MG tablet Take 1 tablet (5 mg) by mouth every 6 hours as needed for nausea or vomiting (Patient not taking: Reported on 5/22/2017)     phosphorus tablet 250 mg (K PHOS NEUTRAL) 250 MG per tablet Take 2 tablets (500 mg) by mouth 3 times daily (Patient not taking: Reported on 5/22/2017)     metoprolol (TOPROL-XL) 25 MG 24 hr tablet Take 0.5 tablets (12.5 mg) by mouth daily AM (Patient not taking: Reported on 5/22/2017)     loperamide (IMODIUM) 2 MG capsule Use 2 caps PO after first loose stool.  Repeat 1 cap after each subsequent loose stool.  Max 8/24 hours. (Patient not taking: Reported on 5/22/2017)     diphenoxylate-atropine (LOMOTIL) 2.5-0.025 MG per tablet Take 1 tablet by mouth 4 times daily as needed for diarrhea Reported on 5/22/2017     HYDROcodone-acetaminophen (NORCO) 5-325 MG per tablet Take 1 tablet by mouth every 6 hours as needed for moderate to severe pain Reported on 5/22/2017     No current facility-administered medications for this visit.      Facility-Administered Medications Ordered in Other Visits   Medication     hydrocortisone sodium succinate (solu-CORTEF) injection     glycopyrrolate (ROBINUL) injection     neostigmine (PROSTIGMINE) injection     protamine injection     albuterol (PROAIR HFA, PROVENTIL HFA, VENTOLIN HFA) inhaler          ALLERGIES:  No Known Allergies    PHYSICAL EXAMINATION:  Vitals: /52 (BP Location: Right arm, Patient Position: Chair, Cuff Size: Adult Regular)  Pulse 97  Temp 98.3  F (36.8  C) (Oral)  Resp 16  SpO2 97%   Wt Readings from Last 10 Encounters:   05/15/17 57.2 kg (126 lb)   05/08/17 57.2 kg (126 lb)   05/03/17 63 kg (139 lb)   04/28/17 62.8 kg (138 lb 8 oz)   04/05/17 66.5 kg (146 lb 8 oz)   03/31/17 61.7 kg (136 lb)    03/31/17 61.7 kg (136 lb)   03/29/17 67.9 kg (149 lb 9.6 oz)   03/15/17 66.8 kg (147 lb 4.8 oz)   03/09/17 63 kg (139 lb)       GENERAL:  A pleasant person in no acute distress.    LYMPH NODES:  No peripheral lymphadenopathy noted in the supraclavicular or cervical regions.   LUNGS:  Clear to auscultation bilaterally.   HEART:  Regular rate and rhythm   ABDOMEN:  Bowel sounds are active.  Soft and nontender.  No hepatosplenomegaly or other masses appreciated.  LOWER EXTREMITIES:  Without pitting edema to the knees bilaterally.   NEUROLOGICAL:  Alert/orientated/able to answer all questions.  CN grossly intact.     LAB:      5/3/2017 09:35 5/15/2017 11:02 5/22/2017 11:43   WBC 3.7 (L) 2.6 (L) 2.3 (L)   Hemoglobin 7.5 (L) 10.5 (L) 9.9 (L)   Hematocrit 23.3 (L) 31.8 (L) 30.0 (L)   Platelet Count 135 (L) 107 (L) 109 (L)   RBC Count 2.49 (L) 3.48 (L) 3.19 (L)   MCV 94 91 94   MCH 30.1 30.2 31.0   MCHC 32.2 33.0 33.0   RDW 17.3 (H) 15.6 (H) 16.3 (H)   Diff Method Automated Method Automated Method Manual Differential   % Neutrophils 57.5 57.8 62.1   % Lymphocytes 15.6 15.6 25.9   % Monocytes 16.7 16.0 8.3   % Eosinophils 6.7 8.2 1.9   % Basophils 0.5 1.2 0.9   % Metamyelocytes   0.9   % Immature Granulocytes 3.0 1.2    Nucleated RBCs 0 0    Absolute Neutrophil 2.1 1.5 (L) 1.4 (L)      5/8/2017 14:34 5/15/2017 11:02 5/22/2017 11:43   Sodium 137 140 138   Potassium 4.1 3.9 4.6   Chloride 105 108 106   Carbon Dioxide 21 20 21   Urea Nitrogen 23 22 17   Creatinine 0.80 0.82 0.92   GFR Estimate >90... >90... 81   GFR Estimate If Black >90... >90... >90...   Calcium 9.0 8.8 8.7   Anion Gap 11 12 11   Albumin 3.3 (L) 3.4 3.3 (L)   Protein Total 7.5 7.6 7.6   Bilirubin Total 1.2 0.9 1.4 (H)   Alkaline Phosphatase 370 (H) 398 (H) 388 (H)   ALT 30 29 28   AST 30 28 29     IMPRESSION/PLAN:   Metastatic prostate cancer: on zytiga/pred and xofigo and xgeva. Mainor has had ongoing on and off diarrhea, infection work up has been negative.   Zytiga was stopped and didn't help.  Mainor felt like his Xofigo dose is too high. We spoke to curtis in nuc med and Mainors weight changes have all been within the 10% acceptable variation.  However, Curtis agreed to give him a smaller dose.  It seems Mainor is doing MUCH better with this cycle. No low BP and no diarrhea and overall feeling at baseline.  No need for IVF today or Thursday.      Mainor and I reviewed his PSA is bouncing around, but given the FTT and diarrhea were likely from the [place], we discussed re-starting his Zytiga which he did on 5/19. So far, mild early satiety and softer stool- but no adverse effect  -Lupron 5/15   -dr Robles on 5/31 for final cycle of Radium     Anemia: likely secondary to the radium as his platelets and ANC are low as well.  2 units helped tremendously bump to 10.5 and 9.9 today.  No dizziness or MOURA    Heme: on lovenox for PE dx 11/2016. Had inpatient stay for GI bleed 1/11-1/14. Stopped xarelto and transitioned back to lovenox BID. No bleeding concerns. Recently dropped from 60mg BID to 50mg BID to 40 mg BID due to high Xa level. Continue at 40 mg BID.  Had blood tinged sputum- will recheck Xa next week.     Bone mets: on xgeva Q 6 weeks.  -5/15    CV: hx of CHF. No clinical s/s of failure.  Off metoprolol due to BP being 100/60 range daily. Reschedule cards appt.       Sabrina Mcguire PA-C

## 2017-05-24 VITALS — BODY MASS INDEX: 20.19 KG/M2 | WEIGHT: 127 LBS

## 2017-05-24 DIAGNOSIS — C79.51 PROSTATE CANCER METASTATIC TO BONE (H): Primary | ICD-10-CM

## 2017-05-24 DIAGNOSIS — I50.22 CHRONIC SYSTOLIC HEART FAILURE (H): ICD-10-CM

## 2017-05-24 DIAGNOSIS — C61 PROSTATE CANCER METASTATIC TO BONE (H): Primary | ICD-10-CM

## 2017-05-24 LAB
ALBUMIN SERPL-MCNC: 3.3 G/DL (ref 3.4–5)
ALP SERPL-CCNC: 383 U/L (ref 40–150)
ALT SERPL W P-5'-P-CCNC: 25 U/L (ref 0–70)
ANION GAP SERPL CALCULATED.3IONS-SCNC: 11 MMOL/L (ref 3–14)
AST SERPL W P-5'-P-CCNC: 28 U/L (ref 0–45)
BASOPHILS # BLD AUTO: 0 10E9/L (ref 0–0.2)
BASOPHILS NFR BLD AUTO: 0.8 %
BILIRUB SERPL-MCNC: 0.9 MG/DL (ref 0.2–1.3)
BUN SERPL-MCNC: 21 MG/DL (ref 7–30)
CALCIUM SERPL-MCNC: 8.6 MG/DL (ref 8.5–10.1)
CHLORIDE SERPL-SCNC: 108 MMOL/L (ref 94–109)
CO2 SERPL-SCNC: 22 MMOL/L (ref 20–32)
CREAT SERPL-MCNC: 0.8 MG/DL (ref 0.66–1.25)
DIFFERENTIAL METHOD BLD: ABNORMAL
EOSINOPHIL # BLD AUTO: 0.1 10E9/L (ref 0–0.7)
EOSINOPHIL NFR BLD AUTO: 4.3 %
ERYTHROCYTE [DISTWIDTH] IN BLOOD BY AUTOMATED COUNT: 16.7 % (ref 10–15)
GFR SERPL CREATININE-BSD FRML MDRD: ABNORMAL ML/MIN/1.7M2
GLUCOSE SERPL-MCNC: 117 MG/DL (ref 70–99)
HCT VFR BLD AUTO: 28.7 % (ref 40–53)
HGB BLD-MCNC: 9.4 G/DL (ref 13.3–17.7)
IMM GRANULOCYTES # BLD: 0 10E9/L (ref 0–0.4)
IMM GRANULOCYTES NFR BLD: 1.2 %
LYMPHOCYTES # BLD AUTO: 0.6 10E9/L (ref 0.8–5.3)
LYMPHOCYTES NFR BLD AUTO: 21.7 %
MCH RBC QN AUTO: 31.3 PG (ref 26.5–33)
MCHC RBC AUTO-ENTMCNC: 32.8 G/DL (ref 31.5–36.5)
MCV RBC AUTO: 96 FL (ref 78–100)
MONOCYTES # BLD AUTO: 0.3 10E9/L (ref 0–1.3)
MONOCYTES NFR BLD AUTO: 13.4 %
NEUTROPHILS # BLD AUTO: 1.5 10E9/L (ref 1.6–8.3)
NEUTROPHILS NFR BLD AUTO: 58.6 %
NRBC # BLD AUTO: 0 10*3/UL
NRBC BLD AUTO-RTO: 0 /100
PLATELET # BLD AUTO: 114 10E9/L (ref 150–450)
POTASSIUM SERPL-SCNC: 3.9 MMOL/L (ref 3.4–5.3)
PROT SERPL-MCNC: 7.4 G/DL (ref 6.8–8.8)
PSA SERPL-MCNC: 569 UG/L (ref 0–4)
RBC # BLD AUTO: 3 10E12/L (ref 4.4–5.9)
SODIUM SERPL-SCNC: 142 MMOL/L (ref 133–144)
WBC # BLD AUTO: 2.5 10E9/L (ref 4–11)

## 2017-05-24 PROCEDURE — 85025 COMPLETE CBC W/AUTO DIFF WBC: CPT | Performed by: INTERNAL MEDICINE

## 2017-05-24 PROCEDURE — 84153 ASSAY OF PSA TOTAL: CPT | Performed by: INTERNAL MEDICINE

## 2017-05-24 PROCEDURE — 80053 COMPREHEN METABOLIC PANEL: CPT | Performed by: INTERNAL MEDICINE

## 2017-05-24 NOTE — NURSING NOTE
Chief Complaint   Patient presents with     Blood Draw     Labs Drawn      Labs drawn peripherally. Pt refused weight in clinic, weight taken at home this am 127 lb and documented in patient's chart.     Lauren Schoen, RN

## 2017-05-31 ENCOUNTER — ONCOLOGY VISIT (OUTPATIENT)
Dept: ONCOLOGY | Facility: CLINIC | Age: 72
End: 2017-05-31
Attending: INTERNAL MEDICINE
Payer: COMMERCIAL

## 2017-05-31 ENCOUNTER — HOSPITAL ENCOUNTER (OUTPATIENT)
Dept: NUCLEAR MEDICINE | Facility: CLINIC | Age: 72
Setting detail: NUCLEAR MEDICINE
Discharge: HOME | End: 2017-05-31
Attending: INTERNAL MEDICINE | Admitting: INTERNAL MEDICINE
Payer: COMMERCIAL

## 2017-05-31 VITALS
SYSTOLIC BLOOD PRESSURE: 100 MMHG | BODY MASS INDEX: 20.35 KG/M2 | TEMPERATURE: 98.1 F | OXYGEN SATURATION: 100 % | HEART RATE: 82 BPM | DIASTOLIC BLOOD PRESSURE: 51 MMHG | RESPIRATION RATE: 16 BRPM | WEIGHT: 128 LBS

## 2017-05-31 DIAGNOSIS — I50.22 CHRONIC SYSTOLIC HEART FAILURE (H): ICD-10-CM

## 2017-05-31 DIAGNOSIS — C61 PROSTATE CANCER METASTATIC TO BONE (H): ICD-10-CM

## 2017-05-31 DIAGNOSIS — C79.51 METASTATIC BONE TUMOR (H): ICD-10-CM

## 2017-05-31 DIAGNOSIS — C79.51 PROSTATE CANCER METASTATIC TO BONE (H): ICD-10-CM

## 2017-05-31 LAB
ALBUMIN SERPL-MCNC: 3.5 G/DL (ref 3.4–5)
ALP SERPL-CCNC: 378 U/L (ref 40–150)
ALT SERPL W P-5'-P-CCNC: 21 U/L (ref 0–70)
ANION GAP SERPL CALCULATED.3IONS-SCNC: 10 MMOL/L (ref 3–14)
AST SERPL W P-5'-P-CCNC: 28 U/L (ref 0–45)
BILIRUB SERPL-MCNC: 1.4 MG/DL (ref 0.2–1.3)
BUN SERPL-MCNC: 16 MG/DL (ref 7–30)
CALCIUM SERPL-MCNC: 8.4 MG/DL (ref 8.5–10.1)
CHLORIDE SERPL-SCNC: 108 MMOL/L (ref 94–109)
CO2 SERPL-SCNC: 21 MMOL/L (ref 20–32)
CREAT SERPL-MCNC: 0.84 MG/DL (ref 0.66–1.25)
GFR SERPL CREATININE-BSD FRML MDRD: 89 ML/MIN/1.7M2
GLUCOSE SERPL-MCNC: 114 MG/DL (ref 70–99)
POTASSIUM SERPL-SCNC: 3.9 MMOL/L (ref 3.4–5.3)
PROT SERPL-MCNC: 7.6 G/DL (ref 6.8–8.8)
PSA SERPL-MCNC: 542 UG/L (ref 0–4)
SODIUM SERPL-SCNC: 139 MMOL/L (ref 133–144)

## 2017-05-31 PROCEDURE — 40000268 ZZH STATISTIC NO CHARGES: Mod: ZF

## 2017-05-31 PROCEDURE — 80053 COMPREHEN METABOLIC PANEL: CPT | Performed by: INTERNAL MEDICINE

## 2017-05-31 PROCEDURE — 99212 OFFICE O/P EST SF 10 MIN: CPT | Mod: 25

## 2017-05-31 PROCEDURE — 99215 OFFICE O/P EST HI 40 MIN: CPT | Mod: 25 | Performed by: INTERNAL MEDICINE

## 2017-05-31 PROCEDURE — 36592 COLLECT BLOOD FROM PICC: CPT

## 2017-05-31 PROCEDURE — 40000141 ZZH STATISTIC PERIPHERAL IV START W/O US GUIDANCE

## 2017-05-31 PROCEDURE — 84153 ASSAY OF PSA TOTAL: CPT | Performed by: INTERNAL MEDICINE

## 2017-05-31 PROCEDURE — 25000125 ZZHC RX 250: Performed by: RADIOLOGY

## 2017-05-31 RX ADMIN — LIDOCAINE HYDROCHLORIDE 0.5 ML: 10 INJECTION, SOLUTION EPIDURAL; INFILTRATION; INTRACAUDAL; PERINEURAL at 08:30

## 2017-05-31 RX ADMIN — RADIUM RA 223 DICHLORIDE 87.9 UCI.: 30 INJECTION INTRAVENOUS at 10:45

## 2017-05-31 NOTE — NURSING NOTE
Chief Complaint   Patient presents with     Blood Draw     IV placed, blood drawn     Labs drawn peripherally via IV placed by vascular access. Patient tolerated well.     CURRY Mckeon RN, BSN

## 2017-05-31 NOTE — NURSING NOTE
"Oncology Rooming Note    May 31, 2017 9:31 AM   Oswaldo Win is a 72 year old male who presents for:    Chief Complaint   Patient presents with     Blood Draw     IV placed, blood drawn     Oncology Clinic Visit     Prostate cancer metastatic to bone      Initial Vitals: /51 (BP Location: Right arm, Patient Position: Chair, Cuff Size: Adult Regular)  Pulse 82  Temp 98.1  F (36.7  C)  Resp 16  Wt 58.1 kg (128 lb)  SpO2 100%  BMI 20.35 kg/m2 Estimated body mass index is 20.35 kg/(m^2) as calculated from the following:    Height as of 5/15/17: 1.689 m (5' 6.5\").    Weight as of this encounter: 58.1 kg (128 lb). Body surface area is 1.65 meters squared.  Data Unavailable Comment: Data Unavailable   No LMP for male patient.  Allergies reviewed: Yes  Medications reviewed: Yes    Medications: Medication refills not needed today.  Pharmacy name entered into [facility]: [facility] [place], [place] - [address]    Clinical concerns:      6 minutes for nursing intake (face to face time)     Netta Duran CMA              "

## 2017-05-31 NOTE — MR AVS SNAPSHOT
After Visit Summary   5/31/2017    Oswaldo Win    MRN: [id number]           Patient Information     Date Of Birth          1945        Visit Information        Provider Department      5/31/2017 9:15 AM Bentley Robles MD [facility]        Today's Diagnoses     Prostate cancer 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)        Chronic systolic heart failure (H)           Follow-ups after your visit        Your next 10 appointments already scheduled     May 31, 2017 11:00 AM CDT   NM RADIUM 223 XOFIGO THERAPY with [id number]   [facility] [place], Nuclear Medicine ([facility], [facility])    [address] [address]   [phone]           Please bring a list of your medicines to the exam. (Include vitamins, minerals and over-the-counter drugs.) You should wear comfortable clothes. Leave your valuables at home. Please bring related prior results and films.  Tell your doctor:   If you are breastfeeding or may be pregnant.   If you have had a barium test within the past few days. Barium may change the results of certain exams.   If you think you may need sedation (medicine to help you relax).  You may eat and drink as normal.  Please call your Imaging Department at your exam site with any questions.            Jun 05, 2017  2:45 PM CDT   [facility] Lab Draw with  whoactually LAB DRAW   [facility] Lab Draw ([facility])    [address] [place] [id number]   [phone]            Jun 05, 2017  3:10 PM CDT   (Arrive by 2:55 PM)   Return Visit with Gabriela Mcguire PA-C   [facility] ([facility])    [address] [place] [address]   [phone]            Jun 12, 2017 10:30 AM CDT   (Arrive by 10:15 AM)   CORE RETURN with Jill Juarez NP   [facility])    [address] [place] [address]  Se  3rd Floor  [facility] [id number] [phone]            Jul 12, 2017  4:45 PM CDT   (Arrive by 4:30 PM)   Return Visit with Bentley Robles MD   [facility] ([facility])    [address]  2nd Floor  [facility] [id number] [phone]              Who to contact     If you have questions or need follow up information about today's clinic visit or your schedule please contact [facility] directly at [phone].  Normal or non-critical lab and imaging results will be communicated to you by [employer], letter or phone within 4 business days after the clinic has received the results. If you do not hear from us within 7 days, please contact the clinic through [employer] or phone. If you have a critical or abnormal lab result, we will notify you by phone as soon as possible.  Submit refill requests through [employer] or call your pharmacy and they will forward the refill request to us. Please allow 3 business days for your refill to be completed.          Additional Information About Your Visit        MyChart Information     [employer] gives you secure access to your electronic health record. If you see a primary care provider, you can also send messages to your care team and make appointments. If you have questions, please call your primary care clinic.  If you do not have a primary care provider, please call [phone] and they will assist you.        Care EveryWhere ID     This is your Care EveryWhere ID. This could be used by other organizations to access your [facility] medical records  [id number]        Your Vitals Were     Pulse Temperature Respirations Pulse Oximetry BMI (Body Mass Index)       82 98.1  F (36.7  C) 16 100% 20.35 kg/m2        Blood Pressure from Last 3 Encounters:   05/31/17 100/51   05/22/17 106/52   05/15/17 97/56    Weight from Last 3 Encounters:   05/31/17 58.1 kg (128 lb)   05/24/17 57.6 kg (127 lb)   05/15/17  57.2 kg (126 lb)              We Performed the Following     Comprehensive metabolic panel     PSA tumor marker        Primary Care Provider Office Phone # Fax #    Lucrecia Collier [phone]       [facility] [address]  [facility] [address]        Thank you!     Thank you for choosing [facility]  for your care. Our goal is always to provide you with excellent care. Hearing back from our patients is one way we can continue to improve our services. Please take a few minutes to complete the written survey that you may receive in the mail after your visit with us. Thank you!             Your Updated Medication List - Protect others around you: Learn how to safely use, store and throw away your medicines at www.disposemymeds.org.          This list is accurate as of: 5/31/17  9:59 AM.  Always use your most recent med list.                   Brand Name Dispense Instructions for use    aspirin 81 MG tablet     90 tablet    Take 1 tablet (81 mg) by mouth daily       calcium carbonate 500 MG tablet    OS-RODNEY 500 mg [place]. [place]    120 tablet    Take 2 tablets (1,000 mg) by mouth 2 times daily       diphenoxylate-atropine 2.5-0.025 MG per tablet    LOMOTIL     Take 1 tablet by mouth 4 times daily as needed for diarrhea Reported on 5/22/2017       enoxaparin 40 MG/0.4ML injection    LOVENOX    60 Syringe    Inject 0.4 mLs (40 mg) Subcutaneous 2 times daily       HYDROcodone-acetaminophen 5-325 MG per tablet    NORCO     Take 1 tablet by mouth every 6 hours as needed for moderate to severe pain Reported on 5/22/2017       loperamide 2 MG capsule    IMODIUM    60 capsule    Use 2 caps PO after first loose stool.  Repeat 1 cap after each subsequent loose stool.  Max 8/24 hours.       metoprolol 25 MG 24 hr tablet    TOPROL-XL    45 tablet    Take 0.5 tablets (12.5 mg) by mouth daily AM       phosphorus tablet 250 mg 250 MG per tablet    K PHOS NEUTRAL    30 tablet    Take 2  tablets (500 mg) by mouth 3 times daily       predniSONE 5 MG tablet    DELTASONE    60 tablet    Take 1 tablet (5 mg) by mouth 2 times daily       prochlorperazine 5 MG tablet    COMPAZINE    30 tablet    Take 1 tablet (5 mg) by mouth every 6 hours as needed for nausea or vomiting       simvastatin 40 MG tablet    ZOCOR    90 tablet    TAKE ONE TABLET BY MOUTH AT BEDTIME       tamsulosin 0.4 MG capsule    FLOMAX    90 capsule    Take 1 capsule (0.4 mg) by mouth daily AM       vitamin D 2000 UNITS tablet     100 tablet    Take 1 tablet by mouth daily

## 2017-05-31 NOTE — LETTER
5/31/2017       RE: Oswaldo Win  [address]  [place] [address]     Dear Colleague,    Thank you for referring your patient, Oswaldo Win, to the [facility]. Please see a copy of my visit note below.    Oncology/Hematology Visit Note  May 31, 2017    DIAGNOSIS:  Mr. Win is a 71 year old male with a hx of CHF, CKD, CAD w/ hx of STEMI and CABG. He met with Dr. Robles at the end of July for consultation regarding metastatic prostate cancer. His story begins in May 2015 when he saw his PCP for back pain, present since Feb. He also had poor appetite, weight loss of 8lb and poor energy.  CT showed diffuse bony mets. CT Chest showed sclerotic mets throughout the bones. He was given degarelix on 7/22 in Urology. He was having pelvic pain and was evaluated by Dr Cavazos would did not feel XRT was appropriate.  He started on docetaxel 7/31. From 8/7-8/14 he was admitted with colitis, CHAR, elevated BNP, lactis acidosis, and poor appetite. He was discharged to a TCU and unfortunately, it sounds like he was not given his Lasix.  His BUBBA worsened and his breathing became compromised.  He was seen by Chelsi Corral on 8/21 and eventually transferred to the ED and admitted 8/21-8/24 for acute on chronic CHF.  At our last visit we discussed not pursuing further Taxotere at this time and discussed starting Zytiga when he was ready.  We pursued PT, OT, home lymphedema for supportive care at home in attempt for further rehabilitation. He was again admitted and discharged - 10/20/15.  He never started the Zytiga as he was still actively recovering from his cardiac issues.  His PSA remained stable, thus no intervention was pursued.  December 2015, Mainor met with Dr Robles and since his ECOG was back to a 1, they discussed re-challenging the Taxotere at a lower dose. 9/26/2016 he started Provenge off study. He continues on lupron every 3 mos and XGEVA. 9/26/2016 he started Provenge off study. He received 3 doses of  Provenge (last 10/28), on 3rd dose had fevers, chills, myalgias, was bedbound for a week due to these symptoms. During this time he held his coumadin for 5 days prior to a central line removal 10/31. Later than week he had sudden onset of right pleuritic chest pain and hemoptysis. Diagnosed with acute segmental/subsegmental PE on 11/8 and prescribed Lovenox.     Mainor was briefly on Zytiga and prednisone in November-December, however after one month his PSA went up, thus it was decided to add in Xofigo (1/11/17-- first).     On 12/28 he started xaralto and took his last pill on 1/10/17.  He stopped as he noticed melanotic stools and had some hemoptysis.  He was seen in clinic the next day with a hgb in th 6 range and was admitted.  Pan-scopes showed no active bleeding.  He was able to get his Xofigo in patient as well.  He was transitioned back to lovenox BID.     Mainor developed diarrhea on and off in the spring of 2017, Zytiga was stopped on 3/31 as it was unsure what was contributing.  Mainor felt it was related to the Xofigo and that his dose was too high as his weight is always at least 10 pounds higher in clinic than at home.      INTERVAL HISTORY:  Mr. 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     The last time I met Mainor was in March.  It has been quite some time since our last visit.  He had significant diarrhea with his radium therapy.  He also notes marked fatigue.  He notes that at one point in time he felt that this therapy was not worth proceeding on.  However, he realized that his weight had significantly changed and probably the dose was not adjusted appropriately for the decrease in weight, although the decrease was not above 10%.  His dose was adjusted last visit on 05/03, when he received 88 microcurie of radium instead of 100 microcurie in the past.  He has not had as much fatigue and denies any more residual diarrhea since then.  He is very happy with the results of the  small change in the dose.  He is relatively asymptomatic from his disease.  His weight has been stable; in fact, he has gained a pound or two over the last 4 weeks.       He feels he has not gained a whole lot from therapy with Provenge except for getting a pulmonary embolism which subsequently led to him being treated with anticoagulation including rivaroxaban.  This in turn gave him a GI bleed perpetuating his cycle of misery, but he is glad that he is out of it and is doing better.         Mainor has not had ANY N/V or diarrhea this cycle. He is feeling stronger and each week his stamina is better.  His appetite is better, he has never been a big eater.  His weight was up at least a pound on his home scale. His BP is stable and no MOURA or dizziness with ambulation.    No f/s/c. No chest pain. No black stools. No new  issues.  He is on lovenox 40 mg BID.      MEDICATIONS:   Current Outpatient Prescriptions   Medication Sig     enoxaparin (LOVENOX) 40 MG/0.4ML injection Inject 0.4 mLs (40 mg) Subcutaneous 2 times daily     aspirin 81 MG tablet Take 1 tablet (81 mg) by mouth daily     prochlorperazine (COMPAZINE) 5 MG tablet Take 1 tablet (5 mg) by mouth every 6 hours as needed for nausea or vomiting     phosphorus tablet 250 mg (K PHOS NEUTRAL) 250 MG per tablet Take 2 tablets (500 mg) by mouth 3 times daily     metoprolol (TOPROL-XL) 25 MG 24 hr tablet Take 0.5 tablets (12.5 mg) by mouth daily AM     loperamide (IMODIUM) 2 MG capsule Use 2 caps PO after first loose stool.  Repeat 1 cap after each subsequent loose stool.  Max 8/24 hours.     predniSONE (DELTASONE) 5 MG tablet Take 1 tablet (5 mg) by mouth 2 times daily     diphenoxylate-atropine (LOMOTIL) 2.5-0.025 MG per tablet Take 1 tablet by mouth 4 times daily as needed for diarrhea Reported on 5/22/2017     calcium carbonate (OS-RODNEY 500 MG [place]. [place]) 500 MG tablet Take 2 tablets (1,000 mg) by mouth 2 times daily     Cholecalciferol (VITAMIN D) 2000 UNITS tablet  Take 1 tablet by mouth daily     simvastatin (ZOCOR) 40 MG tablet TAKE ONE TABLET BY MOUTH AT BEDTIME     tamsulosin (FLOMAX) 0.4 MG 24 hr capsule Take 1 capsule (0.4 mg) by mouth daily AM     HYDROcodone-acetaminophen (NORCO) 5-325 MG per tablet Take 1 tablet by mouth every 6 hours as needed for moderate to severe pain Reported on 5/22/2017     No current facility-administered medications for this visit.      Facility-Administered Medications Ordered in Other Visits   Medication     hydrocortisone sodium succinate (solu-CORTEF) injection     glycopyrrolate (ROBINUL) injection     neostigmine (PROSTIGMINE) injection     protamine injection     albuterol (PROAIR HFA, PROVENTIL HFA, VENTOLIN HFA) inhaler          ALLERGIES:  No Known Allergies    PHYSICAL EXAMINATION:  Vitals: /51 (BP Location: Right arm, Patient Position: Chair, Cuff Size: Adult Regular)  Pulse 82  Temp 98.1  F (36.7  C)  Resp 16  Wt 58.1 kg (128 lb)  SpO2 100%  BMI 20.35 kg/m2   Wt Readings from Last 10 Encounters:   05/31/17 58.1 kg (128 lb)   05/24/17 57.6 kg (127 lb)   05/15/17 57.2 kg (126 lb)   05/08/17 57.2 kg (126 lb)   05/03/17 63 kg (139 lb)   04/28/17 62.8 kg (138 lb 8 oz)   04/05/17 66.5 kg (146 lb 8 oz)   03/31/17 61.7 kg (136 lb)   03/31/17 61.7 kg (136 lb)   03/29/17 67.9 kg (149 lb 9.6 oz)       GENERAL:  A pleasant person in no acute distress.    LYMPH NODES:  No peripheral lymphadenopathy noted in the supraclavicular or cervical regions.   LUNGS:  Clear to auscultation bilaterally.   HEART:  Regular rate and rhythm   ABDOMEN:  Bowel sounds are active.  Soft and nontender.  No hepatosplenomegaly or other masses appreciated.  LOWER EXTREMITIES:  Without pitting edema to the knees bilaterally.   NEUROLOGICAL:  Alert/orientated/able to answer all questions.  CN grossly intact.     LAB:   Recent Labs   Lab Test  05/31/17   0903  05/24/17   1124  05/22/17   1143  05/15/17   1102  05/08/17   1434   NA  139  142  138  140  137    POTASSIUM  3.9  3.9  4.6  3.9  4.1   CHLORIDE  108  108  106  108  105   CO2  21  22  21  20  21   ANIONGAP  10  11  11  12  11   BUN  16  21  17  22  23   CR  0.84  0.80  0.92  0.82  0.80   GLC  114*  117*  104*  125*  110*   RODNEY  8.4*  8.6  8.7  8.8  9.0     Recent Labs   Lab Test  04/05/17   0941  03/31/17   1411  03/15/17   1410  01/14/17   0808  01/13/17   1815  01/13/17   0801   MAG  2.0  2.2  2.4*  2.1  2.0  2.0   PHOS  2.3*  1.6*   --   2.0*  2.9  1.8*     Recent Labs   Lab Test  05/24/17   1124  05/22/17   1143  05/15/17   1102  05/03/17   0935  04/27/17   1604   WBC  2.5*  2.3*  2.6*  3.7*  3.4*   HGB  9.4*  9.9*  10.5*  7.5*  8.8*   PLT  114*  109*  107*  135*  135*   MCV  96  94  91  94  96   NEUTROPHIL  58.6  62.1  57.8  57.5  56.1     Recent Labs   Lab Test  05/31/17   0903  05/24/17   1124  05/22/17   1143   03/31/17   1411   12/21/16   1149  12/01/16   1247   BILITOTAL  1.4*  0.9  1.4*   < >  2.6*   < >  0.6  0.8   ALKPHOS  378*  383*  388*   < >  472*   < >  673*  649*   ALT  21  25  28   < >  16   < >  18  16   AST  28  28  29   < >  22   < >  24  22   ALBUMIN  3.5  3.3*  3.3*   < >  3.7   < >  3.5  3.8   LDH   --    --    --    --   409*   --   321*  348*    < > = values in this interval not displayed.     TSH   Date Value Ref Range Status   09/16/2015 3.18 0.40 - 4.00 mU/L Final     No results for input(s): CEA in the last 05576 hours.  Results for orders placed or performed during the hospital encounter of 05/03/17   NM [address] 223 Xofigo Therapy    Narrative    Examination:  NM RADIUM 223 XOFIGO THERAPY  5/3/2017 12:11 PM     Comparison:  Nuclear medicine RADIUM 223 therapy, 4/5/2017    History:  Painful bony metastasis    Procedure: After confirming the identity of the patient, and after  consultation with the ordering physician Dr. Robles the risks and  benefits of Radium 223 were discussed at length with the patient, and  all questions were answered. 88.6 uCi of Ra-223 were given by IV to  the  patient without complication.      Impression    Impression:    88.6 uCi of Ra-223 IV for therapy of metastatic  prostate cancer, treatment #5..    I have personally reviewed the examination and initial interpretation  and I agree with the findings.    MARIANNE JIMÉNEZ MD         IMPRESSION/PLAN:   Metastatic prostate cancer: on zytiga/pred and xofigo and xgeva. Mainor has had ongoing on and off diarrhea, infection work up has been negative.  Zytiga was stopped and didn't help.  Mainor felt like his Xofigo dose is too high. We spoke to curtis in nuc med and Ila weight changes have all been within the 10% acceptable variation.  However, Curtis agreed to give him a smaller dose.  It seems Mainor is doing  better since his last dose reduction. He denies any hypotension or diarrhea and overall feeling at baseline.  He has not needed IVF or PRBC transfusions.      I had a lengthy discussion with Mainor, who comes alone for this clinic visit as in the past.  I reviewed all his labs with him including his PSA and alkaline phosphatase.  Both of these have trended down, suggesting a response to radium therapy.  This is not very typical, so it is good news that both of these have been suggesting a treatment response.       Mainor for the most part is completely convinced that he was inappropriately treated with a higher dose of radium than he should have received.  He notes that his weight even today in the clinic was about 14 pounds higher than what he weighed himself earlier at his home in his underwear.  He points out that it is his body that needs the treatment and not his clothes.  He attributes this overdosing to the fluctuations in the weight measurements and the leniency in the dose permitting a 10% variation.  Having said that, since he has done much better with the lower dose of radium, I would not be opposed to continuing his last and final dose of this therapy similar to the last time since it was much better tolerated.       He  started his abiraterone on 05/19 and has been tolerating this well without any side effects.  We will continue with this therapy in the near future.  We discussed our subsequent management since he is completing his therapy with radium.  I will continue with abiraterone until he has clear disease progression and we establish that he is not getting any further benefit from this therapy.  We could skip the Lupron doses while he is on abiraterone if he remains on this for a longer period of time.       We would have some challenges in subsequent lines of therapy.  Typically I would choose to go back to chemotherapy, as he has received hormone therapy for quite some time now.  Unfortunately, his white cell counts have not completely recovered and they are still low at 2,500.  Our only alternative would be enzalutamide (Xtandi).  We will have to do a short course of chemotherapy and we can follow this by Xtandi.  We will also have options of clinical trials including either a phase III study with rucaparib or a phase I study with a PTT inhibitor.  I will see him in 4 weeks to assess his progress and again review subsequent lines of therapy.       Anemia: stable - off transfusions    Heme: on lovenox for PE dx 11/2016. Had inpatient stay for GI bleed 1/11-1/14. Stopped xarelto and transitioned back to lovenox BID. No bleeding concerns. Recently dropped from 60mg BID to 50mg BID to 40 mg BID due to high Xa level. Continue at 40 mg BID.  Had blood tinged sputum- will recheck Xa next week.     Bone mets: on xgeva Q 6 weeks.  -5/15    CV: hx of CHF. No clinical s/s of failure.  Off metoprolol due to BP being 100/60 range daily. Reschedule cards appt.     Over 25 min of direct face to face time spent with patient with more than 50% time spent in counseling and coordinating care.      Again, thank you for allowing me to participate in the care of your patient.      Sincerely,    Bentley Robles MD

## 2017-05-31 NOTE — PROGRESS NOTES
Oncology/Hematology Visit Note  May 31, 2017    DIAGNOSIS:  Mr. Win is a 71 year old male with a hx of CHF, CKD, CAD w/ hx of STEMI and CABG. He met with Dr. Robles at the end of July for consultation regarding metastatic prostate cancer. His story begins in May 2015 when he saw his PCP for back pain, present since Feb. He also had poor appetite, weight loss of 8lb and poor energy.  CT showed diffuse bony mets. CT Chest showed sclerotic mets throughout the bones. He was given degarelix on 7/22 in Urology. He was having pelvic pain and was evaluated by Dr Cavazos would did not feel XRT was appropriate.  He started on docetaxel 7/31. From 8/7-8/14 he was admitted with colitis, CHAR, elevated BNP, lactis acidosis, and poor appetite. He was discharged to a TCU and unfortunately, it sounds like he was not given his Lasix.  His BUBBA worsened and his breathing became compromised.  He was seen by Chelsi Corral on 8/21 and eventually transferred to the ED and admitted 8/21-8/24 for acute on chronic CHF.  At our last visit we discussed not pursuing further Taxotere at this time and discussed starting Zytiga when he was ready.  We pursued PT, OT, home lymphedema for supportive care at home in attempt for further rehabilitation. He was again admitted and discharged - 10/20/15.  He never started the Zytiga as he was still actively recovering from his cardiac issues.  His PSA remained stable, thus no intervention was pursued.  December 2015, Mainor met with Dr Robles and since his ECOG was back to a 1, they discussed re-challenging the Taxotere at a lower dose. 9/26/2016 he started Provenge off study. He continues on lupron every 3 mos and XGEVA. 9/26/2016 he started Provenge off study. He received 3 doses of Provenge (last 10/28), on 3rd dose had fevers, chills, myalgias, was bedbound for a week due to these symptoms. During this time he held his coumadin for 5 days prior to a central line removal 10/31. Later than week he had sudden onset  of right pleuritic chest pain and hemoptysis. Diagnosed with acute segmental/subsegmental PE on 11/8 and prescribed Lovenox.     Mainor was briefly on Zytiga and prednisone in November-December, however after one month his PSA went up, thus it was decided to add in Xofigo (1/11/17-- first).     On 12/28 he started xaralto and took his last pill on 1/10/17.  He stopped as he noticed melanotic stools and had some hemoptysis.  He was seen in clinic the next day with a hgb in th 6 range and was admitted.  Pan-scopes showed no active bleeding.  He was able to get his Xofigo in patient as well.  He was transitioned back to lovenox BID.     Mainor developed diarrhea on and off in the spring of 2017, Zytiga was stopped on 3/31 as it was unsure what was contributing.  Mainor felt it was related to the Xofigo and that his dose was too high as his weight is always at least 10 pounds higher in clinic than at home.      INTERVAL HISTORY:  Mr. 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     The last time I met Mainor was in March.  It has been quite some time since our last visit.  He had significant diarrhea with his radium therapy.  He also notes marked fatigue.  He notes that at one point in time he felt that this therapy was not worth proceeding on.  However, he realized that his weight had significantly changed and probably the dose was not adjusted appropriately for the decrease in weight, although the decrease was not above 10%.  His dose was adjusted last visit on 05/03, when he received 88 microcurie of radium instead of 100 microcurie in the past.  He has not had as much fatigue and denies any more residual diarrhea since then.  He is very happy with the results of the small change in the dose.  He is relatively asymptomatic from his disease.  His weight has been stable; in fact, he has gained a pound or two over the last 4 weeks.       He feels he has not gained a whole lot from therapy with Provenge  except for getting a pulmonary embolism which subsequently led to him being treated with anticoagulation including rivaroxaban.  This in turn gave him a GI bleed perpetuating his cycle of misery, but he is glad that he is out of it and is doing better.         Mainor has not had ANY N/V or diarrhea this cycle. He is feeling stronger and each week his stamina is better.  His appetite is better, he has never been a big eater.  His weight was up at least a pound on his home scale. His BP is stable and no MOURA or dizziness with ambulation.    No f/s/c. No chest pain. No black stools. No new  issues.  He is on lovenox 40 mg BID.      MEDICATIONS:   Current Outpatient Prescriptions   Medication Sig     enoxaparin (LOVENOX) 40 MG/0.4ML injection Inject 0.4 mLs (40 mg) Subcutaneous 2 times daily     aspirin 81 MG tablet Take 1 tablet (81 mg) by mouth daily     prochlorperazine (COMPAZINE) 5 MG tablet Take 1 tablet (5 mg) by mouth every 6 hours as needed for nausea or vomiting     phosphorus tablet 250 mg (K PHOS NEUTRAL) 250 MG per tablet Take 2 tablets (500 mg) by mouth 3 times daily     metoprolol (TOPROL-XL) 25 MG 24 hr tablet Take 0.5 tablets (12.5 mg) by mouth daily AM     loperamide (IMODIUM) 2 MG capsule Use 2 caps PO after first loose stool.  Repeat 1 cap after each subsequent loose stool.  Max 8/24 hours.     predniSONE (DELTASONE) 5 MG tablet Take 1 tablet (5 mg) by mouth 2 times daily     diphenoxylate-atropine (LOMOTIL) 2.5-0.025 MG per tablet Take 1 tablet by mouth 4 times daily as needed for diarrhea Reported on 5/22/2017     calcium carbonate (OS-RODNEY 500 MG [place]) 500 MG tablet Take 2 tablets (1,000 mg) by mouth 2 times daily     Cholecalciferol (VITAMIN D) 2000 UNITS tablet Take 1 tablet by mouth daily     simvastatin (ZOCOR) 40 MG tablet TAKE ONE TABLET BY MOUTH AT BEDTIME     tamsulosin (FLOMAX) 0.4 MG 24 hr capsule Take 1 capsule (0.4 mg) by mouth daily AM     HYDROcodone-acetaminophen (NORCO) 5-325 MG  per tablet Take 1 tablet by mouth every 6 hours as needed for moderate to severe pain Reported on 5/22/2017     No current facility-administered medications for this visit.      Facility-Administered Medications Ordered in Other Visits   Medication     hydrocortisone sodium succinate (solu-CORTEF) injection     glycopyrrolate (ROBINUL) injection     neostigmine (PROSTIGMINE) injection     protamine injection     albuterol (PROAIR HFA, PROVENTIL HFA, VENTOLIN HFA) inhaler          ALLERGIES:  No Known Allergies    PHYSICAL EXAMINATION:  Vitals: /51 (BP Location: Right arm, Patient Position: Chair, Cuff Size: Adult Regular)  Pulse 82  Temp 98.1  F (36.7  C)  Resp 16  Wt 58.1 kg (128 lb)  SpO2 100%  BMI 20.35 kg/m2   Wt Readings from Last 10 Encounters:   05/31/17 58.1 kg (128 lb)   05/24/17 57.6 kg (127 lb)   05/15/17 57.2 kg (126 lb)   05/08/17 57.2 kg (126 lb)   05/03/17 63 kg (139 lb)   04/28/17 62.8 kg (138 lb 8 oz)   04/05/17 66.5 kg (146 lb 8 oz)   03/31/17 61.7 kg (136 lb)   03/31/17 61.7 kg (136 lb)   03/29/17 67.9 kg (149 lb 9.6 oz)       GENERAL:  A pleasant person in no acute distress.    LYMPH NODES:  No peripheral lymphadenopathy noted in the supraclavicular or cervical regions.   LUNGS:  Clear to auscultation bilaterally.   HEART:  Regular rate and rhythm   ABDOMEN:  Bowel sounds are active.  Soft and nontender.  No hepatosplenomegaly or other masses appreciated.  LOWER EXTREMITIES:  Without pitting edema to the knees bilaterally.   NEUROLOGICAL:  Alert/orientated/able to answer all questions.  CN grossly intact.     LAB:   Recent Labs   Lab Test  05/31/17   0903  05/24/17   1124  05/22/17   1143  05/15/17   1102  05/08/17   1434   NA  139  142  138  140  137   POTASSIUM  3.9  3.9  4.6  3.9  4.1   CHLORIDE  108  108  106  108  105   CO2  21  22  21  20  21   ANIONGAP  10  11  11  12  11   BUN  16  21  17  22  23   CR  0.84  0.80  0.92  0.82  0.80   GLC  114*  117*  104*  125*  110*   RODNEY   8.4*  8.6  8.7  8.8  9.0     Recent Labs   Lab Test  04/05/17   0941  03/31/17   1411  03/15/17   1410  01/14/17   0808  01/13/17   1815  01/13/17   0801   MAG  2.0  2.2  2.4*  2.1  2.0  2.0   PHOS  2.3*  1.6*   --   2.0*  2.9  1.8*     Recent Labs   Lab Test  05/24/17   1124  05/22/17   1143  05/15/17   1102  05/03/17   0935  04/27/17   1604   WBC  2.5*  2.3*  2.6*  3.7*  3.4*   HGB  9.4*  9.9*  10.5*  7.5*  8.8*   PLT  114*  109*  107*  135*  135*   MCV  96  94  91  94  96   NEUTROPHIL  58.6  62.1  57.8  57.5  56.1     Recent Labs   Lab Test  05/31/17   0903  05/24/17   1124  05/22/17   1143   03/31/17   1411   12/21/16   1149  12/01/16   1247   BILITOTAL  1.4*  0.9  1.4*   < >  2.6*   < >  0.6  0.8   ALKPHOS  378*  383*  388*   < >  472*   < >  673*  649*   ALT  21  25  28   < >  16   < >  18  16   AST  28  28  29   < >  22   < >  24  22   ALBUMIN  3.5  3.3*  3.3*   < >  3.7   < >  3.5  3.8   LDH   --    --    --    --   409*   --   321*  348*    < > = values in this interval not displayed.     TSH   Date Value Ref Range Status   09/16/2015 3.18 0.40 - 4.00 mU/L Final     No results for input(s): CEA in the last 78043 hours.  Results for orders placed or performed during the hospital encounter of 05/03/17   NM [place] 223 Xofigo Therapy    Narrative    Examination:  NM RADIUM 223 XOFIGO THERAPY  5/3/2017 12:11 PM     Comparison:  Nuclear medicine RADIUM 223 therapy, 4/5/2017    History:  Painful bony metastasis    Procedure: After confirming the identity of the patient, and after  consultation with the ordering physician Dr. Robles the risks and  benefits of Radium 223 were discussed at length with the patient, and  all questions were answered. 88.6 uCi of Ra-223 were given by IV to  the patient without complication.      Impression    Impression:    88.6 uCi of Ra-223 IV for therapy of metastatic  prostate cancer, treatment #5..    I have personally reviewed the examination and initial interpretation  and I agree  with the findings.    MARIANNE JIMÉNEZ MD         IMPRESSION/PLAN:   Metastatic prostate cancer: on zytiga/pred and xofigo and xgeva. Mainor has had ongoing on and off diarrhea, infection work up has been negative.  Zytiga was stopped and didn't help.  Mainor felt like his Xofigo dose is too high. We spoke to curtis in nuc med and Ila weight changes have all been within the 10% acceptable variation.  However, Curtis agreed to give him a smaller dose.  It seems Mainor is doing  better since his last dose reduction. He denies any hypotension or diarrhea and overall feeling at baseline.  He has not needed IVF or PRBC transfusions.      I had a lengthy discussion with Mainor, who comes alone for this clinic visit as in the past.  I reviewed all his labs with him including his PSA and alkaline phosphatase.  Both of these have trended down, suggesting a response to radium therapy.  This is not very typical, so it is good news that both of these have been suggesting a treatment response.       Mainor for the most part is completely convinced that he was inappropriately treated with a higher dose of radium than he should have received.  He notes that his weight even today in the clinic was about 14 pounds higher than what he weighed himself earlier at his home in his underwear.  He points out that it is his body that needs the treatment and not his clothes.  He attributes this overdosing to the fluctuations in the weight measurements and the leniency in the dose permitting a 10% variation.  Having said that, since he has done much better with the lower dose of radium, I would not be opposed to continuing his last and final dose of this therapy similar to the last time since it was much better tolerated.       He started his abiraterone on 05/19 and has been tolerating this well without any side effects.  We will continue with this therapy in the near future.  We discussed our subsequent management since he is completing his therapy with  radium.  I will continue with abiraterone until he has clear disease progression and we establish that he is not getting any further benefit from this therapy.  We could skip the Lupron doses while he is on abiraterone if he remains on this for a longer period of time.       We would have some challenges in subsequent lines of therapy.  Typically I would choose to go back to chemotherapy, as he has received hormone therapy for quite some time now.  Unfortunately, his white cell counts have not completely recovered and they are still low at 2,500.  Our only alternative would be enzalutamide (Xtandi).  We will have to do a short course of chemotherapy and we can follow this by Xtandi.  We will also have options of clinical trials including either a phase III study with rucaparib or a phase I study with a PTT inhibitor.  I will see him in 4 weeks to assess his progress and again review subsequent lines of therapy.       Anemia: stable - off transfusions    Heme: on lovenox for PE dx 11/2016. Had inpatient stay for GI bleed 1/11-1/14. Stopped xarelto and transitioned back to lovenox BID. No bleeding concerns. Recently dropped from 60mg BID to 50mg BID to 40 mg BID due to high Xa level. Continue at 40 mg BID.  Had blood tinged sputum- will recheck Xa next week.     Bone mets: on xgeva Q 6 weeks.  -5/15    CV: hx of CHF. No clinical s/s of failure.  Off metoprolol due to BP being 100/60 range daily. Reschedule cards appt.     Over 25 min of direct face to face time spent with patient with more than 50% time spent in counseling and coordinating care.

## 2017-06-05 ENCOUNTER — ONCOLOGY VISIT (OUTPATIENT)
Dept: ONCOLOGY | Facility: CLINIC | Age: 72
End: 2017-06-05
Attending: PHYSICIAN ASSISTANT
Payer: COMMERCIAL

## 2017-06-05 ENCOUNTER — APPOINTMENT (OUTPATIENT)
Dept: LAB | Facility: CLINIC | Age: 72
End: 2017-06-05
Attending: INTERNAL MEDICINE
Payer: COMMERCIAL

## 2017-06-05 VITALS
HEART RATE: 94 BPM | DIASTOLIC BLOOD PRESSURE: 47 MMHG | RESPIRATION RATE: 16 BRPM | OXYGEN SATURATION: 97 % | TEMPERATURE: 98.5 F | HEIGHT: 67 IN | SYSTOLIC BLOOD PRESSURE: 91 MMHG

## 2017-06-05 DIAGNOSIS — I34.0 MITRAL VALVE INSUFFICIENCY, UNSPECIFIED ETIOLOGY: ICD-10-CM

## 2017-06-05 DIAGNOSIS — C79.51 PROSTATE CANCER METASTATIC TO BONE (H): ICD-10-CM

## 2017-06-05 DIAGNOSIS — C61 PROSTATE CANCER METASTATIC TO BONE (H): ICD-10-CM

## 2017-06-05 LAB
ALBUMIN SERPL-MCNC: 3.4 G/DL (ref 3.4–5)
ALP SERPL-CCNC: 367 U/L (ref 40–150)
ALT SERPL W P-5'-P-CCNC: 20 U/L (ref 0–70)
ANION GAP SERPL CALCULATED.3IONS-SCNC: 8 MMOL/L (ref 3–14)
AST SERPL W P-5'-P-CCNC: 24 U/L (ref 0–45)
BASOPHILS # BLD AUTO: 0 10E9/L (ref 0–0.2)
BASOPHILS NFR BLD AUTO: 0.7 %
BILIRUB SERPL-MCNC: 1 MG/DL (ref 0.2–1.3)
BUN SERPL-MCNC: 18 MG/DL (ref 7–30)
CALCIUM SERPL-MCNC: 9.2 MG/DL (ref 8.5–10.1)
CHLORIDE SERPL-SCNC: 108 MMOL/L (ref 94–109)
CO2 SERPL-SCNC: 23 MMOL/L (ref 20–32)
CREAT SERPL-MCNC: 0.92 MG/DL (ref 0.66–1.25)
DIFFERENTIAL METHOD BLD: ABNORMAL
EOSINOPHIL # BLD AUTO: 0 10E9/L (ref 0–0.7)
EOSINOPHIL NFR BLD AUTO: 1.1 %
ERYTHROCYTE [DISTWIDTH] IN BLOOD BY AUTOMATED COUNT: 17.9 % (ref 10–15)
GFR SERPL CREATININE-BSD FRML MDRD: 81 ML/MIN/1.7M2
GLUCOSE SERPL-MCNC: 132 MG/DL (ref 70–99)
HCT VFR BLD AUTO: 26.2 % (ref 40–53)
HGB BLD-MCNC: 8.7 G/DL (ref 13.3–17.7)
IMM GRANULOCYTES # BLD: 0 10E9/L (ref 0–0.4)
IMM GRANULOCYTES NFR BLD: 1.1 %
LMWH PPP CHRO-ACNC: 0.78 IU/ML
LYMPHOCYTES # BLD AUTO: 0.5 10E9/L (ref 0.8–5.3)
LYMPHOCYTES NFR BLD AUTO: 16.5 %
MCH RBC QN AUTO: 31.6 PG (ref 26.5–33)
MCHC RBC AUTO-ENTMCNC: 33.2 G/DL (ref 31.5–36.5)
MCV RBC AUTO: 95 FL (ref 78–100)
MONOCYTES # BLD AUTO: 0.4 10E9/L (ref 0–1.3)
MONOCYTES NFR BLD AUTO: 12.7 %
NEUTROPHILS # BLD AUTO: 1.9 10E9/L (ref 1.6–8.3)
NEUTROPHILS NFR BLD AUTO: 67.9 %
NRBC # BLD AUTO: 0 10*3/UL
NRBC BLD AUTO-RTO: 0 /100
PLATELET # BLD AUTO: 94 10E9/L (ref 150–450)
POTASSIUM SERPL-SCNC: 4.6 MMOL/L (ref 3.4–5.3)
PROT SERPL-MCNC: 8 G/DL (ref 6.8–8.8)
RBC # BLD AUTO: 2.75 10E12/L (ref 4.4–5.9)
SODIUM SERPL-SCNC: 139 MMOL/L (ref 133–144)
WBC # BLD AUTO: 2.8 10E9/L (ref 4–11)

## 2017-06-05 PROCEDURE — 99212 OFFICE O/P EST SF 10 MIN: CPT | Mod: ZF

## 2017-06-05 PROCEDURE — 80053 COMPREHEN METABOLIC PANEL: CPT | Performed by: PHYSICIAN ASSISTANT

## 2017-06-05 PROCEDURE — 36415 COLL VENOUS BLD VENIPUNCTURE: CPT | Performed by: PHYSICIAN ASSISTANT

## 2017-06-05 PROCEDURE — 85520 HEPARIN ASSAY: CPT | Performed by: PHYSICIAN ASSISTANT

## 2017-06-05 PROCEDURE — 85025 COMPLETE CBC W/AUTO DIFF WBC: CPT | Performed by: PHYSICIAN ASSISTANT

## 2017-06-05 PROCEDURE — 99214 OFFICE O/P EST MOD 30 MIN: CPT | Mod: ZP | Performed by: PHYSICIAN ASSISTANT

## 2017-06-05 ASSESSMENT — PAIN SCALES - GENERAL: PAINLEVEL: NO PAIN (0)

## 2017-06-05 NOTE — MR AVS SNAPSHOT
After Visit Summary   6/5/2017    Oswaldo Win    MRN: [id number]           Patient Information     Date Of Birth          1945        Visit Information        Provider Department      6/5/2017 3:10 PM Gabriela Mcguire PA-C [facility]        Today's Diagnoses     Mitral valve insufficiency, unspecified etiology        Prostate cancer 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)           Follow-ups after your visit        Your next 10 appointments already scheduled     Jun 12, 2017 10:00 AM CDT   [facility] Lab Draw with  Urbful LAB DRAW   [facility] Draw ([facility])    [address] [place] [address]   [phone]            Jun 12, 2017 10:30 AM CDT   (Arrive by 10:15 AM)   CORE RETURN with Jill Juarez NP   [facility] ([facility])    [address] [place] [address]   [phone]            Jun 19, 2017  2:45 PM CDT   Masonic Lab Draw with  Urbful LAB DRAW   [facility] Lab Draw ([facility])    [address] [place] [address]   [phone]            Jun 19, 2017  3:10 PM CDT   (Arrive by 2:55 PM)   Return Visit with POLI Bridges [facility] ([facility])    [address] [place] [address]   [phone]            Jul 12, 2017 12:20 AM CDT   CT CHEST/ABDOMEN/PELVIS W CONTRAST with UUCT1   [facility], [place], [place] ([facility])    [address] [facility] [address]   [phone]           Please bring any scans or X-rays taken at other hospitals, if similar tests were done. Also bring a list of your medicines, including vitamins, minerals and over-the-counter drugs. It is safest to leave personal items at home.  Be sure to tell your doctor:   If you have any allergies.    If there s any chance you are pregnant.   If you are breastfeeding.   If you have any special needs.  You may have contrast for this exam. To prepare:   Do not eat or drink for 2 hours before your exam. If you need to take medicine, you may take it with small sips of water. (We may ask you to take liquid medicine as well.)   The day before your exam, drink extra fluids at least six 8-ounce glasses (unless your doctor tells you to restrict your fluids).  Patients over 70 or patients with diabetes or kidney problems:   If you haven t had a blood test (creatinine test) within the last 30 days, go to your clinic or Diagnostic Imaging Department for this test.  If you have diabetes:   If your kidney function is normal, continue taking your metformin (Avandamet, Glucophage, Glucovance, Metaglip) on the day of your exam.   If your kidney function is abnormal, wait 48 hours before restarting this medicine.  You will have oral contrast for this exam:   You will drink the contrast at home. Get this from your clinic or Diagnostic Imaging Department. Please follow the directions given.  Please wear loose clothing, such as a sweat suit or jogging clothes. Avoid snaps, zippers and other metal. We may ask you to undress and put on a hospital gown.  If you have any questions, please call the Imaging Department where you will have your exam.            Jul 12, 2017 10:00 AM CDT   NM INJECTION with UUNMINJ1   [facility], Nuclear Medicine ([facility])    [address] [facility] [id number] [phone]            Jul 12, 2017  1:00 PM CDT   NM BONE SCAN WHOLE BODY with UUNM3   [facility], Nuclear Medicine ([facility])    [address] [facility] [address] [phone]           Please bring a list of your medicines to the exam. (Include vitamins, minerals and over-the-counter drugs.) You should wear  comfortable clothes. Leave your valuables at home. Please bring related prior results and films. Tell your doctor:   If you are breastfeeding or may be pregnant.   If you have had a barium test within the past few days. Barium may change the results of certain exams.   If you think you may need sedation (medicine to help you relax).  You may eat and drink as normal.  Drink plenty of fluids and empty bladder frequently between injection and scans.            Jul 12, 2017  4:45 PM CDT   (Arrive by 4:30 PM)   Return Visit with Bentley Robles MD   [facility] ([facility])    [address] [address] [phone]              Future tests that were ordered for you today     Open Future Orders        Priority Expected Expires Ordered    ABO/Rh type and screen Routine 6/9/2017 6/16/2017 6/5/2017    CBC with platelets differential Routine 6/9/2017 6/16/2017 6/5/2017    CT Chest/Abdomen/Pelvis w Contrast Routine 7/5/2017 7/12/2017 6/5/2017    NM Bone whole body Routine 7/5/2017 7/12/2017 6/5/2017    Comprehensive metabolic panel Routine 7/5/2017 7/12/2017 6/5/2017    CBC with platelets differential Routine 7/5/2017 7/12/2017 6/5/2017    PSA tumor marker Routine 7/5/2017 7/12/2017 6/5/2017            Who to contact     If you have questions or need follow up information about today's clinic visit or your schedule please contact [facility] directly at [phone].  Normal or non-critical lab and imaging results will be communicated to you by MyChart, letter or phone within 4 business days after the clinic has received the results. If you do not hear from us within 7 days, please contact the clinic through MyChart or phone. If you have a critical or abnormal lab result, we will notify you by phone as soon as possible.  Submit refill requests through [email or web address] or call your pharmacy and they will forward the refill request to  "us. Please allow 3 business days for your refill to be completed.          Additional Information About Your Visit        [email or web address] Information     [employer] gives you secure access to your electronic health record. If you see a primary care provider, you can also send messages to your care team and make appointments. If you have questions, please call your primary care clinic.  If you do not have a primary care provider, please call [phone] and they will assist you.        Care EveryWhere ID     This is your Care EveryWhere ID. This could be used by other organizations to access your [facility] medical records  [id number]        Your Vitals Were     Pulse Temperature Respirations Height Pulse Oximetry       94 98.5  F (36.9  C) 16 1.689 m (5' 6.5\") 97%        Blood Pressure from Last 3 Encounters:   06/05/17 91/47   05/31/17 100/51   05/22/17 106/52    Weight from Last 3 Encounters:   05/31/17 58.1 kg (128 lb)   05/24/17 57.6 kg (127 lb)   05/15/17 57.2 kg (126 lb)              We Performed the Following     Anti XA Level     CBC with platelets differential     Comprehensive metabolic panel     EKG 12-lead, tracing only (Future)        Primary Care Provider Office Phone # Fax #    Lucrecia Collier [phone]       [facility] [address] [place] [id number]        Thank you!     Thank you for choosing [facility]  for your care. Our goal is always to provide you with excellent care. Hearing back from our patients is one way we can continue to improve our services. Please take a few minutes to complete the written survey that you may receive in the mail after your visit with us. Thank you!             Your Updated Medication List - Protect others around you: Learn how to safely use, store and throw away your medicines at www.disposemymeds.org.          This list is accurate as of: 6/5/17  4:02 PM.  Always use your most recent med list.                   Brand " Name Dispense Instructions for use    aspirin 81 MG tablet     90 tablet    Take 1 tablet (81 mg) by mouth daily       calcium carbonate 1250 MG tablet    OS-RODNEY 500 mg [place]. [place]    120 tablet    Take 2 tablets (1,000 mg) by mouth 2 times daily       diphenoxylate-atropine 2.5-0.025 MG per tablet    LOMOTIL     Take 1 tablet by mouth 4 times daily as needed for diarrhea Reported on 5/22/2017       enoxaparin 40 MG/0.4ML injection    LOVENOX    60 Syringe    Inject 0.4 mLs (40 mg) Subcutaneous 2 times daily       HYDROcodone-acetaminophen 5-325 MG per tablet    NORCO     Take 1 tablet by mouth every 6 hours as needed for moderate to severe pain Reported on 5/22/2017       loperamide 2 MG capsule    IMODIUM    60 capsule    Use 2 caps PO after first loose stool.  Repeat 1 cap after each subsequent loose stool.  Max 8/24 hours.       metoprolol 25 MG 24 hr tablet    TOPROL-XL    45 tablet    Take 0.5 tablets (12.5 mg) by mouth daily AM       phosphorus tablet 250 mg 250 MG per tablet    K PHOS NEUTRAL    30 tablet    Take 2 tablets (500 mg) by mouth 3 times daily       predniSONE 5 MG tablet    DELTASONE    60 tablet    Take 1 tablet (5 mg) by mouth 2 times daily       prochlorperazine 5 MG tablet    COMPAZINE    30 tablet    Take 1 tablet (5 mg) by mouth every 6 hours as needed for nausea or vomiting       simvastatin 40 MG tablet    ZOCOR    90 tablet    TAKE ONE TABLET BY MOUTH AT BEDTIME       tamsulosin 0.4 MG capsule    FLOMAX    90 capsule    Take 1 capsule (0.4 mg) by mouth daily AM       vitamin D 2000 UNITS tablet     100 tablet    Take 1 tablet by mouth daily

## 2017-06-05 NOTE — NURSING NOTE
"Oncology Rooming Note    June 5, 2017 3:09 PM   Oswaldo Win is a 72 year old male who presents for:    Chief Complaint   Patient presents with     Labs Only     venipuncture, vitals checked     Oncology Clinic Visit     return patient for 1 week f/u related to Prostate cancer 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)     Initial Vitals: BP 91/47  Pulse 94  Temp 98.5  F (36.9  C)  Resp 16  Ht 1.689 m (5' 6.5\")  SpO2 97% Estimated body mass index is 20.35 kg/(m^2) as calculated from the following:    Height as of 5/15/17: 1.689 m (5' 6.5\").    Weight as of 5/31/17: 58.1 kg (128 lb). There is no height or weight on file to calculate BSA.  No Pain (0) Comment: ankle pain   No LMP for male patient.  Allergies reviewed: Yes  Medications reviewed: Yes    Medications: Medication refills not needed today.  Pharmacy name entered into [employer]: [facility] [place], [place] - [address]    Clinical concerns: none pancho was notified.    5 minutes for nursing intake (face to face time)     Parrish Barnes CMA            "

## 2017-06-05 NOTE — PROGRESS NOTES
Oncology/Hematology Visit Note  Jun 5, 2017    DIAGNOSIS:  Mr. Win is a 71 year old male with a hx of CHF, CKD, CAD w/ hx of STEMI and CABG. He met with Dr. Robles at the end of July for consultation regarding metastatic prostate cancer. His story begins in May 2015 when he saw his PCP for back pain, present since Feb. He also had poor appetite, weight loss of 8lb and poor energy.  CT showed diffuse bony mets. CT Chest showed sclerotic mets throughout the bones. He was given degarelix on 7/22 in Urology. He was having pelvic pain and was evaluated by Dr Cavazos would did not feel XRT was appropriate.  He started on docetaxel 7/31. From 8/7-8/14 he was admitted with colitis, CHAR, elevated BNP, lactis acidosis, and poor appetite. He was discharged to a TCU and unfortunately, it sounds like he was not given his Lasix.  His BUBBA worsened and his breathing became compromised.  He was seen by Chelsi Corral on 8/21 and eventually transferred to the ED and admitted 8/21-8/24 for acute on chronic CHF.  At our last visit we discussed not pursuing further Taxotere at this time and discussed starting Zytiga when he was ready.  We pursued PT, OT, home lymphedema for supportive care at home in attempt for further rehabilitation. He was again admitted and discharged - 10/20/15.  He never started the Zytiga as he was still actively recovering from his cardiac issues.  His PSA remained stable, thus no intervention was pursued.  December 2015, Mainor met with Dr Robles and since his ECOG was back to a 1, they discussed re-challenging the Taxotere at a lower dose. 9/26/2016 he started Provenge off study. He continues on lupron every 3 mos and XGEVA. 9/26/2016 he started Provenge off study. He received 3 doses of Provenge (last 10/28), on 3rd dose had fevers, chills, myalgias, was bedbound for a week due to these symptoms. During this time he held his coumadin for 5 days prior to a central line removal 10/31. Later than week he had sudden onset of  right pleuritic chest pain and hemoptysis. Diagnosed with acute segmental/subsegmental PE on 11/8 and prescribed Lovenox.     Mainor was briefly on Zytiga and prednisone in November-December, however after one month his PSA went up, thus it was decided to add in Xofigo (1/11/17-- first).     On 12/28 he started xaralto and took his last pill on 1/10/17.  He stopped as he noticed melanotic stools and had some hemoptysis.  He was seen in clinic the next day with a hgb in th 6 range and was admitted.  Pan-scopes showed no active bleeding.  He was able to get his Xofigo in patient as well.  He was transitioned back to lovenox BID.     Mainor developed diarrhea on and off in the spring of 2017, Zytiga was stopped on 3/31 as it was unsure what was contributing.  Mainor felt it was related to the Xofigo and that his dose was too high as his weight is always at least 10 pounds higher in clinic than at home.      INTERVAL HISTORY:  Mr. 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  For cycle 5 of the Xofigo- Mainor was dose reduced and he tolerated the entire cycle 5 without diarrhea, hypotension or weakness.  On 5/31 he started C6, his final cycle and he had one day of loose stool and nausea last Friday, but otherwise has been feeling just fine.  He is drinking fluids, Ensure and eating 2 meals a day.  No dizziness or dyspnea.  Formed stool over the weekend.      No f/s/c. No chest pain. No black stools. No new  issues.  He is on lovenox 40 mg BID.      MEDICATIONS:   Current Outpatient Prescriptions   Medication Sig     enoxaparin (LOVENOX) 40 MG/0.4ML injection Inject 0.4 mLs (40 mg) Subcutaneous 2 times daily     aspirin 81 MG tablet Take 1 tablet (81 mg) by mouth daily     phosphorus tablet 250 mg (K PHOS NEUTRAL) 250 MG per tablet Take 2 tablets (500 mg) by mouth 3 times daily     metoprolol (TOPROL-XL) 25 MG 24 hr tablet Take 0.5 tablets (12.5 mg) by mouth daily AM     loperamide (IMODIUM) 2 MG capsule  "Use 2 caps PO after first loose stool.  Repeat 1 cap after each subsequent loose stool.  Max 8/24 hours.     predniSONE (DELTASONE) 5 MG tablet Take 1 tablet (5 mg) by mouth 2 times daily     diphenoxylate-atropine (LOMOTIL) 2.5-0.025 MG per tablet Take 1 tablet by mouth 4 times daily as needed for diarrhea Reported on 5/22/2017     calcium carbonate (OS-RODNEY 500 MG [place]. [place]) 500 MG tablet Take 2 tablets (1,000 mg) by mouth 2 times daily     Cholecalciferol (VITAMIN D) 2000 UNITS tablet Take 1 tablet by mouth daily     simvastatin (ZOCOR) 40 MG tablet TAKE ONE TABLET BY MOUTH AT BEDTIME     tamsulosin (FLOMAX) 0.4 MG 24 hr capsule Take 1 capsule (0.4 mg) by mouth daily AM     HYDROcodone-acetaminophen (NORCO) 5-325 MG per tablet Take 1 tablet by mouth every 6 hours as needed for moderate to severe pain Reported on 5/22/2017     prochlorperazine (COMPAZINE) 5 MG tablet Take 1 tablet (5 mg) by mouth every 6 hours as needed for nausea or vomiting (Patient not taking: Reported on 6/5/2017)     No current facility-administered medications for this visit.      Facility-Administered Medications Ordered in Other Visits   Medication     hydrocortisone sodium succinate (solu-CORTEF) injection     glycopyrrolate (ROBINUL) injection     neostigmine (PROSTIGMINE) injection     protamine injection     albuterol (PROAIR HFA, PROVENTIL HFA, VENTOLIN HFA) inhaler          ALLERGIES:  No Known Allergies    PHYSICAL EXAMINATION:  Vitals: BP 91/47  Pulse 94  Temp 98.5  F (36.9  C)  Resp 16  Ht 1.689 m (5' 6.5\")  SpO2 97%   Wt Readings from Last 10 Encounters:   05/31/17 58.1 kg (128 lb)   05/24/17 57.6 kg (127 lb)   05/15/17 57.2 kg (126 lb)   05/08/17 57.2 kg (126 lb)   05/03/17 63 kg (139 lb)   04/28/17 62.8 kg (138 lb 8 oz)   04/05/17 66.5 kg (146 lb 8 oz)   03/31/17 61.7 kg (136 lb)   03/31/17 61.7 kg (136 lb)   03/29/17 67.9 kg (149 lb 9.6 oz)       GENERAL:  A pleasant person in no acute distress.  Mildly pale and thin " looking  LYMPH NODES:  No peripheral lymphadenopathy noted in the supraclavicular or cervical regions.   LUNGS:  Clear to auscultation bilaterally.   HEART:  Regular rate and rhythm   ABDOMEN:  Bowel sounds are active.  Soft and nontender.  No hepatosplenomegaly or other masses appreciated.  LOWER EXTREMITIES:  Without pitting edema to the knees bilaterally.   NEUROLOGICAL:  Alert/orientated/able to answer all questions.  CN grossly intact.     LAB:      5/22/2017 11:43 5/24/2017 11:24 6/5/2017 14:51   WBC 2.3 (L) 2.5 (L) 2.8 (L)   Hemoglobin 9.9 (L) 9.4 (L) 8.7 (L)   Hematocrit 30.0 (L) 28.7 (L) 26.2 (L)   Platelet Count 109 (L) 114 (L) 94 (L)   RBC Count 3.19 (L) 3.00 (L) 2.75 (L)   MCV 94 96 95      6/5/2017 14:51   Sodium 139   Potassium 4.6   Chloride 108   Carbon Dioxide 23   Urea Nitrogen 18   Creatinine 0.92   GFR Estimate 81   GFR Estimate If Black >90...   Calcium 9.2   Anion Gap 8   Albumin 3.4   Protein Total 8.0   Bilirubin Total 1.0   Alkaline Phosphatase 367 (H)   ALT 20   AST 24      5/3/2017 09:34 5/8/2017 14:34 5/24/2017 11:24 5/31/2017 09:03   .00 (H) 626.00 (H) 569.00 (H) 542.00 (H)       IMPRESSION/PLAN:   Metastatic prostate cancer: on zytiga/pred and xofigo and xgeva. Mainor has had ongoing on and off diarrhea, infection work up has been negative.  Zytiga was stopped (march thru early may) and didn't help.  Mainor felt like his Xofigo dose is too high. We spoke to curtis in nuc med and Ila weight changes have all been within the 10% acceptable variation.  However, Curtis agreed to give him a smaller dose.  It seems Mainor is doing  better since his last dose reduction and cycle 5 was uneventful. 5/31 was his last cycle 6.  He had one day of loose stool last week, but is doing well today.   -last Lupron 5/15/17  -CT CAP and bone scan and Dr Robles on 7/12     Anemia: radium induced.  8.7 today, no MOURA or dizziness, but given more myelosuppression, I think Mainor may need a transfusion next week.       -Will set him up for CBC and T & C on 6/12.     Heme: on lovenox for PE dx 11/2016. Had inpatient stay for GI bleed 1/11-1/14. Stopped xarelto and transitioned back to lovenox BID. No bleeding concerns. Recently dropped from 60mg BID to 50mg BID to 40 mg BID due to high Xa level.   -Continue at 40 mg BID.      Bone mets: on xgeva Q 6 weeks.  -5/15 last- will be du again on 6/12    CV: hx of CHF. No clinical s/s of failure.  Off metoprolol due to BP being too low.  BP slightly lower today, but no symptoms.   -cards on 6/12    Sabrina Mcguire PA-C    Over 25 min of direct face to face time spent with patient with more than 50% time spent in counseling and coordinating care.

## 2017-06-08 ENCOUNTER — PRE VISIT (OUTPATIENT)
Dept: CARDIOLOGY | Facility: CLINIC | Age: 72
End: 2017-06-08

## 2017-06-12 ENCOUNTER — OFFICE VISIT (OUTPATIENT)
Dept: CARDIOLOGY | Facility: CLINIC | Age: 72
End: 2017-06-12
Attending: INTERNAL MEDICINE

## 2017-06-12 VITALS
DIASTOLIC BLOOD PRESSURE: 53 MMHG | OXYGEN SATURATION: 98 % | RESPIRATION RATE: 16 BRPM | TEMPERATURE: 98.4 F | HEART RATE: 90 BPM | BODY MASS INDEX: 20.35 KG/M2 | SYSTOLIC BLOOD PRESSURE: 98 MMHG | WEIGHT: 128 LBS

## 2017-06-12 VITALS
HEART RATE: 90 BPM | DIASTOLIC BLOOD PRESSURE: 53 MMHG | OXYGEN SATURATION: 99 % | BODY MASS INDEX: 20.57 KG/M2 | WEIGHT: 128 LBS | HEIGHT: 66 IN | SYSTOLIC BLOOD PRESSURE: 98 MMHG

## 2017-06-12 DIAGNOSIS — I50.22 CHRONIC SYSTOLIC HEART FAILURE (H): Primary | ICD-10-CM

## 2017-06-12 PROCEDURE — 40000809 ZZH STATISTIC NO DOCUMENTATION TO SUPPORT CHARGE

## 2017-06-12 PROCEDURE — 36415 COLL VENOUS BLD VENIPUNCTURE: CPT | Performed by: PHYSICIAN ASSISTANT

## 2017-06-12 PROCEDURE — 85027 COMPLETE CBC AUTOMATED: CPT | Performed by: PHYSICIAN ASSISTANT

## 2017-06-12 PROCEDURE — 99212 OFFICE O/P EST SF 10 MIN: CPT | Mod: ZF

## 2017-06-12 PROCEDURE — 99213 OFFICE O/P EST LOW 20 MIN: CPT | Mod: ZP | Performed by: NURSE PRACTITIONER

## 2017-06-12 ASSESSMENT — PAIN SCALES - GENERAL
PAINLEVEL: MILD PAIN (3)
PAINLEVEL: NO PAIN (0)

## 2017-06-12 NOTE — LETTER
6/12/2017      RE: Oswaldo Win  [address]  [facility] [address]       Dear Colleague,    Thank you for the opportunity to participate in the care of your patient, Oswaldo Win, at the [facility]. Please see a copy of my visit note below.    HPI:   Mr. Oswaldo Win is a 71 year old man with a past medical history including CAD with inferior STEMI (4/15) for which he received a BMS to posterior RCA and required IABP and vasopressor support for cardiogenic shock and amiodarone for VT. His EF by MRI at this point was down to 42% with mild MR. 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 Unfortunately, he returned with NSTEMI in September 2014 with in-stent thrombosis of the RCA BMS. A KIN was placed. He stabilized from a cardiac perspective but was diagnosed with diffuse metastatic prostate cancer for which he's undergoing therapy complicated recently by loose stools. He presents to CORE clinic for follow up.    Mr. Win is feeling well from a heart failure standpoint. No chest pain, palpitations, edema, or bloating. Minimal shortness of breath and no orthopnea or PND. He still struggles with appetite, though improved.     CURRENT MEDICATIONS:  Outpatient Medications Prior to Visit   Medication Sig Dispense Refill     enoxaparin (LOVENOX) 40 MG/0.4ML injection Inject 0.4 mLs (40 mg) Subcutaneous 2 times daily 60 Syringe 3     aspirin 81 MG tablet Take 1 tablet (81 mg) by mouth daily 90 tablet 3     prochlorperazine (COMPAZINE) 5 MG tablet Take 1 tablet (5 mg) by mouth every 6 hours as needed for nausea or vomiting (Patient not taking: Reported on 6/19/2017) 30 tablet 1     phosphorus tablet 250 mg (K PHOS NEUTRAL) 250 MG per tablet Take 2 tablets (500 mg) by mouth 3 times daily 30 tablet 1     metoprolol (TOPROL-XL) 25 MG 24 hr tablet Take 0.5 tablets (12.5 mg) by  mouth daily AM 45 tablet 3     loperamide (IMODIUM) 2 MG capsule Use 2 caps PO after first loose stool.  Repeat 1 cap after each subsequent loose stool.  Max 8/24 hours. (Patient not taking: Reported on 6/19/2017) 60 capsule 1     predniSONE (DELTASONE) 5 MG tablet Take 1 tablet (5 mg) by mouth 2 times daily 60 tablet 2     diphenoxylate-atropine (LOMOTIL) 2.5-0.025 MG per tablet Take 1 tablet by mouth 4 times daily as needed for diarrhea Reported on 5/22/2017       calcium carbonate (OS-RODNEY 500 MG [place]. [place]) 500 MG tablet Take 2 tablets (1,000 mg) by mouth 2 times daily 120 tablet 3     Cholecalciferol (VITAMIN D) 2000 UNITS tablet Take 1 tablet by mouth daily 100 tablet 3     simvastatin (ZOCOR) 40 MG tablet TAKE ONE TABLET BY MOUTH AT BEDTIME 90 tablet 3     tamsulosin (FLOMAX) 0.4 MG 24 hr capsule Take 1 capsule (0.4 mg) by mouth daily AM 90 capsule 3     HYDROcodone-acetaminophen (NORCO) 5-325 MG per tablet Take 1 tablet by mouth every 6 hours as needed for moderate to severe pain Reported on 5/22/2017       Facility-Administered Medications Prior to Visit   Medication Dose Route Frequency Provider Last Rate Last Dose     hydrocortisone sodium succinate (solu-CORTEF) injection    PRN Krissy Foss APRN CRNA   150 mg at 02/05/16 0942     glycopyrrolate (ROBINUL) injection   Intravenous PRN Rd Diamond MD   0.6 mg at 10/16/15 1300     neostigmine (PROSTIGMINE) injection    PRN Rd Diamond MD   3 mg at 10/16/15 1300     protamine injection   Intravenous PRN Rd Diamond MD   25 mg at 10/16/15 1216     albuterol (PROAIR HFA, PROVENTIL HFA, VENTOLIN HFA) inhaler    PRN Rd Diamond MD   5 puff at 10/16/15 1303     ROS:  Constitutional: No fever, chills, or sweats. Weights loss of almost 10 pounds in last 3-4 months  ENT: No visual disturbance, ear ache, epistaxis, sore throat.   Allergies/Immunologic: Negative.   Respiratory: per HPI, recent PE and hemoptysis.   Cardiovascular: Per HPI.   GI: No  "nausea, vomiting, hematemesis, melena, or hematochezia.    Psychiatric: Negative.   Neuro: Negative.   Musculoskeletal: No myalgias    PHYSICAL EXAM  BP 98/53  Pulse 90  Ht 1.676 m (5' 6\")  Wt 58.1 kg (128 lb)  SpO2 99%  BMI 20.66 kg/m2   General: pale man in no acute distress, ambulatory, alert, and articulate  HEENT: Sclera anicteric, EOMI. Dentition intact. Right jaw prominence along the mandible is soft, nontender and without redness or warmth  Chest: Respirations even and unlabored. Lungs clear throughout  CV: Regular rate and rhythm with normal S1 and S2 with 2/6 systolic murmur at the apex. No JVD   Abd: Flat, nondistended, nontender  Extremities: warm without edema   Psych: appropriate and intact    LABS  Last Basic Metabolic Panel:  Lab Results   Component Value Date     06/05/2017      Lab Results   Component Value Date    POTASSIUM 4.6 06/05/2017     Lab Results   Component Value Date    CHLORIDE 108 06/05/2017     Lab Results   Component Value Date    RODNEY 9.2 06/05/2017     Lab Results   Component Value Date    CO2 23 06/05/2017     Lab Results   Component Value Date    BUN 18 06/05/2017     Lab Results   Component Value Date    CR 0.92 06/05/2017     Lab Results   Component Value Date     06/05/2017     ASSESSMENT AND PLAN:  Mr. Win is a pleasant 71 year old man with a history of CAD s/p robotic CABG, BMS to RCA with in-stent thrombosis and subsequent KIN placement, ICM (EF 30-35%), metastatic prostate cancer, and status post MitraClip placement and PE who appears stable. No changes were made. He'll return to [facility] in 4-5 months or as needed.    1. Chronic Systolic heart failure; NYHA Class II  ACE/ARB: NO-stopped d/t hypotension and declining renal function  Beta blocker: 12.5 mg of Toprol daily  Aldost Antag: No- h/o hyperkalemia  ICD / CRT: No-Not indicated. Echo 5/5/16 showed EF 45-50%  Volume Status: euvolemic  2. CAD: Continue statin. Continue beta. Plavix (Mitraclip) has been " discontinued with the addition of warfarin. Continues aspirin  3. Mitral Regurgitation S/P MitraClip placement. On ASA and follow up with Dr. Kim 1 year  4. Provoked DVT (cancer) and recent PE: Continues Lovenox for now.  5. Prostate CA with bony mets: per oncology.     20 minutes was spent in direct patient contact of which >50% was spent counseling.    Jill Juarez NP

## 2017-06-12 NOTE — NURSING NOTE
Diet: Patient instructed regarding a heart failure healthy diet, including discussion of reduced fat and 2000 mg daily sodium restriction, daily weights, medication purpose and compliance, fluid restrictions and resources for patient and family to access for assistance with heart failure management.       Labs: Patient did not receive laboratory testing for the CORE clinic today; last results reviewed 6/5/17.    Med Reconcile: Reviewed and verified all current medications with the patient. The updated medication list was printed and given to the patient.    Return Appointment: Patient given instructions regarding scheduling next clinic visit.     Patient stated that he understood all health information given and agreed to call with further questions or concerns.    There were no changes made.   Pt had received labs 6/5/17. Labs today were ordered by Oncology. Jill would like to see him in 4-6 months.  Due to EPIC down, message sent to Pool Schedulers to call him for appt and to send AVS.

## 2017-06-12 NOTE — PATIENT INSTRUCTIONS
"You were seen today in the Cardiovascular Clinic at the [facility].     Cardiology Providers you saw during your visit: Jill Juarez NP       1. There were no medication changes.  2. Please make a follow-up CORE/heart failure appt with Jill in 4-6 months with labs prior.       Please limit your fluid intake to 2 L (64 ounces) daily.  2 Liters a day = 8.5 cups, or 72 ounces.  Please limit your salt intake to 2 grams a day or less.    If you gain 2# in 24 hours or 5# in one week call Marga Patten RN so we can adjust your medications as needed over the phone.    Please feel free to call me with any questions or concerns.      Marga Patten RN BSN [facility]  Cardiology Care Coordinator-Heart Failure Clinic    Questions and [phone].   First press #1 for the University and then press #3 for \"Medical Questions\" to reach the Cardiology Nurses.     On Call Cardiologist for after hours or on weekends: [phone]   option #4 and ask to speak to the on-call Cardiologist. Inform them you are a CORE/heart failure patient at the [facility].        If you need a medication refill please contact your pharmacy.  Please allow 3 business days for your refill to be completed.  _______________________________________________________  C.O.R.E[facility] CLINIC Cardiomyopathy, Optimization, Rehabilitation, Education   The C.O.R.E. CLINIC is a heart failure specialty clinic within the [facility] where you will work with specialized nurse practitioners dedicated to helping patients with heart failure carefully adjust medications, receive education, and learn who and when to call if symptoms develop. They specialize in helping you better understand your condition, slow the progression of your disease, improve the length and quality of your life, help you detect future heart problems before they become life threatening, and avoid hospitalizations.  As " always, thank you for trusting us with your health care needs!

## 2017-06-12 NOTE — MR AVS SNAPSHOT
"              After Visit Summary   2017    Oswaldo Win    MRN: [id number]           Patient Information     Date Of Birth          1945        Visit Information        Provider Department      2017 10:30 AM Jill Juarez NP [facility]        Care Instructions    You were seen today in the Cardiovascular Clinic at the [facility].     Cardiology Providers you saw during your visit: Jill Juarez NP       1. There were no medication changes.  2. Please make a follow-up CORE/heart failure appt with Jill in 4-6 months with labs prior.       Please limit your fluid intake to 2 L (64 ounces) daily.  2 Liters a day = 8.5 cups, or 72 ounces.  Please limit your salt intake to 2 grams a day or less.    If you gain 2# in 24 hours or 5# in one week call Marga Patten RN so we can adjust your medications as needed over the phone.    Please feel free to call me with any questions or concerns.      Marga Patten RN BSN [facility]  Cardiology Care Coordinator-Heart Failure Clinic    Questions and [phone].   First press #1 for the [facility] and then press #3 for \"Medical Questions\" to reach the Cardiology Nurses.     On Call Cardiologist for after hours or on weekends: [phone]   option #4 and ask to speak to the on-call Cardiologist. Inform them you are a CORE/heart failure patient at the [facility].        If you need a medication refill please contact your pharmacy.  Please allow 3 business days for your refill to be completed.  _______________________________________________________  C.O.R.E. CLINIC Cardiomyopathy, Optimization, Rehabilitation, Education   The C.O.R.E. CLINIC is a heart failure specialty clinic within the [facility] where you will work with specialized nurse practitioners dedicated to helping patients with heart failure carefully adjust medications, receive education, and learn who and " when to call if symptoms develop. They specialize in helping you better understand your condition, slow the progression of your disease, improve the length and quality of your life, help you detect future heart problems before they become life threatening, and avoid hospitalizations.  As always, thank you for trusting us with your health care needs!                  Follow-ups after your visit        Your next 10 appointments already scheduled     Jun 19, 2017  2:45 PM CDT   [facility] Lab Draw with  [facility] LAB DRAW   [facility] ([facility])    [address] [place] [address]   [phone]            Jun 19, 2017  3:10 PM CDT   (Arrive by 2:55 PM)   Return Visit with POLI Bridges [facility] ([facility])    [address] [place] [address]   [phone]            Jul 12, 2017 10:00 AM CDT   NM INJECTION with [id number]   [facility], [place], Nuclear Medicine ([facility])    [address] [address]   [phone]            Jul 12, 2017 12:20 PM CDT   CT CHEST/ABDOMEN/PELVIS W CONTRAST with UUCT1   [facility] [place], [place] ([facility])    [address] [address]   [phone]           Please bring any scans or X-rays taken at other hospitals, if similar tests were done. Also bring a list of your medicines, including vitamins, minerals and over-the-counter drugs. It is safest to leave personal items at home.  Be sure to tell your doctor:   If you have any allergies.   If there s any chance you are pregnant.   If you are breastfeeding.   If you have any special needs.  You may have contrast for this exam. To prepare:   Do not eat or drink for 2 hours before your exam. If you need to take medicine, you may take it with small sips of water. (We  may ask you to take liquid medicine as well.)   The day before your exam, drink extra fluids at least six 8-ounce glasses (unless your doctor tells you to restrict your fluids).  Patients over 70 or patients with diabetes or kidney problems:   If you haven t had a blood test (creatinine test) within the last 30 days, go to your clinic or Diagnostic Imaging Department for this test.  If you have diabetes:   If your kidney function is normal, continue taking your metformin (Avandamet, Glucophage, Glucovance, Metaglip) on the day of your exam.   If your kidney function is abnormal, wait 48 hours before restarting this medicine.  You will have oral contrast for this exam:   You will drink the contrast at home. Get this from your clinic or Diagnostic Imaging Department. Please follow the directions given.  Please wear loose clothing, such as a sweat suit or jogging clothes. Avoid snaps, zippers and other metal. We may ask you to undress and put on a hospital gown.  If you have any questions, please call the Imaging Department where you will have your exam.            Jul 12, 2017  1:00 PM CDT   NM BONE SCAN WHOLE BODY with UUNM3   [facility], [place], Nuclear Medicine ([facility])    [address] [address] [phone]           Please bring a list of your medicines to the exam. (Include vitamins, minerals and over-the-counter drugs.) You should wear comfortable clothes. Leave your valuables at home. Please bring related prior results and films. Tell your doctor:   If you are breastfeeding or may be pregnant.   If you have had a barium test within the past few days. Barium may change the results of certain exams.   If you think you may need sedation (medicine to help you relax).  You may eat and drink as normal.  Drink plenty of fluids and empty bladder frequently between injection and scans.            Jul 12, 2017  4:45 PM CDT   (Arrive by 4:30 PM)  "  Return Visit with Bentley Robles MD   [facility] ([facility])    [address]  2nd Floor  [facility] [address] [phone]              Who to contact     If you have questions or need follow up information about today's clinic visit or your schedule please contact [facility] directly at [phone].  Normal or non-critical lab and imaging results will be communicated to you by iWardahart, letter or phone within 4 business days after the clinic has received the results. If you do not hear from us within 7 days, please contact the clinic through iWardahart or phone. If you have a critical or abnormal lab result, we will notify you by phone as soon as possible.  Submit refill requests through Epuramat or call your pharmacy and they will forward the refill request to us. Please allow 3 business days for your refill to be completed.          Additional Information About Your Visit        [employer] Information     Epuramat gives you secure access to your electronic health record. If you see a primary care provider, you can also send messages to your care team and make appointments. If you have questions, please call your primary care clinic.  If you do not have a primary care provider, please call [phone] and they will assist you.        Care EveryWhere ID     This is your Care EveryWhere ID. This could be used by other organizations to access your [facility] medical records  [id number]        Your Vitals Were     Pulse Height Pulse Oximetry BMI (Body Mass Index)          90 1.676 m (5' 6\") 99% 20.66 kg/m2         Blood Pressure from Last 3 Encounters:   06/12/17 98/53   06/12/17 98/53   06/05/17 91/47    Weight from Last 3 Encounters:   06/12/17 58.1 kg (128 lb)   06/12/17 58.1 kg (128 lb)   05/31/17 58.1 kg (128 lb)              Today, you had the following     No orders found for display       Primary Care Provider Office Phone # Fax #    Lucrecia " CLAUDIA Collier -[phone]       [facility] [address]  [facility] [address]        Thank you!     Thank you for choosing [facility]  for your care. Our goal is always to provide you with excellent care. Hearing back from our patients is one way we can continue to improve our services. Please take a few minutes to complete the written survey that you may receive in the mail after your visit with us. Thank you!             Your Updated Medication List - Protect others around you: Learn how to safely use, store and throw away your medicines at www.disposemymeds.org.          This list is accurate as of: 6/12/17 11:59 PM.  Always use your most recent med list.                   Brand Name Dispense Instructions for use    aspirin 81 MG tablet     90 tablet    Take 1 tablet (81 mg) by mouth daily       calcium carbonate 1250 MG tablet    OS-RODNEY 500 mg [place]. [place]    120 tablet    Take 2 tablets (1,000 mg) by mouth 2 times daily       diphenoxylate-atropine 2.5-0.025 MG per tablet    LOMOTIL     Take 1 tablet by mouth 4 times daily as needed for diarrhea Reported on 5/22/2017       enoxaparin 40 MG/0.4ML injection    LOVENOX    60 Syringe    Inject 0.4 mLs (40 mg) Subcutaneous 2 times daily       HYDROcodone-acetaminophen 5-325 MG per tablet    NORCO     Take 1 tablet by mouth every 6 hours as needed for moderate to severe pain Reported on 5/22/2017       loperamide 2 MG capsule    IMODIUM    60 capsule    Use 2 caps PO after first loose stool.  Repeat 1 cap after each subsequent loose stool.  Max 8/24 hours.       metoprolol 25 MG 24 hr tablet    TOPROL-XL    45 tablet    Take 0.5 tablets (12.5 mg) by mouth daily AM       phosphorus tablet 250 mg 250 MG per tablet    K PHOS NEUTRAL    30 tablet    Take 2 tablets (500 mg) by mouth 3 times daily       predniSONE 5 MG tablet    DELTASONE    60 tablet    Take 1 tablet (5 mg) by mouth 2 times daily       prochlorperazine 5 MG tablet     COMPAZINE    30 tablet    Take 1 tablet (5 mg) by mouth every 6 hours as needed for nausea or vomiting       simvastatin 40 MG tablet    ZOCOR    90 tablet    TAKE ONE TABLET BY MOUTH AT BEDTIME       tamsulosin 0.4 MG capsule    FLOMAX    90 capsule    Take 1 capsule (0.4 mg) by mouth daily AM       vitamin D 2000 UNITS tablet     100 tablet    Take 1 tablet by mouth daily

## 2017-06-12 NOTE — PROGRESS NOTES
HPI:   Mr. Oswaldo Win is a 71 year old man with a past medical history including CAD with inferior STEMI (4/15) for which he received a BMS to posterior RCA and required IABP and vasopressor support for cardiogenic shock and amiodarone for VT. His EF by MRI at this point was down to 42% with mild MR. 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 Unfortunately, he returned with NSTEMI in September 2014 with in-stent thrombosis of the RCA BMS. A KIN was placed. He stabilized from a cardiac perspective but was diagnosed with diffuse metastatic prostate cancer for which he's undergoing therapy complicated recently by loose stools. He presents to CORE clinic for follow up.    Mr. Win is feeling well from a heart failure standpoint. No chest pain, palpitations, edema, or bloating. Minimal shortness of breath and no orthopnea or PND. He still struggles with appetite, though improved.     CURRENT MEDICATIONS:  Outpatient Medications Prior to Visit   Medication Sig Dispense Refill     enoxaparin (LOVENOX) 40 MG/0.4ML injection Inject 0.4 mLs (40 mg) Subcutaneous 2 times daily 60 Syringe 3     aspirin 81 MG tablet Take 1 tablet (81 mg) by mouth daily 90 tablet 3     prochlorperazine (COMPAZINE) 5 MG tablet Take 1 tablet (5 mg) by mouth every 6 hours as needed for nausea or vomiting (Patient not taking: Reported on 6/19/2017) 30 tablet 1     phosphorus tablet 250 mg (K PHOS NEUTRAL) 250 MG per tablet Take 2 tablets (500 mg) by mouth 3 times daily 30 tablet 1     metoprolol (TOPROL-XL) 25 MG 24 hr tablet Take 0.5 tablets (12.5 mg) by mouth daily AM 45 tablet 3     loperamide (IMODIUM) 2 MG capsule Use 2 caps PO after first loose stool.  Repeat 1 cap after each subsequent loose stool.  Max 8/24 hours. (Patient not taking: Reported on 6/19/2017) 60 capsule 1     predniSONE (DELTASONE) 5 MG tablet Take 1 tablet (5 mg) by mouth 2 times daily 60  "tablet 2     diphenoxylate-atropine (LOMOTIL) 2.5-0.025 MG per tablet Take 1 tablet by mouth 4 times daily as needed for diarrhea Reported on 5/22/2017       calcium carbonate (OS-RODNEY 500 MG [place]. [place]) 500 MG tablet Take 2 tablets (1,000 mg) by mouth 2 times daily 120 tablet 3     Cholecalciferol (VITAMIN D) 2000 UNITS tablet Take 1 tablet by mouth daily 100 tablet 3     simvastatin (ZOCOR) 40 MG tablet TAKE ONE TABLET BY MOUTH AT BEDTIME 90 tablet 3     tamsulosin (FLOMAX) 0.4 MG 24 hr capsule Take 1 capsule (0.4 mg) by mouth daily AM 90 capsule 3     HYDROcodone-acetaminophen (NORCO) 5-325 MG per tablet Take 1 tablet by mouth every 6 hours as needed for moderate to severe pain Reported on 5/22/2017       Facility-Administered Medications Prior to Visit   Medication Dose Route Frequency Provider Last Rate Last Dose     hydrocortisone sodium succinate (solu-CORTEF) injection    PRN Krissy Foss APRN CRNA   150 mg at 02/05/16 0942     glycopyrrolate (ROBINUL) injection   Intravenous PRN Rd Diamond MD   0.6 mg at 10/16/15 1300     neostigmine (PROSTIGMINE) injection    PRN Rd Diamond MD   3 mg at 10/16/15 1300     protamine injection   Intravenous PRN Rd Diamond MD   25 mg at 10/16/15 1216     albuterol (PROAIR HFA, PROVENTIL HFA, VENTOLIN HFA) inhaler    PRN Rd Diamond MD   5 puff at 10/16/15 1303     ROS:  Constitutional: No fever, chills, or sweats. Weights loss of almost 10 pounds in last 3-4 months  ENT: No visual disturbance, ear ache, epistaxis, sore throat.   Allergies/Immunologic: Negative.   Respiratory: per HPI, recent PE and hemoptysis.   Cardiovascular: Per HPI.   GI: No nausea, vomiting, hematemesis, melena, or hematochezia.    Psychiatric: Negative.   Neuro: Negative.   Musculoskeletal: No myalgias      PHYSICAL EXAM  BP 98/53  Pulse 90  Ht 1.676 m (5' 6\")  Wt 58.1 kg (128 lb)  SpO2 99%  BMI 20.66 kg/m2   General: pale man in no acute distress, ambulatory, alert, and " articulate  HEENT: Sclera anicteric, EOMI. Dentition intact. Right jaw prominence along the mandible is soft, nontender and without redness or warmth  Chest: Respirations even and unlabored. Lungs clear throughout  CV: Regular rate and rhythm with normal S1 and S2 with 2/6 systolic murmur at the apex. No JVD   Abd: Flat, nondistended, nontender  Extremities: warm without edema   Psych: appropriate and intact    LABS  Last Basic Metabolic Panel:  Lab Results   Component Value Date     06/05/2017      Lab Results   Component Value Date    POTASSIUM 4.6 06/05/2017     Lab Results   Component Value Date    CHLORIDE 108 06/05/2017     Lab Results   Component Value Date    RODNEY 9.2 06/05/2017     Lab Results   Component Value Date    CO2 23 06/05/2017     Lab Results   Component Value Date    BUN 18 06/05/2017     Lab Results   Component Value Date    CR 0.92 06/05/2017     Lab Results   Component Value Date     06/05/2017       ASSESSMENT AND PLAN:  Mr. Win is a pleasant 71 year old man with a history of CAD s/p robotic CABG, BMS to RCA with in-stent thrombosis and subsequent KIN placement, ICM (EF 30-35%), metastatic prostate cancer, and status post MitraClip placement and PE who appears stable. No changes were made. He'll return to CORE in 4-5 months or as needed.    1. Chronic Systolic heart failure; NYHA Class II  ACE/ARB: NO-stopped d/t hypotension and declining renal function  Beta blocker: 12.5 mg of Toprol daily  Aldost Antag: No- h/o hyperkalemia  ICD / CRT: No-Not indicated. Echo 5/5/16 showed EF 45-50%  Volume Status: euvolemic  2. CAD: Continue statin. Continue beta. Plavix (Mitraclip) has been discontinued with the addition of warfarin. Continues aspirin  3. Mitral Regurgitation S/P MitraClip placement. On ASA and follow up with Dr. Kim 1 year  4. Provoked DVT (cancer) and recent PE: Continues Lovenox for now.  5. Prostate CA with bony mets: per oncology.     20 minutes was spent in direct  patient contact of which >50% was spent counseling.

## 2017-06-12 NOTE — NURSING NOTE
Chief Complaint   Patient presents with     Follow Up For     Return CORE appt; 72yr old male with a h/o chronic systolic heart failure presenting for follow-up with labs prior     Vitals were taken and medications were reconciled.     CHRISTA Tomlin

## 2017-06-13 LAB
ERYTHROCYTE [DISTWIDTH] IN BLOOD BY AUTOMATED COUNT: 19.6 % (ref 10–15)
HCT VFR BLD AUTO: 28.4 % (ref 40–53)
HGB BLD-MCNC: 9.2 G/DL (ref 13.3–17.7)
MCH RBC QN AUTO: 33.1 PG (ref 26.5–33)
MCHC RBC AUTO-ENTMCNC: 32.4 G/DL (ref 31.5–36.5)
MCV RBC AUTO: 102 FL (ref 78–100)
PLATELET # BLD AUTO: 103 10E9/L (ref 150–450)
RBC # BLD AUTO: 2.78 10E12/L (ref 4.4–5.9)
WBC # BLD AUTO: 2.9 10E9/L (ref 4–11)

## 2017-06-15 ENCOUNTER — TELEPHONE (OUTPATIENT)
Dept: ONCOLOGY | Facility: CLINIC | Age: 72
End: 2017-06-15

## 2017-06-15 NOTE — TELEPHONE ENCOUNTER
Oral Chemotherapy Monitoring Program   Placed call to patient in follow up of Zytiga (abiraterone) therapy.   Left message requesting call back.   No drug names were mentioned.    Saida Woods RN  [facility]-Therapy Management  [phone]

## 2017-06-15 NOTE — TELEPHONE ENCOUNTER
"Oral Chemotherapy Monitoring Program    Primary Oncologist: Dr. Robles  Primary Oncology Clinic: [facility]  Cancer Diagnosis: Prostate Cancer     Therapy History:   Confirmed patient is taking Zytiga 1000mg (4 x 250mg) QD continuously, Start Date 11/12/16      Drug Interaction Assessment:   1. Zytiga + Metoprolol = potential increased serum concentrations of metoprolol via CYP2D6 inhibition (Category \"D\"). Per Dr. Robles, monitor BP's and for increased side effects of metoprolol.       Mainor is aware of these drug interactions and knows what changes to monitor for.     Lab Monitoring Plan  C1D1+   CMP, BP C2D1+ Call, CMP, BP C3D1+ Call, CMP, BP C4D1+ Call, CMP, BP C5D1+ Call, CMP, BP C6D1+ Call, CMP, BP   C1D8+   C2D8+   C3D8+   C4D8+   C5D8+   C6D8+     C1D15+ Call, CMP C2D15+ Call, CMP C3D15+   C4D15+   C5D15+   C6D15+     C1D22+   C2D22+   C3D22+   C4D22+   C5D22+   C6D22+       Subjective/Objective:  Oswaldo Win is a 72 year old male contacted by phone for a follow-up visit for oral chemotherapy.  Mainor reports having one episode of nausea and diarrhea last week. It was an isolated incident and  reports that he \"is not hitting the panic button quite yet.\" He continues to drink Ensure multiple times a day and eat 2 meals. He denies any signs of dehydration. Mainor was in good spirits and was excited to go out for pork chops and baked potatoes tonight. Mainor thanked me for the call and will call [facility] with any concerns.     ORAL CHEMOTHERAPY 11/14/2016 12/16/2016 2/3/2017 3/4/2017 6/15/2017   Drug Name Zytiga (Abiraterone) Zytiga (Abiraterone) Zytiga (Abiraterone) Zytiga (Abiraterone) Zytiga (Abiraterone)   Current Dosage 1000mg 1000mg 1000mg 1000mg 1000mg   Current Schedule Daily Daily Daily Daily Daily   Cycle Details Continuous Continuous Continuous Continuous Continuous   Start Date of Last Cycle 11/12/2016 12/12/2016 - 11/12/2016 -   Planned next cycle start date - 1/11/2017 - - -   Doses missed in last 2 weeks 0 0 0 0 0 " "  Adherence Assessment - - Adherent Adherent Adherent   Adverse Effects - Arthralgias - - No AE identified during assessment   Arthralgias - Grade 1 - - -   Pharmacist Intervention(arthralgias) - Yes - - -   Home BPs not done all BPs<140/90 - - all BPs<140/90   Any new drug interactions? Yes No - - No       Last PHQ-2 Score on record:   PHQ-2 ( 1999 [employer]) 4/5/2017 9/14/2016   Q1: Little interest or pleasure in doing things 0 0   Q2: Feeling down, depressed or hopeless 0 0   PHQ-2 Score 0 0       Patient does not report depression symptoms.      Vitals:  BP:   BP Readings from Last 1 Encounters:   06/12/17 98/53     Wt Readings from Last 1 Encounters:   06/12/17 58.1 kg (128 lb)     Estimated body surface area is 1.64 meters squared as calculated from the following:    Height as of 6/12/17: 1.676 m (5' 6\").    Weight as of 6/12/17: 58.1 kg (128 lb).    Labs:  Lab Results   Component Value Date     06/05/2017      Lab Results   Component Value Date    POTASSIUM 4.6 06/05/2017     Lab Results   Component Value Date    RODNEY 9.2 06/05/2017     Lab Results   Component Value Date    ALBUMIN 3.4 06/05/2017     Lab Results   Component Value Date    MAG 2.0 04/05/2017     Lab Results   Component Value Date    PHOS 2.3 04/05/2017     Lab Results   Component Value Date    BUN 18 06/05/2017     Lab Results   Component Value Date    CR 0.92 06/05/2017       Lab Results   Component Value Date    AST 24 06/05/2017     Lab Results   Component Value Date    ALT 20 06/05/2017     Lab Results   Component Value Date    BILITOTAL 1.0 06/05/2017       Lab Results   Component Value Date    WBC 2.9 06/12/2017     Lab Results   Component Value Date    HGB 9.2 06/12/2017     Lab Results   Component Value Date     06/12/2017     Lab Results   Component Value Date    ANEU 1.9 06/05/2017           Assessment:  Pt is tolerating current therapy well.     Plan:  Continue with current therapy.     Follow-Up:  4 weeks for assessment. "     Refill Due:  Pt reports he has a few weeks remaining. Due to be released to site 19 on 7/3.     Saida Woods RN  [facility]-Therapy Management  [phone]

## 2017-06-19 ENCOUNTER — ONCOLOGY VISIT (OUTPATIENT)
Dept: ONCOLOGY | Facility: CLINIC | Age: 72
End: 2017-06-19
Attending: PHYSICIAN ASSISTANT
Payer: COMMERCIAL

## 2017-06-19 ENCOUNTER — APPOINTMENT (OUTPATIENT)
Dept: LAB | Facility: CLINIC | Age: 72
End: 2017-06-19
Attending: INTERNAL MEDICINE
Payer: COMMERCIAL

## 2017-06-19 VITALS
SYSTOLIC BLOOD PRESSURE: 90 MMHG | HEIGHT: 66 IN | DIASTOLIC BLOOD PRESSURE: 47 MMHG | TEMPERATURE: 98.6 F | BODY MASS INDEX: 20.57 KG/M2 | WEIGHT: 128 LBS | OXYGEN SATURATION: 96 % | HEART RATE: 95 BPM | RESPIRATION RATE: 16 BRPM

## 2017-06-19 DIAGNOSIS — C61 PROSTATE CANCER METASTATIC TO BONE (H): ICD-10-CM

## 2017-06-19 DIAGNOSIS — C79.51 PROSTATE CANCER METASTATIC TO BONE (H): ICD-10-CM

## 2017-06-19 LAB
ALBUMIN SERPL-MCNC: 3.5 G/DL (ref 3.4–5)
ALP SERPL-CCNC: 322 U/L (ref 40–150)
ALT SERPL W P-5'-P-CCNC: 27 U/L (ref 0–70)
ANION GAP SERPL CALCULATED.3IONS-SCNC: 7 MMOL/L (ref 3–14)
AST SERPL W P-5'-P-CCNC: 35 U/L (ref 0–45)
BASOPHILS # BLD AUTO: 0 10E9/L (ref 0–0.2)
BASOPHILS NFR BLD AUTO: 1.1 %
BILIRUB SERPL-MCNC: 1.3 MG/DL (ref 0.2–1.3)
BUN SERPL-MCNC: 15 MG/DL (ref 7–30)
CALCIUM SERPL-MCNC: 8.4 MG/DL (ref 8.5–10.1)
CHLORIDE SERPL-SCNC: 110 MMOL/L (ref 94–109)
CO2 SERPL-SCNC: 22 MMOL/L (ref 20–32)
CREAT SERPL-MCNC: 0.85 MG/DL (ref 0.66–1.25)
DIFFERENTIAL METHOD BLD: ABNORMAL
EOSINOPHIL # BLD AUTO: 0 10E9/L (ref 0–0.7)
EOSINOPHIL NFR BLD AUTO: 2.2 %
ERYTHROCYTE [DISTWIDTH] IN BLOOD BY AUTOMATED COUNT: 19.2 % (ref 10–15)
GFR SERPL CREATININE-BSD FRML MDRD: 89 ML/MIN/1.7M2
GLUCOSE SERPL-MCNC: 120 MG/DL (ref 70–99)
HCT VFR BLD AUTO: 25.6 % (ref 40–53)
HGB BLD-MCNC: 8.7 G/DL (ref 13.3–17.7)
IMM GRANULOCYTES # BLD: 0 10E9/L (ref 0–0.4)
IMM GRANULOCYTES NFR BLD: 1.1 %
LYMPHOCYTES # BLD AUTO: 0.4 10E9/L (ref 0.8–5.3)
LYMPHOCYTES NFR BLD AUTO: 21.8 %
MCH RBC QN AUTO: 33.2 PG (ref 26.5–33)
MCHC RBC AUTO-ENTMCNC: 34 G/DL (ref 31.5–36.5)
MCV RBC AUTO: 98 FL (ref 78–100)
MONOCYTES # BLD AUTO: 0.3 10E9/L (ref 0–1.3)
MONOCYTES NFR BLD AUTO: 15.1 %
NEUTROPHILS # BLD AUTO: 1.1 10E9/L (ref 1.6–8.3)
NEUTROPHILS NFR BLD AUTO: 58.7 %
NRBC # BLD AUTO: 0 10*3/UL
NRBC BLD AUTO-RTO: 0 /100
PLATELET # BLD AUTO: 97 10E9/L (ref 150–450)
POTASSIUM SERPL-SCNC: 3.8 MMOL/L (ref 3.4–5.3)
PROT SERPL-MCNC: 7.4 G/DL (ref 6.8–8.8)
PSA SERPL-MCNC: 590 UG/L (ref 0–4)
RBC # BLD AUTO: 2.62 10E12/L (ref 4.4–5.9)
SODIUM SERPL-SCNC: 139 MMOL/L (ref 133–144)
WBC # BLD AUTO: 1.8 10E9/L (ref 4–11)

## 2017-06-19 PROCEDURE — 36415 COLL VENOUS BLD VENIPUNCTURE: CPT | Performed by: PHYSICIAN ASSISTANT

## 2017-06-19 PROCEDURE — 99212 OFFICE O/P EST SF 10 MIN: CPT | Mod: ZF

## 2017-06-19 PROCEDURE — 99214 OFFICE O/P EST MOD 30 MIN: CPT | Mod: ZP | Performed by: PHYSICIAN ASSISTANT

## 2017-06-19 PROCEDURE — 85025 COMPLETE CBC W/AUTO DIFF WBC: CPT | Performed by: PHYSICIAN ASSISTANT

## 2017-06-19 PROCEDURE — 84153 ASSAY OF PSA TOTAL: CPT | Performed by: PHYSICIAN ASSISTANT

## 2017-06-19 PROCEDURE — 80053 COMPREHEN METABOLIC PANEL: CPT | Performed by: PHYSICIAN ASSISTANT

## 2017-06-19 ASSESSMENT — PAIN SCALES - GENERAL: PAINLEVEL: NO PAIN (0)

## 2017-06-19 NOTE — NURSING NOTE
Chief Complaint   Patient presents with     Blood Draw     labs collected from L hand venipuncutre, vitals taken      hSannon Grissom RN

## 2017-06-19 NOTE — MR AVS SNAPSHOT
After Visit Summary   6/19/2017    Oswaldo Win    MRN: [id number]           Patient Information     Date Of Birth          1945        Visit Information        Provider Department      6/19/2017 3:10 PM Gabriela Mcguire PA-C [facility]        Today's Diagnoses     Prostate cancer 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)           Follow-ups after your visit        Your next 10 appointments already scheduled     Jul 12, 2017 10:00 AM CDT   NM INJECTION with [id number]   [facility], [place], Nuclear Medicine ([facility], [facility])    [address] [address] [phone]            Jul 12, 2017 12:20 PM CDT   CT CHEST/ABDOMEN/PELVIS W CONTRAST with UUCT1   [facility], [place], [place] ([facility])    [address] [place] [id number] [phone]           Please bring any scans or X-rays taken at other hospitals, if similar tests were done. Also bring a list of your medicines, including vitamins, minerals and over-the-counter drugs. It is safest to leave personal items at home.  Be sure to tell your doctor:   If you have any allergies.   If there s any chance you are pregnant.   If you are breastfeeding.   If you have any special needs.  You may have contrast for this exam. To prepare:   Do not eat or drink for 2 hours before your exam. If you need to take medicine, you may take it with small sips of water. (We may ask you to take liquid medicine as well.)   The day before your exam, drink extra fluids at least six 8-ounce glasses (unless your doctor tells you to restrict your fluids).  Patients over 70 or patients with diabetes or kidney problems:   If you haven t had a blood test (creatinine test) within the last 30 days, go to your clinic or Diagnostic Imaging Department for this test.  If you have diabetes:   If your kidney function is normal, continue taking your metformin (Avandamet,  Glucophage, Glucovance, Metaglip) on the day of your exam.   If your kidney function is abnormal, wait 48 hours before restarting this medicine.  You will have oral contrast for this exam:   You will drink the contrast at home. Get this from your clinic or Diagnostic Imaging Department. Please follow the directions given.  Please wear loose clothing, such as a sweat suit or jogging clothes. Avoid snaps, zippers and other metal. We may ask you to undress and put on a hospital gown.  If you have any questions, please call the Imaging Department where you will have your exam.            Jul 12, 2017  1:00 PM CDT   NM BONE SCAN WHOLE BODY with UUNM3   [facility], [place], Nuclear Medicine ([facility], [facility])    [address] [facility] [address] [phone]           Please bring a list of your medicines to the exam. (Include vitamins, minerals and over-the-counter drugs.) You should wear comfortable clothes. Leave your valuables at home. Please bring related prior results and films. Tell your doctor:   If you are breastfeeding or may be pregnant.   If you have had a barium test within the past few days. Barium may change the results of certain exams.   If you think you may need sedation (medicine to help you relax).  You may eat and drink as normal.  Drink plenty of fluids and empty bladder frequently between injection and scans.            Jul 12, 2017  4:45 PM CDT   (Arrive by 4:30 PM)   Return Visit with Bentley Robles MD   [facility] ([facility])    [address] [facility] [address] [phone]              Future tests that were ordered for you today     Open Future Orders        Priority Expected Expires Ordered    CBC with platelets differential Routine 6/26/2017 6/29/2017 6/19/2017    ABO/Rh type and screen Routine 6/26/2017 6/29/2017 6/19/2017            Who to contact     If you have  "questions or need follow up information about today's clinic visit or your schedule please contact [facility] directly at [phone].  Normal or non-critical lab and imaging results will be communicated to you by MyChart, letter or phone within 4 business days after the clinic has received the results. If you do not hear from us within 7 days, please contact the clinic through [employer] or phone. If you have a critical or abnormal lab result, we will notify you by phone as soon as possible.  Submit refill requests through [employer] or call your pharmacy and they will forward the refill request to us. Please allow 3 business days for your refill to be completed.          Additional Information About Your Visit        [employer] Information     [employer] gives you secure access to your electronic health record. If you see a primary care provider, you can also send messages to your care team and make appointments. If you have questions, please call your primary care clinic.  If you do not have a primary care provider, please call [phone] and they will assist you.        Care EveryWhere ID     This is your Care EveryWhere ID. This could be used by other organizations to access your [facility] medical records  [id number]        Your Vitals Were     Pulse Temperature Respirations Height Pulse Oximetry BMI (Body Mass Index)    95 98.6  F (37  C) 16 1.676 m (5' 6\") 96% 20.66 kg/m2       Blood Pressure from Last 3 Encounters:   06/19/17 90/47   06/12/17 98/53   06/12/17 98/53    Weight from Last 3 Encounters:   06/19/17 58.1 kg (128 lb)   06/12/17 58.1 kg (128 lb)   06/12/17 58.1 kg (128 lb)              We Performed the Following     CBC with platelets differential     Comprehensive metabolic panel     PSA tumor marker        Primary Care Provider Office Phone # Fax #    Lucrecia Collier [phone]       [facility] [address]  [facility] [address]        Thank you!     " Thank you for choosing [facility]  for your care. Our goal is always to provide you with excellent care. Hearing back from our patients is one way we can continue to improve our services. Please take a few minutes to complete the written survey that you may receive in the mail after your visit with us. Thank you!             Your Updated Medication List - Protect others around you: Learn how to safely use, store and throw away your medicines at www.disposemymeds.org.          This list is accurate as of: 6/19/17  4:09 PM.  Always use your most recent med list.                   Brand Name Dispense Instructions for use    aspirin 81 MG tablet     90 tablet    Take 1 tablet (81 mg) by mouth daily       calcium carbonate 1250 MG tablet    OS-RODNEY 500 mg [place]    120 tablet    Take 2 tablets (1,000 mg) by mouth 2 times daily       diphenoxylate-atropine 2.5-0.025 MG per tablet    LOMOTIL     Take 1 tablet by mouth 4 times daily as needed for diarrhea Reported on 5/22/2017       enoxaparin 40 MG/0.4ML injection    LOVENOX    60 Syringe    Inject 0.4 mLs (40 mg) Subcutaneous 2 times daily       HYDROcodone-acetaminophen 5-325 MG per tablet    NORCO     Take 1 tablet by mouth every 6 hours as needed for moderate to severe pain Reported on 5/22/2017       loperamide 2 MG capsule    IMODIUM    60 capsule    Use 2 caps PO after first loose stool.  Repeat 1 cap after each subsequent loose stool.  Max 8/24 hours.       metoprolol 25 MG 24 hr tablet    TOPROL-XL    45 tablet    Take 0.5 tablets (12.5 mg) by mouth daily AM       phosphorus tablet 250 mg 250 MG per tablet    K PHOS NEUTRAL    30 tablet    Take 2 tablets (500 mg) by mouth 3 times daily       predniSONE 5 MG tablet    DELTASONE    60 tablet    Take 1 tablet (5 mg) by mouth 2 times daily       prochlorperazine 5 MG tablet    COMPAZINE    30 tablet    Take 1 tablet (5 mg) by mouth every 6 hours as needed for nausea or vomiting       simvastatin  40 MG tablet    ZOCOR    90 tablet    TAKE ONE TABLET BY MOUTH AT BEDTIME       tamsulosin 0.4 MG capsule    FLOMAX    90 capsule    Take 1 capsule (0.4 mg) by mouth daily AM       vitamin D 2000 UNITS tablet     100 tablet    Take 1 tablet by mouth daily

## 2017-06-19 NOTE — PROGRESS NOTES
Oncology/Hematology Visit Note  Jun 19, 2017    DIAGNOSIS:  Mr. Win is a 71 year old male with a hx of CHF, CKD, CAD w/ hx of STEMI and CABG. He met with Dr. Robles at the end of July for consultation regarding metastatic prostate cancer. His story begins in May 2015 when he saw his PCP for back pain, present since Feb. He also had poor appetite, weight loss of 8lb and poor energy.  CT showed diffuse bony mets. CT Chest showed sclerotic mets throughout the bones. He was given degarelix on 7/22 in Urology. He was having pelvic pain and was evaluated by Dr Cavazos would did not feel XRT was appropriate.  He started on docetaxel 7/31. From 8/7-8/14 he was admitted with colitis, CHAR, elevated BNP, lactis acidosis, and poor appetite. He was discharged to a TCU and unfortunately, it sounds like he was not given his Lasix.  His BUBBA worsened and his breathing became compromised.  He was seen by Chelsi Corral on 8/21 and eventually transferred to the ED and admitted 8/21-8/24 for acute on chronic CHF.  At our last visit we discussed not pursuing further Taxotere at this time and discussed starting Zytiga when he was ready.  We pursued PT, OT, home lymphedema for supportive care at home in attempt for further rehabilitation. He was again admitted and discharged - 10/20/15.  He never started the Zytiga as he was still actively recovering from his cardiac issues.  His PSA remained stable, thus no intervention was pursued.  December 2015, Mainor met with Dr Robles and since his ECOG was back to a 1, they discussed re-challenging the Taxotere at a lower dose. 9/26/2016 he started Provenge off study. He continues on lupron every 3 mos and XGEVA. 9/26/2016 he started Provenge off study. He received 3 doses of Provenge (last 10/28), on 3rd dose had fevers, chills, myalgias, was bedbound for a week due to these symptoms. During this time he held his coumadin for 5 days prior to a central line removal 10/31. Later than week he had sudden onset  of right pleuritic chest pain and hemoptysis. Diagnosed with acute segmental/subsegmental PE on 11/8 and prescribed Lovenox.     Mainor was briefly on Zytiga and prednisone in November-December, however after one month his PSA went up, thus it was decided to add in Xofigo (1/11/17-- first).     On 12/28 he started xaralto and took his last pill on 1/10/17.  He stopped as he noticed melanotic stools and had some hemoptysis.  He was seen in clinic the next day with a hgb in th 6 range and was admitted.  Pan-scopes showed no active bleeding.  He was able to get his Xofigo in patient as well.  He was transitioned back to lovenox BID.     Mainor developed diarrhea on and off in the spring of 2017, Zytiga was stopped on 3/31 as it was unsure what was contributing.  Mainor felt it was related to the Xofigo and that his dose was too high as his weight is always at least 10 pounds higher in clinic than at home.      INTERVAL HISTORY:  Mr. 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  For cycle 5 of the Xofigo- Mainor was dose reduced and he tolerated the entire cycle 5 without diarrhea, hypotension or weakness.  On 5/31 he started C6, his final cycle and he had one day of loose stool and nausea a few days later, but otherwise has been feeling just fine.  He is drinking fluids, Ensure and eating 2 meals a day.  No dizziness or dyspnea.  Formed stool.  His BP has been in the 90/60 range, but he denies dizziness or shortness of breath.     No f/s/c. No chest pain. No black stools. No new  issues.  He is on lovenox 40 mg BID.      MEDICATIONS:   Current Outpatient Prescriptions   Medication Sig     enoxaparin (LOVENOX) 40 MG/0.4ML injection Inject 0.4 mLs (40 mg) Subcutaneous 2 times daily     aspirin 81 MG tablet Take 1 tablet (81 mg) by mouth daily     phosphorus tablet 250 mg (K PHOS NEUTRAL) 250 MG per tablet Take 2 tablets (500 mg) by mouth 3 times daily     metoprolol (TOPROL-XL) 25 MG 24 hr tablet Take  "0.5 tablets (12.5 mg) by mouth daily AM     predniSONE (DELTASONE) 5 MG tablet Take 1 tablet (5 mg) by mouth 2 times daily     calcium carbonate (OS-RODNEY 500 MG [place]. [place]) 500 MG tablet Take 2 tablets (1,000 mg) by mouth 2 times daily     Cholecalciferol (VITAMIN D) 2000 UNITS tablet Take 1 tablet by mouth daily     simvastatin (ZOCOR) 40 MG tablet TAKE ONE TABLET BY MOUTH AT BEDTIME     tamsulosin (FLOMAX) 0.4 MG 24 hr capsule Take 1 capsule (0.4 mg) by mouth daily AM     HYDROcodone-acetaminophen (NORCO) 5-325 MG per tablet Take 1 tablet by mouth every 6 hours as needed for moderate to severe pain Reported on 5/22/2017     prochlorperazine (COMPAZINE) 5 MG tablet Take 1 tablet (5 mg) by mouth every 6 hours as needed for nausea or vomiting (Patient not taking: Reported on 6/19/2017)     loperamide (IMODIUM) 2 MG capsule Use 2 caps PO after first loose stool.  Repeat 1 cap after each subsequent loose stool.  Max 8/24 hours. (Patient not taking: Reported on 6/19/2017)     diphenoxylate-atropine (LOMOTIL) 2.5-0.025 MG per tablet Take 1 tablet by mouth 4 times daily as needed for diarrhea Reported on 5/22/2017     No current facility-administered medications for this visit.      Facility-Administered Medications Ordered in Other Visits   Medication     hydrocortisone sodium succinate (solu-CORTEF) injection     glycopyrrolate (ROBINUL) injection     neostigmine (PROSTIGMINE) injection     protamine injection     albuterol (PROAIR HFA, PROVENTIL HFA, VENTOLIN HFA) inhaler          ALLERGIES:  No Known Allergies    PHYSICAL EXAMINATION:  Vitals: BP 90/47  Pulse 95  Temp 98.6  F (37  C)  Resp 16  Ht 1.676 m (5' 6\")  Wt 58.1 kg (128 lb)  SpO2 96%  BMI 20.66 kg/m2   Wt Readings from Last 10 Encounters:   06/19/17 58.1 kg (128 lb)   06/12/17 58.1 kg (128 lb)   06/12/17 58.1 kg (128 lb)   05/31/17 58.1 kg (128 lb)   05/24/17 57.6 kg (127 lb)   05/15/17 57.2 kg (126 lb)   05/08/17 57.2 kg (126 lb)   05/03/17 63 kg (139 " lb)   04/28/17 62.8 kg (138 lb 8 oz)   04/05/17 66.5 kg (146 lb 8 oz)       GENERAL:  A pleasant person in no acute distress.  Mildly pale and thin looking  LYMPH NODES:  No peripheral lymphadenopathy noted in the supraclavicular or cervical regions.   LUNGS:  Clear to auscultation bilaterally.   HEART:  Regular rate and rhythm   ABDOMEN:  Bowel sounds are active.  Soft and nontender.  No hepatosplenomegaly or other masses appreciated.  LOWER EXTREMITIES:  Without pitting edema to the knees bilaterally.   NEUROLOGICAL:  Alert/orientated/able to answer all questions.  CN grossly intact.     LAB:      5/24/2017 11:24 6/5/2017 14:51 6/12/2017 12:00 6/19/2017 15:00   WBC 2.5 (L) 2.8 (L) 2.9 (L) 1.8 (L)   Hemoglobin 9.4 (L) 8.7 (L) 9.2 (L) 8.7 (L)   Hematocrit 28.7 (L) 26.2 (L) 28.4 (L) 25.6 (L)   Platelet Count 114 (L) 94 (L) 103 (L) 97 (L)   RBC Count 3.00 (L) 2.75 (L) 2.78 (L) 2.62 (L)   MCV 96 95 102 (H) 98   Absolute Neutrophil 1.5 (L) 1.9  1.1 (L)        6/19/2017 15:00   Sodium 139   Potassium 3.8   Chloride 110 (H)   Carbon Dioxide 22   Urea Nitrogen 15   Creatinine 0.85   GFR Estimate 89   GFR Estimate If Black >90...   Calcium 8.4 (L)   Anion Gap 7   Albumin 3.5   Protein Total 7.4   Bilirubin Total 1.3   Alkaline Phosphatase 322 (H)   ALT 27   AST 35       IMPRESSION/PLAN:   Metastatic prostate cancer: on zytiga/pred and xofigo and xgeva. Mainor has had ongoing on and off diarrhea, infection work up has been negative.  Zytiga was stopped (march thru early may) and didn't help.  Mainor felt like his Xofigo dose is too high. We spoke to curtis in [facility] med and Ila weight changes have all been within the 10% acceptable variation.  However, Curtis agreed to give him a smaller dose.  It seems Mainor is doing  better since his last dose reduction and cycle 5 was uneventful. 5/31 was his last cycle 6.  He has done much better with this lower dose for C5 and C6  -continue oral Zytiga  -last Lupron 5/15/17  -CT CAP and bone scan and  Dr Robles on 7/12     Anemia: radium induced, no bleeding  8.7 today, no MOURA or dizziness, but given more myelosuppression, I think Mainor may need a transfusion next week.      -Will set him up for CBC and T & C on 6/26.     Heme: on lovenox for PE dx 11/2016. Had inpatient stay for GI bleed 1/11-1/14. Stopped xarelto and transitioned back to lovenox BID. No bleeding concerns. Recently dropped from 60mg BID to 50mg BID to 40 mg BID due to high Xa level.   -Continue at 40 mg BID.      Bone mets: on xgeva Q 6 weeks.  -5/15 last- missed today.  Will set up for next week    CV: hx of CHF. No clinical s/s of failure.  Off metoprolol due to BP being too low.  BP slightly lower today, but no symptoms.   -cards on 6/12    Sabrina Mcguire PA-C    Over 25 min of direct face to face time spent with patient with more than 50% time spent in counseling and coordinating care.

## 2017-06-19 NOTE — NURSING NOTE
"Oncology Rooming Note    June 19, 2017 3:12 PM   Oswaldo Win is a 72 year old male who presents for:    Chief Complaint   Patient presents with     Blood Draw     labs collected from L hand venipuncutre, vitals taken      Oncology Clinic Visit     return patient visit for follow up related to Prostate cancer 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)     Initial Vitals: BP 90/47  Pulse 95  Temp 98.6  F (37  C)  Resp 16  Ht 1.676 m (5' 6\")  Wt 58.1 kg (128 lb)  SpO2 96%  BMI 20.66 kg/m2 Estimated body mass index is 20.66 kg/(m^2) as calculated from the following:    Height as of this encounter: 1.676 m (5' 6\").    Weight as of this encounter: 58.1 kg (128 lb). Body surface area is 1.64 meters squared.  No Pain (0) Comment: Data Unavailable   No LMP for male patient.  Allergies reviewed: Yes  Medications reviewed: Yes    Medications: Medication refills not needed today.  Pharmacy name entered into [employer]: [facility] [place], [place] - [address]    Clinical concerns: 5 pancho was notified.    5 minutes for nursing intake (face to face time)     Parrish Barnes CMA              "

## 2017-06-26 RX ORDER — FUROSEMIDE 10 MG/ML
10 INJECTION INTRAMUSCULAR; INTRAVENOUS ONCE
Status: CANCELLED
Start: 2017-06-26 | End: 2017-06-26

## 2017-06-27 ENCOUNTER — INFUSION THERAPY VISIT (OUTPATIENT)
Dept: ONCOLOGY | Facility: CLINIC | Age: 72
End: 2017-06-27
Attending: INTERNAL MEDICINE
Payer: COMMERCIAL

## 2017-06-27 VITALS
TEMPERATURE: 98.9 F | OXYGEN SATURATION: 99 % | WEIGHT: 124 LBS | RESPIRATION RATE: 16 BRPM | BODY MASS INDEX: 20.01 KG/M2 | HEART RATE: 67 BPM | SYSTOLIC BLOOD PRESSURE: 94 MMHG | DIASTOLIC BLOOD PRESSURE: 39 MMHG

## 2017-06-27 VITALS
OXYGEN SATURATION: 99 % | HEART RATE: 72 BPM | DIASTOLIC BLOOD PRESSURE: 53 MMHG | RESPIRATION RATE: 16 BRPM | TEMPERATURE: 98.6 F | SYSTOLIC BLOOD PRESSURE: 103 MMHG

## 2017-06-27 DIAGNOSIS — D64.9 ANEMIA, UNSPECIFIED TYPE: ICD-10-CM

## 2017-06-27 DIAGNOSIS — C79.51 PROSTATE CANCER METASTATIC TO BONE (H): Primary | ICD-10-CM

## 2017-06-27 DIAGNOSIS — I50.22 CHRONIC SYSTOLIC HEART FAILURE (H): ICD-10-CM

## 2017-06-27 DIAGNOSIS — C61 PROSTATE CANCER METASTATIC TO BONE (H): Primary | ICD-10-CM

## 2017-06-27 LAB
ABO + RH BLD: NORMAL
ABO + RH BLD: NORMAL
ALBUMIN SERPL-MCNC: 3.5 G/DL (ref 3.4–5)
ALP SERPL-CCNC: 259 U/L (ref 40–150)
ALT SERPL W P-5'-P-CCNC: 34 U/L (ref 0–70)
ANION GAP SERPL CALCULATED.3IONS-SCNC: 10 MMOL/L (ref 3–14)
AST SERPL W P-5'-P-CCNC: 36 U/L (ref 0–45)
BASOPHILS # BLD AUTO: 0 10E9/L (ref 0–0.2)
BASOPHILS NFR BLD AUTO: 0.4 %
BILIRUB SERPL-MCNC: 1.1 MG/DL (ref 0.2–1.3)
BLD GP AB SCN SERPL QL: NORMAL
BLD PROD TYP BPU: NORMAL
BLD UNIT ID BPU: 0
BLD UNIT ID BPU: 0
BLOOD BANK CMNT PATIENT-IMP: NORMAL
BLOOD PRODUCT CODE: NORMAL
BLOOD PRODUCT CODE: NORMAL
BPU ID: NORMAL
BPU ID: NORMAL
BUN SERPL-MCNC: 14 MG/DL (ref 7–30)
CALCIUM SERPL-MCNC: 8.3 MG/DL (ref 8.5–10.1)
CHLORIDE SERPL-SCNC: 107 MMOL/L (ref 94–109)
CO2 SERPL-SCNC: 21 MMOL/L (ref 20–32)
CREAT SERPL-MCNC: 0.82 MG/DL (ref 0.66–1.25)
DIFFERENTIAL METHOD BLD: ABNORMAL
EOSINOPHIL # BLD AUTO: 0.1 10E9/L (ref 0–0.7)
EOSINOPHIL NFR BLD AUTO: 2.8 %
ERYTHROCYTE [DISTWIDTH] IN BLOOD BY AUTOMATED COUNT: 18.6 % (ref 10–15)
GFR SERPL CREATININE-BSD FRML MDRD: ABNORMAL ML/MIN/1.7M2
GLUCOSE SERPL-MCNC: 99 MG/DL (ref 70–99)
HCT VFR BLD AUTO: 23.1 % (ref 40–53)
HGB BLD-MCNC: 7.9 G/DL (ref 13.3–17.7)
IMM GRANULOCYTES # BLD: 0.1 10E9/L (ref 0–0.4)
IMM GRANULOCYTES NFR BLD: 2 %
LYMPHOCYTES # BLD AUTO: 0.7 10E9/L (ref 0.8–5.3)
LYMPHOCYTES NFR BLD AUTO: 27.1 %
MCH RBC QN AUTO: 32.2 PG (ref 26.5–33)
MCHC RBC AUTO-ENTMCNC: 34.2 G/DL (ref 31.5–36.5)
MCV RBC AUTO: 94 FL (ref 78–100)
MONOCYTES # BLD AUTO: 0.4 10E9/L (ref 0–1.3)
MONOCYTES NFR BLD AUTO: 13.9 %
NEUTROPHILS # BLD AUTO: 1.4 10E9/L (ref 1.6–8.3)
NEUTROPHILS NFR BLD AUTO: 53.8 %
NRBC # BLD AUTO: 0 10*3/UL
NRBC BLD AUTO-RTO: 0 /100
NUM BPU REQUESTED: 2
PLATELET # BLD AUTO: 79 10E9/L (ref 150–450)
POTASSIUM SERPL-SCNC: 3.3 MMOL/L (ref 3.4–5.3)
PROT SERPL-MCNC: 7.5 G/DL (ref 6.8–8.8)
RBC # BLD AUTO: 2.45 10E12/L (ref 4.4–5.9)
SODIUM SERPL-SCNC: 138 MMOL/L (ref 133–144)
SPECIMEN EXP DATE BLD: NORMAL
TRANSFUSION STATUS PATIENT QL: NORMAL
WBC # BLD AUTO: 2.5 10E9/L (ref 4–11)

## 2017-06-27 PROCEDURE — 25000128 H RX IP 250 OP 636: Mod: ZF | Performed by: INTERNAL MEDICINE

## 2017-06-27 PROCEDURE — 86923 COMPATIBILITY TEST ELECTRIC: CPT | Performed by: PHYSICIAN ASSISTANT

## 2017-06-27 PROCEDURE — 86900 BLOOD TYPING SEROLOGIC ABO: CPT | Performed by: PHYSICIAN ASSISTANT

## 2017-06-27 PROCEDURE — 86901 BLOOD TYPING SEROLOGIC RH(D): CPT | Performed by: PHYSICIAN ASSISTANT

## 2017-06-27 PROCEDURE — 80053 COMPREHEN METABOLIC PANEL: CPT | Performed by: INTERNAL MEDICINE

## 2017-06-27 PROCEDURE — 25000128 H RX IP 250 OP 636: Mod: ZF | Performed by: PHYSICIAN ASSISTANT

## 2017-06-27 PROCEDURE — 25000125 ZZHC RX 250: Performed by: RADIOLOGY

## 2017-06-27 PROCEDURE — 96372 THER/PROPH/DIAG INJ SC/IM: CPT | Mod: 59

## 2017-06-27 PROCEDURE — 86850 RBC ANTIBODY SCREEN: CPT | Performed by: PHYSICIAN ASSISTANT

## 2017-06-27 PROCEDURE — 40000556 ZZH STATISTIC PERIPHERAL IV START W US GUIDANCE: Mod: ZF

## 2017-06-27 PROCEDURE — 36430 TRANSFUSION BLD/BLD COMPNT: CPT

## 2017-06-27 PROCEDURE — 36415 COLL VENOUS BLD VENIPUNCTURE: CPT | Performed by: PHYSICIAN ASSISTANT

## 2017-06-27 PROCEDURE — P9016 RBC LEUKOCYTES REDUCED: HCPCS | Performed by: PHYSICIAN ASSISTANT

## 2017-06-27 PROCEDURE — 85025 COMPLETE CBC W/AUTO DIFF WBC: CPT | Performed by: PHYSICIAN ASSISTANT

## 2017-06-27 RX ORDER — FUROSEMIDE 10 MG/ML
10 INJECTION INTRAMUSCULAR; INTRAVENOUS ONCE
Status: DISCONTINUED | OUTPATIENT
Start: 2017-06-27 | End: 2017-06-27 | Stop reason: HOSPADM

## 2017-06-27 RX ADMIN — DENOSUMAB 120 MG: 120 INJECTION SUBCUTANEOUS at 10:28

## 2017-06-27 RX ADMIN — LIDOCAINE HYDROCHLORIDE 0.5 ML: 10 INJECTION, SOLUTION EPIDURAL; INFILTRATION; INTRACAUDAL; PERINEURAL at 10:00

## 2017-06-27 RX ADMIN — SODIUM CHLORIDE 250 ML: 9 INJECTION, SOLUTION INTRAVENOUS at 14:12

## 2017-06-27 NOTE — PROGRESS NOTES
Infusion Nursing Note:  Oswaldo Win presents today for 2 units PRBC and Xgeva injection.    Patient seen by provider today: Yes: Pt was seen by María Garner PA-C at bedside today at 12:00pm.   present during visit today: Not Applicable.    Note: Pt arrived to floor after blood draw in lab. Pt stated he was light headed but not dizzy and had just gotten over a week of congestion. Pt vitals were stable and he proceeded to eat crackers and a sandwich. María Garner PA-C was paged for confirmation that the pt would receive 2 units PRBC and Xgeva injection.   TORB: María Garner PA-C, Kristen Caro RN. 6/27/17 1010am -(Pt okay to receive Xgeva injection today, along with 2 units of PRBC)   Pt tolderated Xgeva injection w/o reaction. Pt declined Lasix IVP 20mg between 1st and 2nd unit of blood.   TORB: María Garner PA-C, Kristen Caro RN. 6/27/17 Okay for pt to decline Lasix. Do not give med between units.   Pt tolerated both units of PRBC w/o reaction. Left the floor without questions and verbalized understanding of next visit & discharge instructions.     Intravenous Access:  Peripheral IV placed in Lab.     Treatment Conditions:  Lab Results   Component Value Date    HGB 7.9 06/27/2017     Lab Results   Component Value Date    WBC 2.5 06/27/2017      Lab Results   Component Value Date    ANEU 1.4 06/27/2017     Lab Results   Component Value Date    PLT 79 06/27/2017      Lab Results   Component Value Date     06/27/2017                   Lab Results   Component Value Date    POTASSIUM 3.3 06/27/2017           Lab Results   Component Value Date    MAG 2.0 04/05/2017            Lab Results   Component Value Date    CR 0.82 06/27/2017                   Lab Results   Component Value Date    RODNEY 8.3 06/27/2017                Lab Results   Component Value Date    BILITOTAL 1.1 06/27/2017           Lab Results   Component Value Date    ALBUMIN 3.5 06/27/2017                    Lab Results    Component Value Date    ALT 34 06/27/2017           Lab Results   Component Value Date    AST 36 06/27/2017     Results reviewed, labs MET treatment parameters, ok to proceed with treatment.  Blood transfusion consent signed 4/4/17.      Post Infusion Assessment:  Patient tolerated infusion without incident.  Patient tolerated injection without incident.  Site patent and intact, free from redness, edema or discomfort.  No evidence of extravasations.  Access discontinued per protocol.    Discharge Plan:   Patient declined prescription refills.  Discharge instructions reviewed with: Patient.  Patient and/or family verbalized understanding of discharge instructions and all questions answered.  Copy of AVS reviewed with patient and/or family.  Patient will return 7/12/17 for f/u with Dr Robles for next appointment.  Patient discharged in stable condition accompanied by: self.  Departure Mode: Ambulatory.    Kristen Caro RN

## 2017-06-27 NOTE — PATIENT INSTRUCTIONS
June 2017 Sunday Monday Tuesday Wednesday Thursday Friday Saturday                       1     2     3       4     5     [facility]    2:45 PM   (15 min.)   [facility]   [facility] Lab Draw     [facility] RETURN    2:55 PM   (50 min.)   Gabriela Mcguire PA-C   [facility] ED 6     7     8     9     10       11     12     [facility] D[facility]   10:00 AM   (15 min.)   [facility]   [facility] Lab Draw     [facility] CORE RETURN   10:15 AM   (30 min.)   Jill Juarez NP   [facility] 13     14     15     16     17       18     19     [facility] DRAW    2:45 PM   (15 min.)   [facility]   [facility] Lab Draw     [facility] RETURN    2:55 PM   (50 min.)   Gabriela Mcguire PA-C   [facility] ED 20     21     22     23     24       25     26     27     [facility] DRAW    8:45 AM   (15 min.)   [facility]   [facility] Lab Draw     [facility] ONC INFUSION 240    9:30 AM   (240 min.)   [facility] ONCOLOGY INFUSION   [facility] ED 28     29     30                     July 2017 Sunday Monday Tuesday Wednesday Thursday Friday Saturday                                 1       2     3     4     5     6     7     8       9     10     11     12     NM INJ   10:00 AM   (15 min.)   UUNMINJ1   [facility], Nuclear Medicine     CT CHEST/ABDOMEN/PELVIS W   12:20 PM   (20 min.)   UUCT1   [facility], [place]     NM BONE SCAN WHOLE BODY    1:00 PM   (60 min.)   UUNM3   [facility], Nuclear Medicine     [facility] RETURN    4:30 PM   (30 min.)   Bentley Robles MD   [facility] ED 13     14     15       16     17     18     19     20     21     22       23     24     25     26     27     28     29       30     31                                           Lab Results:  Recent Results (from the past 12 hour(s))   ABO/Rh type and screen    Collection Time: 06/27/17  9:12 AM   Result Value Ref Range    ABO  Pending     RH(D) Pending     Antibody Screen Pending     Test Valid Only At       [facility]    Specimen Expires 06/30/2017    CBC with platelets differential    Collection Time: 06/27/17  9:14 AM   Result Value Ref Range    WBC 2.5 (L) 4.0 - 11.0 10e9/L    RBC Count 2.45 (L) 4.4 - 5.9 10e12/L    Hemoglobin 7.9 (L) 13.3 - 17.7 g/dL    Hematocrit 23.1 (L) 40.0 - 53.0 %    MCV 94 78 - 100 fl    MCH 32.2 26.5 - 33.0 pg    MCHC 34.2 31.5 - 36.5 g/dL    RDW 18.6 (H) 10.0 - 15.0 %    Platelet Count 79 (L) 150 - 450 10e9/L    Diff Method Automated Method     % Neutrophils 53.8 %    % Lymphocytes 27.1 %    % Monocytes 13.9 %    % Eosinophils 2.8 %    % Basophils 0.4 %    % Immature Granulocytes 2.0 %    Nucleated RBCs 0 0 /100    Absolute Neutrophil 1.4 (L) 1.6 - 8.3 10e9/L    Absolute Lymphocytes 0.7 (L) 0.8 - 5.3 10e9/L    Absolute Monocytes 0.4 0.0 - 1.3 10e9/L    Absolute Eosinophils 0.1 0.0 - 0.7 10e9/L    Absolute Basophils 0.0 0.0 - 0.2 10e9/L    Abs Immature Granulocytes 0.1 0 - 0.4 10e9/L    Absolute Nucleated RBC 0.0    Comprehensive metabolic panel    Collection Time: 06/27/17  9:14 AM   Result Value Ref Range    Sodium 138 133 - 144 mmol/L    Potassium 3.3 (L) 3.4 - 5.3 mmol/L    Chloride 107 94 - 109 mmol/L    Carbon Dioxide 21 20 - 32 mmol/L    Anion Gap 10 3 - 14 mmol/L    Glucose 99 70 - 99 mg/dL    Urea Nitrogen 14 7 - 30 mg/dL    Creatinine 0.82 0.66 - 1.25 mg/dL    GFR Estimate >90  Non  GFR Calc   >60 mL/min/1.7m2    GFR Estimate If Black >90   GFR Calc   >60 mL/min/1.7m2    Calcium 8.3 (L) 8.5 - 10.1 mg/dL    Bilirubin Total 1.1 0.2 - 1.3 mg/dL    Albumin 3.5 3.4 - 5.0 g/dL    Protein Total 7.5 6.8 - 8.8 g/dL    Alkaline Phosphatase 259 (H) 40 - 150 U/L    ALT 34 0 - 70 U/L    AST 36 0 - 45 U/L     Contact Numbers    [facility] [place]: [phone]  [facility] Triage:  [phone]    Call triage with chills and/or temperature greater  than or equal to 100.5, uncontrolled nausea/vomiting, diarrhea, constipation, dizziness, shortness of breath, chest pain, bleeding, unexplained bruising, or any new/concerning symptoms, questions/concerns.     If you are having any concerning symptoms or wish to speak to a provider before your next infusion visit, please call your care coordinator or triage to notify them so we can adequately serve you.       After Hours: [phone]    If after hours, weekends, or holidays, call main hospital  and ask for Oncology doctor on call.

## 2017-06-27 NOTE — NURSING NOTE
IV and labs drawn by Janae Galicia.  Vitals obtained without complication.    See flowsheets.    PA notified of pt bp readings, she will go see pt in infusion.    Netta Duran CMA ([facility])

## 2017-06-27 NOTE — MR AVS SNAPSHOT
After Visit Summary   6/27/2017    Oswaldo Win    MRN: [id number]           Patient Information     Date Of Birth          1945        Visit Information        Provider Department      6/27/2017 9:30 AM [facility] 17 ATC; [facility] ONCOLOGY INFUSION [facility]        Today's Diagnoses     Prostate cancer 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)    -  1    Anemia, unspecified type        Chronic systolic heart failure (H)          Care Instructions          June 2017 Sunday Monday Tuesday Wednesday Thursday Friday Saturday                       1     2     3       4     5     [facility]    2:45 PM   (15 min.)    [facility]     [facility] RETURN    2:55 PM   (50 min.)   Gabriela Mcguire PA-C   [facility] 6     7     8     9     10       11     12     [facility]   10:00 AM   (15 min.)    [facility]     [facility] CORE RETURN   10:15 AM   (30 min.)   Jill Juarez NP   [facility] 13     14     15     16     17       18     19     [facility] DRAW    2:45 PM   (15 min.)    [facility]     [facility] RETURN    2:55 PM   (50 min.)   Gabriela Mcguire PA-C   [facility] 20     21     22     23     24       25     26     27     [facility] DRAW    8:45 AM   (15 min.)    [facility] LAB DRAW   [facility] Lab Draw     P ONC INFUSION 240    9:30 AM   (240 min.)   [facility] ONCOLOGY INFUSION   [facility] 28     29     30                     July 2017 Sunday Monday Tuesday Wednesday Thursday Friday Saturday                                 1       2     3     4     5     6     7     8       9     10     11     12     NM INJ   10:00 AM   (15 min.)   UUNMINJ1   [facility], Nuclear Medicine     CT CHEST/ABDOMEN/PELVIS W   12:20 PM   (20 min.)   UUCT1   [facility] [place], [place]     NM BONE SCAN WHOLE BODY    1:00 PM   (60 min.)   UUNM3    [facility] [place], Nuclear Medicine     [facility] RETURN    4:30 PM   (30 min.)   Bentley Robles MD   [facility] 13     14     15       16     17     18     19     20     21     22       23     24     25     26     27     28     29       30     31                                           Lab Results:  Recent Results (from the past 12 hour(s))   ABO/Rh type and screen    Collection Time: 06/27/17  9:12 AM   Result Value Ref Range    ABO Pending     RH(D) Pending     Antibody Screen Pending     Test Valid Only At       [facility]    Specimen Expires 06/30/2017    CBC with platelets differential    Collection Time: 06/27/17  9:14 AM   Result Value Ref Range    WBC 2.5 (L) 4.0 - 11.0 10e9/L    RBC Count 2.45 (L) 4.4 - 5.9 10e12/L    Hemoglobin 7.9 (L) 13.3 - 17.7 g/dL    Hematocrit 23.1 (L) 40.0 - 53.0 %    MCV 94 78 - 100 fl    MCH 32.2 26.5 - 33.0 pg    MCHC 34.2 31.5 - 36.5 g/dL    RDW 18.6 (H) 10.0 - 15.0 %    Platelet Count 79 (L) 150 - 450 10e9/L    Diff Method Automated Method     % Neutrophils 53.8 %    % Lymphocytes 27.1 %    % Monocytes 13.9 %    % Eosinophils 2.8 %    % Basophils 0.4 %    % Immature Granulocytes 2.0 %    Nucleated RBCs 0 0 /100    Absolute Neutrophil 1.4 (L) 1.6 - 8.3 10e9/L    Absolute Lymphocytes 0.7 (L) 0.8 - 5.3 10e9/L    Absolute Monocytes 0.4 0.0 - 1.3 10e9/L    Absolute Eosinophils 0.1 0.0 - 0.7 10e9/L    Absolute Basophils 0.0 0.0 - 0.2 10e9/L    Abs Immature Granulocytes 0.1 0 - 0.4 10e9/L    Absolute Nucleated RBC 0.0    Comprehensive metabolic panel    Collection Time: 06/27/17  9:14 AM   Result Value Ref Range    Sodium 138 133 - 144 mmol/L    Potassium 3.3 (L) 3.4 - 5.3 mmol/L    Chloride 107 94 - 109 mmol/L    Carbon Dioxide 21 20 - 32 mmol/L    Anion Gap 10 3 - 14 mmol/L    Glucose 99 70 - 99 mg/dL    Urea Nitrogen 14 7 - 30 mg/dL    Creatinine 0.82 0.66 - 1.25 mg/dL    GFR Estimate >90  Non  GFR  Calc   >60 mL/min/1.7m2    GFR Estimate If Black >90   GFR Calc   >60 mL/min/1.7m2    Calcium 8.3 (L) 8.5 - 10.1 mg/dL    Bilirubin Total 1.1 0.2 - 1.3 mg/dL    Albumin 3.5 3.4 - 5.0 g/dL    Protein Total 7.5 6.8 - 8.8 g/dL    Alkaline Phosphatase 259 (H) 40 - 150 U/L    ALT 34 0 - 70 U/L    AST 36 0 - 45 U/L     Contact Numbers    [facility] Main Line: [phone]  [facility] Triage:  [phone]    Call triage with chills and/or temperature greater than or equal to 100.5, uncontrolled nausea/vomiting, diarrhea, constipation, dizziness, shortness of breath, chest pain, bleeding, unexplained bruising, or any new/concerning symptoms, questions/concerns.     If you are having any concerning symptoms or wish to speak to a provider before your next infusion visit, please call your care coordinator or triage to notify them so we can adequately serve you.       After Hours: [phone]    If after hours, weekends, or holidays, call main hospital  and ask for Oncology doctor on call.             Follow-ups after your visit        Your next 10 appointments already scheduled     Jul 12, 2017 10:00 AM CDT   NM INJECTION with UUNMINJ1   [facility], Nuclear Medicine ([facility])    [address] [id number] [phone]            Jul 12, 2017 12:20 PM CDT   CT CHEST/ABDOMEN/PELVIS W CONTRAST with UUCT1   [facility], [place] ([facility])    [address] [id number] [phone]           Please bring any scans or X-rays taken at other hospitals, if similar tests were done. Also bring a list of your medicines, including vitamins, minerals and over-the-counter drugs. It is safest to leave personal items at home.  Be sure to tell your doctor:   If you have any allergies.   If there s any chance you are pregnant.   If you are breastfeeding.   If you have any special needs.  You  may have contrast for this exam. To prepare:   Do not eat or drink for 2 hours before your exam. If you need to take medicine, you may take it with small sips of water. (We may ask you to take liquid medicine as well.)   The day before your exam, drink extra fluids at least six 8-ounce glasses (unless your doctor tells you to restrict your fluids).  Patients over 70 or patients with diabetes or kidney problems:   If you haven t had a blood test (creatinine test) within the last 30 days, go to your clinic or Diagnostic Imaging Department for this test.  If you have diabetes:   If your kidney function is normal, continue taking your metformin (Avandamet, Glucophage, Glucovance, Metaglip) on the day of your exam.   If your kidney function is abnormal, wait 48 hours before restarting this medicine.  You will have oral contrast for this exam:   You will drink the contrast at home. Get this from your clinic or Diagnostic Imaging Department. Please follow the directions given.  Please wear loose clothing, such as a sweat suit or jogging clothes. Avoid snaps, zippers and other metal. We may ask you to undress and put on a hospital gown.  If you have any questions, please call the Imaging Department where you will have your exam.            Jul 12, 2017  1:00 PM CDT   NM BONE SCAN WHOLE BODY with UUNM3   [facility], [place], Nuclear Medicine ([facility] [place])    [address] [address] [phone]           Please bring a list of your medicines to the exam. (Include vitamins, minerals and over-the-counter drugs.) You should wear comfortable clothes. Leave your valuables at home. Please bring related prior results and films. Tell your doctor:   If you are breastfeeding or may be pregnant.   If you have had a barium test within the past few days. Barium may change the results of certain exams.   If you think you may need sedation (medicine to help you relax).  You  may eat and drink as normal.  Drink plenty of fluids and empty bladder frequently between injection and scans.            Jul 12, 2017  4:45 PM CDT   (Arrive by 4:30 PM)   Return Visit with Bentley Robles MD   [facility]    [address]  [address] [facility] [address] [phone]              Future tests that were ordered for you today     Open Standing Orders        Priority Remaining Interval Expires Ordered    Transfuse red blood cell unit Routine 1/2 TRANSFUSE 2 UNITS  6/27/2017    Red blood cell prepare order unit Routine 99/100 CONDITIONAL (SPECIFY) BLOOD  6/26/2017            Who to contact     If you have questions or need follow up information about today's clinic visit or your schedule please contact [facility] directly at [phone].  Normal or non-critical lab and imaging results will be communicated to you by MyChart, letter or phone within 4 business days after the clinic has received the results. If you do not hear from us within 7 days, please contact the clinic through [employer] or phone. If you have a critical or abnormal lab result, we will notify you by phone as soon as possible.  Submit refill requests through [employer] or call your pharmacy and they will forward the refill request to us. Please allow 3 business days for your refill to be completed.          Additional Information About Your Visit        MyChart Information     [employer] gives you secure access to your electronic health record. If you see a primary care provider, you can also send messages to your care team and make appointments. If you have questions, please call your primary care clinic.  If you do not have a primary care provider, please call [phone] and they will assist you.        Care EveryWhere ID     This is your Care EveryWhere ID. This could be used by other organizations to access your [facility]  records  [id number]        Your Vitals Were     Pulse Temperature Respirations Pulse Oximetry BMI (Body Mass Index)       76 97.9  F (36.6  C) (Oral) 16 99% 20.01 kg/m2        Blood Pressure from Last 3 Encounters:   06/27/17 122/55   06/19/17 90/47   06/12/17 98/53    Weight from Last 3 Encounters:   06/27/17 56.2 kg (124 lb)   06/19/17 58.1 kg (128 lb)   06/12/17 58.1 kg (128 lb)              We Performed the Following     ABO/Rh type and screen     CBC with platelets differential     Comprehensive metabolic panel        Primary Care Provider Office Phone # Fax #    Lucrecia Collier [phone]       [facility] [address]  [facility] [address]        Equal Access to Services     BLADIMIR KEY : Hadii aad ku hadasho Sokwame, waaxda luqadaha, qaybta kaalmada aria, peter moran . So [facility] [phone].    ATENCIÓN: Si habla español, tiene a mccall disposición servicios gratuitos de asistencia lingüística. Marquise al [phone].    We comply with applicable federal civil rights laws and [place] laws. We do not discriminate on the basis of race, color, national origin, age, disability sex, sexual orientation or gender identity.            Thank you!     Thank you for choosing [facility]  for your care. Our goal is always to provide you with excellent care. Hearing back from our patients is one way we can continue to improve our services. Please take a few minutes to complete the written survey that you may receive in the mail after your visit with us. Thank you!             Your Updated Medication List - Protect others around you: Learn how to safely use, store and throw away your medicines at www.disposemymeds.org.          This list is accurate as of: 6/27/17 10:34 AM.  Always use your most recent med list.                   Brand Name Dispense Instructions for use Diagnosis    aspirin 81 MG tablet     90 tablet    Take 1 tablet  (81 mg) by mouth daily    Acute on chronic combined systolic and diastolic congestive heart failure (H)       calcium carbonate 1250 MG tablet    OS-RODNEY 500 mg [employer]  [employer] [place]. [place]    120 tablet    Take 2 tablets (1,000 mg) by mouth 2 times daily    Hypocalcemia       diphenoxylate-atropine 2.5-0.025 MG per tablet    LOMOTIL     Take 1 tablet by mouth 4 times daily as needed for diarrhea Reported on 5/22/2017        enoxaparin 40 MG/0.4ML injection    LOVENOX    60 Syringe    Inject 0.4 mLs (40 mg) Subcutaneous 2 times daily    Other chronic pulmonary embolism without acute cor pulmonale (H)       HYDROcodone-acetaminophen 5-325 MG per tablet    NORCO     Take 1 tablet by mouth every 6 hours as needed for moderate to severe pain Reported on 5/22/2017        loperamide 2 MG capsule    IMODIUM    60 capsule    Use 2 caps PO after first loose stool.  Repeat 1 cap after each subsequent loose stool.  Max 8/24 hours.    Diarrhea, unspecified type       metoprolol 25 MG 24 hr tablet    TOPROL-XL    45 tablet    Take 0.5 tablets (12.5 mg) by mouth daily AM    (HFpEF) heart failure with preserved ejection fraction (H)       phosphorus tablet 250 mg 250 MG per tablet    K PHOS NEUTRAL    30 tablet    Take 2 tablets (500 mg) by mouth 3 times daily    Hypophosphatemia       predniSONE 5 MG tablet    DELTASONE    60 tablet    Take 1 tablet (5 mg) by mouth 2 times daily    Prostate cancer 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), Metastatic bone tumor (H)       prochlorperazine 5 MG tablet    COMPAZINE    30 tablet    Take 1 tablet (5 mg) by mouth every 6 hours as needed for nausea or vomiting    Nausea       simvastatin 40 MG tablet    ZOCOR    90 tablet    TAKE ONE TABLET BY MOUTH AT BEDTIME    Hyperlipidemia LDL goal <130       tamsulosin 0.4 MG capsule    FLOMAX    90 capsule    Take 1 capsule (0.4 mg) by mouth daily AM    Ureteral stone       vitamin D 2000 UNITS tablet     100 tablet    Take 1 tablet by mouth daily    Vitamin D deficiency

## 2017-07-05 DIAGNOSIS — C61 PROSTATE CANCER METASTATIC TO BONE (H): Primary | ICD-10-CM

## 2017-07-05 DIAGNOSIS — C79.51 METASTATIC BONE TUMOR (H): ICD-10-CM

## 2017-07-05 DIAGNOSIS — C79.51 PROSTATE CANCER METASTATIC TO BONE (H): Primary | ICD-10-CM

## 2017-07-05 RX ORDER — PREDNISONE 5 MG/1
5 TABLET ORAL 2 TIMES DAILY
Qty: 60 TABLET | Refills: 2 | Status: SHIPPED | OUTPATIENT
Start: 2017-07-05 | End: 2017-01-01

## 2017-07-05 RX ORDER — ABIRATERONE ACETATE 250 MG/1
1000 TABLET ORAL DAILY
Qty: 120 TABLET | Refills: 2 | Status: SHIPPED | OUTPATIENT
Start: 2017-07-05 | End: 2017-01-01

## 2017-07-11 ENCOUNTER — CARE COORDINATION (OUTPATIENT)
Dept: CARDIOLOGY | Facility: CLINIC | Age: 72
End: 2017-07-11

## 2017-07-11 NOTE — PROGRESS NOTES
"Per , patient has been contacted multiple times to schedule his 4-6 month f/u appointment stemming from his  appointment when f/u could not be scheduled due to EPIC being down.  Patient has not returned calls to schedule this.     Marga Patten RN BSN [facility]  Cardiology Care Coordinator - [facility]  Questions and [phone]  First press #1 for the [place] and then press #3 for \"Medical Advise\" to reach a Cardiology Nurse.       "

## 2017-07-12 ENCOUNTER — HOSPITAL ENCOUNTER (OUTPATIENT)
Dept: CT IMAGING | Facility: CLINIC | Age: 72
Discharge: HOME | End: 2017-07-12
Attending: PHYSICIAN ASSISTANT | Admitting: PHYSICIAN ASSISTANT
Payer: COMMERCIAL

## 2017-07-12 ENCOUNTER — HOSPITAL ENCOUNTER (OUTPATIENT)
Dept: NUCLEAR MEDICINE | Facility: CLINIC | Age: 72
Setting detail: NUCLEAR MEDICINE
End: 2017-07-12
Attending: PHYSICIAN ASSISTANT
Payer: COMMERCIAL

## 2017-07-12 ENCOUNTER — ONCOLOGY VISIT (OUTPATIENT)
Dept: ONCOLOGY | Facility: CLINIC | Age: 72
End: 2017-07-12
Attending: INTERNAL MEDICINE
Payer: COMMERCIAL

## 2017-07-12 VITALS
RESPIRATION RATE: 16 BRPM | OXYGEN SATURATION: 100 % | WEIGHT: 125 LBS | TEMPERATURE: 98.1 F | BODY MASS INDEX: 20.18 KG/M2 | HEART RATE: 47 BPM | DIASTOLIC BLOOD PRESSURE: 49 MMHG | SYSTOLIC BLOOD PRESSURE: 101 MMHG

## 2017-07-12 DIAGNOSIS — C79.51 METASTATIC BONE TUMOR (H): ICD-10-CM

## 2017-07-12 DIAGNOSIS — C61 PROSTATE CANCER METASTATIC TO BONE (H): ICD-10-CM

## 2017-07-12 DIAGNOSIS — D64.9 ANEMIA, UNSPECIFIED TYPE: ICD-10-CM

## 2017-07-12 DIAGNOSIS — C61 PROSTATE CANCER METASTATIC TO BONE (H): Primary | ICD-10-CM

## 2017-07-12 DIAGNOSIS — C79.51 PROSTATE CANCER METASTATIC TO BONE (H): ICD-10-CM

## 2017-07-12 DIAGNOSIS — C79.51 PROSTATE CANCER METASTATIC TO BONE (H): Primary | ICD-10-CM

## 2017-07-12 PROCEDURE — 78306 BONE IMAGING WHOLE BODY: CPT

## 2017-07-12 PROCEDURE — A9503 TC99M MEDRONATE: HCPCS | Performed by: PHYSICIAN ASSISTANT

## 2017-07-12 PROCEDURE — 40000141 ZZH STATISTIC PERIPHERAL IV START W/O US GUIDANCE

## 2017-07-12 PROCEDURE — 34300033 ZZH RX 343: Performed by: PHYSICIAN ASSISTANT

## 2017-07-12 PROCEDURE — 99212 OFFICE O/P EST SF 10 MIN: CPT | Mod: ZF

## 2017-07-12 PROCEDURE — 99214 OFFICE O/P EST MOD 30 MIN: CPT | Mod: ZP | Performed by: INTERNAL MEDICINE

## 2017-07-12 RX ORDER — IOPAMIDOL 755 MG/ML
69 INJECTION, SOLUTION INTRAVASCULAR ONCE
Status: COMPLETED | OUTPATIENT
Start: 2017-07-12 | End: 2017-07-12

## 2017-07-12 RX ORDER — TC 99M MEDRONATE 20 MG/10ML
25 INJECTION, POWDER, LYOPHILIZED, FOR SOLUTION INTRAVENOUS ONCE
Status: COMPLETED | OUTPATIENT
Start: 2017-07-12 | End: 2017-07-12

## 2017-07-12 RX ADMIN — TC 99M MEDRONATE 24.2 MCI.: 20 INJECTION, POWDER, LYOPHILIZED, FOR SOLUTION INTRAVENOUS at 10:15

## 2017-07-12 RX ADMIN — IOPAMIDOL 69 ML: 755 INJECTION, SOLUTION INTRAVENOUS at 11:16

## 2017-07-12 ASSESSMENT — PAIN SCALES - GENERAL: PAINLEVEL: SEVERE PAIN (7)

## 2017-07-12 NOTE — PROGRESS NOTES
Oncology/Hematology Visit Note  Jul 12, 2017    DIAGNOSIS:  Mr. Win is a 71 year old male with a hx of CHF, CKD, CAD w/ hx of STEMI and CABG. He met with Dr. Robles at the end of July for consultation regarding metastatic prostate cancer. His story begins in May 2015 when he saw his PCP for back pain, present since Feb. He also had poor appetite, weight loss of 8lb and poor energy.  CT showed diffuse bony mets. CT Chest showed sclerotic mets throughout the bones. He was given degarelix on 7/22 in Urology. He was having pelvic pain and was evaluated by Dr Cavazos would did not feel XRT was appropriate.  He started on docetaxel 7/31. From 8/7-8/14 he was admitted with colitis, CHAR, elevated BNP, lactis acidosis, and poor appetite. He was discharged to a TCU and unfortunately, it sounds like he was not given his Lasix.  His BUBBA worsened and his breathing became compromised.  He was seen by Chelsi Corral on 8/21 and eventually transferred to the ED and admitted 8/21-8/24 for acute on chronic CHF.  At our last visit we discussed not pursuing further Taxotere at this time and discussed starting Zytiga when he was ready.  We pursued PT, OT, home lymphedema for supportive care at home in attempt for further rehabilitation. He was again admitted and discharged - 10/20/15.  He never started the Zytiga as he was still actively recovering from his cardiac issues.  His PSA remained stable, thus no intervention was pursued.  December 2015, Mainor met with Dr Robles and since his ECOG was back to a 1, they discussed re-challenging the Taxotere at a lower dose. 9/26/2016 he started Provenge off study. He continues on lupron every 3 mos and XGEVA. 9/26/2016 he started Provenge off study. He received 3 doses of Provenge (last 10/28), on 3rd dose had fevers, chills, myalgias, was bedbound for a week due to these symptoms. During this time he held his coumadin for 5 days prior to a central line removal 10/31. Later than week he had sudden onset  of right pleuritic chest pain and hemoptysis. Diagnosed with acute segmental/subsegmental PE on 11/8 and prescribed Lovenox.     Mainor was briefly on Zytiga and prednisone in November-December, however after one month his PSA went up, thus it was decided to add in Xofigo (1/11/17-- first).     On 12/28 he started xaralto and took his last pill on 1/10/17.  He stopped as he noticed melanotic stools and had some hemoptysis.  He was seen in clinic the next day with a hgb in th 6 range and was admitted.  Pan-scopes showed no active bleeding.  He was able to get his Xofigo in patient as well.  He was transitioned back to lovenox BID.     Mainor developed diarrhea on and off in the spring of 2017, Zytiga was stopped on 3/31 as it was unsure what was contributing.  Mainor felt it was related to the Xofigo and that his dose was too high as his weight is always at least 10 pounds higher in clinic than at home.      INTERVAL HISTORY:  Mr. 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      Mainor is doing well and has no new complains at this visit. His diarrhea has resolved and he attributes this to adjusting his dose of radium. He has a low back pain which is persistent and not very different.     No f/s/c. No chest pain. No black stools. No new  issues.  He is on lovenox 40 mg BID.      MEDICATIONS:   Current Outpatient Prescriptions   Medication Sig     predniSONE (DELTASONE) 5 MG tablet Take 1 tablet (5 mg) by mouth 2 times daily     abiraterone (ZYTIGA) 250 MG tablet Take 4 tablets (1,000 mg) by mouth daily Take on empty stomach.     enoxaparin (LOVENOX) 40 MG/0.4ML injection Inject 0.4 mLs (40 mg) Subcutaneous 2 times daily     aspirin 81 MG tablet Take 1 tablet (81 mg) by mouth daily     prochlorperazine (COMPAZINE) 5 MG tablet Take 1 tablet (5 mg) by mouth every 6 hours as needed for nausea or vomiting (Patient not taking: Reported on 6/19/2017)     phosphorus tablet 250 mg (K PHOS NEUTRAL) 250 MG per  tablet Take 2 tablets (500 mg) by mouth 3 times daily     metoprolol (TOPROL-XL) 25 MG 24 hr tablet Take 0.5 tablets (12.5 mg) by mouth daily AM     loperamide (IMODIUM) 2 MG capsule Use 2 caps PO after first loose stool.  Repeat 1 cap after each subsequent loose stool.  Max 8/24 hours. (Patient not taking: Reported on 6/19/2017)     predniSONE (DELTASONE) 5 MG tablet Take 1 tablet (5 mg) by mouth 2 times daily     diphenoxylate-atropine (LOMOTIL) 2.5-0.025 MG per tablet Take 1 tablet by mouth 4 times daily as needed for diarrhea Reported on 5/22/2017     calcium carbonate (OS-RODNEY 500 MG [place]) 500 MG tablet Take 2 tablets (1,000 mg) by mouth 2 times daily     Cholecalciferol (VITAMIN D) 2000 UNITS tablet Take 1 tablet by mouth daily     simvastatin (ZOCOR) 40 MG tablet TAKE ONE TABLET BY MOUTH AT BEDTIME     tamsulosin (FLOMAX) 0.4 MG 24 hr capsule Take 1 capsule (0.4 mg) by mouth daily AM     HYDROcodone-acetaminophen (NORCO) 5-325 MG per tablet Take 1 tablet by mouth every 6 hours as needed for moderate to severe pain Reported on 5/22/2017     No current facility-administered medications for this visit.      Facility-Administered Medications Ordered in Other Visits   Medication     hydrocortisone sodium succinate (solu-CORTEF) injection     glycopyrrolate (ROBINUL) injection     neostigmine (PROSTIGMINE) injection     protamine injection     albuterol (PROAIR HFA, PROVENTIL HFA, VENTOLIN HFA) inhaler          ALLERGIES:  No Known Allergies    PHYSICAL EXAMINATION:  Vitals: There were no vitals taken for this visit.   Wt Readings from Last 10 Encounters:   07/12/17 56.7 kg (125 lb)   06/27/17 56.2 kg (124 lb)   06/19/17 58.1 kg (128 lb)   06/12/17 58.1 kg (128 lb)   06/12/17 58.1 kg (128 lb)   05/31/17 58.1 kg (128 lb)   05/24/17 57.6 kg (127 lb)   05/15/17 57.2 kg (126 lb)   05/08/17 57.2 kg (126 lb)   05/03/17 63 kg (139 lb)       GENERAL:  A pleasant person in no acute distress.  Mildly pale and thin  looking  LYMPH NODES:  No peripheral lymphadenopathy noted in the supraclavicular or cervical regions.   LUNGS:  Clear to auscultation bilaterally.   HEART:  Regular rate and rhythm   ABDOMEN:  Bowel sounds are active.  Soft and nontender.  No hepatosplenomegaly or other masses appreciated.  LOWER EXTREMITIES:  Without pitting edema to the knees bilaterally.   NEUROLOGICAL:  Alert/orientated/able to answer all questions.  CN grossly intact.     LAB:     Recent Labs   Lab Test  06/27/17   0914  06/19/17   1500  06/05/17   1451  05/31/17   0903  05/24/17   1124   NA  138  139  139  139  142   POTASSIUM  3.3*  3.8  4.6  3.9  3.9   CHLORIDE  107  110*  108  108  108   CO2  21  22  23  21  22   ANIONGAP  10  7  8  10  11   BUN  14  15  18  16  21   CR  0.82  0.85  0.92  0.84  0.80   GLC  99  120*  132*  114*  117*   RODNEY  8.3*  8.4*  9.2  8.4*  8.6     Recent Labs   Lab Test  04/05/17   0941  03/31/17   1411  03/15/17   1410  01/14/17   0808  01/13/17   1815  01/13/17   0801   MAG  2.0  2.2  2.4*  2.1  2.0  2.0   PHOS  2.3*  1.6*   --   2.0*  2.9  1.8*     Recent Labs   Lab Test  06/27/17   0914  06/19/17   1500  06/12/17   1200  06/05/17   1451  05/24/17   1124  05/22/17   1143   WBC  2.5*  1.8*  2.9*  2.8*  2.5*  2.3*   HGB  7.9*  8.7*  9.2*  8.7*  9.4*  9.9*   PLT  79*  97*  103*  94*  114*  109*   MCV  94  98  102*  95  96  94   NEUTROPHIL  53.8  58.7   --   67.9  58.6  62.1     Recent Labs   Lab Test  06/27/17   0914  06/19/17   1500  06/05/17   1451   03/31/17   1411   12/21/16   1149  12/01/16   1247   BILITOTAL  1.1  1.3  1.0   < >  2.6*   < >  0.6  0.8   ALKPHOS  259*  322*  367*   < >  472*   < >  673*  649*   ALT  34  27  20   < >  16   < >  18  16   AST  36  35  24   < >  22   < >  24  22   ALBUMIN  3.5  3.5  3.4   < >  3.7   < >  3.5  3.8   LDH   --    --    --    --   409*   --   321*  348*    < > = values in this interval not displayed.     TSH   Date Value Ref Range Status   09/16/2015 3.18 0.40 - 4.00  mU/L Final     EXAMINATION: CT CHEST/ABDOMEN/PELVIS W CONTRAST, 7/12/2017 11:37 AM     TECHNIQUE:  Helical CT images from the thoracic inlet through the  symphysis pubis were obtained  with contrast. Contrast dose: 69 cc of  isovue 370     COMPARISON: CT chest, abdomen and pelvis 9/7/2016, bone scan 9/7/2016,  CT chest 7/2/2015.     HISTORY: metastatic prostate, s/p Radium treatment, new baseline,  Malignant neoplasm of prostate, Secondary malignant neoplasm of bone     FINDINGS:     Chest: Heart size is normal. Normal caliber the aorta and pulmonary  artery. No central pulmonary embolus. Mitral valvular clip. Coronary  artery calcifications. Thyroid is unremarkable. No mediastinal, hilar  or axillary lymphadenopathy. No pericardial or pleural effusion. The  central tracheobronchial tree is patent.      Lungs: Moderate apical predominant centrilobular emphysematous  changes. Numerous small calcified granulomas. Noncalcified pulmonary  nodules are not significantly changed from at least 7/2/2015,  including 4 mm nodule along the left major fissure (series 4 image  89), 2 mm left lower lobe nodule (series 4 image 113).  Reticulation/fibrosis at the base right middle and right lower lobes  is not significantly changed.     Abdomen and pelvis: The liver, gallbladder, pancreas, spleen, and  adrenals are within normal limits. Nonspecific bilateral perinephric  fat stranding is not significantly changed. Left ureteral stent has  been removed. No hydronephrosis or hydroureter. Stable mild wall  thickening of the urinary bladder, presumably sequelae of radiation.  Grossly stable nodular enlarged prostate. The colon and small bowel  are within normal limits. The proximal appendix is within normal  limits (series 3 image 447), however the distal tip which was  previously mildly dilated and appeared inflamed is now obscured,  clumped adjacent to small bowel loops. Major vasculature is patent.  Aorta is normal in caliber. No  lymphadenopathy in the abdomen or  pelvis. No free air or significant free fluid.     Bones and soft tissues: Diffuse sclerotic bone metastases are  progressed from 9/7/2016, particularly in the proximal femurs and  humeri as well as in the sternum. Stable appearance of L4 compression  fracture with retropulsion of fracture fragments and mild-moderate  spinal canal narrowing at that level. No new pathologic fracture  identified.          IMPRESSION:   1. Progression of diffuse osseous metastatic disease.  2. Stable L4 compression fracture with mild-moderate spinal canal  narrowing at that level. No acute pathologic fracture.  3. Pulmonary nodules are stable from at least 7/2/2015. No new  pulmonary nodules.  4. The distal appendix, which previously was mildly dilated and  inflamed in appearance, is today obscured, clumped adjacent to some  small bowel loops. Recommend attention on follow-up.     I have personally reviewed the examination and initial interpretation  and I agree with the findings.     RENAY SHAHID MD           Results for orders placed or performed during the hospital encounter of 07/12/17   CT Chest/Abdomen/Pelvis w Contrast    Narrative    EXAMINATION: CT CHEST/ABDOMEN/PELVIS W CONTRAST, 7/12/2017 11:37 AM    TECHNIQUE:  Helical CT images from the thoracic inlet through the  symphysis pubis were obtained  with contrast. Contrast dose: 69 cc of  isovue 370    COMPARISON: CT chest, abdomen and pelvis 9/7/2016, bone scan 9/7/2016,  CT chest 7/2/2015.    HISTORY: metastatic prostate, s/p Radium treatment, new baseline,  Malignant neoplasm of prostate, Secondary malignant neoplasm of bone    FINDINGS:    Chest: Heart size is normal. Normal caliber the aorta and pulmonary  artery. No central pulmonary embolus. Mitral valvular clip. Coronary  artery calcifications. Thyroid is unremarkable. No mediastinal, hilar  or axillary lymphadenopathy. No pericardial or pleural effusion. The  central  tracheobronchial tree is patent.     Lungs: Moderate apical predominant centrilobular emphysematous  changes. Numerous small calcified granulomas. Noncalcified pulmonary  nodules are not significantly changed from at least 7/2/2015,  including 4 mm nodule along the left major fissure (series 4 image  89), 2 mm left lower lobe nodule (series 4 image 113).  Reticulation/fibrosis at the base right middle and right lower lobes  is not significantly changed.    Abdomen and pelvis: The liver, gallbladder, pancreas, spleen, and  adrenals are within normal limits. Nonspecific bilateral perinephric  fat stranding is not significantly changed. Left ureteral stent has  been removed. No hydronephrosis or hydroureter. Stable mild wall  thickening of the urinary bladder, presumably sequelae of radiation.  Grossly stable nodular enlarged prostate. The colon and small bowel  are within normal limits. The proximal appendix is within normal  limits (series 3 image 447), however the distal tip which was  previously mildly dilated and appeared inflamed is now obscured,  clumped adjacent to small bowel loops. Major vasculature is patent.  Aorta is normal in caliber. No lymphadenopathy in the abdomen or  pelvis. No free air or significant free fluid.    Bones and soft tissues: Diffuse sclerotic bone metastases are  progressed from 9/7/2016, particularly in the proximal femurs and  humeri as well as in the sternum. Stable appearance of L4 compression  fracture with retropulsion of fracture fragments and mild-moderate  spinal canal narrowing at that level. No new pathologic fracture  identified.       Impression    IMPRESSION:   1. Progression of diffuse osseous metastatic disease.  2. Stable L4 compression fracture with mild-moderate spinal canal  narrowing at that level. No acute pathologic fracture.  3. Pulmonary nodules are stable from at least 7/2/2015. No new  pulmonary nodules.  4. The distal appendix, which previously was mildly  dilated and  inflamed in appearance, is today obscured, clumped adjacent to some  small bowel loops. Recommend attention on follow-up.    I have personally reviewed the examination and initial interpretation  and I agree with the findings.    RENAY SHAHID MD     Recent Labs   Lab Test  06/19/17   1500  05/31/17   0903  05/24/17   1124  05/08/17   1434  05/03/17   0934   12/21/16   1149  12/01/16   1247  10/28/16   0942   09/07/16   1859   08/21/15   1405   PSA  590.00*  542.00*  569.00*  626.00*  688.00*   < >  424.22*  324.65*   --    < >  28.98*   < >  756.97*   TESTOSTTOTAL   --    --    --    --    --    --  <2 <2  11*   --   7*   --   4*    < > = values in this interval not displayed.        IMPRESSION/PLAN:   Metastatic prostate cancer: on zytiga/pred and xofigo and xgeva. Mainor has had ongoing on and off diarrhea, infection work up has been negative.  Zytiga was stopped (march thru early may) and didn't help.  Mainor felt like his Xofigo dose is too high. We spoke to curtis in nuc med and Ila weight changes have all been within the 10% acceptable variation.  However, Curtis agreed to give him a smaller dose.  It seems Mainor has completed his radium on 5/31     -continue oral Zytiga  -last Lupron 5/15/17  - I have reviewed his restaging scans. It does appear quite similar though the official read does suggest progression in the bones. His PSA values have been stable over the last few months. His alkaline phosphatase has been steadily dropping. He did not have labs drawn today.     While there is biochemical response, he does have radiographic progression based on official report. His symptoms have been stable. Our next step in the absence of clinical trial would be chemotherapy or enzalutamide. I am not very optimistic that we will have continued response with enzalutamide immediately post abiraterone failure. His previous taxotere was from Dec through March 2016. We could consider repeating his taxotere.    I  will see him in 2 - 3 weeks with labs.     Anemia: possibly radium induced, no bleeding  7.9 today,     Heme: on lovenox for PE dx 11/2016. Had inpatient stay for GI bleed 1/11-1/14. Stopped xarelto and transitioned back to lovenox BID. No bleeding concerns. Recently dropped from 60mg BID to 50mg BID to 40 mg BID due to high Xa level.   -Continue at 40 mg BID.      Bone mets: on xgeva Q 6 weeks.    CV: hx of CHF. No clinical s/s of failure.  Off metoprolol due to BP being too low.  BP slightly lower today, but no symptoms.   -cards on 6/12    Over 25 min of direct face to face time spent with patient with more than 50% time spent in counseling and coordinating care.

## 2017-07-12 NOTE — LETTER
7/12/2017       RE: Oswaldo Win  [address]  [facility] [address]     Dear Colleague,    Thank you for referring your patient, Oswaldo Win, to the [facility]. Please see a copy of my visit note below.    Oncology/Hematology Visit Note  Jul 12, 2017    DIAGNOSIS:  Mr. Win is a 71 year old male with a hx of CHF, CKD, CAD w/ hx of STEMI and CABG. He met with Dr. Robles at the end of July for consultation regarding metastatic prostate cancer. His story begins in May 2015 when he saw his PCP for back pain, present since Feb. He also had poor appetite, weight loss of 8lb and poor energy.  CT showed diffuse bony mets. CT Chest showed sclerotic mets throughout the bones. He was given degarelix on 7/22 in Urology. He was having pelvic pain and was evaluated by Dr Cavazos would did not feel XRT was appropriate.  He started on docetaxel 7/31. From 8/7-8/14 he was admitted with colitis, CHAR, elevated BNP, lactis acidosis, and poor appetite. He was discharged to a TCU and unfortunately, it sounds like he was not given his Lasix.  His BUBBA worsened and his breathing became compromised.  He was seen by Chelsi Corral on 8/21 and eventually transferred to the ED and admitted 8/21-8/24 for acute on chronic CHF.  At our last visit we discussed not pursuing further Taxotere at this time and discussed starting Zytiga when he was ready.  We pursued PT, OT, home lymphedema for supportive care at home in attempt for further rehabilitation. He was again admitted and discharged - 10/20/15.  He never started the Zytiga as he was still actively recovering from his cardiac issues.  His PSA remained stable, thus no intervention was pursued.  December 2015, Mainor met with Dr Robles and since his ECOG was back to a 1, they discussed re-challenging the Taxotere at a lower dose. 9/26/2016 he started Provenge off study. He continues on lupron every 3 mos and XGEVA. 9/26/2016 he started Provenge off study. He received 3 doses of  Provenge (last 10/28), on 3rd dose had fevers, chills, myalgias, was bedbound for a week due to these symptoms. During this time he held his coumadin for 5 days prior to a central line removal 10/31. Later than week he had sudden onset of right pleuritic chest pain and hemoptysis. Diagnosed with acute segmental/subsegmental PE on 11/8 and prescribed Lovenox.     Mainor was briefly on Zytiga and prednisone in November-December, however after one month his PSA went up, thus it was decided to add in Xofigo (1/11/17-- first).     On 12/28 he started xaralto and took his last pill on 1/10/17.  He stopped as he noticed melanotic stools and had some hemoptysis.  He was seen in clinic the next day with a hgb in th 6 range and was admitted.  Pan-scopes showed no active bleeding.  He was able to get his Xofigo in patient as well.  He was transitioned back to lovenox BID.     Mainor developed diarrhea on and off in the spring of 2017, Zytiga was stopped on 3/31 as it was unsure what was contributing.  Mainor felt it was related to the Xofigo and that his dose was too high as his weight is always at least 10 pounds higher in clinic than at home.      INTERVAL HISTORY:  Mr. 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      Mainor is doing well and has no new complains at this visit. His diarrhea has resolved and he attributes this to adjusting his dose of radium. He has a low back pain which is persistent and not very different.     No f/s/c. No chest pain. No black stools. No new  issues.  He is on lovenox 40 mg BID.      MEDICATIONS:   Current Outpatient Prescriptions   Medication Sig     predniSONE (DELTASONE) 5 MG tablet Take 1 tablet (5 mg) by mouth 2 times daily     abiraterone (ZYTIGA) 250 MG tablet Take 4 tablets (1,000 mg) by mouth daily Take on empty stomach.     enoxaparin (LOVENOX) 40 MG/0.4ML injection Inject 0.4 mLs (40 mg) Subcutaneous 2 times daily     aspirin 81 MG tablet Take 1 tablet (81 mg)  by mouth daily     prochlorperazine (COMPAZINE) 5 MG tablet Take 1 tablet (5 mg) by mouth every 6 hours as needed for nausea or vomiting (Patient not taking: Reported on 6/19/2017)     phosphorus tablet 250 mg (K PHOS NEUTRAL) 250 MG per tablet Take 2 tablets (500 mg) by mouth 3 times daily     metoprolol (TOPROL-XL) 25 MG 24 hr tablet Take 0.5 tablets (12.5 mg) by mouth daily AM     loperamide (IMODIUM) 2 MG capsule Use 2 caps PO after first loose stool.  Repeat 1 cap after each subsequent loose stool.  Max 8/24 hours. (Patient not taking: Reported on 6/19/2017)     predniSONE (DELTASONE) 5 MG tablet Take 1 tablet (5 mg) by mouth 2 times daily     diphenoxylate-atropine (LOMOTIL) 2.5-0.025 MG per tablet Take 1 tablet by mouth 4 times daily as needed for diarrhea Reported on 5/22/2017     calcium carbonate (OS-RODNEY 500 MG [place]. [place]) 500 MG tablet Take 2 tablets (1,000 mg) by mouth 2 times daily     Cholecalciferol (VITAMIN D) 2000 UNITS tablet Take 1 tablet by mouth daily     simvastatin (ZOCOR) 40 MG tablet TAKE ONE TABLET BY MOUTH AT BEDTIME     tamsulosin (FLOMAX) 0.4 MG 24 hr capsule Take 1 capsule (0.4 mg) by mouth daily AM     HYDROcodone-acetaminophen (NORCO) 5-325 MG per tablet Take 1 tablet by mouth every 6 hours as needed for moderate to severe pain Reported on 5/22/2017     No current facility-administered medications for this visit.      Facility-Administered Medications Ordered in Other Visits   Medication     hydrocortisone sodium succinate (solu-CORTEF) injection     glycopyrrolate (ROBINUL) injection     neostigmine (PROSTIGMINE) injection     protamine injection     albuterol (PROAIR HFA, PROVENTIL HFA, VENTOLIN HFA) inhaler          ALLERGIES:  No Known Allergies    PHYSICAL EXAMINATION:  Vitals: There were no vitals taken for this visit.   Wt Readings from Last 10 Encounters:   07/12/17 56.7 kg (125 lb)   06/27/17 56.2 kg (124 lb)   06/19/17 58.1 kg (128 lb)   06/12/17 58.1 kg (128 lb)   06/12/17  58.1 kg (128 lb)   05/31/17 58.1 kg (128 lb)   05/24/17 57.6 kg (127 lb)   05/15/17 57.2 kg (126 lb)   05/08/17 57.2 kg (126 lb)   05/03/17 63 kg (139 lb)       GENERAL:  A pleasant person in no acute distress.  Mildly pale and thin looking  LYMPH NODES:  No peripheral lymphadenopathy noted in the supraclavicular or cervical regions.   LUNGS:  Clear to auscultation bilaterally.   HEART:  Regular rate and rhythm   ABDOMEN:  Bowel sounds are active.  Soft and nontender.  No hepatosplenomegaly or other masses appreciated.  LOWER EXTREMITIES:  Without pitting edema to the knees bilaterally.   NEUROLOGICAL:  Alert/orientated/able to answer all questions.  CN grossly intact.     LAB:     Recent Labs   Lab Test  06/27/17   0914  06/19/17   1500  06/05/17   1451  05/31/17   0903  05/24/17   1124   NA  138  139  139  139  142   POTASSIUM  3.3*  3.8  4.6  3.9  3.9   CHLORIDE  107  110*  108  108  108   CO2  21  22  23  21  22   ANIONGAP  10  7  8  10  11   BUN  14  15  18  16  21   CR  0.82  0.85  0.92  0.84  0.80   GLC  99  120*  132*  114*  117*   RODNEY  8.3*  8.4*  9.2  8.4*  8.6     Recent Labs   Lab Test  04/05/17   0941  03/31/17   1411  03/15/17   1410  01/14/17   0808  01/13/17   1815  01/13/17   0801   MAG  2.0  2.2  2.4*  2.1  2.0  2.0   PHOS  2.3*  1.6*   --   2.0*  2.9  1.8*     Recent Labs   Lab Test  06/27/17   0914  06/19/17   1500  06/12/17   1200  06/05/17   1451  05/24/17   1124  05/22/17   1143   WBC  2.5*  1.8*  2.9*  2.8*  2.5*  2.3*   HGB  7.9*  8.7*  9.2*  8.7*  9.4*  9.9*   PLT  79*  97*  103*  94*  114*  109*   MCV  94  98  102*  95  96  94   NEUTROPHIL  53.8  58.7   --   67.9  58.6  62.1     Recent Labs   Lab Test  06/27/17   0914  06/19/17   1500  06/05/17   1451   03/31/17   1411   12/21/16   1149  12/01/16   1247   BILITOTAL  1.1  1.3  1.0   < >  2.6*   < >  0.6  0.8   ALKPHOS  259*  322*  367*   < >  472*   < >  673*  649*   ALT  34  27  20   < >  16   < >  18  16   AST  36  35  24   < >  22   < >   24  22   ALBUMIN  3.5  3.5  3.4   < >  3.7   < >  3.5  3.8   LDH   --    --    --    --   409*   --   321*  348*    < > = values in this interval not displayed.     TSH   Date Value Ref Range Status   09/16/2015 3.18 0.40 - 4.00 mU/L Final     EXAMINATION: CT CHEST/ABDOMEN/PELVIS W CONTRAST, 7/12/2017 11:37 AM     TECHNIQUE:  Helical CT images from the thoracic inlet through the  symphysis pubis were obtained  with contrast. Contrast dose: 69 cc of  isovue 370     COMPARISON: CT chest, abdomen and pelvis 9/7/2016, bone scan 9/7/2016,  CT chest 7/2/2015.     HISTORY: metastatic prostate, s/p Radium treatment, new baseline,  Malignant neoplasm of prostate, Secondary malignant neoplasm of bone     FINDINGS:     Chest: Heart size is normal. Normal caliber the aorta and pulmonary  artery. No central pulmonary embolus. Mitral valvular clip. Coronary  artery calcifications. Thyroid is unremarkable. No mediastinal, hilar  or axillary lymphadenopathy. No pericardial or pleural effusion. The  central tracheobronchial tree is patent.      Lungs: Moderate apical predominant centrilobular emphysematous  changes. Numerous small calcified granulomas. Noncalcified pulmonary  nodules are not significantly changed from at least 7/2/2015,  including 4 mm nodule along the left major fissure (series 4 image  89), 2 mm left lower lobe nodule (series 4 image 113).  Reticulation/fibrosis at the base right middle and right lower lobes  is not significantly changed.     Abdomen and pelvis: The liver, gallbladder, pancreas, spleen, and  adrenals are within normal limits. Nonspecific bilateral perinephric  fat stranding is not significantly changed. Left ureteral stent has  been removed. No hydronephrosis or hydroureter. Stable mild wall  thickening of the urinary bladder, presumably sequelae of radiation.  Grossly stable nodular enlarged prostate. The colon and small bowel  are within normal limits. The proximal appendix is within  normal  limits (series 3 image 447), however the distal tip which was  previously mildly dilated and appeared inflamed is now obscured,  clumped adjacent to small bowel loops. Major vasculature is patent.  Aorta is normal in caliber. No lymphadenopathy in the abdomen or  pelvis. No free air or significant free fluid.     Bones and soft tissues: Diffuse sclerotic bone metastases are  progressed from 9/7/2016, particularly in the proximal femurs and  humeri as well as in the sternum. Stable appearance of L4 compression  fracture with retropulsion of fracture fragments and mild-moderate  spinal canal narrowing at that level. No new pathologic fracture  identified.          IMPRESSION:   1. Progression of diffuse osseous metastatic disease.  2. Stable L4 compression fracture with mild-moderate spinal canal  narrowing at that level. No acute pathologic fracture.  3. Pulmonary nodules are stable from at least 7/2/2015. No new  pulmonary nodules.  4. The distal appendix, which previously was mildly dilated and  inflamed in appearance, is today obscured, clumped adjacent to some  small bowel loops. Recommend attention on follow-up.     I have personally reviewed the examination and initial interpretation  and I agree with the findings.     RENAY SHAHID MD           Results for orders placed or performed during the hospital encounter of 07/12/17   CT Chest/Abdomen/Pelvis w Contrast    Narrative    EXAMINATION: CT CHEST/ABDOMEN/PELVIS W CONTRAST, 7/12/2017 11:37 AM    TECHNIQUE:  Helical CT images from the thoracic inlet through the  symphysis pubis were obtained  with contrast. Contrast dose: 69 cc of  isovue 370    COMPARISON: CT chest, abdomen and pelvis 9/7/2016, bone scan 9/7/2016,  CT chest 7/2/2015.    HISTORY: metastatic prostate, s/p Radium treatment, new baseline,  Malignant neoplasm of prostate, Secondary malignant neoplasm of bone    FINDINGS:    Chest: Heart size is normal. Normal caliber the aorta and  pulmonary  artery. No central pulmonary embolus. Mitral valvular clip. Coronary  artery calcifications. Thyroid is unremarkable. No mediastinal, hilar  or axillary lymphadenopathy. No pericardial or pleural effusion. The  central tracheobronchial tree is patent.     Lungs: Moderate apical predominant centrilobular emphysematous  changes. Numerous small calcified granulomas. Noncalcified pulmonary  nodules are not significantly changed from at least 7/2/2015,  including 4 mm nodule along the left major fissure (series 4 image  89), 2 mm left lower lobe nodule (series 4 image 113).  Reticulation/fibrosis at the base right middle and right lower lobes  is not significantly changed.    Abdomen and pelvis: The liver, gallbladder, pancreas, spleen, and  adrenals are within normal limits. Nonspecific bilateral perinephric  fat stranding is not significantly changed. Left ureteral stent has  been removed. No hydronephrosis or hydroureter. Stable mild wall  thickening of the urinary bladder, presumably sequelae of radiation.  Grossly stable nodular enlarged prostate. The colon and small bowel  are within normal limits. The proximal appendix is within normal  limits (series 3 image 447), however the distal tip which was  previously mildly dilated and appeared inflamed is now obscured,  clumped adjacent to small bowel loops. Major vasculature is patent.  Aorta is normal in caliber. No lymphadenopathy in the abdomen or  pelvis. No free air or significant free fluid.    Bones and soft tissues: Diffuse sclerotic bone metastases are  progressed from 9/7/2016, particularly in the proximal femurs and  humeri as well as in the sternum. Stable appearance of L4 compression  fracture with retropulsion of fracture fragments and mild-moderate  spinal canal narrowing at that level. No new pathologic fracture  identified.       Impression    IMPRESSION:   1. Progression of diffuse osseous metastatic disease.  2. Stable L4 compression  fracture with mild-moderate spinal canal  narrowing at that level. No acute pathologic fracture.  3. Pulmonary nodules are stable from at least 7/2/2015. No new  pulmonary nodules.  4. The distal appendix, which previously was mildly dilated and  inflamed in appearance, is today obscured, clumped adjacent to some  small bowel loops. Recommend attention on follow-up.    I have personally reviewed the examination and initial interpretation  and I agree with the findings.    RENAY SHAHID MD     Recent Labs   Lab Test  06/19/17   1500  05/31/17   0903  05/24/17   1124  05/08/17   1434  05/03/17   0934   12/21/16   1149  12/01/16   1247  10/28/16   0942   09/07/16   1859   08/21/15   1405   PSA  590.00*  542.00*  569.00*  626.00*  688.00*   < >  424.22*  324.65*   --    < >  28.98*   < >  756.97*   TESTOSTTOTAL   --    --    --    --    --    --  <2 <2  11*   --   7*   --   4*    < > = values in this interval not displayed.        IMPRESSION/PLAN:   Metastatic prostate cancer: on zytiga/pred and xofigo and xgeva. Mainor has had ongoing on and off diarrhea, infection work up has been negative.  Zytiga was stopped (march thru early may) and didn't help.  Mainor felt like his Xofigo dose is too high. We spoke to curtis in nuc med and Mainors weight changes have all been within the 10% acceptable variation.  However, Curtis agreed to give him a smaller dose.  It seems Mainor has completed his radium on 5/31     -continue oral Zytiga  -last Lupron 5/15/17  - I have reviewed his restaging scans. It does appear quite similar though the official read does suggest progression in the bones. His PSA values have been stable over the last few months. His alkaline phosphatase has been steadily dropping. He did not have labs drawn today.     While there is biochemical response, he does have radiographic progression based on official report. His symptoms have been stable. Our next step in the absence of clinical trial would be chemotherapy or  enzalutamide. I am not very optimistic that we will have continued response with enzalutamide immediately post abiraterone failure. His previous taxotere was from Dec through March 2016. We could consider repeating his taxotere.    I will see him in 2 - 3 weeks with labs.     Anemia: possibly radium induced, no bleeding  7.9 today,     Heme: on lovenox for PE dx 11/2016. Had inpatient stay for GI bleed 1/11-1/14. Stopped xarelto and transitioned back to lovenox BID. No bleeding concerns. Recently dropped from 60mg BID to 50mg BID to 40 mg BID due to high Xa level.   -Continue at 40 mg BID.      Bone mets: on xgeva Q 6 weeks.    CV: hx of CHF. No clinical s/s of failure.  Off metoprolol due to BP being too low.  BP slightly lower today, but no symptoms.   -cards on 6/12    Over 25 min of direct face to face time spent with patient with more than 50% time spent in counseling and coordinating care.      Again, thank you for allowing me to participate in the care of your patient.      Sincerely,    Bentley Robles MD

## 2017-07-12 NOTE — MR AVS SNAPSHOT
After Visit Summary   7/12/2017    Oswaldo Win    MRN: [id number]           Patient Information     Date Of Birth          1945        Visit Information        Provider Department      7/12/2017 4:45 PM Bentley Robles MD [facility]         Follow-ups after your visit        Your next 10 appointments already scheduled     Jul 31, 2017  9:00 AM CDT   LAB with  LAB   [facility] ([facility]    [address] [place] [id number] [phone]           Patient must bring picture ID.  Patient should be prepared to give a urine specimen  Please do not eat 10-12 hours before your appointment if you are coming in fasting for labs on lipids, cholesterol, or glucose (sugar).  Pregnant women should follow their Care Team instructions. Water with medications is okay. Do not drink coffee or other fluids.   If you have concerns about taking  your medications, please ask at office or if scheduling via [employer], send a message by clicking on Secure Messaging, Message Your Care Team.            Aug 02, 2017  9:45 AM CDT   (Arrive by 9:30 AM)   Return Visit with Bentley Robles MD   [facility] ([facility])    [address] [address] [phone]              Who to contact     If you have questions or need follow up information about today's clinic visit or your schedule please contact [facility] directly at [phone].  Normal or non-critical lab and imaging results will be communicated to you by MyChart, letter or phone within 4 business days after the clinic has received the results. If you do not hear from us within 7 days, please contact the clinic through MyChart or phone. If you have a critical or abnormal lab result, we will notify you by phone as soon as possible.  Submit refill requests through [employer] or call your pharmacy and  they will forward the refill request to us. Please allow 3 business days for your refill to be completed.          Additional Information About Your Visit        [employer] Information     [employer] gives you secure access to your electronic health record. If you see a primary care provider, you can also send messages to your care team and make appointments. If you have questions, please call your primary care clinic.  If you do not have a primary care provider, please call [phone] and they will assist you.        Care EveryWhere ID     This is your Care EveryWhere ID. This could be used by other organizations to access your [facility] medical records  [id number]        Your Vitals Were     Pulse Temperature Respirations Pulse Oximetry BMI (Body Mass Index)       47 98.1  F (36.7  C) (Oral) 16 100% 20.18 kg/m2        Blood Pressure from Last 3 Encounters:   07/12/17 101/49   06/27/17 (!) 94/39   06/27/17 103/53    Weight from Last 3 Encounters:   07/12/17 56.7 kg (125 lb)   06/27/17 56.2 kg (124 lb)   06/19/17 58.1 kg (128 lb)              Today, you had the following     No orders found for display       Primary Care Provider Office Phone # Fax #    Lucrecia Collier [phone]       [facility] [address]  [facility] [address]        Equal Access to Services     BLADIMIR KEY : Hadii jesse goetz hadasho Sokwame, waaxda luqadaha, qaybta kaalmada aria, peter brock. So [facility] [phone].    ATENCIÓN: Si habla español, tiene a mccall disposición servicios gratuitos de asistencia lingüística. Marquise al [phone].    We comply with applicable federal civil rights laws and [place] laws. We do not discriminate on the basis of race, color, national origin, age, disability sex, sexual orientation or gender identity.            Thank you!     Thank you for choosing [facility]  for your care. Our goal is always to provide you with excellent  care. Hearing back from our patients is one way we can continue to improve our services. Please take a few minutes to complete the written survey that you may receive in the mail after your visit with us. Thank you!             Your Updated Medication List - Protect others around you: Learn how to safely use, store and throw away your medicines at www.disposemymeds.org.          This list is accurate as of: 7/12/17  5:32 PM.  Always use your most recent med list.                   Brand Name Dispense Instructions for use Diagnosis    abiraterone 250 MG tablet    ZYTIGA    120 tablet    Take 4 tablets (1,000 mg) by mouth daily Take on empty stomach.    Prostate cancer 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), Metastatic bone tumor (H)       aspirin 81 MG tablet     90 tablet    Take 1 tablet (81 mg) by mouth daily    Acute on chronic combined systolic and diastolic congestive heart failure (H)       calcium carbonate 1250 MG tablet    OS-RODNEY 500 mg [place]. [place]    120 tablet    Take 2 tablets (1,000 mg) by mouth 2 times daily    Hypocalcemia       diphenoxylate-atropine 2.5-0.025 MG per tablet    LOMOTIL     Take 1 tablet by mouth 4 times daily as needed for diarrhea Reported on 5/22/2017        enoxaparin 40 MG/0.4ML injection    LOVENOX    60 Syringe    Inject 0.4 mLs (40 mg) Subcutaneous 2 times daily    Other chronic pulmonary embolism without acute cor pulmonale (H)       HYDROcodone-acetaminophen 5-325 MG per tablet    NORCO     Take 1 tablet by mouth every 6 hours as needed for moderate to severe pain Reported on 5/22/2017        loperamide 2 MG capsule    IMODIUM    60 capsule    Use 2 caps PO after first loose stool.  Repeat 1 cap after each subsequent loose stool.  Max 8/24 hours.    Diarrhea, unspecified type       metoprolol 25 MG 24 hr tablet    TOPROL-XL    45 tablet    Take 0.5 tablets (12.5 mg) by mouth daily AM    (HFpEF) heart failure with preserved ejection fraction (H)       phosphorus tablet 250 mg 250 MG per tablet     K PHOS NEUTRAL    30 tablet    Take 2 tablets (500 mg) by mouth 3 times daily    Hypophosphatemia       * predniSONE 5 MG tablet    DELTASONE    60 tablet    Take 1 tablet (5 mg) by mouth 2 times daily    Prostate cancer 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), Metastatic bone tumor (H)       * predniSONE 5 MG tablet    DELTASONE    60 tablet    Take 1 tablet (5 mg) by mouth 2 times daily    Prostate cancer 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), Metastatic bone tumor (H)       prochlorperazine 5 MG tablet    COMPAZINE    30 tablet    Take 1 tablet (5 mg) by mouth every 6 hours as needed for nausea or vomiting    Nausea       simvastatin 40 MG tablet    ZOCOR    90 tablet    TAKE ONE TABLET BY MOUTH AT BEDTIME    Hyperlipidemia LDL goal <130       tamsulosin 0.4 MG capsule    FLOMAX    90 capsule    Take 1 capsule (0.4 mg) by mouth daily AM    Ureteral stone       vitamin D 2000 UNITS tablet     100 tablet    Take 1 tablet by mouth daily    Vitamin D deficiency       * Notice:  This list has 2 medication(s) that are the same as other medications prescribed for you. Read the directions carefully, and ask your doctor or other care provider to review them with you.

## 2017-07-12 NOTE — NURSING NOTE
"Oncology Rooming Note    July 12, 2017 3:44 PM   Oswaldo Win is a 72 year old male who presents for:    Chief Complaint   Patient presents with     Oncology Clinic Visit     Prostate cancer metastatic to bone      Initial Vitals: /49  Pulse (!) 47  Temp 98.1  F (36.7  C) (Oral)  Resp 16  Wt 56.7 kg (125 lb)  SpO2 100%  BMI 20.18 kg/m2 Estimated body mass index is 20.18 kg/(m^2) as calculated from the following:    Height as of 6/19/17: 1.676 m (5' 6\").    Weight as of this encounter: 56.7 kg (125 lb). Body surface area is 1.62 meters squared.  Severe Pain (7) Comment: Data Unavailable   No LMP for male patient.  Allergies reviewed: Yes  Medications reviewed: Yes    Medications: Medication refills not needed today.  Pharmacy name entered into [facility]: [facility] [place], [place] - [address]    Clinical concerns:      6 minutes for nursing intake (face to face time)     Netta Duran CMA              "

## 2017-08-02 NOTE — MR AVS SNAPSHOT
After Visit Summary   8/2/2017    Oswaldo Win    MRN: [id number]           Patient Information     Date Of Birth          1945        Visit Information        Provider Department      8/2/2017 9:45 AM Bentley Robles MD [facility]         Follow-ups after your visit        Your next 10 appointments already scheduled     Aug 07, 2017 12:30 PM CDT   [place] [facility] [place] [facility]   [facility] ([facility])    [address] [place] [id number]   [phone]            Aug 07, 2017  1:00 PM CDT   Infusion 120 with UC ONCOLOGY INFUSION, UC 25 ATC   [facility] ([facility])    [address] [place] [address]   [phone]            Aug 28, 2017 11:15 AM CDT   [place] [facility] [place] [facility]   [facility] ([facility])    [address] [place] [id number]   [phone]            Aug 28, 2017 11:50 AM CDT   (Arrive by 11:35 AM)   Return Visit with POLI Bridges [facility] ([facility])    [address] [place] [address]   [phone]            Aug 28, 2017 12:30 PM CDT   Infusion 120 with UC ONCOLOGY INFUSION, UC 29 ATC   [facility] ([facility])    [address] [place] [address]   [phone]            Sep 18, 2017 10:30 AM CDT   [place] [facility] [place] [facility]   [facility] [place] [facility] ([facility])    [address] [place] [address]   [phone]            Sep 18, 2017 11:00 AM CDT   (Arrive by 10:45 AM)   Return Visit with POLI Bridgse [facility] ([facility])    [address] [place] [address]  Se  2nd Floor  [facility] [id number] [phone]            Sep 18, 2017 12:00 PM CDT   Infusion 120 with UC ONCOLOGY INFUSION   [facility] ([facility])    [address] [facility]  2nd [facility] [place] [facility] [id number] [phone]              Who to contact     If you have questions or need follow up information about today's clinic visit or your schedule please contact [facility] directly at [phone].  Normal or non-critical lab and imaging results will be communicated to you by [facility], letter or phone within 4 business days after the clinic has received the results. If you do not hear from us within 7 days, please contact the clinic through [employer] or phone. If you have a critical or abnormal lab result, we will notify you by phone as soon as possible.  Submit refill requests through [employer] or call your pharmacy and they will forward the refill request to us. Please allow 3 business days for your refill to be completed.          Additional Information About Your Visit        [facility] Information     [employer] gives you secure access to your electronic health record. If you see a primary care provider, you can also send messages to your care team and make appointments. If you have questions, please call your primary care clinic.  If you do not have a primary care provider, please call [phone] and they will assist you.        Care EveryWhere ID     This is your Care EveryWhere ID. This could be used by other organizations to access your [facility] medical records  [id number]        Your Vitals Were     Pulse Temperature Respirations Pulse Oximetry BMI (Body Mass Index)       85 98  F (36.7  C) (Oral) 14 100% 20.66 kg/m2        Blood Pressure from Last 3 Encounters:   08/02/17 120/76   07/12/17 101/49   06/27/17 (!) 94/39    Weight from Last 3 Encounters:   08/02/17 58.1 kg (128 lb)   07/12/17 56.7 kg (125 lb)   06/27/17 56.2 kg (124 lb)               Today, you had the following     No orders found for display         Today's Medication Changes          These changes are accurate as of: 8/2/17 11:17 AM.  If you have any questions, ask your nurse or doctor.               Stop taking these medicines if you haven't already. Please contact your care team if you have questions.     abiraterone 250 MG tablet   Commonly known as:  ZYTIGA   Stopped by:  Bentley Robles MD           predniSONE 5 MG tablet   Commonly known as:  DELTASONE   Stopped by:  Bentley Robles MD                    Primary Care Provider Office Phone # Fax #    Lucrecia CLAUDIA Collier [phone]       [facility] [address]  [facility] [address]        Equal Access to Services     JENNI KEY : Kelsey purcello Soomaali, waaxda luqadaha, qaybta kaalmada adeegyada, peter moran . So [facility] [phone].    ATENCIÓN: Si habla español, tiene a mccall disposición servicios gratuitos de asistencia lingüística. [facility] [phone].    We comply with applicable federal civil rights laws and [place] laws. We do not discriminate on the basis of race, color, national origin, age, disability sex, sexual orientation or gender identity.            Thank you!     Thank you for choosing [facility]  for your care. Our goal is always to provide you with excellent care. Hearing back from our patients is one way we can continue to improve our services. Please take a few minutes to complete the written survey that you may receive in the mail after your visit with us. Thank you!             Your Updated Medication List - Protect others around you: Learn how to safely use, store and throw away your medicines at www.disposemymeds.org.          This list is accurate as of: 8/2/17 11:17 AM.  Always use your most recent med list.                   Brand Name Dispense Instructions for use Diagnosis    aspirin 81 MG tablet      90 tablet    Take 1 tablet (81 mg) by mouth daily    Acute on chronic combined systolic and diastolic congestive heart failure (H)       calcium carbonate 1250 MG tablet    OS-RODNEY 500 mg [place]. Ca    120 tablet    Take 2 tablets (1,000 mg) by mouth 2 times daily    Hypocalcemia       enoxaparin 40 MG/0.4ML injection    LOVENOX    60 Syringe    Inject 0.4 mLs (40 mg) Subcutaneous 2 times daily    Other chronic pulmonary embolism without acute cor pulmonale (H)       HYDROcodone-acetaminophen 5-325 MG per tablet    NORCO     Take 1 tablet by mouth every 6 hours as needed for moderate to severe pain Reported on 5/22/2017        loperamide 2 MG capsule    IMODIUM    60 capsule    Use 2 caps PO after first loose stool.  Repeat 1 cap after each subsequent loose stool.  Max 8/24 hours.    Diarrhea, unspecified type       metoprolol 25 MG 24 hr tablet    TOPROL-XL    45 tablet    Take 0.5 tablets (12.5 mg) by mouth daily AM    (HFpEF) heart failure with preserved ejection fraction (H)       phosphorus tablet 250 mg 250 MG per tablet    K PHOS NEUTRAL    30 tablet    Take 2 tablets (500 mg) by mouth 3 times daily    Hypophosphatemia       prochlorperazine 5 MG tablet    COMPAZINE    30 tablet    Take 1 tablet (5 mg) by mouth every 6 hours as needed for nausea or vomiting    Nausea       simvastatin 40 MG tablet    ZOCOR    90 tablet    TAKE ONE TABLET BY MOUTH AT BEDTIME    Hyperlipidemia LDL goal <130       tamsulosin 0.4 MG capsule    FLOMAX    90 capsule    Take 1 capsule (0.4 mg) by mouth daily AM    Ureteral stone       vitamin D 2000 UNITS tablet     100 tablet    Take 1 tablet by mouth daily    Vitamin D deficiency

## 2017-08-02 NOTE — LETTER
8/2/2017       RE: Oswaldo Win  [address]  [facility] [address]     Dear Colleague,    Thank you for referring your patient, Oswaldo Win, to the [facility]. Please see a copy of my visit note below.    Oncology/Hematology Visit Note  Aug 2, 2017    DIAGNOSIS:  Mr. Win is a 71 year old male with a hx of CHF, CKD, CAD w/ hx of STEMI and CABG. He met with Dr. Robles at the end of July for consultation regarding metastatic prostate cancer. His story begins in May 2015 when he saw his PCP for back pain, present since Feb. He also had poor appetite, weight loss of 8lb and poor energy.  CT showed diffuse bony mets. CT Chest showed sclerotic mets throughout the bones. He was given degarelix on 7/22 in Urology. He was having pelvic pain and was evaluated by Dr Cavazos would did not feel XRT was appropriate.  He started on docetaxel 7/31. From 8/7-8/14 he was admitted with colitis, CHAR, elevated BNP, lactis acidosis, and poor appetite. He was discharged to a TCU and unfortunately, it sounds like he was not given his Lasix.  His BUBBA worsened and his breathing became compromised.  He was seen by Chelsi Corral on 8/21 and eventually transferred to the ED and admitted 8/21-8/24 for acute on chronic CHF.  At our last visit we discussed not pursuing further Taxotere at this time and discussed starting Zytiga when he was ready.  We pursued PT, OT, home lymphedema for supportive care at home in attempt for further rehabilitation. He was again admitted and discharged - 10/20/15.  He never started the Zytiga as he was still actively recovering from his cardiac issues.  His PSA remained stable, thus no intervention was pursued.  December 2015, Mainor met with Dr Robles and since his ECOG was back to a 1, they discussed re-challenging the Taxotere at a lower dose. 9/26/2016 he started Provenge off study. He continues on lupron every 3 mos and XGEVA. 9/26/2016 he started Provenge off study. He received 3 doses of  Provenge (last 10/28), on 3rd dose had fevers, chills, myalgias, was bedbound for a week due to these symptoms. During this time he held his coumadin for 5 days prior to a central line removal 10/31. Later than week he had sudden onset of right pleuritic chest pain and hemoptysis. Diagnosed with acute segmental/subsegmental PE on 11/8 and prescribed Lovenox.     Mainor was briefly on Zytiga and prednisone in November-December, however after one month his PSA went up, thus it was decided to add in Xofigo (1/11/17-- first).     On 12/28 he started xaralto and took his last pill on 1/10/17.  He stopped as he noticed melanotic stools and had some hemoptysis.  He was seen in clinic the next day with a hgb in th 6 range and was admitted.  Pan-scopes showed no active bleeding.  He was able to get his Xofigo in patient as well.  He was transitioned back to lovenox BID.     Mainor developed diarrhea on and off in the spring of 2017, Zytiga was stopped on 3/31 as it was unsure what was contributing.  Mainor felt it was related to the Xofigo and that his dose was too high as his weight is always at least 10 pounds higher in clinic than at home.      INTERVAL HISTORY:  Mr. 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      Mainor is doing well and has no new complains at this visit. His diarrhea has resolved and he attributes this to adjusting his dose of radium. He has a low back pain which is persistent and not very different.     No f/s/c. No chest pain. No black stools. No new  issues.  He is on lovenox 40 mg BID.      MEDICATIONS:   Current Outpatient Prescriptions   Medication Sig     enoxaparin (LOVENOX) 40 MG/0.4ML injection Inject 0.4 mLs (40 mg) Subcutaneous 2 times daily     aspirin 81 MG tablet Take 1 tablet (81 mg) by mouth daily     calcium carbonate (OS-RODNEY 500 MG [place]. [place]) 500 MG tablet Take 2 tablets (1,000 mg) by mouth 2 times daily     Cholecalciferol (VITAMIN D) 2000 UNITS tablet Take 1  tablet by mouth daily     simvastatin (ZOCOR) 40 MG tablet TAKE ONE TABLET BY MOUTH AT BEDTIME     HYDROcodone-acetaminophen (NORCO) 5-325 MG per tablet Take 1 tablet by mouth every 6 hours as needed for moderate to severe pain Reported on 5/22/2017     prochlorperazine (COMPAZINE) 5 MG tablet Take 1 tablet (5 mg) by mouth every 6 hours as needed for nausea or vomiting (Patient not taking: Reported on 6/19/2017)     phosphorus tablet 250 mg (K PHOS NEUTRAL) 250 MG per tablet Take 2 tablets (500 mg) by mouth 3 times daily (Patient not taking: Reported on 8/2/2017)     metoprolol (TOPROL-XL) 25 MG 24 hr tablet Take 0.5 tablets (12.5 mg) by mouth daily AM (Patient not taking: Reported on 8/2/2017)     loperamide (IMODIUM) 2 MG capsule Use 2 caps PO after first loose stool.  Repeat 1 cap after each subsequent loose stool.  Max 8/24 hours. (Patient not taking: Reported on 6/19/2017)     tamsulosin (FLOMAX) 0.4 MG 24 hr capsule Take 1 capsule (0.4 mg) by mouth daily AM (Patient not taking: Reported on 8/2/2017)     No current facility-administered medications for this visit.      Facility-Administered Medications Ordered in Other Visits   Medication     hydrocortisone sodium succinate (solu-CORTEF) injection     glycopyrrolate (ROBINUL) injection     neostigmine (PROSTIGMINE) injection     protamine injection     albuterol (PROAIR HFA, PROVENTIL HFA, VENTOLIN HFA) inhaler          ALLERGIES:  No Known Allergies    PHYSICAL EXAMINATION:  Vitals: /76  Pulse 85  Temp 98  F (36.7  C) (Oral)  Resp 14  Wt 58.1 kg (128 lb)  SpO2 100%  BMI 20.66 kg/m2   Wt Readings from Last 10 Encounters:   08/02/17 58.1 kg (128 lb)   07/12/17 56.7 kg (125 lb)   06/27/17 56.2 kg (124 lb)   06/19/17 58.1 kg (128 lb)   06/12/17 58.1 kg (128 lb)   06/12/17 58.1 kg (128 lb)   05/31/17 58.1 kg (128 lb)   05/24/17 57.6 kg (127 lb)   05/15/17 57.2 kg (126 lb)   05/08/17 57.2 kg (126 lb)       GENERAL:  A pleasant person in no acute  distress.  Mildly pale and thin looking  LYMPH NODES:  No peripheral lymphadenopathy noted in the supraclavicular or cervical regions.   LUNGS:  Clear to auscultation bilaterally.   HEART:  Regular rate and rhythm   ABDOMEN:  Bowel sounds are active.  Soft and nontender.  No hepatosplenomegaly or other masses appreciated.  LOWER EXTREMITIES:  Without pitting edema to the knees bilaterally.   NEUROLOGICAL:  Alert/orientated/able to answer all questions.  CN grossly intact.     LAB:   Recent Labs   Lab Test  08/01/17   0910  06/27/17   0914  06/19/17   1500  06/05/17   1451  05/31/17   0903   NA  140  138  139  139  139   POTASSIUM  4.6  3.3*  3.8  4.6  3.9   CHLORIDE  110*  107  110*  108  108   CO2  19*  21  22  23  21   ANIONGAP  11  10  7  8  10   BUN  22  14  15  18  16   CR  0.98  0.82  0.85  0.92  0.84   GLC  124*  99  120*  132*  114*   RODNEY  9.8  8.3*  8.4*  9.2  8.4*     Recent Labs   Lab Test  04/05/17   0941  03/31/17   1411  03/15/17   1410  01/14/17   0808  01/13/17   1815  01/13/17   0801   MAG  2.0  2.2  2.4*  2.1  2.0  2.0   PHOS  2.3*  1.6*   --   2.0*  2.9  1.8*     Recent Labs   Lab Test  06/27/17   0914  06/19/17   1500  06/12/17   1200  06/05/17   1451  05/24/17   1124  05/22/17   1143   WBC  2.5*  1.8*  2.9*  2.8*  2.5*  2.3*   HGB  7.9*  8.7*  9.2*  8.7*  9.4*  9.9*   PLT  79*  97*  103*  94*  114*  109*   MCV  94  98  102*  95  96  94   NEUTROPHIL  53.8  58.7   --   67.9  58.6  62.1     Recent Labs   Lab Test  08/01/17   0910  06/27/17   0914  06/19/17   1500   03/31/17   1411   12/21/16   1149  12/01/16   1247   BILITOTAL  1.2  1.1  1.3   < >  2.6*   < >  0.6  0.8   ALKPHOS  312*  259*  322*   < >  472*   < >  673*  649*   ALT  44  34  27   < >  16   < >  18  16   AST  40  36  35   < >  22   < >  24  22   ALBUMIN  4.0  3.5  3.5   < >  3.7   < >  3.5  3.8   LDH   --    --    --    --   409*   --   321*  348*    < > = values in this interval not displayed.     TSH   Date Value Ref Range Status    09/16/2015 3.18 0.40 - 4.00 mU/L Final     No results for input(s): CEA in the last 44409 hours.  Results for orders placed or performed during the hospital encounter of 07/12/17   CT Chest/Abdomen/Pelvis w Contrast    Narrative    EXAMINATION: CT CHEST/ABDOMEN/PELVIS W CONTRAST, 7/12/2017 11:37 AM    TECHNIQUE:  Helical CT images from the thoracic inlet through the  symphysis pubis were obtained  with contrast. Contrast dose: 69 cc of  isovue 370    COMPARISON: CT chest, abdomen and pelvis 9/7/2016, bone scan 9/7/2016,  CT chest 7/2/2015.    HISTORY: metastatic prostate, s/p Radium treatment, new baseline,  Malignant neoplasm of prostate, Secondary malignant neoplasm of bone    FINDINGS:    Chest: Heart size is normal. Normal caliber the aorta and pulmonary  artery. No central pulmonary embolus. Mitral valvular clip. Coronary  artery calcifications. Thyroid is unremarkable. No mediastinal, hilar  or axillary lymphadenopathy. No pericardial or pleural effusion. The  central tracheobronchial tree is patent.     Lungs: Moderate apical predominant centrilobular emphysematous  changes. Numerous small calcified granulomas. Noncalcified pulmonary  nodules are not significantly changed from at least 7/2/2015,  including 4 mm nodule along the left major fissure (series 4 image  89), 2 mm left lower lobe nodule (series 4 image 113).  Reticulation/fibrosis at the base right middle and right lower lobes  is not significantly changed.    Abdomen and pelvis: The liver, gallbladder, pancreas, spleen, and  adrenals are within normal limits. Nonspecific bilateral perinephric  fat stranding is not significantly changed. Left ureteral stent has  been removed. No hydronephrosis or hydroureter. Stable mild wall  thickening of the urinary bladder, presumably sequelae of radiation.  Grossly stable nodular enlarged prostate. The colon and small bowel  are within normal limits. The proximal appendix is within normal  limits (series 3  image 447), however the distal tip which was  previously mildly dilated and appeared inflamed is now obscured,  clumped adjacent to small bowel loops. Major vasculature is patent.  Aorta is normal in caliber. No lymphadenopathy in the abdomen or  pelvis. No free air or significant free fluid.    Bones and soft tissues: Diffuse sclerotic bone metastases are  progressed from 9/7/2016, particularly in the proximal femurs and  humeri as well as in the sternum. Stable appearance of L4 compression  fracture with retropulsion of fracture fragments and mild-moderate  spinal canal narrowing at that level. No new pathologic fracture  identified.       Impression    IMPRESSION:   1. Progression of diffuse osseous metastatic disease.  2. Stable L4 compression fracture with mild-moderate spinal canal  narrowing at that level. No acute pathologic fracture.  3. Pulmonary nodules are stable from at least 7/2/2015. No new  pulmonary nodules.  4. The distal appendix, which previously was mildly dilated and  inflamed in appearance, is today obscured, clumped adjacent to some  small bowel loops. Recommend attention on follow-up.    I have personally reviewed the examination and initial interpretation  and I agree with the findings.    RENAY SHAHID MD     Recent Labs   Lab Test  08/01/17   0910  06/19/17   1500  05/31/17   0903  05/24/17   1124  05/08/17   1434   12/21/16   1149  12/01/16   1247  10/28/16   0942   09/07/16   1859   08/21/15   1405   PSA  741.00*  590.00*  542.00*  569.00*  626.00*   < >  424.22*  324.65*   --    < >  28.98*   < >  756.97*   TESTOSTTOTAL   --    --    --    --    --    --  <2 <2  11*   --   7*   --   4*    < > = values in this interval not displayed.        IMPRESSION/PLAN:   Metastatic prostate cancer: on zytiga/pred and xofigo and xgeva. Mainor has had ongoing on and off diarrhea, infection work up has been negative.  Zytiga was stopped (march thru early may) and didn't help.  Mainor felt like his  Xofigo dose is too high. We spoke to curtis in nuc med and Ila weight changes have all been within the 10% acceptable variation.  However, Curtis agreed to give him a smaller dose.  It seems Mainor has completed his radium on 5/31     -continue oral Zytiga till he runs out of prescription  -last Lupron 5/15/17; will have to continue as he is not responding on abiraterone  - I have reviewed his restaging scans again. It does appear quite similar though the official read does suggest progression in the bones. His PSA values had  been stable over the last few months but now is rising. His alkaline phosphatase also had been steadily dropping and is also rising.     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 We do have a clinical trial with PARP inhibitor - rucaparib. I reviewed the clinical trial in great details with him. In a study of similar class of agent - olaparib published in NEJM (N Engl J Med 2015; 373:0721-8924) about 33% of similar patients did have one of the mutations involving homologous DNA repair enzyme. 14 of the 16 patients with one of the mutations had a treatment response. Anemia (in 10 of the 50 patients [20%]) and fatigue (in 6 [12%]) were the most common grade 3 or 4 adverse events, findings that are consistent with previous studies of this class of agents. The current Triton 2 study selects patients with one of these mutations which is checked at screening (BRCA1/2, TRACY, CHK2, RAD51 and others). This can be done from a fresh biopsy or from next generation sequencing of circulating tumor cells. He only has bony lesions and would like to have the next generation sequencing for the mutations to be done on his blood and consider biopsy only if it fails. He has not had taxotere for castration resistant disease. Interestingly when we held his taxotere after the first dose  for his heart failure, he had 1 elevated PSA. But then we restarted taxotere and PSA response.    His previous taxotere was from Dec 2015 through March 2016. I would again repeat his taxotere. I reviewed chemotherapy with docetaxel. I extensively reviewed the side effects from this chemotherapy.  We reviewed the peripheral neuropathy, alopecia, neutropenic fevers, myelosuppression, mucositis, diarrhea, allergies, pedal edema and rash.  Of these, alopecia and nail changes have more cosmetic bearings.  Neutropenic fevers are something to be quite cognizant about. He should take every fever seriously because if his counts are low, then he would not have the defenses and he should give us a call immediately.  He should not even wait for the next morning.  He should be seen either in the clinic or in the ER the same day.  We would get basic blood tests including the CBC and blood cultures.  We would do a fever workup for him.  If the counts were adequate, we could discharge him home on antibiotics if he looked stable.  If his counts were low, even if he was feeling fine, we would have to admit him to ensure that he was safe and stable. We reviewed the palliative intent, side effects, benefits, rationale, alternatives and outpatient regimen with him. He had severe CHF after first dose of taxotere. I feel this was more due to valvular abnormality he had. We are in a better position now and hopefully, he would do much better. I will reduce the dose of his taxotere to 65 mg/m2 and use growth factor as he already has leucopenia.     He will see Ms. Gabriela Shayla prior to next cycle of chemotherapy. I will coordinate evaluation by my research nurse - Ayana Abdi or Mai for prescreening and next generation sequencing.     Anemia: possibly radium induced, no bleeding  Hgb 7.9 ,     Heme: on lovenox for PE dx 11/2016. Had inpatient stay for GI bleed 1/11-1/14. Stopped xarelto and transitioned back to lovenox BID. No  bleeding concerns. Recently dropped from 60mg BID to 50mg BID to 40 mg BID due to high Xa level.   -Continue at 40 mg BID.      Bone mets: on xgeva Q 6 weeks.    CV: hx of CHF. No clinical s/s of failure.  Off metoprolol due to BP being too low.  BP slightly lower today, but no symptoms.       Over 45 min of direct face to face time spent with patient with more than 50% time spent in counseling and coordinating care.      Again, thank you for allowing me to participate in the care of your patient.      Sincerely,    Bentley Robles MD

## 2017-08-02 NOTE — NURSING NOTE
"Oncology Rooming Note    August 2, 2017 9:24 AM   Oswaldo Win is a 72 year old male who presents for:    Chief Complaint   Patient presents with     Oncology Clinic Visit     Prostate CA     Initial Vitals: /76  Pulse 85  Temp 98  F (36.7  C) (Oral)  Resp 14  Wt 58.1 kg (128 lb)  SpO2 100%  BMI 20.66 kg/m2 Estimated body mass index is 20.66 kg/(m^2) as calculated from the following:    Height as of 6/19/17: 1.676 m (5' 6\").    Weight as of this encounter: 58.1 kg (128 lb). Body surface area is 1.64 meters squared.  No Pain (0) Comment: Data Unavailable   No LMP for male patient.  Allergies reviewed: Yes  Medications reviewed: Yes    Medications: Medication refills not needed today.  Pharmacy name entered into [employer]: [facility] [place], [place] - [address]    Clinical concerns:      6 minutes for nursing intake (face to face time)     Netta Duran CMA              "

## 2017-08-03 NOTE — PROGRESS NOTES
Oncology/Hematology Visit Note  Aug 2, 2017    DIAGNOSIS:  Mr. Win is a 71 year old male with a hx of CHF, CKD, CAD w/ hx of STEMI and CABG. He met with Dr. Robles at the end of July for consultation regarding metastatic prostate cancer. His story begins in May 2015 when he saw his PCP for back pain, present since Feb. He also had poor appetite, weight loss of 8lb and poor energy.  CT showed diffuse bony mets. CT Chest showed sclerotic mets throughout the bones. He was given degarelix on 7/22 in Urology. He was having pelvic pain and was evaluated by Dr Cavazos would did not feel XRT was appropriate.  He started on docetaxel 7/31. From 8/7-8/14 he was admitted with colitis, CHAR, elevated BNP, lactis acidosis, and poor appetite. He was discharged to a TCU and unfortunately, it sounds like he was not given his Lasix.  His BUBBA worsened and his breathing became compromised.  He was seen by Chelsi Corral on 8/21 and eventually transferred to the ED and admitted 8/21-8/24 for acute on chronic CHF.  At our last visit we discussed not pursuing further Taxotere at this time and discussed starting Zytiga when he was ready.  We pursued PT, OT, home lymphedema for supportive care at home in attempt for further rehabilitation. He was again admitted and discharged - 10/20/15.  He never started the Zytiga as he was still actively recovering from his cardiac issues.  His PSA remained stable, thus no intervention was pursued.  December 2015, Mainor met with Dr Robles and since his ECOG was back to a 1, they discussed re-challenging the Taxotere at a lower dose. 9/26/2016 he started Provenge off study. He continues on lupron every 3 mos and XGEVA. 9/26/2016 he started Provenge off study. He received 3 doses of Provenge (last 10/28), on 3rd dose had fevers, chills, myalgias, was bedbound for a week due to these symptoms. During this time he held his coumadin for 5 days prior to a central line removal 10/31. Later than week he had sudden onset of  right pleuritic chest pain and hemoptysis. Diagnosed with acute segmental/subsegmental PE on 11/8 and prescribed Lovenox.     Mainor was briefly on Zytiga and prednisone in November-December, however after one month his PSA went up, thus it was decided to add in Xofigo (1/11/17-- first).     On 12/28 he started xaralto and took his last pill on 1/10/17.  He stopped as he noticed melanotic stools and had some hemoptysis.  He was seen in clinic the next day with a hgb in th 6 range and was admitted.  Pan-scopes showed no active bleeding.  He was able to get his Xofigo in patient as well.  He was transitioned back to lovenox BID.     Mainor developed diarrhea on and off in the spring of 2017, Zytiga was stopped on 3/31 as it was unsure what was contributing.  Mainor felt it was related to the Xofigo and that his dose was too high as his weight is always at least 10 pounds higher in clinic than at home.      INTERVAL HISTORY:  Mr. 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      Mainor is doing well and has no new complains at this visit. His diarrhea has resolved and he attributes this to adjusting his dose of radium. He has a low back pain which is persistent and not very different.     No f/s/c. No chest pain. No black stools. No new  issues.  He is on lovenox 40 mg BID.      MEDICATIONS:   Current Outpatient Prescriptions   Medication Sig     enoxaparin (LOVENOX) 40 MG/0.4ML injection Inject 0.4 mLs (40 mg) Subcutaneous 2 times daily     aspirin 81 MG tablet Take 1 tablet (81 mg) by mouth daily     calcium carbonate (OS-RODNEY 500 MG [place]. [place]) 500 MG tablet Take 2 tablets (1,000 mg) by mouth 2 times daily     Cholecalciferol (VITAMIN D) 2000 UNITS tablet Take 1 tablet by mouth daily     simvastatin (ZOCOR) 40 MG tablet TAKE ONE TABLET BY MOUTH AT BEDTIME     HYDROcodone-acetaminophen (NORCO) 5-325 MG per tablet Take 1 tablet by mouth every 6 hours as needed for moderate to severe pain Reported on  5/22/2017     prochlorperazine (COMPAZINE) 5 MG tablet Take 1 tablet (5 mg) by mouth every 6 hours as needed for nausea or vomiting (Patient not taking: Reported on 6/19/2017)     phosphorus tablet 250 mg (K PHOS NEUTRAL) 250 MG per tablet Take 2 tablets (500 mg) by mouth 3 times daily (Patient not taking: Reported on 8/2/2017)     metoprolol (TOPROL-XL) 25 MG 24 hr tablet Take 0.5 tablets (12.5 mg) by mouth daily AM (Patient not taking: Reported on 8/2/2017)     loperamide (IMODIUM) 2 MG capsule Use 2 caps PO after first loose stool.  Repeat 1 cap after each subsequent loose stool.  Max 8/24 hours. (Patient not taking: Reported on 6/19/2017)     tamsulosin (FLOMAX) 0.4 MG 24 hr capsule Take 1 capsule (0.4 mg) by mouth daily AM (Patient not taking: Reported on 8/2/2017)     No current facility-administered medications for this visit.      Facility-Administered Medications Ordered in Other Visits   Medication     hydrocortisone sodium succinate (solu-CORTEF) injection     glycopyrrolate (ROBINUL) injection     neostigmine (PROSTIGMINE) injection     protamine injection     albuterol (PROAIR HFA, PROVENTIL HFA, VENTOLIN HFA) inhaler          ALLERGIES:  No Known Allergies    PHYSICAL EXAMINATION:  Vitals: /76  Pulse 85  Temp 98  F (36.7  C) (Oral)  Resp 14  Wt 58.1 kg (128 lb)  SpO2 100%  BMI 20.66 kg/m2   Wt Readings from Last 10 Encounters:   08/02/17 58.1 kg (128 lb)   07/12/17 56.7 kg (125 lb)   06/27/17 56.2 kg (124 lb)   06/19/17 58.1 kg (128 lb)   06/12/17 58.1 kg (128 lb)   06/12/17 58.1 kg (128 lb)   05/31/17 58.1 kg (128 lb)   05/24/17 57.6 kg (127 lb)   05/15/17 57.2 kg (126 lb)   05/08/17 57.2 kg (126 lb)       GENERAL:  A pleasant person in no acute distress.  Mildly pale and thin looking  LYMPH NODES:  No peripheral lymphadenopathy noted in the supraclavicular or cervical regions.   LUNGS:  Clear to auscultation bilaterally.   HEART:  Regular rate and rhythm   ABDOMEN:  Bowel sounds are  active.  Soft and nontender.  No hepatosplenomegaly or other masses appreciated.  LOWER EXTREMITIES:  Without pitting edema to the knees bilaterally.   NEUROLOGICAL:  Alert/orientated/able to answer all questions.  CN grossly intact.     LAB:   Recent Labs   Lab Test  08/01/17   0910  06/27/17   0914  06/19/17   1500  06/05/17   1451  05/31/17   0903   NA  140  138  139  139  139   POTASSIUM  4.6  3.3*  3.8  4.6  3.9   CHLORIDE  110*  107  110*  108  108   CO2  19*  21  22  23  21   ANIONGAP  11  10  7  8  10   BUN  22  14  15  18  16   CR  0.98  0.82  0.85  0.92  0.84   GLC  124*  99  120*  132*  114*   RODNEY  9.8  8.3*  8.4*  9.2  8.4*     Recent Labs   Lab Test  04/05/17   0941  03/31/17   1411  03/15/17   1410  01/14/17   0808  01/13/17   1815  01/13/17   0801   MAG  2.0  2.2  2.4*  2.1  2.0  2.0   PHOS  2.3*  1.6*   --   2.0*  2.9  1.8*     Recent Labs   Lab Test  06/27/17   0914  06/19/17   1500  06/12/17   1200  06/05/17   1451  05/24/17   1124  05/22/17   1143   WBC  2.5*  1.8*  2.9*  2.8*  2.5*  2.3*   HGB  7.9*  8.7*  9.2*  8.7*  9.4*  9.9*   PLT  79*  97*  103*  94*  114*  109*   MCV  94  98  102*  95  96  94   NEUTROPHIL  53.8  58.7   --   67.9  58.6  62.1     Recent Labs   Lab Test  08/01/17   0910  06/27/17   0914  06/19/17   1500   03/31/17   1411   12/21/16   1149  12/01/16   1247   BILITOTAL  1.2  1.1  1.3   < >  2.6*   < >  0.6  0.8   ALKPHOS  312*  259*  322*   < >  472*   < >  673*  649*   ALT  44  34  27   < >  16   < >  18  16   AST  40  36  35   < >  22   < >  24  22   ALBUMIN  4.0  3.5  3.5   < >  3.7   < >  3.5  3.8   LDH   --    --    --    --   409*   --   321*  348*    < > = values in this interval not displayed.     TSH   Date Value Ref Range Status   09/16/2015 3.18 0.40 - 4.00 mU/L Final     No results for input(s): CEA in the last 31550 hours.  Results for orders placed or performed during the hospital encounter of 07/12/17   CT Chest/Abdomen/Pelvis w Contrast    Narrative     EXAMINATION: CT CHEST/ABDOMEN/PELVIS W CONTRAST, 7/12/2017 11:37 AM    TECHNIQUE:  Helical CT images from the thoracic inlet through the  symphysis pubis were obtained  with contrast. Contrast dose: 69 cc of  isovue 370    COMPARISON: CT chest, abdomen and pelvis 9/7/2016, bone scan 9/7/2016,  CT chest 7/2/2015.    HISTORY: metastatic prostate, s/p Radium treatment, new baseline,  Malignant neoplasm of prostate, Secondary malignant neoplasm of bone    FINDINGS:    Chest: Heart size is normal. Normal caliber the aorta and pulmonary  artery. No central pulmonary embolus. Mitral valvular clip. Coronary  artery calcifications. Thyroid is unremarkable. No mediastinal, hilar  or axillary lymphadenopathy. No pericardial or pleural effusion. The  central tracheobronchial tree is patent.     Lungs: Moderate apical predominant centrilobular emphysematous  changes. Numerous small calcified granulomas. Noncalcified pulmonary  nodules are not significantly changed from at least 7/2/2015,  including 4 mm nodule along the left major fissure (series 4 image  89), 2 mm left lower lobe nodule (series 4 image 113).  Reticulation/fibrosis at the base right middle and right lower lobes  is not significantly changed.    Abdomen and pelvis: The liver, gallbladder, pancreas, spleen, and  adrenals are within normal limits. Nonspecific bilateral perinephric  fat stranding is not significantly changed. Left ureteral stent has  been removed. No hydronephrosis or hydroureter. Stable mild wall  thickening of the urinary bladder, presumably sequelae of radiation.  Grossly stable nodular enlarged prostate. The colon and small bowel  are within normal limits. The proximal appendix is within normal  limits (series 3 image 447), however the distal tip which was  previously mildly dilated and appeared inflamed is now obscured,  clumped adjacent to small bowel loops. Major vasculature is patent.  Aorta is normal in caliber. No lymphadenopathy in the  abdomen or  pelvis. No free air or significant free fluid.    Bones and soft tissues: Diffuse sclerotic bone metastases are  progressed from 9/7/2016, particularly in the proximal femurs and  humeri as well as in the sternum. Stable appearance of L4 compression  fracture with retropulsion of fracture fragments and mild-moderate  spinal canal narrowing at that level. No new pathologic fracture  identified.       Impression    IMPRESSION:   1. Progression of diffuse osseous metastatic disease.  2. Stable L4 compression fracture with mild-moderate spinal canal  narrowing at that level. No acute pathologic fracture.  3. Pulmonary nodules are stable from at least 7/2/2015. No new  pulmonary nodules.  4. The distal appendix, which previously was mildly dilated and  inflamed in appearance, is today obscured, clumped adjacent to some  small bowel loops. Recommend attention on follow-up.    I have personally reviewed the examination and initial interpretation  and I agree with the findings.    RENAY SHAHID MD     Recent Labs   Lab Test  08/01/17   0910  06/19/17   1500  05/31/17   0903  05/24/17   1124  05/08/17   1434   12/21/16   1149  12/01/16   1247  10/28/16   0942   09/07/16   1859   08/21/15   1405   PSA  741.00*  590.00*  542.00*  569.00*  626.00*   < >  424.22*  324.65*   --    < >  28.98*   < >  756.97*   TESTOSTTOTAL   --    --    --    --    --    --  <2 <2  11*   --   7*   --   4*    < > = values in this interval not displayed.        IMPRESSION/PLAN:   Metastatic prostate cancer: on zytiga/pred and xofigo and xgeva. Mainor has had ongoing on and off diarrhea, infection work up has been negative.  Zytiga was stopped (march thru early may) and didn't help.  Mainor felt like his Xofigo dose is too high. We spoke to curtis in nuc med and Ila weight changes have all been within the 10% acceptable variation.  However, Curtis agreed to give him a smaller dose.  It seems Mainor has completed his radium on 5/31     -continue  oral Zytiga till he runs out of prescription  -last Lupron 5/15/17; will have to continue as he is not responding on abiraterone  - I have reviewed his restaging scans again. It does appear quite similar though the official read does suggest progression in the bones. His PSA values had  been stable over the last few months but now is rising. His alkaline phosphatase also had been steadily dropping and is also rising.     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 We do have a clinical trial with PARP inhibitor - rucaparib. I reviewed the clinical trial in great details with him. In a study of similar class of agent - olaparib published in NEJ (N Engl J Med 2015; 373:5625-2442) about 33% of similar patients did have one of the mutations involving homologous DNA repair enzyme. 14 of the 16 patients with one of the mutations had a treatment response. Anemia (in 10 of the 50 patients [20%]) and fatigue (in 6 [12%]) were the most common grade 3 or 4 adverse events, findings that are consistent with previous studies of this class of agents. The current Triton 2 study selects patients with one of these mutations which is checked at screening (BRCA1/2, TRACY, CHK2, RAD51 and others). This can be done from a fresh biopsy or from next generation sequencing of circulating tumor cells. He only has bony lesions and would like to have the next generation sequencing for the mutations to be done on his blood and consider biopsy only if it fails. He has not had taxotere for castration resistant disease. Interestingly when we held his taxotere after the first dose for his heart failure, he had 1 elevated PSA. But then we restarted taxotere and PSA response.    His previous taxotere was from Dec 2015 through March 2016. I would again repeat his taxotere. I reviewed chemotherapy with docetaxel. I  extensively reviewed the side effects from this chemotherapy.  We reviewed the peripheral neuropathy, alopecia, neutropenic fevers, myelosuppression, mucositis, diarrhea, allergies, pedal edema and rash.  Of these, alopecia and nail changes have more cosmetic bearings.  Neutropenic fevers are something to be quite cognizant about. He should take every fever seriously because if his counts are low, then he would not have the defenses and he should give us a call immediately.  He should not even wait for the next morning.  He should be seen either in the clinic or in the ER the same day.  We would get basic blood tests including the CBC and blood cultures.  We would do a fever workup for him.  If the counts were adequate, we could discharge him home on antibiotics if he looked stable.  If his counts were low, even if he was feeling fine, we would have to admit him to ensure that he was safe and stable. We reviewed the palliative intent, side effects, benefits, rationale, alternatives and outpatient regimen with him. He had severe CHF after first dose of taxotere. I feel this was more due to valvular abnormality he had. We are in a better position now and hopefully, he would do much better. I will reduce the dose of his taxotere to 65 mg/m2 and use growth factor as he already has leucopenia.     He will see Ms. Gabriela Jasont prior to next cycle of chemotherapy. I will coordinate evaluation by my research nurse - Ayana Abdi or Mai for prescreening and next generation sequencing.     Anemia: possibly radium induced, no bleeding  Hgb 7.9 ,     Heme: on lovenox for PE dx 11/2016. Had inpatient stay for GI bleed 1/11-1/14. Stopped xarelto and transitioned back to lovenox BID. No bleeding concerns. Recently dropped from 60mg BID to 50mg BID to 40 mg BID due to high Xa level.   -Continue at 40 mg BID.      Bone mets: on xgeva Q 6 weeks.    CV: hx of CHF. No clinical s/s of failure.  Off metoprolol due to BP being  too low.  BP slightly lower today, but no symptoms.       Over 45 min of direct face to face time spent with patient with more than 50% time spent in counseling and coordinating care.

## 2017-08-04 PROBLEM — T45.1X5A AGRANULOCYTOSIS SECONDARY TO CANCER CHEMOTHERAPY (H): Status: ACTIVE | Noted: 2017-01-01

## 2017-08-04 PROBLEM — D70.1 AGRANULOCYTOSIS SECONDARY TO CANCER CHEMOTHERAPY (H): Status: ACTIVE | Noted: 2017-01-01

## 2017-08-07 NOTE — MR AVS SNAPSHOT
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)        Anemia, unspecified type          Care Instructions          August 2017 Sunday Monday Tuesday Wednesday Thursday Friday Saturday             1     LAB    9:15 AM   (15 min.)    LAB   [facility] 2     UMP RETURN    9:30 AM   (30 min.)   Bentley Robles MD   [facility] 3     4     5       6     7     [facility] LAB DRAW   12:30 PM   (15 min.)    [facility] LAB DRAW   [facility] Lab Draw     UMP ONC INFUSION 120    1:00 PM   (120 min.)    ONCOLOGY INFUSION   [facility] 8     UMP ONC INFUSION 240   12:00 PM   (240 min.)    ONCOLOGY INFUSION   [facility] 9     10     11     12       13     14     15     [facility] LAB DRAW    1:00 PM   (15 min.)    [facility] LAB DRAW   [facility] Lab Draw     UMP ONC INFUSION 120    1:30 PM   (120 min.)    ONCOLOGY INFUSION   [facility] 16     17     18     19       20     21     22     23     24     25     26       27     28     29     30     31 September 2017 Sunday Monday Tuesday Wednesday Thursday Friday Saturday                            1     2       3     4     5     [facility] LAB DRAW   12:00 PM   (15 min.)    [facility] LAB DRAW   [facility] Lab Draw     UMP RETURN   12:15 PM   (50 min.)   Gabriela Mcguire PA-C   [facility]     UMP ONC INFUSION 120    1:30 PM   (120 min.)    ONCOLOGY INFUSION   [facility] 6     7     8     9        10     11     12     13     14     15     16       17     18     19     20     21     22     23       24     25     [facility] LAB DRAW   12:00 PM   (15 min.)   [facility] LAB DRAW   [facility] Lab Draw     [facility] RETURN   12:25 PM   (50 min.)   Gabriela Mcguire PA-C   [facility] ONC INFUSION 120    1:30 PM   (120 min.)    ONCOLOGY INFUSION   [facility] 26     27     28     29     30                  Lab Results:  Recent Results (from the past 12 hour(s))   Basic metabolic panel    Collection Time: 08/07/17 12:46 PM   Result Value Ref Range    Sodium 142 133 - 144 mmol/L    Potassium 3.5 3.4 - 5.3 mmol/L    Chloride 110 (H) 94 - 109 mmol/L    Carbon Dioxide 22 20 - 32 mmol/L    Anion Gap 9 3 - 14 mmol/L    Glucose 134 (H) 70 - 99 mg/dL    Urea Nitrogen 20 7 - 30 mg/dL    Creatinine 0.84 0.66 - 1.25 mg/dL    GFR Estimate 90 >60 mL/min/1.7m2    GFR Estimate If Black >90   GFR Calc   >60 mL/min/1.7m2    Calcium 8.5 8.5 - 10.1 mg/dL   CBC with platelets differential    Collection Time: 08/07/17 12:46 PM   Result Value Ref Range    WBC 4.2 4.0 - 11.0 10e9/L    RBC Count 2.39 (L) 4.4 - 5.9 10e12/L    Hemoglobin 7.8 (L) 13.3 - 17.7 g/dL    Hematocrit 22.4 (L) 40.0 - 53.0 %    MCV 94 78 - 100 fl    MCH 32.6 26.5 - 33.0 pg    MCHC 34.8 31.5 - 36.5 g/dL    RDW 19.2 (H) 10.0 - 15.0 %    Platelet Count 93 (L) 150 - 450 10e9/L    Diff Method Automated Method     % Neutrophils 78.1 %    % Lymphocytes 9.9 %    % Monocytes 10.1 %    % Eosinophils 0.7 %    % Basophils 0.2 %    % Immature Granulocytes 1.0 %    Nucleated RBCs 0 0 /100    Absolute Neutrophil 3.3 1.6 - 8.3 10e9/L    Absolute Lymphocytes 0.4 (L) 0.8 - 5.3 10e9/L    Absolute Monocytes 0.4 0.0 - 1.3 10e9/L    Absolute Eosinophils 0.0 0.0 - 0.7 10e9/L    Absolute Basophils 0.0 0.0 - 0.2 10e9/L    Abs Immature Granulocytes 0.0 0 - 0.4 10e9/L    Absolute Nucleated RBC 0.0    Hepatic panel    Collection  Time: 08/07/17 12:46 PM   Result Value Ref Range    Bilirubin Direct 0.2 0.0 - 0.2 mg/dL    Bilirubin Total 1.1 0.2 - 1.3 mg/dL    Albumin 3.3 (L) 3.4 - 5.0 g/dL    Protein Total 6.6 (L) 6.8 - 8.8 g/dL    Alkaline Phosphatase 271 (H) 40 - 150 U/L    ALT 38 0 - 70 U/L    AST 39 0 - 45 U/L   ABO/Rh type and screen    Collection Time: 08/07/17 12:46 PM   Result Value Ref Range    ABO Pending     RH(D) Pending     Antibody Screen Pending     Test Valid Only At       [facility]    Specimen Expires 08/10/2017      Contact Numbers    [facility] Main Line: [phone]  [facility] Triage:  [phone]    Call triage with chills and/or temperature greater than or equal to 100.5, uncontrolled nausea/vomiting, diarrhea, constipation, dizziness, shortness of breath, chest pain, bleeding, unexplained bruising, or any new/concerning symptoms, questions/concerns.     If you are having any concerning symptoms or wish to speak to a provider before your next infusion visit, please call your care coordinator or triage to notify them so we can adequately serve you.       After Hours: [phone]    If after hours, weekends, or holidays, call main hospital  and ask for Oncology doctor on call.             Follow-ups after your visit        Your next 10 appointments already scheduled     Aug 08, 2017 12:00 PM CDT   Infusion 240 with UC ONCOLOGY INFUSION, UC 15 ATC   [facility] ([facility])    [address] [address] [phone]            Aug 15, 2017  1:00 PM CDT   [facility] Lab Draw with UC [facility] LAB DRAW   [facility] Lab Draw ([facility])    [address] [address] [phone]            Aug 15, 2017  1:30 PM CDT   Infusion 120 with UC ONCOLOGY INFUSION, UC 22 ATC   [facility] ([facility])    [address] [address]   [phone]            Sep 05, 2017 12:00 PM CDT   [facility] Lab Draw with  [facility] LAB DRAW   [facility] Lab Draw ([facility])    [address] [place] [address]   [phone]            Sep 05, 2017 12:30 PM CDT   (Arrive by 12:15 PM)   Return Visit with Gabriela Mcguire PA-C   [facility] ([facility])    [address] [place] [address]   [phone]            Sep 05, 2017  1:30 PM CDT   Infusion 120 with UC ONCOLOGY INFUSION, UC 22 ATC   [facility] ([facility])    [address] [place] [address]   [phone]            Sep 25, 2017 12:00 PM CDT   [facility] Lab Draw with  [facility] LAB DRAW   [facility] Draw ([facility])    [address] [place] [id number] [phone]              Future tests that were ordered for you today     Open Standing Orders        Priority Remaining Interval Expires Ordered    Red blood cell prepare order unit Routine 99/100 CONDITIONAL (SPECIFY) BLOOD  8/7/2017    Transfuse red blood cell unit Routine 1/2 TRANSFUSE 2 UNITS  8/7/2017            Who to contact     If you have questions or need follow up information about today's clinic visit or your schedule please contact [facility] directly at [phone].  Normal or non-critical lab and imaging results will be communicated to you by MyChart, letter or phone within 4 business days after the clinic has received the results. If you do not hear from us within 7 days, please contact the clinic through MyChart or phone. If you have a critical or abnormal lab result, we will notify you by phone as soon as possible.  Submit refill requests through [employer] or call your pharmacy and they will forward the  refill request to us. Please allow 3 business days for your refill to be completed.          Additional Information About Your Visit        [employer] Information     [employer] gives you secure access to your electronic health record. If you see a primary care provider, you can also send messages to your care team and make appointments. If you have questions, please call your primary care clinic.  If you do not have a primary care provider, please call [phone] and they will assist you.        Care EveryWhere ID     This is your Care EveryWhere ID. This could be used by other organizations to access your [place] medical records  [id number]        Your Vitals Were     Pulse Temperature Respirations Pulse Oximetry BMI (Body Mass Index)       72 98.9  F (37.2  C) (Oral) 18 98% 20.5 kg/m2        Blood Pressure from Last 3 Encounters:   08/07/17 102/57   08/02/17 120/76   07/12/17 101/49    Weight from Last 3 Encounters:   08/07/17 57.6 kg (127 lb)   08/02/17 58.1 kg (128 lb)   07/12/17 56.7 kg (125 lb)              We Performed the Following     ABO/Rh type and screen     Basic metabolic panel     CBC with platelets differential     Hepatic panel          Today's Medication Changes          These changes are accurate as of: 8/7/17  3:45 PM.  If you have any questions, ask your nurse or doctor.               Start taking these medicines.        Dose/Directions    dexamethasone 4 MG tablet   Commonly known as:  DECADRON   Used for:  Prostate cancer 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), Metastatic bone tumor (H)        Take 2 tablets (8 mg) evening prior and morning of docetaxel dose.   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        Dose/Directions    * prochlorperazine 5 MG tablet   Commonly known as:  COMPAZINE   This may have changed:  Another medication with the same name was added. Make sure you understand how and when to take each.   Used for:  Nausea   Changed by:  Chelsi Knapp PA-C        Dose:  5 mg   Take 1 tablet (5 mg) by mouth every 6 hours as needed for nausea or vomiting   Quantity:  30 tablet   Refills:  1       * prochlorperazine 10 MG tablet   Commonly known as:  COMPAZINE   This may have changed:  You were already taking a medication with the same name, and this prescription was added. Make sure you understand how and when to take each.   Used for:  Prostate cancer 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), Metastatic bone tumor (H)        Dose:  5 mg   Take 0.5 tablets (5 mg) by mouth every 6 hours as needed (Nausea/Vomiting)   Quantity:  30 tablet   Refills:  5       * Notice:  This list has 2 medication(s) that are the same as other medications prescribed for you. Read the directions carefully, and ask your doctor or other care provider to review them with you.         Where to get your medicines      These medications were sent to [place], [place] - [address] [place] [id number]    Hours:  TRANSPLANT PHONE NUMBER [phone] Phone:  [phone]     dexamethasone 4 MG tablet    predniSONE 5 MG tablet    prochlorperazine 10 MG tablet         Some of these will need a paper prescription and others can be bought over the counter.  Ask your nurse if you have questions.     Bring a paper prescription for  each of these medications     LORazepam 0.5 MG tablet                Primary Care Provider Office Phone # Fax #    Lucrecia Collier [phone]       [facility] [place] [address]  [facility] [address]        Equal Access to Services     BLADIMIR KEY : Kelsey goetz hadlorenzao Soomaali, waaxda luqadaha, qaybta kaalmada adeegyada, waxponce kurtz chanelfabian palmaalanevelyn brock. So [facility] [phone].    ATENCIÓN: Si habla español, tiene a mccall disposición servicios gratuitos de asistencia lingüística. Marquise al [phone].    We comply with applicable federal civil rights laws and [place] laws. We do not discriminate on the basis of race, color, national origin, age, disability sex, sexual orientation or gender identity.            Thank you!     Thank you for choosing [facility]  for your care. Our goal is always to provide you with excellent care. Hearing back from our patients is one way we can continue to improve our services. Please take a few minutes to complete the written survey that you may receive in the mail after your visit with us. Thank you!             Your Updated Medication List - Protect others around you: Learn how to safely use, store and throw away your medicines at www.disposemymeds.org.          This list is accurate as of: 8/7/17  3:45 PM.  Always use your most recent med list.                   Brand Name Dispense Instructions for use Diagnosis    aspirin 81 MG tablet     90 tablet    Take 1 tablet (81 mg) by mouth daily    Acute on chronic combined systolic and diastolic congestive heart failure (H)       calcium carbonate 1250 MG tablet    OS-RODNEY 500 mg [place]. [place]    120 tablet    Take 2 tablets (1,000 mg) by mouth 2 times daily    Hypocalcemia       dexamethasone 4 MG tablet    DECADRON    4 tablet    Take 2 tablets (8 mg) evening prior and morning of docetaxel dose.    Prostate cancer 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), Metastatic bone tumor (H)       enoxaparin  40 MG/0.4ML injection    LOVENOX    60 Syringe    Inject 0.4 mLs (40 mg) Subcutaneous 2 times daily    Other chronic pulmonary embolism without acute cor pulmonale (H)       HYDROcodone-acetaminophen 5-325 MG per tablet    NORCO     Take 1 tablet by mouth every 6 hours as needed for moderate to severe pain Reported on 5/22/2017        loperamide 2 MG capsule    IMODIUM    60 capsule    Use 2 caps PO after first loose stool.  Repeat 1 cap after each subsequent loose stool.  Max 8/24 hours.    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), Prostate cancer 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), Metastatic bone tumor (H)       * prochlorperazine 5 MG tablet    COMPAZINE    30 tablet    Take 1 tablet (5 mg) by mouth every 6 hours as needed for nausea or vomiting    Nausea       * prochlorperazine 10 MG tablet    COMPAZINE    30 tablet    Take 0.5 tablets (5 mg) by mouth every 6 hours as needed (Nausea/Vomiting)    Prostate cancer 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), Metastatic bone tumor (H)       simvastatin 40 MG tablet    ZOCOR    90 tablet    TAKE ONE TABLET BY MOUTH AT BEDTIME    Hyperlipidemia LDL goal <130       tamsulosin 0.4 MG capsule    FLOMAX    90 capsule    Take 1 capsule (0.4 mg) by mouth daily AM    Ureteral stone       vitamin D 2000 UNITS tablet     100 tablet    Take 1 tablet by mouth daily    Vitamin D deficiency       * Notice:  This list has 2 medication(s) that are the same as other medications prescribed for you.  Read the directions carefully, and ask your doctor or other care provider to review them with you.

## 2017-08-07 NOTE — PATIENT INSTRUCTIONS
August 2017 Sunday Monday Tuesday Wednesday Thursday Friday Saturday             1     LAB    9:15 AM   (15 min.)    LAB   [facility] 2     UMP RETURN    9:30 AM   (30 min.)   Bentley Robles MD   [facility] 3     4     5       6     7     [facility] LAB DRAW   12:30 PM   (15 min.)   [facility] LAB DRAW   [facility] Lab Draw     UMP ONC INFUSION 120    1:00 PM   (120 min.)   UC ONCOLOGY INFUSION   [facility] 8     UMP ONC INFUSION 240   12:00 PM   (240 min.)   UC ONCOLOGY INFUSION   [facility] 9     10     11     12       13     14     15     [facility] LAB DRAW    1:00 PM   (15 min.)   [facility]   [facility]     UMP ONC INFUSION 120    1:30 PM   (120 min.)    ONCOLOGY INFUSION   [facility] 16     17     18     19       20     21     22     23     24     25     26       27     28     29     30     31 September 2017 Sunday Monday Tuesday Wednesday Thursday Friday Saturday                            1     2       3     4     5     [facility] LAB DRAW   12:00 PM   (15 min.)   [facility] LAB DRAW   [facility] Lab Draw     UMP RETURN   12:15 PM   (50 min.)   Gabriela Mcguire PA-C   [facility]     UMP ONC INFUSION 120    1:30 PM   (120 min.)    ONCOLOGY INFUSION   [facility] 6     7     8     9       10     11     12     13     14     15     16       17     18     19     20     21     22     23       24     25     [facility] LAB DRAW   12:00 PM   (15 min.)   [facility] LAB DRAW   [facility] Lab Draw     UMP RETURN   12:25 PM   (50 min.)   Gabriela Mcguire PA-C   [facility]     UMP ONC INFUSION 120    1:30 PM   (120 min.)    ONCOLOGY INFUSION   [facility] 26     27     28     29     30                  Lab Results:  Recent Results (from the past 12  hour(s))   Basic metabolic panel    Collection Time: 08/07/17 12:46 PM   Result Value Ref Range    Sodium 142 133 - 144 mmol/L    Potassium 3.5 3.4 - 5.3 mmol/L    Chloride 110 (H) 94 - 109 mmol/L    Carbon Dioxide 22 20 - 32 mmol/L    Anion Gap 9 3 - 14 mmol/L    Glucose 134 (H) 70 - 99 mg/dL    Urea Nitrogen 20 7 - 30 mg/dL    Creatinine 0.84 0.66 - 1.25 mg/dL    GFR Estimate 90 >60 mL/min/1.7m2    GFR Estimate If Black >90   GFR Calc   >60 mL/min/1.7m2    Calcium 8.5 8.5 - 10.1 mg/dL   CBC with platelets differential    Collection Time: 08/07/17 12:46 PM   Result Value Ref Range    WBC 4.2 4.0 - 11.0 10e9/L    RBC Count 2.39 (L) 4.4 - 5.9 10e12/L    Hemoglobin 7.8 (L) 13.3 - 17.7 g/dL    Hematocrit 22.4 (L) 40.0 - 53.0 %    MCV 94 78 - 100 fl    MCH 32.6 26.5 - 33.0 pg    MCHC 34.8 31.5 - 36.5 g/dL    RDW 19.2 (H) 10.0 - 15.0 %    Platelet Count 93 (L) 150 - 450 10e9/L    Diff Method Automated Method     % Neutrophils 78.1 %    % Lymphocytes 9.9 %    % Monocytes 10.1 %    % Eosinophils 0.7 %    % Basophils 0.2 %    % Immature Granulocytes 1.0 %    Nucleated RBCs 0 0 /100    Absolute Neutrophil 3.3 1.6 - 8.3 10e9/L    Absolute Lymphocytes 0.4 (L) 0.8 - 5.3 10e9/L    Absolute Monocytes 0.4 0.0 - 1.3 10e9/L    Absolute Eosinophils 0.0 0.0 - 0.7 10e9/L    Absolute Basophils 0.0 0.0 - 0.2 10e9/L    Abs Immature Granulocytes 0.0 0 - 0.4 10e9/L    Absolute Nucleated RBC 0.0    Hepatic panel    Collection Time: 08/07/17 12:46 PM   Result Value Ref Range    Bilirubin Direct 0.2 0.0 - 0.2 mg/dL    Bilirubin Total 1.1 0.2 - 1.3 mg/dL    Albumin 3.3 (L) 3.4 - 5.0 g/dL    Protein Total 6.6 (L) 6.8 - 8.8 g/dL    Alkaline Phosphatase 271 (H) 40 - 150 U/L    ALT 38 0 - 70 U/L    AST 39 0 - 45 U/L   ABO/Rh type and screen    Collection Time: 08/07/17 12:46 PM   Result Value Ref Range    ABO Pending     RH(D) Pending     Antibody Screen Pending     Test Valid Only At       [facility]  [facility]    Specimen Expires 08/10/2017      Contact Numbers    [facility] [place]: [phone]  [facility] Triage:  [phone]    Call triage with chills and/or temperature greater than or equal to 100.5, uncontrolled nausea/vomiting, diarrhea, constipation, dizziness, shortness of breath, chest pain, bleeding, unexplained bruising, or any new/concerning symptoms, questions/concerns.     If you are having any concerning symptoms or wish to speak to a provider before your next infusion visit, please call your care coordinator or triage to notify them so we can adequately serve you.       After Hours: [phone]    If after hours, weekends, or holidays, call main hospital  and ask for Oncology doctor on call.

## 2017-08-07 NOTE — PROGRESS NOTES
"Infusion Nursing Note:  Oswaldo Win presented today for Possible Taxotere, Possible 2 Units PRBC.   Patient seen by provider today: No    Treatment Conditions:  Lab Results   Component Value Date    HGB 7.8 08/07/2017     Lab Results   Component Value Date    WBC 4.2 08/07/2017      Lab Results   Component Value Date    ANEU 3.3 08/07/2017     Lab Results   Component Value Date    PLT 93 08/07/2017      Lab Results   Component Value Date     08/07/2017                   Lab Results   Component Value Date    POTASSIUM 3.5 08/07/2017           Lab Results   Component Value Date    MAG 2.0 04/05/2017            Lab Results   Component Value Date    CR 0.84 08/07/2017                   Lab Results   Component Value Date    RODNEY 8.5 08/07/2017                Lab Results   Component Value Date    BILITOTAL 1.1 08/07/2017           Lab Results   Component Value Date    ALBUMIN 3.3 08/07/2017                    Lab Results   Component Value Date    ALT 38 08/07/2017           Lab Results   Component Value Date    AST 39 08/07/2017     Results reviewed, labs MET treatment parameters.      Intravenous Access:  Peripheral IV placed.  Right lower forearm.  PIV left intact for next day PRBC infusion.    Note: Treatment Plan Deferred: to 8/15/17 due to patient quality of life preferences. TORB: Gabriela WALTON, Katie Andres RN 8/7/17 1350 pm -\"Patient may defer chemo today and receive Taxotere within the next two weeks so he can continue to enjoy how well he feels this week. Give two units of blood today to treat Hgb of 7.8\"  Patient Hgb <8. Orders to give 2 units PRBC received. T/S order sent. Tube was not received by Hospital blood bank until 15:45. Blood could be received by [facility] blood bank anywhere between 16:30 and 18:30. Patient had already been waiting two hours for type and screen to be processed and he stated he did not want to wait here any longer for blood to arrive.  He was then scheduled for 2 PRBC infusion " Tuesday 8/8/17 at noon.   Gabriela WALTON was paged and notified that patient would receive blood tomorrow.   Dr Robles was also notified of these updates stated above, by phone at 1612 pm on 8/7/17      Discharge Plan:   Prescription refills given for Dexamethasone, Ativan and Compazine.  Discharge instructions reviewed with: Patient.  Patient and/or family verbalized understanding of discharge instructions and all questions answered.  Copy of AVS reviewed with patient and/or family.  Patient will return 8/8/17 for blood transfusion. He will return 8/15/17 for Taxotere Cycle 1 Day 1.  Patient discharged in stable condition accompanied by: self.  Departure Mode: Ambulatory.    Kristen Caro RN

## 2017-08-07 NOTE — NURSING NOTE
Patient received Xgeva and Lupron injections today.  Patient had no questions for an RN today.  Xgeva given in right arm without incident. Lupron given to left gluteus leonel without incident.   Patient is waiting to see if he will get some red blood cells - RN took over care.

## 2017-08-08 NOTE — PROGRESS NOTES
Infusion Nursing Note:  Oswaldo Win presents today for 2 Units PRBC.    Patient seen by provider today: No   present during visit today: Not Applicable.    Note: Alert, oriented, cheerful. States he feels good and doesn't feel like his hgb is low. Color is pale, but patient is not symptomatic otherwise. Patient states he has no questions or concerns today.    Intravenous Access:  Peripheral IV placed.    Treatment Conditions:  Results reviewed, labs MET treatment parameters, ok to proceed with treatment.  Blood transfusion consent signed 3/31/17.      Post Infusion Assessment:  Patient tolerated infusion without incident.  Site patent and intact, free from redness, edema or discomfort.  Access discontinued per protocol.    Discharge Plan:   Patient declined prescription refills.  Discharge instructions reviewed with: Patient.  Patient and/or family verbalized understanding of discharge instructions and all questions answered.  AVS to patient via InCorta[employer].  Patient will return 8/15/17 for next appointment.   Patient discharged in stable condition accompanied by: self.  Departure Mode: Ambulatory.    Vani Lopez RN

## 2017-08-08 NOTE — PATIENT INSTRUCTIONS
Contact Numbers    [facility] Main Line: [phone]  [facility] Triage:  [phone]    Call triage with chills and/or temperature greater than or equal to 100.5, uncontrolled nausea/vomiting, diarrhea, constipation, dizziness, shortness of breath, chest pain, bleeding, unexplained bruising, or any new/concerning symptoms, questions/concerns.     If you are having any concerning symptoms or wish to speak to a provider before your next infusion visit, please call your care coordinator or triage to notify them so we can adequately serve you.       After Hours: [phone]    If after hours, weekends, or holidays, call main hospital  and ask for Oncology doctor on call.         August 2017 Sunday Monday Tuesday Wednesday Thursday Friday Saturday             1     LAB    9:15 AM   (15 min.)    LAB   [facility] 2     UMP RETURN    9:30 AM   (30 min.)   Bentley Robles MD   [facility] 3     4     5       6     7     [facility] LAB DRAW   12:30 PM   (15 min.)    [facility] LAB DRAW   [facility] Lab Draw     UMP ONC INFUSION 120    1:00 PM   (120 min.)    ONCOLOGY INFUSION   [facility] 8     UMP ONC INFUSION 240   12:00 PM   (240 min.)    ONCOLOGY INFUSION   [facility] 9     10     11     12       13     14     15     [facility] LAB DRAW    1:00 PM   (15 min.)   [facility] DRAW   [facility] Lab Draw     UMP ONC INFUSION 120    1:30 PM   (120 min.)    ONCOLOGY INFUSION   [facility] 16     17     18     19       20     21     22     23     24     25     26       27     28     29     30     31 September 2017 Sunday Monday Tuesday Wednesday Thursday Friday Saturday                            1     2       3     4     5     [facility] LAB DRAW   12:00 PM   (15 min.)   [facility]   [facility]     UMP RETURN   12:15 PM   (50 min.)   Gabriela Mcguire,  POLI BURNETT [facility] ONC INFUSION 120    1:30 PM   (120 min.)    ONCOLOGY INFUSION   [facility] 6     7     8     9       10     11     12     13     14     15     16       17     18     19     20     21     22     23       24     25     [facility] LAB DRAW   12:00 PM   (15 min.)   [facility] LAB DRAW   [facility] Lab Draw     [facility] RETURN   12:25 PM   (50 min.)   Gabriela Mcguire PA-C [facility] ONC INFUSION 120    1:30 PM   (120 min.)    ONCOLOGY INFUSION   [facility] 26     27     28     29     30                  Lab Results:  No results found for this or any previous visit (from the past 12 hour(s)).

## 2017-08-08 NOTE — MR AVS SNAPSHOT
After Visit Summary   8/8/2017    Oswaldo Win    MRN: [id number]           Patient Information     Date Of Birth          1945        Visit Information        Provider Department      8/8/2017 12:00 PM  15 ATC;  ONCOLOGY INFUSION [facility]        Today's Diagnoses     Anemia, unspecified type    -  1      Care Instructions    Contact Numbers    [facility] Main Line: [phone]  [facility] Triage:  [phone]    Call triage with chills and/or temperature greater than or equal to 100.5, uncontrolled nausea/vomiting, diarrhea, constipation, dizziness, shortness of breath, chest pain, bleeding, unexplained bruising, or any new/concerning symptoms, questions/concerns.     If you are having any concerning symptoms or wish to speak to a provider before your next infusion visit, please call your care coordinator or triage to notify them so we can adequately serve you.       After Hours: [phone]    If after hours, weekends, or holidays, call main hospital  and ask for Oncology doctor on call.         August 2017 Sunday Monday Tuesday Wednesday Thursday Friday Saturday             1     LAB    9:15 AM   (15 min.)    LAB   [facility] 2     UMP RETURN    9:30 AM   (30 min.)   Bentley Robles MD   [facility] 3     4     5       6     7     [facility] LAB DRAW   12:30 PM   (15 min.)    [facility] LAB DRAW   [facility] Lab Draw     UMP ONC INFUSION 120    1:00 PM   (120 min.)    ONCOLOGY INFUSION   [facility] 8     UMP ONC INFUSION 240   12:00 PM   (240 min.)    ONCOLOGY INFUSION   [facility] 9     10     11     12       13     14     15     [facility] LAB DRAW    1:00 PM   (15 min.)    [facility] LAB DRAW   [facility] Lab Draw     UMP ONC INFUSION 120    1:30 PM   (120 min.)    ONCOLOGY INFUSION   [facility] 16     17     18     19       20     21     22      23     24     25     26       27     28     29     30     31 September 2017 Sunday Monday Tuesday Wednesday Thursday Friday Saturday                            1     2       3     4     5     [facility] LAB DRAW   12:00 PM   (15 min.)   [facility] LAB DRAW   [facility] Lab Draw     UMP RETURN   12:15 PM   (50 min.)   Gabriela Mcguire PA-C   [facility]     UMP ONC INFUSION 120    1:30 PM   (120 min.)   UC ONCOLOGY INFUSION   [facility] 6     7     8     9       10     11     12     13     14     15     16       17     18     19     20     21     22     23       24     25     [facility] LAB DRAW   12:00 PM   (15 min.)   [facility] LAB DRAW   [facility] Lab Draw     UMP RETURN   12:25 PM   (50 min.)   Gabriela Mcguire PA-C   [facility]     UMP ONC INFUSION 120    1:30 PM   (120 min.)   UC ONCOLOGY INFUSION   [facility] 26     27     28     29     30                  Lab Results:  No results found for this or any previous visit (from the past 12 hour(s)).            Follow-ups after your visit        Your next 10 appointments already scheduled     Aug 15, 2017  1:00 PM CDT   [facility] Lab Draw with  [facility] LAB DRAW   [facility])    [address] [address]   [phone]            Aug 15, 2017  1:30 PM CDT   Infusion 120 with UC ONCOLOGY INFUSION, UC 22 ATC   [facility] ([facility])    [address] [address]   [phone]            Sep 05, 2017 12:00 PM CDT   [facility] Lab Draw with  [facility] LAB DRAW   [facility] ([facility])    [address] [address]   [phone]            Sep 05, 2017 12:30 PM CDT   (Arrive by 12:15 PM)   Return Visit with Gabriela MAHARAJ  POLI Mcguire   [facility]    [address]  [address]  [facility] [address]   [phone]            Sep 05, 2017  1:30 PM CDT   Infusion 120 with UC ONCOLOGY INFUSION, UC 22 ATC   [facility]    [address]  [address]  [facility] [address] [phone]            Sep 25, 2017 12:00 PM CDT   [facility] Lab Draw with  [facility] LAB DRAW   [facility] Lab Draw ([facility])    [address]  [address] [facility] [address] [phone]            Sep 25, 2017 12:40 PM CDT   (Arrive by 12:25 PM)   Return Visit with Gabriela Mcguire PA-C   [facility]    [address]  [address] [facility] [address] [phone]            Sep 25, 2017  1:30 PM CDT   Infusion 120 with UC ONCOLOGY INFUSION   [facility] ([facility])    [address] [address] [phone]              Future tests that were ordered for you today     Open Standing Orders        Priority Remaining Interval Expires Ordered    Red blood cell prepare order unit Routine 99/100 CONDITIONAL (SPECIFY) BLOOD  8/7/2017            Who to contact     If you have questions or need follow up information about today's clinic visit or your schedule please contact [facility] directly at [phone].  Normal or non-critical lab and imaging results will be communicated to you by MyChart, letter or phone within 4 business days after the clinic has received the results. If you do not hear from us within 7 days, please contact the clinic through MyChart or phone. If you have a critical or abnormal lab result, we will notify you by phone as soon as possible.  Submit refill requests through [employer] or call your pharmacy  and they will forward the refill request to us. Please allow 3 business days for your refill to be completed.          Additional Information About Your Visit        [employer] Information     [email or web address] gives you secure access to your electronic health record. If you see a primary care provider, you can also send messages to your care team and make appointments. If you have questions, please call your primary care clinic.  If you do not have a primary care provider, please call [phone] and they will assist you.        Care EveryWhere ID     This is your Care EveryWhere ID. This could be used by other organizations to access your [place] medical records  [id number]        Your Vitals Were     Pulse Temperature Respirations Pulse Oximetry          98 98.5  F (36.9  C) (Oral) 16 100%         Blood Pressure from Last 3 Encounters:   08/08/17 122/53   08/07/17 102/57   08/02/17 120/76    Weight from Last 3 Encounters:   08/07/17 57.6 kg (127 lb)   08/02/17 58.1 kg (128 lb)   07/12/17 56.7 kg (125 lb)              We Performed the Following     Transfuse red blood cell unit     Transfuse red blood cell unit        Primary Care Provider Office Phone # Fax #    Lucrecia Collier [phone] [address] [place] [address]        Equal Access to Services     BLADIMIR KEY : Hadii aad ku hadasho Soomaali, waaxda luqadaha, qaybta kaalmada adeegyada, peter kurtz haycayla moran . So [facility] [phone].    ATENCIÓN: Si habla español, tiene a mccall disposición servicios gratuitos de asistencia lingüística. Llame al [phone].    We comply with applicable federal civil rights laws and [place] laws. We do not discriminate on the basis of race, color, national origin, age, disability sex, sexual orientation or gender identity.            Thank you!     Thank you for choosing [facility]  for your care. Our goal is always to provide you with excellent care. Hearing back  from our patients is one way we can continue to improve our services. Please take a few minutes to complete the written survey that you may receive in the mail after your visit with us. Thank you!             Your Updated Medication List - Protect others around you: Learn how to safely use, store and throw away your medicines at www.disposemymeds.org.          This list is accurate as of: 8/8/17  4:54 PM.  Always use your most recent med list.                   Brand Name Dispense Instructions for use Diagnosis    aspirin 81 MG tablet     90 tablet    Take 1 tablet (81 mg) by mouth daily    Acute on chronic combined systolic and diastolic congestive heart failure (H)       calcium carbonate 1250 MG tablet    OS-RODNEY 500 mg [place]. Ca    120 tablet    Take 2 tablets (1,000 mg) by mouth 2 times daily    Hypocalcemia       dexamethasone 4 MG tablet    DECADRON    4 tablet    Take 2 tablets (8 mg) evening prior and morning of docetaxel dose.    Prostate cancer 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), Metastatic bone tumor (H)       enoxaparin 40 MG/0.4ML injection    LOVENOX    60 Syringe    Inject 0.4 mLs (40 mg) Subcutaneous 2 times daily    Other chronic pulmonary embolism without acute cor pulmonale (H)       HYDROcodone-acetaminophen 5-325 MG per tablet    NORCO     Take 1 tablet by mouth every 6 hours as needed for moderate to severe pain Reported on 5/22/2017        loperamide 2 MG capsule    IMODIUM    60 capsule    Use 2 caps PO after first loose stool.  Repeat 1 cap after each subsequent loose stool.  Max 8/24 hours.    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), Prostate cancer 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), Metastatic bone tumor (H)       * prochlorperazine 5 MG tablet    COMPAZINE    30 tablet    Take 1 tablet (5 mg) by mouth every 6 hours as needed for nausea or vomiting    Nausea       * prochlorperazine 10 MG tablet    COMPAZINE    30 tablet    Take 0.5 tablets (5 mg) by mouth every 6 hours as needed (Nausea/Vomiting)    Prostate cancer 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), Metastatic bone tumor (H)       simvastatin 40 MG tablet    ZOCOR    90 tablet    TAKE ONE TABLET BY MOUTH AT BEDTIME    Hyperlipidemia LDL goal <130       tamsulosin 0.4 MG capsule    FLOMAX    90 capsule    Take 1 capsule (0.4 mg) by mouth daily AM    Ureteral stone       vitamin D 2000 UNITS tablet     100 tablet    Take 1 tablet by mouth daily    Vitamin D deficiency       * Notice:  This list has 2 medication(s) that are the same as other medications prescribed for you. Read the directions carefully, and ask your doctor or other care provider to review them with you.

## 2017-08-15 NOTE — NURSING NOTE
Chief Complaint   Patient presents with     Blood Draw     VPT and vitals completed in lab by LUZ and RN     IV placed by RN.  Labs collected through IV.  Pt tolerated procedure well.  See flowsheet.  Pt also declined to be weighed, as he was concerned about receiving an incorrect number.  Weight documented is from PT, which he stated was collected this am in just his underwear.    Lynette Turner LPN

## 2017-08-15 NOTE — PATIENT INSTRUCTIONS
Contact Numbers  [facility]: [phone]    After Hours:  [phone]  Triage: [phone]    Please call the [facility] Triage line if you experience a temperature greater than or equal to 100.5, shaking chills, have uncontrolled nausea, vomiting and/or diarrhea, dizziness, shortness of breath, chest pain, bleeding, unexplained bruising, or if you have any other new/concerning symptoms, questions or concerns.     If it is after hours, weekends, or holidays, please call the main hospital  at  [phone] and ask to speak to the Oncology doctor on call.     If you are having any concerning symptoms or wish to speak to a provider before your next infusion visit, please call your care coordinator or triage to notify them so we can adequately serve you.     If you need a refill on a narcotic prescription or other medication, please call triage before your infusion appointment.   Neulasta Onpro On-Body injector applied to right abdomen at 3:50PM.  Writer discussed Neulasta injection would start on 8/16/17 at 6:50PM, approximately 27 hours after application applied today.  Written and Verbal instruction reviewed with patient.  Pt instructed when the dose delivery starts, it will take about 45 minutes to complete.  Pt aware Neulasta Onpro On-Body should have green flashing light and to call triage or on-call MD if injector flashes red or appears to be leaking. Pt aware to keep Onpro On-Body Neualsta 4 inches away from electrical equipment and to avoid showering 4 hours prior to injection.   Neulasta Onpro Lot number:[id number]        August 2017 Sunday Monday Tuesday Wednesday Thursday Friday Saturday             1     LAB    9:15 AM   (15 min.)    LAB   [facility] 2     UMP RETURN    9:30 AM   (30 min.)   Bentley Robles MD   [facility] 3     4     5       6     7     [facility]   12:30 PM   (15 min.)   [facility] DRAW   [facility] Lab  Draw     UMP ONC INFUSION 120    1:00 PM   (120 min.)    ONCOLOGY INFUSION   [facility] 8     UMP ONC INFUSION 240   12:00 PM   (240 min.)    ONCOLOGY INFUSION   [facility] 9     10     11     12       13     14     15     [facility] LAB DRAW    1:00 PM   (15 min.)   [facility] LAB DRAW   [facility] Lab Draw     UMP ONC INFUSION 120    1:30 PM   (120 min.)    ONCOLOGY INFUSION   [facility] 16     17     18     19       20     21     22     23     24     25     26       27     28     29     30     31 September 2017 Sunday Monday Tuesday Wednesday Thursday Friday Saturday                            1     2       3     4     5     [facility] LAB DRAW   12:00 PM   (15 min.)   [facility] LAB DRAW   [facility] Lab Draw     UMP RETURN   12:15 PM   (50 min.)   Gabriela Mcguire PA-C   [facility]     UMP ONC INFUSION 120    1:30 PM   (120 min.)    ONCOLOGY INFUSION   [facility] 6     7     8     9       10     11     12     13     14     15     16       17     18     19     20     21     22     23       24     25     [facility] LAB DRAW   12:00 PM   (15 min.)   [facility] DRAW   [facility] Lab Draw     UMP RETURN   12:25 PM   (50 min.)   Gabriela Mcguire PA-C   [facility]     UMP ONC INFUSION 120    1:30 PM   (120 min.)    ONCOLOGY INFUSION   [facility] 26     27     28     29     30                  Lab Results:  Recent Results (from the past 12 hour(s))   Basic metabolic panel    Collection Time: 08/15/17  1:07 PM   Result Value Ref Range    Sodium 138 133 - 144 mmol/L    Potassium 4.1 3.4 - 5.3 mmol/L    Chloride 108 94 - 109 mmol/L    Carbon Dioxide 19 (L) 20 - 32 mmol/L    Anion Gap 12 3 - 14 mmol/L    Glucose 204 (H) 70 - 99 mg/dL    Urea Nitrogen 20 7 - 30 mg/dL    Creatinine 0.72 0.66 - 1.25 mg/dL    GFR Estimate  >90 >60 mL/min/1.7m2    GFR Estimate If Black >90 >60 mL/min/1.7m2    Calcium 9.1 8.5 - 10.1 mg/dL   CBC with platelets differential    Collection Time: 08/15/17  1:07 PM   Result Value Ref Range    WBC 6.0 4.0 - 11.0 10e9/L    RBC Count 3.70 (L) 4.4 - 5.9 10e12/L    Hemoglobin 11.5 (L) 13.3 - 17.7 g/dL    Hematocrit 33.5 (L) 40.0 - 53.0 %    MCV 91 78 - 100 fl    MCH 31.1 26.5 - 33.0 pg    MCHC 34.3 31.5 - 36.5 g/dL    RDW 17.3 (H) 10.0 - 15.0 %    Platelet Count 104 (L) 150 - 450 10e9/L    Diff Method Automated Method     % Neutrophils 86.7 %    % Lymphocytes 7.6 %    % Monocytes 4.7 %    % Eosinophils 0.0 %    % Basophils 0.0 %    % Immature Granulocytes 1.0 %    Nucleated RBCs 0 0 /100    Absolute Neutrophil 5.2 1.6 - 8.3 10e9/L    Absolute Lymphocytes 0.5 (L) 0.8 - 5.3 10e9/L    Absolute Monocytes 0.3 0.0 - 1.3 10e9/L    Absolute Eosinophils 0.0 0.0 - 0.7 10e9/L    Absolute Basophils 0.0 0.0 - 0.2 10e9/L    Abs Immature Granulocytes 0.1 0 - 0.4 10e9/L    Absolute Nucleated RBC 0.0    Hepatic panel    Collection Time: 08/15/17  1:07 PM   Result Value Ref Range    Bilirubin Direct 0.2 0.0 - 0.2 mg/dL    Bilirubin Total 1.0 0.2 - 1.3 mg/dL    Albumin 3.6 3.4 - 5.0 g/dL    Protein Total 7.5 6.8 - 8.8 g/dL    Alkaline Phosphatase 284 (H) 40 - 150 U/L    ALT 40 0 - 70 U/L    AST 35 0 - 45 U/L

## 2017-08-15 NOTE — MR AVS SNAPSHOT
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     If it is after hours, weekends, or holidays, please call the main hospital  at  [phone] and ask to speak to the Oncology doctor on call.     If you are having any concerning symptoms or wish to speak to a provider before your next infusion visit, please call your care coordinator or triage to notify them so we can adequately serve you.     If you need a refill on a narcotic prescription or other medication, please call triage before your infusion appointment.   Neulasta Onpro On-Body injector applied to right abdomen at 3:50PM.  Writer discussed Neulasta injection would start on 8/16/17 at 6:50PM, approximately 27 hours after application applied today.  Written and Verbal instruction reviewed with patient.  Pt instructed when the dose delivery starts, it will take about 45 minutes to complete.  Pt aware Neulasta Onpro On-Body should have green flashing light and to call triage or on-call MD if injector flashes red or appears to be leaking. Pt aware to keep Onpro On-Body Neualsta 4 inches away from electrical  equipment and to avoid showering 4 hours prior to injection.   Neulasta Onpro Lot number:[id number]        August 2017 Sunday Monday Tuesday Wednesday Thursday Friday Saturday             1     LAB    9:15 AM   (15 min.)    LAB   [facility] 2     [facility] RETURN    9:30 AM   (30 min.)   Bentley Robles MD   [facility] 3     4     5       6     7     [facility] LAB DRAW   12:30 PM   (15 min.)    [facility] LAB DRAW   [facility] Lab Draw     [facility] ONC INFUSION 120    1:00 PM   (120 min.)    ONCOLOGY INFUSION   [facility] 8     [facility] ONC INFUSION 240   12:00 PM   (240 min.)    ONCOLOGY INFUSION   [facility] 9     10     11     12       13     14     15     [facility] LAB DRAW    1:00 PM   (15 min.)    [facility]     [facility] ONC INFUSION 120    1:30 PM   (120 min.)    ONCOLOGY INFUSION   [facility] 16     17     18     19       20     21     22     23     24     25     26       27     28     29     30     31 September 2017 Sunday Monday Tuesday Wednesday Thursday Friday Saturday                            1     2       3     4     5     [facility] LAB DRAW   12:00 PM   (15 min.)    [facility] LAB DRAW   [facility] Lab Draw     UMP RETURN   12:15 PM   (50 min.)   Gabriela Mcguire PA-C   [facility]     [facility] ONC INFUSION 120    1:30 PM   (120 min.)    ONCOLOGY INFUSION   [facility] 6     7     8     9       10     11     12     13     14     15     16       17     18     19     20     21     22     23       24     25     [facility] LAB DRAW   12:00 PM   (15 min.)    [facility]     [facility] RETURN   12:25 PM   (50 min.)   Gabriela Mcguire PA-C   [facility]     [facility] ONC INFUSION 120    1:30 PM   (120 min.)    ONCOLOGY INFUSION   [facility]  26     27     28     29     30                  Lab Results:  Recent Results (from the past 12 hour(s))   Basic metabolic panel    Collection Time: 08/15/17  1:07 PM   Result Value Ref Range    Sodium 138 133 - 144 mmol/L    Potassium 4.1 3.4 - 5.3 mmol/L    Chloride 108 94 - 109 mmol/L    Carbon Dioxide 19 (L) 20 - 32 mmol/L    Anion Gap 12 3 - 14 mmol/L    Glucose 204 (H) 70 - 99 mg/dL    Urea Nitrogen 20 7 - 30 mg/dL    Creatinine 0.72 0.66 - 1.25 mg/dL    GFR Estimate >90 >60 mL/min/1.7m2    GFR Estimate If Black >90 >60 mL/min/1.7m2    Calcium 9.1 8.5 - 10.1 mg/dL   CBC with platelets differential    Collection Time: 08/15/17  1:07 PM   Result Value Ref Range    WBC 6.0 4.0 - 11.0 10e9/L    RBC Count 3.70 (L) 4.4 - 5.9 10e12/L    Hemoglobin 11.5 (L) 13.3 - 17.7 g/dL    Hematocrit 33.5 (L) 40.0 - 53.0 %    MCV 91 78 - 100 fl    MCH 31.1 26.5 - 33.0 pg    MCHC 34.3 31.5 - 36.5 g/dL    RDW 17.3 (H) 10.0 - 15.0 %    Platelet Count 104 (L) 150 - 450 10e9/L    Diff Method Automated Method     % Neutrophils 86.7 %    % Lymphocytes 7.6 %    % Monocytes 4.7 %    % Eosinophils 0.0 %    % Basophils 0.0 %    % Immature Granulocytes 1.0 %    Nucleated RBCs 0 0 /100    Absolute Neutrophil 5.2 1.6 - 8.3 10e9/L    Absolute Lymphocytes 0.5 (L) 0.8 - 5.3 10e9/L    Absolute Monocytes 0.3 0.0 - 1.3 10e9/L    Absolute Eosinophils 0.0 0.0 - 0.7 10e9/L    Absolute Basophils 0.0 0.0 - 0.2 10e9/L    Abs Immature Granulocytes 0.1 0 - 0.4 10e9/L    Absolute Nucleated RBC 0.0    Hepatic panel    Collection Time: 08/15/17  1:07 PM   Result Value Ref Range    Bilirubin Direct 0.2 0.0 - 0.2 mg/dL    Bilirubin Total 1.0 0.2 - 1.3 mg/dL    Albumin 3.6 3.4 - 5.0 g/dL    Protein Total 7.5 6.8 - 8.8 g/dL    Alkaline Phosphatase 284 (H) 40 - 150 U/L    ALT 40 0 - 70 U/L    AST 35 0 - 45 U/L               Follow-ups after your visit        Your next 10 appointments already scheduled     Sep 05, 2017 12:00 PM CARLOS Rojas Lab Draw with RADHA ROJAS  LAB DRAW   [facility]    [address] [address]   [phone]            Sep 05, 2017 12:30 PM CDT   (Arrive by 12:15 PM)   Return Visit with Gabriela Mcguire PA-C   [facility] [place] ([facility])    [address] [id number] [phone]            Sep 05, 2017  1:30 PM CDT   Infusion 120 with UC ONCOLOGY INFUSION, UC 22 ATC   [facility] [place] ([facility])    [address] [address] [phone]            Sep 25, 2017 12:00 PM CDT   [facility] Lab Draw with UC [facility] LAB DRAW   [facility] Lab Draw ([facility])    [address] [address] [phone]            Sep 25, 2017 12:40 PM CDT   (Arrive by 12:25 PM)   Return Visit with Gabriela Mcguire PA-C   [facility] [place] ([facility])    [address] [place] [id number] [phone]            Sep 25, 2017  1:30 PM CDT   Infusion 120 with UC ONCOLOGY INFUSION, UC 27 ATC   [facility] [place] ([facility])    [address] [place] [id number] [phone]              Who to contact     If you have questions or need follow up information about today's clinic visit or your schedule please contact [facility] directly at [phone].  Normal or non-critical lab and imaging results will be communicated to you by MyChart, letter or phone within 4 business days after the clinic has received the results. If you do not hear from us within 7 days, please contact the clinic through MyChart or phone. If you have a critical or abnormal lab result, we will notify you by phone as soon as possible.  Submit refill requests through  [employer] or call your pharmacy and they will forward the refill request to us. Please allow 3 business days for your refill to be completed.          Additional Information About Your Visit        [facility] Information     [employer] gives you secure access to your electronic health record. If you see a primary care provider, you can also send messages to your care team and make appointments. If you have questions, please call your primary care clinic.  If you do not have a primary care provider, please call [phone] and they will assist you.        Care EveryWhere ID     This is your Care EveryWhere ID. This could be used by other organizations to access your [facility] medical records  [id number]        Your Vitals Were     Pulse Temperature Respirations Pulse Oximetry BMI (Body Mass Index)       74 98  F (36.7  C) (Oral) 16 98% 20.5 kg/m2        Blood Pressure from Last 3 Encounters:   08/15/17 118/67   08/08/17 122/53   08/07/17 102/57    Weight from Last 3 Encounters:   08/15/17 57.6 kg (127 lb)   08/07/17 57.6 kg (127 lb)   08/02/17 58.1 kg (128 lb)              We Performed the Following     Basic metabolic panel     CBC with platelets differential     Hepatic panel        Primary Care Provider Office Phone # Fax #    Lucrecia Collier [phone] [address]  [facility] [address]        Equal Access to Services     BLADIMIR KEY : Hadii jesse purcello Sokwame, waaxda luqadaha, qaybta kaalmada aria, peter brock. So [facility] [phone].    ATENCIÓN: Si habla español, tiene a mccall disposición servicios gratuitos de asistencia lingüística. Marquise al [phone].    We comply with applicable federal civil rights laws and [place] laws. We do not discriminate on the basis of race, color, national origin, age, disability sex, sexual orientation or gender identity.            Thank you!     Thank you for choosing [facility]  for your care.  Our goal is always to provide you with excellent care. Hearing back from our patients is one way we can continue to improve our services. Please take a few minutes to complete the written survey that you may receive in the mail after your visit with us. Thank you!             Your Updated Medication List - Protect others around you: Learn how to safely use, store and throw away your medicines at www.disposemymeds.org.          This list is accurate as of: 8/15/17  3:50 PM.  Always use your most recent med list.                   Brand Name Dispense Instructions for use Diagnosis    aspirin 81 MG tablet     90 tablet    Take 1 tablet (81 mg) by mouth daily    Acute on chronic combined systolic and diastolic congestive heart failure (H)       calcium carbonate 1250 MG tablet    OS-RODNEY 500 mg [place]. Ca    120 tablet    Take 2 tablets (1,000 mg) by mouth 2 times daily    Hypocalcemia       dexamethasone 4 MG tablet    DECADRON    4 tablet    Take 2 tablets (8 mg) evening prior and morning of docetaxel dose.    Prostate cancer 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), Metastatic bone tumor (H)       enoxaparin 40 MG/0.4ML injection    LOVENOX    60 Syringe    Inject 0.4 mLs (40 mg) Subcutaneous 2 times daily    Other chronic pulmonary embolism without acute cor pulmonale (H)       HYDROcodone-acetaminophen 5-325 MG per tablet    NORCO     Take 1 tablet by mouth every 6 hours as needed for moderate to severe pain Reported on 5/22/2017        loperamide 2 MG capsule    IMODIUM    60 capsule    Use 2 caps PO after first loose stool.  Repeat 1 cap after each subsequent loose stool.  Max 8/24 hours.    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), Prostate cancer 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), Metastatic bone tumor (H)       * prochlorperazine 5 MG tablet    COMPAZINE    30 tablet    Take 1 tablet (5 mg) by mouth every 6 hours as needed for nausea or vomiting    Nausea       * prochlorperazine 10 MG tablet    COMPAZINE    30 tablet    Take 0.5 tablets (5 mg) by mouth every 6 hours as needed (Nausea/Vomiting)    Prostate cancer 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), Metastatic bone tumor (H)       simvastatin 40 MG tablet    ZOCOR    90 tablet    TAKE ONE TABLET BY MOUTH AT BEDTIME    Hyperlipidemia LDL goal <130       tamsulosin 0.4 MG capsule    FLOMAX    90 capsule    Take 1 capsule (0.4 mg) by mouth daily AM    Ureteral stone       vitamin D 2000 UNITS tablet     100 tablet    Take 1 tablet by mouth daily    Vitamin D deficiency       * Notice:  This list has 2 medication(s) that are the same as other medications prescribed for you. Read the directions carefully, and ask your doctor or other care provider to review them with you.

## 2017-08-15 NOTE — PROGRESS NOTES
Infusion Nursing Note:  Oswaldo Win presents today for C1 D1 Taxotere.    Patient seen by provider today: No   present during visit today: Not Applicable.    Note: Teaching with pt on neulasta on-body injector.    Intravenous Access:  Peripheral IV placed.    Treatment Conditions:  Lab Results   Component Value Date    HGB 11.5 08/15/2017     Lab Results   Component Value Date    WBC 6.0 08/15/2017      Lab Results   Component Value Date    ANEU 5.2 08/15/2017     Lab Results   Component Value Date     08/15/2017      Lab Results   Component Value Date     08/15/2017                   Lab Results   Component Value Date    POTASSIUM 4.1 08/15/2017           Lab Results   Component Value Date    MAG 2.0 04/05/2017            Lab Results   Component Value Date    CR 0.72 08/15/2017                   Lab Results   Component Value Date    RODNEY 9.1 08/15/2017                Lab Results   Component Value Date    BILITOTAL 1.0 08/15/2017           Lab Results   Component Value Date    ALBUMIN 3.6 08/15/2017                    Lab Results   Component Value Date    ALT 40 08/15/2017           Lab Results   Component Value Date    AST 35 08/15/2017     Results reviewed, labs MET treatment parameters, ok to proceed with treatment.          Post Infusion Assessment:  Patient tolerated infusion without incident.  Blood return noted pre and post infusion.  Site patent and intact, free from redness, edema or discomfort.  No evidence of extravasations.  Access discontinued per protocol.    Discharge Plan:   Prescription refills given for lovenox, simvastatin and aspirin.  Discharge instructions reviewed with: Patient.  Patient and/or family verbalized understanding of discharge instructions and all questions answered.  Copy of AVS reviewed with patient and/or family.  Patient will return 9/5/17 for labs, provider visit and C2 D1 Taxotere.  Patient discharged in stable condition accompanied by:  self.  Departure Mode: Ambulatory.    Cece Chacon RN

## 2017-08-16 NOTE — PROGRESS NOTES
ORAL CHEMOTHERAPY DISCONTINUATION       Primary Oncologist:  Dr. Robles  Primary Oncology Clinic: [facility]  Cancer Diagnosis:  Prostate  Therapy History:  Zytiga   Start ~11/16 or 12/16  On hold from 3/31 till May due to diarrhea  Restarted and now discontinuing   Therapy Ended On:  8/2/2017  Reason For Discontinuation: unacceptable toxicity    Additional Notes:  Thank you for the opportunity to be a part in the care of this patient's oral chemotherapy. The oncology pharmacy will no longer be following this patient for oral chemotherapy. If there are any questions or the plan changes, feel free to contact us.     Tamara Meirno, Student Pharmacy Intern   [phone]

## 2017-09-05 NOTE — PATIENT INSTRUCTIONS
Neulasta Onpro On-Body injector applied to right abd at 3:45 with light facing up.   Neulasta injection would start on 9/6 at 6:45, approximately 27 hours after application applied today.    When the dose delivery starts, it will take about 45 minutes to complete.  Neulasta Onpro On-Body should have green flashing light and to call triage or on-call MD if injector flashes red or appears to be leaking.  Keep Onpro On-Body Neualsta 4 inches away from electrical equipment and to avoid showering 4 hours prior to injection.     Contact Numbers    [facility] Main Line: [phone]  [facility] Triage:  [phone]    Call triage with chills and/or temperature greater than or equal to 100.5, uncontrolled nausea/vomiting, diarrhea, constipation, dizziness, shortness of breath, chest pain, bleeding, unexplained bruising, or any new/concerning symptoms, questions/concerns.     If you are having any concerning symptoms or wish to speak to a provider before your next infusion visit, please call your care coordinator or triage to notify them so we can adequately serve you.             September 2017 Sunday Monday Tuesday Wednesday Thursday Friday Saturday                            1     2       3     4     5     P [facility] LAB DRAW   12:00 PM   (15 min.)    [facility]     [facility] RETURN   12:15 PM   (50 min.)   Gabriela Mcguire PA-C [facility] ONC INFUSION 120    1:30 PM   (120 min.)    ONCOLOGY INFUSION   [facility] 6     7     8     9       10     11     12     13     14     15     16       17     18     19     20     21     22     23       24     25     [facility] LAB DRAW   12:00 PM   (15 min.)    [facility]     [facility] RETURN   12:25 PM   (50 min.)   Gabriela Mcguire PA-C [facility] ONC INFUSION 120    1:30 PM   (120 min.)    ONCOLOGY INFUSION   [facility]  26 27 28 29 30 October 2017 Sunday Monday Tuesday Wednesday Thursday Friday Saturday   1     2     3     4     5     6     7       8     9     10     11     12     13     14       15     16     17     18     [facility] LAB DRAW   11:15 AM   (15 min.)    [facility] LAB DRAW   [facility] Lab Draw     [facility] RETURN   11:30 AM   (30 min.)   Bentley Robles MD   [facility] ONC INFUSION 120   12:30 PM   (120 min.)    ONCOLOGY INFUSION   [facility] 19     20     21       22     23     24     25     26     27     28       29     30     31                                      Lab Results:  Recent Results (from the past 12 hour(s))   PSA tumor marker    Collection Time: 09/05/17 12:39 PM   Result Value Ref Range    .00 (H) 0 - 4 ug/L   Basic metabolic panel    Collection Time: 09/05/17 12:39 PM   Result Value Ref Range    Sodium 141 133 - 144 mmol/L    Potassium 4.0 3.4 - 5.3 mmol/L    Chloride 112 (H) 94 - 109 mmol/L    Carbon Dioxide 21 20 - 32 mmol/L    Anion Gap 8 3 - 14 mmol/L    Glucose 109 (H) 70 - 99 mg/dL    Urea Nitrogen 21 7 - 30 mg/dL    Creatinine 0.74 0.66 - 1.25 mg/dL    GFR Estimate >90 >60 mL/min/1.7m2    GFR Estimate If Black >90 >60 mL/min/1.7m2    Calcium 7.7 (L) 8.5 - 10.1 mg/dL   CBC with platelets differential    Collection Time: 09/05/17 12:39 PM   Result Value Ref Range    WBC 8.1 4.0 - 11.0 10e9/L    RBC Count 3.16 (L) 4.4 - 5.9 10e12/L    Hemoglobin 10.1 (L) 13.3 - 17.7 g/dL    Hematocrit 29.5 (L) 40.0 - 53.0 %    MCV 93 78 - 100 fl    MCH 32.0 26.5 - 33.0 pg    MCHC 34.2 31.5 - 36.5 g/dL    RDW 18.6 (H) 10.0 - 15.0 %    Platelet Count 79 (L) 150 - 450 10e9/L    Diff Method Automated Method     % Neutrophils 76.3 %    % Lymphocytes 8.2 %    % Monocytes 9.8 %    % Eosinophils 0.2 %    % Basophils 0.5 %    % Immature Granulocytes 5.0 %    Nucleated RBCs 0 0 /100    Absolute Neutrophil 6.1 1.6 - 8.3 10e9/L     Absolute Lymphocytes 0.7 (L) 0.8 - 5.3 10e9/L    Absolute Monocytes 0.8 0.0 - 1.3 10e9/L    Absolute Eosinophils 0.0 0.0 - 0.7 10e9/L    Absolute Basophils 0.0 0.0 - 0.2 10e9/L    Abs Immature Granulocytes 0.4 0 - 0.4 10e9/L    Absolute Nucleated RBC 0.0    Hepatic panel    Collection Time: 09/05/17 12:39 PM   Result Value Ref Range    Bilirubin Direct 0.2 0.0 - 0.2 mg/dL    Bilirubin Total 0.9 0.2 - 1.3 mg/dL    Albumin 3.5 3.4 - 5.0 g/dL    Protein Total 7.3 6.8 - 8.8 g/dL    Alkaline Phosphatase 292 (H) 40 - 150 U/L    ALT 51 0 - 70 U/L    AST 35 0 - 45 U/L

## 2017-09-05 NOTE — MR AVS SNAPSHOT
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   Neulasta injection would start on 9/6 at 6:45, approximately 27 hours after application applied today.    When the dose delivery starts, it will take about 45 minutes to complete.  Neulasta Onpro On-Body should have green flashing light and to call triage or on-call MD if injector flashes red or appears to be leaking.  Keep Onpro On-Body Neualsta 4 inches away from electrical equipment and to avoid showering 4 hours prior to injection.     Contact Numbers    [facility] Main Line: [phone]  [facility] Triage:  [phone]    Call triage with chills and/or temperature greater than or equal to 100.5, uncontrolled nausea/vomiting, diarrhea, constipation, dizziness, shortness of breath, chest pain, bleeding, unexplained bruising, or any new/concerning symptoms, questions/concerns.     If you are having any concerning symptoms or wish to speak to a provider before your next infusion visit, please call your care coordinator or triage to notify them so we can adequately serve you.             September 2017 Sunday Monday Tuesday Wednesday Thursday Friday Saturday                            1     2       3     4     5     [facility]   12:00 PM   (15 min.)   [facility]   [facility] Lab Draw     [facility] RETURN   12:15 PM   (50 min.)   Gabriela Mcguire PA-C [facility] ONC INFUSION 120    1:30 PM   (120 min.)    ONCOLOGY  INFUSION   [facility] 6     7     8     9       10     11     12     13     14     15     16       17     18     19     20     21     22     23       24     25     [facility] D[facility]   12:00 PM   (15 min.)   [facility]     [facility] RETURN   12:25 PM   (50 min.)   Gabriela Mcguire PA-C   [facility]     [facility] ONC INFUSION 120    1:30 PM   (120 min.)   [facility] ONCOLOGY INFUSION   [facility] 26 27 28 29 30 October 2017 Sunday Monday Tuesday Wednesday Thursday Friday Saturday   1     2     3     4     5     6     7       8     9     10     11     12     13     14       15     16     17     18     [facility] DRAW   11:15 AM   (15 min.)    [facility] DRAW   [facility] Lab Draw     [facility] RETURN   11:30 AM   (30 min.)   Bentley Robles MD   [facility]     [facility] ONC INFUSION 120   12:30 PM   (120 min.)   [facility] ONCOLOGY INFUSION   [facility] 19     20     21       22     23     24     25     26     27     28       29     30     31                                      Lab Results:  Recent Results (from the past 12 hour(s))   PSA tumor marker    Collection Time: 09/05/17 12:39 PM   Result Value Ref Range    .00 (H) 0 - 4 ug/L   Basic metabolic panel    Collection Time: 09/05/17 12:39 PM   Result Value Ref Range    Sodium 141 133 - 144 mmol/L    Potassium 4.0 3.4 - 5.3 mmol/L    Chloride 112 (H) 94 - 109 mmol/L    Carbon Dioxide 21 20 - 32 mmol/L    Anion Gap 8 3 - 14 mmol/L    Glucose 109 (H) 70 - 99 mg/dL    Urea Nitrogen 21 7 - 30 mg/dL    Creatinine 0.74 0.66 - 1.25 mg/dL    GFR Estimate >90 >60 mL/min/1.7m2    GFR Estimate If Black >90 >60 mL/min/1.7m2    Calcium 7.7 (L) 8.5 - 10.1 mg/dL   CBC with platelets differential    Collection Time: 09/05/17 12:39 PM   Result Value Ref Range    WBC 8.1 4.0 - 11.0 10e9/L    RBC Count 3.16 (L) 4.4 - 5.9  10e12/L    Hemoglobin 10.1 (L) 13.3 - 17.7 g/dL    Hematocrit 29.5 (L) 40.0 - 53.0 %    MCV 93 78 - 100 fl    MCH 32.0 26.5 - 33.0 pg    MCHC 34.2 31.5 - 36.5 g/dL    RDW 18.6 (H) 10.0 - 15.0 %    Platelet Count 79 (L) 150 - 450 10e9/L    Diff Method Automated Method     % Neutrophils 76.3 %    % Lymphocytes 8.2 %    % Monocytes 9.8 %    % Eosinophils 0.2 %    % Basophils 0.5 %    % Immature Granulocytes 5.0 %    Nucleated RBCs 0 0 /100    Absolute Neutrophil 6.1 1.6 - 8.3 10e9/L    Absolute Lymphocytes 0.7 (L) 0.8 - 5.3 10e9/L    Absolute Monocytes 0.8 0.0 - 1.3 10e9/L    Absolute Eosinophils 0.0 0.0 - 0.7 10e9/L    Absolute Basophils 0.0 0.0 - 0.2 10e9/L    Abs Immature Granulocytes 0.4 0 - 0.4 10e9/L    Absolute Nucleated RBC 0.0    Hepatic panel    Collection Time: 09/05/17 12:39 PM   Result Value Ref Range    Bilirubin Direct 0.2 0.0 - 0.2 mg/dL    Bilirubin Total 0.9 0.2 - 1.3 mg/dL    Albumin 3.5 3.4 - 5.0 g/dL    Protein Total 7.3 6.8 - 8.8 g/dL    Alkaline Phosphatase 292 (H) 40 - 150 U/L    ALT 51 0 - 70 U/L    AST 35 0 - 45 U/L             Follow-ups after your visit        Your next 10 appointments already scheduled     Sep 25, 2017 12:00 PM CDT   [facility] Lab Draw with  ALICE App LAB DRAW   [facility] Lab Draw ([facility])    [address]  [address] [facility] [address] [phone]            Sep 25, 2017 12:40 PM CDT   (Arrive by 12:25 PM)   Return Visit with Gabriela Mcguire PA-C   [facility] ([facility])    [address]  [address] [facility] [address] [phone]            Sep 25, 2017  1:30 PM CDT   Infusion 120 with UC ONCOLOGY INFUSION, UC 27 ATC   [facility] ([facility])    [address]  [address] [facility] [address] [phone]            Oct 18, 2017 11:15 AM CDT   [facility] Lab Draw with [facility] DRAW   [facility] ([facility])    [address] [address]   [phone]            Oct 18, 2017 11:45 AM CDT   (Arrive by 11:30 AM)   Return Visit with Bentley Robles MD   [facility] ([facility])    [address] [place] [address]   [phone]            Oct 18, 2017 12:30 PM CDT   Infusion 120 with UC ONCOLOGY INFUSION, UC 29 ATC   [facility] ([facility])    [address] [place] [address]   [phone]            Nov 08, 2017 12:00 PM CST   [facility] Lab Draw with  [facility] LAB DRAW   [facility] Lab Draw ([facility])    [address] [place] [address]   [phone]            Nov 08, 2017 12:30 PM CST   (Arrive by 12:15 PM)   Return Visit with Gabriela Mcguire PA-C   [facility] ([facility])    [address] [place] [address]   [phone]              Who to contact     If you have questions or need follow up information about today's clinic visit or your schedule please contact [facility] directly at [phone].  Normal or non-critical lab and imaging results will be communicated to you by MyChart, letter or phone within 4 business days after the clinic has received the results. If you do not hear from us within 7 days, please contact the clinic through MyChart or phone. If you have a critical or abnormal lab result, we will notify you by phone as soon as possible.  Submit refill requests through [employer] or call your pharmacy and they will forward the refill request to us. Please allow 3 business days for your refill to be completed.          Additional Information About Your Visit        [employer] Information     [employer] gives you secure access to your  electronic health record. If you see a primary care provider, you can also send messages to your care team and make appointments. If you have questions, please call your primary care clinic.  If you do not have a primary care provider, please call [phone] and they will assist you.        Care EveryWhere ID     This is your Care EveryWhere ID. This could be used by other organizations to access your [place] medical records  [id number]         Blood Pressure from Last 3 Encounters:   09/05/17 95/58   08/15/17 118/67   08/08/17 122/53    Weight from Last 3 Encounters:   09/05/17 58.5 kg (129 lb)   08/15/17 57.6 kg (127 lb)   08/07/17 57.6 kg (127 lb)              We Performed the Following     Basic metabolic panel     CBC with platelets differential     Hepatic panel     PSA tumor marker          Today's Medication Changes          These changes are accurate as of: 9/5/17  3:49 PM.  If you have any questions, ask your nurse or doctor.               Start taking these medicines.        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        Dose/Directions    dexamethasone 4 MG tablet   Commonly known as:  DECADRON   This may have changed:  additional instructions   Used for:  Prostate cancer 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), Metastatic bone tumor (H)   Changed by:  Gabriela Mcguire PA-C        Take 2 tablets (8 mg) the morning after chemo (day 2) and 1 tab (4 mg) the following day (day 3)   Quantity:  6 tablet   Refills:  3         Stop taking these medicines if you haven't already. Please contact your care team if you have questions.     loperamide 2 MG capsule   Commonly known as:   IMODIUM   Stopped by:  Gabriela Mcguire PA-C           phosphorus tablet 250 mg 250 MG per tablet   Commonly known as:  K PHOS NEUTRAL   Stopped by:  Gabriela Mcguire PA-C           prochlorperazine 10 MG tablet   Commonly known as:  COMPAZINE   Stopped by:  Gabriela Mcguire PA-C           prochlorperazine 5 MG tablet   Commonly known as:  COMPAZINE   Stopped by:  Gabriela Mcguire PA-C           tamsulosin 0.4 MG capsule   Commonly known as:  FLOMAX   Stopped by:  Gabriela Mcguire PA-C                Where to get your medicines      These medications were sent to [facility] [address]  [address] [facility] [address] [address]    Hours:  TRANSPLANT PHONE NUMBER [phone] Phone:  [phone]     dexamethasone 4 MG tablet    predniSONE 5 MG tablet                Primary Care Provider Office Phone # Fax #    Lucrecia Collier [phone]       [address]  [facility] [address]        Equal Access to Services     [facility] CenterMÓNICA : Hadii jesse goetz hadasho Soomaali, waaxda luqadaha, qaybta kaalmada adeshayy, peter moran . So [facility] [phone].    ATENCIÓN: Si habla español, tiene a mccall disposición servicios gratuitos de asistencia lingüística. Marquise al [phone].    We comply with applicable federal civil rights laws and [place] laws. We do not discriminate on the basis of race, color, national origin, age, disability sex, sexual orientation or gender identity.            Thank you!     Thank you for choosing [facility]  for your care. Our goal is always to provide you with excellent care. Hearing back from our patients is one way we can continue to improve our services. Please take a few minutes to complete the written survey that you may receive in the mail after your visit with us. Thank you!             Your Updated Medication List - Protect others around you:  Learn how to safely use, store and throw away your medicines at www.disposemymeds.org.          This list is accurate as of: 9/5/17  3:49 PM.  Always use your most recent med list.                   Brand Name Dispense Instructions for use Diagnosis    aspirin 81 MG tablet     90 tablet    Take 1 tablet (81 mg) by mouth daily    Acute on chronic combined systolic and diastolic congestive heart failure (H)       calcium carbonate 1250 MG tablet    OS-RODNEY 500 mg [place]. [place]    120 tablet    Take 2 tablets (1,000 mg) by mouth 2 times daily    Hypocalcemia       dexamethasone 4 MG tablet    DECADRON    6 tablet    Take 2 tablets (8 mg) the morning after chemo (day 2) and 1 tab (4 mg) the following day (day 3)    Prostate cancer 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), Metastatic bone tumor (H)       enoxaparin 40 MG/0.4ML injection    LOVENOX    60 Syringe    Inject 0.4 mLs (40 mg) Subcutaneous 2 times daily    Other chronic pulmonary embolism without acute cor pulmonale (H)       HYDROcodone-acetaminophen 5-325 MG per tablet    NORCO     Take 1 tablet by mouth every 6 hours as needed for moderate to severe pain Reported on 5/22/2017        LORazepam 0.5 MG tablet    ATIVAN    30 tablet    Take 1 tablet (0.5 mg) by mouth every 4 hours as needed (Anxiety, Nausea/Vomiting or Sleep)    Prostate cancer 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), Metastatic bone tumor (H)       predniSONE 5 MG tablet    DELTASONE    42 tablet    Take 1 tablet (5 mg) by mouth 2 times daily for 21 days    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

## 2017-09-05 NOTE — PROGRESS NOTES
Oncology/Hematology Visit Note  Sep 5, 2017    DIAGNOSIS:  Mr. Win is a 71 year old male with a hx of CHF, CKD, CAD w/ hx of STEMI and CABG. He met with Dr. Robles at the end of July for consultation regarding metastatic prostate cancer. His story begins in May 2015 when he saw his PCP for back pain, present since Feb. He also had poor appetite, weight loss of 8lb and poor energy.  CT showed diffuse bony mets. CT Chest showed sclerotic mets throughout the bones. He was given degarelix on 7/22 in Urology. He was having pelvic pain and was evaluated by Dr Cavazos would did not feel XRT was appropriate.  He started on docetaxel 7/31. From 8/7-8/14 he was admitted with colitis, CHAR, elevated BNP, lactis acidosis, and poor appetite. He was discharged to a TCU and unfortunately, it sounds like he was not given his Lasix.  His BUBBA worsened and his breathing became compromised.  He was seen by Chelsi Corral on 8/21 and eventually transferred to the ED and admitted 8/21-8/24 for acute on chronic CHF.  At our last visit we discussed not pursuing further Taxotere at this time and discussed starting Zytiga when he was ready.  We pursued PT, OT, home lymphedema for supportive care at home in attempt for further rehabilitation. He was again admitted and discharged - 10/20/15.  He never started the Zytiga as he was still actively recovering from his cardiac issues.  His PSA remained stable, thus no intervention was pursued.  December 2015, Mainor met with Dr Robles and since his ECOG was back to a 1, they discussed re-challenging the Taxotere at a lower dose. 9/26/2016 he started Provenge off study. He continues on lupron every 3 mos and XGEVA. 9/26/2016 he started Provenge off study. He received 3 doses of Provenge (last 10/28), on 3rd dose had fevers, chills, myalgias, was bedbound for a week due to these symptoms. During this time he held his coumadin for 5 days prior to a central line removal 10/31. Later than week he had sudden onset of  right pleuritic chest pain and hemoptysis. Diagnosed with acute segmental/subsegmental PE on 11/8 and prescribed Lovenox.     Mainor was briefly on Zytiga and prednisone in November-December, however after one month his PSA went up, thus it was decided to add in Xofigo (1/11/17-- first).     On 12/28 he started xaralto and took his last pill on 1/10/17.  He stopped as he noticed melanotic stools and had some hemoptysis.  He was seen in clinic the next day with a hgb in th 6 range and was admitted.  Pan-scopes showed no active bleeding.  He was able to get his Xofigo in patient as well.  He was transitioned back to lovenox BID.     Mainor developed diarrhea on and off in the spring of 2017, Zytiga was stopped on 3/31 as it was unsure what was contributing.  Mainor felt it was related to the Xofigo and that his dose was too high as his weight is always at least 10 pounds higher in clinic than at home.  At his last visit with Dr Robles, his PSA was rising again and re-started Taxotere on 8/15/17.    INTERVAL HISTORY:  Mr. 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      Mainor is doing well and has no new complains at this visit. He restarted Taxotere and everything went fairly well.  He had some fatigue and mucositis around day 4-7, but otherwise the last two weeks he has felt his energy levels perk up again. He has been able to go on his motorcycle almost daily- although no long trips -usually just to get food.  This is a big deal - a huge piece of his QOL and early summer he was too tired to do so.  Eating well, not pushing fluids as much as he should.     No f/s/c. No chest pain. No black stools. No new  issues.  He is on lovenox 40 mg BID.      MEDICATIONS:   Current Outpatient Prescriptions   Medication Sig     dexamethasone (DECADRON) 4 MG tablet Take 2 tablets (8 mg) the morning after chemo (day 2) and 1 tab (4 mg) the following day (day 3)     enoxaparin (LOVENOX) 40 MG/0.4ML injection Inject  "0.4 mLs (40 mg) Subcutaneous 2 times daily     aspirin 81 MG tablet Take 1 tablet (81 mg) by mouth daily     calcium carbonate (OS-RODNEY 500 MG [place]. [place]) 500 MG tablet Take 2 tablets (1,000 mg) by mouth 2 times daily     Cholecalciferol (VITAMIN D) 2000 UNITS tablet Take 1 tablet by mouth daily     simvastatin (ZOCOR) 40 MG tablet TAKE ONE TABLET BY MOUTH AT BEDTIME     LORazepam (ATIVAN) 0.5 MG tablet Take 1 tablet (0.5 mg) by mouth every 4 hours as needed (Anxiety, Nausea/Vomiting or Sleep) (Patient not taking: Reported on 8/15/2017)     [DISCONTINUED] dexamethasone (DECADRON) 4 MG tablet Take 2 tablets (8 mg) evening prior and morning of docetaxel dose. (Patient not taking: Reported on 9/5/2017)     HYDROcodone-acetaminophen (NORCO) 5-325 MG per tablet Take 1 tablet by mouth every 6 hours as needed for moderate to severe pain Reported on 5/22/2017     No current facility-administered medications for this visit.      Facility-Administered Medications Ordered in Other Visits   Medication     hydrocortisone sodium succinate (solu-CORTEF) injection     glycopyrrolate (ROBINUL) injection     neostigmine (PROSTIGMINE) injection     protamine injection     albuterol (PROAIR HFA, PROVENTIL HFA, VENTOLIN HFA) inhaler          ALLERGIES:  No Known Allergies    PHYSICAL EXAMINATION:  Vitals: BP 95/58  Pulse 100  Temp 97.6  F (36.4  C) (Tympanic)  Resp 14  Ht 1.676 m (5' 5.98\")  Wt 58.5 kg (129 lb)  SpO2 100%  BMI 20.83 kg/m2   Wt Readings from Last 10 Encounters:   09/05/17 58.5 kg (129 lb)   08/15/17 57.6 kg (127 lb)   08/07/17 57.6 kg (127 lb)   08/02/17 58.1 kg (128 lb)   07/12/17 56.7 kg (125 lb)   06/27/17 56.2 kg (124 lb)   06/19/17 58.1 kg (128 lb)   06/12/17 58.1 kg (128 lb)   06/12/17 58.1 kg (128 lb)   05/31/17 58.1 kg (128 lb)       GENERAL:  A pleasant person in no acute distress.  Mildly pale and thin looking  LYMPH NODES:  No peripheral lymphadenopathy noted in the supraclavicular or cervical regions. "   LUNGS:  Clear to auscultation bilaterally.   HEART:  Regular rate and rhythm   ABDOMEN:  Bowel sounds are active.  Soft and nontender.  No hepatosplenomegaly or other masses appreciated.  LOWER EXTREMITIES:  Without pitting edema to the knees bilaterally.   NEUROLOGICAL:  Alert/orientated/able to answer all questions.  CN grossly intact.     LAB:      8/15/2017 13:07 9/5/2017 12:39   Sodium 138 141   Potassium 4.1 4.0   Chloride 108 112 (H)   Carbon Dioxide 19 (L) 21   Urea Nitrogen 20 21   Creatinine 0.72 0.74   GFR Estimate >90 >90   GFR Estimate If Black >90 >90   Calcium 9.1 7.7 (L)   Anion Gap 12 8   Albumin 3.6 3.5   Protein Total 7.5 7.3   Bilirubin Total 1.0 0.9   Alkaline Phosphatase 284 (H) 292 (H)   ALT 40 51   AST 35 35   Bilirubin Direct 0.2 0.2   Glucose 204 (H) 109 (H)   WBC 6.0 8.1   Hemoglobin 11.5 (L) 10.1 (L)   Hematocrit 33.5 (L) 29.5 (L)   Platelet Count 104 (L) 79 (L)   RBC Count 3.70 (L) 3.16 (L)   MCV 91 93      5/24/2017 11:24 5/31/2017 09:03 6/19/2017 15:00 8/1/2017 09:10 9/5/2017 12:39   .00 (H) 542.00 (H) 590.00 (H) 741.00 (H) 655.00 (H)     IMPRESSION/PLAN:   Metastatic prostate cancer: recent progression on Zytiga, Xofigo.  Now back on IV Taxotere every 3 weeks.  Tolerated well with mild fatigue, myelosuppression, mucositis.  Labs OK today, platelets 79k but OK for therapy.      Anemia: improved back to 11.5 after radium.  Today 10.0 with myelosuppression from the Taxotere.  Will recheck counts in 10 days to make sure platelets are stable.     Heme: on lovenox for PE dx 11/2016. Had inpatient stay for GI bleed 1/11-1/14. Stopped xarelto and transitioned back to lovenox BID. No bleeding concerns. Recently dropped from 60mg BID to 50mg BID to 40 mg BID due to high Xa level.   -Continue at 40 mg BID.      Bone mets: on xgeva - had monthly for a year. OK to go to q 3 months now.     CV: hx of CHF. No clinical s/s of failure.  Off metoprolol due to BP being too low.  BP slightly  lower today, will bolus 500 cc.     Over 25 min of direct face to face time spent with patient with more than 50% time spent in counseling and coordinating care.
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
## 2017-09-05 NOTE — PROGRESS NOTES
Infusion Nursing Note:  Oswaldo Win presents today for Day 1 Cycle 2 Taxotere and Neulasta On-Pro    Patient seen by provider today: Yes: Sabrina WALTON   present during visit today: Not Applicable.    Note: N/A.    Intravenous Access:  Peripheral IV placed.    Treatment Conditions:  Lab Results   Component Value Date    HGB 10.1 09/05/2017     Lab Results   Component Value Date    WBC 8.1 09/05/2017      Lab Results   Component Value Date    ANEU 6.1 09/05/2017     Lab Results   Component Value Date    PLT 79 09/05/2017      Lab Results   Component Value Date     09/05/2017                   Lab Results   Component Value Date    POTASSIUM 4.0 09/05/2017           Lab Results   Component Value Date    MAG 2.0 04/05/2017            Lab Results   Component Value Date    CR 0.74 09/05/2017                   Lab Results   Component Value Date    RODNEY 7.7 09/05/2017                Lab Results   Component Value Date    BILITOTAL 0.9 09/05/2017           Lab Results   Component Value Date    ALBUMIN 3.5 09/05/2017                    Lab Results   Component Value Date    ALT 51 09/05/2017           Lab Results   Component Value Date    AST 35 09/05/2017     Results reviewed, labs MET treatment parameters, ok to proceed with treatment.     Neulasta Onpro On-Body injector applied to right abd at 1545 with light facing up.  Writer discussed Neulasta injection would start on 9/6 at 1845, approximately 27 hours after application applied today.  Written and Verbal instruction reviewed with patient.  Pt instructed when the dose delivery starts, it will take about 45 minutes to complete.  Pt aware Neulasta Onpro On-Body should have green flashing light and to call triage or on-call MD if injector flashes red or appears to be leaking. Pt aware to keep Onpro On-Body Neualsta 4 inches away from electrical equipment and to avoid showering 4 hours prior to injection.            Post Infusion Assessment:  Patient  tolerated infusion without incident.  Blood return noted pre and post infusion.  Site patent and intact, free from redness, edema or discomfort.  No evidence of extravasations.  Access discontinued per protocol.    Discharge Plan:   Prescription refills given for Decadron.  Oswaldo said he has Prednisone at home.   Discharge instructions reviewed with: Patient.  Patient and/or family verbalized understanding of discharge instructions and all questions answered.  Copy of AVS reviewed with patient and/or family.  Patient will return 9/25 for next appointment.  Patient discharged in stable condition accompanied by: self.  Departure Mode: Ambulatory.    Erin Fernandez RN

## 2017-09-05 NOTE — MR AVS SNAPSHOT
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)           Follow-ups after your visit        Your next 10 appointments already scheduled     Sep 25, 2017 12:00 PM CDT   Masonic Lab Draw with [facility] MASONIC LAB DRAW   [facility] Mason[facility] Lab Draw ([facility])    [address]  [address]nd [facility] [address]   [phone]            Sep 25, 2017 12:40 PM CDT   (Arrive by 12:25 PM)   Return Visit with Gabriela Mcguire PA-C   [facility] ([facility])    [address] [place] [address]  2nd [facility] [address]   [phone]            Sep 25, 2017  1:30 PM CDT   Infusion 120 with UC ONCOLOGY INFUSION, UC 27 ATC   [facility] ([facility])    [address]  [address]nd [facility] [address]   [phone]            Oct 18, 2017 11:15 AM CDT   Masonic Lab Draw with UC MASONIC LAB DRAW   [facility] Lab Draw ([facility])    [address]  [address] [facility] [address]   [phone]            Oct 18, 2017 11:45 AM CDT   (Arrive by 11:30 AM)   Return Visit with Bentley Robles MD   [facility] ([facility])    [address]  [address] [facility] [address]   [phone]            Oct 18, 2017 12:30 PM CDT   Infusion 120 with UC ONCOLOGY INFUSION, UC 29 ATC   [facility] ([facility])    [address] [address]   [phone]             Nov 08, 2017 12:00 PM CST   Masonic Lab Draw with  MASONIC LAB DRAW   [facility] Lab Draw ([facility])    [address]  [address]nd [facility] [address] [phone]            Nov 08, 2017 12:30 PM CST   (Arrive by 12:15 PM)   Return Visit with Gabriela Mcguire PA-C   [facility] ([facility])    [address] [address] [phone]              Future tests that were ordered for you today     Open Future Orders        Priority Expected Expires Ordered    CBC with platelets differential Routine 9/15/2017 9/15/2017 9/5/2017            Who to contact     If you have questions or need follow up information about today's clinic visit or your schedule please contact [facility] directly at [phone].  Normal or non-critical lab and imaging results will be communicated to you by MyChart, letter or phone within 4 business days after the clinic has received the results. If you do not hear from us within 7 days, please contact the clinic through [employer] or phone. If you have a critical or abnormal lab result, we will notify you by phone as soon as possible.  Submit refill requests through [employer] or call your pharmacy and they will forward the refill request to us. Please allow 3 business days for your refill to be completed.          Additional Information About Your Visit        [employer] Information     [employer] gives you secure access to your electronic health record. If you see a primary care provider, you can also send messages to your care team and make appointments. If you have questions, please call your primary care clinic.  If you do not have a primary care provider, please call [phone] and they will assist you.        Care EveryWhere ID     This is your Care EveryWhere ID. This could be used by other organizations to access your [facility]  "records  [id number]        Your Vitals Were     Pulse Temperature Respirations Height Pulse Oximetry BMI (Body Mass Index)    100 97.6  F (36.4  C) (Tympanic) 14 1.676 m (5' 5.98\") 100% 20.83 kg/m2       Blood Pressure from Last 3 Encounters:   09/05/17 95/58   08/15/17 118/67   08/08/17 122/53    Weight from Last 3 Encounters:   09/05/17 58.5 kg (129 lb)   08/15/17 57.6 kg (127 lb)   08/07/17 57.6 kg (127 lb)                 Today's Medication Changes          These changes are accurate as of: 9/5/17  8:57 PM.  If you have any questions, ask your nurse or doctor.               Start taking these medicines.        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        Dose/Directions    dexamethasone 4 MG tablet   Commonly known as:  DECADRON   This may have changed:  additional instructions   Used for:  Prostate cancer 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), Metastatic bone tumor (H)   Changed by:  Gabriela Mcguire PA-C        Take 2 tablets (8 mg) the morning after chemo (day 2) and 1 tab (4 mg) the following day (day 3)   Quantity:  6 tablet   Refills:  3         Stop taking these medicines if you haven't already. Please contact your care team if you have questions.     loperamide 2 MG capsule   Commonly known as:  IMODIUM   Stopped by:  Gabriela Mcguire PA-C           phosphorus tablet 250 mg 250 MG per tablet   Commonly known as:  K PHOS NEUTRAL   Stopped by:  Gabriela Mcguire PA-C           prochlorperazine 10 MG tablet   Commonly known as:  COMPAZINE   Stopped by:  Gabriela Mcguire PA-C           prochlorperazine 5 MG tablet   Commonly known as:  COMPAZINE   Stopped by:  " Gabriela Mcguire PA-C           tamsulosin 0.4 MG capsule   Commonly known as:  FLOMAX   Stopped by:  Gabriela Mcguire PA-C                Where to get your medicines      These medications were sent to [place], [place] - [address]  [address], [facility] [address]    Hours:  TRANSPLANT PHONE NUMBER [phone] Phone:  [phone]     dexamethasone 4 MG tablet    predniSONE 5 MG tablet                Primary Care Provider Office Phone # Fax #    Lucrecia Collier [phone]       [address]  [facility] [address]        Equal Access to Services     BLADIMIR KEY : Kelsey purcello Sokwame, waaxda geeadaha, qaybta kaalmada adekelleyyada, peter brock. So [facility] [phone].    ATENCIÓN: Si habla español, tiene a mccall disposición servicios gratuitos de asistencia lingüística. Llame al [phone].    We comply with applicable federal civil rights laws and [place] laws. We do not discriminate on the basis of race, color, national origin, age, disability sex, sexual orientation or gender identity.            Thank you!     Thank you for choosing [facility]  for your care. Our goal is always to provide you with excellent care. Hearing back from our patients is one way we can continue to improve our services. Please take a few minutes to complete the written survey that you may receive in the mail after your visit with us. Thank you!             Your Updated Medication List - Protect others around you: Learn how to safely use, store and throw away your medicines at www.disposemymeds.org.          This list is accurate as of: 9/5/17  8:57 PM.  Always use your most recent med list.                   Brand Name Dispense Instructions for use Diagnosis    aspirin 81 MG tablet     90 tablet    Take 1 tablet (81 mg) by mouth daily    Acute on chronic combined systolic and diastolic  congestive heart failure (H)       calcium carbonate 1250 MG tablet    OS-RODNEY 500 mg [place]. Ca    120 tablet    Take 2 tablets (1,000 mg) by mouth 2 times daily    Hypocalcemia       dexamethasone 4 MG tablet    DECADRON    6 tablet    Take 2 tablets (8 mg) the morning after chemo (day 2) and 1 tab (4 mg) the following day (day 3)    Prostate cancer 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), Metastatic bone tumor (H)       enoxaparin 40 MG/0.4ML injection    LOVENOX    60 Syringe    Inject 0.4 mLs (40 mg) Subcutaneous 2 times daily    Other chronic pulmonary embolism without acute cor pulmonale (H)       HYDROcodone-acetaminophen 5-325 MG per tablet    NORCO     Take 1 tablet by mouth every 6 hours as needed for moderate to severe pain Reported on 5/22/2017        LORazepam 0.5 MG tablet    ATIVAN    30 tablet    Take 1 tablet (0.5 mg) by mouth every 4 hours as needed (Anxiety, Nausea/Vomiting or Sleep)    Prostate cancer 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), Metastatic bone tumor (H)       predniSONE 5 MG tablet    DELTASONE    42 tablet    Take 1 tablet (5 mg) by mouth 2 times daily for 21 days    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

## 2017-09-05 NOTE — NURSING NOTE
IV started and labs completed by [facility].  Vitals obtained without complication.    See flowsheets.    Provider paged regarding pt bp of 95/58.    Netta Duran CMA (AAMA)

## 2017-09-06 NOTE — PROGRESS NOTES
Per Sabrina Mcguire PA-C's request, informed pt his PSA has decreased to 655.  Would like to check a CBC again on 9/15 to check his platelets. Pt verbalized understanding. Had no further questions.  Message sent to scheduling to call pt with lab appointment from 9/15.

## 2017-09-14 NOTE — PROGRESS NOTES
"Informed by Dr. Robles Mainor was not feeling well.  Called pt to follow up.  Pt states occasional lightheadedness and SOB with activity. Today when he went grocery shopping he had to stop 3-4 times to catch his breath. States his fluid intake has been \"successful\" but feels he maybe could be dehydrated.  Is unsure how much fluids has been drinking.  Having muscle fatigue which he attributes to the Taxotere. Last received Taxotere on 9/5. Is scheduled to have CBC checked tomorrow. R/s lab appointment to today at 1200 and added a type and screen to labs. Advised pt to wait for results which he said he would do.  Pt's son is coming to visit on Sunday and will be staying with him for a couple of weeks. If he needs a blood transfusion he would like to try to have it done today or tomorrow so he can enjoy the visit with his son.  "

## 2017-09-14 NOTE — PROGRESS NOTES
Infusion Nursing Note:  Oswaldo Win presents today for 2 units PRBC.    Patient seen by provider today: No   present during visit today: Not Applicable.    Note: Pt complains of shortness of breath with walking short distances as well as some occasional dizziness.  Hgb 8.3 but is symptomatic so will receive 2 units of PRBC today.    Lasix given between units as ordered in therapy plan.    Intravenous Access:  Peripheral IV placed by vascular access.    Treatment Conditions:  Lab Results   Component Value Date    HGB 8.3 09/14/2017     Lab Results   Component Value Date    WBC 14.8 09/14/2017      Lab Results   Component Value Date    ANEU 12.0 09/14/2017     Lab Results   Component Value Date    PLT 90 09/14/2017      Blood transfusion consent signed 3/31/17.        Post Infusion Assessment:  Patient tolerated infusion without incident.  Blood return noted pre and post infusion.  Site patent and intact, free from redness, edema or discomfort.  No evidence of extravasations.  Access discontinued per protocol.    Discharge Plan:   Patient declined prescription refills.  Discharge instructions reviewed with: Patient.  Patient and/or family verbalized understanding of discharge instructions and all questions answered.  Copy of AVS reviewed with patient and/or family.  Patient will return 9/25/17 for next appointment.  Patient discharged in stable condition accompanied by: self.  Departure Mode: Ambulatory.    Margarita Chacon RN

## 2017-09-14 NOTE — MR AVS SNAPSHOT
After Visit Summary   9/14/2017    Oswaldo Win    MRN: [id number]           Patient Information     Date Of Birth          1945        Visit Information        Provider Department      9/14/2017 1:30 PM  19 ATC;  ONCOLOGY INFUSION [facility]        Today's Diagnoses     Anemia, unspecified type    -  1      Care Instructions    Contact Numbers    [facility] Main Line: [phone]  [facility] [place] Triage:  [phone]    Call triage with chills and/or temperature greater than or equal to 100.5, uncontrolled nausea/vomiting, diarrhea, constipation, dizziness, shortness of breath, chest pain, bleeding, unexplained bruising, or any new/concerning symptoms, questions/concerns.     If you are having any concerning symptoms or wish to speak to a provider before your next infusion visit, please call your care coordinator or triage to notify them so we can adequately serve you.       After Hours: [phone]    If after hours, weekends, or holidays, call main hospital  and ask for Oncology doctor on call.         September 2017 Sunday Monday Tuesday Wednesday Thursday Friday Saturday                            1     2       3     4     5     P [facility] LAB DRAW   12:00 PM   (15 min.)    [facility]ASONIC LAB DRAW   [facility] Lab Draw     UMP RETURN   12:15 PM   (50 min.)   Gabriela Mcguire PA-C   [facility]    1:30 PM   (120 min.)    ONCOLOGY INFUSION   [facility] 6     7     8     9       10     11     12     13     14     [facility] LAB DRAW   12:00 PM   (15 min.)    [facility] LAB DRAW   [facility] Lab Draw     UMP ONC INFUSION 360    1:30 PM   (360 min.)    ONCOLOGY INFUSION   [facility] 15     16       17     18     19     20     21     22     23       24     25     [facility] LAB DRAW   12:00 PM   (15 min.)    [facility] LAB DRAW   [facility] Lab Draw     UMP RETURN   12:25 PM    (50 min.)   Gabriela Mcguire PA-C   [facility] ONC INFUSION 120    1:30 PM   (120 min.)    ONCOLOGY INFUSION   [facility] 26 27 28 29 30 October 2017 Sunday Monday Tuesday Wednesday Thursday Friday Saturday   1     2     3     4     5     6     7       8     9     10     11     12     13     14       15     16     17     18     [facility] MASON[facility] LAB DRAW   11:15 AM   (15 min.)   UC [facility] LAB DRAW   [facility] Lab Draw     P RETURN   11:30 AM   (30 min.)   Bentley Robles MD   [facility] ONC INFUSION 120   12:30 PM   (120 min.)    ONCOLOGY INFUSION   [facility] 19     20     21       22     23     24     25     26     27     28       29     30     31                                      Lab Results:  Recent Results (from the past 12 hour(s))   ABO/Rh type and screen    Collection Time: 09/14/17 12:10 PM   Result Value Ref Range    Units Ordered 2     ABO B     RH(D) Pos     Antibody Screen Neg     Test Valid Only At          [facility]    Specimen Expires 09/17/2017     Crossmatch Red Blood Cells    Blood component    Collection Time: 09/14/17 12:10 PM   Result Value Ref Range    Unit Number [id number]     Blood Component Type Red Blood Cells Leukocyte Reduced     Division Number 00     Status of Unit Released to care unit 09/14/2017 1656     Blood Product Code M0244E95     Unit Status ISS    Blood component    Collection Time: 09/14/17 12:10 PM   Result Value Ref Range    Unit Number [id number]     Blood Component Type Red Blood Cells Leukocyte Reduced     Division Number 00     Status of Unit Released to care unit 09/14/2017 1453     Blood Product Code [id number]     Unit Status ISS    CBC with platelets differential    Collection Time: 09/14/17 12:11 PM   Result Value Ref Range    WBC 14.8 (H) 4.0 - 11.0 10e9/L    RBC  Count 2.65 (L) 4.4 - 5.9 10e12/L    Hemoglobin 8.3 (L) 13.3 - 17.7 g/dL    Hematocrit 25.3 (L) 40.0 - 53.0 %    MCV 96 78 - 100 fl    MCH 31.3 26.5 - 33.0 pg    MCHC 32.8 31.5 - 36.5 g/dL    RDW 19.5 (H) 10.0 - 15.0 %    Platelet Count 90 (L) 150 - 450 10e9/L    Diff Method Manual Differential     % Neutrophils 81.0 %    % Lymphocytes 6.0 %    % Monocytes 9.0 %    % Eosinophils 0.0 %    % Basophils 0.0 %    % Metamyelocytes 1.0 %    % Myelocytes 3.0 %    Absolute Neutrophil 12.0 (H) 1.6 - 8.3 10e9/L    Absolute Lymphocytes 0.9 0.8 - 5.3 10e9/L    Absolute Monocytes 1.3 0.0 - 1.3 10e9/L    Absolute Eosinophils 0.0 0.0 - 0.7 10e9/L    Absolute Basophils 0.0 0.0 - 0.2 10e9/L    Absolute Metamyelocytes 0.1 (H) 0 10e9/L    Absolute Myelocytes 0.4 (H) 0 10e9/L    Anisocytosis Moderate     Poikilocytosis Slight     Polychromasia Slight                Follow-ups after your visit        Your next 10 appointments already scheduled     Sep 25, 2017 12:00 PM CDT   [facility] Lab Draw with  MASONIC LAB DRAW   [facility] Lab Draw ([facility])    [address] [place] [address]  [address] [facility] [id number] [phone]            Sep 25, 2017 12:40 PM CDT   (Arrive by 12:25 PM)   Return Visit with Gabriela Mcguire PA-C   [facility] ([facility])    [address] [place] [address]  [address] [facility] [id number] [phone]            Sep 25, 2017  1:30 PM CDT   Infusion 120 with  ONCOLOGY INFUSION,  27 ATC   [facility] ([facility])    [address] [place] [address]  [address] [facility] [id number] [phone]            Oct 18, 2017 11:15 AM CDT   [facility] Lab Draw with  MASONIC LAB DRAW   [facility] Lab Draw ([facility])    [address]  [address] [facility] [address] [phone]            Oct 18, 2017 11:45 AM CDT   (Arrive by 11:30 AM)   Return Visit with  Bentley Robles MD   [facility] ([facility])    [address] [place] [address]   [phone]            Oct 18, 2017 12:30 PM CDT   Infusion 120 with UC ONCOLOGY INFUSION, UC 29 ATC   [facility] ([facility])    [address] [place] [address]   [phone]            Nov 08, 2017 12:00 PM CST   M[facility]ic Lab Draw with UC MASONIC LAB DRAW   [facility] Lab Draw ([facility])    [address] [place] [address]   [phone]            Nov 08, 2017 12:30 PM CST   (Arrive by 12:15 PM)   Return Visit with Gabriela Mcguire PA-C   [facility] ([facility])    [address] [place] [phone] [phone]              Future tests that were ordered for you today     Open Standing Orders        Priority Remaining Interval Expires Ordered    Red blood cell prepare order unit Routine 99/100 CONDITIONAL (SPECIFY) BLOOD  9/14/2017            Who to contact     If you have questions or need follow up information about today's clinic visit or your schedule please contact [facility] directly at [phone].  Normal or non-critical lab and imaging results will be communicated to you by Ohanahart, letter or phone within 4 business days after the clinic has received the results. If you do not hear from us within 7 days, please contact the clinic through [employer] or phone. If you have a critical or abnormal lab result, we will notify you by phone as soon as possible.  Submit refill requests through [employer] or call your pharmacy and they will forward the refill request to us. Please allow 3 business days for your refill to be completed.          Additional Information About Your Visit        [employer]M [employer] Information     [employer] gives you secure  access to your electronic health record. If you see a primary care provider, you can also send messages to your care team and make appointments. If you have questions, please call your primary care clinic.  If you do not have a primary care provider, please call [phone] and they will assist you.        Care EveryWhere ID     This is your Care EveryWhere ID. This could be used by other organizations to access your [place] medical records  [id number]        Your Vitals Were     Pulse Temperature Respirations Pulse Oximetry          79 99.3  F (37.4  C) (Oral) 16 98%         Blood Pressure from Last 3 Encounters:   09/14/17 111/68   09/05/17 95/58   08/15/17 118/67    Weight from Last 3 Encounters:   09/05/17 58.5 kg (129 lb)   08/15/17 57.6 kg (127 lb)   08/07/17 57.6 kg (127 lb)              We Performed the Following     Transfuse red blood cell unit     Transfuse red blood cell unit        Primary Care Provider Office Phone # Fax #    Lucrecia Collier [phone] [address] [place] [address]        Equal Access to Services     [facility]: Hadii aad ku hadasho Sokwame, waaxda luqadaha, qaybta kaalmada adekelleyyada, peter moran . So [facility] [phone].    ATENCIÓN: Si habla español, tiene a mccall disposición servicios gratuitos de asistencia lingüística. [facility] [phone].    We comply with applicable federal civil rights laws and [place] laws. We do not discriminate on the basis of race, color, national origin, age, disability sex, sexual orientation or gender identity.            Thank you!     Thank you for choosing [facility]  for your care. Our goal is always to provide you with excellent care. Hearing back from our patients is one way we can continue to improve our services. Please take a few minutes to complete the written survey that you may receive in the mail after your visit with us. Thank you!             Your  Updated Medication List - Protect others around you: Learn how to safely use, store and throw away your medicines at www.disposemymeds.org.          This list is accurate as of: 9/14/17  6:11 PM.  Always use your most recent med list.                   Brand Name Dispense Instructions for use Diagnosis    aspirin 81 MG tablet     90 tablet    Take 1 tablet (81 mg) by mouth daily    Acute on chronic combined systolic and diastolic congestive heart failure (H)       calcium carbonate 1250 MG tablet    OS-RODNEY 500 mg [place]. Ca    120 tablet    Take 2 tablets (1,000 mg) by mouth 2 times daily    Hypocalcemia       dexamethasone 4 MG tablet    DECADRON    6 tablet    Take 2 tablets (8 mg) the morning after chemo (day 2) and 1 tab (4 mg) the following day (day 3)    Prostate cancer 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), Metastatic bone tumor (H)       enoxaparin 40 MG/0.4ML injection    LOVENOX    60 Syringe    Inject 0.4 mLs (40 mg) Subcutaneous 2 times daily    Other chronic pulmonary embolism without acute cor pulmonale (H)       HYDROcodone-acetaminophen 5-325 MG per tablet    NORCO     Take 1 tablet by mouth every 6 hours as needed for moderate to severe pain Reported on 5/22/2017        LORazepam 0.5 MG tablet    ATIVAN    30 tablet    Take 1 tablet (0.5 mg) by mouth every 4 hours as needed (Anxiety, Nausea/Vomiting or Sleep)    Prostate cancer 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), Metastatic bone tumor (H)       predniSONE 5 MG tablet    DELTASONE    42 tablet    Take 1 tablet (5 mg) by mouth 2 times daily for 21 days    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

## 2017-09-14 NOTE — PATIENT INSTRUCTIONS
Contact Numbers    [facility] Main Line: [phone]  [facility] Triage:  [phone]    Call triage with chills and/or temperature greater than or equal to 100.5, uncontrolled nausea/vomiting, diarrhea, constipation, dizziness, shortness of breath, chest pain, bleeding, unexplained bruising, or any new/concerning symptoms, questions/concerns.     If you are having any concerning symptoms or wish to speak to a provider before your next infusion visit, please call your care coordinator or triage to notify them so we can adequately serve you.       After Hours: [phone]    If after hours, weekends, or holidays, call main hospital  and ask for Oncology doctor on call.         September 2017 Sunday Monday Tuesday Wednesday Thursday Friday Saturday                            1     2       3     4     5     [facility] LAB DRAW   12:00 PM   (15 min.)    MASONIC LAB DRAW   [facility] Lab Draw     UMP RETURN   12:15 PM   (50 min.)   Gabriela Mcguire PA-C [facility]     UMP ONC INFUSION 120    1:30 PM   (120 min.)   UC ONCOLOGY INFUSION   [facility] 6     7     8     9       10     11     12     13     14     UMP [facility] LAB DRAW   12:00 PM   (15 min.)   UC [facility] LAB DRAW   [facility] Masonic Lab Draw     UMP ONC INFUSION 360    1:30 PM   (360 min.)    ONCOLOGY INFUSION   [facility] 15     16       17     18     19     20     21     22     23       24     25     UMP [facility] LAB DRAW   12:00 PM   (15 min.)    MASONIC LAB DRAW   [facility] Mason[facility] Lab Draw     UMP RETURN   12:25 PM   (50 min.)   Gabriela Mcguire PA-C [facility]     UMP ONC INFUSION 120    1:30 PM   (120 min.)    ONCOLOGY INFUSION   [facility] 26     27     28     29     30                October 2017 Sunday Monday Tuesday Wednesday Thursday Friday Saturday   1     2     3     4     5     6     7       8     9     10     11     12      13     14       15     16     17     18     [facility] LAB DRAW   11:15 AM   (15 min.)   [facility] LAB DRAW   [facility] Lab Draw     [facility] RETURN   11:30 AM   (30 min.)   Bentley Robles MD   [facility] ONC INFUSION 120   12:30 PM   (120 min.)    ONCOLOGY INFUSION   [facility] 19     20     21       22     23     24     25     26     27     28       29     30     31                                      Lab Results:  Recent Results (from the past 12 hour(s))   ABO/Rh type and screen    Collection Time: 09/14/17 12:10 PM   Result Value Ref Range    Units Ordered 2     ABO B     RH(D) Pos     Antibody Screen Neg     Test Valid Only At          [facility]    Specimen Expires 09/17/2017     Crossmatch Red Blood Cells    Blood component    Collection Time: 09/14/17 12:10 PM   Result Value Ref Range    Unit Number [id number]     Blood Component Type Red Blood Cells Leukocyte Reduced     Division Number 00     Status of Unit Released to care unit 09/14/2017 1656     Blood Product Code [id number]     Unit Status ISS    Blood component    Collection Time: 09/14/17 12:10 PM   Result Value Ref Range    Unit Number [id number]     Blood Component Type Red Blood Cells Leukocyte Reduced     Division Number 00     Status of Unit Released to care unit 09/14/2017 1453     Blood Product Code [id number]     Unit Status ISS    CBC with platelets differential    Collection Time: 09/14/17 12:11 PM   Result Value Ref Range    WBC 14.8 (H) 4.0 - 11.0 10e9/L    RBC Count 2.65 (L) 4.4 - 5.9 10e12/L    Hemoglobin 8.3 (L) 13.3 - 17.7 g/dL    Hematocrit 25.3 (L) 40.0 - 53.0 %    MCV 96 78 - 100 fl    MCH 31.3 26.5 - 33.0 pg    MCHC 32.8 31.5 - 36.5 g/dL    RDW 19.5 (H) 10.0 - 15.0 %    Platelet Count 90 (L) 150 - 450 10e9/L    Diff Method Manual Differential     % Neutrophils 81.0 %    % Lymphocytes 6.0 %    % Monocytes 9.0 %    %  Eosinophils 0.0 %    % Basophils 0.0 %    % Metamyelocytes 1.0 %    % Myelocytes 3.0 %    Absolute Neutrophil 12.0 (H) 1.6 - 8.3 10e9/L    Absolute Lymphocytes 0.9 0.8 - 5.3 10e9/L    Absolute Monocytes 1.3 0.0 - 1.3 10e9/L    Absolute Eosinophils 0.0 0.0 - 0.7 10e9/L    Absolute Basophils 0.0 0.0 - 0.2 10e9/L    Absolute Metamyelocytes 0.1 (H) 0 10e9/L    Absolute Myelocytes 0.4 (H) 0 10e9/L    Anisocytosis Moderate     Poikilocytosis Slight     Polychromasia Slight

## 2017-09-14 NOTE — PROGRESS NOTES
Met with pt in clinic and reviewed CBC results from today.   Scheduled pt at 1:30pm today to receive 2 units PRBCs for Hgb 8.3 (standing transfusion orders state to transfuse 2 units PRBCs for Hgb <8 or if symptomatic). Pt is symptomatic.

## 2017-09-19 NOTE — PROGRESS NOTES
Received call from pt requesting a refill on prednisone 5mg BID. Prednisone rx was sent to the [facility] pharmacy on 9/5 but pt states he did not  rx that day would prefer prescription is sent to the [facility] pharmacy.  Called [facility] pharmacy and cancelled rx. Sent Prednisone rx to [facility] pharmacy.

## 2017-09-25 NOTE — MR AVS SNAPSHOT
After Visit Summary   9/25/2017    Oswaldo Win    MRN: [id number]           Patient Information     Date Of Birth          1945        Visit Information        Provider Department      9/25/2017 12:40 PM Gabriela Mcguire PA-C [facility]        Today's Diagnoses     Elevated bilirubin    -  1       Follow-ups after your visit        Your next 10 appointments already scheduled     Oct 18, 2017 11:15 AM CDT   [facility] Lab Draw with UC [facility]IC LAB DRAW   [facility]    [address] [place] [address]   [phone]            Oct 18, 2017 11:45 AM CDT   (Arrive by 11:30 AM)   Return Visit with Bentley Robles MD   [facility] ([facility])    [address] [place] [address]   [phone]            Oct 18, 2017 12:30 PM CDT   Infusion 120 with UC ONCOLOGY INFUSION, UC 29 ATC   [facility] ([facility])    [address] [place] [address]   [phone]            Nov 08, 2017 12:00 PM CST   [facility] Lab Draw with UC MASONIC LAB DRAW   [facility]    [address] [place] [address]   [phone]            Nov 08, 2017 12:30 PM CST   (Arrive by 12:15 PM)   Return Visit with Gabriela Mcguire PA-C   [facility] ([facility])    [address] [place] [address]   [phone]            Nov 08, 2017  1:30 PM CST   Infusion 120 with UC ONCOLOGY INFUSION, UC 22 ATC   [facility] ([facility])    [address] [place] [address]   [phone]              Who to contact     If you have questions or need follow up information about today's clinic visit  "or your schedule please contact [facility] CANCER [facility] directly at [phone].  Normal or non-critical lab and imaging results will be communicated to you by MyChart, letter or phone within 4 business days after the clinic has received the results. If you do not hear from us within 7 days, please contact the clinic through [employer] or phone. If you have a critical or abnormal lab result, we will notify you by phone as soon as possible.  Submit refill requests through [employer] or call your pharmacy and they will forward the refill request to us. Please allow 3 business days for your refill to be completed.          Additional Information About Your Visit        [employer] Information     [employer] gives you secure access to your electronic health record. If you see a primary care provider, you can also send messages to your care team and make appointments. If you have questions, please call your primary care clinic.  If you do not have a primary care provider, please call [phone] and they will assist you.        Care EveryWhere ID     This is your Care EveryWhere ID. This could be used by other organizations to access your [place] medical records  [id number]        Your Vitals Were     Pulse Temperature Height Pulse Oximetry BMI (Body Mass Index)       87 98.1  F (36.7  C) (Oral) 1.676 m (5' 5.98\") 100% 21 kg/m2        Blood Pressure from Last 3 Encounters:   09/25/17 108/64   09/25/17 108/64   09/14/17 111/70    Weight from Last 3 Encounters:   09/25/17 59 kg (130 lb)   09/25/17 59 kg (130 lb)   09/05/17 58.5 kg (129 lb)              We Performed the Following     Bilirubin Direct and Total        Primary Care Provider Office Phone # Fax #    Lucrecia Collier [phone] [address] [facility] [address]        Equal Access to Services     BLADIMIR KEY : Kelsey Ybarra, waaxda luqadaha, qaybta kaalmada aria, peter brock. So wa " [phone].    ATENCIÓN: Si ok arango, tiene a mccall disposición servicios gratuitos de asistencia lingüística. Marquise montoya [phone].    We comply with applicable federal civil rights laws and [place] laws. We do not discriminate on the basis of race, color, national origin, age, disability sex, sexual orientation or gender identity.            Thank you!     Thank you for choosing [facility]CER [facility]  for your care. Our goal is always to provide you with excellent care. Hearing back from our patients is one way we can continue to improve our services. Please take a few minutes to complete the written survey that you may receive in the mail after your visit with us. Thank you!             Your Updated Medication List - Protect others around you: Learn how to safely use, store and throw away your medicines at www.disposemymeds.org.          This list is accurate as of: 9/25/17  5:21 PM.  Always use your most recent med list.                   Brand Name Dispense Instructions for use Diagnosis    aspirin 81 MG tablet     90 tablet    Take 1 tablet (81 mg) by mouth daily    Acute on chronic combined systolic and diastolic congestive heart failure (H)       calcium carbonate 1250 MG tablet    OS-RODNEY 500 mg [place]. Ca    120 tablet    Take 2 tablets (1,000 mg) by mouth 2 times daily    Hypocalcemia       dexamethasone 4 MG tablet    DECADRON    6 tablet    Take 2 tablets (8 mg) the morning after chemo (day 2) and 1 tab (4 mg) the following day (day 3)    Prostate cancer 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), Metastatic bone tumor (H)       enoxaparin 40 MG/0.4ML injection    LOVENOX    60 Syringe    Inject 0.4 mLs (40 mg) Subcutaneous 2 times daily    Other chronic pulmonary embolism without acute cor pulmonale (H)       HYDROcodone-acetaminophen 5-325 MG per tablet    NORCO     Take 1 tablet by mouth every 6 hours as needed for moderate to severe pain Reported on 5/22/2017        LORazepam 0.5 MG tablet    ATIVAN    30  tablet    Take 1 tablet (0.5 mg) by mouth every 4 hours as needed (Anxiety, Nausea/Vomiting or Sleep)    Prostate cancer 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), Metastatic bone tumor (H)       predniSONE 5 MG tablet    DELTASONE    42 tablet    Take 1 tablet (5 mg) by mouth 2 times daily    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

## 2017-09-25 NOTE — PROGRESS NOTES
Infusion Nursing Note:  Oswaldo Win presents today for Day 1 Cycle 3 Taxotere and On Body Neulasta.    Patient seen by provider today: Yes: ISABELLE Cline saw patient prior to infusion   present during visit today: Not Applicable.    Note:   ISABELLE Greenfield/Sabiha Main RN at 1430 on 9/25/17  OK to proceed with chemotherapy with plt 74.  Xgeva to be given every 3 months.    Neulasta On Pro- On Body injector applied to patient on right abdomen. Discussed with patient when to call, and that injection would start 27 hours after application. Patient's injection scheduled to start at 1900 on 9/26/17. Patient aware that Neulasta On-pro on body should have green flashing light throughout, and if it flashes red to call the on call number. Patient aware to keep electronics 4 inches away from on pro injector and to avoid showering 4 hours prior to injection. Patient given written and verbal instruction on the above and verbalized understanding.     Lot number for Neulasta On Pro: [id number]     Intravenous Access:  Peripheral IV placed by vascular access in the lab.    Treatment Conditions:  Lab Results   Component Value Date    HGB 11.1 09/25/2017     Lab Results   Component Value Date    WBC 8.7 09/25/2017      Lab Results   Component Value Date    ANEU 6.5 09/25/2017     Lab Results   Component Value Date    PLT 74 09/25/2017      Lab Results   Component Value Date     09/25/2017                   Lab Results   Component Value Date    POTASSIUM 4.2 09/25/2017           Lab Results   Component Value Date    MAG 2.0 04/05/2017            Lab Results   Component Value Date    CR 0.88 09/25/2017                   Lab Results   Component Value Date    RODNEY 8.6 09/25/2017                Lab Results   Component Value Date    BILITOTAL 1.6 09/25/2017           Lab Results   Component Value Date    ALBUMIN 3.5 09/25/2017                    Lab Results   Component Value Date    ALT 44 09/25/2017            Lab Results   Component Value Date    AST 28 09/25/2017     Results reviewed, labs did NOT meet treatment parameters: see TORB.      Post Infusion Assessment:  Patient tolerated infusion without incident.  Site patent and intact, free from redness, edema or discomfort.  No evidence of extravasations.  Access discontinued per protocol.    Discharge Plan:   Prescription refills given for dexamethasone.  Discharge instructions reviewed with: Patient.  Patient and/or family verbalized understanding of discharge instructions and all questions answered.  Copy of AVS reviewed with patient and/or family.  Patient will return 10/18/17 for next appointment.  Patient discharged in stable condition accompanied by: self.  Departure Mode: Ambulatory.    Sabiha Main RN

## 2017-09-25 NOTE — NURSING NOTE
Chief Complaint   Patient presents with     Blood Draw     PIV placed by vasc access. Labs collected via piv.

## 2017-09-25 NOTE — MR AVS SNAPSHOT
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     If it is after hours, weekends, or holidays, please call [phone] to speak to someone regarding your questions or concerns.    If you are having any concerning symptoms or wish to speak to a provider before your next infusion visit, please call your care coordinator or triage to notify them so we can adequately serve you.     If you need a refill on a narcotic prescription or other medication, please call triage before your infusion appointment.            September 2017 Sunday Monday Tuesday Wednesday Thursday Friday Saturday                            1     2       3     4     5     [facility] DRAW   12:00 PM   (15 min.)   [facility]   [facility] Lab Draw     [facility] RETURN   12:15 PM   (50 min.)   Gabriela Mcguire PA-C   [facility] ONC INFUSION 120    1:30 PM   (120 min.)    ONCOLOGY INFUSION   [facility] 6     7     8     9       10     11     12     13     14     [facility] DRAW   12:00 PM   (15 min.)   [facility]     [facility] Draw     [facility] ONC INFUSION 360    1:30 PM   (360 min.)   [facility] ONCOLOGY INFUSION   [facility] 15     16       17     18     19     20     21     22     23       24     25     [facility]   12:00 PM   (15 min.)   [facility]   [facility] Draw     [facility] RETURN   12:25 PM   (50 min.)   Gabriela Mcguire PA-C   [facility]     [facility] ONC INFUSION 120    1:30 PM   (120 min.)   [facility] ONCOLOGY INFUSION   [facility] 26     27     28     29 30 October 2017 Sunday Monday Tuesday Wednesday Thursday Friday Saturday   1     2     3     4     5     6     7       8     9     10     11     12     13     14       15     16     17     18     [facility]   11:15 AM   (15 min.)    [facility] DRAW   [facility] Lab Draw     [facility] RETURN   11:30 AM   (30 min.)   Bentley Robles MD   [facility]     [facility] ONC INFUSION 120   12:30 PM   (120 min.)   [facility] ONCOLOGY INFUSION   [facility] 19     20     21       22     23     24     25     26     27     28       29     30     31                                      Lab Results:  Recent Results (from the past 12 hour(s))   PSA tumor marker    Collection Time: 09/25/17 12:19 PM   Result Value Ref Range    .00 (H) 0 - 4 ug/L   Basic metabolic panel    Collection Time: 09/25/17 12:19 PM   Result Value Ref Range    Sodium 139 133 - 144 mmol/L    Potassium 4.2 3.4 - 5.3 mmol/L    Chloride 108 94 - 109 mmol/L    Carbon Dioxide 24 20 - 32 mmol/L    Anion Gap 8 3 - 14 mmol/L    Glucose 105 (H) 70 - 99 mg/dL    Urea Nitrogen 25 7 - 30 mg/dL    Creatinine 0.88 0.66 - 1.25 mg/dL    GFR Estimate 84 >60 mL/min/1.7m2    GFR Estimate If Black >90 >60 mL/min/1.7m2    Calcium 8.6 8.5 - 10.1 mg/dL   CBC with platelets differential    Collection Time: 09/25/17 12:19 PM   Result Value Ref Range    WBC 8.7 4.0 - 11.0 10e9/L    RBC Count 3.51  (L) 4.4 - 5.9 10e12/L    Hemoglobin 11.1 (L) 13.3 - 17.7 g/dL    Hematocrit 33.0 (L) 40.0 - 53.0 %    MCV 94 78 - 100 fl    MCH 31.6 26.5 - 33.0 pg    MCHC 33.6 31.5 - 36.5 g/dL    RDW 17.3 (H) 10.0 - 15.0 %    Platelet Count 74 (L) 150 - 450 10e9/L    Diff Method Automated Method     % Neutrophils 74.1 %    % Lymphocytes 12.4 %    % Monocytes 9.4 %    % Eosinophils 0.2 %    % Basophils 0.6 %    % Immature Granulocytes 3.3 %    Nucleated RBCs 0 0 /100    Absolute Neutrophil 6.5 1.6 - 8.3 10e9/L    Absolute Lymphocytes 1.1 0.8 - 5.3 10e9/L    Absolute Monocytes 0.8 0.0 - 1.3 10e9/L    Absolute Eosinophils 0.0 0.0 - 0.7 10e9/L    Absolute Basophils 0.1 0.0 - 0.2 10e9/L    Abs Immature Granulocytes 0.3 0 - 0.4 10e9/L    Absolute Nucleated RBC 0.0    Hepatic panel    Collection Time: 09/25/17 12:19 PM   Result Value Ref Range    Bilirubin Direct 0.2 0.0 - 0.2 mg/dL    Bilirubin Total 1.6 (H) 0.2 - 1.3 mg/dL    Albumin 3.5 3.4 - 5.0 g/dL    Protein Total 7.2 6.8 - 8.8 g/dL    Alkaline Phosphatase 288 (H) 40 - 150 U/L    ALT 44 0 - 70 U/L    AST 28 0 - 45 U/L              Follow-ups after your visit        Your next 10 appointments already scheduled     Oct 18, 2017 11:15 AM CDT   [facility] Draw with  [facility] DRAW   [facility] Lab Draw ([facility])    [address] [place] [id number] [phone]            Oct 18, 2017 11:45 AM CDT   (Arrive by 11:30 AM)   Return Visit with Bentley Robles MD   [facility] ([facility])    [address] [place] [address] [phone]            Oct 18, 2017 12:30 PM CDT   Infusion 120 with UC ONCOLOGY INFUSION, UC 29 ATC   [facility] ([facility])    [address] [place] [id number]   [phone]            Nov 08, 2017 12:00 PM [facility]    [facility] Lab Draw ([facility])    [address] [facility] [address] [facility]  [address] [facility] [id number] [phone]            Nov 08, 2017 12:30 PM CST   (Arrive by 12:15 PM)   Return Visit with Gabriela Mcguire PA-C   [facility] ([facility])    [address] [id number] [phone]            Nov 08, 2017  1:30 PM CST   Infusion 120 with UC ONCOLOGY INFUSION, UC 22 ATC   [facility] ([facility])    [address] [address] [phone]              Who to contact     If you have questions or need follow up information about today's clinic visit or your schedule please contact [facility] [place] directly at [phone].  Normal or non-critical lab and imaging results will be communicated to you by MyChart, letter or phone within 4 business days after the clinic has received the results. If you do not hear from us within 7 days, please contact the clinic through [employer] or phone. If you have a critical or abnormal lab result, we will notify you by phone as soon as possible.  Submit refill requests through [employer] or call your pharmacy and they will forward the refill request to us. Please allow 3 business days for your refill to be completed.          Additional Information About Your Visit        [employer] Information     [employer] gives you secure access to your electronic health record. If you see a primary care provider, you can also send messages to your care team and make appointments. If you have questions, please call your primary care clinic.  If you do not have a primary care provider, please call [phone] and they will assist you.        Care EveryWhere ID     This is your Care EveryWhere ID. This could be used by other organizations to access your [place] medical records  [id number]        Your Vitals  Were     Pulse Temperature Respirations Pulse Oximetry BMI (Body Mass Index)       87 98.1  F (36.7  C) (Oral) 20 100% 20.99 kg/m2        Blood Pressure from Last 3 Encounters:   09/25/17 108/64   09/25/17 108/64   09/14/17 111/70    Weight from Last 3 Encounters:   09/25/17 59 kg (130 lb)   09/25/17 59 kg (130 lb)   09/05/17 58.5 kg (129 lb)              We Performed the Following     Basic metabolic panel     CBC with platelets differential     Hepatic panel     PSA tumor marker        Primary Care Provider Office Phone # Fax #    Lucrecia Collier [phone] [address]  [facility] [address]        Equal Access to Services     BLADIMIR KEY : Hadii jesse purcello Sokwame, waaxda luqadaha, qaybta kaalmada adeegyada, peter moran . So [facility] [phone].    ATENCIÓN: Si habla español, tiene a mccall disposición servicios gratuitos de asistencia lingüística. [facility] [phone].    We comply with applicable federal civil rights laws and [place] laws. We do not discriminate on the basis of race, color, national origin, age, disability sex, sexual orientation or gender identity.            Thank you!     Thank you for choosing [facility]  for your care. Our goal is always to provide you with excellent care. Hearing back from our patients is one way we can continue to improve our services. Please take a few minutes to complete the written survey that you may receive in the mail after your visit with us. Thank you!             Your Updated Medication List - Protect others around you: Learn how to safely use, store and throw away your medicines at www.disposemymeds.org.          This list is accurate as of: 9/25/17  3:50 PM.  Always use your most recent med list.                   Brand Name Dispense Instructions for use Diagnosis    aspirin 81 MG tablet     90 tablet    Take 1 tablet (81 mg) by mouth daily    Acute on chronic combined systolic  and diastolic congestive heart failure (H)       calcium carbonate 1250 MG tablet    OS-RODNEY 500 mg [place]. Ca    120 tablet    Take 2 tablets (1,000 mg) by mouth 2 times daily    Hypocalcemia       dexamethasone 4 MG tablet    DECADRON    6 tablet    Take 2 tablets (8 mg) the morning after chemo (day 2) and 1 tab (4 mg) the following day (day 3)    Prostate cancer 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), Metastatic bone tumor (H)       enoxaparin 40 MG/0.4ML injection    LOVENOX    60 Syringe    Inject 0.4 mLs (40 mg) Subcutaneous 2 times daily    Other chronic pulmonary embolism without acute cor pulmonale (H)       HYDROcodone-acetaminophen 5-325 MG per tablet    NORCO     Take 1 tablet by mouth every 6 hours as needed for moderate to severe pain Reported on 5/22/2017        LORazepam 0.5 MG tablet    ATIVAN    30 tablet    Take 1 tablet (0.5 mg) by mouth every 4 hours as needed (Anxiety, Nausea/Vomiting or Sleep)    Prostate cancer 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), Metastatic bone tumor (H)       predniSONE 5 MG tablet    DELTASONE    42 tablet    Take 1 tablet (5 mg) by mouth 2 times daily    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

## 2017-09-25 NOTE — PATIENT INSTRUCTIONS
Contact Numbers  [facility]: [phone]  Triage: [phone] (Monday-Friday 8-4:30)  After Hours:  [phone]    Please call the [facility] Triage line if you experience a temperature greater than or equal to 100.5, shaking chills, have uncontrolled nausea, vomiting and/or diarrhea, dizziness, shortness of breath, chest pain, bleeding, unexplained bruising, or if you have any other new/concerning symptoms, questions or concerns.     If it is after hours, weekends, or holidays, please call [phone] to speak to someone regarding your questions or concerns.    If you are having any concerning symptoms or wish to speak to a provider before your next infusion visit, please call your care coordinator or triage to notify them so we can adequately serve you.     If you need a refill on a narcotic prescription or other medication, please call triage before your infusion appointment.            September 2017 Sunday Monday Tuesday Wednesday Thursday Friday Saturday                            1     2       3     4     5     [facility] LAB DRAW   12:00 PM   (15 min.)   [facility] DRAW   [facility] Lab Draw     [facility] RETURN   12:15 PM   (50 min.)   [facility]    1:30 PM   (120 min.)    ONCOLOGY INFUSION   [facility] 6     7     8     9       10     11     12     13     14     [facility] D[facility]   12:00 PM   (15 min.)   [facility] DRAW   [facility] Lab Draw     [facility] ONC INFUSION 360    1:30 PM   (360 min.)    ONCOLOGY INFUSION   [facility] 15     16       17     18     19     20     21     22     23       24     25     [facility] DRAW   12:00 PM   (15 min.)    [facility] LAB DRAW   [facility] Lab Draw     [facility] RETURN   12:25 PM   (50 min.)   Gabriela Mcguire PA-C M [facility]     [facility] ONC INFUSION 120    1:30 PM   (120 min.)    ONCOLOGY INFUSION     [facility] 26 [phone] 30 October 2017 Sunday Monday Tuesday Wednesday Thursday Friday Saturday   1     2     3     4     5     6     7       8     9     10     11     12     13     14       15     16     17     18     [facility] LAB DRAW   11:15 AM   (15 min.)    [facility] LAB DRAW   [facility] Lab Draw     [facility] RETURN   11:30 AM   (30 min.)   Bentley Robles MD   [facility] ONC INFUSION 120   12:30 PM   (120 min.)    ONCOLOGY INFUSION   [facility] 19     20     21       22     23     24     25     26     27     28       29     30     31                                      Lab Results:  Recent Results (from the past 12 hour(s))   PSA tumor marker    Collection Time: 09/25/17 12:19 PM   Result Value Ref Range    .00 (H) 0 - 4 ug/L   Basic metabolic panel    Collection Time: 09/25/17 12:19 PM   Result Value Ref Range    Sodium 139 133 - 144 mmol/L    Potassium 4.2 3.4 - 5.3 mmol/L    Chloride 108 94 - 109 mmol/L    Carbon Dioxide 24 20 - 32 mmol/L    Anion Gap 8 3 - 14 mmol/L    Glucose 105 (H) 70 - 99 mg/dL    Urea Nitrogen 25 7 - 30 mg/dL    Creatinine 0.88 0.66 - 1.25 mg/dL    GFR Estimate 84 >60 mL/min/1.7m2    GFR Estimate If Black >90 >60 mL/min/1.7m2    Calcium 8.6 8.5 - 10.1 mg/dL   CBC with platelets differential    Collection Time: 09/25/17 12:19 PM   Result Value Ref Range    WBC 8.7 4.0 - 11.0 10e9/L    RBC Count 3.51 (L) 4.4 - 5.9 10e12/L    Hemoglobin 11.1 (L) 13.3 - 17.7 g/dL    Hematocrit 33.0 (L) 40.0 - 53.0 %    MCV 94 78 - 100 fl    MCH 31.6 26.5 - 33.0 pg    MCHC 33.6 31.5 - 36.5 g/dL    RDW 17.3 (H) 10.0 - 15.0 %    Platelet Count 74 (L) 150 - 450 10e9/L    Diff Method Automated Method     % Neutrophils 74.1 %    % Lymphocytes 12.4 %    % Monocytes 9.4 %    % Eosinophils 0.2 %    % Basophils 0.6 %    % Immature Granulocytes 3.3 %    Nucleated RBCs 0 0 /100    Absolute Neutrophil  6.5 1.6 - 8.3 10e9/L    Absolute Lymphocytes 1.1 0.8 - 5.3 10e9/L    Absolute Monocytes 0.8 0.0 - 1.3 10e9/L    Absolute Eosinophils 0.0 0.0 - 0.7 10e9/L    Absolute Basophils 0.1 0.0 - 0.2 10e9/L    Abs Immature Granulocytes 0.3 0 - 0.4 10e9/L    Absolute Nucleated RBC 0.0    Hepatic panel    Collection Time: 09/25/17 12:19 PM   Result Value Ref Range    Bilirubin Direct 0.2 0.0 - 0.2 mg/dL    Bilirubin Total 1.6 (H) 0.2 - 1.3 mg/dL    Albumin 3.5 3.4 - 5.0 g/dL    Protein Total 7.2 6.8 - 8.8 g/dL    Alkaline Phosphatase 288 (H) 40 - 150 U/L    ALT 44 0 - 70 U/L    AST 28 0 - 45 U/L

## 2017-09-25 NOTE — NURSING NOTE
"Oncology Rooming Note    September 25, 2017 12:41 PM   Oswaldo Win is a 72 year old male who presents for:    Chief Complaint   Patient presents with     Oncology Clinic Visit     Folow up-Prostate CA     Initial Vitals: /64 (BP Location: Right arm, Patient Position: Sitting, Cuff Size: Adult Regular)  Pulse 87  Temp 98.1  F (36.7  C) (Oral)  Ht 1.676 m (5' 5.98\")  Wt 59 kg (130 lb)  SpO2 100%  BMI 21 kg/m2 Estimated body mass index is 21 kg/(m^2) as calculated from the following:    Height as of this encounter: 1.676 m (5' 5.98\").    Weight as of this encounter: 59 kg (130 lb). Body surface area is 1.66 meters squared.  Mild Pain (3) Comment: Both ankles-Right more   No LMP for male patient.  Allergies reviewed: Yes  Medications reviewed: Yes    Medications: Medication refills not needed today.  Pharmacy name entered into [facility]: [facility] [place], [place] - [address]    Clinical concerns: Lab Results Sabrina Mcguire was notified.    10 minutes for nursing intake (face to face time)     Flora Mayer LPN              "

## 2017-09-25 NOTE — PROGRESS NOTES
Oncology/Hematology Visit Note  Sep 25, 2017    DIAGNOSIS:  Mr. Win is a 71 year old male with a hx of CHF, CKD, CAD w/ hx of STEMI and CABG. He met with Dr. Robles at the end of July for consultation regarding metastatic prostate cancer. His story begins in May 2015 when he saw his PCP for back pain, present since Feb. He also had poor appetite, weight loss of 8lb and poor energy.  CT showed diffuse bony mets. CT Chest showed sclerotic mets throughout the bones. He was given degarelix on 7/22 in Urology. He was having pelvic pain and was evaluated by Dr Cavazos would did not feel XRT was appropriate.  He started on docetaxel 7/31. From 8/7-8/14 he was admitted with colitis, CHAR, elevated BNP, lactis acidosis, and poor appetite. He was discharged to a TCU and unfortunately, it sounds like he was not given his Lasix.  His BUBBA worsened and his breathing became compromised.  He was seen by Chelsi Corral on 8/21 and eventually transferred to the ED and admitted 8/21-8/24 for acute on chronic CHF.  At our last visit we discussed not pursuing further Taxotere at this time and discussed starting Zytiga when he was ready.  We pursued PT, OT, home lymphedema for supportive care at home in attempt for further rehabilitation. He was again admitted and discharged - 10/20/15.  He never started the Zytiga as he was still actively recovering from his cardiac issues.  His PSA remained stable, thus no intervention was pursued.  December 2015, Mainor met with Dr Robles and since his ECOG was back to a 1, they discussed re-challenging the Taxotere at a lower dose. 9/26/2016 he started Provenge off study. He continues on lupron every 3 mos and XGEVA. 9/26/2016 he started Provenge off study. He received 3 doses of Provenge (last 10/28), on 3rd dose had fevers, chills, myalgias, was bedbound for a week due to these symptoms. During this time he held his coumadin for 5 days prior to a central line removal 10/31. Later than week he had sudden onset  of right pleuritic chest pain and hemoptysis. Diagnosed with acute segmental/subsegmental PE on 11/8 and prescribed Lovenox.     Mainor was briefly on Zytiga and prednisone in November-December, however after one month his PSA went up, thus it was decided to add in Xofigo (1/11/17-- first).     On 12/28 he started xaralto and took his last pill on 1/10/17.  He stopped as he noticed melanotic stools and had some hemoptysis.  He was seen in clinic the next day with a hgb in th 6 range and was admitted.  Pan-scopes showed no active bleeding.  He was able to get his Xofigo in patient as well.  He was transitioned back to lovenox BID.     Mainor developed diarrhea on and off in the spring of 2017, Zytiga was stopped on 3/31 as it was unsure what was contributing.  Mainor felt it was related to the Xofigo and that his dose was too high as his weight is always at least 10 pounds higher in clinic than at home.  At his last visit with Dr Robles, his PSA was rising again and re-started Taxotere on 8/15/17.    INTERVAL HISTORY:  Mr. 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      Mainor is doing well and has no new complains at this visit. He restarted Taxotere and everything went fairly well.  He had some fatigue and mucositis around day 4-7, but otherwise the last two weeks he has felt his energy levels perk up again.  Eating well, not pushing fluids as much as he should. He also had a PRBC transfusion about day 10 when he called in for fatigue and mild dizziness. He started to feel that again the last few days, but likely isn't hydrating.     No f/s/c. No chest pain. No black stools. No new  issues.  He is on lovenox 40 mg BID.  Son is in town.     MEDICATIONS:   Current Outpatient Prescriptions   Medication Sig     predniSONE (DELTASONE) 5 MG tablet Take 1 tablet (5 mg) by mouth 2 times daily     enoxaparin (LOVENOX) 40 MG/0.4ML injection Inject 0.4 mLs (40 mg) Subcutaneous 2 times daily     aspirin 81 MG  "tablet Take 1 tablet (81 mg) by mouth daily     calcium carbonate (OS-RODNEY 500 MG [place]. [place]) 500 MG tablet Take 2 tablets (1,000 mg) by mouth 2 times daily     Cholecalciferol (VITAMIN D) 2000 UNITS tablet Take 1 tablet by mouth daily     simvastatin (ZOCOR) 40 MG tablet TAKE ONE TABLET BY MOUTH AT BEDTIME     dexamethasone (DECADRON) 4 MG tablet Take 2 tablets (8 mg) the morning after chemo (day 2) and 1 tab (4 mg) the following day (day 3) (Patient not taking: Reported on 9/14/2017)     LORazepam (ATIVAN) 0.5 MG tablet Take 1 tablet (0.5 mg) by mouth every 4 hours as needed (Anxiety, Nausea/Vomiting or Sleep) (Patient not taking: Reported on 8/15/2017)     HYDROcodone-acetaminophen (NORCO) 5-325 MG per tablet Take 1 tablet by mouth every 6 hours as needed for moderate to severe pain Reported on 5/22/2017     No current facility-administered medications for this visit.      Facility-Administered Medications Ordered in Other Visits   Medication     hydrocortisone sodium succinate (solu-CORTEF) injection     glycopyrrolate (ROBINUL) injection     neostigmine (PROSTIGMINE) injection     protamine injection     albuterol (PROAIR HFA, PROVENTIL HFA, VENTOLIN HFA) inhaler          ALLERGIES:  No Known Allergies    PHYSICAL EXAMINATION:  Vitals: /64 (BP Location: Right arm, Patient Position: Sitting, Cuff Size: Adult Regular)  Pulse 87  Temp 98.1  F (36.7  C) (Oral)  Ht 1.676 m (5' 5.98\")  Wt 59 kg (130 lb)  SpO2 100%  BMI 21 kg/m2   Wt Readings from Last 10 Encounters:   09/25/17 59 kg (130 lb)   09/05/17 58.5 kg (129 lb)   08/15/17 57.6 kg (127 lb)   08/07/17 57.6 kg (127 lb)   08/02/17 58.1 kg (128 lb)   07/12/17 56.7 kg (125 lb)   06/27/17 56.2 kg (124 lb)   06/19/17 58.1 kg (128 lb)   06/12/17 58.1 kg (128 lb)   06/12/17 58.1 kg (128 lb)       GENERAL:  A pleasant person in no acute distress.  Mildly pale and thin looking  LYMPH NODES:  No peripheral lymphadenopathy noted in the supraclavicular or cervical " regions.   LUNGS:  Clear to auscultation bilaterally.   HEART:  Regular rate and rhythm   ABDOMEN:  Bowel sounds are active.  Soft and nontender.  No hepatosplenomegaly or other masses appreciated.  LOWER EXTREMITIES:  Without pitting edema to the knees bilaterally.   NEUROLOGICAL:  Alert/orientated/able to answer all questions.  CN grossly intact.     LAB:      8/15/2017 13:07 9/5/2017 12:39 9/14/2017 12:11 9/25/2017 12:19   WBC 6.0 8.1 14.8 (H) 8.7   Hemoglobin 11.5 (L) 10.1 (L) 8.3 (L) 11.1 (L)   Hematocrit 33.5 (L) 29.5 (L) 25.3 (L) 33.0 (L)   Platelet Count 104 (L) 79 (L) 90 (L) 74 (L)   RBC Count 3.70 (L) 3.16 (L) 2.65 (L) 3.51 (L)   MCV 91 93 96 94      8/1/2017 09:10 8/7/2017 12:46 8/15/2017 13:07 9/5/2017 12:39 9/25/2017 12:19   Sodium 140 142 138 141 139   Potassium 4.6 3.5 4.1 4.0 4.2   Chloride 110 (H) 110 (H) 108 112 (H) 108   Carbon Dioxide 19 (L) 22 19 (L) 21 24   Urea Nitrogen 22 20 20 21 25   Creatinine 0.98 0.84 0.72 0.74 0.88   GFR Estimate 75 90 >90 >90 84   GFR Estimate If Black >90... >90... >90 >90 >90   Calcium 9.8 8.5 9.1 7.7 (L) 8.6   Anion Gap 11 9 12 8 8   Albumin 4.0 3.3 (L) 3.6 3.5 3.5   Protein Total 8.2 6.6 (L) 7.5 7.3 7.2   Bilirubin Total 1.2 1.1 1.0 0.9 1.6 (H)   Alkaline Phosphatase 312 (H) 271 (H) 284 (H) 292 (H) 288 (H)   ALT 44 38 40 51 44   AST 40 39 35 35 28   Bilirubin Direct  0.2 0.2 0.2 0.2   .00 (H)   655.00 (H) 480.00 (H)       IMPRESSION/PLAN:   Metastatic prostate cancer: recent progression on Zytiga, Xofigo.  Now back on IV Taxotere every 3 weeks.  Tolerated well with mild fatigue, myelosuppression, mucositis.  Labs OK today, platelets 74k but OK for therapy.    -OK for C3 today.  PSA declining!     Anemia: improved back to 11.5 after radium and another PRBC ~10 days ago.  Counts held up last cycle (checked a day 10 and platelets were just fine) so won't check another mid-cycle lab unless Mainor calls in with symptoms.     Heme: on lovenox for PE dx 11/2016. Had  inpatient stay for GI bleed 1/11-1/14. Stopped xarelto and transitioned back to lovenox BID. No bleeding concerns. Recently dropped from 60mg BID to 50mg BID to 40 mg BID due to high Xa level.   -Continue at 40 mg BID.      Bone mets: on xgeva - had monthly for a year. OK to go to q 3 months now. (due in Nov of 2017)    CV: hx of CHF. No clinical s/s of failure.  Off metoprolol due to BP being too low.      Bili up- all indirect.  OK for Taxotere    Over 25 min of direct face to face time spent with patient with more than 50% time spent in counseling and coordinating care.      Sabrina Mcguire PA-C

## 2017-10-18 NOTE — MR AVS SNAPSHOT
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     If you are having any concerning symptoms or wish to speak to a provider before your next infusion visit, please call your care coordinator or triage to notify them so we can adequately serve you.       After Hours: [phone]    If after hours, weekends, or holidays, call main hospital  and ask for Oncology doctor on call.           October 2017 Sunday Monday Tuesday Wednesday Thursday Friday Saturday   1     2     3     4     5     6     7       8     9     10     11     12     13     14       15     16     17     18     [facility] LAB DRAW   11:15 AM   (15 min.)    [facility] LAB DRAW   [facility] Lab Draw     [facility] RETURN   11:30 AM   (30 min.)   Bentley Robles MD   [facility]     [facility] ONC INFUSION 120   12:30 PM   (120 min.)    ONCOLOGY INFUSION   [facility] 19     20     21       22     23     24     25     26     27     28       29     30     31 November 2017 Sunday Monday Tuesday Wednesday Thursday Friday Saturday                  1     2     3     4       5     6     7     8     [facility] DRAW    12:00 PM   (15 min.)   [facility] LAB DRAW   [facility] Lab Draw     [facility] RETURN   12:15 PM   (50 min.)   Gabriela Mcguire PA-C   [facility] ONC INFUSION 120    1:30 PM   (120 min.)    ONCOLOGY INFUSION   [facility] 9     10     11       12     13     14     15     16     17     18       19     20     21     22     23     24     25       26     27     28     29     [place] [facility]    1:45 PM   (15 min.)   [facility] LAB DRAW   [facility] Lab Draw     [facility] RETURN    2:00 PM   (30 min.)   Bentley Robles MD   [facility] ONC INFUSION 120    3:00 PM   (120 min.)    ONCOLOGY INFUSION   [facility] 30                            Lab Results:  Recent Results (from the past 12 hour(s))   CBC with platelets differential    Collection Time: 10/18/17 11:25 AM   Result Value Ref Range    WBC 21.2 (H) 4.0 - 11.0 10e9/L    RBC Count 3.06 (L) 4.4 - 5.9 10e12/L    Hemoglobin 10.2 (L) 13.3 - 17.7 g/dL    Hematocrit 30.4 (L) 40.0 - 53.0 %    MCV 99 78 - 100 fl    MCH 33.3 (H) 26.5 - 33.0 pg    MCHC 33.6 31.5 - 36.5 g/dL    RDW 20.4 (H) 10.0 - 15.0 %    Platelet Count 97 (L) 150 - 450 10e9/L    Diff Method Manual Differential     % Neutrophils 85.0 %    % Lymphocytes 9.0 %    % Monocytes 3.0 %    % Eosinophils 0.0 %    % Basophils 0.0 %    % Myelocytes 3.0 %    Absolute Neutrophil 18.0 (H) 1.6 - 8.3 10e9/L    Absolute Lymphocytes 1.9 0.8 - 5.3 10e9/L    Absolute Monocytes 0.6 0.0 - 1.3 10e9/L    Absolute Eosinophils 0.0 0.0 - 0.7 10e9/L    Absolute Basophils 0.0 0.0 - 0.2 10e9/L    Absolute Myelocytes 0.6 (H) 0 10e9/L    RBC Morphology Normal    Hepatic panel    Collection Time: 10/18/17 11:25 AM   Result Value Ref Range    Bilirubin Direct 0.2 0.0 - 0.2 mg/dL    Bilirubin Total 1.4 (H) 0.2 - 1.3 mg/dL    Albumin 4.0 3.4 - 5.0 g/dL    Protein Total 8.3 6.8 - 8.8 g/dL    Alkaline Phosphatase 276 (H) 40 - 150 U/L     ALT 37 0 - 70 U/L    AST 30 0 - 45 U/L   PSA tumor marker    Collection Time: 10/18/17 11:25 AM   Result Value Ref Range    .00 (H) 0 - 4 ug/L   Basic metabolic panel    Collection Time: 10/18/17 11:25 AM   Result Value Ref Range    Sodium 137 133 - 144 mmol/L    Potassium 4.5 3.4 - 5.3 mmol/L    Chloride 104 94 - 109 mmol/L    Carbon Dioxide 21 20 - 32 mmol/L    Anion Gap 11 3 - 14 mmol/L    Glucose 134 (H) 70 - 99 mg/dL    Urea Nitrogen 27 7 - 30 mg/dL    Creatinine 0.76 0.66 - 1.25 mg/dL    GFR Estimate >90 >60 mL/min/1.7m2    GFR Estimate If Black >90 >60 mL/min/1.7m2    Calcium 9.3 8.5 - 10.1 mg/dL               Follow-ups after your visit        Your next 10 appointments already scheduled     Nov 08, 2017 12:00 PM CST   [facility] Draw with [facility]   [facility]    [address] [place] [address]   [phone]            Nov 08, 2017 12:30 PM CST   (Arrive by 12:15 PM)   Return Visit with Gabriela Mcguire PA-C   [facility])    [address] [place] [address]   [phone]            Nov 08, 2017  1:30 PM CST   Infusion 120 with  ONCOLOGY INFUSION, UC 22 ATC   [facility] ([facility])    [address] [place] [address]   [phone]            Nov 29, 2017  1:45 PM CST   [facility] Lab Draw with  [facility] LAB DRAW   [facility] Lab Draw ([facility])    [address] [place] [address]   [phone]            Nov 29, 2017  2:15 PM CST   (Arrive by 2:00 PM)   Return Visit with Bentley Robles MD   [facility] ([facility])    [address] [place] [address]   [phone]            Nov 29, 2017  3:00 PM CST   Infusion 120 with   ONCOLOGY INFUSION, UC 23 ATC   [facility] ([facility])    [address]  [address]nd Floor  [facility] [place] [address] [phone]              Future tests that were ordered for you today     Open Standing Orders        Priority Remaining Interval Expires Ordered    CBC with platelets differential Routine 25/25  10/18/2018 10/18/2017            Who to contact     If you have questions or need follow up information about today's clinic visit or your schedule please contact [facility] directly at [phone].  Normal or non-critical lab and imaging results will be communicated to you by RESAAShart, letter or phone within 4 business days after the clinic has received the results. If you do not hear from us within 7 days, please contact the clinic through watAgamet or phone. If you have a critical or abnormal lab result, we will notify you by phone as soon as possible.  Submit refill requests through GetFresh or call your pharmacy and they will forward the refill request to us. Please allow 3 business days for your refill to be completed.          Additional Information About Your Visit        RESAAShart Information     GetFresh gives you secure access to your electronic health record. If you see a primary care provider, you can also send messages to your care team and make appointments. If you have questions, please call your primary care clinic.  If you do not have a primary care provider, please call [phone] and they will assist you.        Care EveryWhere ID     This is your Care EveryWhere ID. This could be used by other organizations to access your [place] medical records  [id number]         Blood Pressure from Last 3 Encounters:   10/18/17 103/56   10/18/17 103/56   09/25/17 108/64    Weight from Last 3 Encounters:   10/18/17 60.8 kg (134 lb)   10/18/17 60.8 kg (134 lb)   09/25/17 59 kg (130 lb)              We Performed the Following     Basic  metabolic panel     CBC with platelets differential     Hepatic panel     PSA tumor marker          Today's Medication Changes          These changes are accurate as of: 10/18/17  1:42 PM.  If you have any questions, ask your nurse or doctor.               These medicines have changed or have updated prescriptions.        Dose/Directions    dexamethasone 4 MG tablet   Commonly known as:  DECADRON   This may have changed:    - how much to take  - how to take this  - when to take this  - additional instructions   Used for:  Prostate cancer 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), Metastatic bone tumor (H)   Changed by:  Bentley Robles MD        Dose:  4 mg   Take 1 tablet (4 mg) by mouth daily (with breakfast)   Quantity:  12 tablet   Refills:  0            Where to get your medicines      These medications were sent to [facility] [address]  [address] [facility] [address]     Phone:  [phone]     dexamethasone 4 MG tablet         Some of these will need a paper prescription and others can be bought over the counter.  Ask your nurse if you have questions.     Bring a paper prescription for each of these medications     HYDROcodone-acetaminophen 5-325 MG per tablet                Primary Care Provider Office Phone # Fax #    Lucrecia Collier [phone]       [address]  [place] [address]        Equal Access to Services     BLADIMIR KEY AH: Kelsey purcello Sokwame, waaxda luqadaha, qaybta kaalmada adeegyada, peter brock. So [facility] [phone].    ATENCIÓN: Si habla español, tiene a mccall disposición servicios gratuitos de asistencia lingüística. Llame al [phone].    We comply with applicable federal civil rights laws and [place] laws. We do not discriminate on the basis of race, color, national origin, age, disability, sex, sexual orientation, or gender identity.            Thank you!     Thank you for choosing [facility]  for your care. Our goal is always to provide you with excellent care. Hearing back from our patients is one way we can continue to improve our services. Please take a few minutes to complete the written survey that you may receive in the mail after your visit with us. Thank you!             Your Updated Medication List - Protect others around you: Learn how to safely use, store and throw away your medicines at www.disposemymeds.org.          This list is accurate as of: 10/18/17  1:42 PM.  Always use your most recent med list.                   Brand Name Dispense Instructions for use Diagnosis    aspirin 81 MG tablet     90 tablet    Take 1 tablet (81 mg) by mouth daily    Acute on chronic combined systolic and diastolic congestive heart failure (H)       calcium carbonate 1250 MG tablet    OS-RODNEY 500 mg [place]. [place]    120 tablet    Take 2 tablets (1,000 mg) by mouth 2 times daily    Hypocalcemia       dexamethasone 4 MG tablet    DECADRON    12 tablet    Take 1 tablet (4 mg) by mouth daily (with breakfast)    Prostate cancer 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), Metastatic bone tumor (H)       enoxaparin 40 MG/0.4ML injection    LOVENOX    60 Syringe    Inject 0.4 mLs (40 mg) Subcutaneous 2 times daily    Other chronic pulmonary embolism without acute cor pulmonale (H)       HYDROcodone-acetaminophen 5-325 MG per tablet    NORCO    30 tablet    Take 1 tablet by mouth every 6 hours as needed for moderate to severe pain Reported on 5/22/2017    Prostate cancer 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), Metastatic bone tumor (H)       LORazepam 0.5 MG tablet    ATIVAN    30 tablet    Take 1 tablet (0.5 mg) by mouth every 4 hours as needed (Anxiety, Nausea/Vomiting or Sleep)    Prostate cancer 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), Metastatic bone tumor (H)       predniSONE 5 MG tablet    DELTASONE    42 tablet    Take 1 tablet (5 mg) by mouth 2 times daily    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

## 2017-10-18 NOTE — PATIENT INSTRUCTIONS
Contact Numbers    [facility] [place] Main Line: [phone]  [facility] [place] Triage:  [phone]    Call triage with chills and/or temperature greater than or equal to 100.5, uncontrolled nausea/vomiting, diarrhea, constipation, dizziness, shortness of breath, chest pain, bleeding, unexplained bruising, or any new/concerning symptoms, questions/concerns.     If you are having any concerning symptoms or wish to speak to a provider before your next infusion visit, please call your care coordinator or triage to notify them so we can adequately serve you.       After Hours: [phone]    If after hours, weekends, or holidays, call main hospital  and ask for Oncology doctor on call.           October 2017 Sunday Monday Tuesday Wednesday Thursday Friday Saturday   1     2     3     4     5     6     7       8     9     10     11     12     13     14       15     16     17     18     [facility]   11:15 AM   (15 min.)    [facility]   [facility] Draw     [facility] RETURN   11:30 AM   (30 min.)   Bentley Robles MD   [facility]     [facility] ONC INFUSION 120   12:30 PM   (120 min.)   [facility]   [facility] 19     20     21       22     23     24     25     26     27     28       29     30     31                                    November 2017 Sunday Monday Tuesday Wednesday Thursday Friday Saturday                  1     2     3     4       5     6     7     8     [facility]   12:00 PM   (15 min.)   [facility]   [facility] Lab Draw     [facility] RETURN   12:15 PM   (50 min.)   Gabriela Mcguire PA-C   [facility]     [facility] ONC INFUSION 120    1:30 PM   (120 min.)   [facility]   [facility] 9     10     11       12     13     14     15     16     17     18       19     20     21     22     23     24     25       26     27     28     29     [facility] DRAW    1:45 PM   (15 min.)   [facility]  LAB DRAW   [facility] Lab Draw     [facility] RETURN    2:00 PM   (30 min.)   Bentley Robles MD   [facility]     [facility] ONC INFUSION 120    3:00 PM   (120 min.)   [facility]FUSION   [facility] 30                            Lab Results:  Recent Results (from the past 12 hour(s))   CBC with platelets differential    Collection Time: 10/18/17 11:25 AM   Result Value Ref Range    WBC 21.2 (H) 4.0 - 11.0 10e9/L    RBC Count 3.06 (L) 4.4 - 5.9 10e12/L    Hemoglobin 10.2 (L) 13.3 - 17.7 g/dL    Hematocrit 30.4 (L) 40.0 - 53.0 %    MCV 99 78 - 100 fl    MCH 33.3 (H) 26.5 - 33.0 pg    MCHC 33.6 31.5 - 36.5 g/dL    RDW 20.4 (H) 10.0 - 15.0 %    Platelet Count 97 (L) 150 - 450 10e9/L    Diff Method Manual Differential     % Neutrophils 85.0 %    % Lymphocytes 9.0 %    % Monocytes 3.0 %    % Eosinophils 0.0 %    % Basophils 0.0 %    % Myelocytes 3.0 %    Absolute Neutrophil 18.0 (H) 1.6 - 8.3 10e9/L    Absolute Lymphocytes 1.9 0.8 - 5.3 10e9/L    Absolute Monocytes 0.6 0.0 - 1.3 10e9/L    Absolute Eosinophils 0.0 0.0 - 0.7 10e9/L    Absolute Basophils 0.0 0.0 - 0.2 10e9/L    Absolute Myelocytes 0.6 (H) 0 10e9/L    RBC Morphology Normal    Hepatic panel    Collection Time: 10/18/17 11:25 AM   Result Value Ref Range    Bilirubin Direct 0.2 0.0 - 0.2 mg/dL    Bilirubin Total 1.4 (H) 0.2 - 1.3 mg/dL    Albumin 4.0 3.4 - 5.0 g/dL    Protein Total 8.3 6.8 - 8.8 g/dL    Alkaline Phosphatase 276 (H) 40 - 150 U/L    ALT 37 0 - 70 U/L    AST 30 0 - 45 U/L   PSA tumor marker    Collection Time: 10/18/17 11:25 AM   Result Value Ref Range    .00 (H) 0 - 4 ug/L   Basic metabolic panel    Collection Time: 10/18/17 11:25 AM   Result Value Ref Range    Sodium 137 133 - 144 mmol/L    Potassium 4.5 3.4 - 5.3 mmol/L    Chloride 104 94 - 109 mmol/L    Carbon Dioxide 21 20 - 32 mmol/L    Anion Gap 11 3 - 14 mmol/L    Glucose 134 (H) 70 - 99 mg/dL    Urea Nitrogen 27 7 - 30 mg/dL    Creatinine 0.76  0.66 - 1.25 mg/dL    GFR Estimate >90 >60 mL/min/1.7m2    GFR Estimate If Black >90 >60 mL/min/1.7m2    Calcium 9.3 8.5 - 10.1 mg/dL

## 2017-10-18 NOTE — NURSING NOTE
Chief Complaint   Patient presents with     Blood Draw     Labs only     Vitals done, IV placed by WATSON Mcginnis, labs drawn from IV.  See doc flow sheets for details.  Virginia Reagan CMA

## 2017-10-18 NOTE — LETTER
10/18/2017       RE: Oswaldo Win  [address]  [facility] [address]     Dear Colleague,    Thank you for referring your patient, Oswaldo Win, to the [facility]. Please see a copy of my visit note below.    Oncology/Hematology Visit Note  Oct 18, 2017    DIAGNOSIS:  Mr. Win is a 71 year old male with a hx of CHF, CKD, CAD w/ hx of STEMI and CABG. He met with Dr. Robles at the end of July for consultation regarding metastatic prostate cancer. His story begins in May 2015 when he saw his PCP for back pain, present since Feb. He also had poor appetite, weight loss of 8lb and poor energy.  CT showed diffuse bony mets. CT Chest showed sclerotic mets throughout the bones. He was given degarelix on 7/22 in Urology. He was having pelvic pain and was evaluated by Dr Cavazos would did not feel XRT was appropriate.  He started on docetaxel 7/31. From 8/7-8/14 he was admitted with colitis, CHAR, elevated BNP, lactis acidosis, and poor appetite. He was discharged to a TCU and unfortunately, it sounds like he was not given his Lasix.  His BUBBA worsened and his breathing became compromised.  He was seen by Chelsi Corarl on 8/21 and eventually transferred to the ED and admitted 8/21-8/24 for acute on chronic CHF.  At our last visit we discussed not pursuing further Taxotere at this time and discussed starting Zytiga when he was ready.  We pursued PT, OT, home lymphedema for supportive care at home in attempt for further rehabilitation. He was again admitted and discharged - 10/20/15.  He never started the Zytiga as he was still actively recovering from his cardiac issues.  His PSA remained stable, thus no intervention was pursued.  December 2015, Mainor met with Dr Robles and since his ECOG was back to a 1, they discussed re-challenging the Taxotere at a lower dose. 9/26/2016 he started Provenge off study. He continues on lupron every 3 mos and XGEVA. 9/26/2016 he started Provenge off study. He received 3 doses of  Provenge (last 10/28), on 3rd dose had fevers, chills, myalgias, was bedbound for a week due to these symptoms. During this time he held his coumadin for 5 days prior to a central line removal 10/31. Later than week he had sudden onset of right pleuritic chest pain and hemoptysis. Diagnosed with acute segmental/subsegmental PE on 11/8 and prescribed Lovenox.     Mainor was briefly on Zytiga and prednisone in November-December, however after one month his PSA went up, thus it was decided to add in Xofigo (1/11/17-- first).     On 12/28 he started xaralto and took his last pill on 1/10/17.  He stopped as he noticed melanotic stools and had some hemoptysis.  He was seen in clinic the next day with a hgb in th 6 range and was admitted.  Pan-scopes showed no active bleeding.  He was able to get his Xofigo in patient as well.  He was transitioned back to lovenox BID.     Mainor developed diarrhea on and off in the spring of 2017, Zytiga was stopped on 3/31 as it was unsure what was contributing.  Mainor felt it was related to the Xofigo and that his dose was too high as his weight is always at least 10 pounds higher in clinic than at home.  At his last visit with Dr Robles, his PSA was rising again and re-started Taxotere on 8/15/17.    INTERVAL HISTORY:  Mr. 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      Mainor is doing well and has no new complains at this visit. He is having most of the listed side effects. He has some fatigue, mucositis around day 4-7, greasy sensation in tongue, altered taste, mild nausea/feeling queasy, aches and pain in his muscles, diarrhea, but otherwise he feels perfectly fine.  He notes that he has started melting again - his hair has been shedding.     No f/s/c. No chest pain. No black stools. No new  issues.  He is on lovenox 40 mg BID.  Son is in town.     MEDICATIONS:   Current Outpatient Prescriptions   Medication Sig     predniSONE (DELTASONE) 5 MG tablet Take 1 tablet (5  mg) by mouth 2 times daily     dexamethasone (DECADRON) 4 MG tablet Take 2 tablets (8 mg) the morning after chemo (day 2) and 1 tab (4 mg) the following day (day 3)     LORazepam (ATIVAN) 0.5 MG tablet Take 1 tablet (0.5 mg) by mouth every 4 hours as needed (Anxiety, Nausea/Vomiting or Sleep)     enoxaparin (LOVENOX) 40 MG/0.4ML injection Inject 0.4 mLs (40 mg) Subcutaneous 2 times daily     aspirin 81 MG tablet Take 1 tablet (81 mg) by mouth daily     calcium carbonate (OS-RODNEY 500 MG Yuhaaviatam. CA) 500 MG tablet Take 2 tablets (1,000 mg) by mouth 2 times daily     Cholecalciferol (VITAMIN D) 2000 UNITS tablet Take 1 tablet by mouth daily     simvastatin (ZOCOR) 40 MG tablet TAKE ONE TABLET BY MOUTH AT BEDTIME     HYDROcodone-acetaminophen (NORCO) 5-325 MG per tablet Take 1 tablet by mouth every 6 hours as needed for moderate to severe pain Reported on 5/22/2017     No current facility-administered medications for this visit.      Facility-Administered Medications Ordered in Other Visits   Medication     hydrocortisone sodium succinate (solu-CORTEF) injection     glycopyrrolate (ROBINUL) injection     neostigmine (PROSTIGMINE) injection     protamine injection     albuterol (PROAIR HFA, PROVENTIL HFA, VENTOLIN HFA) inhaler          ALLERGIES:  No Known Allergies    PHYSICAL EXAMINATION:  Vitals: /56  Pulse 108  Temp 97.8  F (36.6  C) (Oral)  Resp 18  Wt 60.8 kg (134 lb)  SpO2 98%  BMI 21.64 kg/m2   Wt Readings from Last 10 Encounters:   10/18/17 60.8 kg (134 lb)   10/18/17 60.8 kg (134 lb)   09/25/17 59 kg (130 lb)   09/25/17 59 kg (130 lb)   09/05/17 58.5 kg (129 lb)   08/15/17 57.6 kg (127 lb)   08/07/17 57.6 kg (127 lb)   08/02/17 58.1 kg (128 lb)   07/12/17 56.7 kg (125 lb)   06/27/17 56.2 kg (124 lb)       GENERAL:  A pleasant person in no acute distress.  Mildly pale and thin looking  LYMPH NODES:  No peripheral lymphadenopathy noted in the supraclavicular or cervical regions.   LUNGS:  Clear to  auscultation bilaterally.   HEART:  Regular rate and rhythm   ABDOMEN:  Bowel sounds are active.  Soft and nontender.  No hepatosplenomegaly or other masses appreciated.  LOWER EXTREMITIES:  Without pitting edema to the knees bilaterally.   NEUROLOGICAL:  Alert/orientated/able to answer all questions.  CN grossly intact.     LAB:     Recent Labs   Lab Test  10/18/17   1125  09/25/17   1219  09/05/17   1239  08/15/17   1307  08/07/17   1246   NA  137  139  141  138  142   POTASSIUM  4.5  4.2  4.0  4.1  3.5   CHLORIDE  104  108  112*  108  110*   CO2  21  24  21  19*  22   ANIONGAP  11  8  8  12  9   BUN  27  25  21  20  20   CR  0.76  0.88  0.74  0.72  0.84   GLC  134*  105*  109*  204*  134*   RODNEY  9.3  8.6  7.7*  9.1  8.5     Recent Labs   Lab Test  04/05/17   0941  03/31/17   1411  03/15/17   1410  01/14/17   0808  01/13/17   1815  01/13/17   0801   MAG  2.0  2.2  2.4*  2.1  2.0  2.0   PHOS  2.3*  1.6*   --   2.0*  2.9  1.8*     Recent Labs   Lab Test  10/18/17   1125  09/25/17   1219  09/14/17   1211  09/05/17   1239  08/15/17   1307  08/07/17   1246   WBC  21.2*  8.7  14.8*  8.1  6.0  4.2   HGB  10.2*  11.1*  8.3*  10.1*  11.5*  7.8*   PLT  97*  74*  90*  79*  104*  93*   MCV  99  94  96  93  91  94   NEUTROPHIL   --   74.1  81.0  76.3  86.7  78.1     Recent Labs   Lab Test  10/18/17   1125  09/25/17   1219  09/05/17   1239   03/31/17   1411   12/21/16   1149  12/01/16   1247   BILITOTAL  1.4*  1.6*  0.9   < >  2.6*   < >  0.6  0.8   ALKPHOS  276*  288*  292*   < >  472*   < >  673*  649*   ALT  37  44  51   < >  16   < >  18  16   AST  30  28  35   < >  22   < >  24  22   ALBUMIN  4.0  3.5  3.5   < >  3.7   < >  3.5  3.8   LDH   --    --    --    --   409*   --   321*  348*    < > = values in this interval not displayed.     TSH   Date Value Ref Range Status   09/16/2015 3.18 0.40 - 4.00 mU/L Final     Recent Labs   Lab Test  09/25/17   1219  09/05/17   1239  08/01/17   0910  06/19/17   1500  05/31/17   0903    12/21/16   1149  12/01/16   1247  10/28/16   0942   09/07/16   1859   08/21/15   1405   PSA  480.00*  655.00*  741.00*  590.00*  542.00*   < >  424.22*  324.65*   --    < >  28.98*   < >  756.97*   TESTOSTTOTAL   --    --    --    --    --    --  <2 <2  11*   --   7*   --   4*    < > = values in this interval not displayed.         Recent Labs   Lab Test  10/18/17   1125  09/25/17   1219  09/05/17   1239  08/01/17   0910  06/19/17   1500   12/21/16   1149  12/01/16   1247  10/28/16   0942   09/07/16   1859   08/21/15   1405   PSA  479.00*  480.00*  655.00*  741.00*  590.00*   < >  424.22*  324.65*   --    < >  28.98*   < >  756.97*   TESTOSTTOTAL   --    --    --    --    --    --   <2  <2  11*   --   7*   --   4*    < > = values in this interval not displayed.          IMPRESSION/PLAN:   Metastatic prostate cancer: recent progression on Zytiga, Xofigo.  Now back on IV Taxotere every 3 weeks.  Tolerated well with mild fatigue, myelosuppression, mucositis, aches/pain, nausea, diarrhea, alopecia    We will continue with therapy as previously planned.     He dislikes going to pharmacy for dexamethasone for each cycle. He would like to have a single prescription.     He again had questions on the 'Triton 2' which we had discussed in the past. He wanted to know how long rucaparib has been in the market and efficacy data on this. I again reviewed the clinical trial in great details with him. In a study of similar class of agent - olaparib published in NEJ (N Engl J Med 2015; 373:9616-1593) about 33% of similar patients did have one of the mutations involving homologous DNA repair enzyme. 14 of the 16 patients with one of the mutations had a treatment response. Anemia (in 10 of the 50 patients [20%]) and fatigue (in 6 [12%]) were the most common grade 3 or 4 adverse events, findings that are consistent with previous studies of this class of agents. The current Triton 2 study selects patients with one of these mutations  which is checked at screening (BRCA1/2, TRACY, CHK2, RAD51 and others). This can be done from a fresh biopsy or from next generation sequencing of circulating tumor cells.     He remains interested in the study. He would be a candidate after he progresses post chemotherapy.     Anemia:Hgb is 10.2; will continue to observe. He will call if he has SOB. We could transfuse to keep Hgb closer to 10 given his CAD.     Heme: on lovenox for PE dx 11/2016. Had inpatient stay for GI bleed 1/11-1/14. Stopped xarelto and transitioned back to lovenox BID. No bleeding concerns. Recently dropped from 60mg BID to 50mg BID to 40 mg BID due to high Xa level.   -Continue at 40 mg BID.      Bone mets: on xgeva - had monthly for a year. OK to go to q 3 months now. Will administer at next visit for docetaxel(due in Nov of 2017)    CV: hx of CHF. No clinical s/s of failure.  Off metoprolol due to BP being too low.      Bili improved/stable.  OK for Taxotere    He will see Ms. Gabriela Mcguire in 3 weeks and me in 6 weeks.     Over 25 min of direct face to face time spent with patient with more than 50% time spent in counseling and coordinating care.        Again, thank you for allowing me to participate in the care of your patient.      Sincerely,    Bentley Robles MD

## 2017-10-18 NOTE — NURSING NOTE
"Oncology Rooming Note    October 18, 2017 11:43 AM   Oswaldo Win is a 72 year old male who presents for:    Chief Complaint   Patient presents with     Blood Draw     Labs only     Oncology Clinic Visit     Prostate CA     Initial Vitals: /56  Pulse 108  Temp 97.8  F (36.6  C) (Oral)  Resp 18  Wt 60.8 kg (134 lb)  SpO2 98%  BMI 21.64 kg/m2 Estimated body mass index is 21.64 kg/(m^2) as calculated from the following:    Height as of 9/25/17: 1.676 m (5' 5.98\").    Weight as of this encounter: 60.8 kg (134 lb). Body surface area is 1.68 meters squared.  Data Unavailable Comment: Data Unavailable   No LMP for male patient.  Allergies reviewed: Yes  Medications reviewed: Yes    Medications: Medication refills not needed today.  Pharmacy name entered into [employer]: [facility] [place], [place] - [address]    Clinical concerns:      6 minutes for nursing intake (face to face time)     Netta Duran CMA              "

## 2017-10-18 NOTE — MR AVS SNAPSHOT
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)           Follow-ups after your visit        Your next 10 appointments already scheduled     Nov 08, 2017 12:00 PM CST   [facility] Draw with [facility]   [facility] ([facility])    [address] [place] [id number]   [phone]            Nov 08, 2017 12:30 PM CST   (Arrive by 12:15 PM)   Return Visit with Gabriela Mcguire PA-C   [facility] ([facility])    [address] [place] [id number]   [phone]            Nov 08, 2017  1:30 PM CST   Infusion 120 with UC ONCOLOGY INFUSION, UC 22 ATC   [facility] ([facility])    [address] [place] [id number]   [phone]            Nov 29, 2017  1:45 PM CST   [facility] Draw with [facility] DRAW   [facility] ([facility])    [address] [place] [id number]   [phone]            Nov 29, 2017  2:15 PM CST   (Arrive by 2:00 PM)   Return Visit with Bentley Robles MD   [facility] ([facility])    [address] [place] [address]   [phone]            Nov 29, 2017  3:00 PM CST   Infusion 120 with UC ONCOLOGY INFUSION, UC 23 ATC   [facility] ([facility])    [address] [place] [phone]   [phone]               Future tests that were ordered for you today     Open Standing Orders        Priority Remaining Interval Expires Ordered    CBC with platelets differential Routine 25/25  10/18/2018 10/18/2017            Who to contact     If you have questions or need follow up information about today's clinic visit or your schedule please contact [facility] directly at [phone].  Normal or non-critical lab and imaging results will be communicated to you by [employer], letter or phone within 4 business days after the clinic has received the results. If you do not hear from us within 7 days, please contact the clinic through [employer] or phone. If you have a critical or abnormal lab result, we will notify you by phone as soon as possible.  Submit refill requests through [email or web address] or call your pharmacy and they will forward the refill request to us. Please allow 3 business days for your refill to be completed.          Additional Information About Your Visit        [employer] Information     [email or web address] gives you secure access to your electronic health record. If you see a primary care provider, you can also send messages to your care team and make appointments. If you have questions, please call your primary care clinic.  If you do not have a primary care provider, please call [phone] and they will assist you.        Care EveryWhere ID     This is your Care EveryWhere ID. This could be used by other organizations to access your [facility] medical records  [id number]        Your Vitals Were     Pulse Temperature Respirations Pulse Oximetry BMI (Body Mass Index)       108 97.8  F (36.6  C) (Oral) 18 98% 21.64 kg/m2        Blood Pressure from Last 3 Encounters:   10/18/17 103/56   10/18/17 103/56   09/25/17 108/64    Weight from Last 3 Encounters:   10/18/17 60.8 kg (134 lb)   10/18/17 60.8 kg (134 lb)   09/25/17 59 kg (130 lb)                 Today's Medication Changes          These changes are accurate as  of: 10/18/17  1:08 PM.  If you have any questions, ask your nurse or doctor.               These medicines have changed or have updated prescriptions.        Dose/Directions    dexamethasone 4 MG tablet   Commonly known as:  DECADRON   This may have changed:    - how much to take  - how to take this  - when to take this  - additional instructions   Used for:  Prostate cancer 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), Metastatic bone tumor (H)   Changed by:  Bentley Robles MD        Dose:  4 mg   Take 1 tablet (4 mg) by mouth daily (with breakfast)   Quantity:  12 tablet   Refills:  0            Where to get your medicines      These medications were sent to [facility] [address]  [address] [facility] [address]     Phone:  [phone]     dexamethasone 4 MG tablet         Some of these will need a paper prescription and others can be bought over the counter.  Ask your nurse if you have questions.     Bring a paper prescription for each of these medications     HYDROcodone-acetaminophen 5-325 MG per tablet                Primary Care Provider Office Phone # Fax #    Lucrecia Collier [phone]       [address]  [facility] [address]        Equal Access to Services     BLADIMIR KEY AH: Kelsey Ybarra, waesperanzada sandeep, qaconcepcionta kaalmada aria, peter brock. So [facility] [phone].    ATENCIÓN: Si habla español, tiene a mccall disposición servicios gratuitos de asistencia lingüística. [facility] [phone].    We comply with applicable federal civil rights laws and [place] laws. We do not discriminate on the basis of race, color, national origin, age, disability, sex, sexual orientation, or gender identity.            Thank you!     Thank you for choosing [facility]  for your care. Our goal is always to provide you with excellent care. Hearing back from our patients is one way we can continue to improve  our services. Please take a few minutes to complete the written survey that you may receive in the mail after your visit with us. Thank you!             Your Updated Medication List - Protect others around you: Learn how to safely use, store and throw away your medicines at www.disposemymeds.org.          This list is accurate as of: 10/18/17  1:08 PM.  Always use your most recent med list.                   Brand Name Dispense Instructions for use Diagnosis    aspirin 81 MG tablet     90 tablet    Take 1 tablet (81 mg) by mouth daily    Acute on chronic combined systolic and diastolic congestive heart failure (H)       calcium carbonate 1250 MG tablet    OS-RODNEY 500 mg [place]. Ca    120 tablet    Take 2 tablets (1,000 mg) by mouth 2 times daily    Hypocalcemia       dexamethasone 4 MG tablet    DECADRON    12 tablet    Take 1 tablet (4 mg) by mouth daily (with breakfast)    Prostate cancer 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), Metastatic bone tumor (H)       enoxaparin 40 MG/0.4ML injection    LOVENOX    60 Syringe    Inject 0.4 mLs (40 mg) Subcutaneous 2 times daily    Other chronic pulmonary embolism without acute cor pulmonale (H)       HYDROcodone-acetaminophen 5-325 MG per tablet    NORCO    30 tablet    Take 1 tablet by mouth every 6 hours as needed for moderate to severe pain Reported on 5/22/2017    Prostate cancer 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), Metastatic bone tumor (H)       LORazepam 0.5 MG tablet    ATIVAN    30 tablet    Take 1 tablet (0.5 mg) by mouth every 4 hours as needed (Anxiety, Nausea/Vomiting or Sleep)    Prostate cancer 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), Metastatic bone tumor (H)       predniSONE 5 MG tablet    DELTASONE    42 tablet    Take 1 tablet (5 mg) by mouth 2 times daily    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

## 2017-10-18 NOTE — PROGRESS NOTES
Infusion Nursing Note:  Oswaldo Win presents today for Cycle 4 Day 1 Taxotere.    Patient seen by provider today: Dr. Robles prior to infusion   present during visit today: Not Applicable.    Note: Pt denies complaints today.    Intravenous Access:  Peripheral IV placed.    Treatment Conditions:  Lab Results   Component Value Date    HGB 10.2 10/18/2017     Lab Results   Component Value Date    WBC 21.2 10/18/2017      Lab Results   Component Value Date    ANEU 18.0 10/18/2017     Lab Results   Component Value Date    PLT 97 10/18/2017      Lab Results   Component Value Date     10/18/2017                   Lab Results   Component Value Date    POTASSIUM 4.5 10/18/2017           Lab Results   Component Value Date    MAG 2.0 04/05/2017            Lab Results   Component Value Date    CR 0.76 10/18/2017                   Lab Results   Component Value Date    RODNEY 9.3 10/18/2017                Lab Results   Component Value Date    BILITOTAL 1.4 10/18/2017           Lab Results   Component Value Date    ALBUMIN 4.0 10/18/2017                    Lab Results   Component Value Date    ALT 37 10/18/2017           Lab Results   Component Value Date    AST 30 10/18/2017     Results reviewed, labs MET treatment parameters, ok to proceed with treatment.          Post Infusion Assessment:  Patient tolerated infusion without incident.  Blood return noted pre and post infusion.  Site patent and intact, free from redness, edema or discomfort.  No evidence of extravasations.  Access discontinued per protocol.    Neulasta On Pro- On Body injector applied to patient today on RUQ abdomen at 1430 with light facing toward naval. Writer discussed Neulasta injection would start tomorrow at 1730, approximately 27 hours after application applied today.  Written and Verbal instruction reviewed with patient.  Pt instructed when the dose delivery starts, it will take about 45 minutes to complete. Pt aware Neulasta Onpro On-Body  should have green flashing light and to call triage or on-call MD if injector flashes red or appears to be leaking. Pt aware to keep Onpro On-Body Neualsta 4 inches away from electrical equipment and to avoid showering 4 hours prior to injection. Neulasta Onpro Lot number documented on MAR.        Discharge Plan:   Patient declined prescription refills.  Discharge instructions reviewed with: Patient.  Patient and/or family verbalized understanding of discharge instructions and all questions answered.  Copy of AVS reviewed with patient and/or family.  Patient will return 11/8/17 for next appointment.  Patient discharged in stable condition accompanied by: self.  Departure Mode: Ambulatory.  Face to Face time: 0 min.      Idania Barfield RN

## 2017-10-30 NOTE — PROGRESS NOTES
Received call from pt. Pt reports SOB with activity, a little lightheadedness if moves too quick, muscle fatigue and weakness. States sx started last Wednesday and have progressively become more noticable.  Denies any headaches or chest pain. Feels his Hgb is low and needs a blood transfusion. Last Taxotere infusion was 10/18 and Hgb was 10/2 at that time.     Spoke with charge nurse, Fouzia. Informed next transfusion appointment is Wednesday,11/1 at 0800.  Called pt and he feels he is OK waiting until Wednesday for transfusion. Offered pt to come to clinic today or tomorrow for type and screen and CBC but he declined. Stated he would prefer to have labs and transfusion the same day. Encouraged pt to push fluids and contact clinic in the interim if sx worsen which he said he would do. Advised him to check in at 0700 on 11/1 for labs and type and screen prior to transfusion at 0800.    Dr. Robles and Sabrina Mcguire PA-C updated.

## 2017-11-01 NOTE — MR AVS SNAPSHOT
After Visit Summary   11/1/2017    Oswaldo Win    MRN: [id number]           Patient Information     Date Of Birth          1945        Visit Information        Provider Department      11/1/2017 8:00 AM  18 ATC;  ONCOLOGY INFUSION [facility]        Today's Diagnoses     Prostate cancer 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)    -  1    Metastatic bone tumor (H)        Chronic systolic heart failure (H)        Anemia, unspecified type          Care Instructions    Contact Numbers    [facility] [place] [facility] Main Line: [phone]  [facility] Triage:  [phone]    Call triage with chills and/or temperature greater than or equal to 100.5, uncontrolled nausea/vomiting, diarrhea, constipation, dizziness, shortness of breath, chest pain, bleeding, unexplained bruising, or any new/concerning symptoms, questions/concerns.     If you are having any concerning symptoms or wish to speak to a provider before your next infusion visit, please call your care coordinator or triage to notify them so we can adequately serve you.       After Hours: [phone]    If after hours, weekends, or holidays, call main hospital  and ask for Oncology doctor on call.             November 2017 Sunday Monday Tuesday Wednesday Thursday Friday Saturday                  1     [facility] LAB DRAW    7:00 AM   (15 min.)    [facility] LAB DRAW   [facility] Lab Draw     [facility] ONC INFUSION 360    8:00 AM   (360 min.)    ONCOLOGY INFUSION   [facility] 2     3     4       5     6     7     8     [facility]   12:00 PM   (15 min.)    [facility] LAB DRAW   [facility] Lab Draw     [facility] RETURN   12:15 PM   (50 min.)   Gabriela Mcguire PA-C   [facility]     [facility] ONC INFUSION 120    1:30 PM   (120 min.)    ONCOLOGY INFUSION   [facility] 9     10     11       12     13     14     15     16     17     18       19     20     21     22     23     24     25       26      27     28     29     [facility]    1:45 PM   (15 min.)   [facility] LAB DRAW   [facility] Lab Draw     [facility] RETURN    2:00 PM   (30 min.)   Bentley Robles MD   [facility] ONC INFUSION 120    3:00 PM   (120 min.)    ONCOLOGY INFUSION   [facility] 30                               Recent Results (from the past 24 hour(s))   *CBC with platelets differential    Collection Time: 11/01/17  8:12 AM   Result Value Ref Range    WBC 11.2 (H) 4.0 - 11.0 10e9/L    RBC Count 2.48 (L) 4.4 - 5.9 10e12/L    Hemoglobin 8.5 (L) 13.3 - 17.7 g/dL    Hematocrit 26.1 (L) 40.0 - 53.0 %     (H) 78 - 100 fl    MCH 34.3 (H) 26.5 - 33.0 pg    MCHC 32.6 31.5 - 36.5 g/dL    RDW 21.7 (H) 10.0 - 15.0 %    Platelet Count 73 (L) 150 - 450 10e9/L    Diff Method Manual Differential     % Neutrophils 83.0 %    % Lymphocytes 9.0 %    % Monocytes 5.0 %    % Eosinophils 0.0 %    % Basophils 1.0 %    % Metamyelocytes 1.0 %    % Myelocytes 1.0 %    Absolute Neutrophil 9.3 (H) 1.6 - 8.3 10e9/L    Absolute Lymphocytes 1.0 0.8 - 5.3 10e9/L    Absolute Monocytes 0.6 0.0 - 1.3 10e9/L    Absolute Eosinophils 0.0 0.0 - 0.7 10e9/L    Absolute Basophils 0.1 0.0 - 0.2 10e9/L    Absolute Metamyelocytes 0.1 (H) 0 10e9/L    Absolute Myelocytes 0.1 (H) 0 10e9/L    Anisocytosis Moderate     Poikilocytosis Slight     Teardrop Cells Slight     Macrocytes Present    ABO/Rh type and screen    Collection Time: 11/01/17  8:12 AM   Result Value Ref Range    Units Ordered 2     ABO B     RH(D) Pos     Antibody Screen Neg     Test Valid Only At          [facility]    Specimen Expires 11/04/2017     Crossmatch Red Blood Cells    PSA tumor marker    Collection Time: 11/01/17  8:12 AM   Result Value Ref Range    .00 (H) 0 - 4 ug/L   Comprehensive metabolic panel    Collection Time: 11/01/17  8:12 AM   Result Value Ref Range    Sodium 142 133 - 144 mmol/L     Potassium 4.0 3.4 - 5.3 mmol/L    Chloride 114 (H) 94 - 109 mmol/L    Carbon Dioxide 19 (L) 20 - 32 mmol/L    Anion Gap 9 3 - 14 mmol/L    Glucose 121 (H) 70 - 99 mg/dL    Urea Nitrogen 15 7 - 30 mg/dL    Creatinine 0.81 0.66 - 1.25 mg/dL    GFR Estimate >90 >60 mL/min/1.7m2    GFR Estimate If Black >90 >60 mL/min/1.7m2    Calcium 8.5 8.5 - 10.1 mg/dL    Bilirubin Total 0.9 0.2 - 1.3 mg/dL    Albumin 3.6 3.4 - 5.0 g/dL    Protein Total 6.9 6.8 - 8.8 g/dL    Alkaline Phosphatase 220 (H) 40 - 150 U/L    ALT 26 0 - 70 U/L    AST 24 0 - 45 U/L   Blood component    Collection Time: 11/01/17  8:12 AM   Result Value Ref Range    Unit Number [id number]     Blood Component Type Red Blood Cells Leukocyte Reduced     Division Number 00     Status of Unit Released to care unit 11/01/2017 1042     Blood Product Code [id number]     Unit Status ISS    Blood component    Collection Time: 11/01/17  8:12 AM   Result Value Ref Range    Unit Number [id number]     Blood Component Type Red Blood Cells Leukocyte Reduced     Division Number 00     Status of Unit Ready for patient 11/01/2017 1040     Blood Product Code [id number]     Unit Status ROSALIE          After Your Blood Transfusion  Discharge Instructions  After you leave  After a blood transfusion (received red blood cells, platelets, plasma, cryo or granulocytes), you will need to watch for signs of a reaction for the next 48 hours.  Call your clinic or 911 (or go to the Emergency room) if you have any signs of a reaction:    Shaking or chills    Fever (temperature above 100.0 F)    Headache    Nausea    Hives    Itching    Swelling of the face or feeling flushed    Ongoing dry cough (nothing is coughed up)    Trouble breathing or wheezing  Some signs of a reaction won't show up for a few days or up to 4 weeks.   These may include:    Fatigue    Dizziness    Pink or red urine  For informational purposes only. Not to replace the advice of your health care provider.   Copyright    2015 [facility]. All rights reserved. [employer] [id number] - Rev 07/16.          Follow-ups after your visit        Your next 10 appointments already scheduled     Nov 08, 2017 12:00 PM CST   [facility] Lab Draw with [facility] LAB DRAW   [facility] Lab Draw ([facility])    [address] [place] [id number]   [phone]            Nov 08, 2017 12:30 PM CST   (Arrive by 12:15 PM)   Return Visit with Gabriela Mcguire PA-C   [facility] ([facility])    [address] [place] [id number]   [phone]            Nov 08, 2017  1:30 PM CST   Infusion 120 with UC ONCOLOGY INFUSION, UC 22 ATC   [facility] ([facility])    [address] [place] [phone]   [phone]            Nov 29, 2017  1:45 PM CST   [facility] Lab Draw with [facility] LAB DRAW   [facility] Draw ([facility])    [address] [place] [phone]   [phone]            Nov 29, 2017  2:15 PM CST   (Arrive by 2:00 PM)   Return Visit with Bentley Robles MD   [facility] ([facility])    [address] [place] [address]   [phone]            Nov 29, 2017  3:00 PM CST   Infusion 120 with UC ONCOLOGY INFUSION, UC 23 ATC   [facility] ([facility])    [address] [place] [address]   [phone]              Future tests that were ordered for you today     Open Standing Orders        Priority Remaining Interval Expires Ordered    Transfuse red blood cell unit Routine 1/2 TRANSFUSE 2 UNITS  11/1/2017    Red blood cell prepare order unit Routine 99/100 CONDITIONAL (SPECIFY) BLOOD  10/31/2017            Who to contact     If you have questions or need  follow up information about today's clinic visit or your schedule please contact [facility] directly at [phone].  Normal or non-critical lab and imaging results will be communicated to you by [employer], letter or phone within 4 business days after the clinic has received the results. If you do not hear from us within 7 days, please contact the clinic through [employer] or phone. If you have a critical or abnormal lab result, we will notify you by phone as soon as possible.  Submit refill requests through [employer] or call your pharmacy and they will forward the refill request to us. Please allow 3 business days for your refill to be completed.          Additional Information About Your Visit        [employer] Information     [employer] gives you secure access to your electronic health record. If you see a primary care provider, you can also send messages to your care team and make appointments. If you have questions, please call your primary care clinic.  If you do not have a primary care provider, please call [phone] and they will assist you.        Care EveryWhere ID     This is your Care EveryWhere ID. This could be used by other organizations to access your [place] medical records  [id number]        Your Vitals Were     Pulse Temperature Respirations Pulse Oximetry BMI (Body Mass Index)       90 98.6  F (37  C) (Oral) 16 100% 21.8 kg/m2        Blood Pressure from Last 3 Encounters:   11/01/17 102/49   10/18/17 103/56   10/18/17 103/56    Weight from Last 3 Encounters:   11/01/17 61.2 kg (135 lb)   10/18/17 60.8 kg (134 lb)   10/18/17 60.8 kg (134 lb)              We Performed the Following     *CBC with platelets differential     ABO/Rh type and screen     Blood component     Blood component     Comprehensive metabolic panel     PSA tumor marker     Transfuse red blood cell unit        Primary Care Provider Office Phone # Fax #    Lucrecia Collier [phone]  [facility] [address]        Equal Access to Services     BLADIMIR KEY : Hadii aad ku hadlorenzagiulia Crystalkwame, waesperanzada luqadaha, qaconcepcionta neliamikkipeter brown. So [facility] [phone].    ATENCIÓN: Si habla español, tiene a mccall disposición servicios gratuitos de asistencia lingüística. Marquise al [phone].    We comply with applicable federal civil rights laws and [place] laws. We do not discriminate on the basis of race, color, national origin, age, disability, sex, sexual orientation, or gender identity.            Thank you!     Thank you for choosing [facility]  for your care. Our goal is always to provide you with excellent care. Hearing back from our patients is one way we can continue to improve our services. Please take a few minutes to complete the written survey that you may receive in the mail after your visit with us. Thank you!             Your Updated Medication List - Protect others around you: Learn how to safely use, store and throw away your medicines at www.disposemymeds.org.          This list is accurate as of: 11/1/17 11:38 AM.  Always use your most recent med list.                   Brand Name Dispense Instructions for use Diagnosis    aspirin 81 MG tablet     90 tablet    Take 1 tablet (81 mg) by mouth daily    Acute on chronic combined systolic and diastolic congestive heart failure (H)       calcium carbonate 1250 MG tablet    OS-RODNEY 500 mg [place]. Ca    120 tablet    Take 2 tablets (1,000 mg) by mouth 2 times daily    Hypocalcemia       dexamethasone 4 MG tablet    DECADRON    12 tablet    Take 1 tablet (4 mg) by mouth daily (with breakfast)    Prostate cancer 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), Metastatic bone tumor (H)       enoxaparin 40 MG/0.4ML injection    LOVENOX    60 Syringe    Inject 0.4 mLs (40 mg) Subcutaneous 2 times daily    Other chronic pulmonary embolism without acute cor pulmonale (H)       HYDROcodone-acetaminophen 5-325 MG per  tablet    NORCO    30 tablet    Take 1 tablet by mouth every 6 hours as needed for moderate to severe pain Reported on 5/22/2017    Prostate cancer 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), Metastatic bone tumor (H)       LORazepam 0.5 MG tablet    ATIVAN    30 tablet    Take 1 tablet (0.5 mg) by mouth every 4 hours as needed (Anxiety, Nausea/Vomiting or Sleep)    Prostate cancer 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), Metastatic bone tumor (H)       predniSONE 5 MG tablet    DELTASONE    42 tablet    Take 1 tablet (5 mg) by mouth 2 times daily    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

## 2017-11-01 NOTE — NURSING NOTE
Chief Complaint   Patient presents with     Blood Draw     Peripheal IV placed by RN and vitals taken by MELBA Llanes CMA November 1, 2017

## 2017-11-01 NOTE — PROGRESS NOTES
Infusion Nursing Note:  Oswaldo Win presents today for 2 units PRBCs.    Patient seen by provider today: No    Note: Pt reports MOURA, dizziness, lightheadedness x3 days. States he feels like his Hgb is low. Denies fevers, signs/symptoms of bleeding, or falls. 500ml IVF given as ordered for hypotension. 2 units PRBCs given. Pt refused Lasix. Gabriela WALTON notified. Hypotension resolved s/p IVF and transfusion. Pt educated to call if delayed reaction is suspected. Verbalized understanding.     TORB: 11/1/17 0850 Gabriela WALTON/Chelsi Mcclendon RN: Transfuse 2 units PRBC for Hgb 8.5 and symptomatic.      Intravenous Access:  Peripheral IV placed.    Treatment Conditions:  Lab Results   Component Value Date    HGB 8.5 11/01/2017     Lab Results   Component Value Date    WBC 11.2 11/01/2017      Lab Results   Component Value Date    ANEU 9.3 11/01/2017     Lab Results   Component Value Date    PLT 73 11/01/2017      Blood transfusion consent signed 3/31/17.    Post Infusion Assessment:  Patient tolerated transfusion without incident.  Blood return noted pre and post transfusion.  Access discontinued per protocol.    Discharge Plan:   Patient declined prescription refills.  Discharge instructions reviewed with: Patient.  Patient verbalized understanding of discharge instructions and all questions answered.  Copy of AVS reviewed with patient and/or family.  Patient will return 11/8/17 for next appointment.  Patient discharged in stable condition accompanied by: self.  Face to Face time: 3.    TAMIR LI, RN

## 2017-11-01 NOTE — PROGRESS NOTES
11/1/2017 7:34 AM LISAB ISABELLE Hawkins/Katie Jacome RN  -please start 500cc NS bolus IV now for hypotension and wait for lab results

## 2017-11-01 NOTE — PATIENT INSTRUCTIONS
Contact Numbers    [facility] [place] [facility] Main Line: [phone]  [facility] Triage:  [phone]    Call triage with chills and/or temperature greater than or equal to 100.5, uncontrolled nausea/vomiting, diarrhea, constipation, dizziness, shortness of breath, chest pain, bleeding, unexplained bruising, or any new/concerning symptoms, questions/concerns.     If you are having any concerning symptoms or wish to speak to a provider before your next infusion visit, please call your care coordinator or triage to notify them so we can adequately serve you.       After Hours: [phone]    If after hours, weekends, or holidays, call main hospital  and ask for Oncology doctor on call.             November 2017 Sunday Monday Tuesday Wednesday Thursday Friday Saturday                  1     [facility] DRAW    7:00 AM   (15 min.)   [facility]   [facility]     [facility] ONC INFUSION 360    8:00 AM   (360 min.)    ONCOLOGY INFUSION   [facility] 2     3     4       5     6     7     8     [facility] DRAW   12:00 PM   (15 min.)   [facility]   [facility]     [facility] RETURN   12:15 PM   (50 min.)   Gabriela Mcguire PA-C   [facility]    1:30 PM   (120 min.)    ONCOLOGY INFUSION   [facility] 9     10     11       12     13     14     15     16     17     18       19     20     21     22     23     24     25       26     27     28     29     [facility] DRAW    1:45 PM   (15 min.)   [facility]   [facility]     [facility] RETURN    2:00 PM   (30 min.)   Bentley Robles MD   [facility]     [facility] ONC INFUSION 120    3:00 PM   (120 min.)    ONCOLOGY INFUSION   [facility] 30                               Recent Results (from the past 24 hour(s))   *CBC with platelets differential    Collection Time: 11/01/17  8:12 AM   Result Value Ref Range    WBC  11.2 (H) 4.0 - 11.0 10e9/L    RBC Count 2.48 (L) 4.4 - 5.9 10e12/L    Hemoglobin 8.5 (L) 13.3 - 17.7 g/dL    Hematocrit 26.1 (L) 40.0 - 53.0 %     (H) 78 - 100 fl    MCH 34.3 (H) 26.5 - 33.0 pg    MCHC 32.6 31.5 - 36.5 g/dL    RDW 21.7 (H) 10.0 - 15.0 %    Platelet Count 73 (L) 150 - 450 10e9/L    Diff Method Manual Differential     % Neutrophils 83.0 %    % Lymphocytes 9.0 %    % Monocytes 5.0 %    % Eosinophils 0.0 %    % Basophils 1.0 %    % Metamyelocytes 1.0 %    % Myelocytes 1.0 %    Absolute Neutrophil 9.3 (H) 1.6 - 8.3 10e9/L    Absolute Lymphocytes 1.0 0.8 - 5.3 10e9/L    Absolute Monocytes 0.6 0.0 - 1.3 10e9/L    Absolute Eosinophils 0.0 0.0 - 0.7 10e9/L    Absolute Basophils 0.1 0.0 - 0.2 10e9/L    Absolute Metamyelocytes 0.1 (H) 0 10e9/L    Absolute Myelocytes 0.1 (H) 0 10e9/L    Anisocytosis Moderate     Poikilocytosis Slight     Teardrop Cells Slight     Macrocytes Present    ABO/Rh type and screen    Collection Time: 11/01/17  8:12 AM   Result Value Ref Range    Units Ordered 2     ABO B     RH(D) Pos     Antibody Screen Neg     Test Valid Only At          [facility]    Specimen Expires 11/04/2017     Crossmatch Red Blood Cells    PSA tumor marker    Collection Time: 11/01/17  8:12 AM   Result Value Ref Range    .00 (H) 0 - 4 ug/L   Comprehensive metabolic panel    Collection Time: 11/01/17  8:12 AM   Result Value Ref Range    Sodium 142 133 - 144 mmol/L    Potassium 4.0 3.4 - 5.3 mmol/L    Chloride 114 (H) 94 - 109 mmol/L    Carbon Dioxide 19 (L) 20 - 32 mmol/L    Anion Gap 9 3 - 14 mmol/L    Glucose 121 (H) 70 - 99 mg/dL    Urea Nitrogen 15 7 - 30 mg/dL    Creatinine 0.81 0.66 - 1.25 mg/dL    GFR Estimate >90 >60 mL/min/1.7m2    GFR Estimate If Black >90 >60 mL/min/1.7m2    Calcium 8.5 8.5 - 10.1 mg/dL    Bilirubin Total 0.9 0.2 - 1.3 mg/dL    Albumin 3.6 3.4 - 5.0 g/dL    Protein Total 6.9 6.8 - 8.8 g/dL    Alkaline Phosphatase 220 (H) 40  - 150 U/L    ALT 26 0 - 70 U/L    AST 24 0 - 45 U/L   Blood component    Collection Time: 11/01/17  8:12 AM   Result Value Ref Range    Unit Number [id number]     Blood Component Type Red Blood Cells Leukocyte Reduced     Division Number 00     Status of Unit Released to care unit 11/01/2017 1042     Blood Product Code [id number]     Unit Status ISS    Blood component    Collection Time: 11/01/17  8:12 AM   Result Value Ref Range    Unit Number [id number]     Blood Component Type Red Blood Cells Leukocyte Reduced     Division Number 00     Status of Unit Ready for patient 11/01/2017 1040     Blood Product Code [id number]     Unit Status ROSALIE          After Your Blood Transfusion  Discharge Instructions  After you leave  After a blood transfusion (received red blood cells, platelets, plasma, cryo or granulocytes), you will need to watch for signs of a reaction for the next 48 hours.  Call your clinic or 911 (or go to the Emergency room) if you have any signs of a reaction:    Shaking or chills    Fever (temperature above 100.0 F)    Headache    Nausea    Hives    Itching    Swelling of the face or feeling flushed    Ongoing dry cough (nothing is coughed up)    Trouble breathing or wheezing  Some signs of a reaction won't show up for a few days or up to 4 weeks.   These may include:    Fatigue    Dizziness    Pink or red urine  For informational purposes only. Not to replace the advice of your health care provider.   Copyright   2015 [employer]. All rights reserved. [employer] [id number] - Rev 07/16.

## 2017-11-08 NOTE — NURSING NOTE
Chief Complaint   Patient presents with     Blood Draw     labs drawn via PIV      /61 (BP Location: Left arm, Patient Position: Chair, Cuff Size: Adult Regular)  Pulse 78  Temp 98.1  F (36.7  C) (Oral)  Wt 61.2 kg (135 lb)  SpO2 100%  BMI 21.8 kg/m2    PIV placed right forearm by vascular access for infusion,  Labs drawn and sent. Pt tolerated well. Pt checked in for next appointment.    Rhonda Isabel

## 2017-11-08 NOTE — MR AVS SNAPSHOT
After Visit Summary   11/8/2017    Oswaldo Win    MRN: [id number]           Patient Information     Date Of Birth          1945        Visit Information        Provider Department      11/8/2017 12:30 PM Gabriela Mcguire PA-C [facility]        Today's Diagnoses     Prostate cancer 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)    -  1    Metastatic bone tumor (H)        Hypocalcemia           Follow-ups after your visit        Your next 10 appointments already scheduled     Nov 13, 2017  9:15 AM CST   [facility] Draw with [facility] DRAW   [facility] ([facility])    [address] [place] [id number]   [phone]            Nov 13, 2017 10:00 AM CST   Infusion 120 with UC ONCOLOGY INFUSION, UC 29 ATC   [facility] ([facility])    [address] [place] [address]   [phone]            Nov 22, 2017  6:30 AM CST   [facility] Lab Draw with [facility] DRAW   [facility] Lab Draw ([facility])    [address] [place] [id number]   [phone]            Nov 22, 2017  7:00 AM CST   Infusion 360 with UC ONCOLOGY INFUSION, UC 11 ATC   [facility] ([facility])    [address] [place] [address]   [phone]            Dec 06, 2017  8:45 AM CST   [facility] Lab Draw with [facility] DRAW   [facility]    [address] [place] [address]   [phone]            Dec 06, 2017  9:15 AM CST   (Arrive by 9:00 AM)   Return Visit with Bentley Robles MD   [facility] ([facility])    [address] [place] [id number]   [phone]            Dec 06, 2017 10:30 AM CST   Infusion 120 with UC  ONCOLOGY INFUSION,  22 ATC   [facility] ([facility])    [address]  [address]  [facility] [address] [phone]              Who to contact     If you have questions or need follow up information about today's clinic visit or your schedule please contact [facility] directly at [phone].  Normal or non-critical lab and imaging results will be communicated to you by [employer], letter or phone within 4 business days after the clinic has received the results. If you do not hear from us within 7 days, please contact the clinic through Polar[employer]ak[employer] or phone. If you have a critical or abnormal lab result, we will notify you by phone as soon as possible.  Submit refill requests through [employer] or call your pharmacy and they will forward the refill request to us. Please allow 3 business days for your refill to be completed.          Additional Information About Your Visit        [employer]ximity Information     [employer] gives you secure access to your electronic health record. If you see a primary care provider, you can also send messages to your care team and make appointments. If you have questions, please call your primary care clinic.  If you do not have a primary care provider, please call [phone] and they will assist you.        Care EveryWhere ID     This is your Care EveryWhere ID. This could be used by other organizations to access your [place] medical records  [id number]        Your Vitals Were     Pulse Temperature Pulse Oximetry BMI (Body Mass Index)          78 98.1  F (36.7  C) (Oral) 100% 21.8 kg/m2         Blood Pressure from Last 3 Encounters:   11/08/17 115/61   11/01/17 114/50   10/18/17 103/56    Weight from Last 3 Encounters:   11/08/17 61.2 kg (135 lb)   11/01/17 61.2 kg (135 lb)   10/18/17 60.8 kg (134 lb)              Today, you had the following     No orders found for display         Today's Medication Changes           These changes are accurate as of: 11/8/17  2:21 PM.  If you have any questions, ask your nurse or doctor.               These medicines have changed or have updated prescriptions.        Dose/Directions    calcium carbonate 1250 MG tablet   Commonly known as:  OS-RODNEY 500 mg [place]. Ca   This may have changed:  how much to take   Used for:  Hypocalcemia   Changed by:  Gabriela Mcguire PA-C        Dose:  1 tablet   Take 1 tablet (1,250 mg) by mouth 2 times daily   Quantity:  120 tablet   Refills:  3            Where to get your medicines      These medications were sent to [facility] [place], [place] - [address]  [address], [facility] [address]     Phone:  [phone]     calcium carbonate 1250 MG tablet    dexamethasone 4 MG tablet                Primary Care Provider Office Phone # Fax #    Lucrecia Collier [phone]       [address]  [facility] [address]        Equal Access to Services     BLADIMIR KEY : Hadii aad ku hadasho Sokwame, waaxda luqadaha, qaybta kaalmada adeegyainna, peter moran . So [facility] [phone].    ATENCIÓN: Si anilla español, tiene a mccall disposición servicios gratuitos de asistencia lingüística. Llame al [phone].    We comply with applicable federal civil rights laws and [place] laws. We do not discriminate on the basis of race, color, national origin, age, disability, sex, sexual orientation, or gender identity.            Thank you!     Thank you for choosing [facility]  for your care. Our goal is always to provide you with excellent care. Hearing back from our patients is one way we can continue to improve our services. Please take a few minutes to complete the written survey that you may receive in the mail after your visit with us. Thank you!             Your Updated Medication List - Protect others around you: Learn how to safely use, store and throw away your medicines at  www.disposemymeds.org.          This list is accurate as of: 11/8/17  2:21 PM.  Always use your most recent med list.                   Brand Name Dispense Instructions for use Diagnosis    aspirin 81 MG tablet     90 tablet    Take 1 tablet (81 mg) by mouth daily    Acute on chronic combined systolic and diastolic congestive heart failure (H)       calcium carbonate 1250 MG tablet    OS-RODNEY 500 mg [place]. Ca    120 tablet    Take 1 tablet (1,250 mg) by mouth 2 times daily    Hypocalcemia       dexamethasone 4 MG tablet    DECADRON    12 tablet    Take 1 tablet (4 mg) by mouth daily (with breakfast)    Prostate cancer 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), Metastatic bone tumor (H)       enoxaparin 40 MG/0.4ML injection    LOVENOX    60 Syringe    Inject 0.4 mLs (40 mg) Subcutaneous 2 times daily    Other chronic pulmonary embolism without acute cor pulmonale (H)       HYDROcodone-acetaminophen 5-325 MG per tablet    NORCO    30 tablet    Take 1 tablet by mouth every 6 hours as needed for moderate to severe pain Reported on 5/22/2017    Prostate cancer 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), Metastatic bone tumor (H)       LORazepam 0.5 MG tablet    ATIVAN    30 tablet    Take 1 tablet (0.5 mg) by mouth every 4 hours as needed (Anxiety, Nausea/Vomiting or Sleep)    Prostate cancer 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), Metastatic bone tumor (H)       predniSONE 5 MG tablet    DELTASONE    42 tablet    Take 1 tablet (5 mg) by mouth 2 times daily    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

## 2017-11-08 NOTE — PROGRESS NOTES
Oncology/Hematology Visit Note  Nov 8, 2017    DIAGNOSIS:  Mr. Win is a 71 year old male with a hx of CHF, CKD, CAD w/ hx of STEMI and CABG. He met with Dr. Robles at the end of July for consultation regarding metastatic prostate cancer. His story begins in May 2015 when he saw his PCP for back pain, present since Feb. He also had poor appetite, weight loss of 8lb and poor energy.  CT showed diffuse bony mets. CT Chest showed sclerotic mets throughout the bones. He was given degarelix on 7/22 in Urology. He was having pelvic pain and was evaluated by Dr Cavazos would did not feel XRT was appropriate.  He started on docetaxel 7/31. From 8/7-8/14 he was admitted with colitis, CHAR, elevated BNP, lactis acidosis, and poor appetite. He was discharged to a TCU and unfortunately, it sounds like he was not given his Lasix.  His BUBBA worsened and his breathing became compromised.  He was seen by Chelsi Corral on 8/21 and eventually transferred to the ED and admitted 8/21-8/24 for acute on chronic CHF.  At our last visit we discussed not pursuing further Taxotere at this time and discussed starting Zytiga when he was ready.  We pursued PT, OT, home lymphedema for supportive care at home in attempt for further rehabilitation. He was again admitted and discharged - 10/20/15.  He never started the Zytiga as he was still actively recovering from his cardiac issues.  His PSA remained stable, thus no intervention was pursued.  December 2015, Mainor met with Dr Robles and since his ECOG was back to a 1, they discussed re-challenging the Taxotere at a lower dose. 9/26/2016 he started Provenge off study. He continues on lupron every 3 mos and XGEVA. 9/26/2016 he started Provenge off study. He received 3 doses of Provenge (last 10/28), on 3rd dose had fevers, chills, myalgias, was bedbound for a week due to these symptoms. During this time he held his coumadin for 5 days prior to a central line removal 10/31. Later than week he had sudden onset of  right pleuritic chest pain and hemoptysis. Diagnosed with acute segmental/subsegmental PE on 11/8 and prescribed Lovenox.     Mainor was briefly on Zytiga and prednisone in November-December, however after one month his PSA went up, thus it was decided to add in Xofigo (1/11/17-- first).     On 12/28 he started xaralto and took his last pill on 1/10/17.  He stopped as he noticed melanotic stools and had some hemoptysis.  He was seen in clinic the next day with a hgb in th 6 range and was admitted.  Pan-scopes showed no active bleeding.  He was able to get his Xofigo in patient as well.  He was transitioned back to lovenox BID.     Mainor developed diarrhea on and off in the spring of 2017, Zytiga was stopped on 3/31 as it was unsure what was contributing.  Mainor felt it was related to the Xofigo and that his dose was too high as his weight is always at least 10 pounds higher in clinic than at home.      At his last visit with Dr Robles, his PSA was rising again and re-started Taxotere on 8/15/17.  PSA has been slowly trending down.     INTERVAL HISTORY:  Mr. 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      Mainor is doing well and has no new complains at this visit. He is having most of the listed side effects. He has some fatigue, mucositis around day 4-7, greasy sensation in tongue, altered taste, mild nausea/feeling queasy, aches and pain in his muscles, diarrhea, but otherwise he feels perfectly fine.  He notes that he has started melting again - his hair has been shedding. He had PRBC this last cycle on day 10- he was having MOURA and fatigue.  The transfusion made him feel fantastic again.    No f/s/c. No chest pain. No black stools. No new  issues.  He is on lovenox 40 mg BID.  He would like to feel good for Thanksgiving.     MEDICATIONS:   Current Outpatient Prescriptions   Medication Sig     dexamethasone (DECADRON) 4 MG tablet Take 1 tablet (4 mg) by mouth daily (with breakfast)     calcium  carbonate (OS-RODNEY 500 MG [place]. CA) 1250 MG tablet Take 1 tablet (1,250 mg) by mouth 2 times daily     HYDROcodone-acetaminophen (NORCO) 5-325 MG per tablet Take 1 tablet by mouth every 6 hours as needed for moderate to severe pain Reported on 5/22/2017     predniSONE (DELTASONE) 5 MG tablet Take 1 tablet (5 mg) by mouth 2 times daily     LORazepam (ATIVAN) 0.5 MG tablet Take 1 tablet (0.5 mg) by mouth every 4 hours as needed (Anxiety, Nausea/Vomiting or Sleep)     enoxaparin (LOVENOX) 40 MG/0.4ML injection Inject 0.4 mLs (40 mg) Subcutaneous 2 times daily     aspirin 81 MG tablet Take 1 tablet (81 mg) by mouth daily     Cholecalciferol (VITAMIN D) 2000 UNITS tablet Take 1 tablet by mouth daily     simvastatin (ZOCOR) 40 MG tablet TAKE ONE TABLET BY MOUTH AT BEDTIME     [DISCONTINUED] dexamethasone (DECADRON) 4 MG tablet Take 1 tablet (4 mg) by mouth daily (with breakfast)     Current Facility-Administered Medications   Medication     denosumab (XGEVA) injection 120 mg     leuprolide (LUPRON DEPOT) kit 22.5 mg     Facility-Administered Medications Ordered in Other Visits   Medication     hydrocortisone sodium succinate (solu-CORTEF) injection     glycopyrrolate (ROBINUL) injection     neostigmine (PROSTIGMINE) injection     protamine injection     albuterol (PROAIR HFA, PROVENTIL HFA, VENTOLIN HFA) inhaler          ALLERGIES:  No Known Allergies    PHYSICAL EXAMINATION:  Vitals: /61 (BP Location: Left arm, Patient Position: Chair, Cuff Size: Adult Regular)  Pulse 78  Temp 98.1  F (36.7  C) (Oral)  Wt 61.2 kg (135 lb)  SpO2 100%  BMI 21.8 kg/m2   Wt Readings from Last 10 Encounters:   11/08/17 61.2 kg (135 lb)   11/01/17 61.2 kg (135 lb)   10/18/17 60.8 kg (134 lb)   10/18/17 60.8 kg (134 lb)   09/25/17 59 kg (130 lb)   09/25/17 59 kg (130 lb)   09/05/17 58.5 kg (129 lb)   08/15/17 57.6 kg (127 lb)   08/07/17 57.6 kg (127 lb)   08/02/17 58.1 kg (128 lb)       GENERAL:  A pleasant person in no acute  distress.  Mildly pale and thin looking  LYMPH NODES:  No peripheral lymphadenopathy noted in the supraclavicular or cervical regions.   LUNGS:  Clear to auscultation bilaterally.   HEART:  Regular rate and rhythm   ABDOMEN:  Bowel sounds are active.  Soft and nontender.  No hepatosplenomegaly or other masses appreciated.  LOWER EXTREMITIES:  Without pitting edema to the knees bilaterally.   NEUROLOGICAL:  Alert/orientated/able to answer all questions.  CN grossly intact.     LAB:        10/18/2017 11:25 11/1/2017 08:12 11/8/2017 12:28   Sodium 137 142 137   Potassium 4.5 4.0 3.9   Chloride 104 114 (H) 108   Carbon Dioxide 21 19 (L) 22   Urea Nitrogen 27 15 18   Creatinine 0.76 0.81 0.87   GFR Estimate >90 >90 86   GFR Estimate If Black >90 >90 >90   Calcium 9.3 8.5 8.6   Anion Gap 11 9 8   Albumin 4.0 3.6 3.8   Protein Total 8.3 6.9 7.2   Bilirubin Total 1.4 (H) 0.9 1.2   Alkaline Phosphatase 276 (H) 220 (H) 232 (H)   ALT 37 26 27   AST 30 24 27   Bilirubin Direct 0.2  0.2   .00 (H) 389.00 (H)    Glucose 134 (H) 121 (H) 84   WBC 21.2 (H) 11.2 (H) 13.5 (H)   Hemoglobin 10.2 (L) 8.5 (L) 11.9 (L)   Hematocrit 30.4 (L) 26.1 (L) 35.9 (L)   Platelet Count 97 (L) 73 (L) 69 (L)   RBC Count 3.06 (L) 2.48 (L) 3.63 (L)   MCV 99 105 (H) 99       IMPRESSION/PLAN:   Metastatic prostate cancer: recent progression on Zytiga, Xofigo.  Now back on IV Taxotere every 3 weeks.  Tolerated well with mild fatigue, myelosuppression, mucositis, aches/pain, nausea, diarrhea, alopecia    Mainor's platelets are 69k today. I have been treating him in the low 70k range, but technically his parameters are 75k.  Given he is on anticoagulation, I think we should give him a week break and recheck next week.  Then we could set him up for PRBC right before thanksgiving if needed.    -Lupron today 11/8/17    Anemia:Hgb is 11.9 today.  He seems to need a transfusion every other cycle.  We need to keep him above 9 given his CAD.      Heme: on lovenox  for PE dx 11/2016. Had inpatient stay for GI bleed 1/11-1/14. Stopped xarelto and transitioned back to lovenox BID. No bleeding concerns. Recently dropped from 60mg BID to 50mg BID to 40 mg BID due to high Xa level.   -Continue at 40 mg BID.      Bone mets: on xgeva - had monthly for a year. OK to go to q 3 months now.   -Given today 11/8/17    CV: hx of CHF. No clinical s/s of failure.  Off metoprolol due to BP being too low.        Over 25 min of direct face to face time spent with patient with more than 50% time spent in counseling and coordinating care.      Sabrina Mcguire PA-C

## 2017-11-08 NOTE — NURSING NOTE
"Oncology Rooming Note    November 8, 2017 12:35 PM   Oswaldo Win is a 72 year old male who presents for:    Chief Complaint   Patient presents with     Blood Draw     labs drawn via PIV      Oncology Clinic Visit     Prostate CA     Initial Vitals: /61 (BP Location: Left arm, Patient Position: Chair, Cuff Size: Adult Regular)  Pulse 78  Temp 98.1  F (36.7  C) (Oral)  Wt 61.2 kg (135 lb)  SpO2 100%  BMI 21.8 kg/m2 Estimated body mass index is 21.8 kg/(m^2) as calculated from the following:    Height as of 9/25/17: 1.676 m (5' 5.98\").    Weight as of this encounter: 61.2 kg (135 lb). Body surface area is 1.69 meters squared.  Moderate Pain (5) Comment: Data Unavailable   No LMP for male patient.  Allergies reviewed: Yes  Medications reviewed: Yes    Medications: MEDICATION REFILLS NEEDED TODAY. Provider was notified.  Pharmacy name entered into [facility]: [facility] [place], [place] - [address]    Clinical concerns: Refill of Decadron needed Sabrina Mcguire was notified.    6 minutes for nursing intake (face to face time)     Netta Duran CMA              "

## 2017-11-08 NOTE — NURSING NOTE
XGEVA given in Left arm. Pt. Tolerated well. See CHRISTA Lopez        Lupron given in Left Glutaeus Jim. Pt.t tolerated well. See CHRISTA Lopez        .

## 2017-11-13 NOTE — PATIENT INSTRUCTIONS
Contact Numbers  [facility] Nurse Triage: [phone]  After Hours Nurse Line:  [phone]    Please call the [facility] Nurse Triage line or after hours number if you experience a temperature greater than or equal to 100.5, shaking chills, have uncontrolled nausea, vomiting and/or diarrhea, dizziness, shortness of breath, chest pain, bleeding, unexplained bruising, or if you have any other new/concerning symptoms, questions or concerns.     If you are having any concerning symptoms or wish to speak to a provider before your next infusion visit, please call your care coordinator or triage to notify them so we can adequately serve you.     If you need a refill on a narcotic prescription or other medication, please call triage before your infusion appointment.         November 2017 Sunday Monday Tuesday Wednesday Thursday Friday Saturday                  1     [facility] DRAW    7:00 AM   (15 min.)    [facility] LAB DRAW   [facility] Lab Draw     [facility] ONC INFUSION 360    8:00 AM   (360 min.)    ONCOLOGY INFUSION   [facility] 2     3     4       5     6     7     8     [facility] DRAW   12:00 PM   (15 min.)    [facility] LAB DRAW   [facility] Lab Draw     [facility] RETURN   12:15 PM   (50 min.)   Gabriela Mcguire PA-C   [facility] 9     10     11       12     13     [facility] DRAW    9:15 AM   (15 min.)    [facility] LAB DRAW   [facility] Lab Draw     [facility] ONC INFUSION 120   10:00 AM   (120 min.)    ONCOLOGY INFUSION   [facility] 14     15     16     17     18       19     20     21     22     [facility] DRAW    6:30 AM   (15 min.)    [facility] LAB DRAW   [facility] Lab Draw     [facility] ONC INFUSION 360    7:00 AM   (360 min.)    ONCOLOGY INFUSION   [facility] 23     24     25       26     27     28     29     30 December 2017 Sunday Monday Tuesday Wednesday  Thursday Friday Saturday                            1     2       3     4     5     6     [facility] LAB DRAW    8:45 AM   (15 min.)    [facility] LAB DRAW   [facility] Lab Draw     P RETURN    9:00 AM   (30 min.)   Bentley Robles MD   [facility] ONC INFUSION 120   10:30 AM   (120 min.)   [facility] ONCOLOGY INFUSION   [facility] 7     8     9       10     11     12     13     14     15     16       17     18     19     20     21     22     23       24     25     26     27     28     29     30       31                                                Lab Results:  Recent Results (from the past 12 hour(s))   CBC with platelets differential    Collection Time: 11/13/17  9:47 AM   Result Value Ref Range    WBC 14.0 (H) 4.0 - 11.0 10e9/L    RBC Count 3.35 (L) 4.4 - 5.9 10e12/L    Hemoglobin 10.9 (L) 13.3 - 17.7 g/dL    Hematocrit 32.5 (L) 40.0 - 53.0 %    MCV 97 78 - 100 fl    MCH 32.5 26.5 - 33.0 pg    MCHC 33.5 31.5 - 36.5 g/dL    RDW 19.0 (H) 10.0 - 15.0 %    Platelet Count 105 (L) 150 - 450 10e9/L    Diff Method Automated Method     % Neutrophils 89.6 %    % Lymphocytes 4.6 %    % Monocytes 2.5 %    % Eosinophils 0.0 %    % Basophils 0.2 %    % Immature Granulocytes 3.1 %    Nucleated RBCs 0 0 /100    Absolute Neutrophil 12.5 (H) 1.6 - 8.3 10e9/L    Absolute Lymphocytes 0.6 (L) 0.8 - 5.3 10e9/L    Absolute Monocytes 0.4 0.0 - 1.3 10e9/L    Absolute Eosinophils 0.0 0.0 - 0.7 10e9/L    Absolute Basophils 0.0 0.0 - 0.2 10e9/L    Abs Immature Granulocytes 0.4 0 - 0.4 10e9/L    Absolute Nucleated RBC 0.0

## 2017-11-13 NOTE — MR AVS SNAPSHOT
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     If you are having any concerning symptoms or wish to speak to a provider before your next infusion visit, please call your care coordinator or triage to notify them so we can adequately serve you.     If you need a refill on a narcotic prescription or other medication, please call triage before your infusion appointment.         November 2017 Sunday Monday Tuesday Wednesday Thursday Friday Saturday                  1     [facility]    7:00 AM   (15 min.)    [facility]     P ONC INFUSION 360    8:00 AM   (360 min.)    ONCOLOGY INFUSION   [facility] 2     3     4       5     6     7     8     [facility]   12:00 PM   (15 min.)    [facility]     [facility] RETURN   12:15 PM   (50 min.)   Gabriela Mcguire PA-C   [facility] 9     10     11       12     13     [facility]    9:15 AM   (15 min.)    [facility]     [facility] ONC  INFUSION 120   10:00 AM   (120 min.)    ONCOLOGY INFUSION   [facility] 14     15     16     17     18       19     20     21     22     [facility] DRAW    6:30 AM   (15 min.)    [facility]     [facility] ONC INFUSION 360    7:00 AM   (360 min.)    ONCOLOGY INFUSION   [facility] 23     24     25       26     27     28     29     30 December 2017 Sunday Monday Tuesday Wednesday Thursday Friday Saturday                            1     2       3     4     5     6     [facility] DRAW    8:45 AM   (15 min.)    [facility] DRAW   [facility] Lab Draw     [facility] RETURN    9:00 AM   (30 min.)   Bentley Robles MD   [facility]     [facility] ONC INFUSION 120   10:30 AM   (120 min.)    ONCOLOGY INFUSION   [facility] 7     8     9       10     11     12     13     14     15     16       17     18     19     20     21     22     23       24     25     26     27     28     29     30       31                                                Lab Results:  Recent Results (from the past 12 hour(s))   CBC with platelets differential    Collection Time: 11/13/17  9:47 AM   Result Value Ref Range    WBC 14.0 (H) 4.0 - 11.0 10e9/L    RBC Count 3.35 (L) 4.4 - 5.9 10e12/L    Hemoglobin 10.9 (L) 13.3 - 17.7 g/dL    Hematocrit 32.5 (L) 40.0 - 53.0 %    MCV 97 78 - 100 fl    MCH 32.5 26.5 - 33.0 pg    MCHC 33.5 31.5 - 36.5 g/dL    RDW 19.0 (H) 10.0 - 15.0 %    Platelet Count 105 (L) 150 - 450 10e9/L    Diff Method Automated Method     % Neutrophils 89.6 %    % Lymphocytes 4.6 %    % Monocytes 2.5 %    % Eosinophils 0.0 %    % Basophils 0.2 %    % Immature Granulocytes 3.1 %    Nucleated RBCs 0 0 /100    Absolute Neutrophil 12.5 (H) 1.6 - 8.3 10e9/L    Absolute Lymphocytes 0.6 (L) 0.8 - 5.3 10e9/L    Absolute Monocytes 0.4 0.0 - 1.3 10e9/L    Absolute Eosinophils 0.0 0.0 - 0.7 10e9/L     Absolute Basophils 0.0 0.0 - 0.2 10e9/L    Abs Immature Granulocytes 0.4 0 - 0.4 10e9/L    Absolute Nucleated RBC 0.0                Follow-ups after your visit        Your next 10 appointments already scheduled     Nov 22, 2017  6:30 AM CST   [facility] with [facility] DRAW   [facility] Lab Draw ([facility])    [address] [place] [id number] [phone]            Nov 22, 2017  7:00 AM CST   Infusion 360 with UC ONCOLOGY INFUSION, UC 11 ATC   [facility] ([facility])    [address] [place] [id number] [phone]            Dec 06, 2017  8:45 AM CST   [facility] Draw with UC [facility] DRAW   [facility] Draw ([facility])    [address] [place] [id number] [phone]            Dec 06, 2017  9:15 AM CST   (Arrive by 9:00 AM)   Return Visit with Bentley Robles MD   [facility] ([facility])    [address] [place] [id number] [phone]            Dec 06, 2017 10:30 AM CST   Infusion 120 with UC ONCOLOGY INFUSION, UC 22 ATC   [facility] ([facility])    [address] [place] [id number] [phone]              Who to contact     If you have questions or need follow up information about today's clinic visit or your schedule please contact [facility] directly at [phone].  Normal or non-critical lab and imaging results will be communicated to you by MyChart, letter or phone within 4 business days after the clinic has received the results. If you do not hear from us within 7 days, please contact the clinic through MyChart or phone. If you have a critical or abnormal lab result, we will notify you by phone as soon as possible.  Submit  refill requests through [employer] or call your pharmacy and they will forward the refill request to us. Please allow 3 business days for your refill to be completed.          Additional Information About Your Visit        [employer] Information     [employer] gives you secure access to your electronic health record. If you see a primary care provider, you can also send messages to your care team and make appointments. If you have questions, please call your primary care clinic.  If you do not have a primary care provider, please call [phone] and they will assist you.        Care EveryWhere ID     This is your Care EveryWhere ID. This could be used by other organizations to access your [facility] medical records  [id number]        Your Vitals Were     Pulse Temperature Respirations Pulse Oximetry BMI (Body Mass Index)       97 98.2  F (36.8  C) (Oral) 16 99% 21.8 kg/m2        Blood Pressure from Last 3 Encounters:   11/13/17 103/46   11/08/17 115/61   11/01/17 114/50    Weight from Last 3 Encounters:   11/13/17 61.2 kg (135 lb)   11/08/17 61.2 kg (135 lb)   11/01/17 61.2 kg (135 lb)              We Performed the Following     CBC with platelets differential        Primary Care Provider Office Phone # Fax #    Lucrecia Collier [phone] [address]  [facility] [address]        Equal Access to Services     BLADIMIR KEY : Hadii jesse goetz hadasho Sokwame, waaxda luqadaha, qaybta kaalmada peter knapp. So [facility] [phone].    ATENCIÓN: Si habla español, tiene a mccall disposición servicios gratuitos de asistencia lingüística. Marquise al [phone].    We comply with applicable federal civil rights laws and [place] laws. We do not discriminate on the basis of race, color, national origin, age, disability, sex, sexual orientation, or gender identity.            Thank you!     Thank you for choosing [facility]  for your care. Our goal is  always to provide you with excellent care. Hearing back from our patients is one way we can continue to improve our services. Please take a few minutes to complete the written survey that you may receive in the mail after your visit with us. Thank you!             Your Updated Medication List - Protect others around you: Learn how to safely use, store and throw away your medicines at www.disposemymeds.org.          This list is accurate as of: 11/13/17 11:13 AM.  Always use your most recent med list.                   Brand Name Dispense Instructions for use Diagnosis    aspirin 81 MG tablet     90 tablet    Take 1 tablet (81 mg) by mouth daily    Acute on chronic combined systolic and diastolic congestive heart failure (H)       calcium carbonate 1250 MG tablet    OS-RODNEY 500 mg [place] Ca    120 tablet    Take 1 tablet (1,250 mg) by mouth 2 times daily    Hypocalcemia       dexamethasone 4 MG tablet    DECADRON    12 tablet    Take 1 tablet (4 mg) by mouth daily (with breakfast)    Prostate cancer 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), Metastatic bone tumor (H)       enoxaparin 40 MG/0.4ML injection    LOVENOX    60 Syringe    Inject 0.4 mLs (40 mg) Subcutaneous 2 times daily    Other chronic pulmonary embolism without acute cor pulmonale (H)       HYDROcodone-acetaminophen 5-325 MG per tablet    NORCO    30 tablet    Take 1 tablet by mouth every 6 hours as needed for moderate to severe pain Reported on 5/22/2017    Prostate cancer 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), Metastatic bone tumor (H)       LORazepam 0.5 MG tablet    ATIVAN    30 tablet    Take 1 tablet (0.5 mg) by mouth every 4 hours as needed (Anxiety, Nausea/Vomiting or Sleep)    Prostate cancer 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), Metastatic bone tumor (H)       predniSONE 5 MG tablet    DELTASONE    42 tablet    Take 1 tablet (5 mg) by mouth 2 times daily    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

## 2017-11-13 NOTE — PROGRESS NOTES
Infusion Nursing Note:  Oswaldo Win presents today for Cycle 5 Taxotere.    Patient seen by provider today: No    Note: Pt reports no changes since seeing Sabrina last week. Confirmed he is taking his Decadron and also is taking Prednisone, but only once daily instead of twice as prescribed.    Neulasta Onpro On-Body injector applied to right abdomen at 1200 with light facing towards navel.  Writer discussed Neulasta injection would start tomorrow 11/14 at 1500, approximately 27 hours after application applied today.  Written and Verbal instruction reviewed with patient.  Pt instructed when the dose delivery starts, it will take about 45 minutes to complete.  Pt aware Neulasta Onpro On-Body should have green flashing light and to call triage or on-call MD if injector flashes red or appears to be leaking. Pt aware to keep Onpro On-Body Neulasta 4 inches away from electrical equipment and to avoid showering 4 hours prior to injection.       Intravenous Access:  Peripheral IV placed by Vascular Access.    Treatment Conditions:  Lab Results   Component Value Date    HGB 10.9 11/13/2017     Lab Results   Component Value Date    WBC 14.0 11/13/2017      Lab Results   Component Value Date    ANEU 12.5 11/13/2017     Lab Results   Component Value Date     11/13/2017      Results reviewed, labs MET treatment parameters, ok to proceed with treatment.    Post Infusion Assessment:  Patient tolerated infusion without incident.  Blood return noted pre and post infusion.  Site patent and intact, free from redness, edema or discomfort.  No evidence of extravasations. Access discontinued per protocol.    Discharge Plan:   Copy of AVS reviewed with patient and/or family.  Patient will return 11/22 for possible blood transfusion.  Patient discharged in stable condition accompanied by: self.  Departure Mode: Ambulatory.    Katie Andres RN

## 2017-11-13 NOTE — NURSING NOTE
Chief Complaint   Patient presents with     Blood Draw     Labs drawn from venipuncture by RN. Vs taken and pt checked in for appt     Labs collected from venipuncture by RN. Vitals taken. Checked in for appointment(s).    Ceci Zarate RN

## 2017-11-22 NOTE — PROGRESS NOTES
Infusion Nursing Note:  Oswaldo Win presents today for Possible transfusion and lab check.    Patient seen by provider today: No   present during visit today: Not Applicable.    Note: Patient arrived to infusion complaining of shortness of breath and dizziness.  Patient hgb 10.1.  Patient expresses interest in receiving 1 unit of blood today.  Reviewed patient with ISABELLE Cline.    TORB ISABELLE Cline/Sabiha Main RN at 0800 on 11/22/17  Patient not to receive transfusion today.  Check orthostatic BP.  Give 1L NS.    Patient BP orthostatic with standing BP 76/38.  Patient refused to stay for IV fluids.  Educated patient that he is volume down, so he needs additional hydration.  Patient verbalized understanding, but still refused.  ISABELLE Velasquez notified and came to see patient.  Patient continued to refuse IV fluids despite additional education and advisement. Paitent states he will drink additional fluid.    Patient educated to call if symptoms worsen.    Intravenous Access:  No PIV placed.    Treatment Conditions:  Lab Results   Component Value Date    HGB 10.1 11/22/2017     Lab Results   Component Value Date    WBC 19.9 11/22/2017      Lab Results   Component Value Date    ANEU 15.5 11/22/2017     Lab Results   Component Value Date    PLT 85 11/22/2017      Results reviewed, labs did NOT meet treatment parameters: hgb 10.1, plt 85.      Discharge Plan:   Patient declined prescription refills.  Discharge instructions reviewed with: Patient.  Patient and/or family verbalized understanding of discharge instructions and all questions answered.  Copy of AVS reviewed with patient and/or family.  Patient will return 12/6/17 for next appointment.  Patient discharged in stable condition accompanied by: self.  Departure Mode: Ambulatory.  Face to Face time: 7 minutes assessing symptoms and educating patient.    Sabiha Main RN

## 2017-11-22 NOTE — MR AVS SNAPSHOT
After Visit Summary   11/22/2017    Oswaldo Win    MRN: [id number]           Patient Information     Date Of Birth          1945        Visit Information        Provider Department      11/22/2017 7:00 AM  11 ATC;  ONCOLOGY INFUSION [facility]        Today's Diagnoses     Anemia, unspecified type    -  1    Prostate cancer 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)        Metastatic bone tumor (H)          Care Instructions    Contact Numbers  [facility]: [phone]  Triage: [phone] (Monday-Friday 8-4:30)  After Hours:  [phone]    Please call the [facility] Triage line if you experience a temperature greater than or equal to 100.4, shaking chills, have uncontrolled nausea, vomiting and/or diarrhea, dizziness, shortness of breath, chest pain, bleeding, unexplained bruising, or if you have any other new/concerning symptoms, questions or concerns.     If it is after hours, weekends, or holidays, please call [phone] to speak to someone regarding your questions or concerns.    If you are having any concerning symptoms or wish to speak to a provider before your next infusion visit, please call your care coordinator or triage to notify them so we can adequately serve you.     If you need a refill on a narcotic prescription or other medication, please call triage before your infusion appointment.             November 2017 Sunday Monday Tuesday Wednesday Thursday Friday Saturday                  1     [facility] LAB DRAW    7:00 AM   (15 min.)    [facility] LAB DRAW   [facility] Draw     [facility] ONC INFUSION 360    8:00 AM   (360 min.)    ONCOLOGY INFUSION   [facility] 2     3     4       5     6     7     8     [facility] DRAW   12:00 PM   (15 min.)    [facility] LAB DRAW   [facility] Lab Draw     [facility] RETURN   12:15 PM   (50 min.)   Gabriela Mcguire PA-C   [facility] 9     10     11       12     13     [facility]  DRAW    9:15 AM   (15 min.)    [facility] LAB DRAW   [facility] Draw     [facility] ONC INFUSION 120   10:00 AM   (120 min.)    ONCOLOGY INFUSION   [facility] 14     15     16     17     18       19     20     21     22     [facility] DRAW    6:30 AM   (15 min.)    [facility] LAB DRAW   [facility] Draw     [facility] ONC INFUSION 360    7:00 AM   (360 min.)    ONCOLOGY INFUSION   [facility] 23     24     25       26     27     28     29 30 December 2017 Sunday Monday Tuesday Wednesday Thursday Friday Saturday                            1     2       3     4     5     6     [facility] DRAW    8:45 AM   (15 min.)    [facility] LAB DRAW   [facility] Draw     [facility] RETURN    9:00 AM   (30 min.)   Bentley Robles MD   [facility]     [facility] ONC INFUSION 120   10:30 AM   (120 min.)    ONCOLOGY INFUSION   [facility] 7     8     9       10     11     12     13     14     15     16       17     18     19     20     21     22     23       24     25     26     27     28     29     30       31                                                Lab Results:  Recent Results (from the past 12 hour(s))   ABO/Rh type and screen    Collection Time: 11/22/17  6:37 AM   Result Value Ref Range    ABO B     RH(D) Pos     Antibody Screen PENDING     Test Valid Only At          [facility]    Specimen Expires 11/25/2017    CBC with platelets differential    Collection Time: 11/22/17  6:37 AM   Result Value Ref Range    WBC 19.9 (H) 4.0 - 11.0 10e9/L    RBC Count 3.00 (L) 4.4 - 5.9 10e12/L    Hemoglobin 10.1 (L) 13.3 - 17.7 g/dL    Hematocrit 31.2 (L) 40.0 - 53.0 %     (H) 78 - 100 fl    MCH 33.7 (H) 26.5 - 33.0 pg    MCHC 32.4 31.5 - 36.5 g/dL    RDW 19.9 (H) 10.0 - 15.0 %    Platelet Count 85 (L) 150 - 450 10e9/L    Diff Method Manual Differential     %  Neutrophils 78.0 %    % Lymphocytes 10.0 %    % Monocytes 8.0 %    % Eosinophils 0.0 %    % Basophils 0.0 %    % Metamyelocytes 3.0 %    % Myelocytes 1.0 %    Nucleated RBCs 1 (H) 0 /100    Absolute Neutrophil 15.5 (H) 1.6 - 8.3 10e9/L    Absolute Lymphocytes 2.0 0.8 - 5.3 10e9/L    Absolute Monocytes 1.6 (H) 0.0 - 1.3 10e9/L    Absolute Eosinophils 0.0 0.0 - 0.7 10e9/L    Absolute Basophils 0.0 0.0 - 0.2 10e9/L    Absolute Metamyelocytes 0.6 (H) 0 10e9/L    Absolute Myelocytes 0.2 (H) 0 10e9/L    Absolute Nucleated RBC 0.2     Anisocytosis Moderate     Poikilocytosis Slight     Polychromasia Slight               Follow-ups after your visit        Your next 10 appointments already scheduled     Dec 06, 2017  8:45 AM CST   [facility] Lab Draw with [facility] LAB DRAW   [facility] Lab Draw ([facility])    [address] [place] [address] [id number] [phone]            Dec 06, 2017  9:15 AM CST   (Arrive by 9:00 AM)   Return Visit with Bentley Robles MD   [facility] ([facility])    [address] [place] [address] [id number] [phone]            Dec 06, 2017 10:30 AM CST   Infusion 120 with  ONCOLOGY INFUSION, UC 22 ATC   [facility] ([facility])    [address] [place] [address] [id number] [phone]              Future tests that were ordered for you today     Open Standing Orders        Priority Remaining Interval Expires Ordered    Transfuse red blood cell unit Routine 1/2 TRANSFUSE 2 UNITS  11/22/2017    Red blood cell prepare order unit Routine 99/100 CONDITIONAL (SPECIFY) BLOOD  11/21/2017            Who to contact     If you have questions or need follow up information about today's clinic visit or your schedule please contact [facility] directly at [phone].  Normal or non-critical lab and imaging  results will be communicated to you by [employer]ouphart, letter or phone within 4 business days after the clinic has received the results. If you do not hear from us within 7 days, please contact the clinic through MIGS[employer] or phone. If you have a critical or abnormal lab result, we will notify you by phone as soon as possible.  Submit refill requests through [employer] or call your pharmacy and they will forward the refill request to us. Please allow 3 business days for your refill to be completed.          Additional Information About Your Visit        MIGSIF Information     M[employer] gives you secure access to your electronic health record. If you see a primary care provider, you can also send messages to your care team and make appointments. If you have questions, please call your primary care clinic.  If you do not have a primary care provider, please call [phone] and they will assist you.        Care EveryWhere ID     This is your Care EveryWhere ID. This could be used by other organizations to access your [place] medical records  [id number]        Your Vitals Were     Pulse Temperature Respirations Pulse Oximetry BMI (Body Mass Index)       106 98.2  F (36.8  C) (Oral) 16 100% 21.8 kg/m2        Blood Pressure from Last 3 Encounters:   11/22/17 113/62   11/13/17 103/46   11/08/17 115/61    Weight from Last 3 Encounters:   11/22/17 61.2 kg (135 lb)   11/13/17 61.2 kg (135 lb)   11/08/17 61.2 kg (135 lb)              We Performed the Following     ABO/Rh type and screen     CBC with platelets differential          Today's Medication Changes          These changes are accurate as of: 11/22/17  8:47 AM.  If you have any questions, ask your nurse or doctor.               These medicines have changed or have updated prescriptions.        Dose/Directions    enoxaparin 40 MG/0.4ML injection   Commonly known as:  LOVENOX   This may have changed:  when to take this   Used for:  Other chronic pulmonary embolism without acute  cor pulmonale (H)        Dose:  40 mg   Inject 0.4 mLs (40 mg) Subcutaneous 2 times daily   Quantity:  60 Syringe   Refills:  3                Primary Care Provider Office Phone # Fax #    Lucrecia Collier [phone] [address]  [facility] [address]        Equal Access to Services     HUMERAJENNI YESI : Hadii aad ku hadasho Soomaali, waaxda luqadaha, qaybta kaalmada adeegyada, peter kurtz chaneln myla palmaalanevelyn moran . So [facility] [phone].    ATENCIÓN: Si habla español, tiene a mccall disposición servicios gratuitos de asistencia lingüística. Marquise al [phone].    We comply with applicable federal civil rights laws and [place] laws. We do not discriminate on the basis of race, color, national origin, age, disability, sex, sexual orientation, or gender identity.            Thank you!     Thank you for choosing [facility]  for your care. Our goal is always to provide you with excellent care. Hearing back from our patients is one way we can continue to improve our services. Please take a few minutes to complete the written survey that you may receive in the mail after your visit with us. Thank you!             Your Updated Medication List - Protect others around you: Learn how to safely use, store and throw away your medicines at www.disposemymeds.org.          This list is accurate as of: 11/22/17  8:47 AM.  Always use your most recent med list.                   Brand Name Dispense Instructions for use Diagnosis    aspirin 81 MG tablet     90 tablet    Take 1 tablet (81 mg) by mouth daily    Acute on chronic combined systolic and diastolic congestive heart failure (H)       calcium carbonate 1250 MG tablet    OS-RODNEY 500 mg [place]. Ca    120 tablet    Take 1 tablet (1,250 mg) by mouth 2 times daily    Hypocalcemia       dexamethasone 4 MG tablet    DECADRON    12 tablet    Take 1 tablet (4 mg) by mouth daily (with breakfast)    Prostate cancer 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),  Metastatic bone tumor (H)       enoxaparin 40 MG/0.4ML injection    LOVENOX    60 Syringe    Inject 0.4 mLs (40 mg) Subcutaneous 2 times daily    Other chronic pulmonary embolism without acute cor pulmonale (H)       HYDROcodone-acetaminophen 5-325 MG per tablet    NORCO    30 tablet    Take 1 tablet by mouth every 6 hours as needed for moderate to severe pain Reported on 5/22/2017    Prostate cancer 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), Metastatic bone tumor (H)       LORazepam 0.5 MG tablet    ATIVAN    30 tablet    Take 1 tablet (0.5 mg) by mouth every 4 hours as needed (Anxiety, Nausea/Vomiting or Sleep)    Prostate cancer 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), Metastatic bone tumor (H)       * predniSONE 5 MG tablet    DELTASONE    42 tablet    Take 1 tablet (5 mg) by mouth 2 times daily    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), Prostate cancer 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), Metastatic bone tumor (H)       * predniSONE 5 MG tablet    DELTASONE    42 tablet    Take 1 tablet (5 mg) by mouth 2 times daily for 21 days    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 Read the directions carefully, and ask your doctor or other care provider to review them with you.

## 2017-11-22 NOTE — PATIENT INSTRUCTIONS
Contact Numbers  [facility]: [phone]  Triage: [phone] (Monday-Friday 8-4:30)  After Hours:  [phone]    Please call the [facility] Triage line if you experience a temperature greater than or equal to 100.4, shaking chills, have uncontrolled nausea, vomiting and/or diarrhea, dizziness, shortness of breath, chest pain, bleeding, unexplained bruising, or if you have any other new/concerning symptoms, questions or concerns.     If it is after hours, weekends, or holidays, please call [phone] to speak to someone regarding your questions or concerns.    If you are having any concerning symptoms or wish to speak to a provider before your next infusion visit, please call your care coordinator or triage to notify them so we can adequately serve you.     If you need a refill on a narcotic prescription or other medication, please call triage before your infusion appointment.             November 2017 Sunday Monday Tuesday Wednesday Thursday Friday Saturday                  1     [facility] DRAW    7:00 AM   (15 min.)    [facility] LAB DRAW   [facility] Lab Draw     [facility] ONC INFUSION 360    8:00 AM   (360 min.)    ONCOLOGY INFUSION   [facility] 2     3     4       5     6     7     8     [facility] LAB DRAW   12:00 PM   (15 min.)   [facility] DRAW   [facility] Lab Draw     UMP RETURN   12:15 PM   (50 min.)   Gabriela Mcguire PA-C   [facility] 9     10     11       12     13     [facility] DRAW    9:15 AM   (15 min.)   [facility] DRAW   [facility] Lab Draw     [facility] ONC INFUSION 120   10:00 AM   (120 min.)    ONCOLOGY INFUSION   [facility] 14     15     16     17     18       19     20     21     22     [facility] DRAW    6:30 AM   (15 min.)   [facility] DRAW   [facility] Lab Draw     [facility] ONC INFUSION 360    7:00 AM   (360 min.)    ONCOLOGY INFUSION   [facility] 23      24     25       26     27 28 29 30 December 2017 Sunday Monday Tuesday Wednesday Thursday Friday Saturday                            1     2       3     4     5     6     [facility] LAB DRAW    8:45 AM   (15 min.)    [facility] Lab Draw     [facility] RETURN    9:00 AM   (30 min.)   Bentley Robles MD   [facility] ONC INFUSION 120   10:30 AM   (120 min.)    ONCOLOGY INFUSION   [facility] 7     8     9       10     11     12     13     14     15     16       17     18     19     20     21     22     23       24     25     26     27     28     29     30       31                                                Lab Results:  Recent Results (from the past 12 hour(s))   ABO/Rh type and screen    Collection Time: 11/22/17  6:37 AM   Result Value Ref Range    ABO B     RH(D) Pos     Antibody Screen PENDING     Test Valid Only At          [facility]    Specimen Expires 11/25/2017    CBC with platelets differential    Collection Time: 11/22/17  6:37 AM   Result Value Ref Range    WBC 19.9 (H) 4.0 - 11.0 10e9/L    RBC Count 3.00 (L) 4.4 - 5.9 10e12/L    Hemoglobin 10.1 (L) 13.3 - 17.7 g/dL    Hematocrit 31.2 (L) 40.0 - 53.0 %     (H) 78 - 100 fl    MCH 33.7 (H) 26.5 - 33.0 pg    MCHC 32.4 31.5 - 36.5 g/dL    RDW 19.9 (H) 10.0 - 15.0 %    Platelet Count 85 (L) 150 - 450 10e9/L    Diff Method Manual Differential     % Neutrophils 78.0 %    % Lymphocytes 10.0 %    % Monocytes 8.0 %    % Eosinophils 0.0 %    % Basophils 0.0 %    % Metamyelocytes 3.0 %    % Myelocytes 1.0 %    Nucleated RBCs 1 (H) 0 /100    Absolute Neutrophil 15.5 (H) 1.6 - 8.3 10e9/L    Absolute Lymphocytes 2.0 0.8 - 5.3 10e9/L    Absolute Monocytes 1.6 (H) 0.0 - 1.3 10e9/L    Absolute Eosinophils 0.0 0.0 - 0.7 10e9/L    Absolute Basophils 0.0 0.0 - 0.2 10e9/L    Absolute Metamyelocytes 0.6 (H) 0  10e9/L    Absolute Myelocytes 0.2 (H) 0 10e9/L    Absolute Nucleated RBC 0.2     Anisocytosis Moderate     Poikilocytosis Slight     Polychromasia Slight

## 2017-12-06 NOTE — MR AVS SNAPSHOT
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)           Follow-ups after your visit        Your next 10 appointments already scheduled     Jan 03, 2018 10:45 AM CST   [facility] with  [facility]   [facility] ([facility])    [address]  2nd Floor  [facility] [id number] [phone]            Jan 03, 2018 11:30 AM CST   (Arrive by 11:15 AM)   MR PROSTATE with [id number]   [facility] ([facility])    [address]  1st Floor  [facility] [id number] [phone]           Take your medicines as usual, unless your doctor tells you not to. Bring a list of your current medicines to your exam (including vitamins, minerals and over-the-counter drugs).  You will be given intravenous contrast for this exam. To prepare:   The day before your exam, drink extra fluids at least six 8-ounce glasses (unless your doctor tells you to restrict your fluids).   Have a blood test (creatinine test) within 30 days of your exam. Go to your clinic or Diagnostic Imaging Department for this test.  The MRI machine uses a strong magnet. Please wear clothes without metal (snaps, zippers). A sweatsuit works well, or we may give you a hospital gown.  Please remove any body piercings and hair extensions before you arrive. You will also remove watches, jewelry, hairpins, wallets, dentures, partial dental plates and hearing aids. You may wear contact lenses, and you may be able to wear your rings. We have a safe place to keep your personal  items, but it is safer to leave them at home.   **IMPORTANT** THE INSTRUCTIONS BELOW ARE ONLY FOR THOSE PATIENTS WHO HAVE BEEN TOLD THEY WILL RECEIVE SEDATION OR GENERAL ANESTHESIA DURING THEIR MRI PROCEDURE:  IF YOU WILL RECEIVE SEDATION (take medicine to help you relax during your exam):   You must get the medicine from your doctor before you arrive. Bring the medicine to the exam. Do not take it at home.   Arrive one hour early. Bring someone who can take you home after the test. Your medicine will make you sleepy. After the exam, you may not drive, take a bus or take a taxi by yourself.   No eating 8 hours before your exam. You may have clear liquids up until 4 hours before your exam. (Clear liquids include water, clear tea, black coffee and fruit juice without pulp.)  IF YOU WILL RECEIVE ANESTHESIA (be asleep for your exam):   Arrive 1 1/2 hours early. Bring someone who can take you home after the test. You may not drive, take a bus or take a taxi by yourself.   No eating 8 hours before your exam. You may have clear liquids up until 4 hours before your exam. (Clear liquids include water, clear tea, black coffee and fruit juice without pulp.)  Please call the Imaging Department at your exam site with any questions.            Jan 03, 2018  2:15 PM CST   (Arrive by 2:00 PM)   Return Visit with Bentley Robles MD   [facility] ([facility])    [address] [place] [id number] [phone]              Future tests that were ordered for you today     Open Future Orders        Priority Expected Expires Ordered    MR Prostate Routine  12/6/2018 12/6/2017            Who to contact     If you have questions or need follow up information about today's clinic visit or your schedule please contact [facility] directly at [phone].  Normal or non-critical lab and imaging results will be communicated to you by Elin, letter  or phone within 4 business days after the clinic has received the results. If you do not hear from us within 7 days, please contact the clinic through Clarity or phone. If you have a critical or abnormal lab result, we will notify you by phone as soon as possible.  Submit refill requests through Clarity or call your pharmacy and they will forward the refill request to us. Please allow 3 business days for your refill to be completed.          Additional Information About Your Visit        [employer] Information     Clarity gives you secure access to your electronic health record. If you see a primary care provider, you can also send messages to your care team and make appointments. If you have questions, please call your primary care clinic.  If you do not have a primary care provider, please call [phone] and they will assist you.        Care EveryWhere ID     This is your Care EveryWhere ID. This could be used by other organizations to access your [place] medical records  [id number]        Your Vitals Were     Pulse Temperature Respirations Pulse Oximetry BMI (Body Mass Index)       105 98.1  F (36.7  C) (Oral) 16 100% 21.88 kg/m2        Blood Pressure from Last 3 Encounters:   12/06/17 119/62   11/22/17 113/62   11/13/17 103/46    Weight from Last 3 Encounters:   12/06/17 61.5 kg (135 lb 8 oz)   11/22/17 61.2 kg (135 lb)   11/13/17 61.2 kg (135 lb)                 Today's Medication Changes          These changes are accurate as of: 12/6/17  3:10 PM.  If you have any questions, ask your nurse or doctor.               These medicines have changed or have updated prescriptions.        Dose/Directions    enoxaparin 40 MG/0.4ML injection   Commonly known as:  LOVENOX   This may have changed:  when to take this   Used for:  Other chronic pulmonary embolism without acute cor pulmonale (H)        Dose:  40 mg   Inject 0.4 mLs (40 mg) Subcutaneous 2 times daily   Quantity:  60 Syringe   Refills:  3       * predniSONE  5 MG tablet   Commonly known as:  DELTASONE   This may have changed:  Another medication with the same name was added. Make sure you understand how and when to take each.   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 Make sure you understand how and when to take each.   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 Read the directions carefully, and ask your doctor or other care provider to review them with you.         Where to get your medicines      These medications were sent to [facility] [address] [facility]  [address] [place] [address] [address] [address]    Hours:  TRANSPLANT PHONE NUMBER [phone] Phone:  [phone]     predniSONE 5 MG tablet                Primary Care Provider Office Phone # Fax #    Lucrecia Collier [phone]       [address]  [facility] [address]        Equal Access to Services     [facility]: Hadii jesse goetz hadasho Soomaali, waaxda luqadaha, qaybta kaalmada adeegyada, peter brock. So [facility] [phone].    ATENCIÓN: Si habla español, tiene a mccall disposición servicios gratuitos de asistencia lingüística. Llame al [phone].    We comply with applicable federal civil rights laws  and [place] laws. We do not discriminate on the basis of race, color, national origin, age, disability, sex, sexual orientation, or gender identity.            Thank you!     Thank you for choosing [facility]  for your care. Our goal is always to provide you with excellent care. Hearing back from our patients is one way we can continue to improve our services. Please take a few minutes to complete the written survey that you may receive in the mail after your visit with us. Thank you!             Your Updated Medication List - Protect others around you: Learn how to safely use, store and throw away your medicines at www.disposemymeds.org.          This list is accurate as of: 12/6/17  3:10 PM.  Always use your most recent med list.                   Brand Name Dispense Instructions for use Diagnosis    aspirin 81 MG tablet     90 tablet    Take 1 tablet (81 mg) by mouth daily    Acute on chronic combined systolic and diastolic congestive heart failure (H)       calcium carbonate 1250 MG tablet    OS-RODNEY 500 mg [place]. Ca    120 tablet    Take 1 tablet (1,250 mg) by mouth 2 times daily    Hypocalcemia       dexamethasone 4 MG tablet    DECADRON    12 tablet    Take 1 tablet (4 mg) by mouth daily (with breakfast)    Prostate cancer 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), Metastatic bone tumor (H)       enoxaparin 40 MG/0.4ML injection    LOVENOX    60 Syringe    Inject 0.4 mLs (40 mg) Subcutaneous 2 times daily    Other chronic pulmonary embolism without acute cor pulmonale (H)       HYDROcodone-acetaminophen 5-325 MG per tablet    NORCO    30 tablet    Take 1 tablet by mouth every 6 hours as needed for moderate to severe pain Reported on 5/22/2017    Prostate cancer 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), Metastatic bone tumor (H)       LORazepam 0.5 MG tablet    ATIVAN    30 tablet    Take 1 tablet (0.5 mg) by mouth every 4 hours as needed (Anxiety, Nausea/Vomiting or Sleep)    Prostate cancer 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), Metastatic  bone tumor (H)       * predniSONE 5 MG tablet    DELTASONE    42 tablet    Take 1 tablet (5 mg) by mouth 2 times daily    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), Prostate cancer 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), Metastatic bone tumor (H)       * predniSONE 5 MG tablet    DELTASONE    42 tablet    Take 1 tablet (5 mg) by mouth 2 times daily for 21 days    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 Read the directions carefully, and ask your doctor or other care provider to review them with you.

## 2017-12-06 NOTE — NURSING NOTE
Chief Complaint   Patient presents with     Blood Draw     peripheral IV placed in left arm for lab draw by RN and vitals taken by MELBA Llanes CMA December 6, 2017

## 2017-12-06 NOTE — PATIENT INSTRUCTIONS
Contact numbers:  Triage Main Line: [phone]  After hours: [phone]    Call with chills and/or temperature greater than or equal to 100.5 and questions or concerns.    If after hours, weekends, or holidays, call main hospital  at  [phone] and ask for Oncology doctor on call.           December 2017 Sunday Monday Tuesday Wednesday Thursday Friday Saturday                            1     2       3     4     5     6     [facility]    8:45 AM   (15 min.)    [facility] DRAW   [facility] Draw     [facility] RETURN    9:00 AM   (30 min.)   Bentley Robles MD   [facility]     [facility] ONC INFUSION 120   10:30 AM   (120 min.)    ONCOLOGY INFUSION   [facility] 7     8     9       10     11     12     13     14     15     16       17     18     19     20     21     22     23       24     25     26     27     28     29     30       31                                              January 2018 Sunday Monday Tuesday Wednesday Thursday Friday Saturday        1     2     3     [facility] D[facility]   10:45 AM   (15 min.)    [facility] DRAW   [facility] Draw     MR PROSTATE   11:15 AM   (45 min.)   [id number]   [facility]     [facility] RETURN    2:00 PM   (30 min.)   Bentley Robles MD   [facility] 4     5     6       7     8     9     10     11     12     13       14     15     16     17     18     19     20       21     22     23     24     25     26     27       28     29     30     31                                 Lab Results:  Recent Results (from the past 12 hour(s))   PSA tumor marker    Collection Time: 12/06/17  8:35 AM   Result Value Ref Range    .00 (H) 0 - 4 ug/L   Basic metabolic panel    Collection Time: 12/06/17  8:35 AM   Result Value Ref Range    Sodium 136 133 - 144 mmol/L    Potassium 4.4 3.4 - 5.3 mmol/L    Chloride 103 94 - 109 mmol/L    Carbon Dioxide 21 20 - 32 mmol/L     Anion Gap 12 3 - 14 mmol/L    Glucose 178 (H) 70 - 99 mg/dL    Urea Nitrogen 22 7 - 30 mg/dL    Creatinine 0.90 0.66 - 1.25 mg/dL    GFR Estimate 83 >60 mL/min/1.7m2    GFR Estimate If Black >90 >60 mL/min/1.7m2    Calcium 9.1 8.5 - 10.1 mg/dL   CBC with platelets differential    Collection Time: 12/06/17  8:35 AM   Result Value Ref Range    WBC 11.2 (H) 4.0 - 11.0 10e9/L    RBC Count 2.98 (L) 4.4 - 5.9 10e12/L    Hemoglobin 10.3 (L) 13.3 - 17.7 g/dL    Hematocrit 30.7 (L) 40.0 - 53.0 %     (H) 78 - 100 fl    MCH 34.6 (H) 26.5 - 33.0 pg    MCHC 33.6 31.5 - 36.5 g/dL    RDW 20.3 (H) 10.0 - 15.0 %    Platelet Count 108 (L) 150 - 450 10e9/L    Diff Method Automated Method     % Neutrophils 87.8 %    % Lymphocytes 5.6 %    % Monocytes 1.7 %    % Eosinophils 0.0 %    % Basophils 0.2 %    % Immature Granulocytes 4.7 %    Nucleated RBCs 0 0 /100    Absolute Neutrophil 9.8 (H) 1.6 - 8.3 10e9/L    Absolute Lymphocytes 0.6 (L) 0.8 - 5.3 10e9/L    Absolute Monocytes 0.2 0.0 - 1.3 10e9/L    Absolute Eosinophils 0.0 0.0 - 0.7 10e9/L    Absolute Basophils 0.0 0.0 - 0.2 10e9/L    Abs Immature Granulocytes 0.5 (H) 0 - 0.4 10e9/L    Absolute Nucleated RBC 0.0    Hepatic panel    Collection Time: 12/06/17  8:35 AM   Result Value Ref Range    Bilirubin Direct 0.1 0.0 - 0.2 mg/dL    Bilirubin Total 1.1 0.2 - 1.3 mg/dL    Albumin 3.8 3.4 - 5.0 g/dL    Protein Total 7.4 6.8 - 8.8 g/dL    Alkaline Phosphatase 178 (H) 40 - 150 U/L    ALT 22 0 - 70 U/L    AST 24 0 - 45 U/L

## 2017-12-06 NOTE — PROGRESS NOTES
Oncology/Hematology Visit Note  Dec 6, 2017    DIAGNOSIS:  Mr. Win is a 71 year old male with a hx of CHF, CKD, CAD w/ hx of STEMI and CABG. He met with Dr. Robles at the end of July for consultation regarding metastatic prostate cancer. His story begins in May 2015 when he saw his PCP for back pain, present since Feb. He also had poor appetite, weight loss of 8lb and poor energy.  CT showed diffuse bony mets. CT Chest showed sclerotic mets throughout the bones. He was given degarelix on 7/22 in Urology. He was having pelvic pain and was evaluated by Dr Cavazos would did not feel XRT was appropriate.  He started on docetaxel 7/31. From 8/7-8/14 he was admitted with colitis, CHAR, elevated BNP, lactis acidosis, and poor appetite. He was discharged to a TCU and unfortunately, it sounds like he was not given his Lasix.  His BUBBA worsened and his breathing became compromised.  He was seen by Chelsi Corral on 8/21 and eventually transferred to the ED and admitted 8/21-8/24 for acute on chronic CHF.  At our last visit we discussed not pursuing further Taxotere at this time and discussed starting Zytiga when he was ready.  We pursued PT, OT, home lymphedema for supportive care at home in attempt for further rehabilitation. He was again admitted and discharged - 10/20/15.  He never started the Zytiga as he was still actively recovering from his cardiac issues.  His PSA remained stable, thus no intervention was pursued.  December 2015, Mainor met with Dr Robles and since his ECOG was back to a 1, they discussed re-challenging the Taxotere at a lower dose. 9/26/2016 he started Provenge off study. He continues on lupron every 3 mos and XGEVA. 9/26/2016 he started Provenge off study. He received 3 doses of Provenge (last 10/28), on 3rd dose had fevers, chills, myalgias, was bedbound for a week due to these symptoms. During this time he held his coumadin for 5 days prior to a central line removal 10/31. Later than week he had sudden onset of  right pleuritic chest pain and hemoptysis. Diagnosed with acute segmental/subsegmental PE on 11/8 and prescribed Lovenox.     Mainor was briefly on Zytiga and prednisone in November-December, however after one month his PSA went up, thus it was decided to add in Xofigo (1/11/17-- first).     On 12/28 he started xaralto and took his last pill on 1/10/17.  He stopped as he noticed melanotic stools and had some hemoptysis.  He was seen in clinic the next day with a hgb in th 6 range and was admitted.  Pan-scopes showed no active bleeding.  He was able to get his Xofigo in patient as well.  He was transitioned back to lovenox BID.     Mainor developed diarrhea on and off in the spring of 2017, Zytiga was stopped on 3/31 as it was unsure what was contributing.  Mainor felt it was related to the Xofigo and that his dose was too high as his weight is always at least 10 pounds higher in clinic than at home.      At his last visit with Dr Robles, his PSA was rising again and re-started Taxotere on 8/15/17.      INTERVAL HISTORY:  Mr. 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      Mainor is doing well and has no new complains at this visit. We had a long chat. He is doing reasonably well, although there are a number of issues.  He notes that he has to stop and take a break every so often and does not have the energy he used to in the past.  After doing his groceries, he had to sit down in the car and then upon reaching home he just got the stuff for the freezer in and then again took a break.  His sleep pattern has somehow changed to where he goes to bed at 4:00 p.m. and wakes up at 8:00 or 8:30.  Somehow he tries and goes back to sleep and then wakes up wide awake at 2:00 a.m. and after that he cannot sleep.  This has been disrupting his overall lifestyle and has been making it difficult to function otherwise.  He has lost most of his hair.  He does have occasional nosebleeds which are more of a nuisance.  His  diarrhea was not a big issue with this cycle.  He is not in any pain.     No f/s/c. No chest pain. No black stools. No new  issues.  He is on lovenox 40 mg BID.  He would like to feel good for Thanksgiving.     MEDICATIONS:   Current Outpatient Prescriptions   Medication Sig     dexamethasone (DECADRON) 4 MG tablet Take 1 tablet (4 mg) by mouth daily (with breakfast)     calcium carbonate (OS-RODNEY 500 MG [place]. [place]) 1250 MG tablet Take 1 tablet (1,250 mg) by mouth 2 times daily     enoxaparin (LOVENOX) 40 MG/0.4ML injection Inject 0.4 mLs (40 mg) Subcutaneous 2 times daily (Patient taking differently: Inject 40 mg Subcutaneous daily )     aspirin 81 MG tablet Take 1 tablet (81 mg) by mouth daily     Cholecalciferol (VITAMIN D) 2000 UNITS tablet Take 1 tablet by mouth daily     HYDROcodone-acetaminophen (NORCO) 5-325 MG per tablet Take 1 tablet by mouth every 6 hours as needed for moderate to severe pain Reported on 5/22/2017 (Patient not taking: Reported on 11/13/2017)     predniSONE (DELTASONE) 5 MG tablet Take 1 tablet (5 mg) by mouth 2 times daily     LORazepam (ATIVAN) 0.5 MG tablet Take 1 tablet (0.5 mg) by mouth every 4 hours as needed (Anxiety, Nausea/Vomiting or Sleep) (Patient not taking: Reported on 12/6/2017)     simvastatin (ZOCOR) 40 MG tablet TAKE ONE TABLET BY MOUTH AT BEDTIME     No current facility-administered medications for this visit.      Facility-Administered Medications Ordered in Other Visits   Medication     hydrocortisone sodium succinate (solu-CORTEF) injection     glycopyrrolate (ROBINUL) injection     neostigmine (PROSTIGMINE) injection     protamine injection     albuterol (PROAIR HFA, PROVENTIL HFA, VENTOLIN HFA) inhaler          ALLERGIES:  No Known Allergies    PHYSICAL EXAMINATION:  Vitals: /62 (BP Location: Left arm, Patient Position: Chair, Cuff Size: Adult Regular)  Pulse 105  Temp 98.1  F (36.7  C) (Oral)  Resp 16  Wt 61.5 kg (135 lb 8 oz)  SpO2 100%  BMI 21.88 kg/m2    Wt Readings from Last 10 Encounters:   12/06/17 61.5 kg (135 lb 8 oz)   11/22/17 61.2 kg (135 lb)   11/13/17 61.2 kg (135 lb)   11/08/17 61.2 kg (135 lb)   11/01/17 61.2 kg (135 lb)   10/18/17 60.8 kg (134 lb)   10/18/17 60.8 kg (134 lb)   09/25/17 59 kg (130 lb)   09/25/17 59 kg (130 lb)   09/05/17 58.5 kg (129 lb)       GENERAL:  A pleasant person in no acute distress.  Mildly pale and thin looking  LYMPH NODES:  No peripheral lymphadenopathy noted in the supraclavicular or cervical regions.   LUNGS:  Clear to auscultation bilaterally.   HEART:  Regular rate and rhythm   ABDOMEN:  Bowel sounds are active.  Soft and nontender.  No hepatosplenomegaly or other masses appreciated.  LOWER EXTREMITIES:  Without pitting edema to the knees bilaterally.   NEUROLOGICAL:  Alert/orientated/able to answer all questions.  CN grossly intact.     LAB:   Recent Labs   Lab Test  12/06/17   0835  11/08/17   1228  11/01/17   0812  10/18/17   1125  09/25/17   1219   NA  136  137  142  137  139   POTASSIUM  4.4  3.9  4.0  4.5  4.2   CHLORIDE  103  108  114*  104  108   CO2  21  22  19*  21  24   ANIONGAP  12  8  9  11  8   BUN  22  18  15  27  25   CR  0.90  0.87  0.81  0.76  0.88   GLC  178*  84  121*  134*  105*   RODNEY  9.1  8.6  8.5  9.3  8.6     Recent Labs   Lab Test  04/05/17   0941  03/31/17   1411  03/15/17   1410  01/14/17   0808  01/13/17   1815  01/13/17   0801   MAG  2.0  2.2  2.4*  2.1  2.0  2.0   PHOS  2.3*  1.6*   --   2.0*  2.9  1.8*     Recent Labs   Lab Test  12/06/17   0835  11/22/17   0637  11/13/17   0947  11/08/17   1228  11/01/17   0812   WBC  11.2*  19.9*  14.0*  13.5*  11.2*   HGB  10.3*  10.1*  10.9*  11.9*  8.5*   PLT  108*  85*  105*  69*  73*   MCV  103*  104*  97  99  105*   NEUTROPHIL  87.8  78.0  89.6  82.6  83.0     Recent Labs   Lab Test  12/06/17   0835  11/08/17   1228  11/01/17   0812   03/31/17   1411   12/21/16   1149  12/01/16   1247   BILITOTAL  1.1  1.2  0.9   < >  2.6*   < >  0.6  0.8    ALKPHOS  178*  232*  220*   < >  472*   < >  673*  649*   ALT  22  27  26   < >  16   < >  18  16   AST  24  27  24   < >  22   < >  24  22   ALBUMIN  3.8  3.8  3.6   < >  3.7   < >  3.5  3.8   LDH   --    --    --    --   409*   --   321*  348*    < > = values in this interval not displayed.     TSH   Date Value Ref Range Status   09/16/2015 3.18 0.40 - 4.00 mU/L Final     No results for input(s): CEA in the last 41724 hours.  Results for orders placed or performed during the hospital encounter of 07/12/17   CT Chest/Abdomen/Pelvis w Contrast    Narrative    EXAMINATION: CT CHEST/ABDOMEN/PELVIS W CONTRAST, 7/12/2017 11:37 AM    TECHNIQUE:  Helical CT images from the thoracic inlet through the  symphysis pubis were obtained  with contrast. Contrast dose: 69 cc of  isovue 370    COMPARISON: CT chest, abdomen and pelvis 9/7/2016, bone scan 9/7/2016,  CT chest 7/2/2015.    HISTORY: metastatic prostate, s/p Radium treatment, new baseline,  Malignant neoplasm of prostate, Secondary malignant neoplasm of bone    FINDINGS:    Chest: Heart size is normal. Normal caliber the aorta and pulmonary  artery. No central pulmonary embolus. Mitral valvular clip. Coronary  artery calcifications. Thyroid is unremarkable. No mediastinal, hilar  or axillary lymphadenopathy. No pericardial or pleural effusion. The  central tracheobronchial tree is patent.     Lungs: Moderate apical predominant centrilobular emphysematous  changes. Numerous small calcified granulomas. Noncalcified pulmonary  nodules are not significantly changed from at least 7/2/2015,  including 4 mm nodule along the left major fissure (series 4 image  89), 2 mm left lower lobe nodule (series 4 image 113).  Reticulation/fibrosis at the base right middle and right lower lobes  is not significantly changed.    Abdomen and pelvis: The liver, gallbladder, pancreas, spleen, and  adrenals are within normal limits. Nonspecific bilateral perinephric  fat stranding is not  significantly changed. Left ureteral stent has  been removed. No hydronephrosis or hydroureter. Stable mild wall  thickening of the urinary bladder, presumably sequelae of radiation.  Grossly stable nodular enlarged prostate. The colon and small bowel  are within normal limits. The proximal appendix is within normal  limits (series 3 image 447), however the distal tip which was  previously mildly dilated and appeared inflamed is now obscured,  clumped adjacent to small bowel loops. Major vasculature is patent.  Aorta is normal in caliber. No lymphadenopathy in the abdomen or  pelvis. No free air or significant free fluid.    Bones and soft tissues: Diffuse sclerotic bone metastases are  progressed from 9/7/2016, particularly in the proximal femurs and  humeri as well as in the sternum. Stable appearance of L4 compression  fracture with retropulsion of fracture fragments and mild-moderate  spinal canal narrowing at that level. No new pathologic fracture  identified.       Impression    IMPRESSION:   1. Progression of diffuse osseous metastatic disease.  2. Stable L4 compression fracture with mild-moderate spinal canal  narrowing at that level. No acute pathologic fracture.  3. Pulmonary nodules are stable from at least 7/2/2015. No new  pulmonary nodules.  4. The distal appendix, which previously was mildly dilated and  inflamed in appearance, is today obscured, clumped adjacent to some  small bowel loops. Recommend attention on follow-up.    I have personally reviewed the examination and initial interpretation  and I agree with the findings.    RENAY SHAHID MD     Recent Labs   Lab Test  12/06/17   0835  11/08/17   1228  11/01/17   0812  10/18/17   1125  09/25/17   1219   12/21/16   1149  12/01/16   1247  10/28/16   0942   09/07/16   1859   08/21/15   1405   PSA  580.00*  379.00*  389.00*  479.00*  480.00*   < >  424.22*  324.65*   --    < >  28.98*   < >  756.97*   TESTOSTTOTAL   --    --    --    --    --     --  <2 <2  11*   --   7*   --   4*    < > = values in this interval not displayed.          IMPRESSION/PLAN:   Metastatic prostate cancer: recent progression on Zytiga, Xofigo.  Now back on IV Taxotere every 3 weeks.  Tolerated well with mild fatigue, myelosuppression, mucositis, aches/pain, nausea, diarrhea, alopecia    We reviewed a lot of things at this clinic visit.  We started by reviewing his labs, most of which seem to have improved from the past.  His alkaline phosphatase has continued to decline, which we have been following as a marker for treatment response.  Interestingly, his hemoglobin has held up for the last couple of weeks and his platelet counts have recovered to 100,000.  This is encouraging, as we had to delay his chemotherapy with the last cycle due to platelet counts dropping below 70,000.       Towards the end of the clinic visit, his PSA came back and was elevated at 580 which is significantly higher than 379 at the last visit about 4 weeks ago.  Usually I follow a couple of PSAs to get a trend, but this is a significant rise and is quite concerning.  I did review a number of options with him, but in the end we decided to proceed with chemotherapy for today and then repeat a PSA along with other labs in about 4 weeks' time.  My suspicion is that this is indeed true disease progression.  We had reviewed the two options of a clinical trial with rucaparib as a next step.  Off trial, we would have the option of enzalutamide.  The clinical trial is available only for patients who have either the BRCA mutations or other mutations involving the DNA homologous repair enzymes.  He still has an intact prostate and never had either a prostatectomy or radiation to his prostate.  We could get core biopsies from the prostate.  I will get an MRI done to better decide on the site for biopsy.  We will need only a couple of good cores from the prostate with viable tumor in them for the next-generation  sequencing that needs to be done.  This would be sent as a part of the clinical trial for [employer] [facility] analysis.  We might find other mutations unrelated to the DNA repair enzyme and could try for alternate off-label therapies, too.  If there is no actionable mutation found, then we could proceed with enzalutamide.     Anemia:Hgb is 10.3 today and stable.  He seems to need a transfusion every other cycle.  We need to keep him above 9 given his CAD.      Heme: on lovenox for PE dx 11/2016. Had inpatient stay for GI bleed 1/11-1/14. Stopped xarelto and transitioned back to lovenox BID. No bleeding concerns. Recently dropped from 60mg BID to 50mg BID to 40 mg BID due to high Xa level.   -Continue at 40 mg BID.      Bone mets: on xgeva - had monthly for a year. OK to go to q 3 months now.   -Given 11/8/17    CV: hx of CHF. No clinical s/s of failure.  Off metoprolol due to BP being too low.        Over 45 min of direct face to face time spent with patient with more than 50% time spent in counseling and coordinating care.

## 2017-12-06 NOTE — LETTER
12/6/2017       RE: Oswaldo Win  [address]  [facility] [id number]     Dear Colleague,    Thank you for referring your patient, Oswaldo Win, to the [facility]. Please see a copy of my visit note below.    Oncology/Hematology Visit Note  Dec 6, 2017    DIAGNOSIS:  Mr. Win is a 71 year old male with a hx of CHF, CKD, CAD w/ hx of STEMI and CABG. He met with Dr. Robles at the end of July for consultation regarding metastatic prostate cancer. His story begins in May 2015 when he saw his PCP for back pain, present since Feb. He also had poor appetite, weight loss of 8lb and poor energy.  CT showed diffuse bony mets. CT Chest showed sclerotic mets throughout the bones. He was given degarelix on 7/22 in Urology. He was having pelvic pain and was evaluated by Dr Cavazos would did not feel XRT was appropriate.  He started on docetaxel 7/31. From 8/7-8/14 he was admitted with colitis, CHAR, elevated BNP, lactis acidosis, and poor appetite. He was discharged to a TCU and unfortunately, it sounds like he was not given his Lasix.  His BUBBA worsened and his breathing became compromised.  He was seen by Chelsi Corral on 8/21 and eventually transferred to the ED and admitted 8/21-8/24 for acute on chronic CHF.  At our last visit we discussed not pursuing further Taxotere at this time and discussed starting Zytiga when he was ready.  We pursued PT, OT, home lymphedema for supportive care at home in attempt for further rehabilitation. He was again admitted and discharged - 10/20/15.  He never started the Zytiga as he was still actively recovering from his cardiac issues.  His PSA remained stable, thus no intervention was pursued.  December 2015, Mainor met with Dr Robles and since his ECOG was back to a 1, they discussed re-challenging the Taxotere at a lower dose. 9/26/2016 he started Provenge off study. He continues on lupron every 3 mos and XGEVA. 9/26/2016 he started Provenge off study. He received 3 doses of  Provenge (last 10/28), on 3rd dose had fevers, chills, myalgias, was bedbound for a week due to these symptoms. During this time he held his coumadin for 5 days prior to a central line removal 10/31. Later than week he had sudden onset of right pleuritic chest pain and hemoptysis. Diagnosed with acute segmental/subsegmental PE on 11/8 and prescribed Lovenox.     Mainor was briefly on Zytiga and prednisone in November-December, however after one month his PSA went up, thus it was decided to add in Xofigo (1/11/17-- first).     On 12/28 he started xaralto and took his last pill on 1/10/17.  He stopped as he noticed melanotic stools and had some hemoptysis.  He was seen in clinic the next day with a hgb in th 6 range and was admitted.  Pan-scopes showed no active bleeding.  He was able to get his Xofigo in patient as well.  He was transitioned back to lovenox BID.     Mainor developed diarrhea on and off in the spring of 2017, Zytiga was stopped on 3/31 as it was unsure what was contributing.  Mainor felt it was related to the Xofigo and that his dose was too high as his weight is always at least 10 pounds higher in clinic than at home.      At his last visit with Dr Robles, his PSA was rising again and re-started Taxotere on 8/15/17.      INTERVAL HISTORY:  Mr. 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      Mainor is doing well and has no new complains at this visit. We had a long chat. He is doing reasonably well, although there are a number of issues.  He notes that he has to stop and take a break every so often and does not have the energy he used to in the past.  After doing his groceries, he had to sit down in the car and then upon reaching home he just got the stuff for the freezer in and then again took a break.  His sleep pattern has somehow changed to where he goes to bed at 4:00 p.m. and wakes up at 8:00 or 8:30.  Somehow he tries and goes back to sleep and then wakes up wide awake at 2:00  a.m. and after that he cannot sleep.  This has been disrupting his overall lifestyle and has been making it difficult to function otherwise.  He has lost most of his hair.  He does have occasional nosebleeds which are more of a nuisance.  His diarrhea was not a big issue with this cycle.  He is not in any pain.     No f/s/c. No chest pain. No black stools. No new  issues.  He is on lovenox 40 mg BID.  He would like to feel good for Thanksgiving.     MEDICATIONS:   Current Outpatient Prescriptions   Medication Sig     dexamethasone (DECADRON) 4 MG tablet Take 1 tablet (4 mg) by mouth daily (with breakfast)     calcium carbonate (OS-RODNEY 500 MG [place]. [place]) 1250 MG tablet Take 1 tablet (1,250 mg) by mouth 2 times daily     enoxaparin (LOVENOX) 40 MG/0.4ML injection Inject 0.4 mLs (40 mg) Subcutaneous 2 times daily (Patient taking differently: Inject 40 mg Subcutaneous daily )     aspirin 81 MG tablet Take 1 tablet (81 mg) by mouth daily     Cholecalciferol (VITAMIN D) 2000 UNITS tablet Take 1 tablet by mouth daily     HYDROcodone-acetaminophen (NORCO) 5-325 MG per tablet Take 1 tablet by mouth every 6 hours as needed for moderate to severe pain Reported on 5/22/2017 (Patient not taking: Reported on 11/13/2017)     predniSONE (DELTASONE) 5 MG tablet Take 1 tablet (5 mg) by mouth 2 times daily     LORazepam (ATIVAN) 0.5 MG tablet Take 1 tablet (0.5 mg) by mouth every 4 hours as needed (Anxiety, Nausea/Vomiting or Sleep) (Patient not taking: Reported on 12/6/2017)     simvastatin (ZOCOR) 40 MG tablet TAKE ONE TABLET BY MOUTH AT BEDTIME     No current facility-administered medications for this visit.      Facility-Administered Medications Ordered in Other Visits   Medication     hydrocortisone sodium succinate (solu-CORTEF) injection     glycopyrrolate (ROBINUL) injection     neostigmine (PROSTIGMINE) injection     protamine injection     albuterol (PROAIR HFA, PROVENTIL HFA, VENTOLIN HFA) inhaler          ALLERGIES:  No  Known Allergies    PHYSICAL EXAMINATION:  Vitals: /62 (BP Location: Left arm, Patient Position: Chair, Cuff Size: Adult Regular)  Pulse 105  Temp 98.1  F (36.7  C) (Oral)  Resp 16  Wt 61.5 kg (135 lb 8 oz)  SpO2 100%  BMI 21.88 kg/m2   Wt Readings from Last 10 Encounters:   12/06/17 61.5 kg (135 lb 8 oz)   11/22/17 61.2 kg (135 lb)   11/13/17 61.2 kg (135 lb)   11/08/17 61.2 kg (135 lb)   11/01/17 61.2 kg (135 lb)   10/18/17 60.8 kg (134 lb)   10/18/17 60.8 kg (134 lb)   09/25/17 59 kg (130 lb)   09/25/17 59 kg (130 lb)   09/05/17 58.5 kg (129 lb)       GENERAL:  A pleasant person in no acute distress.  Mildly pale and thin looking  LYMPH NODES:  No peripheral lymphadenopathy noted in the supraclavicular or cervical regions.   LUNGS:  Clear to auscultation bilaterally.   HEART:  Regular rate and rhythm   ABDOMEN:  Bowel sounds are active.  Soft and nontender.  No hepatosplenomegaly or other masses appreciated.  LOWER EXTREMITIES:  Without pitting edema to the knees bilaterally.   NEUROLOGICAL:  Alert/orientated/able to answer all questions.  CN grossly intact.     LAB:   Recent Labs   Lab Test  12/06/17   0835  11/08/17   1228  11/01/17   0812  10/18/17   1125  09/25/17   1219   NA  136  137  142  137  139   POTASSIUM  4.4  3.9  4.0  4.5  4.2   CHLORIDE  103  108  114*  104  108   CO2  21  22  19*  21  24   ANIONGAP  12  8  9  11  8   BUN  22  18  15  27  25   CR  0.90  0.87  0.81  0.76  0.88   GLC  178*  84  121*  134*  105*   RODNEY  9.1  8.6  8.5  9.3  8.6     Recent Labs   Lab Test  04/05/17   0941  03/31/17   1411  03/15/17   1410  01/14/17   0808  01/13/17   1815  01/13/17   0801   MAG  2.0  2.2  2.4*  2.1  2.0  2.0   PHOS  2.3*  1.6*   --   2.0*  2.9  1.8*     Recent Labs   Lab Test  12/06/17   0835  11/22/17   0637  11/13/17   0947  11/08/17   1228  11/01/17   0812   WBC  11.2*  19.9*  14.0*  13.5*  11.2*   HGB  10.3*  10.1*  10.9*  11.9*  8.5*   PLT  108*  85*  105*  69*  73*   MCV  103*  104*  97   99  105*   NEUTROPHIL  87.8  78.0  89.6  82.6  83.0     Recent Labs   Lab Test  12/06/17   0835  11/08/17   1228  11/01/17   0812   03/31/17   1411   12/21/16   1149  12/01/16   1247   BILITOTAL  1.1  1.2  0.9   < >  2.6*   < >  0.6  0.8   ALKPHOS  178*  232*  220*   < >  472*   < >  673*  649*   ALT  22  27  26   < >  16   < >  18  16   AST  24  27  24   < >  22   < >  24  22   ALBUMIN  3.8  3.8  3.6   < >  3.7   < >  3.5  3.8   LDH   --    --    --    --   409*   --   321*  348*    < > = values in this interval not displayed.     TSH   Date Value Ref Range Status   09/16/2015 3.18 0.40 - 4.00 mU/L Final     No results for input(s): CEA in the last 77074 hours.  Results for orders placed or performed during the hospital encounter of 07/12/17   CT Chest/Abdomen/Pelvis w Contrast    Narrative    EXAMINATION: CT CHEST/ABDOMEN/PELVIS W CONTRAST, 7/12/2017 11:37 AM    TECHNIQUE:  Helical CT images from the thoracic inlet through the  symphysis pubis were obtained  with contrast. Contrast dose: 69 cc of  isovue 370    COMPARISON: CT chest, abdomen and pelvis 9/7/2016, bone scan 9/7/2016,  CT chest 7/2/2015.    HISTORY: metastatic prostate, s/p Radium treatment, new baseline,  Malignant neoplasm of prostate, Secondary malignant neoplasm of bone    FINDINGS:    Chest: Heart size is normal. Normal caliber the aorta and pulmonary  artery. No central pulmonary embolus. Mitral valvular clip. Coronary  artery calcifications. Thyroid is unremarkable. No mediastinal, hilar  or axillary lymphadenopathy. No pericardial or pleural effusion. The  central tracheobronchial tree is patent.     Lungs: Moderate apical predominant centrilobular emphysematous  changes. Numerous small calcified granulomas. Noncalcified pulmonary  nodules are not significantly changed from at least 7/2/2015,  including 4 mm nodule along the left major fissure (series 4 image  89), 2 mm left lower lobe nodule (series 4 image 113).  Reticulation/fibrosis at the  base right middle and right lower lobes  is not significantly changed.    Abdomen and pelvis: The liver, gallbladder, pancreas, spleen, and  adrenals are within normal limits. Nonspecific bilateral perinephric  fat stranding is not significantly changed. Left ureteral stent has  been removed. No hydronephrosis or hydroureter. Stable mild wall  thickening of the urinary bladder, presumably sequelae of radiation.  Grossly stable nodular enlarged prostate. The colon and small bowel  are within normal limits. The proximal appendix is within normal  limits (series 3 image 447), however the distal tip which was  previously mildly dilated and appeared inflamed is now obscured,  clumped adjacent to small bowel loops. Major vasculature is patent.  Aorta is normal in caliber. No lymphadenopathy in the abdomen or  pelvis. No free air or significant free fluid.    Bones and soft tissues: Diffuse sclerotic bone metastases are  progressed from 9/7/2016, particularly in the proximal femurs and  humeri as well as in the sternum. Stable appearance of L4 compression  fracture with retropulsion of fracture fragments and mild-moderate  spinal canal narrowing at that level. No new pathologic fracture  identified.       Impression    IMPRESSION:   1. Progression of diffuse osseous metastatic disease.  2. Stable L4 compression fracture with mild-moderate spinal canal  narrowing at that level. No acute pathologic fracture.  3. Pulmonary nodules are stable from at least 7/2/2015. No new  pulmonary nodules.  4. The distal appendix, which previously was mildly dilated and  inflamed in appearance, is today obscured, clumped adjacent to some  small bowel loops. Recommend attention on follow-up.    I have personally reviewed the examination and initial interpretation  and I agree with the findings.    RENAY SHAHID MD     Recent Labs   Lab Test  12/06/17   0835  11/08/17   1228  11/01/17   0812  10/18/17   1125  09/25/17   1219    12/21/16   1149  12/01/16   1247  10/28/16   0942   09/07/16   1859   08/21/15   1405   PSA  580.00*  379.00*  389.00*  479.00*  480.00*   < >  424.22*  324.65*   --    < >  28.98*   < >  756.97*   TESTOSTTOTAL   --    --    --    --    --    --  <2 <2  11*   --   7*   --   4*    < > = values in this interval not displayed.          IMPRESSION/PLAN:   Metastatic prostate cancer: recent progression on Zytiga, Xofigo.  Now back on IV Taxotere every 3 weeks.  Tolerated well with mild fatigue, myelosuppression, mucositis, aches/pain, nausea, diarrhea, alopecia    We reviewed a lot of things at this clinic visit.  We started by reviewing his labs, most of which seem to have improved from the past.  His alkaline phosphatase has continued to decline, which we have been following as a marker for treatment response.  Interestingly, his hemoglobin has held up for the last couple of weeks and his platelet counts have recovered to 100,000.  This is encouraging, as we had to delay his chemotherapy with the last cycle due to platelet counts dropping below 70,000.       Towards the end of the clinic visit, his PSA came back and was elevated at 580 which is significantly higher than 379 at the last visit about 4 weeks ago.  Usually I follow a couple of PSAs to get a trend, but this is a significant rise and is quite concerning.  I did review a number of options with him, but in the end we decided to proceed with chemotherapy for today and then repeat a PSA along with other labs in about 4 weeks' time.  My suspicion is that this is indeed true disease progression.  We had reviewed the two options of a clinical trial with rucaparib as a next step.  Off trial, we would have the option of enzalutamide.  The clinical trial is available only for patients who have either the BRCA mutations or other mutations involving the DNA homologous repair enzymes.  He still has an intact prostate and never had either a prostatectomy or radiation to  his prostate.  We could get core biopsies from the prostate.  I will get an MRI done to better decide on the site for biopsy.  We will need only a couple of good cores from the prostate with viable tumor in them for the next-generation sequencing that needs to be done.  This would be sent as a part of the clinical trial for [employer] [facility] analysis.  We might find other mutations unrelated to the DNA repair enzyme and could try for alternate off-label therapies, too.  If there is no actionable mutation found, then we could proceed with enzalutamide.     Anemia:Hgb is 10.3 today and stable.  He seems to need a transfusion every other cycle.  We need to keep him above 9 given his CAD.      Heme: on lovenox for PE dx 11/2016. Had inpatient stay for GI bleed 1/11-1/14. Stopped xarelto and transitioned back to lovenox BID. No bleeding concerns. Recently dropped from 60mg BID to 50mg BID to 40 mg BID due to high Xa level.   -Continue at 40 mg BID.      Bone mets: on xgeva - had monthly for a year. OK to go to q 3 months now.   -Given 11/8/17    CV: hx of CHF. No clinical s/s of failure.  Off metoprolol due to BP being too low.        Over 45 min of direct face to face time spent with patient with more than 50% time spent in counseling and coordinating care.        Sincerely,    Bentley Robles MD

## 2017-12-06 NOTE — MR AVS SNAPSHOT
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    If after hours, weekends, or holidays, call main hospital  at  [phone] and ask for Oncology doctor on call.           December 2017 Sunday Monday Tuesday Wednesday Thursday Friday Saturday                            1     2       3     4     5     6     [facility]    8:45 AM   (15 min.)    [facility]   [facility] Lab Draw     [facility] RETURN    9:00 AM   (30 min.)   Bentley Robles MD   [facility]     [facility] ONC INFUSION 120   10:30 AM   (120 min.)   [facility] ONCOLOGY INFUSION   [facility] 7     8     9       10     11     12     13     14     15     16       17     18     19     20     21     22     23       24     25     26     27     28     29     30       31 January 2018 Sunday Monday Tuesday Wednesday Thursday Friday Saturday        1     2     3     [facility]   10:45 AM   (15 min.)    [facility]     MR PROSTATE   11:15 AM   (45 min.)   [id number]   [facility] MRI     [facility] RETURN    2:00 PM   (30 min.)   Bentley Robles MD   [facility] 4     5     6       7     8     9     10     11     12     13       14     15     16     17     18     19     20       21     22     23      24     25     26     27       28     29     30     31                                 Lab Results:  Recent Results (from the past 12 hour(s))   PSA tumor marker    Collection Time: 12/06/17  8:35 AM   Result Value Ref Range    .00 (H) 0 - 4 ug/L   Basic metabolic panel    Collection Time: 12/06/17  8:35 AM   Result Value Ref Range    Sodium 136 133 - 144 mmol/L    Potassium 4.4 3.4 - 5.3 mmol/L    Chloride 103 94 - 109 mmol/L    Carbon Dioxide 21 20 - 32 mmol/L    Anion Gap 12 3 - 14 mmol/L    Glucose 178 (H) 70 - 99 mg/dL    Urea Nitrogen 22 7 - 30 mg/dL    Creatinine 0.90 0.66 - 1.25 mg/dL    GFR Estimate 83 >60 mL/min/1.7m2    GFR Estimate If Black >90 >60 mL/min/1.7m2    Calcium 9.1 8.5 - 10.1 mg/dL   CBC with platelets differential    Collection Time: 12/06/17  8:35 AM   Result Value Ref Range    WBC 11.2 (H) 4.0 - 11.0 10e9/L    RBC Count 2.98 (L) 4.4 - 5.9 10e12/L    Hemoglobin 10.3 (L) 13.3 - 17.7 g/dL    Hematocrit 30.7 (L) 40.0 - 53.0 %     (H) 78 - 100 fl    MCH 34.6 (H) 26.5 - 33.0 pg    MCHC 33.6 31.5 - 36.5 g/dL    RDW 20.3 (H) 10.0 - 15.0 %    Platelet Count 108 (L) 150 - 450 10e9/L    Diff Method Automated Method     % Neutrophils 87.8 %    % Lymphocytes 5.6 %    % Monocytes 1.7 %    % Eosinophils 0.0 %    % Basophils 0.2 %    % Immature Granulocytes 4.7 %    Nucleated RBCs 0 0 /100    Absolute Neutrophil 9.8 (H) 1.6 - 8.3 10e9/L    Absolute Lymphocytes 0.6 (L) 0.8 - 5.3 10e9/L    Absolute Monocytes 0.2 0.0 - 1.3 10e9/L    Absolute Eosinophils 0.0 0.0 - 0.7 10e9/L    Absolute Basophils 0.0 0.0 - 0.2 10e9/L    Abs Immature Granulocytes 0.5 (H) 0 - 0.4 10e9/L    Absolute Nucleated RBC 0.0    Hepatic panel    Collection Time: 12/06/17  8:35 AM   Result Value Ref Range    Bilirubin Direct 0.1 0.0 - 0.2 mg/dL    Bilirubin Total 1.1 0.2 - 1.3 mg/dL    Albumin 3.8 3.4 - 5.0 g/dL    Protein Total 7.4 6.8 - 8.8 g/dL    Alkaline Phosphatase 178 (H) 40 - 150 U/L    ALT 22 0 - 70 U/L    AST 24 0 -  45 U/L               Follow-ups after your visit        Your next 10 appointments already scheduled     Jan 03, 2018 10:45 AM CST   [facility] with  [facility]RAW   [facility] Draw ([facility])    [address]  2nd Floor  [facility] [id number] [phone]            Jan 03, 2018 11:30 AM CST   (Arrive by 11:15 AM)   MR PROSTATE with [id number]   [facility] MRI ([facility])    [address]  1st Floor  [facility] [id number] [phone]           Take your medicines as usual, unless your doctor tells you not to. Bring a list of your current medicines to your exam (including vitamins, minerals and over-the-counter drugs).  You will be given intravenous contrast for this exam. To prepare:   The day before your exam, drink extra fluids at least six 8-ounce glasses (unless your doctor tells you to restrict your fluids).   Have a blood test (creatinine test) within 30 days of your exam. Go to your clinic or Diagnostic Imaging Department for this test.  The MRI machine uses a strong magnet. Please wear clothes without metal (snaps, zippers). A sweatsuit works well, or we may give you a hospital gown.  Please remove any body piercings and hair extensions before you arrive. You will also remove watches, jewelry, hairpins, wallets, dentures, partial dental plates and hearing aids. You may wear contact lenses, and you may be able to wear your rings. We have a safe place to keep your personal items, but it is safer to leave them at home.   **IMPORTANT** THE INSTRUCTIONS BELOW ARE ONLY FOR THOSE PATIENTS WHO HAVE BEEN TOLD THEY WILL RECEIVE SEDATION OR GENERAL ANESTHESIA DURING THEIR MRI PROCEDURE:  IF YOU WILL RECEIVE SEDATION (take medicine to help you relax during your exam):   You must get the medicine from your doctor before you arrive. Bring the medicine to the exam. Do not take it at home.   Arrive one hour early.  Bring someone who can take you home after the test. Your medicine will make you sleepy. After the exam, you may not drive, take a bus or take a taxi by yourself.   No eating 8 hours before your exam. You may have clear liquids up until 4 hours before your exam. (Clear liquids include water, clear tea, black coffee and fruit juice without pulp.)  IF YOU WILL RECEIVE ANESTHESIA (be asleep for your exam):   Arrive 1 1/2 hours early. Bring someone who can take you home after the test. You may not drive, take a bus or take a taxi by yourself.   No eating 8 hours before your exam. You may have clear liquids up until 4 hours before your exam. (Clear liquids include water, clear tea, black coffee and fruit juice without pulp.)  Please call the Imaging Department at your exam site with any questions.            Jan 03, 2018  2:15 PM CST   (Arrive by 2:00 PM)   Return Visit with Bentley Robles MD   [facility] ([facility])    [address] [place] [id number] [phone]              Future tests that were ordered for you today     Open Future Orders        Priority Expected Expires Ordered    MR Prostate Routine  12/6/2018 12/6/2017            Who to contact     If you have questions or need follow up information about today's clinic visit or your schedule please contact [facility] directly at [phone].  Normal or non-critical lab and imaging results will be communicated to you by MyChart, letter or phone within 4 business days after the clinic has received the results. If you do not hear from us within 7 days, please contact the clinic through MyChart or phone. If you have a critical or abnormal lab result, we will notify you by phone as soon as possible.  Submit refill requests through [facility] or call your pharmacy and they will forward the refill request to us. Please allow 3 business days for your refill to be completed.           Additional Information About Your Visit        [employer] Information     [employer] gives you secure access to your electronic health record. If you see a primary care provider, you can also send messages to your care team and make appointments. If you have questions, please call your primary care clinic.  If you do not have a primary care provider, please call [phone] and they will assist you.        Care EveryWhere ID     This is your Care EveryWhere ID. This could be used by other organizations to access your [facility] medical records  [id number]         Blood Pressure from Last 3 Encounters:   12/06/17 119/62   11/22/17 113/62   11/13/17 103/46    Weight from Last 3 Encounters:   12/06/17 61.5 kg (135 lb 8 oz)   11/22/17 61.2 kg (135 lb)   11/13/17 61.2 kg (135 lb)              We Performed the Following     Basic metabolic panel     CBC with platelets differential     Hepatic panel     PSA tumor marker          Today's Medication Changes          These changes are accurate as of: 12/6/17 11:52 AM.  If you have any questions, ask your nurse or doctor.               These medicines have changed or have updated prescriptions.        Dose/Directions    enoxaparin 40 MG/0.4ML injection   Commonly known as:  LOVENOX   This may have changed:  when to take this   Used for:  Other chronic pulmonary embolism without acute cor pulmonale (H)        Dose:  40 mg   Inject 0.4 mLs (40 mg) Subcutaneous 2 times daily   Quantity:  60 Syringe   Refills:  3       * predniSONE 5 MG tablet   Commonly known as:  DELTASONE   This may have changed:  Another medication with the same name was added. Make sure you understand how and when to take each.   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 Make sure you understand how and when to take each.   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 Read the directions carefully, and ask your doctor or other care provider to review them with you.         Where to get your medicines      These medications were sent to [facility] [address]  [address] [place] [address] [address] [place] [id number]    Hours:  TRANSPLANT PHONE NUMBER [phone] Phone:  [phone]     predniSONE 5 MG tablet                Primary Care Provider Office Phone # Fax #    Lucrecia Collier [phone]       [address]  [facility] [address]        Equal Access to Services     BLADIMIR KEY AH: Hadii jesse purcello Sokwame, waaxda luqadaha, qaybta kaalmada adeshayy, peter moran . So [facility] [phone].    ATENCIÓN: Si habla español, tiene a mccall disposición servicios gratuitos de asistencia lingüística. Llame al [phone].    We comply with applicable federal civil rights laws and [place] laws. We do not discriminate on the basis of race, color, national origin, age, disability, sex, sexual orientation, or gender identity.            Thank you!     Thank you for choosing [facility]  for your care. Our goal is always to provide you with excellent care. Hearing back from our patients is one way we can continue to improve our services. Please take a few minutes to complete the written survey that you  may receive in the mail after your visit with us. Thank you!             Your Updated Medication List - Protect others around you: Learn how to safely use, store and throw away your medicines at www.disposemymeds.org.          This list is accurate as of: 12/6/17 11:52 AM.  Always use your most recent med list.                   Brand Name Dispense Instructions for use Diagnosis    aspirin 81 MG tablet     90 tablet    Take 1 tablet (81 mg) by mouth daily    Acute on chronic combined systolic and diastolic congestive heart failure (H)       calcium carbonate 1250 MG tablet    OS-RODNEY 500 mg [place]. Ca    120 tablet    Take 1 tablet (1,250 mg) by mouth 2 times daily    Hypocalcemia       dexamethasone 4 MG tablet    DECADRON    12 tablet    Take 1 tablet (4 mg) by mouth daily (with breakfast)    Prostate cancer 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), Metastatic bone tumor (H)       enoxaparin 40 MG/0.4ML injection    LOVENOX    60 Syringe    Inject 0.4 mLs (40 mg) Subcutaneous 2 times daily    Other chronic pulmonary embolism without acute cor pulmonale (H)       HYDROcodone-acetaminophen 5-325 MG per tablet    NORCO    30 tablet    Take 1 tablet by mouth every 6 hours as needed for moderate to severe pain Reported on 5/22/2017    Prostate cancer 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), Metastatic bone tumor (H)       LORazepam 0.5 MG tablet    ATIVAN    30 tablet    Take 1 tablet (0.5 mg) by mouth every 4 hours as needed (Anxiety, Nausea/Vomiting or Sleep)    Prostate cancer 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), Metastatic bone tumor (H)       * predniSONE 5 MG tablet    DELTASONE    42 tablet    Take 1 tablet (5 mg) by mouth 2 times daily    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), Prostate cancer 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), Metastatic bone tumor (H)       * predniSONE 5 MG tablet    DELTASONE    42 tablet    Take 1 tablet (5 mg) by mouth 2 times daily for 21 days    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 Read the directions carefully, and ask your doctor or other care provider to review them with you.

## 2017-12-06 NOTE — PROGRESS NOTES
Infusion Nursing Note:  Oswaldo Win presents for D1C6 Taxotere  Met with Dr. Robles before infusion.    Note: N/A.    Treatment Conditions:  Lab Results   Component Value Date    HGB 10.3 12/06/2017     Lab Results   Component Value Date    WBC 11.2 12/06/2017      Lab Results   Component Value Date    ANEU 9.8 12/06/2017     Lab Results   Component Value Date     12/06/2017      Lab Results   Component Value Date     12/06/2017                   Lab Results   Component Value Date    POTASSIUM 4.4 12/06/2017           Lab Results   Component Value Date    MAG 2.0 04/05/2017            Lab Results   Component Value Date    CR 0.90 12/06/2017                   Lab Results   Component Value Date    RODNEY 9.1 12/06/2017                Lab Results   Component Value Date    BILITOTAL 1.1 12/06/2017           Lab Results   Component Value Date    ALBUMIN 3.8 12/06/2017                    Lab Results   Component Value Date    ALT 22 12/06/2017           Lab Results   Component Value Date    AST 24 12/06/2017     Results reviewed, labs MET treatment parameters, ok to proceed with treatment.    Intravenous Access:  Peripheral IV placed in lab.    Post Infusion Assessment:  Patient tolerated infusion without incident.  Blood return noted pre and post infusion.  No evidence of extravasations.  Access discontinued per protocol.    Neulasta On Pro- On Body injector applied to patient today at 12:40. Writer discussed with patient when to call, and that injection would start 27 hours after application. Patient's injection scheduled to start at 3:40 on 12/7. Patient aware that Neulasta On-pro on body should have green flashing light throughout, and if it flashes red to call triage or the on-call number on discharge paperwork. Patient aware to keep electronics 4 inches away from on pro injector and to avoid showering 4 hours prior to injection. Patient given written and verbal instruction on the above and verbalized  understanding.   Lot number for Neulasta On Pro: see MAR comments    Discharge Plan:   Patient declined prescription refills.  Discharge instructions reviewed with: Patient.  Patient and/or family verbalized understanding of discharge instructions and all questions answered.  Copy of AVS reviewed with patient and/or family.  Patient will return 1/3/18 for next appointment.  Patient discharged in stable condition accompanied by: self.    Nolvia Penaloza RN

## 2017-12-06 NOTE — NURSING NOTE
"Oncology Rooming Note    December 6, 2017 9:09 AM   Oswaldo Win is a 72 year old male who presents for:    Chief Complaint   Patient presents with     Blood Draw     peripheral IV placed in left arm for lab draw by RN and vitals taken by [facility]      Oncology Clinic Visit     Return for Prostate Ca , labs, Tx     Initial Vitals: /62 (BP Location: Left arm, Patient Position: Chair, Cuff Size: Adult Regular)  Pulse 105  Temp 98.1  F (36.7  C) (Oral)  Resp 16  Wt 61.5 kg (135 lb 8 oz)  SpO2 100%  BMI 21.88 kg/m2 Estimated body mass index is 21.88 kg/(m^2) as calculated from the following:    Height as of 9/25/17: 1.676 m (5' 5.98\").    Weight as of this encounter: 61.5 kg (135 lb 8 oz). Body surface area is 1.69 meters squared.  Mild Pain (3) Comment: ankle pain    No LMP for male patient.  Allergies reviewed: Yes  Medications reviewed: Yes    Medications: Medication refills not needed today.  Pharmacy name entered into [facility]: [facility] [place], [place] - [address]    Clinical concerns: lab counts  angelito  was notified.    6 minutes for nursing intake (face to face time)     Jane Hickman MA              "

## 2017-12-19 NOTE — PROGRESS NOTES
Received call from pt. Pt reports feeling SOB with activity, dizzy with standing, and having muscle fatigue. Denies any headaches or chest pain. Pt requesting to come in for labs and blood transfusion.   Scheduled pt tomorrow at 0630 for CBC and type and screen with blood transfusion at 0700. Pt aware and verbalized understanding. Sabrina Mcguire PA-C updated.

## 2017-12-20 NOTE — PROGRESS NOTES
"Infusion Nursing Note:  Oswaldo Win presents today for labs and possible trasnfusion.    Patient seen by provider today: No   present during visit today: Not Applicable.    Note: Patient complains of some mild dizziness today, states that it really isn't \"too bad\".  He has his \"zombie walk\" where he feels a little unsteady but denies any falls at home. He continues to have shortness of breath with exertion which is not new for him but has been a little worse the past few days along with generalized fatigue.  He denies any fevers or chills. Denies any chest pains or swelling and his weight is stable.  He has an occasional cough with clear or white sputum. He denies any nausea or vomiting and states that he is eating and drinking well. He had 1-2 days of diarrhea over a week ago, none since.  RN checked blood pressure sitting and standing as patient has a hx of being orthostatic.  Sitting /61 with HR 80.  Standing BP 86/58 with HR 99.      12/20/2017 0740 ISABELLE Agrawal/Katie Jacome RN  -patient does not need any blood today, ok to offer an appointment to recheck labs next week if patient desires  -encourage patient to stay for a liter of IV fluids today     Patient updated on Sabrina's recommendations.  He did not want to stay for IV fluids today, states that he will just try and drink more at home.  He does not want to schedule anything else at this time.  He will continue to monitor his symptoms at home and will call if his dizziness or sob is worsening.      Intravenous Access:  No Intravenous access/labs at this visit.    Treatment Conditions:  Lab Results   Component Value Date    HGB 9.4 12/20/2017     Lab Results   Component Value Date    WBC 10.9 12/20/2017      Lab Results   Component Value Date    ANEU 9.0 12/20/2017     Lab Results   Component Value Date    PLT 81 12/20/2017      Results reviewed, labs did NOT meet treatment parameters: Hgb <8.    Discharge Plan:   Patient declined " prescription refills.  Discharge instructions reviewed with: Patient.  Patient and/or family verbalized understanding of discharge instructions and all questions answered.  Copy of AVS reviewed with patient and/or family.  Patient will return 1/3 for next appointment with Dr Robles  Patient discharged in stable condition accompanied by: self.  Departure Mode: Ambulatory.  Face to Face time: 5 minutes.    Katie Jacome RN

## 2017-12-20 NOTE — NURSING NOTE
Chief Complaint   Patient presents with     Blood Draw     Labs drawn via  by RN. Prefers to wait on blood counts to see if PIV is needed. VS taken.     Elza Stack RN

## 2017-12-20 NOTE — MR AVS SNAPSHOT
After Visit Summary   12/20/2017    Oswaldo Win    MRN: [id number]           Patient Information     Date Of Birth          1945        Visit Information        Provider Department      12/20/2017 7:00 AM  12 ATC; [facility] ONCOL[facility]Y INFUSION [facility]        Today's Diagnoses     Prostate cancer 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)    -  1    SOB (shortness of breath)        Anemia, unspecified type          Care Instructions    Contact Numbers    [facility] Main Line: [phone]  [facility] Triage:  [phone]    Call triage with chills and/or temperature greater than or equal to 100.5, uncontrolled nausea/vomiting, diarrhea, constipation, dizziness, shortness of breath, chest pain, bleeding, unexplained bruising, or any new/concerning symptoms, questions/concerns.     If you are having any concerning symptoms or wish to speak to a provider before your next infusion visit, please call your care coordinator or triage to notify them so we can adequately serve you.       After Hours: [phone]    If after hours, weekends, or holidays, call main hospital  and ask for Oncology doctor on call.           December 2017 Sunday Monday Tuesday Wednesday Thursday Friday Saturday                            1     2       3     4     5     6     [facility]    8:45 AM   (15 min.)    [facility] DRAW   [facility] Lab Draw     [facility] RETURN    9:00 AM   (30 min.)   Bentley Robles MD   [facility]     [facility] ONC INFUSION 120   10:30 AM   (120 min.)   [facility] ON[facility] INFUSION   [facility] 7     8     9       10     11     12     13     14     15     16       17     18     19     20     [facility]    6:30 AM   (15 min.)    [facility] DRAW   [facility] Lab Draw     P ONC INFUSION 240    7:00 AM   (240 min.)    ONCOLOGY INFUSION   [facility] 21     22     23       24     25     26     27     28     29     30        31 January 2018 Sunday Monday Tuesday Wednesday Thursday Friday Saturday        1     2     3     [facility]   10:45 AM   (15 min.)    [facility] DRAW   [facility] Draw     MR PROSTATE   11:15 AM   (45 min.)   [id number]   [facility] MRI     [facility] RETURN    2:00 PM   (30 min.)   Bentley Robles MD   [facility] 4     5     6       7     8     9     10     11     12     13       14     15     16     17     18     19     20       21     22     23     24     25     26     27       28     29     30     31                                Recent Results (from the past 24 hour(s))   *CBC with platelets differential    Collection Time: 12/20/17  6:34 AM   Result Value Ref Range    WBC 10.9 4.0 - 11.0 10e9/L    RBC Count 2.66 (L) 4.4 - 5.9 10e12/L    Hemoglobin 9.4 (L) 13.3 - 17.7 g/dL    Hematocrit 28.5 (L) 40.0 - 53.0 %     (H) 78 - 100 fl    MCH 35.3 (H) 26.5 - 33.0 pg    MCHC 33.0 31.5 - 36.5 g/dL    RDW 19.5 (H) 10.0 - 15.0 %    Platelet Count 81 (L) 150 - 450 10e9/L    Diff Method Automated Method     % Neutrophils 82.4 %    % Lymphocytes 9.2 %    % Monocytes 5.2 %    % Eosinophils 0.1 %    % Basophils 0.2 %    % Immature Granulocytes 2.9 %    Nucleated RBCs 0 0 /100    Absolute Neutrophil 9.0 (H) 1.6 - 8.3 10e9/L    Absolute Lymphocytes 1.0 0.8 - 5.3 10e9/L    Absolute Monocytes 0.6 0.0 - 1.3 10e9/L    Absolute Eosinophils 0.0 0.0 - 0.7 10e9/L    Absolute Basophils 0.0 0.0 - 0.2 10e9/L    Abs Immature Granulocytes 0.3 0 - 0.4 10e9/L    Absolute Nucleated RBC 0.0    ABO/Rh type and screen    Collection Time: 12/20/17  6:34 AM   Result Value Ref Range    ABO PENDING     Antibody Screen PENDING     Test Valid Only At          [facility]    Specimen Expires 12/23/2017                  Follow-ups after your visit        Your next 10 appointments already scheduled      Jan 03, 2018 10:45 AM CST   [facility] Draw with  [facility] DRAW   [facility] Draw ([facility])    [address]  [address] Floor  [facility] [place] [id number] [phone]            Jan 03, 2018 11:30 AM CST   (Arrive by 11:15 AM)   MR PROSTATE with [id number]   [facility] MRI ([facility])    [address]  [address] Floor  [place] [id number] [phone]           Take your medicines as usual, unless your doctor tells you not to. Bring a list of your current medicines to your exam (including vitamins, minerals and over-the-counter drugs).  You will be given intravenous contrast for this exam. To prepare:   The day before your exam, drink extra fluids at least six 8-ounce glasses (unless your doctor tells you to restrict your fluids).   Have a blood test (creatinine test) within 30 days of your exam. Go to your clinic or Diagnostic Imaging Department for this test.  The MRI machine uses a strong magnet. Please wear clothes without metal (snaps, zippers). A sweatsuit works well, or we may give you a hospital gown.  Please remove any body piercings and hair extensions before you arrive. You will also remove watches, jewelry, hairpins, wallets, dentures, partial dental plates and hearing aids. You may wear contact lenses, and you may be able to wear your rings. We have a safe place to keep your personal items, but it is safer to leave them at home.   **IMPORTANT** THE INSTRUCTIONS BELOW ARE ONLY FOR THOSE PATIENTS WHO HAVE BEEN TOLD THEY WILL RECEIVE SEDATION OR GENERAL ANESTHESIA DURING THEIR MRI PROCEDURE:  IF YOU WILL RECEIVE SEDATION (take medicine to help you relax during your exam):   You must get the medicine from your doctor before you arrive. Bring the medicine to the exam. Do not take it at home.   Arrive one hour early. Bring someone who can take you home after the test. Your medicine will make you sleepy. After the exam,  you may not drive, take a bus or take a taxi by yourself.   No eating 8 hours before your exam. You may have clear liquids up until 4 hours before your exam. (Clear liquids include water, clear tea, black coffee and fruit juice without pulp.)  IF YOU WILL RECEIVE ANESTHESIA (be asleep for your exam):   Arrive 1 1/2 hours early. Bring someone who can take you home after the test. You may not drive, take a bus or take a taxi by yourself.   No eating 8 hours before your exam. You may have clear liquids up until 4 hours before your exam. (Clear liquids include water, clear tea, black coffee and fruit juice without pulp.)  Please call the Imaging Department at your exam site with any questions.            Jan 03, 2018  2:15 PM CST   (Arrive by 2:00 PM)   Return Visit with Bentley Robles MD   [facility] ([facility])    [address] [place] [id number] [phone]              Future tests that were ordered for you today     Open Standing Orders        Priority Remaining Interval Expires Ordered    Red blood cell prepare order unit Routine 99/100 CONDITIONAL (SPECIFY) BLOOD  12/19/2017            Who to contact     If you have questions or need follow up information about today's clinic visit or your schedule please contact [facility] directly at [phone].  Normal or non-critical lab and imaging results will be communicated to you by MyChart, letter or phone within 4 business days after the clinic has received the results. If you do not hear from us within 7 days, please contact the clinic through MyChart or phone. If you have a critical or abnormal lab result, we will notify you by phone as soon as possible.  Submit refill requests through [employer] or call your pharmacy and they will forward the refill request to us. Please allow 3 business days for your refill to be completed.          Additional Information About Your  Visit        [employer] Information     [employer] gives you secure access to your electronic health record. If you see a primary care provider, you can also send messages to your care team and make appointments. If you have questions, please call your primary care clinic.  If you do not have a primary care provider, please call [phone] and they will assist you.        Care EveryWhere ID     This is your Care EveryWhere ID. This could be used by other organizations to access your [place] medical records  [id number]        Your Vitals Were     Pulse Temperature Respirations Pulse Oximetry BMI (Body Mass Index)       99 98.4  F (36.9  C) (Oral) 18 99% 21.8 kg/m2        Blood Pressure from Last 3 Encounters:   12/20/17 (!) 86/58   12/06/17 119/62   11/22/17 113/62    Weight from Last 3 Encounters:   12/20/17 61.2 kg (135 lb)   12/06/17 61.5 kg (135 lb 8 oz)   11/22/17 61.2 kg (135 lb)              We Performed the Following     *CBC with platelets differential     ABO/Rh type and screen          Today's Medication Changes          These changes are accurate as of: 12/20/17  7:45 AM.  If you have any questions, ask your nurse or doctor.               These medicines have changed or have updated prescriptions.        Dose/Directions    enoxaparin 40 MG/0.4ML injection   Commonly known as:  LOVENOX   This may have changed:  when to take this   Used for:  Other chronic pulmonary embolism without acute cor pulmonale (H)        Dose:  40 mg   Inject 0.4 mLs (40 mg) Subcutaneous 2 times daily   Quantity:  60 Syringe   Refills:  3                Primary Care Provider Office Phone # Fax #    Lucrecia Collier [phone] [address] [facility] [address]        Equal Access to Services     BLADIMIR KEY : Kelsey Ybarra, john hopkins, peter feliciano. So [facility] [place] [phone].    ATENCIÓN: Si habla español, tiene a mccall disposición  servicios gratuitos de asistencia lingüística. Marquise montoya [phone].    We comply with applicable federal civil rights laws and [place] laws. We do not discriminate on the basis of race, color, national origin, age, disability, sex, sexual orientation, or gender identity.            Thank you!     Thank you for choosing [facility]  for your care. Our goal is always to provide you with excellent care. Hearing back from our patients is one way we can continue to improve our services. Please take a few minutes to complete the written survey that you may receive in the mail after your visit with us. Thank you!             Your Updated Medication List - Protect others around you: Learn how to safely use, store and throw away your medicines at www.disposemymeds.org.          This list is accurate as of: 12/20/17  7:45 AM.  Always use your most recent med list.                   Brand Name Dispense Instructions for use Diagnosis    aspirin 81 MG tablet     90 tablet    Take 1 tablet (81 mg) by mouth daily    Acute on chronic combined systolic and diastolic congestive heart failure (H)       calcium carbonate 1250 MG tablet    OS-RODNEY 500 mg [place]. Ca    120 tablet    Take 1 tablet (1,250 mg) by mouth 2 times daily    Hypocalcemia       dexamethasone 4 MG tablet    DECADRON    12 tablet    Take 1 tablet (4 mg) by mouth daily (with breakfast)    Prostate cancer 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), Metastatic bone tumor (H)       enoxaparin 40 MG/0.4ML injection    LOVENOX    60 Syringe    Inject 0.4 mLs (40 mg) Subcutaneous 2 times daily    Other chronic pulmonary embolism without acute cor pulmonale (H)       HYDROcodone-acetaminophen 5-325 MG per tablet    NORCO    30 tablet    Take 1 tablet by mouth every 6 hours as needed for moderate to severe pain Reported on 5/22/2017    Prostate cancer 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), Metastatic bone tumor (H)       LORazepam 0.5 MG tablet    ATIVAN    30 tablet    Take 1 tablet (0.5  mg) by mouth every 4 hours as needed (Anxiety, Nausea/Vomiting or Sleep)    Prostate cancer 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), Metastatic bone tumor (H)       * predniSONE 5 MG tablet    DELTASONE    42 tablet    Take 1 tablet (5 mg) by mouth 2 times daily    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), Prostate cancer 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), Metastatic bone tumor (H)       * predniSONE 5 MG tablet    DELTASONE    42 tablet    Take 1 tablet (5 mg) by mouth 2 times daily for 21 days    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 Read the directions carefully, and ask your doctor or other care provider to review them with you.

## 2017-12-20 NOTE — PATIENT INSTRUCTIONS
Contact Numbers    [facility] Main Line: [phone]  [facility] Triage:  [phone]    Call triage with chills and/or temperature greater than or equal to 100.5, uncontrolled nausea/vomiting, diarrhea, constipation, dizziness, shortness of breath, chest pain, bleeding, unexplained bruising, or any new/concerning symptoms, questions/concerns.     If you are having any concerning symptoms or wish to speak to a provider before your next infusion visit, please call your care coordinator or triage to notify them so we can adequately serve you.       After Hours: [phone]    If after hours, weekends, or holidays, call main hospital  and ask for Oncology doctor on call.           December 2017 Sunday Monday Tuesday Wednesday Thursday Friday Saturday                            1     2       3     4     5     6     [facility] D[facility]    8:45 AM   (15 min.)    [facility] DRAW   [facility] Lab Draw     [facility] RETURN    9:00 AM   (30 min.)   Bentley Robles MD   [facility]     [facility] ONC INFUSION 120   10:30 AM   (120 min.)   [facility] ONCOLOGY INFUSION   [facility] 7     8     9       10     11     12     13     14     15     16       17     18     19     20     [facility] DRAW    6:30 AM   (15 min.)    [facility] DRAW   [facility] Lab Draw     [facility] ONC INFUSION 240    7:00 AM   (240 min.)   [facility] ONCOLOGY INFUSION   [facility] 21     22     23       24     25     26     27     28     29     30       31 January 2018 Sunday Monday Tuesday Wednesday Thursday Friday Saturday        1     2     3     [facility] DRAW   10:45 AM   (15 min.)   [facility]     MR PROSTATE   11:15 AM   (45 min.)   [id number]   [facility] MRI     [facility] RETURN    2:00 PM   (30 min.)   Bentley Robles MD   [facility] 4     5     6       7     8     9     10      11     12     13       14     15     16     17     18     19     20       21     22     23     24     25     26     27       28     29     30     31                                Recent Results (from the past 24 hour(s))   *CBC with platelets differential    Collection Time: 12/20/17  6:34 AM   Result Value Ref Range    WBC 10.9 4.0 - 11.0 10e9/L    RBC Count 2.66 (L) 4.4 - 5.9 10e12/L    Hemoglobin 9.4 (L) 13.3 - 17.7 g/dL    Hematocrit 28.5 (L) 40.0 - 53.0 %     (H) 78 - 100 fl    MCH 35.3 (H) 26.5 - 33.0 pg    MCHC 33.0 31.5 - 36.5 g/dL    RDW 19.5 (H) 10.0 - 15.0 %    Platelet Count 81 (L) 150 - 450 10e9/L    Diff Method Automated Method     % Neutrophils 82.4 %    % Lymphocytes 9.2 %    % Monocytes 5.2 %    % Eosinophils 0.1 %    % Basophils 0.2 %    % Immature Granulocytes 2.9 %    Nucleated RBCs 0 0 /100    Absolute Neutrophil 9.0 (H) 1.6 - 8.3 10e9/L    Absolute Lymphocytes 1.0 0.8 - 5.3 10e9/L    Absolute Monocytes 0.6 0.0 - 1.3 10e9/L    Absolute Eosinophils 0.0 0.0 - 0.7 10e9/L    Absolute Basophils 0.0 0.0 - 0.2 10e9/L    Abs Immature Granulocytes 0.3 0 - 0.4 10e9/L    Absolute Nucleated RBC 0.0    ABO/Rh type and screen    Collection Time: 12/20/17  6:34 AM   Result Value Ref Range    ABO PENDING     Antibody Screen PENDING     Test Valid Only At          [facility]    Specimen Expires 12/23/2017

## 2018-01-01 ENCOUNTER — APPOINTMENT (OUTPATIENT)
Dept: LAB | Facility: CLINIC | Age: 73
End: 2018-01-01
Attending: INTERNAL MEDICINE
Payer: COMMERCIAL

## 2018-01-01 ENCOUNTER — ONCOLOGY VISIT (OUTPATIENT)
Dept: ONCOLOGY | Facility: CLINIC | Age: 73
End: 2018-01-01
Attending: PHYSICIAN ASSISTANT
Payer: COMMERCIAL

## 2018-01-01 ENCOUNTER — APPOINTMENT (OUTPATIENT)
Dept: GENERAL RADIOLOGY | Facility: CLINIC | Age: 73
DRG: 025 | End: 2018-01-01
Attending: PSYCHIATRY & NEUROLOGY
Payer: COMMERCIAL

## 2018-01-01 ENCOUNTER — TELEPHONE (OUTPATIENT)
Dept: ONCOLOGY | Facility: CLINIC | Age: 73
End: 2018-01-01

## 2018-01-01 ENCOUNTER — APPOINTMENT (OUTPATIENT)
Dept: GENERAL RADIOLOGY | Facility: CLINIC | Age: 73
DRG: 025 | End: 2018-01-01
Attending: NEUROLOGICAL SURGERY
Payer: COMMERCIAL

## 2018-01-01 ENCOUNTER — HOSPITAL ENCOUNTER (INPATIENT)
Facility: CLINIC | Age: 73
LOS: 6 days | DRG: 025 | End: 2018-07-16
Attending: EMERGENCY MEDICINE | Admitting: INTERNAL MEDICINE
Payer: COMMERCIAL

## 2018-01-01 ENCOUNTER — APPOINTMENT (OUTPATIENT)
Dept: GENERAL RADIOLOGY | Facility: CLINIC | Age: 73
DRG: 025 | End: 2018-01-01
Payer: COMMERCIAL

## 2018-01-01 ENCOUNTER — ALLIED HEALTH/NURSE VISIT (OUTPATIENT)
Dept: NEUROLOGY | Facility: CLINIC | Age: 73
DRG: 025 | End: 2018-01-01
Attending: PSYCHIATRY & NEUROLOGY
Payer: COMMERCIAL

## 2018-01-01 ENCOUNTER — APPOINTMENT (OUTPATIENT)
Dept: GENERAL RADIOLOGY | Facility: CLINIC | Age: 73
DRG: 025 | End: 2018-01-01
Attending: NURSE PRACTITIONER
Payer: COMMERCIAL

## 2018-01-01 ENCOUNTER — APPOINTMENT (OUTPATIENT)
Dept: CARDIOLOGY | Facility: CLINIC | Age: 73
End: 2018-01-01
Attending: PHYSICIAN ASSISTANT
Payer: COMMERCIAL

## 2018-01-01 ENCOUNTER — ONCOLOGY VISIT (OUTPATIENT)
Dept: ONCOLOGY | Facility: CLINIC | Age: 73
End: 2018-01-01
Attending: INTERNAL MEDICINE
Payer: COMMERCIAL

## 2018-01-01 ENCOUNTER — APPOINTMENT (OUTPATIENT)
Dept: CT IMAGING | Facility: CLINIC | Age: 73
DRG: 025 | End: 2018-01-01
Attending: PSYCHIATRY & NEUROLOGY
Payer: COMMERCIAL

## 2018-01-01 ENCOUNTER — APPOINTMENT (OUTPATIENT)
Dept: CT IMAGING | Facility: CLINIC | Age: 73
DRG: 025 | End: 2018-01-01
Attending: STUDENT IN AN ORGANIZED HEALTH CARE EDUCATION/TRAINING PROGRAM
Payer: COMMERCIAL

## 2018-01-01 ENCOUNTER — CARE COORDINATION (OUTPATIENT)
Dept: ONCOLOGY | Facility: CLINIC | Age: 73
End: 2018-01-01

## 2018-01-01 ENCOUNTER — APPOINTMENT (OUTPATIENT)
Dept: CT IMAGING | Facility: CLINIC | Age: 73
DRG: 025 | End: 2018-01-01
Attending: NURSE PRACTITIONER
Payer: COMMERCIAL

## 2018-01-01 ENCOUNTER — ANESTHESIA (OUTPATIENT)
Dept: SURGERY | Facility: CLINIC | Age: 73
DRG: 025 | End: 2018-01-01
Payer: COMMERCIAL

## 2018-01-01 ENCOUNTER — ALLIED HEALTH/NURSE VISIT (OUTPATIENT)
Dept: NEUROLOGY | Facility: CLINIC | Age: 73
DRG: 025 | End: 2018-01-01
Attending: NEUROLOGICAL SURGERY
Payer: COMMERCIAL

## 2018-01-01 ENCOUNTER — APPOINTMENT (OUTPATIENT)
Dept: OCCUPATIONAL THERAPY | Facility: CLINIC | Age: 73
DRG: 025 | End: 2018-01-01
Attending: STUDENT IN AN ORGANIZED HEALTH CARE EDUCATION/TRAINING PROGRAM
Payer: COMMERCIAL

## 2018-01-01 ENCOUNTER — APPOINTMENT (OUTPATIENT)
Dept: LAB | Facility: CLINIC | Age: 73
End: 2018-01-01
Attending: PHYSICIAN ASSISTANT
Payer: COMMERCIAL

## 2018-01-01 ENCOUNTER — APPOINTMENT (OUTPATIENT)
Dept: GENERAL RADIOLOGY | Facility: CLINIC | Age: 73
DRG: 025 | End: 2018-01-01
Attending: STUDENT IN AN ORGANIZED HEALTH CARE EDUCATION/TRAINING PROGRAM
Payer: COMMERCIAL

## 2018-01-01 ENCOUNTER — APPOINTMENT (OUTPATIENT)
Dept: CARDIOLOGY | Facility: CLINIC | Age: 73
DRG: 025 | End: 2018-01-01
Attending: STUDENT IN AN ORGANIZED HEALTH CARE EDUCATION/TRAINING PROGRAM
Payer: COMMERCIAL

## 2018-01-01 ENCOUNTER — RADIANT APPOINTMENT (OUTPATIENT)
Dept: MRI IMAGING | Facility: CLINIC | Age: 73
End: 2018-01-01
Attending: INTERNAL MEDICINE
Payer: COMMERCIAL

## 2018-01-01 ENCOUNTER — HOSPITAL ENCOUNTER (OUTPATIENT)
Facility: CLINIC | Age: 73
Setting detail: OBSERVATION
Discharge: HOME | End: 2018-02-21
Attending: EMERGENCY MEDICINE | Admitting: FAMILY MEDICINE
Payer: COMMERCIAL

## 2018-01-01 ENCOUNTER — APPOINTMENT (OUTPATIENT)
Dept: GENERAL RADIOLOGY | Facility: CLINIC | Age: 73
DRG: 025 | End: 2018-01-01
Attending: ANESTHESIOLOGY
Payer: COMMERCIAL

## 2018-01-01 ENCOUNTER — ANESTHESIA EVENT (OUTPATIENT)
Dept: SURGERY | Facility: CLINIC | Age: 73
DRG: 025 | End: 2018-01-01
Payer: COMMERCIAL

## 2018-01-01 ENCOUNTER — RESULTS ONLY (OUTPATIENT)
Dept: ONCOLOGY | Facility: CLINIC | Age: 73
End: 2018-01-01

## 2018-01-01 ENCOUNTER — TRANSFERRED RECORDS (OUTPATIENT)
Dept: HEALTH INFORMATION MANAGEMENT | Facility: CLINIC | Age: 73
End: 2018-01-01

## 2018-01-01 ENCOUNTER — ONCOLOGY VISIT (OUTPATIENT)
Dept: ONCOLOGY | Facility: CLINIC | Age: 73
End: 2018-01-01

## 2018-01-01 ENCOUNTER — APPOINTMENT (OUTPATIENT)
Dept: ULTRASOUND IMAGING | Facility: CLINIC | Age: 73
DRG: 025 | End: 2018-01-01
Attending: STUDENT IN AN ORGANIZED HEALTH CARE EDUCATION/TRAINING PROGRAM
Payer: COMMERCIAL

## 2018-01-01 ENCOUNTER — APPOINTMENT (OUTPATIENT)
Dept: CT IMAGING | Facility: CLINIC | Age: 73
End: 2018-01-01
Attending: EMERGENCY MEDICINE
Payer: COMMERCIAL

## 2018-01-01 ENCOUNTER — APPOINTMENT (OUTPATIENT)
Dept: GENERAL RADIOLOGY | Facility: CLINIC | Age: 73
End: 2018-01-01
Attending: EMERGENCY MEDICINE
Payer: COMMERCIAL

## 2018-01-01 ENCOUNTER — APPOINTMENT (OUTPATIENT)
Dept: GENERAL RADIOLOGY | Facility: CLINIC | Age: 73
DRG: 025 | End: 2018-01-01
Attending: EMERGENCY MEDICINE
Payer: COMMERCIAL

## 2018-01-01 VITALS
HEART RATE: 78 BPM | BODY MASS INDEX: 19.77 KG/M2 | RESPIRATION RATE: 18 BRPM | SYSTOLIC BLOOD PRESSURE: 134 MMHG | WEIGHT: 123 LBS | OXYGEN SATURATION: 100 % | TEMPERATURE: 98.2 F | HEIGHT: 66 IN | DIASTOLIC BLOOD PRESSURE: 52 MMHG

## 2018-01-01 VITALS
DIASTOLIC BLOOD PRESSURE: 65 MMHG | TEMPERATURE: 96.6 F | BODY MASS INDEX: 24.27 KG/M2 | SYSTOLIC BLOOD PRESSURE: 90 MMHG | HEART RATE: 71 BPM | RESPIRATION RATE: 31 BRPM | WEIGHT: 151.01 LBS | HEIGHT: 66 IN | OXYGEN SATURATION: 68 %

## 2018-01-01 VITALS
SYSTOLIC BLOOD PRESSURE: 89 MMHG | HEART RATE: 94 BPM | TEMPERATURE: 98.8 F | DIASTOLIC BLOOD PRESSURE: 55 MMHG | OXYGEN SATURATION: 100 % | BODY MASS INDEX: 19.86 KG/M2 | WEIGHT: 123 LBS | RESPIRATION RATE: 14 BRPM

## 2018-01-01 VITALS
RESPIRATION RATE: 16 BRPM | SYSTOLIC BLOOD PRESSURE: 91 MMHG | HEIGHT: 64 IN | OXYGEN SATURATION: 97 % | BODY MASS INDEX: 19.81 KG/M2 | HEART RATE: 94 BPM | TEMPERATURE: 98 F | WEIGHT: 116 LBS | DIASTOLIC BLOOD PRESSURE: 57 MMHG

## 2018-01-01 VITALS
HEART RATE: 89 BPM | HEIGHT: 64 IN | SYSTOLIC BLOOD PRESSURE: 116 MMHG | DIASTOLIC BLOOD PRESSURE: 74 MMHG | WEIGHT: 117 LBS | TEMPERATURE: 97.8 F | OXYGEN SATURATION: 100 % | BODY MASS INDEX: 19.97 KG/M2

## 2018-01-01 VITALS
TEMPERATURE: 97.7 F | HEART RATE: 102 BPM | WEIGHT: 127 LBS | BODY MASS INDEX: 20.41 KG/M2 | HEIGHT: 66 IN | RESPIRATION RATE: 16 BRPM | DIASTOLIC BLOOD PRESSURE: 90 MMHG | OXYGEN SATURATION: 99 % | SYSTOLIC BLOOD PRESSURE: 96 MMHG

## 2018-01-01 VITALS
TEMPERATURE: 98 F | OXYGEN SATURATION: 100 % | WEIGHT: 124 LBS | BODY MASS INDEX: 19.93 KG/M2 | DIASTOLIC BLOOD PRESSURE: 55 MMHG | HEIGHT: 66 IN | HEART RATE: 87 BPM | SYSTOLIC BLOOD PRESSURE: 98 MMHG | RESPIRATION RATE: 16 BRPM

## 2018-01-01 VITALS
HEART RATE: 94 BPM | WEIGHT: 119 LBS | OXYGEN SATURATION: 100 % | BODY MASS INDEX: 20.32 KG/M2 | DIASTOLIC BLOOD PRESSURE: 67 MMHG | HEIGHT: 64 IN | SYSTOLIC BLOOD PRESSURE: 110 MMHG | TEMPERATURE: 97.7 F

## 2018-01-01 VITALS
SYSTOLIC BLOOD PRESSURE: 102 MMHG | OXYGEN SATURATION: 99 % | DIASTOLIC BLOOD PRESSURE: 67 MMHG | HEIGHT: 66 IN | HEART RATE: 102 BPM | WEIGHT: 129 LBS | RESPIRATION RATE: 16 BRPM | BODY MASS INDEX: 20.73 KG/M2 | TEMPERATURE: 95.8 F

## 2018-01-01 VITALS
WEIGHT: 127 LBS | RESPIRATION RATE: 18 BRPM | SYSTOLIC BLOOD PRESSURE: 92 MMHG | TEMPERATURE: 98.1 F | DIASTOLIC BLOOD PRESSURE: 63 MMHG | BODY MASS INDEX: 20.51 KG/M2 | HEART RATE: 104 BPM | OXYGEN SATURATION: 98 %

## 2018-01-01 VITALS
HEART RATE: 88 BPM | BODY MASS INDEX: 19.81 KG/M2 | TEMPERATURE: 97.9 F | HEIGHT: 64 IN | DIASTOLIC BLOOD PRESSURE: 58 MMHG | OXYGEN SATURATION: 100 % | SYSTOLIC BLOOD PRESSURE: 105 MMHG | RESPIRATION RATE: 16 BRPM | WEIGHT: 116 LBS

## 2018-01-01 VITALS
HEART RATE: 96 BPM | SYSTOLIC BLOOD PRESSURE: 107 MMHG | RESPIRATION RATE: 16 BRPM | TEMPERATURE: 97.8 F | HEIGHT: 66 IN | OXYGEN SATURATION: 100 % | DIASTOLIC BLOOD PRESSURE: 67 MMHG

## 2018-01-01 VITALS
OXYGEN SATURATION: 100 % | DIASTOLIC BLOOD PRESSURE: 55 MMHG | BODY MASS INDEX: 18.95 KG/M2 | TEMPERATURE: 98 F | WEIGHT: 111 LBS | SYSTOLIC BLOOD PRESSURE: 104 MMHG | RESPIRATION RATE: 16 BRPM | HEART RATE: 96 BPM | HEIGHT: 64 IN

## 2018-01-01 DIAGNOSIS — Z79.899 ENCOUNTER FOR LONG-TERM (CURRENT) USE OF MEDICATIONS: ICD-10-CM

## 2018-01-01 DIAGNOSIS — C79.51 PROSTATE CANCER METASTATIC TO BONE (H): ICD-10-CM

## 2018-01-01 DIAGNOSIS — C79.51 PROSTATE CANCER METASTATIC TO BONE (H): Primary | ICD-10-CM

## 2018-01-01 DIAGNOSIS — C61 PROSTATE CANCER METASTATIC TO BONE (H): ICD-10-CM

## 2018-01-01 DIAGNOSIS — C79.51 METASTATIC BONE TUMOR (H): ICD-10-CM

## 2018-01-01 DIAGNOSIS — C79.51 METASTATIC BONE TUMOR (H): Primary | ICD-10-CM

## 2018-01-01 DIAGNOSIS — C61 PROSTATE CANCER METASTATIC TO BONE (H): Primary | ICD-10-CM

## 2018-01-01 DIAGNOSIS — R53.83 OTHER FATIGUE: Primary | ICD-10-CM

## 2018-01-01 DIAGNOSIS — M79.622 PAIN OF LEFT UPPER ARM: ICD-10-CM

## 2018-01-01 DIAGNOSIS — E86.0 DEHYDRATION: ICD-10-CM

## 2018-01-01 DIAGNOSIS — S06.5XAA SUBDURAL HEMATOMA (H): Primary | ICD-10-CM

## 2018-01-01 DIAGNOSIS — R53.83 OTHER FATIGUE: ICD-10-CM

## 2018-01-01 DIAGNOSIS — Z86.79 HISTORY OF CARDIAC MURMUR: ICD-10-CM

## 2018-01-01 DIAGNOSIS — R56.9 SEIZURE-LIKE ACTIVITY (H): Primary | ICD-10-CM

## 2018-01-01 DIAGNOSIS — D64.9 SYMPTOMATIC ANEMIA: ICD-10-CM

## 2018-01-01 DIAGNOSIS — R42 LIGHTHEADEDNESS: Primary | ICD-10-CM

## 2018-01-01 DIAGNOSIS — M62.81 GENERALIZED MUSCLE WEAKNESS: ICD-10-CM

## 2018-01-01 DIAGNOSIS — R07.9 CHEST PAIN: ICD-10-CM

## 2018-01-01 DIAGNOSIS — I50.22 CHRONIC SYSTOLIC HEART FAILURE (H): ICD-10-CM

## 2018-01-01 DIAGNOSIS — I25.10 ASCVD (ARTERIOSCLEROTIC CARDIOVASCULAR DISEASE): Primary | ICD-10-CM

## 2018-01-01 DIAGNOSIS — R07.9 CHEST PAIN, UNSPECIFIED TYPE: ICD-10-CM

## 2018-01-01 LAB
ABO + RH BLD: ABNORMAL
ACANTHOCYTES BLD QL SMEAR: SLIGHT
ACANTHOCYTES BLD QL SMEAR: SLIGHT
ALBUMIN SERPL-MCNC: 2.4 G/DL (ref 3.4–5)
ALBUMIN SERPL-MCNC: 2.5 G/DL (ref 3.4–5)
ALBUMIN SERPL-MCNC: 3 G/DL (ref 3.4–5)
ALBUMIN SERPL-MCNC: 3.6 G/DL (ref 3.4–5)
ALBUMIN SERPL-MCNC: 3.6 G/DL (ref 3.4–5)
ALBUMIN SERPL-MCNC: 3.7 G/DL (ref 3.4–5)
ALBUMIN SERPL-MCNC: 3.8 G/DL (ref 3.4–5)
ALBUMIN SERPL-MCNC: 3.8 G/DL (ref 3.4–5)
ALBUMIN SERPL-MCNC: 3.9 G/DL (ref 3.4–5)
ALBUMIN UR-MCNC: 10 MG/DL
ALBUMIN UR-MCNC: 10 MG/DL
ALBUMIN UR-MCNC: 100 MG/DL
ALBUMIN UR-MCNC: 100 MG/DL
ALBUMIN UR-MCNC: 30 MG/DL
ALBUMIN UR-MCNC: ABNORMAL MG/DL
ALP SERPL-CCNC: 144 U/L (ref 40–150)
ALP SERPL-CCNC: 153 U/L (ref 40–150)
ALP SERPL-CCNC: 155 U/L (ref 40–150)
ALP SERPL-CCNC: 157 U/L (ref 40–150)
ALP SERPL-CCNC: 158 U/L (ref 40–150)
ALP SERPL-CCNC: 162 U/L (ref 40–150)
ALP SERPL-CCNC: 169 U/L (ref 40–150)
ALP SERPL-CCNC: 176 U/L (ref 40–150)
ALP SERPL-CCNC: 180 U/L (ref 40–150)
ALP SERPL-CCNC: 180 U/L (ref 40–150)
ALP SERPL-CCNC: 181 U/L (ref 40–150)
ALP SERPL-CCNC: 183 U/L (ref 40–150)
ALP SERPL-CCNC: 186 U/L (ref 40–150)
ALP SERPL-CCNC: 196 U/L (ref 40–150)
ALP SERPL-CCNC: 199 U/L (ref 40–150)
ALP SERPL-CCNC: 227 U/L (ref 40–150)
ALT SERPL W P-5'-P-CCNC: 11 U/L (ref 0–70)
ALT SERPL W P-5'-P-CCNC: 11 U/L (ref 0–70)
ALT SERPL W P-5'-P-CCNC: 12 U/L (ref 0–70)
ALT SERPL W P-5'-P-CCNC: 13 U/L (ref 0–70)
ALT SERPL W P-5'-P-CCNC: 13 U/L (ref 0–70)
ALT SERPL W P-5'-P-CCNC: 14 U/L (ref 0–70)
ALT SERPL W P-5'-P-CCNC: 14 U/L (ref 0–70)
ALT SERPL W P-5'-P-CCNC: 15 U/L (ref 0–70)
ALT SERPL W P-5'-P-CCNC: 16 U/L (ref 0–70)
ALT SERPL W P-5'-P-CCNC: 18 U/L (ref 0–70)
ALT SERPL W P-5'-P-CCNC: 267 U/L (ref 0–70)
ALT SERPL W P-5'-P-CCNC: 48 U/L (ref 0–70)
ALT SERPL W P-5'-P-CCNC: 550 U/L (ref 0–70)
ALT SERPL W P-5'-P-CCNC: 622 U/L (ref 0–70)
AMMONIA PLAS-SCNC: 12 UMOL/L (ref 10–50)
AMYLASE SERPL-CCNC: 57 U/L (ref 30–110)
ANGLE RATE OF CLOT STRENGTH: 42.4 DEGREES (ref 53–72)
ANGLE RATE OF CLOT STRENGTH: 44.1 DEGREES (ref 53–72)
ANGLE RATE OF CLOT STRENGTH: 58.1 DEGREES (ref 53–72)
ANGLE RATE OF CLOT STRENGTH: 59.5 DEGREES (ref 53–72)
ANION GAP SERPL CALCULATED.3IONS-SCNC: 10 MMOL/L (ref 3–14)
ANION GAP SERPL CALCULATED.3IONS-SCNC: 11 MMOL/L (ref 3–14)
ANION GAP SERPL CALCULATED.3IONS-SCNC: 12 MMOL/L (ref 3–14)
ANION GAP SERPL CALCULATED.3IONS-SCNC: 13 MMOL/L (ref 3–14)
ANION GAP SERPL CALCULATED.3IONS-SCNC: 13 MMOL/L (ref 3–14)
ANION GAP SERPL CALCULATED.3IONS-SCNC: 15 MMOL/L (ref 3–14)
ANION GAP SERPL CALCULATED.3IONS-SCNC: 16 MMOL/L (ref 3–14)
ANION GAP SERPL CALCULATED.3IONS-SCNC: 18 MMOL/L (ref 3–14)
ANION GAP SERPL CALCULATED.3IONS-SCNC: 19 MMOL/L (ref 3–14)
ANION GAP SERPL CALCULATED.3IONS-SCNC: 20 MMOL/L (ref 3–14)
ANION GAP SERPL CALCULATED.3IONS-SCNC: 20 MMOL/L (ref 3–14)
ANION GAP SERPL CALCULATED.3IONS-SCNC: 21 MMOL/L (ref 3–14)
ANION GAP SERPL CALCULATED.3IONS-SCNC: 23 MMOL/L (ref 3–14)
ANION GAP SERPL CALCULATED.3IONS-SCNC: 25 MMOL/L (ref 3–14)
ANION GAP SERPL CALCULATED.3IONS-SCNC: 25 MMOL/L (ref 3–14)
ANION GAP SERPL CALCULATED.3IONS-SCNC: 26 MMOL/L (ref 3–14)
ANION GAP SERPL CALCULATED.3IONS-SCNC: 27 MMOL/L (ref 3–14)
ANION GAP SERPL CALCULATED.3IONS-SCNC: 8 MMOL/L (ref 3–14)
ANION GAP SERPL CALCULATED.3IONS-SCNC: 9 MMOL/L (ref 3–14)
ANISOCYTOSIS BLD QL SMEAR: ABNORMAL
ANISOCYTOSIS BLD QL SMEAR: SLIGHT
APPEARANCE UR: ABNORMAL
APPEARANCE UR: CLEAR
APPEARANCE UR: CLEAR
APTT PPP: 31 SEC (ref 22–37)
APTT PPP: 39 SEC (ref 22–37)
APTT PPP: 43 SEC (ref 22–37)
APTT PPP: 45 SEC (ref 22–37)
APTT PPP: 45 SEC (ref 22–37)
APTT PPP: 46 SEC (ref 22–37)
APTT PPP: 47 SEC (ref 22–37)
APTT PPP: 52 SEC (ref 22–37)
APTT PPP: 55 SEC (ref 22–37)
APTT PPP: 60 SEC (ref 22–37)
AST SERPL W P-5'-P-CCNC: 1690 U/L (ref 0–45)
AST SERPL W P-5'-P-CCNC: 1765 U/L (ref 0–45)
AST SERPL W P-5'-P-CCNC: 184 U/L (ref 0–45)
AST SERPL W P-5'-P-CCNC: 30 U/L (ref 0–45)
AST SERPL W P-5'-P-CCNC: 31 U/L (ref 0–45)
AST SERPL W P-5'-P-CCNC: 37 U/L (ref 0–45)
AST SERPL W P-5'-P-CCNC: 37 U/L (ref 0–45)
AST SERPL W P-5'-P-CCNC: 38 U/L (ref 0–45)
AST SERPL W P-5'-P-CCNC: 38 U/L (ref 0–45)
AST SERPL W P-5'-P-CCNC: 40 U/L (ref 0–45)
AST SERPL W P-5'-P-CCNC: 40 U/L (ref 0–45)
AST SERPL W P-5'-P-CCNC: 42 U/L (ref 0–45)
AST SERPL W P-5'-P-CCNC: 44 U/L (ref 0–45)
AST SERPL W P-5'-P-CCNC: 56 U/L (ref 0–45)
AST SERPL W P-5'-P-CCNC: 60 U/L (ref 0–45)
AST SERPL W P-5'-P-CCNC: 768 U/L (ref 0–45)
BACTERIA SPEC CULT: NO GROWTH
BACTERIA SPEC CULT: NORMAL
BASE DEFICIT BLDA-SCNC: 10 MMOL/L
BASE DEFICIT BLDA-SCNC: 12.2 MMOL/L
BASE DEFICIT BLDA-SCNC: 12.9 MMOL/L
BASE DEFICIT BLDA-SCNC: 13.5 MMOL/L
BASE DEFICIT BLDA-SCNC: 14.7 MMOL/L
BASE DEFICIT BLDA-SCNC: 15.8 MMOL/L
BASE DEFICIT BLDA-SCNC: 16.6 MMOL/L
BASE DEFICIT BLDA-SCNC: 3.5 MMOL/L
BASE DEFICIT BLDA-SCNC: 4.4 MMOL/L
BASE DEFICIT BLDA-SCNC: 7.6 MMOL/L
BASE DEFICIT BLDA-SCNC: 9.1 MMOL/L
BASE DEFICIT BLDA-SCNC: 9.3 MMOL/L
BASE DEFICIT BLDA-SCNC: 9.6 MMOL/L
BASE DEFICIT BLDA-SCNC: 9.9 MMOL/L
BASE DEFICIT BLDA-SCNC: NORMAL MMOL/L
BASE DEFICIT BLDV-SCNC: 11.5 MMOL/L
BASE DEFICIT BLDV-SCNC: 11.7 MMOL/L
BASE DEFICIT BLDV-SCNC: 12.4 MMOL/L
BASE DEFICIT BLDV-SCNC: 13.9 MMOL/L
BASE DEFICIT BLDV-SCNC: 6.4 MMOL/L
BASE DEFICIT BLDV-SCNC: 6.8 MMOL/L
BASE DEFICIT BLDV-SCNC: 7.2 MMOL/L
BASE DEFICIT BLDV-SCNC: 8.7 MMOL/L
BASE DEFICIT BLDV-SCNC: 8.9 MMOL/L
BASE EXCESS BLDA CALC-SCNC: NORMAL MMOL/L
BASOPHILS # BLD AUTO: 0 10E9/L (ref 0–0.2)
BASOPHILS # BLD AUTO: 0.1 10E9/L (ref 0–0.2)
BASOPHILS # BLD AUTO: 0.1 10E9/L (ref 0–0.2)
BASOPHILS NFR BLD AUTO: 0 %
BASOPHILS NFR BLD AUTO: 0.2 %
BASOPHILS NFR BLD AUTO: 0.3 %
BASOPHILS NFR BLD AUTO: 0.5 %
BASOPHILS NFR BLD AUTO: 0.6 %
BASOPHILS NFR BLD AUTO: 0.6 %
BASOPHILS NFR BLD AUTO: 0.7 %
BASOPHILS NFR BLD AUTO: 0.7 %
BASOPHILS NFR BLD AUTO: 0.9 %
BASOPHILS NFR BLD AUTO: 0.9 %
BASOPHILS NFR BLD AUTO: 1.8 %
BILIRUB DIRECT SERPL-MCNC: 0.1 MG/DL (ref 0–0.2)
BILIRUB SERPL-MCNC: 0.6 MG/DL (ref 0.2–1.3)
BILIRUB SERPL-MCNC: 0.7 MG/DL (ref 0.2–1.3)
BILIRUB SERPL-MCNC: 0.7 MG/DL (ref 0.2–1.3)
BILIRUB SERPL-MCNC: 0.9 MG/DL (ref 0.2–1.3)
BILIRUB SERPL-MCNC: 1 MG/DL (ref 0.2–1.3)
BILIRUB SERPL-MCNC: 1.1 MG/DL (ref 0.2–1.3)
BILIRUB SERPL-MCNC: 1.4 MG/DL (ref 0.2–1.3)
BILIRUB SERPL-MCNC: 2.7 MG/DL (ref 0.2–1.3)
BILIRUB SERPL-MCNC: 2.9 MG/DL (ref 0.2–1.3)
BILIRUB SERPL-MCNC: 3 MG/DL (ref 0.2–1.3)
BILIRUB SERPL-MCNC: 3.2 MG/DL (ref 0.2–1.3)
BILIRUB UR QL STRIP: ABNORMAL
BILIRUB UR QL STRIP: NEGATIVE
BLD GP AB INVEST PLASRBC-IMP: ABNORMAL
BLD GP AB SCN SERPL QL: ABNORMAL
BLD PROD TYP BPU: ABNORMAL
BLD PROD TYP BPU: ABNORMAL
BLD PROD TYP BPU: NORMAL
BLD UNIT ID BPU: 0
BLOOD BANK CMNT PATIENT-IMP: ABNORMAL
BLOOD PRODUCT CODE: NORMAL
BPU ID: NORMAL
BUN SERPL-MCNC: 11 MG/DL (ref 7–30)
BUN SERPL-MCNC: 12 MG/DL (ref 7–30)
BUN SERPL-MCNC: 12 MG/DL (ref 7–30)
BUN SERPL-MCNC: 13 MG/DL (ref 7–30)
BUN SERPL-MCNC: 14 MG/DL (ref 7–30)
BUN SERPL-MCNC: 15 MG/DL (ref 7–30)
BUN SERPL-MCNC: 16 MG/DL (ref 7–30)
BUN SERPL-MCNC: 16 MG/DL (ref 7–30)
BUN SERPL-MCNC: 17 MG/DL (ref 7–30)
BUN SERPL-MCNC: 17 MG/DL (ref 7–30)
BUN SERPL-MCNC: 18 MG/DL (ref 7–30)
BUN SERPL-MCNC: 19 MG/DL (ref 7–30)
BUN SERPL-MCNC: 23 MG/DL (ref 7–30)
BUN SERPL-MCNC: 25 MG/DL (ref 7–30)
BUN SERPL-MCNC: 27 MG/DL (ref 7–30)
BUN SERPL-MCNC: 28 MG/DL (ref 7–30)
BUN SERPL-MCNC: 30 MG/DL (ref 7–30)
BUN SERPL-MCNC: 32 MG/DL (ref 7–30)
BUN SERPL-MCNC: 33 MG/DL (ref 7–30)
BUN SERPL-MCNC: 33 MG/DL (ref 7–30)
BUN SERPL-MCNC: 34 MG/DL (ref 7–30)
BUN SERPL-MCNC: 38 MG/DL (ref 7–30)
BUN SERPL-MCNC: 38 MG/DL (ref 7–30)
BUN SERPL-MCNC: 39 MG/DL (ref 7–30)
BUN SERPL-MCNC: 40 MG/DL (ref 7–30)
BUN SERPL-MCNC: 41 MG/DL (ref 7–30)
BURR CELLS BLD QL SMEAR: ABNORMAL
BURR CELLS BLD QL SMEAR: SLIGHT
C DIFF TOX B STL QL: NEGATIVE
CA-I BLD-MCNC: 4.2 MG/DL (ref 4.4–5.2)
CA-I BLD-MCNC: 4.3 MG/DL (ref 4.4–5.2)
CA-I SERPL ISE-MCNC: 4.3 MG/DL (ref 4.4–5.2)
CA-I SERPL ISE-MCNC: 4.4 MG/DL (ref 4.4–5.2)
CALCIUM SERPL-MCNC: 6.8 MG/DL (ref 8.5–10.1)
CALCIUM SERPL-MCNC: 7.4 MG/DL (ref 8.5–10.1)
CALCIUM SERPL-MCNC: 7.6 MG/DL (ref 8.5–10.1)
CALCIUM SERPL-MCNC: 7.7 MG/DL (ref 8.5–10.1)
CALCIUM SERPL-MCNC: 7.8 MG/DL (ref 8.5–10.1)
CALCIUM SERPL-MCNC: 7.9 MG/DL (ref 8.5–10.1)
CALCIUM SERPL-MCNC: 8 MG/DL (ref 8.5–10.1)
CALCIUM SERPL-MCNC: 8.2 MG/DL (ref 8.5–10.1)
CALCIUM SERPL-MCNC: 8.2 MG/DL (ref 8.5–10.1)
CALCIUM SERPL-MCNC: 8.4 MG/DL (ref 8.5–10.1)
CALCIUM SERPL-MCNC: 8.5 MG/DL (ref 8.5–10.1)
CALCIUM SERPL-MCNC: 8.6 MG/DL (ref 8.5–10.1)
CALCIUM SERPL-MCNC: 8.8 MG/DL (ref 8.5–10.1)
CALCIUM SERPL-MCNC: 8.8 MG/DL (ref 8.5–10.1)
CALCIUM SERPL-MCNC: 8.9 MG/DL (ref 8.5–10.1)
CALCIUM SERPL-MCNC: 9 MG/DL (ref 8.5–10.1)
CALCIUM SERPL-MCNC: 9.1 MG/DL (ref 8.5–10.1)
CALCIUM SERPL-MCNC: 9.2 MG/DL (ref 8.5–10.1)
CALCIUM SERPL-MCNC: 9.5 MG/DL (ref 8.5–10.1)
CHLORIDE SERPL-SCNC: 106 MMOL/L (ref 94–109)
CHLORIDE SERPL-SCNC: 106 MMOL/L (ref 94–109)
CHLORIDE SERPL-SCNC: 107 MMOL/L (ref 94–109)
CHLORIDE SERPL-SCNC: 108 MMOL/L (ref 94–109)
CHLORIDE SERPL-SCNC: 108 MMOL/L (ref 94–109)
CHLORIDE SERPL-SCNC: 109 MMOL/L (ref 94–109)
CHLORIDE SERPL-SCNC: 110 MMOL/L (ref 94–109)
CHLORIDE SERPL-SCNC: 111 MMOL/L (ref 94–109)
CHLORIDE SERPL-SCNC: 111 MMOL/L (ref 94–109)
CHLORIDE SERPL-SCNC: 112 MMOL/L (ref 94–109)
CHLORIDE SERPL-SCNC: 113 MMOL/L (ref 94–109)
CHLORIDE SERPL-SCNC: 114 MMOL/L (ref 94–109)
CHLORIDE SERPL-SCNC: 114 MMOL/L (ref 94–109)
CHLORIDE SERPL-SCNC: 115 MMOL/L (ref 94–109)
CHLORIDE SERPL-SCNC: 116 MMOL/L (ref 94–109)
CHLORIDE SERPL-SCNC: 117 MMOL/L (ref 94–109)
CHLORIDE SERPL-SCNC: 117 MMOL/L (ref 94–109)
CHLORIDE SERPL-SCNC: 118 MMOL/L (ref 94–109)
CHLORIDE SERPL-SCNC: 119 MMOL/L (ref 94–109)
CI HYPOCOAGULATION INDEX: 0.4 RATIO (ref 0–3)
CI HYPOCOAGULATION INDEX: 1.3 RATIO (ref 0–3)
CI HYPOCOAGULATION INDEX: 5.2 RATIO (ref 0–3)
CI HYPOCOAGULATION INDEX: 7 RATIO (ref 0–3)
CO2 SERPL-SCNC: 10 MMOL/L (ref 20–32)
CO2 SERPL-SCNC: 12 MMOL/L (ref 20–32)
CO2 SERPL-SCNC: 13 MMOL/L (ref 20–32)
CO2 SERPL-SCNC: 13 MMOL/L (ref 20–32)
CO2 SERPL-SCNC: 14 MMOL/L (ref 20–32)
CO2 SERPL-SCNC: 14 MMOL/L (ref 20–32)
CO2 SERPL-SCNC: 15 MMOL/L (ref 20–32)
CO2 SERPL-SCNC: 17 MMOL/L (ref 20–32)
CO2 SERPL-SCNC: 19 MMOL/L (ref 20–32)
CO2 SERPL-SCNC: 20 MMOL/L (ref 20–32)
CO2 SERPL-SCNC: 21 MMOL/L (ref 20–32)
CO2 SERPL-SCNC: 22 MMOL/L (ref 20–32)
CO2 SERPL-SCNC: 23 MMOL/L (ref 20–32)
COLOR UR AUTO: ABNORMAL
COLOR UR AUTO: ABNORMAL
COLOR UR AUTO: YELLOW
COPATH REPORT: NORMAL
CREAT SERPL-MCNC: 0.76 MG/DL (ref 0.66–1.25)
CREAT SERPL-MCNC: 0.82 MG/DL (ref 0.66–1.25)
CREAT SERPL-MCNC: 0.82 MG/DL (ref 0.66–1.25)
CREAT SERPL-MCNC: 0.83 MG/DL (ref 0.66–1.25)
CREAT SERPL-MCNC: 0.84 MG/DL (ref 0.66–1.25)
CREAT SERPL-MCNC: 0.85 MG/DL (ref 0.66–1.25)
CREAT SERPL-MCNC: 0.88 MG/DL (ref 0.66–1.25)
CREAT SERPL-MCNC: 0.89 MG/DL (ref 0.66–1.25)
CREAT SERPL-MCNC: 0.89 MG/DL (ref 0.66–1.25)
CREAT SERPL-MCNC: 0.91 MG/DL (ref 0.66–1.25)
CREAT SERPL-MCNC: 0.92 MG/DL (ref 0.66–1.25)
CREAT SERPL-MCNC: 0.93 MG/DL (ref 0.66–1.25)
CREAT SERPL-MCNC: 0.94 MG/DL (ref 0.66–1.25)
CREAT SERPL-MCNC: 0.98 MG/DL (ref 0.66–1.25)
CREAT SERPL-MCNC: 1.01 MG/DL (ref 0.66–1.25)
CREAT SERPL-MCNC: 1.03 MG/DL (ref 0.66–1.25)
CREAT SERPL-MCNC: 1.08 MG/DL (ref 0.66–1.25)
CREAT SERPL-MCNC: 1.09 MG/DL (ref 0.66–1.25)
CREAT SERPL-MCNC: 1.16 MG/DL (ref 0.66–1.25)
CREAT SERPL-MCNC: 1.18 MG/DL (ref 0.66–1.25)
CREAT SERPL-MCNC: 1.22 MG/DL (ref 0.66–1.25)
CREAT SERPL-MCNC: 1.23 MG/DL (ref 0.66–1.25)
CREAT SERPL-MCNC: 1.32 MG/DL (ref 0.66–1.25)
CREAT SERPL-MCNC: 1.33 MG/DL (ref 0.66–1.25)
CREAT SERPL-MCNC: 1.37 MG/DL (ref 0.66–1.25)
CREAT SERPL-MCNC: 1.4 MG/DL (ref 0.66–1.25)
CREAT UR-MCNC: 105 MG/DL
CRP SERPL-MCNC: 140 MG/L (ref 0–8)
CRP SERPL-MCNC: 160 MG/L (ref 0–8)
DIFFERENTIAL METHOD BLD: ABNORMAL
EOSINOPHIL # BLD AUTO: 0 10E9/L (ref 0–0.7)
EOSINOPHIL # BLD AUTO: 0.1 10E9/L (ref 0–0.7)
EOSINOPHIL # BLD AUTO: 0.2 10E9/L (ref 0–0.7)
EOSINOPHIL # BLD AUTO: 0.3 10E9/L (ref 0–0.7)
EOSINOPHIL NFR BLD AUTO: 0 %
EOSINOPHIL NFR BLD AUTO: 0.5 %
EOSINOPHIL NFR BLD AUTO: 0.7 %
EOSINOPHIL NFR BLD AUTO: 0.9 %
EOSINOPHIL NFR BLD AUTO: 1.2 %
EOSINOPHIL NFR BLD AUTO: 1.3 %
EOSINOPHIL NFR BLD AUTO: 1.4 %
EOSINOPHIL NFR BLD AUTO: 1.7 %
EOSINOPHIL NFR BLD AUTO: 1.8 %
EOSINOPHIL NFR BLD AUTO: 1.8 %
EOSINOPHIL NFR BLD AUTO: 1.9 %
EOSINOPHIL NFR BLD AUTO: 2 %
EOSINOPHIL NFR BLD AUTO: 2.1 %
EOSINOPHIL NFR BLD AUTO: 2.1 %
EOSINOPHIL NFR BLD AUTO: 2.2 %
EOSINOPHIL NFR BLD AUTO: 3.7 %
EOSINOPHIL NFR BLD AUTO: 4.5 %
EOSINOPHIL NFR BLD AUTO: 8.2 %
ERYTHROCYTE [DISTWIDTH] IN BLOOD BY AUTOMATED COUNT: 14.6 % (ref 10–15)
ERYTHROCYTE [DISTWIDTH] IN BLOOD BY AUTOMATED COUNT: 14.6 % (ref 10–15)
ERYTHROCYTE [DISTWIDTH] IN BLOOD BY AUTOMATED COUNT: 14.9 % (ref 10–15)
ERYTHROCYTE [DISTWIDTH] IN BLOOD BY AUTOMATED COUNT: 14.9 % (ref 10–15)
ERYTHROCYTE [DISTWIDTH] IN BLOOD BY AUTOMATED COUNT: 15.2 % (ref 10–15)
ERYTHROCYTE [DISTWIDTH] IN BLOOD BY AUTOMATED COUNT: 15.4 % (ref 10–15)
ERYTHROCYTE [DISTWIDTH] IN BLOOD BY AUTOMATED COUNT: 15.8 % (ref 10–15)
ERYTHROCYTE [DISTWIDTH] IN BLOOD BY AUTOMATED COUNT: 15.9 % (ref 10–15)
ERYTHROCYTE [DISTWIDTH] IN BLOOD BY AUTOMATED COUNT: 15.9 % (ref 10–15)
ERYTHROCYTE [DISTWIDTH] IN BLOOD BY AUTOMATED COUNT: 16.2 % (ref 10–15)
ERYTHROCYTE [DISTWIDTH] IN BLOOD BY AUTOMATED COUNT: 16.4 % (ref 10–15)
ERYTHROCYTE [DISTWIDTH] IN BLOOD BY AUTOMATED COUNT: 16.7 % (ref 10–15)
ERYTHROCYTE [DISTWIDTH] IN BLOOD BY AUTOMATED COUNT: 16.8 % (ref 10–15)
ERYTHROCYTE [DISTWIDTH] IN BLOOD BY AUTOMATED COUNT: 17.1 % (ref 10–15)
ERYTHROCYTE [DISTWIDTH] IN BLOOD BY AUTOMATED COUNT: 17.1 % (ref 10–15)
ERYTHROCYTE [DISTWIDTH] IN BLOOD BY AUTOMATED COUNT: 17.2 % (ref 10–15)
ERYTHROCYTE [DISTWIDTH] IN BLOOD BY AUTOMATED COUNT: 17.4 % (ref 10–15)
ERYTHROCYTE [DISTWIDTH] IN BLOOD BY AUTOMATED COUNT: 17.5 % (ref 10–15)
ERYTHROCYTE [DISTWIDTH] IN BLOOD BY AUTOMATED COUNT: 17.8 % (ref 10–15)
ERYTHROCYTE [DISTWIDTH] IN BLOOD BY AUTOMATED COUNT: 18.4 % (ref 10–15)
ERYTHROCYTE [DISTWIDTH] IN BLOOD BY AUTOMATED COUNT: 19.2 % (ref 10–15)
ERYTHROCYTE [DISTWIDTH] IN BLOOD BY AUTOMATED COUNT: 19.2 % (ref 10–15)
ERYTHROCYTE [DISTWIDTH] IN BLOOD BY AUTOMATED COUNT: 19.3 % (ref 10–15)
ERYTHROCYTE [DISTWIDTH] IN BLOOD BY AUTOMATED COUNT: 19.3 % (ref 10–15)
ERYTHROCYTE [DISTWIDTH] IN BLOOD BY AUTOMATED COUNT: 19.5 % (ref 10–15)
ERYTHROCYTE [DISTWIDTH] IN BLOOD BY AUTOMATED COUNT: 19.6 % (ref 10–15)
ERYTHROCYTE [DISTWIDTH] IN BLOOD BY AUTOMATED COUNT: 19.6 % (ref 10–15)
FERRITIN SERPL-MCNC: 2009 NG/ML (ref 26–388)
FIBRINOGEN PPP-MCNC: 102 MG/DL (ref 200–420)
FIBRINOGEN PPP-MCNC: 121 MG/DL (ref 200–420)
FIBRINOGEN PPP-MCNC: 149 MG/DL (ref 200–420)
FIBRINOGEN PPP-MCNC: 153 MG/DL (ref 200–420)
FIBRINOGEN PPP-MCNC: 190 MG/DL (ref 200–420)
FIBRINOGEN PPP-MCNC: 203 MG/DL (ref 200–420)
FIBRINOGEN PPP-MCNC: 209 MG/DL (ref 200–420)
FIBRINOGEN PPP-MCNC: 251 MG/DL (ref 200–420)
FIBRINOGEN PPP-MCNC: 252 MG/DL (ref 200–420)
FIBRINOGEN PPP-MCNC: 280 MG/DL (ref 200–420)
FIBRINOGEN PPP-MCNC: 89 MG/DL (ref 200–420)
FOLATE SERPL-MCNC: 8 NG/ML
FRACT EXCRET NA UR+SERPL-RTO: 0.3 %
G ACTUAL CLOT STRENGTH: 4.2 KD/SC (ref 4.5–11)
G ACTUAL CLOT STRENGTH: 4.7 KD/SC (ref 4.5–11)
G ACTUAL CLOT STRENGTH: 5.4 KD/SC (ref 4.5–11)
G ACTUAL CLOT STRENGTH: 6.9 KD/SC (ref 4.5–11)
GFR SERPL CREATININE-BSD FRML MDRD: 49 ML/MIN/1.7M2
GFR SERPL CREATININE-BSD FRML MDRD: 51 ML/MIN/1.7M2
GFR SERPL CREATININE-BSD FRML MDRD: 53 ML/MIN/1.7M2
GFR SERPL CREATININE-BSD FRML MDRD: 53 ML/MIN/1.7M2
GFR SERPL CREATININE-BSD FRML MDRD: 57 ML/MIN/1.7M2
GFR SERPL CREATININE-BSD FRML MDRD: 58 ML/MIN/1.7M2
GFR SERPL CREATININE-BSD FRML MDRD: 60 ML/MIN/1.7M2
GFR SERPL CREATININE-BSD FRML MDRD: 61 ML/MIN/1.7M2
GFR SERPL CREATININE-BSD FRML MDRD: 66 ML/MIN/1.7M2
GFR SERPL CREATININE-BSD FRML MDRD: 67 ML/MIN/1.7M2
GFR SERPL CREATININE-BSD FRML MDRD: 71 ML/MIN/1.7M2
GFR SERPL CREATININE-BSD FRML MDRD: 72 ML/MIN/1.7M2
GFR SERPL CREATININE-BSD FRML MDRD: 75 ML/MIN/1.7M2
GFR SERPL CREATININE-BSD FRML MDRD: 78 ML/MIN/1.7M2
GFR SERPL CREATININE-BSD FRML MDRD: 79 ML/MIN/1.7M2
GFR SERPL CREATININE-BSD FRML MDRD: 80 ML/MIN/1.7M2
GFR SERPL CREATININE-BSD FRML MDRD: 81 ML/MIN/1.7M2
GFR SERPL CREATININE-BSD FRML MDRD: 84 ML/MIN/1.7M2
GFR SERPL CREATININE-BSD FRML MDRD: 85 ML/MIN/1.7M2
GFR SERPL CREATININE-BSD FRML MDRD: 85 ML/MIN/1.7M2
GFR SERPL CREATININE-BSD FRML MDRD: 88 ML/MIN/1.7M2
GFR SERPL CREATININE-BSD FRML MDRD: 89 ML/MIN/1.7M2
GFR SERPL CREATININE-BSD FRML MDRD: >90 ML/MIN/1.7M2
GLUCOSE BLD-MCNC: 34 MG/DL (ref 70–99)
GLUCOSE BLDC GLUCOMTR-MCNC: 106 MG/DL (ref 70–99)
GLUCOSE BLDC GLUCOMTR-MCNC: 119 MG/DL (ref 70–99)
GLUCOSE BLDC GLUCOMTR-MCNC: 126 MG/DL (ref 70–99)
GLUCOSE BLDC GLUCOMTR-MCNC: 128 MG/DL (ref 70–99)
GLUCOSE BLDC GLUCOMTR-MCNC: 142 MG/DL (ref 70–99)
GLUCOSE BLDC GLUCOMTR-MCNC: 156 MG/DL (ref 70–99)
GLUCOSE BLDC GLUCOMTR-MCNC: 160 MG/DL (ref 70–99)
GLUCOSE BLDC GLUCOMTR-MCNC: 164 MG/DL (ref 70–99)
GLUCOSE BLDC GLUCOMTR-MCNC: 169 MG/DL (ref 70–99)
GLUCOSE BLDC GLUCOMTR-MCNC: 169 MG/DL (ref 70–99)
GLUCOSE BLDC GLUCOMTR-MCNC: 92 MG/DL (ref 70–99)
GLUCOSE SERPL-MCNC: 101 MG/DL (ref 70–99)
GLUCOSE SERPL-MCNC: 103 MG/DL (ref 70–99)
GLUCOSE SERPL-MCNC: 104 MG/DL (ref 70–99)
GLUCOSE SERPL-MCNC: 104 MG/DL (ref 70–99)
GLUCOSE SERPL-MCNC: 106 MG/DL (ref 70–99)
GLUCOSE SERPL-MCNC: 108 MG/DL (ref 70–99)
GLUCOSE SERPL-MCNC: 108 MG/DL (ref 70–99)
GLUCOSE SERPL-MCNC: 114 MG/DL (ref 70–99)
GLUCOSE SERPL-MCNC: 116 MG/DL (ref 70–99)
GLUCOSE SERPL-MCNC: 120 MG/DL (ref 70–99)
GLUCOSE SERPL-MCNC: 123 MG/DL (ref 70–99)
GLUCOSE SERPL-MCNC: 124 MG/DL (ref 70–99)
GLUCOSE SERPL-MCNC: 124 MG/DL (ref 70–99)
GLUCOSE SERPL-MCNC: 140 MG/DL (ref 70–99)
GLUCOSE SERPL-MCNC: 144 MG/DL (ref 70–99)
GLUCOSE SERPL-MCNC: 154 MG/DL (ref 70–99)
GLUCOSE SERPL-MCNC: 159 MG/DL (ref 70–99)
GLUCOSE SERPL-MCNC: 160 MG/DL (ref 70–99)
GLUCOSE SERPL-MCNC: 163 MG/DL (ref 70–99)
GLUCOSE SERPL-MCNC: 190 MG/DL (ref 70–99)
GLUCOSE SERPL-MCNC: 194 MG/DL (ref 70–99)
GLUCOSE SERPL-MCNC: 224 MG/DL (ref 70–99)
GLUCOSE SERPL-MCNC: 48 MG/DL (ref 70–99)
GLUCOSE SERPL-MCNC: 85 MG/DL (ref 70–99)
GLUCOSE SERPL-MCNC: 89 MG/DL (ref 70–99)
GLUCOSE SERPL-MCNC: 91 MG/DL (ref 70–99)
GLUCOSE SERPL-MCNC: 99 MG/DL (ref 70–99)
GLUCOSE UR STRIP-MCNC: 30 MG/DL
GLUCOSE UR STRIP-MCNC: ABNORMAL MG/DL
GLUCOSE UR STRIP-MCNC: NEGATIVE MG/DL
GRAM STN SPEC: NORMAL
GRAM STN SPEC: NORMAL
HBA1C MFR BLD: 4.9 % (ref 0–5.6)
HCO3 BLD-SCNC: 11 MMOL/L (ref 21–28)
HCO3 BLD-SCNC: 11 MMOL/L (ref 21–28)
HCO3 BLD-SCNC: 12 MMOL/L (ref 21–28)
HCO3 BLD-SCNC: 12 MMOL/L (ref 21–28)
HCO3 BLD-SCNC: 13 MMOL/L (ref 21–28)
HCO3 BLD-SCNC: 13 MMOL/L (ref 21–28)
HCO3 BLD-SCNC: 14 MMOL/L (ref 21–28)
HCO3 BLD-SCNC: 14 MMOL/L (ref 21–28)
HCO3 BLD-SCNC: 15 MMOL/L (ref 21–28)
HCO3 BLD-SCNC: 15 MMOL/L (ref 21–28)
HCO3 BLD-SCNC: 16 MMOL/L (ref 21–28)
HCO3 BLD-SCNC: 19 MMOL/L (ref 21–28)
HCO3 BLD-SCNC: 20 MMOL/L (ref 21–28)
HCO3 BLD-SCNC: 9 MMOL/L (ref 21–28)
HCO3 BLD-SCNC: NORMAL MMOL/L (ref 21–28)
HCO3 BLDV-SCNC: 14 MMOL/L (ref 21–28)
HCO3 BLDV-SCNC: 14 MMOL/L (ref 21–28)
HCO3 BLDV-SCNC: 15 MMOL/L (ref 21–28)
HCO3 BLDV-SCNC: 17 MMOL/L (ref 21–28)
HCO3 BLDV-SCNC: 18 MMOL/L (ref 21–28)
HCO3 BLDV-SCNC: 19 MMOL/L (ref 21–28)
HCO3 BLDV-SCNC: 19 MMOL/L (ref 21–28)
HCT VFR BLD AUTO: 19.6 % (ref 40–53)
HCT VFR BLD AUTO: 19.9 % (ref 40–53)
HCT VFR BLD AUTO: 19.9 % (ref 40–53)
HCT VFR BLD AUTO: 21.5 % (ref 40–53)
HCT VFR BLD AUTO: 21.5 % (ref 40–53)
HCT VFR BLD AUTO: 21.8 % (ref 40–53)
HCT VFR BLD AUTO: 22 % (ref 40–53)
HCT VFR BLD AUTO: 22.7 % (ref 40–53)
HCT VFR BLD AUTO: 23.7 % (ref 40–53)
HCT VFR BLD AUTO: 25.2 % (ref 40–53)
HCT VFR BLD AUTO: 25.3 % (ref 40–53)
HCT VFR BLD AUTO: 26.4 % (ref 40–53)
HCT VFR BLD AUTO: 26.5 % (ref 40–53)
HCT VFR BLD AUTO: 26.9 % (ref 40–53)
HCT VFR BLD AUTO: 27 % (ref 40–53)
HCT VFR BLD AUTO: 27.3 % (ref 40–53)
HCT VFR BLD AUTO: 27.6 % (ref 40–53)
HCT VFR BLD AUTO: 28.9 % (ref 40–53)
HCT VFR BLD AUTO: 29 % (ref 40–53)
HCT VFR BLD AUTO: 29.1 % (ref 40–53)
HCT VFR BLD AUTO: 29.5 % (ref 40–53)
HCT VFR BLD AUTO: 29.5 % (ref 40–53)
HCT VFR BLD AUTO: 30.7 % (ref 40–53)
HCT VFR BLD AUTO: 30.9 % (ref 40–53)
HCT VFR BLD AUTO: 31.5 % (ref 40–53)
HCT VFR BLD AUTO: 32.1 % (ref 40–53)
HCT VFR BLD AUTO: 32.5 % (ref 40–53)
HCT VFR BLD AUTO: 34.3 % (ref 40–53)
HCT VFR BLD AUTO: 34.5 % (ref 40–53)
HGB BLD-MCNC: 10.2 G/DL (ref 13.3–17.7)
HGB BLD-MCNC: 10.3 G/DL (ref 13.3–17.7)
HGB BLD-MCNC: 10.9 G/DL (ref 13.3–17.7)
HGB BLD-MCNC: 10.9 G/DL (ref 13.3–17.7)
HGB BLD-MCNC: 11.1 G/DL (ref 13.3–17.7)
HGB BLD-MCNC: 11.5 G/DL (ref 13.3–17.7)
HGB BLD-MCNC: 12 G/DL (ref 13.3–17.7)
HGB BLD-MCNC: 6.6 G/DL (ref 13.3–17.7)
HGB BLD-MCNC: 6.7 G/DL (ref 13.3–17.7)
HGB BLD-MCNC: 6.8 G/DL (ref 13.3–17.7)
HGB BLD-MCNC: 7 G/DL (ref 13.3–17.7)
HGB BLD-MCNC: 7.3 G/DL (ref 13.3–17.7)
HGB BLD-MCNC: 7.3 G/DL (ref 13.3–17.7)
HGB BLD-MCNC: 7.5 G/DL (ref 13.3–17.7)
HGB BLD-MCNC: 7.5 G/DL (ref 13.3–17.7)
HGB BLD-MCNC: 8 G/DL (ref 13.3–17.7)
HGB BLD-MCNC: 8.2 G/DL (ref 13.3–17.7)
HGB BLD-MCNC: 8.5 G/DL (ref 13.3–17.7)
HGB BLD-MCNC: 8.6 G/DL (ref 13.3–17.7)
HGB BLD-MCNC: 8.6 G/DL (ref 13.3–17.7)
HGB BLD-MCNC: 8.7 G/DL (ref 13.3–17.7)
HGB BLD-MCNC: 8.8 G/DL (ref 13.3–17.7)
HGB BLD-MCNC: 8.9 G/DL (ref 13.3–17.7)
HGB BLD-MCNC: 9.4 G/DL (ref 13.3–17.7)
HGB BLD-MCNC: 9.4 G/DL (ref 13.3–17.7)
HGB BLD-MCNC: 9.5 G/DL (ref 13.3–17.7)
HGB BLD-MCNC: 9.6 G/DL (ref 13.3–17.7)
HGB BLD-MCNC: 9.8 G/DL (ref 13.3–17.7)
HGB BLD-MCNC: 9.8 G/DL (ref 13.3–17.7)
HGB BLD-MCNC: 9.9 G/DL (ref 13.3–17.7)
HGB BLD-MCNC: 9.9 G/DL (ref 13.3–17.7)
HGB UR QL STRIP: ABNORMAL
HGB UR QL STRIP: NEGATIVE
HGB UR QL STRIP: NEGATIVE
IMM GRANULOCYTES # BLD: 0 10E9/L (ref 0–0.4)
IMM GRANULOCYTES # BLD: 0.1 10E9/L (ref 0–0.4)
IMM GRANULOCYTES # BLD: 0.1 10E9/L (ref 0–0.4)
IMM GRANULOCYTES # BLD: 0.2 10E9/L (ref 0–0.4)
IMM GRANULOCYTES NFR BLD: 0.2 %
IMM GRANULOCYTES NFR BLD: 0.3 %
IMM GRANULOCYTES NFR BLD: 0.4 %
IMM GRANULOCYTES NFR BLD: 0.5 %
IMM GRANULOCYTES NFR BLD: 0.5 %
IMM GRANULOCYTES NFR BLD: 0.6 %
IMM GRANULOCYTES NFR BLD: 0.6 %
IMM GRANULOCYTES NFR BLD: 0.7 %
IMM GRANULOCYTES NFR BLD: 0.7 %
IMM GRANULOCYTES NFR BLD: 0.8 %
IMM GRANULOCYTES NFR BLD: 1.3 %
IMM GRANULOCYTES NFR BLD: 3.7 %
INR PPP: 1.2 (ref 0.86–1.14)
INR PPP: 1.29 (ref 0.86–1.14)
INR PPP: 1.37 (ref 0.86–1.14)
INR PPP: 1.38 (ref 0.86–1.14)
INR PPP: 1.4 (ref 0.86–1.14)
INR PPP: 1.51 (ref 0.86–1.14)
INR PPP: 1.87 (ref 0.86–1.14)
INR PPP: 1.88 (ref 0.86–1.14)
INR PPP: 2.06 (ref 0.86–1.14)
INR PPP: 2.24 (ref 0.86–1.14)
INR PPP: 2.41 (ref 0.86–1.14)
INR PPP: 2.52 (ref 0.86–1.14)
INR PPP: 2.57 (ref 0.86–1.14)
INR PPP: 2.57 (ref 0.86–1.14)
INR PPP: 2.83 (ref 0.86–1.14)
INTERPRETATION ECG - MUSE: NORMAL
IRON SATN MFR SERPL: 55 % (ref 15–46)
IRON SERPL-MCNC: 151 UG/DL (ref 35–180)
K TIME TO SPEC CLOT STRENGTH: 2.4 MINUTE (ref 1–3)
K TIME TO SPEC CLOT STRENGTH: 2.4 MINUTE (ref 1–3)
K TIME TO SPEC CLOT STRENGTH: 4.3 MINUTE (ref 1–3)
K TIME TO SPEC CLOT STRENGTH: 4.6 MINUTE (ref 1–3)
KETONES UR STRIP-MCNC: 10 MG/DL
KETONES UR STRIP-MCNC: ABNORMAL MG/DL
KETONES UR STRIP-MCNC: NEGATIVE MG/DL
LAB SCANNED RESULT: NORMAL
LACTATE BLD-SCNC: 10.5 MMOL/L (ref 0.7–2)
LACTATE BLD-SCNC: 10.8 MMOL/L (ref 0.7–2)
LACTATE BLD-SCNC: 11 MMOL/L (ref 0.7–2)
LACTATE BLD-SCNC: 11.6 MMOL/L (ref 0.7–2)
LACTATE BLD-SCNC: 12.2 MMOL/L (ref 0.7–2)
LACTATE BLD-SCNC: 12.2 MMOL/L (ref 0.7–2)
LACTATE BLD-SCNC: 13.9 MMOL/L (ref 0.7–2)
LACTATE BLD-SCNC: 17 MMOL/L (ref 0.7–2)
LACTATE BLD-SCNC: 2.1 MMOL/L (ref 0.7–2)
LACTATE BLD-SCNC: 2.2 MMOL/L (ref 0.7–2)
LACTATE BLD-SCNC: 2.8 MMOL/L (ref 0.7–2)
LACTATE BLD-SCNC: 2.9 MMOL/L (ref 0.7–2)
LACTATE BLD-SCNC: 20 MMOL/L (ref 0.7–2)
LACTATE BLD-SCNC: 3.5 MMOL/L (ref 0.7–2)
LACTATE BLD-SCNC: 7.5 MMOL/L (ref 0.7–2)
LACTATE BLD-SCNC: 9.4 MMOL/L (ref 0.7–2)
LACTATE BLD-SCNC: 9.5 MMOL/L (ref 0.7–2)
LACTATE BLD-SCNC: 9.8 MMOL/L (ref 0.7–2)
LACTATE BLD-SCNC: 9.9 MMOL/L (ref 0.7–2)
LACTATE BLD-SCNC: NORMAL MMOL/L (ref 0.7–2)
LACTATE SERPL-SCNC: 17 MMOL/L (ref 0.4–2)
LEUKOCYTE ESTERASE UR QL STRIP: ABNORMAL
LEUKOCYTE ESTERASE UR QL STRIP: NEGATIVE
LIPASE SERPL-CCNC: 164 U/L (ref 73–393)
LY30 LYSIS AT 30 MINUTES: 0 % (ref 0–8)
LY30 LYSIS AT 30 MINUTES: 9.5 % (ref 0–8)
LY60 LYSIS AT 60 MINUTES: 0 % (ref 0–15)
LY60 LYSIS AT 60 MINUTES: 12.5 % (ref 0–15)
LYMPHOCYTES # BLD AUTO: 0.3 10E9/L (ref 0.8–5.3)
LYMPHOCYTES # BLD AUTO: 0.4 10E9/L (ref 0.8–5.3)
LYMPHOCYTES # BLD AUTO: 0.5 10E9/L (ref 0.8–5.3)
LYMPHOCYTES # BLD AUTO: 0.6 10E9/L (ref 0.8–5.3)
LYMPHOCYTES # BLD AUTO: 0.7 10E9/L (ref 0.8–5.3)
LYMPHOCYTES # BLD AUTO: 0.8 10E9/L (ref 0.8–5.3)
LYMPHOCYTES # BLD AUTO: 0.9 10E9/L (ref 0.8–5.3)
LYMPHOCYTES # BLD AUTO: 1 10E9/L (ref 0.8–5.3)
LYMPHOCYTES NFR BLD AUTO: 10 %
LYMPHOCYTES NFR BLD AUTO: 11.2 %
LYMPHOCYTES NFR BLD AUTO: 12.1 %
LYMPHOCYTES NFR BLD AUTO: 12.3 %
LYMPHOCYTES NFR BLD AUTO: 12.9 %
LYMPHOCYTES NFR BLD AUTO: 15.7 %
LYMPHOCYTES NFR BLD AUTO: 15.9 %
LYMPHOCYTES NFR BLD AUTO: 16.3 %
LYMPHOCYTES NFR BLD AUTO: 18.2 %
LYMPHOCYTES NFR BLD AUTO: 18.6 %
LYMPHOCYTES NFR BLD AUTO: 18.7 %
LYMPHOCYTES NFR BLD AUTO: 20.6 %
LYMPHOCYTES NFR BLD AUTO: 22.3 %
LYMPHOCYTES NFR BLD AUTO: 22.5 %
LYMPHOCYTES NFR BLD AUTO: 23 %
LYMPHOCYTES NFR BLD AUTO: 24 %
LYMPHOCYTES NFR BLD AUTO: 24.1 %
LYMPHOCYTES NFR BLD AUTO: 24.5 %
LYMPHOCYTES NFR BLD AUTO: 25.6 %
LYMPHOCYTES NFR BLD AUTO: 26 %
LYMPHOCYTES NFR BLD AUTO: 9.6 %
LYMPHOCYTES NFR BLD AUTO: 9.8 %
Lab: NORMAL
MA MAXIMUM CLOT STRENGTH: 45.9 MM (ref 50–70)
MA MAXIMUM CLOT STRENGTH: 48.2 MM (ref 50–70)
MA MAXIMUM CLOT STRENGTH: 52 MM (ref 50–70)
MA MAXIMUM CLOT STRENGTH: 57.9 MM (ref 50–70)
MAGNESIUM SERPL-MCNC: 1.7 MG/DL (ref 1.6–2.3)
MAGNESIUM SERPL-MCNC: 1.7 MG/DL (ref 1.6–2.3)
MAGNESIUM SERPL-MCNC: 1.9 MG/DL (ref 1.6–2.3)
MAGNESIUM SERPL-MCNC: 1.9 MG/DL (ref 1.6–2.3)
MAGNESIUM SERPL-MCNC: 2.1 MG/DL (ref 1.6–2.3)
MAGNESIUM SERPL-MCNC: 2.3 MG/DL (ref 1.6–2.3)
MAGNESIUM SERPL-MCNC: 2.4 MG/DL (ref 1.6–2.3)
MCH RBC QN AUTO: 29.7 PG (ref 26.5–33)
MCH RBC QN AUTO: 29.9 PG (ref 26.5–33)
MCH RBC QN AUTO: 30 PG (ref 26.5–33)
MCH RBC QN AUTO: 30.2 PG (ref 26.5–33)
MCH RBC QN AUTO: 30.3 PG (ref 26.5–33)
MCH RBC QN AUTO: 30.3 PG (ref 26.5–33)
MCH RBC QN AUTO: 30.4 PG (ref 26.5–33)
MCH RBC QN AUTO: 30.5 PG (ref 26.5–33)
MCH RBC QN AUTO: 30.5 PG (ref 26.5–33)
MCH RBC QN AUTO: 30.6 PG (ref 26.5–33)
MCH RBC QN AUTO: 30.6 PG (ref 26.5–33)
MCH RBC QN AUTO: 30.7 PG (ref 26.5–33)
MCH RBC QN AUTO: 30.7 PG (ref 26.5–33)
MCH RBC QN AUTO: 30.9 PG (ref 26.5–33)
MCH RBC QN AUTO: 31.1 PG (ref 26.5–33)
MCH RBC QN AUTO: 31.2 PG (ref 26.5–33)
MCH RBC QN AUTO: 31.2 PG (ref 26.5–33)
MCH RBC QN AUTO: 31.5 PG (ref 26.5–33)
MCH RBC QN AUTO: 31.7 PG (ref 26.5–33)
MCH RBC QN AUTO: 32 PG (ref 26.5–33)
MCH RBC QN AUTO: 32.2 PG (ref 26.5–33)
MCH RBC QN AUTO: 32.6 PG (ref 26.5–33)
MCH RBC QN AUTO: 32.9 PG (ref 26.5–33)
MCH RBC QN AUTO: 33.3 PG (ref 26.5–33)
MCH RBC QN AUTO: 33.3 PG (ref 26.5–33)
MCH RBC QN AUTO: 33.7 PG (ref 26.5–33)
MCH RBC QN AUTO: 35.3 PG (ref 26.5–33)
MCHC RBC AUTO-ENTMCNC: 31.9 G/DL (ref 31.5–36.5)
MCHC RBC AUTO-ENTMCNC: 32.3 G/DL (ref 31.5–36.5)
MCHC RBC AUTO-ENTMCNC: 32.5 G/DL (ref 31.5–36.5)
MCHC RBC AUTO-ENTMCNC: 32.6 G/DL (ref 31.5–36.5)
MCHC RBC AUTO-ENTMCNC: 33 G/DL (ref 31.5–36.5)
MCHC RBC AUTO-ENTMCNC: 33.2 G/DL (ref 31.5–36.5)
MCHC RBC AUTO-ENTMCNC: 33.3 G/DL (ref 31.5–36.5)
MCHC RBC AUTO-ENTMCNC: 33.5 G/DL (ref 31.5–36.5)
MCHC RBC AUTO-ENTMCNC: 33.5 G/DL (ref 31.5–36.5)
MCHC RBC AUTO-ENTMCNC: 33.6 G/DL (ref 31.5–36.5)
MCHC RBC AUTO-ENTMCNC: 33.7 G/DL (ref 31.5–36.5)
MCHC RBC AUTO-ENTMCNC: 33.7 G/DL (ref 31.5–36.5)
MCHC RBC AUTO-ENTMCNC: 33.8 G/DL (ref 31.5–36.5)
MCHC RBC AUTO-ENTMCNC: 34 G/DL (ref 31.5–36.5)
MCHC RBC AUTO-ENTMCNC: 34.1 G/DL (ref 31.5–36.5)
MCHC RBC AUTO-ENTMCNC: 34.1 G/DL (ref 31.5–36.5)
MCHC RBC AUTO-ENTMCNC: 34.2 G/DL (ref 31.5–36.5)
MCHC RBC AUTO-ENTMCNC: 34.3 G/DL (ref 31.5–36.5)
MCHC RBC AUTO-ENTMCNC: 34.4 G/DL (ref 31.5–36.5)
MCHC RBC AUTO-ENTMCNC: 34.6 G/DL (ref 31.5–36.5)
MCHC RBC AUTO-ENTMCNC: 34.8 G/DL (ref 31.5–36.5)
MCV RBC AUTO: 100 FL (ref 78–100)
MCV RBC AUTO: 100 FL (ref 78–100)
MCV RBC AUTO: 106 FL (ref 78–100)
MCV RBC AUTO: 88 FL (ref 78–100)
MCV RBC AUTO: 89 FL (ref 78–100)
MCV RBC AUTO: 90 FL (ref 78–100)
MCV RBC AUTO: 91 FL (ref 78–100)
MCV RBC AUTO: 92 FL (ref 78–100)
MCV RBC AUTO: 93 FL (ref 78–100)
MCV RBC AUTO: 93 FL (ref 78–100)
MCV RBC AUTO: 94 FL (ref 78–100)
MCV RBC AUTO: 94 FL (ref 78–100)
MCV RBC AUTO: 95 FL (ref 78–100)
MCV RBC AUTO: 96 FL (ref 78–100)
MCV RBC AUTO: 96 FL (ref 78–100)
MCV RBC AUTO: 97 FL (ref 78–100)
MCV RBC AUTO: 98 FL (ref 78–100)
MCV RBC AUTO: 99 FL (ref 78–100)
MCV RBC AUTO: 99 FL (ref 78–100)
METAMYELOCYTES # BLD: 0 10E9/L
METAMYELOCYTES # BLD: 0 10E9/L
METAMYELOCYTES # BLD: 0.1 10E9/L
METAMYELOCYTES NFR BLD MANUAL: 0.9 %
MICROCYTES BLD QL SMEAR: PRESENT
MICROCYTES BLD QL SMEAR: PRESENT
MONOCYTES # BLD AUTO: 0.1 10E9/L (ref 0–1.3)
MONOCYTES # BLD AUTO: 0.2 10E9/L (ref 0–1.3)
MONOCYTES # BLD AUTO: 0.3 10E9/L (ref 0–1.3)
MONOCYTES # BLD AUTO: 0.4 10E9/L (ref 0–1.3)
MONOCYTES # BLD AUTO: 0.5 10E9/L (ref 0–1.3)
MONOCYTES # BLD AUTO: 0.7 10E9/L (ref 0–1.3)
MONOCYTES NFR BLD AUTO: 10 %
MONOCYTES NFR BLD AUTO: 10.1 %
MONOCYTES NFR BLD AUTO: 10.2 %
MONOCYTES NFR BLD AUTO: 10.3 %
MONOCYTES NFR BLD AUTO: 10.4 %
MONOCYTES NFR BLD AUTO: 10.7 %
MONOCYTES NFR BLD AUTO: 11 %
MONOCYTES NFR BLD AUTO: 11.2 %
MONOCYTES NFR BLD AUTO: 11.8 %
MONOCYTES NFR BLD AUTO: 3.6 %
MONOCYTES NFR BLD AUTO: 3.6 %
MONOCYTES NFR BLD AUTO: 4.5 %
MONOCYTES NFR BLD AUTO: 4.7 %
MONOCYTES NFR BLD AUTO: 5.6 %
MONOCYTES NFR BLD AUTO: 5.8 %
MONOCYTES NFR BLD AUTO: 6 %
MONOCYTES NFR BLD AUTO: 7 %
MONOCYTES NFR BLD AUTO: 7 %
MONOCYTES NFR BLD AUTO: 8.4 %
MONOCYTES NFR BLD AUTO: 8.9 %
MONOCYTES NFR BLD AUTO: 9.2 %
MONOCYTES NFR BLD AUTO: 9.8 %
MUCOUS THREADS #/AREA URNS LPF: PRESENT /LPF
MYELOCYTES # BLD: 0 10E9/L
MYELOCYTES # BLD: 0.1 10E9/L
MYELOCYTES NFR BLD MANUAL: 0.9 %
MYELOCYTES NFR BLD MANUAL: 0.9 %
MYELOCYTES NFR BLD MANUAL: 1.8 %
MYELOCYTES NFR BLD MANUAL: 5 %
NEUTROPHILS # BLD AUTO: 1.4 10E9/L (ref 1.6–8.3)
NEUTROPHILS # BLD AUTO: 1.8 10E9/L (ref 1.6–8.3)
NEUTROPHILS # BLD AUTO: 1.9 10E9/L (ref 1.6–8.3)
NEUTROPHILS # BLD AUTO: 1.9 10E9/L (ref 1.6–8.3)
NEUTROPHILS # BLD AUTO: 2.2 10E9/L (ref 1.6–8.3)
NEUTROPHILS # BLD AUTO: 2.3 10E9/L (ref 1.6–8.3)
NEUTROPHILS # BLD AUTO: 2.4 10E9/L (ref 1.6–8.3)
NEUTROPHILS # BLD AUTO: 2.4 10E9/L (ref 1.6–8.3)
NEUTROPHILS # BLD AUTO: 2.8 10E9/L (ref 1.6–8.3)
NEUTROPHILS # BLD AUTO: 2.8 10E9/L (ref 1.6–8.3)
NEUTROPHILS # BLD AUTO: 3.1 10E9/L (ref 1.6–8.3)
NEUTROPHILS # BLD AUTO: 3.2 10E9/L (ref 1.6–8.3)
NEUTROPHILS # BLD AUTO: 3.4 10E9/L (ref 1.6–8.3)
NEUTROPHILS # BLD AUTO: 3.6 10E9/L (ref 1.6–8.3)
NEUTROPHILS # BLD AUTO: 4.1 10E9/L (ref 1.6–8.3)
NEUTROPHILS # BLD AUTO: 4.2 10E9/L (ref 1.6–8.3)
NEUTROPHILS # BLD AUTO: 5.5 10E9/L (ref 1.6–8.3)
NEUTROPHILS NFR BLD AUTO: 59.2 %
NEUTROPHILS NFR BLD AUTO: 60.8 %
NEUTROPHILS NFR BLD AUTO: 61.4 %
NEUTROPHILS NFR BLD AUTO: 62 %
NEUTROPHILS NFR BLD AUTO: 62.5 %
NEUTROPHILS NFR BLD AUTO: 63.1 %
NEUTROPHILS NFR BLD AUTO: 63.3 %
NEUTROPHILS NFR BLD AUTO: 65.2 %
NEUTROPHILS NFR BLD AUTO: 66.6 %
NEUTROPHILS NFR BLD AUTO: 66.8 %
NEUTROPHILS NFR BLD AUTO: 68.7 %
NEUTROPHILS NFR BLD AUTO: 70 %
NEUTROPHILS NFR BLD AUTO: 71.4 %
NEUTROPHILS NFR BLD AUTO: 72.3 %
NEUTROPHILS NFR BLD AUTO: 72.9 %
NEUTROPHILS NFR BLD AUTO: 75.2 %
NEUTROPHILS NFR BLD AUTO: 76.9 %
NEUTROPHILS NFR BLD AUTO: 78.5 %
NEUTROPHILS NFR BLD AUTO: 80.7 %
NEUTROPHILS NFR BLD AUTO: 82.1 %
NEUTROPHILS NFR BLD AUTO: 84.4 %
NEUTROPHILS NFR BLD AUTO: 84.6 %
NITRATE UR QL: ABNORMAL
NITRATE UR QL: NEGATIVE
NRBC # BLD AUTO: 0 10*3/UL
NRBC # BLD AUTO: 0.1 10*3/UL
NRBC # BLD AUTO: 0.1 10*3/UL
NRBC # BLD AUTO: 0.3 10*3/UL
NRBC # BLD AUTO: 0.3 10*3/UL
NRBC BLD AUTO-RTO: 0 /100
NRBC BLD AUTO-RTO: 1 /100
NRBC BLD AUTO-RTO: 14 /100
NRBC BLD AUTO-RTO: 2 /100
NRBC BLD AUTO-RTO: 3 /100
NRBC BLD AUTO-RTO: 8 /100
NUM BPU REQUESTED: 0
NUM BPU REQUESTED: 1
NUM BPU REQUESTED: 10
NUM BPU REQUESTED: 2
NUM BPU REQUESTED: 2
NUM BPU REQUESTED: 4
NUM BPU REQUESTED: 5
NUM BPU REQUESTED: 6
NUM BPU REQUESTED: 7
O2/TOTAL GAS SETTING VFR VENT: 100 %
O2/TOTAL GAS SETTING VFR VENT: 40 %
O2/TOTAL GAS SETTING VFR VENT: 50 %
O2/TOTAL GAS SETTING VFR VENT: 60 %
O2/TOTAL GAS SETTING VFR VENT: 70 %
O2/TOTAL GAS SETTING VFR VENT: 70 %
O2/TOTAL GAS SETTING VFR VENT: 80 %
O2/TOTAL GAS SETTING VFR VENT: NORMAL %
OXYHGB MFR BLD: 94 % (ref 92–100)
OXYHGB MFR BLD: 95 % (ref 92–100)
OXYHGB MFR BLD: 96 % (ref 92–100)
OXYHGB MFR BLD: 97 % (ref 92–100)
OXYHGB MFR BLD: 97 % (ref 92–100)
OXYHGB MFR BLDV: 33 %
OXYHGB MFR BLDV: 33 %
OXYHGB MFR BLDV: 37 %
OXYHGB MFR BLDV: 38 %
OXYHGB MFR BLDV: 39 %
OXYHGB MFR BLDV: 43 %
OXYHGB MFR BLDV: 44 %
OXYHGB MFR BLDV: 53 %
OXYHGB MFR BLDV: 64 %
PCO2 BLD: 18 MM HG (ref 35–45)
PCO2 BLD: 19 MM HG (ref 35–45)
PCO2 BLD: 21 MM HG (ref 35–45)
PCO2 BLD: 22 MM HG (ref 35–45)
PCO2 BLD: 22 MM HG (ref 35–45)
PCO2 BLD: 24 MM HG (ref 35–45)
PCO2 BLD: 27 MM HG (ref 35–45)
PCO2 BLD: 27 MM HG (ref 35–45)
PCO2 BLD: 29 MM HG (ref 35–45)
PCO2 BLD: 30 MM HG (ref 35–45)
PCO2 BLD: 31 MM HG (ref 35–45)
PCO2 BLD: 32 MM HG (ref 35–45)
PCO2 BLD: NORMAL MM HG (ref 35–45)
PCO2 BLDV: 31 MM HG (ref 40–50)
PCO2 BLDV: 34 MM HG (ref 40–50)
PCO2 BLDV: 34 MM HG (ref 40–50)
PCO2 BLDV: 35 MM HG (ref 40–50)
PCO2 BLDV: 36 MM HG (ref 40–50)
PCO2 BLDV: 37 MM HG (ref 40–50)
PCO2 BLDV: 37 MM HG (ref 40–50)
PCO2 BLDV: 40 MM HG (ref 40–50)
PCO2 BLDV: 52 MM HG (ref 40–50)
PH BLD: 7.19 PH (ref 7.35–7.45)
PH BLD: 7.21 PH (ref 7.35–7.45)
PH BLD: 7.22 PH (ref 7.35–7.45)
PH BLD: 7.24 PH (ref 7.35–7.45)
PH BLD: 7.31 PH (ref 7.35–7.45)
PH BLD: 7.36 PH (ref 7.35–7.45)
PH BLD: 7.37 PH (ref 7.35–7.45)
PH BLD: 7.39 PH (ref 7.35–7.45)
PH BLD: 7.4 PH (ref 7.35–7.45)
PH BLD: 7.42 PH (ref 7.35–7.45)
PH BLD: 7.42 PH (ref 7.35–7.45)
PH BLD: 7.43 PH (ref 7.35–7.45)
PH BLD: NORMAL PH (ref 7.35–7.45)
PH BLDV: 7.12 PH (ref 7.32–7.43)
PH BLDV: 7.17 PH (ref 7.32–7.43)
PH BLDV: 7.18 PH (ref 7.32–7.43)
PH BLDV: 7.2 PH (ref 7.32–7.43)
PH BLDV: 7.28 PH (ref 7.32–7.43)
PH BLDV: 7.28 PH (ref 7.32–7.43)
PH BLDV: 7.3 PH (ref 7.32–7.43)
PH BLDV: 7.31 PH (ref 7.32–7.43)
PH BLDV: 7.33 PH (ref 7.32–7.43)
PH BLDV: 7.34 PH (ref 7.32–7.43)
PH UR STRIP: 5.5 PH (ref 5–7)
PH UR STRIP: 6 PH (ref 5–7)
PH UR STRIP: 6 PH (ref 5–7)
PH UR STRIP: ABNORMAL PH (ref 5–7)
PHOSPHATE SERPL-MCNC: 1.5 MG/DL (ref 2.5–4.5)
PHOSPHATE SERPL-MCNC: 2.3 MG/DL (ref 2.5–4.5)
PHOSPHATE SERPL-MCNC: 2.4 MG/DL (ref 2.5–4.5)
PHOSPHATE SERPL-MCNC: 2.4 MG/DL (ref 2.5–4.5)
PHOSPHATE SERPL-MCNC: 2.7 MG/DL (ref 2.5–4.5)
PHOSPHATE SERPL-MCNC: 3 MG/DL (ref 2.5–4.5)
PHOSPHATE SERPL-MCNC: 3.9 MG/DL (ref 2.5–4.5)
PHOSPHATE SERPL-MCNC: 4.7 MG/DL (ref 2.5–4.5)
PHOSPHATE SERPL-MCNC: 5.1 MG/DL (ref 2.5–4.5)
PHOSPHATE SERPL-MCNC: 5.4 MG/DL (ref 2.5–4.5)
PLATELET # BLD AUTO: 103 10E9/L (ref 150–450)
PLATELET # BLD AUTO: 104 10E9/L (ref 150–450)
PLATELET # BLD AUTO: 106 10E9/L (ref 150–450)
PLATELET # BLD AUTO: 107 10E9/L (ref 150–450)
PLATELET # BLD AUTO: 107 10E9/L (ref 150–450)
PLATELET # BLD AUTO: 108 10E9/L (ref 150–450)
PLATELET # BLD AUTO: 108 10E9/L (ref 150–450)
PLATELET # BLD AUTO: 110 10E9/L (ref 150–450)
PLATELET # BLD AUTO: 111 10E9/L (ref 150–450)
PLATELET # BLD AUTO: 112 10E9/L (ref 150–450)
PLATELET # BLD AUTO: 113 10E9/L (ref 150–450)
PLATELET # BLD AUTO: 115 10E9/L (ref 150–450)
PLATELET # BLD AUTO: 119 10E9/L (ref 150–450)
PLATELET # BLD AUTO: 149 10E9/L (ref 150–450)
PLATELET # BLD AUTO: 187 10E9/L (ref 150–450)
PLATELET # BLD AUTO: 43 10E9/L (ref 150–450)
PLATELET # BLD AUTO: 47 10E9/L (ref 150–450)
PLATELET # BLD AUTO: 49 10E9/L (ref 150–450)
PLATELET # BLD AUTO: 57 10E9/L (ref 150–450)
PLATELET # BLD AUTO: 62 10E9/L (ref 150–450)
PLATELET # BLD AUTO: 64 10E9/L (ref 150–450)
PLATELET # BLD AUTO: 73 10E9/L (ref 150–450)
PLATELET # BLD AUTO: 76 10E9/L (ref 150–450)
PLATELET # BLD AUTO: 79 10E9/L (ref 150–450)
PLATELET # BLD AUTO: 81 10E9/L (ref 150–450)
PLATELET # BLD AUTO: 81 10E9/L (ref 150–450)
PLATELET # BLD AUTO: 85 10E9/L (ref 150–450)
PLATELET # BLD AUTO: 86 10E9/L (ref 150–450)
PLATELET # BLD AUTO: 87 10E9/L (ref 150–450)
PLATELET # BLD AUTO: 88 10E9/L (ref 150–450)
PLATELET # BLD AUTO: 89 10E9/L (ref 150–450)
PLATELET # BLD AUTO: 94 10E9/L (ref 150–450)
PLATELET # BLD AUTO: 97 10E9/L (ref 150–450)
PLATELET # BLD AUTO: 99 10E9/L (ref 150–450)
PLATELET # BLD EST: ABNORMAL 10*3/UL
PO2 BLD: 104 MM HG (ref 80–105)
PO2 BLD: 108 MM HG (ref 80–105)
PO2 BLD: 150 MM HG (ref 80–105)
PO2 BLD: 156 MM HG (ref 80–105)
PO2 BLD: 159 MM HG (ref 80–105)
PO2 BLD: 226 MM HG (ref 80–105)
PO2 BLD: 246 MM HG (ref 80–105)
PO2 BLD: 263 MM HG (ref 80–105)
PO2 BLD: 271 MM HG (ref 80–105)
PO2 BLD: 287 MM HG (ref 80–105)
PO2 BLD: 305 MM HG (ref 80–105)
PO2 BLD: 76 MM HG (ref 80–105)
PO2 BLD: 91 MM HG (ref 80–105)
PO2 BLD: 96 MM HG (ref 80–105)
PO2 BLD: NORMAL MM HG (ref 80–105)
PO2 BLDV: 23 MM HG (ref 25–47)
PO2 BLDV: 23 MM HG (ref 25–47)
PO2 BLDV: 25 MM HG (ref 25–47)
PO2 BLDV: 26 MM HG (ref 25–47)
PO2 BLDV: 28 MM HG (ref 25–47)
PO2 BLDV: 30 MM HG (ref 25–47)
PO2 BLDV: 32 MM HG (ref 25–47)
PO2 BLDV: 34 MM HG (ref 25–47)
PO2 BLDV: 42 MM HG (ref 25–47)
POIKILOCYTOSIS BLD QL SMEAR: ABNORMAL
POIKILOCYTOSIS BLD QL SMEAR: ABNORMAL
POIKILOCYTOSIS BLD QL SMEAR: SLIGHT
POTASSIUM SERPL-SCNC: 3.2 MMOL/L (ref 3.4–5.3)
POTASSIUM SERPL-SCNC: 3.5 MMOL/L (ref 3.4–5.3)
POTASSIUM SERPL-SCNC: 3.5 MMOL/L (ref 3.4–5.3)
POTASSIUM SERPL-SCNC: 3.6 MMOL/L (ref 3.4–5.3)
POTASSIUM SERPL-SCNC: 3.6 MMOL/L (ref 3.4–5.3)
POTASSIUM SERPL-SCNC: 3.8 MMOL/L (ref 3.4–5.3)
POTASSIUM SERPL-SCNC: 3.8 MMOL/L (ref 3.4–5.3)
POTASSIUM SERPL-SCNC: 3.9 MMOL/L (ref 3.4–5.3)
POTASSIUM SERPL-SCNC: 4 MMOL/L (ref 3.4–5.3)
POTASSIUM SERPL-SCNC: 4.2 MMOL/L (ref 3.4–5.3)
POTASSIUM SERPL-SCNC: 4.3 MMOL/L (ref 3.4–5.3)
POTASSIUM SERPL-SCNC: 4.4 MMOL/L (ref 3.4–5.3)
POTASSIUM SERPL-SCNC: 4.4 MMOL/L (ref 3.4–5.3)
POTASSIUM SERPL-SCNC: 4.5 MMOL/L (ref 3.4–5.3)
POTASSIUM SERPL-SCNC: 4.5 MMOL/L (ref 3.4–5.3)
POTASSIUM SERPL-SCNC: 4.6 MMOL/L (ref 3.4–5.3)
POTASSIUM SERPL-SCNC: 4.9 MMOL/L (ref 3.4–5.3)
POTASSIUM SERPL-SCNC: 5 MMOL/L (ref 3.4–5.3)
POTASSIUM SERPL-SCNC: 5.1 MMOL/L (ref 3.4–5.3)
POTASSIUM SERPL-SCNC: 5.8 MMOL/L (ref 3.4–5.3)
PREALB SERPL IA-MCNC: 17 MG/DL (ref 15–45)
PROCALCITONIN SERPL-MCNC: 0.08 NG/ML
PROCALCITONIN SERPL-MCNC: 10.13 NG/ML
PROCALCITONIN SERPL-MCNC: 25.73 NG/ML
PROT SERPL-MCNC: 5 G/DL (ref 6.8–8.8)
PROT SERPL-MCNC: 5.1 G/DL (ref 6.8–8.8)
PROT SERPL-MCNC: 5.2 G/DL (ref 6.8–8.8)
PROT SERPL-MCNC: 5.3 G/DL (ref 6.8–8.8)
PROT SERPL-MCNC: 6.1 G/DL (ref 6.8–8.8)
PROT SERPL-MCNC: 7.4 G/DL (ref 6.8–8.8)
PROT SERPL-MCNC: 7.5 G/DL (ref 6.8–8.8)
PROT SERPL-MCNC: 7.5 G/DL (ref 6.8–8.8)
PROT SERPL-MCNC: 7.6 G/DL (ref 6.8–8.8)
PROT SERPL-MCNC: 7.6 G/DL (ref 6.8–8.8)
PROT SERPL-MCNC: 7.7 G/DL (ref 6.8–8.8)
PROT SERPL-MCNC: 7.8 G/DL (ref 6.8–8.8)
PROT SERPL-MCNC: 7.9 G/DL (ref 6.8–8.8)
PSA SERPL-MCNC: 1160 UG/L (ref 0–4)
PSA SERPL-MCNC: 474 UG/L (ref 0–4)
PSA SERPL-MCNC: 664 UG/L (ref 0–4)
PSA SERPL-MCNC: 707 UG/L (ref 0–4)
PSA SERPL-MCNC: 724 UG/L (ref 0–4)
PSA SERPL-MCNC: 784 UG/L (ref 0–4)
PSA SERPL-MCNC: 903 UG/L (ref 0–4)
PSA SERPL-MCNC: 941 UG/L (ref 0–4)
R TIME UNTIL CLOT FORMS: 10.3 MINUTE (ref 5–10)
R TIME UNTIL CLOT FORMS: 5.1 MINUTE (ref 5–10)
R TIME UNTIL CLOT FORMS: 5.2 MINUTE (ref 5–10)
R TIME UNTIL CLOT FORMS: 7.1 MINUTE (ref 5–10)
RADIOLOGIST FLAGS: ABNORMAL
RBC # BLD AUTO: 2.16 10E12/L (ref 4.4–5.9)
RBC # BLD AUTO: 2.18 10E12/L (ref 4.4–5.9)
RBC # BLD AUTO: 2.21 10E12/L (ref 4.4–5.9)
RBC # BLD AUTO: 2.27 10E12/L (ref 4.4–5.9)
RBC # BLD AUTO: 2.36 10E12/L (ref 4.4–5.9)
RBC # BLD AUTO: 2.38 10E12/L (ref 4.4–5.9)
RBC # BLD AUTO: 2.43 10E12/L (ref 4.4–5.9)
RBC # BLD AUTO: 2.5 10E12/L (ref 4.4–5.9)
RBC # BLD AUTO: 2.54 10E12/L (ref 4.4–5.9)
RBC # BLD AUTO: 2.55 10E12/L (ref 4.4–5.9)
RBC # BLD AUTO: 2.67 10E12/L (ref 4.4–5.9)
RBC # BLD AUTO: 2.79 10E12/L (ref 4.4–5.9)
RBC # BLD AUTO: 2.85 10E12/L (ref 4.4–5.9)
RBC # BLD AUTO: 2.87 10E12/L (ref 4.4–5.9)
RBC # BLD AUTO: 2.87 10E12/L (ref 4.4–5.9)
RBC # BLD AUTO: 2.91 10E12/L (ref 4.4–5.9)
RBC # BLD AUTO: 2.92 10E12/L (ref 4.4–5.9)
RBC # BLD AUTO: 2.93 10E12/L (ref 4.4–5.9)
RBC # BLD AUTO: 2.94 10E12/L (ref 4.4–5.9)
RBC # BLD AUTO: 2.97 10E12/L (ref 4.4–5.9)
RBC # BLD AUTO: 3.01 10E12/L (ref 4.4–5.9)
RBC # BLD AUTO: 3.06 10E12/L (ref 4.4–5.9)
RBC # BLD AUTO: 3.09 10E12/L (ref 4.4–5.9)
RBC # BLD AUTO: 3.1 10E12/L (ref 4.4–5.9)
RBC # BLD AUTO: 3.17 10E12/L (ref 4.4–5.9)
RBC # BLD AUTO: 3.2 10E12/L (ref 4.4–5.9)
RBC # BLD AUTO: 3.51 10E12/L (ref 4.4–5.9)
RBC # BLD AUTO: 3.57 10E12/L (ref 4.4–5.9)
RBC # BLD AUTO: 3.63 10E12/L (ref 4.4–5.9)
RBC # BLD AUTO: 3.81 10E12/L (ref 4.4–5.9)
RBC # BLD AUTO: 3.88 10E12/L (ref 4.4–5.9)
RBC #/AREA URNS AUTO: 1 /HPF (ref 0–2)
RBC #/AREA URNS AUTO: 3 /HPF (ref 0–2)
RBC #/AREA URNS AUTO: >182 /HPF (ref 0–2)
RBC #/AREA URNS AUTO: ABNORMAL /HPF (ref 0–2)
RBC INCLUSIONS BLD: SLIGHT
RBC INCLUSIONS BLD: SLIGHT
RETICS # AUTO: 81.8 10E9/L (ref 25–95)
RETICS # AUTO: NORMAL 10E9/L (ref 25–95)
RETICS/RBC NFR AUTO: 3.2 % (ref 0.5–2)
RETICS/RBC NFR AUTO: NORMAL % (ref 0.5–2)
SODIUM SERPL-SCNC: 135 MMOL/L (ref 133–144)
SODIUM SERPL-SCNC: 136 MMOL/L (ref 133–144)
SODIUM SERPL-SCNC: 138 MMOL/L (ref 133–144)
SODIUM SERPL-SCNC: 138 MMOL/L (ref 133–144)
SODIUM SERPL-SCNC: 139 MMOL/L (ref 133–144)
SODIUM SERPL-SCNC: 140 MMOL/L (ref 133–144)
SODIUM SERPL-SCNC: 141 MMOL/L (ref 133–144)
SODIUM SERPL-SCNC: 142 MMOL/L (ref 133–144)
SODIUM SERPL-SCNC: 142 MMOL/L (ref 133–144)
SODIUM SERPL-SCNC: 143 MMOL/L (ref 133–144)
SODIUM SERPL-SCNC: 144 MMOL/L (ref 133–144)
SODIUM SERPL-SCNC: 145 MMOL/L (ref 133–144)
SODIUM SERPL-SCNC: 146 MMOL/L (ref 133–144)
SODIUM SERPL-SCNC: 146 MMOL/L (ref 133–144)
SODIUM SERPL-SCNC: 149 MMOL/L (ref 133–144)
SODIUM SERPL-SCNC: 149 MMOL/L (ref 133–144)
SODIUM SERPL-SCNC: 150 MMOL/L (ref 133–144)
SODIUM SERPL-SCNC: 151 MMOL/L (ref 133–144)
SODIUM SERPL-SCNC: 154 MMOL/L (ref 133–144)
SODIUM UR-SCNC: 48 MMOL/L
SOURCE: ABNORMAL
SP GR UR STRIP: 1.01 (ref 1–1.03)
SP GR UR STRIP: 1.02 (ref 1–1.03)
SP GR UR STRIP: 1.03 (ref 1–1.03)
SP GR UR STRIP: ABNORMAL (ref 1–1.03)
SPECIMEN EXP DATE BLD: ABNORMAL
SPECIMEN SOURCE: NORMAL
TIBC SERPL-MCNC: 274 UG/DL (ref 240–430)
TRANS CELLS #/AREA URNS HPF: <1 /HPF (ref 0–1)
TRANS CELLS #/AREA URNS HPF: <1 /HPF (ref 0–1)
TRANSFERRIN SERPL-MCNC: 215 MG/DL (ref 210–360)
TRANSFUSION STATUS PATIENT QL: NORMAL
TROPONIN I SERPL-MCNC: 0.87 UG/L (ref 0–0.04)
TROPONIN I SERPL-MCNC: 1 UG/L (ref 0–0.04)
TROPONIN I SERPL-MCNC: 1.17 UG/L (ref 0–0.04)
TROPONIN I SERPL-MCNC: 1.2 UG/L (ref 0–0.04)
TROPONIN I SERPL-MCNC: 1.39 UG/L (ref 0–0.04)
TROPONIN I SERPL-MCNC: 1.4 UG/L (ref 0–0.04)
TROPONIN I SERPL-MCNC: 1.73 UG/L (ref 0–0.04)
TROPONIN I SERPL-MCNC: 1.87 UG/L (ref 0–0.04)
TROPONIN I SERPL-MCNC: 2.05 UG/L (ref 0–0.04)
TROPONIN I SERPL-MCNC: 2.08 UG/L (ref 0–0.04)
TROPONIN I SERPL-MCNC: <0.015 UG/L (ref 0–0.04)
TSH SERPL DL<=0.005 MIU/L-ACNC: 2.72 MU/L (ref 0.4–4)
URATE CRY #/AREA URNS HPF: ABNORMAL /HPF
UROBILINOGEN UR STRIP-MCNC: ABNORMAL MG/DL (ref 0–2)
UROBILINOGEN UR STRIP-MCNC: NORMAL MG/DL (ref 0–2)
VANCOMYCIN SERPL-MCNC: 11.3 MG/L
VANCOMYCIN SERPL-MCNC: 21.4 MG/L
VIT B12 SERPL-MCNC: 604 PG/ML (ref 193–986)
WBC # BLD AUTO: 2.1 10E9/L (ref 4–11)
WBC # BLD AUTO: 2.1 10E9/L (ref 4–11)
WBC # BLD AUTO: 2.2 10E9/L (ref 4–11)
WBC # BLD AUTO: 2.7 10E9/L (ref 4–11)
WBC # BLD AUTO: 2.7 10E9/L (ref 4–11)
WBC # BLD AUTO: 2.9 10E9/L (ref 4–11)
WBC # BLD AUTO: 2.9 10E9/L (ref 4–11)
WBC # BLD AUTO: 3 10E9/L (ref 4–11)
WBC # BLD AUTO: 3.1 10E9/L (ref 4–11)
WBC # BLD AUTO: 3.2 10E9/L (ref 4–11)
WBC # BLD AUTO: 3.4 10E9/L (ref 4–11)
WBC # BLD AUTO: 3.5 10E9/L (ref 4–11)
WBC # BLD AUTO: 3.6 10E9/L (ref 4–11)
WBC # BLD AUTO: 3.6 10E9/L (ref 4–11)
WBC # BLD AUTO: 3.7 10E9/L (ref 4–11)
WBC # BLD AUTO: 3.8 10E9/L (ref 4–11)
WBC # BLD AUTO: 4 10E9/L (ref 4–11)
WBC # BLD AUTO: 4 10E9/L (ref 4–11)
WBC # BLD AUTO: 4.3 10E9/L (ref 4–11)
WBC # BLD AUTO: 4.4 10E9/L (ref 4–11)
WBC # BLD AUTO: 5.4 10E9/L (ref 4–11)
WBC # BLD AUTO: 5.4 10E9/L (ref 4–11)
WBC # BLD AUTO: 5.7 10E9/L (ref 4–11)
WBC # BLD AUTO: 5.8 10E9/L (ref 4–11)
WBC # BLD AUTO: 6.2 10E9/L (ref 4–11)
WBC # BLD AUTO: 7.3 10E9/L (ref 4–11)
WBC #/AREA URNS AUTO: 1 /HPF (ref 0–5)
WBC #/AREA URNS AUTO: 170 /HPF (ref 0–5)
WBC #/AREA URNS AUTO: 21 /HPF (ref 0–5)
WBC #/AREA URNS AUTO: 7 /HPF (ref 0–5)
WBC #/AREA URNS AUTO: 81 /HPF (ref 0–5)
WBC #/AREA URNS AUTO: ABNORMAL /HPF

## 2018-01-01 PROCEDURE — 40000196 ZZH STATISTIC RAPCV CVP MONITORING

## 2018-01-01 PROCEDURE — 40000344 ZZHCL STATISTIC THAWING COMPONENT: Performed by: STUDENT IN AN ORGANIZED HEALTH CARE EDUCATION/TRAINING PROGRAM

## 2018-01-01 PROCEDURE — 94002 VENT MGMT INPAT INIT DAY: CPT

## 2018-01-01 PROCEDURE — 94003 VENT MGMT INPAT SUBQ DAY: CPT

## 2018-01-01 PROCEDURE — 84100 ASSAY OF PHOSPHORUS: CPT | Performed by: NEUROLOGICAL SURGERY

## 2018-01-01 PROCEDURE — 25000125 ZZHC RX 250: Performed by: INTERNAL MEDICINE

## 2018-01-01 PROCEDURE — G0463 HOSPITAL OUTPT CLINIC VISIT: HCPCS

## 2018-01-01 PROCEDURE — 70450 CT HEAD/BRAIN W/O DYE: CPT | Mod: 77

## 2018-01-01 PROCEDURE — 86850 RBC ANTIBODY SCREEN: CPT | Performed by: EMERGENCY MEDICINE

## 2018-01-01 PROCEDURE — 80053 COMPREHEN METABOLIC PANEL: CPT | Performed by: PHYSICIAN ASSISTANT

## 2018-01-01 PROCEDURE — 84100 ASSAY OF PHOSPHORUS: CPT | Performed by: STUDENT IN AN ORGANIZED HEALTH CARE EDUCATION/TRAINING PROGRAM

## 2018-01-01 PROCEDURE — 36415 COLL VENOUS BLD VENIPUNCTURE: CPT

## 2018-01-01 PROCEDURE — 37000008 ZZH ANESTHESIA TECHNICAL FEE, 1ST 30 MIN: Performed by: NEUROLOGICAL SURGERY

## 2018-01-01 PROCEDURE — 99214 OFFICE O/P EST MOD 30 MIN: CPT | Mod: ZP | Performed by: PHYSICIAN ASSISTANT

## 2018-01-01 PROCEDURE — 25000132 ZZH RX MED GY IP 250 OP 250 PS 637: Performed by: NURSE PRACTITIONER

## 2018-01-01 PROCEDURE — 83036 HEMOGLOBIN GLYCOSYLATED A1C: CPT | Performed by: STUDENT IN AN ORGANIZED HEALTH CARE EDUCATION/TRAINING PROGRAM

## 2018-01-01 PROCEDURE — G0463 HOSPITAL OUTPT CLINIC VISIT: HCPCS | Mod: ZF

## 2018-01-01 PROCEDURE — 85025 COMPLETE CBC W/AUTO DIFF WBC: CPT | Performed by: PHYSICIAN ASSISTANT

## 2018-01-01 PROCEDURE — 93325 DOPPLER ECHO COLOR FLOW MAPG: CPT | Mod: 26 | Performed by: INTERNAL MEDICINE

## 2018-01-01 PROCEDURE — 40000275 ZZH STATISTIC RCP TIME EA 10 MIN

## 2018-01-01 PROCEDURE — 25000128 H RX IP 250 OP 636: Performed by: NURSE PRACTITIONER

## 2018-01-01 PROCEDURE — 84100 ASSAY OF PHOSPHORUS: CPT | Performed by: NURSE PRACTITIONER

## 2018-01-01 PROCEDURE — 82803 BLOOD GASES ANY COMBINATION: CPT | Performed by: STUDENT IN AN ORGANIZED HEALTH CARE EDUCATION/TRAINING PROGRAM

## 2018-01-01 PROCEDURE — 25000125 ZZHC RX 250: Performed by: NURSE PRACTITIONER

## 2018-01-01 PROCEDURE — 83605 ASSAY OF LACTIC ACID: CPT

## 2018-01-01 PROCEDURE — 71260 CT THORAX DX C+: CPT

## 2018-01-01 PROCEDURE — 85610 PROTHROMBIN TIME: CPT | Performed by: NEUROLOGICAL SURGERY

## 2018-01-01 PROCEDURE — 25000131 ZZH RX MED GY IP 250 OP 636 PS 637: Performed by: STUDENT IN AN ORGANIZED HEALTH CARE EDUCATION/TRAINING PROGRAM

## 2018-01-01 PROCEDURE — 86922 COMPATIBILITY TEST ANTIGLOB: CPT | Performed by: NEUROLOGICAL SURGERY

## 2018-01-01 PROCEDURE — 99285 EMERGENCY DEPT VISIT HI MDM: CPT | Mod: 25 | Performed by: EMERGENCY MEDICINE

## 2018-01-01 PROCEDURE — 40000133 ZZH STATISTIC OT WARD VISIT

## 2018-01-01 PROCEDURE — 80053 COMPREHEN METABOLIC PANEL: CPT | Performed by: INTERNAL MEDICINE

## 2018-01-01 PROCEDURE — 82607 VITAMIN B-12: CPT | Performed by: INTERNAL MEDICINE

## 2018-01-01 PROCEDURE — 83605 ASSAY OF LACTIC ACID: CPT | Performed by: ANESTHESIOLOGY

## 2018-01-01 PROCEDURE — 85025 COMPLETE CBC W/AUTO DIFF WBC: CPT | Performed by: STUDENT IN AN ORGANIZED HEALTH CARE EDUCATION/TRAINING PROGRAM

## 2018-01-01 PROCEDURE — 80048 BASIC METABOLIC PNL TOTAL CA: CPT | Performed by: INTERNAL MEDICINE

## 2018-01-01 PROCEDURE — 86850 RBC ANTIBODY SCREEN: CPT | Performed by: STUDENT IN AN ORGANIZED HEALTH CARE EDUCATION/TRAINING PROGRAM

## 2018-01-01 PROCEDURE — 93010 ELECTROCARDIOGRAM REPORT: CPT | Performed by: INTERNAL MEDICINE

## 2018-01-01 PROCEDURE — P9012 CRYOPRECIPITATE EACH UNIT: HCPCS | Performed by: NURSE PRACTITIONER

## 2018-01-01 PROCEDURE — 25000125 ZZHC RX 250: Performed by: RADIOLOGY

## 2018-01-01 PROCEDURE — 96402 CHEMO HORMON ANTINEOPL SQ/IM: CPT

## 2018-01-01 PROCEDURE — 93010 ELECTROCARDIOGRAM REPORT: CPT | Mod: Z6 | Performed by: EMERGENCY MEDICINE

## 2018-01-01 PROCEDURE — 25000132 ZZH RX MED GY IP 250 OP 250 PS 637: Performed by: NEUROLOGICAL SURGERY

## 2018-01-01 PROCEDURE — 71045 X-RAY EXAM CHEST 1 VIEW: CPT

## 2018-01-01 PROCEDURE — 87040 BLOOD CULTURE FOR BACTERIA: CPT | Performed by: STUDENT IN AN ORGANIZED HEALTH CARE EDUCATION/TRAINING PROGRAM

## 2018-01-01 PROCEDURE — 81001 URINALYSIS AUTO W/SCOPE: CPT | Performed by: STUDENT IN AN ORGANIZED HEALTH CARE EDUCATION/TRAINING PROGRAM

## 2018-01-01 PROCEDURE — 36000070 ZZH SURGERY LEVEL 5 EA 15 ADDTL MIN - UMMC: Performed by: NEUROLOGICAL SURGERY

## 2018-01-01 PROCEDURE — 25000128 H RX IP 250 OP 636: Performed by: STUDENT IN AN ORGANIZED HEALTH CARE EDUCATION/TRAINING PROGRAM

## 2018-01-01 PROCEDURE — 82805 BLOOD GASES W/O2 SATURATION: CPT

## 2018-01-01 PROCEDURE — 82805 BLOOD GASES W/O2 SATURATION: CPT | Performed by: NEUROLOGICAL SURGERY

## 2018-01-01 PROCEDURE — 86900 BLOOD TYPING SEROLOGIC ABO: CPT | Performed by: EMERGENCY MEDICINE

## 2018-01-01 PROCEDURE — 93005 ELECTROCARDIOGRAM TRACING: CPT

## 2018-01-01 PROCEDURE — 72125 CT NECK SPINE W/O DYE: CPT

## 2018-01-01 PROCEDURE — 87040 BLOOD CULTURE FOR BACTERIA: CPT | Performed by: NEUROLOGICAL SURGERY

## 2018-01-01 PROCEDURE — 82728 ASSAY OF FERRITIN: CPT | Performed by: INTERNAL MEDICINE

## 2018-01-01 PROCEDURE — 83735 ASSAY OF MAGNESIUM: CPT | Performed by: INTERNAL MEDICINE

## 2018-01-01 PROCEDURE — 82330 ASSAY OF CALCIUM: CPT | Performed by: NEUROLOGICAL SURGERY

## 2018-01-01 PROCEDURE — 40000986 XR ABDOMEN PORT 1 VW

## 2018-01-01 PROCEDURE — 25000125 ZZHC RX 250: Performed by: PSYCHIATRY & NEUROLOGY

## 2018-01-01 PROCEDURE — 4A1239Z MONITORING OF CARDIAC OUTPUT, PERCUTANEOUS APPROACH: ICD-10-PCS | Performed by: INTERNAL MEDICINE

## 2018-01-01 PROCEDURE — 86905 BLOOD TYPING RBC ANTIGENS: CPT | Performed by: EMERGENCY MEDICINE

## 2018-01-01 PROCEDURE — 25000128 H RX IP 250 OP 636: Performed by: NEUROLOGICAL SURGERY

## 2018-01-01 PROCEDURE — 80076 HEPATIC FUNCTION PANEL: CPT | Performed by: NURSE PRACTITIONER

## 2018-01-01 PROCEDURE — 25000128 H RX IP 250 OP 636: Performed by: INTERNAL MEDICINE

## 2018-01-01 PROCEDURE — 86902 BLOOD TYPE ANTIGEN DONOR EA: CPT | Performed by: NEUROLOGICAL SURGERY

## 2018-01-01 PROCEDURE — 84100 ASSAY OF PHOSPHORUS: CPT | Performed by: INTERNAL MEDICINE

## 2018-01-01 PROCEDURE — 86901 BLOOD TYPING SEROLOGIC RH(D): CPT | Performed by: NEUROLOGICAL SURGERY

## 2018-01-01 PROCEDURE — 95951 ZZHC EEG VIDEO EACH 24 HR: CPT | Mod: ZF

## 2018-01-01 PROCEDURE — 84484 ASSAY OF TROPONIN QUANT: CPT | Performed by: STUDENT IN AN ORGANIZED HEALTH CARE EDUCATION/TRAINING PROGRAM

## 2018-01-01 PROCEDURE — P9012 CRYOPRECIPITATE EACH UNIT: HCPCS | Performed by: STUDENT IN AN ORGANIZED HEALTH CARE EDUCATION/TRAINING PROGRAM

## 2018-01-01 PROCEDURE — 85025 COMPLETE CBC W/AUTO DIFF WBC: CPT | Performed by: EMERGENCY MEDICINE

## 2018-01-01 PROCEDURE — 25000132 ZZH RX MED GY IP 250 OP 250 PS 637: Performed by: INTERNAL MEDICINE

## 2018-01-01 PROCEDURE — 83735 ASSAY OF MAGNESIUM: CPT | Performed by: NEUROLOGICAL SURGERY

## 2018-01-01 PROCEDURE — 25000128 H RX IP 250 OP 636: Mod: ZF | Performed by: PHYSICIAN ASSISTANT

## 2018-01-01 PROCEDURE — 83605 ASSAY OF LACTIC ACID: CPT | Performed by: STUDENT IN AN ORGANIZED HEALTH CARE EDUCATION/TRAINING PROGRAM

## 2018-01-01 PROCEDURE — 40000344 ZZHCL STATISTIC THAWING COMPONENT: Performed by: NURSE PRACTITIONER

## 2018-01-01 PROCEDURE — 96360 HYDRATION IV INFUSION INIT: CPT

## 2018-01-01 PROCEDURE — 84443 ASSAY THYROID STIM HORMONE: CPT | Performed by: INTERNAL MEDICINE

## 2018-01-01 PROCEDURE — 82746 ASSAY OF FOLIC ACID SERUM: CPT | Performed by: INTERNAL MEDICINE

## 2018-01-01 PROCEDURE — 25000125 ZZHC RX 250: Performed by: PHYSICIAN ASSISTANT

## 2018-01-01 PROCEDURE — 94799 UNLISTED PULMONARY SVC/PX: CPT

## 2018-01-01 PROCEDURE — 80048 BASIC METABOLIC PNL TOTAL CA: CPT | Performed by: STUDENT IN AN ORGANIZED HEALTH CARE EDUCATION/TRAINING PROGRAM

## 2018-01-01 PROCEDURE — 83605 ASSAY OF LACTIC ACID: CPT | Performed by: PSYCHIATRY & NEUROLOGY

## 2018-01-01 PROCEDURE — 85025 COMPLETE CBC W/AUTO DIFF WBC: CPT | Performed by: INTERNAL MEDICINE

## 2018-01-01 PROCEDURE — 86960 VOL REDUCTION OF BLOOD/PROD: CPT | Performed by: STUDENT IN AN ORGANIZED HEALTH CARE EDUCATION/TRAINING PROGRAM

## 2018-01-01 PROCEDURE — 87086 URINE CULTURE/COLONY COUNT: CPT | Performed by: NEUROLOGICAL SURGERY

## 2018-01-01 PROCEDURE — 00000146 ZZHCL STATISTIC GLUCOSE BY METER IP

## 2018-01-01 PROCEDURE — 85396 CLOTTING ASSAY WHOLE BLOOD: CPT | Performed by: STUDENT IN AN ORGANIZED HEALTH CARE EDUCATION/TRAINING PROGRAM

## 2018-01-01 PROCEDURE — 81001 URINALYSIS AUTO W/SCOPE: CPT | Performed by: INTERNAL MEDICINE

## 2018-01-01 PROCEDURE — C1713 ANCHOR/SCREW BN/BN,TIS/BN: HCPCS | Performed by: NEUROLOGICAL SURGERY

## 2018-01-01 PROCEDURE — 40000014 ZZH STATISTIC ARTERIAL MONITORING DAILY

## 2018-01-01 PROCEDURE — 83605 ASSAY OF LACTIC ACID: CPT | Performed by: NEUROLOGICAL SURGERY

## 2018-01-01 PROCEDURE — 85610 PROTHROMBIN TIME: CPT | Performed by: STUDENT IN AN ORGANIZED HEALTH CARE EDUCATION/TRAINING PROGRAM

## 2018-01-01 PROCEDURE — 85025 COMPLETE CBC W/AUTO DIFF WBC: CPT | Performed by: NURSE PRACTITIONER

## 2018-01-01 PROCEDURE — 85045 AUTOMATED RETICULOCYTE COUNT: CPT | Performed by: EMERGENCY MEDICINE

## 2018-01-01 PROCEDURE — 72170 X-RAY EXAM OF PELVIS: CPT

## 2018-01-01 PROCEDURE — 27210437 ZZH NUTRITION PRODUCT SEMIELEM INTERMED LITER

## 2018-01-01 PROCEDURE — 83605 ASSAY OF LACTIC ACID: CPT | Performed by: EMERGENCY MEDICINE

## 2018-01-01 PROCEDURE — 85610 PROTHROMBIN TIME: CPT | Performed by: INTERNAL MEDICINE

## 2018-01-01 PROCEDURE — 40001056 ZZHCL STATISTIC GUARDANT360 TUMOR SEQ: Performed by: PHYSICIAN ASSISTANT

## 2018-01-01 PROCEDURE — 82803 BLOOD GASES ANY COMBINATION: CPT | Performed by: NEUROLOGICAL SURGERY

## 2018-01-01 PROCEDURE — 86140 C-REACTIVE PROTEIN: CPT | Performed by: NURSE PRACTITIONER

## 2018-01-01 PROCEDURE — 95951 ZZHC EEG VIDEO < 12 HR: CPT | Mod: 52,ZF

## 2018-01-01 PROCEDURE — 85730 THROMBOPLASTIN TIME PARTIAL: CPT | Performed by: STUDENT IN AN ORGANIZED HEALTH CARE EDUCATION/TRAINING PROGRAM

## 2018-01-01 PROCEDURE — 27210794 ZZH OR GENERAL SUPPLY STERILE: Performed by: NEUROLOGICAL SURGERY

## 2018-01-01 PROCEDURE — 85049 AUTOMATED PLATELET COUNT: CPT | Performed by: STUDENT IN AN ORGANIZED HEALTH CARE EDUCATION/TRAINING PROGRAM

## 2018-01-01 PROCEDURE — 71000017 ZZH RECOVERY PHASE 1 LEVEL 3 EA ADDTL HR: Performed by: NEUROLOGICAL SURGERY

## 2018-01-01 PROCEDURE — 85730 THROMBOPLASTIN TIME PARTIAL: CPT | Performed by: EMERGENCY MEDICINE

## 2018-01-01 PROCEDURE — 36415 COLL VENOUS BLD VENIPUNCTURE: CPT | Performed by: NURSE PRACTITIONER

## 2018-01-01 PROCEDURE — 80053 COMPREHEN METABOLIC PANEL: CPT | Performed by: EMERGENCY MEDICINE

## 2018-01-01 PROCEDURE — 86902 BLOOD TYPE ANTIGEN DONOR EA: CPT | Performed by: EMERGENCY MEDICINE

## 2018-01-01 PROCEDURE — 99233 SBSQ HOSP IP/OBS HIGH 50: CPT | Performed by: INTERNAL MEDICINE

## 2018-01-01 PROCEDURE — 84153 ASSAY OF PSA TOTAL: CPT | Performed by: PHYSICIAN ASSISTANT

## 2018-01-01 PROCEDURE — 83735 ASSAY OF MAGNESIUM: CPT | Performed by: STUDENT IN AN ORGANIZED HEALTH CARE EDUCATION/TRAINING PROGRAM

## 2018-01-01 PROCEDURE — 40000978 ZZH STATISTIC COMPARTMENT STUDY

## 2018-01-01 PROCEDURE — 37000009 ZZH ANESTHESIA TECHNICAL FEE, EACH ADDTL 15 MIN: Performed by: NEUROLOGICAL SURGERY

## 2018-01-01 PROCEDURE — 85730 THROMBOPLASTIN TIME PARTIAL: CPT | Performed by: INTERNAL MEDICINE

## 2018-01-01 PROCEDURE — 99217 ZZC OBSERVATION CARE DISCHARGE: CPT | Mod: Z6 | Performed by: PHYSICIAN ASSISTANT

## 2018-01-01 PROCEDURE — 83605 ASSAY OF LACTIC ACID: CPT | Performed by: INTERNAL MEDICINE

## 2018-01-01 PROCEDURE — 27211315 ZZ H KIT, BLADDER PRESSURE

## 2018-01-01 PROCEDURE — 80048 BASIC METABOLIC PNL TOTAL CA: CPT | Performed by: NEUROLOGICAL SURGERY

## 2018-01-01 PROCEDURE — 93005 ELECTROCARDIOGRAM TRACING: CPT | Performed by: EMERGENCY MEDICINE

## 2018-01-01 PROCEDURE — 85384 FIBRINOGEN ACTIVITY: CPT | Performed by: STUDENT IN AN ORGANIZED HEALTH CARE EDUCATION/TRAINING PROGRAM

## 2018-01-01 PROCEDURE — 71045 X-RAY EXAM CHEST 1 VIEW: CPT | Mod: 77

## 2018-01-01 PROCEDURE — P9016 RBC LEUKOCYTES REDUCED: HCPCS | Performed by: NEUROLOGICAL SURGERY

## 2018-01-01 PROCEDURE — 82610 CYSTATIN C: CPT | Performed by: STUDENT IN AN ORGANIZED HEALTH CARE EDUCATION/TRAINING PROGRAM

## 2018-01-01 PROCEDURE — 70450 CT HEAD/BRAIN W/O DYE: CPT

## 2018-01-01 PROCEDURE — 93308 TTE F-UP OR LMTD: CPT | Mod: 26 | Performed by: INTERNAL MEDICINE

## 2018-01-01 PROCEDURE — 25000128 H RX IP 250 OP 636

## 2018-01-01 PROCEDURE — 4A023N6 MEASUREMENT OF CARDIAC SAMPLING AND PRESSURE, RIGHT HEART, PERCUTANEOUS APPROACH: ICD-10-PCS | Performed by: INTERNAL MEDICINE

## 2018-01-01 PROCEDURE — 96361 HYDRATE IV INFUSION ADD-ON: CPT | Performed by: EMERGENCY MEDICINE

## 2018-01-01 PROCEDURE — 82805 BLOOD GASES W/O2 SATURATION: CPT | Performed by: STUDENT IN AN ORGANIZED HEALTH CARE EDUCATION/TRAINING PROGRAM

## 2018-01-01 PROCEDURE — 25000128 H RX IP 250 OP 636: Performed by: PSYCHIATRY & NEUROLOGY

## 2018-01-01 PROCEDURE — 40000281 ZZH STATISTIC TRANSPORT TIME EA 15 MIN

## 2018-01-01 PROCEDURE — 83690 ASSAY OF LIPASE: CPT | Performed by: PSYCHIATRY & NEUROLOGY

## 2018-01-01 PROCEDURE — 86901 BLOOD TYPING SEROLOGIC RH(D): CPT | Performed by: EMERGENCY MEDICINE

## 2018-01-01 PROCEDURE — P9016 RBC LEUKOCYTES REDUCED: HCPCS | Performed by: EMERGENCY MEDICINE

## 2018-01-01 PROCEDURE — 85384 FIBRINOGEN ACTIVITY: CPT | Performed by: NEUROLOGICAL SURGERY

## 2018-01-01 PROCEDURE — P9037 PLATE PHERES LEUKOREDU IRRAD: HCPCS | Performed by: NURSE PRACTITIONER

## 2018-01-01 PROCEDURE — 86870 RBC ANTIBODY IDENTIFICATION: CPT | Performed by: EMERGENCY MEDICINE

## 2018-01-01 PROCEDURE — 85025 COMPLETE CBC W/AUTO DIFF WBC: CPT | Performed by: NEUROLOGICAL SURGERY

## 2018-01-01 PROCEDURE — P9037 PLATE PHERES LEUKOREDU IRRAD: HCPCS | Performed by: STUDENT IN AN ORGANIZED HEALTH CARE EDUCATION/TRAINING PROGRAM

## 2018-01-01 PROCEDURE — 74177 CT ABD & PELVIS W/CONTRAST: CPT

## 2018-01-01 PROCEDURE — 84134 ASSAY OF PREALBUMIN: CPT | Performed by: INTERNAL MEDICINE

## 2018-01-01 PROCEDURE — 90947 DIALYSIS REPEATED EVAL: CPT

## 2018-01-01 PROCEDURE — 85610 PROTHROMBIN TIME: CPT | Performed by: EMERGENCY MEDICINE

## 2018-01-01 PROCEDURE — 80053 COMPREHEN METABOLIC PANEL: CPT | Performed by: NEUROLOGICAL SURGERY

## 2018-01-01 PROCEDURE — 36415 COLL VENOUS BLD VENIPUNCTURE: CPT | Performed by: NEUROLOGICAL SURGERY

## 2018-01-01 PROCEDURE — 40000048 ZZH STATISTIC DAILY SWAN MONITORING

## 2018-01-01 PROCEDURE — 82947 ASSAY GLUCOSE BLOOD QUANT: CPT | Performed by: ANESTHESIOLOGY

## 2018-01-01 PROCEDURE — G0378 HOSPITAL OBSERVATION PER HR: HCPCS

## 2018-01-01 PROCEDURE — 84145 PROCALCITONIN (PCT): CPT | Performed by: STUDENT IN AN ORGANIZED HEALTH CARE EDUCATION/TRAINING PROGRAM

## 2018-01-01 PROCEDURE — 86900 BLOOD TYPING SEROLOGIC ABO: CPT | Performed by: NEUROLOGICAL SURGERY

## 2018-01-01 PROCEDURE — 36415 COLL VENOUS BLD VENIPUNCTURE: CPT | Performed by: INTERNAL MEDICINE

## 2018-01-01 PROCEDURE — 84484 ASSAY OF TROPONIN QUANT: CPT | Performed by: INTERNAL MEDICINE

## 2018-01-01 PROCEDURE — 27210136 ZZH KIT CATH ARTERIAL EXT SUPPLY

## 2018-01-01 PROCEDURE — 25000125 ZZHC RX 250: Performed by: NURSE ANESTHETIST, CERTIFIED REGISTERED

## 2018-01-01 PROCEDURE — 86901 BLOOD TYPING SEROLOGIC RH(D): CPT | Performed by: STUDENT IN AN ORGANIZED HEALTH CARE EDUCATION/TRAINING PROGRAM

## 2018-01-01 PROCEDURE — 20000004 ZZH R&B ICU UMMC

## 2018-01-01 PROCEDURE — 96360 HYDRATION IV INFUSION INIT: CPT | Performed by: EMERGENCY MEDICINE

## 2018-01-01 PROCEDURE — 40000986 XR CHEST PORT 1 VW

## 2018-01-01 PROCEDURE — 25000128 H RX IP 250 OP 636: Performed by: EMERGENCY MEDICINE

## 2018-01-01 PROCEDURE — 93325 DOPPLER ECHO COLOR FLOW MAPG: CPT

## 2018-01-01 PROCEDURE — 87086 URINE CULTURE/COLONY COUNT: CPT | Performed by: INTERNAL MEDICINE

## 2018-01-01 PROCEDURE — 84484 ASSAY OF TROPONIN QUANT: CPT | Performed by: PSYCHIATRY & NEUROLOGY

## 2018-01-01 PROCEDURE — 85384 FIBRINOGEN ACTIVITY: CPT | Performed by: INTERNAL MEDICINE

## 2018-01-01 PROCEDURE — 82805 BLOOD GASES W/O2 SATURATION: CPT | Performed by: PSYCHIATRY & NEUROLOGY

## 2018-01-01 PROCEDURE — 85027 COMPLETE CBC AUTOMATED: CPT | Performed by: NEUROLOGICAL SURGERY

## 2018-01-01 PROCEDURE — 81001 URINALYSIS AUTO W/SCOPE: CPT | Performed by: EMERGENCY MEDICINE

## 2018-01-01 PROCEDURE — 25000128 H RX IP 250 OP 636: Performed by: ANESTHESIOLOGY

## 2018-01-01 PROCEDURE — 84295 ASSAY OF SERUM SODIUM: CPT | Performed by: STUDENT IN AN ORGANIZED HEALTH CARE EDUCATION/TRAINING PROGRAM

## 2018-01-01 PROCEDURE — 84484 ASSAY OF TROPONIN QUANT: CPT | Performed by: PHYSICIAN ASSISTANT

## 2018-01-01 PROCEDURE — 25800025 ZZH RX 258

## 2018-01-01 PROCEDURE — 40000556 ZZH STATISTIC PERIPHERAL IV START W US GUIDANCE

## 2018-01-01 PROCEDURE — 93503 INSERT/PLACE HEART CATHETER: CPT

## 2018-01-01 PROCEDURE — 84484 ASSAY OF TROPONIN QUANT: CPT | Performed by: NEUROLOGICAL SURGERY

## 2018-01-01 PROCEDURE — 86140 C-REACTIVE PROTEIN: CPT | Performed by: INTERNAL MEDICINE

## 2018-01-01 PROCEDURE — 06HY33Z INSERTION OF INFUSION DEVICE INTO LOWER VEIN, PERCUTANEOUS APPROACH: ICD-10-PCS | Performed by: INTERNAL MEDICINE

## 2018-01-01 PROCEDURE — 86850 RBC ANTIBODY SCREEN: CPT | Performed by: NEUROLOGICAL SURGERY

## 2018-01-01 PROCEDURE — 02HP32Z INSERTION OF MONITORING DEVICE INTO PULMONARY TRUNK, PERCUTANEOUS APPROACH: ICD-10-PCS | Performed by: INTERNAL MEDICINE

## 2018-01-01 PROCEDURE — 81001 URINALYSIS AUTO W/SCOPE: CPT | Performed by: NEUROLOGICAL SURGERY

## 2018-01-01 PROCEDURE — 93306 TTE W/DOPPLER COMPLETE: CPT

## 2018-01-01 PROCEDURE — 71000016 ZZH RECOVERY PHASE 1 LEVEL 3 FIRST HR: Performed by: NEUROLOGICAL SURGERY

## 2018-01-01 PROCEDURE — 82962 GLUCOSE BLOOD TEST: CPT

## 2018-01-01 PROCEDURE — 25000128 H RX IP 250 OP 636: Performed by: RADIOLOGY

## 2018-01-01 PROCEDURE — 3E033XZ INTRODUCTION OF VASOPRESSOR INTO PERIPHERAL VEIN, PERCUTANEOUS APPROACH: ICD-10-PCS | Performed by: NEUROLOGICAL SURGERY

## 2018-01-01 PROCEDURE — 80202 ASSAY OF VANCOMYCIN: CPT | Performed by: NEUROLOGICAL SURGERY

## 2018-01-01 PROCEDURE — 00943ZZ DRAINAGE OF INTRACRANIAL SUBDURAL SPACE, PERCUTANEOUS APPROACH: ICD-10-PCS | Performed by: NEUROLOGICAL SURGERY

## 2018-01-01 PROCEDURE — 86900 BLOOD TYPING SEROLOGIC ABO: CPT | Performed by: STUDENT IN AN ORGANIZED HEALTH CARE EDUCATION/TRAINING PROGRAM

## 2018-01-01 PROCEDURE — 87205 SMEAR GRAM STAIN: CPT | Performed by: STUDENT IN AN ORGANIZED HEALTH CARE EDUCATION/TRAINING PROGRAM

## 2018-01-01 PROCEDURE — 25000566 ZZH SEVOFLURANE, EA 15 MIN: Performed by: NEUROLOGICAL SURGERY

## 2018-01-01 PROCEDURE — 84484 ASSAY OF TROPONIN QUANT: CPT | Performed by: EMERGENCY MEDICINE

## 2018-01-01 PROCEDURE — 99214 OFFICE O/P EST MOD 30 MIN: CPT | Mod: ZP | Performed by: INTERNAL MEDICINE

## 2018-01-01 PROCEDURE — 82570 ASSAY OF URINE CREATININE: CPT | Performed by: STUDENT IN AN ORGANIZED HEALTH CARE EDUCATION/TRAINING PROGRAM

## 2018-01-01 PROCEDURE — 84132 ASSAY OF SERUM POTASSIUM: CPT | Performed by: ANESTHESIOLOGY

## 2018-01-01 PROCEDURE — 25000132 ZZH RX MED GY IP 250 OP 250 PS 637: Performed by: PHYSICIAN ASSISTANT

## 2018-01-01 PROCEDURE — 85027 COMPLETE CBC AUTOMATED: CPT | Performed by: INTERNAL MEDICINE

## 2018-01-01 PROCEDURE — 93306 TTE W/DOPPLER COMPLETE: CPT | Mod: 26 | Performed by: INTERNAL MEDICINE

## 2018-01-01 PROCEDURE — 36415 COLL VENOUS BLD VENIPUNCTURE: CPT | Performed by: PHYSICIAN ASSISTANT

## 2018-01-01 PROCEDURE — 25000128 H RX IP 250 OP 636: Performed by: NURSE ANESTHETIST, CERTIFIED REGISTERED

## 2018-01-01 PROCEDURE — 40000344 ZZHCL STATISTIC THAWING COMPONENT: Performed by: INTERNAL MEDICINE

## 2018-01-01 PROCEDURE — 27210995 ZZH RX 272: Performed by: NEUROLOGICAL SURGERY

## 2018-01-01 PROCEDURE — 40000611 ZZHCL STATISTIC MORPHOLOGY W/INTERP HEMEPATH TC 85060: Performed by: INTERNAL MEDICINE

## 2018-01-01 PROCEDURE — 80053 COMPREHEN METABOLIC PANEL: CPT | Performed by: PSYCHIATRY & NEUROLOGY

## 2018-01-01 PROCEDURE — 82803 BLOOD GASES ANY COMBINATION: CPT | Performed by: ANESTHESIOLOGY

## 2018-01-01 PROCEDURE — 85730 THROMBOPLASTIN TIME PARTIAL: CPT | Performed by: NEUROLOGICAL SURGERY

## 2018-01-01 PROCEDURE — 87086 URINE CULTURE/COLONY COUNT: CPT | Performed by: STUDENT IN AN ORGANIZED HEALTH CARE EDUCATION/TRAINING PROGRAM

## 2018-01-01 PROCEDURE — 82140 ASSAY OF AMMONIA: CPT | Performed by: NURSE PRACTITIONER

## 2018-01-01 PROCEDURE — 87040 BLOOD CULTURE FOR BACTERIA: CPT | Performed by: NURSE PRACTITIONER

## 2018-01-01 PROCEDURE — 36000068 ZZH SURGERY LEVEL 5 1ST 30 MIN - UMMC: Performed by: NEUROLOGICAL SURGERY

## 2018-01-01 PROCEDURE — 12000008 ZZH R&B INTERMEDIATE UMMC

## 2018-01-01 PROCEDURE — 96372 THER/PROPH/DIAG INJ SC/IM: CPT

## 2018-01-01 PROCEDURE — C9399 UNCLASSIFIED DRUGS OR BIOLOG: HCPCS | Performed by: NURSE ANESTHETIST, CERTIFIED REGISTERED

## 2018-01-01 PROCEDURE — 82330 ASSAY OF CALCIUM: CPT

## 2018-01-01 PROCEDURE — 93010 ELECTROCARDIOGRAM REPORT: CPT | Mod: 76 | Performed by: INTERNAL MEDICINE

## 2018-01-01 PROCEDURE — 74018 RADEX ABDOMEN 1 VIEW: CPT

## 2018-01-01 PROCEDURE — 86922 COMPATIBILITY TEST ANTIGLOB: CPT | Performed by: EMERGENCY MEDICINE

## 2018-01-01 PROCEDURE — 87070 CULTURE OTHR SPECIMN AEROBIC: CPT | Performed by: STUDENT IN AN ORGANIZED HEALTH CARE EDUCATION/TRAINING PROGRAM

## 2018-01-01 PROCEDURE — 82330 ASSAY OF CALCIUM: CPT | Performed by: ANESTHESIOLOGY

## 2018-01-01 PROCEDURE — 83550 IRON BINDING TEST: CPT | Performed by: INTERNAL MEDICINE

## 2018-01-01 PROCEDURE — P9059 PLASMA, FRZ BETWEEN 8-24HOUR: HCPCS | Performed by: STUDENT IN AN ORGANIZED HEALTH CARE EDUCATION/TRAINING PROGRAM

## 2018-01-01 PROCEDURE — 84466 ASSAY OF TRANSFERRIN: CPT | Performed by: INTERNAL MEDICINE

## 2018-01-01 PROCEDURE — 85027 COMPLETE CBC AUTOMATED: CPT | Performed by: STUDENT IN AN ORGANIZED HEALTH CARE EDUCATION/TRAINING PROGRAM

## 2018-01-01 PROCEDURE — 85027 COMPLETE CBC AUTOMATED: CPT | Performed by: NURSE PRACTITIONER

## 2018-01-01 PROCEDURE — 84484 ASSAY OF TROPONIN QUANT: CPT | Mod: 91 | Performed by: PHYSICIAN ASSISTANT

## 2018-01-01 PROCEDURE — 83735 ASSAY OF MAGNESIUM: CPT | Performed by: PSYCHIATRY & NEUROLOGY

## 2018-01-01 PROCEDURE — 70450 CT HEAD/BRAIN W/O DYE: CPT | Mod: 76

## 2018-01-01 PROCEDURE — 96372 THER/PROPH/DIAG INJ SC/IM: CPT | Mod: ZF

## 2018-01-01 PROCEDURE — 82330 ASSAY OF CALCIUM: CPT | Performed by: STUDENT IN AN ORGANIZED HEALTH CARE EDUCATION/TRAINING PROGRAM

## 2018-01-01 PROCEDURE — 97530 THERAPEUTIC ACTIVITIES: CPT | Mod: GO

## 2018-01-01 PROCEDURE — 96361 HYDRATE IV INFUSION ADD-ON: CPT

## 2018-01-01 PROCEDURE — 71046 X-RAY EXAM CHEST 2 VIEWS: CPT

## 2018-01-01 PROCEDURE — 4A133B3 MONITORING OF ARTERIAL PRESSURE, PULMONARY, PERCUTANEOUS APPROACH: ICD-10-PCS | Performed by: INTERNAL MEDICINE

## 2018-01-01 PROCEDURE — 84132 ASSAY OF SERUM POTASSIUM: CPT | Performed by: PSYCHIATRY & NEUROLOGY

## 2018-01-01 PROCEDURE — 84295 ASSAY OF SERUM SODIUM: CPT | Performed by: ANESTHESIOLOGY

## 2018-01-01 PROCEDURE — 85045 AUTOMATED RETICULOCYTE COUNT: CPT | Performed by: INTERNAL MEDICINE

## 2018-01-01 PROCEDURE — 27810169 ZZH OR IMPLANT GENERAL: Performed by: NEUROLOGICAL SURGERY

## 2018-01-01 PROCEDURE — 93970 EXTREMITY STUDY: CPT

## 2018-01-01 PROCEDURE — 25000125 ZZHC RX 250: Performed by: NEUROLOGICAL SURGERY

## 2018-01-01 PROCEDURE — 87086 URINE CULTURE/COLONY COUNT: CPT | Performed by: NURSE PRACTITIONER

## 2018-01-01 PROCEDURE — 93321 DOPPLER ECHO F-UP/LMTD STD: CPT | Mod: 26 | Performed by: INTERNAL MEDICINE

## 2018-01-01 PROCEDURE — P9059 PLASMA, FRZ BETWEEN 8-24HOUR: HCPCS | Performed by: INTERNAL MEDICINE

## 2018-01-01 PROCEDURE — 5A1D90Z PERFORMANCE OF URINARY FILTRATION, CONTINUOUS, GREATER THAN 18 HOURS PER DAY: ICD-10-PCS | Performed by: INTERNAL MEDICINE

## 2018-01-01 PROCEDURE — 96372 THER/PROPH/DIAG INJ SC/IM: CPT | Mod: 59

## 2018-01-01 PROCEDURE — 99215 OFFICE O/P EST HI 40 MIN: CPT | Mod: ZP | Performed by: INTERNAL MEDICINE

## 2018-01-01 PROCEDURE — 97165 OT EVAL LOW COMPLEX 30 MIN: CPT | Mod: GO

## 2018-01-01 PROCEDURE — 25800025 ZZH RX 258: Performed by: NURSE PRACTITIONER

## 2018-01-01 PROCEDURE — 84153 ASSAY OF PSA TOTAL: CPT | Performed by: INTERNAL MEDICINE

## 2018-01-01 PROCEDURE — 82150 ASSAY OF AMYLASE: CPT | Performed by: PSYCHIATRY & NEUROLOGY

## 2018-01-01 PROCEDURE — 87493 C DIFF AMPLIFIED PROBE: CPT | Performed by: STUDENT IN AN ORGANIZED HEALTH CARE EDUCATION/TRAINING PROGRAM

## 2018-01-01 PROCEDURE — 83540 ASSAY OF IRON: CPT | Performed by: INTERNAL MEDICINE

## 2018-01-01 PROCEDURE — 44500 INTRO GASTROINTESTINAL TUBE: CPT

## 2018-01-01 PROCEDURE — 36600 WITHDRAWAL OF ARTERIAL BLOOD: CPT

## 2018-01-01 PROCEDURE — 80048 BASIC METABOLIC PNL TOTAL CA: CPT | Performed by: NURSE PRACTITIONER

## 2018-01-01 PROCEDURE — 84300 ASSAY OF URINE SODIUM: CPT | Performed by: STUDENT IN AN ORGANIZED HEALTH CARE EDUCATION/TRAINING PROGRAM

## 2018-01-01 DEVICE — IMP BUR HOLE COVER 17MM LOW PROFILE TI 421.527: Type: IMPLANTABLE DEVICE | Site: CRANIAL | Status: FUNCTIONAL

## 2018-01-01 DEVICE — IMP SCR SYN MATRIX LOW PRO 1.5X04MM SELF DRILL 04.503.104.01: Type: IMPLANTABLE DEVICE | Site: CRANIAL | Status: FUNCTIONAL

## 2018-01-01 RX ORDER — LIDOCAINE HYDROCHLORIDE 10 MG/ML
INJECTION, SOLUTION EPIDURAL; INFILTRATION; INTRACAUDAL; PERINEURAL
Status: DISCONTINUED
Start: 2018-01-01 | End: 2018-01-01 | Stop reason: HOSPADM

## 2018-01-01 RX ORDER — FUROSEMIDE 10 MG/ML
40 INJECTION INTRAMUSCULAR; INTRAVENOUS ONCE
Status: COMPLETED | OUTPATIENT
Start: 2018-01-01 | End: 2018-01-01

## 2018-01-01 RX ORDER — SODIUM CHLORIDE, SODIUM LACTATE, POTASSIUM CHLORIDE, CALCIUM CHLORIDE 600; 310; 30; 20 MG/100ML; MG/100ML; MG/100ML; MG/100ML
INJECTION, SOLUTION INTRAVENOUS
Status: COMPLETED
Start: 2018-01-01 | End: 2018-01-01

## 2018-01-01 RX ORDER — POTASSIUM CHLORIDE 29.8 MG/ML
20 INJECTION INTRAVENOUS
Status: DISCONTINUED | OUTPATIENT
Start: 2018-01-01 | End: 2018-01-01 | Stop reason: RX

## 2018-01-01 RX ORDER — IOPAMIDOL 755 MG/ML
86 INJECTION, SOLUTION INTRAVASCULAR ONCE
Status: COMPLETED | OUTPATIENT
Start: 2018-01-01 | End: 2018-01-01

## 2018-01-01 RX ORDER — ACETAMINOPHEN 325 MG/1
650 TABLET ORAL EVERY 4 HOURS PRN
Status: DISCONTINUED | OUTPATIENT
Start: 2018-01-01 | End: 2018-01-01 | Stop reason: HOSPADM

## 2018-01-01 RX ORDER — HYDROMORPHONE HYDROCHLORIDE 1 MG/ML
.3-.5 INJECTION, SOLUTION INTRAMUSCULAR; INTRAVENOUS; SUBCUTANEOUS EVERY 5 MIN PRN
Status: DISCONTINUED | OUTPATIENT
Start: 2018-01-01 | End: 2018-01-01 | Stop reason: HOSPADM

## 2018-01-01 RX ORDER — PROPOFOL 10 MG/ML
INJECTION, EMULSION INTRAVENOUS PRN
Status: DISCONTINUED | OUTPATIENT
Start: 2018-01-01 | End: 2018-01-01

## 2018-01-01 RX ORDER — SODIUM CHLORIDE 9 MG/ML
INJECTION, SOLUTION INTRAVENOUS
Status: DISCONTINUED
Start: 2018-01-01 | End: 2018-01-01 | Stop reason: HOSPADM

## 2018-01-01 RX ORDER — SODIUM CHLORIDE 450 MG/100ML
INJECTION, SOLUTION INTRAVENOUS CONTINUOUS
Status: DISCONTINUED | OUTPATIENT
Start: 2018-01-01 | End: 2018-01-01

## 2018-01-01 RX ORDER — NALOXONE HYDROCHLORIDE 0.4 MG/ML
.1-.4 INJECTION, SOLUTION INTRAMUSCULAR; INTRAVENOUS; SUBCUTANEOUS
Status: DISCONTINUED | OUTPATIENT
Start: 2018-01-01 | End: 2018-01-01 | Stop reason: HOSPADM

## 2018-01-01 RX ORDER — HYDROMORPHONE HYDROCHLORIDE 1 MG/ML
0.5 INJECTION, SOLUTION INTRAMUSCULAR; INTRAVENOUS; SUBCUTANEOUS ONCE
Status: COMPLETED | OUTPATIENT
Start: 2018-01-01 | End: 2018-01-01

## 2018-01-01 RX ORDER — CEFAZOLIN SODIUM 1 G/50ML
1250 SOLUTION INTRAVENOUS EVERY 24 HOURS
Status: DISCONTINUED | OUTPATIENT
Start: 2018-01-01 | End: 2018-01-01

## 2018-01-01 RX ORDER — SODIUM CHLORIDE 9 MG/ML
INJECTION, SOLUTION INTRAVENOUS CONTINUOUS
Status: DISCONTINUED | OUTPATIENT
Start: 2018-01-01 | End: 2018-01-01

## 2018-01-01 RX ORDER — LIDOCAINE 40 MG/G
CREAM TOPICAL
Status: DISCONTINUED | OUTPATIENT
Start: 2018-01-01 | End: 2018-01-01 | Stop reason: HOSPADM

## 2018-01-01 RX ORDER — ATROPINE SULFATE 10 MG/ML
1-2 SOLUTION/ DROPS OPHTHALMIC
Status: DISCONTINUED | OUTPATIENT
Start: 2018-01-01 | End: 2018-01-01 | Stop reason: HOSPADM

## 2018-01-01 RX ORDER — POTASSIUM CHLORIDE 7.45 MG/ML
10 INJECTION INTRAVENOUS
Status: DISCONTINUED | OUTPATIENT
Start: 2018-01-01 | End: 2018-01-01

## 2018-01-01 RX ORDER — SODIUM CHLORIDE 9 MG/ML
INJECTION, SOLUTION INTRAVENOUS
Status: COMPLETED
Start: 2018-01-01 | End: 2018-01-01

## 2018-01-01 RX ORDER — POTASSIUM CHLORIDE 29.8 MG/ML
20 INJECTION INTRAVENOUS ONCE
Status: COMPLETED | OUTPATIENT
Start: 2018-01-01 | End: 2018-01-01

## 2018-01-01 RX ORDER — HYDROMORPHONE HYDROCHLORIDE 1 MG/ML
.2-.4 INJECTION, SOLUTION INTRAMUSCULAR; INTRAVENOUS; SUBCUTANEOUS
Status: DISCONTINUED | OUTPATIENT
Start: 2018-01-01 | End: 2018-01-01 | Stop reason: HOSPADM

## 2018-01-01 RX ORDER — ASPIRIN 81 MG/1
81 TABLET ORAL DAILY
Status: DISCONTINUED | OUTPATIENT
Start: 2018-01-01 | End: 2018-01-01

## 2018-01-01 RX ORDER — FUROSEMIDE 10 MG/ML
20 INJECTION INTRAMUSCULAR; INTRAVENOUS ONCE
Status: DISCONTINUED | OUTPATIENT
Start: 2018-01-01 | End: 2018-01-01

## 2018-01-01 RX ORDER — FUROSEMIDE 10 MG/ML
INJECTION INTRAMUSCULAR; INTRAVENOUS PRN
Status: DISCONTINUED | OUTPATIENT
Start: 2018-01-01 | End: 2018-01-01

## 2018-01-01 RX ORDER — HYDROCODONE BITARTRATE AND ACETAMINOPHEN 5; 325 MG/1; MG/1
1 TABLET ORAL EVERY 6 HOURS PRN
COMMUNITY

## 2018-01-01 RX ORDER — METHYLPHENIDATE HYDROCHLORIDE 5 MG/1
2.5 TABLET ORAL 2 TIMES DAILY
Qty: 40 TABLET | Refills: 0 | Status: ON HOLD | OUTPATIENT
Start: 2018-01-01 | End: 2018-01-01

## 2018-01-01 RX ORDER — POTASSIUM CL/LIDO/0.9 % NACL 10MEQ/0.1L
10 INTRAVENOUS SOLUTION, PIGGYBACK (ML) INTRAVENOUS
Status: DISCONTINUED | OUTPATIENT
Start: 2018-01-01 | End: 2018-01-01

## 2018-01-01 RX ORDER — PALONOSETRON 0.05 MG/ML
0.25 INJECTION, SOLUTION INTRAVENOUS ONCE
Status: CANCELLED
Start: 2018-01-01 | End: 2018-01-01

## 2018-01-01 RX ORDER — PHYTONADIONE 5 MG/1
10 TABLET ORAL ONCE
Status: DISCONTINUED | OUTPATIENT
Start: 2018-01-01 | End: 2018-01-01

## 2018-01-01 RX ORDER — NALOXONE HYDROCHLORIDE 0.4 MG/ML
.1-.4 INJECTION, SOLUTION INTRAMUSCULAR; INTRAVENOUS; SUBCUTANEOUS
Status: DISCONTINUED | OUTPATIENT
Start: 2018-01-01 | End: 2018-01-01

## 2018-01-01 RX ORDER — DEXAMETHASONE SODIUM PHOSPHATE 4 MG/ML
4 INJECTION, SOLUTION INTRA-ARTICULAR; INTRALESIONAL; INTRAMUSCULAR; INTRAVENOUS; SOFT TISSUE ONCE
Status: COMPLETED | OUTPATIENT
Start: 2018-01-01 | End: 2018-01-01

## 2018-01-01 RX ORDER — MAGNESIUM SULFATE HEPTAHYDRATE 40 MG/ML
4 INJECTION, SOLUTION INTRAVENOUS EVERY 4 HOURS PRN
Status: DISCONTINUED | OUTPATIENT
Start: 2018-01-01 | End: 2018-01-01

## 2018-01-01 RX ORDER — SODIUM CHLORIDE, SODIUM LACTATE, POTASSIUM CHLORIDE, CALCIUM CHLORIDE 600; 310; 30; 20 MG/100ML; MG/100ML; MG/100ML; MG/100ML
INJECTION, SOLUTION INTRAVENOUS CONTINUOUS
Status: DISCONTINUED | OUTPATIENT
Start: 2018-01-01 | End: 2018-01-01

## 2018-01-01 RX ORDER — PROPOFOL 10 MG/ML
INJECTION, EMULSION INTRAVENOUS CONTINUOUS PRN
Status: DISCONTINUED | OUTPATIENT
Start: 2018-01-01 | End: 2018-01-01

## 2018-01-01 RX ORDER — LEVETIRACETAM 500 MG/1
1000 TABLET ORAL 2 TIMES DAILY
Status: DISCONTINUED | OUTPATIENT
Start: 2018-01-01 | End: 2018-01-01

## 2018-01-01 RX ORDER — GADOBUTROL 604.72 MG/ML
7.5 INJECTION INTRAVENOUS ONCE
Status: COMPLETED | OUTPATIENT
Start: 2018-01-01 | End: 2018-01-01

## 2018-01-01 RX ORDER — ALUMINA, MAGNESIA, AND SIMETHICONE 2400; 2400; 240 MG/30ML; MG/30ML; MG/30ML
30 SUSPENSION ORAL EVERY 4 HOURS PRN
Status: DISCONTINUED | OUTPATIENT
Start: 2018-01-01 | End: 2018-01-01 | Stop reason: HOSPADM

## 2018-01-01 RX ORDER — MIRTAZAPINE 15 MG/1
15 TABLET, ORALLY DISINTEGRATING ORAL AT BEDTIME
Status: DISCONTINUED | OUTPATIENT
Start: 2018-01-01 | End: 2018-01-01

## 2018-01-01 RX ORDER — LEVETIRACETAM 10 MG/ML
1000 INJECTION INTRAVASCULAR ONCE
Status: COMPLETED | OUTPATIENT
Start: 2018-01-01 | End: 2018-01-01

## 2018-01-01 RX ORDER — LEVETIRACETAM 10 MG/ML
1000 INJECTION INTRAVASCULAR EVERY 12 HOURS
Status: DISCONTINUED | OUTPATIENT
Start: 2018-01-01 | End: 2018-01-01

## 2018-01-01 RX ORDER — PALONOSETRON 0.05 MG/ML
0.25 INJECTION, SOLUTION INTRAVENOUS ONCE
Status: DISCONTINUED | OUTPATIENT
Start: 2018-01-01 | End: 2018-01-01 | Stop reason: HOSPADM

## 2018-01-01 RX ORDER — ONDANSETRON 4 MG/1
4 TABLET, ORALLY DISINTEGRATING ORAL EVERY 30 MIN PRN
Status: DISCONTINUED | OUTPATIENT
Start: 2018-01-01 | End: 2018-01-01 | Stop reason: HOSPADM

## 2018-01-01 RX ORDER — NICOTINE POLACRILEX 4 MG
15-30 LOZENGE BUCCAL
Status: DISCONTINUED | OUTPATIENT
Start: 2018-01-01 | End: 2018-01-01 | Stop reason: HOSPADM

## 2018-01-01 RX ORDER — FENTANYL CITRATE 50 UG/ML
25-50 INJECTION, SOLUTION INTRAMUSCULAR; INTRAVENOUS
Status: DISCONTINUED | OUTPATIENT
Start: 2018-01-01 | End: 2018-01-01 | Stop reason: HOSPADM

## 2018-01-01 RX ORDER — HYDROCODONE BITARTRATE AND ACETAMINOPHEN 5; 325 MG/1; MG/1
0.5 TABLET ORAL ONCE
Status: DISCONTINUED | OUTPATIENT
Start: 2018-01-01 | End: 2018-01-01

## 2018-01-01 RX ORDER — SODIUM CHLORIDE, SODIUM LACTATE, POTASSIUM CHLORIDE, CALCIUM CHLORIDE 600; 310; 30; 20 MG/100ML; MG/100ML; MG/100ML; MG/100ML
INJECTION, SOLUTION INTRAVENOUS CONTINUOUS
Status: DISCONTINUED | OUTPATIENT
Start: 2018-01-01 | End: 2018-01-01 | Stop reason: HOSPADM

## 2018-01-01 RX ORDER — ONDANSETRON 2 MG/ML
4 INJECTION INTRAMUSCULAR; INTRAVENOUS EVERY 30 MIN PRN
Status: DISCONTINUED | OUTPATIENT
Start: 2018-01-01 | End: 2018-01-01 | Stop reason: HOSPADM

## 2018-01-01 RX ORDER — FUROSEMIDE 10 MG/ML
20 INJECTION INTRAMUSCULAR; INTRAVENOUS ONCE
Status: COMPLETED | OUTPATIENT
Start: 2018-01-01 | End: 2018-01-01

## 2018-01-01 RX ORDER — LIDOCAINE HYDROCHLORIDE 5 MG/ML
1 INJECTION, SOLUTION INFILTRATION; PERINEURAL ONCE
Status: DISPENSED | OUTPATIENT
Start: 2018-01-01

## 2018-01-01 RX ORDER — NITROGLYCERIN 0.4 MG/1
0.4 TABLET SUBLINGUAL EVERY 5 MIN PRN
Status: DISCONTINUED | OUTPATIENT
Start: 2018-01-01 | End: 2018-01-01 | Stop reason: HOSPADM

## 2018-01-01 RX ORDER — LORAZEPAM 2 MG/ML
2 INJECTION INTRAMUSCULAR ONCE
Status: COMPLETED | OUTPATIENT
Start: 2018-01-01 | End: 2018-01-01

## 2018-01-01 RX ORDER — SODIUM CHLORIDE 9 MG/ML
1000 INJECTION, SOLUTION INTRAVENOUS CONTINUOUS
Status: DISCONTINUED | OUTPATIENT
Start: 2018-01-01 | End: 2018-01-01

## 2018-01-01 RX ORDER — LABETALOL HYDROCHLORIDE 5 MG/ML
10-40 INJECTION, SOLUTION INTRAVENOUS EVERY 10 MIN PRN
Status: DISCONTINUED | OUTPATIENT
Start: 2018-01-01 | End: 2018-01-01 | Stop reason: HOSPADM

## 2018-01-01 RX ORDER — MAGNESIUM SULFATE HEPTAHYDRATE 40 MG/ML
2 INJECTION, SOLUTION INTRAVENOUS DAILY PRN
Status: DISCONTINUED | OUTPATIENT
Start: 2018-01-01 | End: 2018-01-01 | Stop reason: HOSPADM

## 2018-01-01 RX ORDER — FENTANYL CITRATE 50 UG/ML
INJECTION, SOLUTION INTRAMUSCULAR; INTRAVENOUS PRN
Status: DISCONTINUED | OUTPATIENT
Start: 2018-01-01 | End: 2018-01-01

## 2018-01-01 RX ORDER — SIMVASTATIN 20 MG
40 TABLET ORAL AT BEDTIME
Status: DISCONTINUED | OUTPATIENT
Start: 2018-01-01 | End: 2018-01-01 | Stop reason: HOSPADM

## 2018-01-01 RX ORDER — PROPOFOL 10 MG/ML
5-75 INJECTION, EMULSION INTRAVENOUS CONTINUOUS
Status: DISCONTINUED | OUTPATIENT
Start: 2018-01-01 | End: 2018-01-01 | Stop reason: HOSPADM

## 2018-01-01 RX ORDER — LORAZEPAM 2 MG/ML
INJECTION INTRAMUSCULAR
Status: COMPLETED
Start: 2018-01-01 | End: 2018-01-01

## 2018-01-01 RX ORDER — ROSUVASTATIN CALCIUM 10 MG/1
10 TABLET, COATED ORAL DAILY
Status: DISCONTINUED | OUTPATIENT
Start: 2018-01-01 | End: 2018-01-01

## 2018-01-01 RX ORDER — MAGNESIUM SULFATE HEPTAHYDRATE 40 MG/ML
4 INJECTION, SOLUTION INTRAVENOUS EVERY 4 HOURS PRN
Status: DISCONTINUED | OUTPATIENT
Start: 2018-01-01 | End: 2018-01-01 | Stop reason: HOSPADM

## 2018-01-01 RX ORDER — ACETAMINOPHEN 325 MG/1
650 TABLET ORAL EVERY 4 HOURS PRN
Status: DISCONTINUED | OUTPATIENT
Start: 2018-01-01 | End: 2018-01-01

## 2018-01-01 RX ORDER — LORAZEPAM 2 MG/ML
1-2 INJECTION INTRAMUSCULAR EVERY 10 MIN PRN
Status: DISCONTINUED | OUTPATIENT
Start: 2018-01-01 | End: 2018-01-01 | Stop reason: HOSPADM

## 2018-01-01 RX ORDER — DEXTROSE MONOHYDRATE 25 G/50ML
25-50 INJECTION, SOLUTION INTRAVENOUS
Status: DISCONTINUED | OUTPATIENT
Start: 2018-01-01 | End: 2018-01-01 | Stop reason: HOSPADM

## 2018-01-01 RX ORDER — CEFAZOLIN SODIUM 1 G/3ML
INJECTION, POWDER, FOR SOLUTION INTRAMUSCULAR; INTRAVENOUS PRN
Status: DISCONTINUED | OUTPATIENT
Start: 2018-01-01 | End: 2018-01-01

## 2018-01-01 RX ORDER — SODIUM CHLORIDE, SODIUM LACTATE, POTASSIUM CHLORIDE, CALCIUM CHLORIDE 600; 310; 30; 20 MG/100ML; MG/100ML; MG/100ML; MG/100ML
INJECTION, SOLUTION INTRAVENOUS CONTINUOUS PRN
Status: DISCONTINUED | OUTPATIENT
Start: 2018-01-01 | End: 2018-01-01

## 2018-01-01 RX ORDER — HYDRALAZINE HYDROCHLORIDE 20 MG/ML
10-20 INJECTION INTRAMUSCULAR; INTRAVENOUS EVERY 30 MIN PRN
Status: DISCONTINUED | OUTPATIENT
Start: 2018-01-01 | End: 2018-01-01 | Stop reason: HOSPADM

## 2018-01-01 RX ORDER — SODIUM CHLORIDE 9 MG/ML
INJECTION, SOLUTION INTRAVENOUS CONTINUOUS PRN
Status: DISCONTINUED | OUTPATIENT
Start: 2018-01-01 | End: 2018-01-01

## 2018-01-01 RX ORDER — ASPIRIN 81 MG/1
81 TABLET ORAL DAILY
Status: DISCONTINUED | OUTPATIENT
Start: 2018-01-01 | End: 2018-01-01 | Stop reason: HOSPADM

## 2018-01-01 RX ORDER — IOPAMIDOL 755 MG/ML
51 INJECTION, SOLUTION INTRAVASCULAR ONCE
Status: COMPLETED | OUTPATIENT
Start: 2018-01-01 | End: 2018-01-01

## 2018-01-01 RX ORDER — LIDOCAINE HYDROCHLORIDE 20 MG/ML
INJECTION, SOLUTION INFILTRATION; PERINEURAL PRN
Status: DISCONTINUED | OUTPATIENT
Start: 2018-01-01 | End: 2018-01-01

## 2018-01-01 RX ORDER — CALCIUM CARBONATE 500(1250)
1 TABLET ORAL 2 TIMES DAILY WITH MEALS
Status: DISCONTINUED | OUTPATIENT
Start: 2018-01-01 | End: 2018-01-01

## 2018-01-01 RX ORDER — MAGNESIUM SULFATE HEPTAHYDRATE 40 MG/ML
2 INJECTION, SOLUTION INTRAVENOUS EVERY 6 HOURS PRN
Status: DISCONTINUED | OUTPATIENT
Start: 2018-01-01 | End: 2018-01-01 | Stop reason: HOSPADM

## 2018-01-01 RX ORDER — BUPIVACAINE HYDROCHLORIDE AND EPINEPHRINE 5; 5 MG/ML; UG/ML
INJECTION, SOLUTION PERINEURAL PRN
Status: DISCONTINUED | OUTPATIENT
Start: 2018-01-01 | End: 2018-01-01 | Stop reason: HOSPADM

## 2018-01-01 RX ORDER — POTASSIUM CHLORIDE 750 MG/1
20-40 TABLET, EXTENDED RELEASE ORAL
Status: DISCONTINUED | OUTPATIENT
Start: 2018-01-01 | End: 2018-01-01

## 2018-01-01 RX ORDER — PREDNISONE 5 MG/1
5 TABLET ORAL 2 TIMES DAILY
Status: DISCONTINUED | OUTPATIENT
Start: 2018-01-01 | End: 2018-01-01 | Stop reason: HOSPADM

## 2018-01-01 RX ORDER — POTASSIUM CHLORIDE 29.8 MG/ML
20 INJECTION INTRAVENOUS EVERY 6 HOURS PRN
Status: DISCONTINUED | OUTPATIENT
Start: 2018-01-01 | End: 2018-01-01 | Stop reason: HOSPADM

## 2018-01-01 RX ORDER — HYDROMORPHONE HYDROCHLORIDE 1 MG/ML
INJECTION, SOLUTION INTRAMUSCULAR; INTRAVENOUS; SUBCUTANEOUS
Status: COMPLETED
Start: 2018-01-01 | End: 2018-01-01

## 2018-01-01 RX ORDER — MIDAZOLAM (PF) 1 MG/ML IN 0.9 % SODIUM CHLORIDE INTRAVENOUS SOLUTION
1-8 CONTINUOUS
Status: DISCONTINUED | OUTPATIENT
Start: 2018-01-01 | End: 2018-01-01

## 2018-01-01 RX ORDER — AMOXICILLIN 250 MG
1 CAPSULE ORAL AT BEDTIME
Status: DISCONTINUED | OUTPATIENT
Start: 2018-01-01 | End: 2018-01-01 | Stop reason: ALTCHOICE

## 2018-01-01 RX ORDER — ACETAMINOPHEN 650 MG/1
650 SUPPOSITORY RECTAL EVERY 4 HOURS PRN
Status: DISCONTINUED | OUTPATIENT
Start: 2018-01-01 | End: 2018-01-01 | Stop reason: HOSPADM

## 2018-01-01 RX ORDER — ROSUVASTATIN CALCIUM 10 MG/1
10 TABLET, COATED ORAL DAILY
Qty: 90 TABLET | Refills: 1 | Status: ON HOLD | OUTPATIENT
Start: 2018-01-01 | End: 2018-01-01

## 2018-01-01 RX ORDER — PROPOFOL 10 MG/ML
5-75 INJECTION, EMULSION INTRAVENOUS CONTINUOUS
Status: DISCONTINUED | OUTPATIENT
Start: 2018-01-01 | End: 2018-01-01

## 2018-01-01 RX ORDER — POTASSIUM CHLORIDE 1.5 G/1.58G
20-40 POWDER, FOR SOLUTION ORAL
Status: DISCONTINUED | OUTPATIENT
Start: 2018-01-01 | End: 2018-01-01

## 2018-01-01 RX ORDER — CALCIUM CARBONATE 500(1250)
1250 TABLET ORAL 2 TIMES DAILY WITH MEALS
Status: DISCONTINUED | OUTPATIENT
Start: 2018-01-01 | End: 2018-01-01 | Stop reason: HOSPADM

## 2018-01-01 RX ORDER — VECURONIUM BROMIDE 1 MG/ML
INJECTION, POWDER, LYOPHILIZED, FOR SOLUTION INTRAVENOUS
Status: COMPLETED
Start: 2018-01-01 | End: 2018-01-01

## 2018-01-01 RX ORDER — VECURONIUM BROMIDE 1 MG/ML
10 INJECTION, POWDER, LYOPHILIZED, FOR SOLUTION INTRAVENOUS ONCE
Status: COMPLETED | OUTPATIENT
Start: 2018-01-01 | End: 2018-01-01

## 2018-01-01 RX ORDER — EPHEDRINE SULFATE 50 MG/ML
INJECTION, SOLUTION INTRAMUSCULAR; INTRAVENOUS; SUBCUTANEOUS PRN
Status: DISCONTINUED | OUTPATIENT
Start: 2018-01-01 | End: 2018-01-01

## 2018-01-01 RX ORDER — PIPERACILLIN SODIUM, TAZOBACTAM SODIUM 4; .5 G/20ML; G/20ML
4.5 INJECTION, POWDER, LYOPHILIZED, FOR SOLUTION INTRAVENOUS EVERY 6 HOURS
Status: DISCONTINUED | OUTPATIENT
Start: 2018-01-01 | End: 2018-01-01

## 2018-01-01 RX ADMIN — LEVETIRACETAM 1000 MG: 10 INJECTION INTRAVENOUS at 09:02

## 2018-01-01 RX ADMIN — DOCUSATE SODIUM 50 MG: 50 LIQUID ORAL at 21:58

## 2018-01-01 RX ADMIN — PIPERACILLIN SODIUM,TAZOBACTAM SODIUM 4.5 G: 4; .5 INJECTION, POWDER, FOR SOLUTION INTRAVENOUS at 00:07

## 2018-01-01 RX ADMIN — LEVETIRACETAM 1000 MG: 500 TABLET, FILM COATED ORAL at 08:00

## 2018-01-01 RX ADMIN — SENNOSIDES A AND B 5 ML: 415.36 LIQUID ORAL at 21:58

## 2018-01-01 RX ADMIN — PREDNISONE 5 MG: 5 TABLET ORAL at 11:58

## 2018-01-01 RX ADMIN — HYDROMORPHONE HYDROCHLORIDE 0.4 MG: 1 INJECTION, SOLUTION INTRAMUSCULAR; INTRAVENOUS; SUBCUTANEOUS at 23:21

## 2018-01-01 RX ADMIN — SODIUM BICARBONATE 25 MEQ/HR: 84 INJECTION, SOLUTION INTRAVENOUS at 20:37

## 2018-01-01 RX ADMIN — LEVETIRACETAM 1000 MG: 500 TABLET, FILM COATED ORAL at 07:46

## 2018-01-01 RX ADMIN — SODIUM CHLORIDE: 9 INJECTION, SOLUTION INTRAVENOUS at 12:34

## 2018-01-01 RX ADMIN — INSULIN ASPART 1 UNITS: 100 INJECTION, SOLUTION INTRAVENOUS; SUBCUTANEOUS at 16:07

## 2018-01-01 RX ADMIN — VITAMIN D, TAB 1000IU (100/BT) 2000 UNITS: 25 TAB at 08:06

## 2018-01-01 RX ADMIN — HYDROMORPHONE HYDROCHLORIDE 0.4 MG: 1 INJECTION, SOLUTION INTRAMUSCULAR; INTRAVENOUS; SUBCUTANEOUS at 10:56

## 2018-01-01 RX ADMIN — HYDROMORPHONE HYDROCHLORIDE 0.4 MG: 1 INJECTION, SOLUTION INTRAMUSCULAR; INTRAVENOUS; SUBCUTANEOUS at 19:19

## 2018-01-01 RX ADMIN — SODIUM CHLORIDE 500 ML: 9 INJECTION, SOLUTION INTRAVENOUS at 16:04

## 2018-01-01 RX ADMIN — CALCIUM 500 MG: 500 TABLET ORAL at 08:00

## 2018-01-01 RX ADMIN — IOPAMIDOL 86 ML: 755 INJECTION, SOLUTION INTRAVENOUS at 15:32

## 2018-01-01 RX ADMIN — PREDNISONE 5 MG: 5 TABLET ORAL at 23:48

## 2018-01-01 RX ADMIN — CALCIUM CHLORIDE 1 G: 100 INJECTION, SOLUTION INTRAVENOUS at 14:06

## 2018-01-01 RX ADMIN — LORAZEPAM 2 MG: 2 INJECTION INTRAMUSCULAR; INTRAVENOUS at 15:08

## 2018-01-01 RX ADMIN — SODIUM CHLORIDE: 9 INJECTION, SOLUTION INTRAVENOUS at 09:06

## 2018-01-01 RX ADMIN — SODIUM CHLORIDE: 9 INJECTION, SOLUTION INTRAVENOUS at 20:43

## 2018-01-01 RX ADMIN — Medication 10 MG: at 11:32

## 2018-01-01 RX ADMIN — HYDROMORPHONE HYDROCHLORIDE 0.5 MG: 1 INJECTION, SOLUTION INTRAMUSCULAR; INTRAVENOUS; SUBCUTANEOUS at 19:42

## 2018-01-01 RX ADMIN — CALCIUM GLUCONATE 2 G: 98 INJECTION, SOLUTION INTRAVENOUS at 08:00

## 2018-01-01 RX ADMIN — CALCIUM 500 MG: 500 TABLET ORAL at 17:52

## 2018-01-01 RX ADMIN — PIPERACILLIN SODIUM,TAZOBACTAM SODIUM 4.5 G: 4; .5 INJECTION, POWDER, FOR SOLUTION INTRAVENOUS at 16:13

## 2018-01-01 RX ADMIN — Medication 5 MG: at 14:05

## 2018-01-01 RX ADMIN — FUROSEMIDE 20 MG: 10 INJECTION, SOLUTION INTRAVENOUS at 12:55

## 2018-01-01 RX ADMIN — PROPOFOL 40 MCG/KG/MIN: 10 INJECTION, EMULSION INTRAVENOUS at 23:29

## 2018-01-01 RX ADMIN — POTASSIUM CHLORIDE 40 MEQ: 1.5 POWDER, FOR SOLUTION ORAL at 13:42

## 2018-01-01 RX ADMIN — VANCOMYCIN HYDROCHLORIDE 1250 MG: 10 INJECTION, POWDER, LYOPHILIZED, FOR SOLUTION INTRAVENOUS at 13:10

## 2018-01-01 RX ADMIN — ACETAMINOPHEN 650 MG: 325 TABLET, FILM COATED ORAL at 16:59

## 2018-01-01 RX ADMIN — MIDAZOLAM 2 MG: 1 INJECTION INTRAMUSCULAR; INTRAVENOUS at 14:45

## 2018-01-01 RX ADMIN — PIPERACILLIN SODIUM,TAZOBACTAM SODIUM 4.5 G: 4; .5 INJECTION, POWDER, FOR SOLUTION INTRAVENOUS at 16:39

## 2018-01-01 RX ADMIN — SIMVASTATIN 40 MG: 20 TABLET, FILM COATED ORAL at 23:47

## 2018-01-01 RX ADMIN — CALCIUM GLUCONATE 1 G: 98 INJECTION, SOLUTION INTRAVENOUS at 06:34

## 2018-01-01 RX ADMIN — SODIUM CHLORIDE: 9 INJECTION, SOLUTION INTRAVENOUS at 13:58

## 2018-01-01 RX ADMIN — POTASSIUM & SODIUM PHOSPHATES POWDER PACK 280-160-250 MG 1 PACKET: 280-160-250 PACK at 08:06

## 2018-01-01 RX ADMIN — PIPERACILLIN SODIUM,TAZOBACTAM SODIUM 4.5 G: 4; .5 INJECTION, POWDER, FOR SOLUTION INTRAVENOUS at 05:35

## 2018-01-01 RX ADMIN — FUROSEMIDE 40 MG: 10 INJECTION, SOLUTION INTRAVENOUS at 14:35

## 2018-01-01 RX ADMIN — DENOSUMAB 120 MG: 120 INJECTION SUBCUTANEOUS at 10:35

## 2018-01-01 RX ADMIN — VECURONIUM BROMIDE 10 MG: 1 INJECTION, POWDER, LYOPHILIZED, FOR SOLUTION INTRAVENOUS at 14:41

## 2018-01-01 RX ADMIN — POTASSIUM & SODIUM PHOSPHATES POWDER PACK 280-160-250 MG 1 PACKET: 280-160-250 PACK at 20:05

## 2018-01-01 RX ADMIN — MAGNESIUM SULFATE HEPTAHYDRATE 2 G: 40 INJECTION, SOLUTION INTRAVENOUS at 12:56

## 2018-01-01 RX ADMIN — POTASSIUM & SODIUM PHOSPHATES POWDER PACK 280-160-250 MG 1 PACKET: 280-160-250 PACK at 09:12

## 2018-01-01 RX ADMIN — AMIODARONE HYDROCHLORIDE 150 MG: 1.5 INJECTION, SOLUTION INTRAVENOUS at 09:27

## 2018-01-01 RX ADMIN — POTASSIUM PHOSPHATE, MONOBASIC AND POTASSIUM PHOSPHATE, DIBASIC 20 MMOL: 224; 236 INJECTION, SOLUTION INTRAVENOUS at 11:17

## 2018-01-01 RX ADMIN — DENOSUMAB 120 MG: 120 INJECTION SUBCUTANEOUS at 10:16

## 2018-01-01 RX ADMIN — PIPERACILLIN SODIUM,TAZOBACTAM SODIUM 4.5 G: 4; .5 INJECTION, POWDER, FOR SOLUTION INTRAVENOUS at 10:51

## 2018-01-01 RX ADMIN — PHENYLEPHRINE HYDROCHLORIDE 200 MCG: 10 INJECTION, SOLUTION INTRAMUSCULAR; INTRAVENOUS; SUBCUTANEOUS at 14:06

## 2018-01-01 RX ADMIN — DEXMEDETOMIDINE HYDROCHLORIDE 0.3 MCG/KG/HR: 100 INJECTION, SOLUTION INTRAVENOUS at 17:05

## 2018-01-01 RX ADMIN — SODIUM CHLORIDE 500 ML: 9 INJECTION, SOLUTION INTRAVENOUS at 17:24

## 2018-01-01 RX ADMIN — NOREPINEPHRINE BITARTRATE 0.05 MCG/KG/MIN: 1 INJECTION INTRAVENOUS at 18:06

## 2018-01-01 RX ADMIN — MIRTAZAPINE 15 MG: 15 TABLET, ORALLY DISINTEGRATING ORAL at 00:12

## 2018-01-01 RX ADMIN — PIPERACILLIN SODIUM,TAZOBACTAM SODIUM 4.5 G: 4; .5 INJECTION, POWDER, FOR SOLUTION INTRAVENOUS at 04:52

## 2018-01-01 RX ADMIN — IOPAMIDOL 51 ML: 755 INJECTION, SOLUTION INTRAVENOUS at 22:27

## 2018-01-01 RX ADMIN — FUROSEMIDE 20 MG: 10 INJECTION, SOLUTION INTRAVENOUS at 13:26

## 2018-01-01 RX ADMIN — DEXMEDETOMIDINE HYDROCHLORIDE 0.2 MCG/KG/HR: 100 INJECTION, SOLUTION INTRAVENOUS at 12:51

## 2018-01-01 RX ADMIN — POTASSIUM & SODIUM PHOSPHATES POWDER PACK 280-160-250 MG 1 PACKET: 280-160-250 PACK at 20:53

## 2018-01-01 RX ADMIN — SODIUM CHLORIDE 500 ML: 9 INJECTION, SOLUTION INTRAVENOUS at 07:21

## 2018-01-01 RX ADMIN — PIPERACILLIN SODIUM,TAZOBACTAM SODIUM 4.5 G: 4; .5 INJECTION, POWDER, FOR SOLUTION INTRAVENOUS at 11:47

## 2018-01-01 RX ADMIN — DEXAMETHASONE SODIUM PHOSPHATE 4 MG: 4 INJECTION, SOLUTION INTRA-ARTICULAR; INTRALESIONAL; INTRAMUSCULAR; INTRAVENOUS; SOFT TISSUE at 13:08

## 2018-01-01 RX ADMIN — HYDROMORPHONE HYDROCHLORIDE 0.4 MG: 1 INJECTION, SOLUTION INTRAMUSCULAR; INTRAVENOUS; SUBCUTANEOUS at 05:36

## 2018-01-01 RX ADMIN — Medication 10 MG: at 08:01

## 2018-01-01 RX ADMIN — SODIUM CHLORIDE, POTASSIUM CHLORIDE, SODIUM LACTATE AND CALCIUM CHLORIDE: 600; 310; 30; 20 INJECTION, SOLUTION INTRAVENOUS at 15:00

## 2018-01-01 RX ADMIN — PIPERACILLIN SODIUM,TAZOBACTAM SODIUM 4.5 G: 4; .5 INJECTION, POWDER, FOR SOLUTION INTRAVENOUS at 22:25

## 2018-01-01 RX ADMIN — LEUPROLIDE ACETATE 22.5 MG: KIT at 10:23

## 2018-01-01 RX ADMIN — SODIUM BICARBONATE 25 MEQ/HR: 84 INJECTION, SOLUTION INTRAVENOUS at 07:59

## 2018-01-01 RX ADMIN — FUROSEMIDE 40 MG: 10 INJECTION, SOLUTION INTRAVENOUS at 22:59

## 2018-01-01 RX ADMIN — HYDROMORPHONE HYDROCHLORIDE 0.2 MG: 1 INJECTION, SOLUTION INTRAMUSCULAR; INTRAVENOUS; SUBCUTANEOUS at 19:52

## 2018-01-01 RX ADMIN — FUROSEMIDE 40 MG: 10 INJECTION, SOLUTION INTRAVENOUS at 22:46

## 2018-01-01 RX ADMIN — LEVETIRACETAM 1000 MG: 10 INJECTION INTRAVENOUS at 20:57

## 2018-01-01 RX ADMIN — LEVETIRACETAM 1000 MG: 500 TABLET, FILM COATED ORAL at 10:21

## 2018-01-01 RX ADMIN — FENTANYL CITRATE 50 MCG/HR: 50 INJECTION, SOLUTION INTRAMUSCULAR; INTRAVENOUS at 13:30

## 2018-01-01 RX ADMIN — LEUPROLIDE ACETATE 22.5 MG: KIT at 10:39

## 2018-01-01 RX ADMIN — SODIUM CHLORIDE 1000 ML: 9 INJECTION, SOLUTION INTRAVENOUS at 06:46

## 2018-01-01 RX ADMIN — SODIUM CHLORIDE, POTASSIUM CHLORIDE, SODIUM LACTATE AND CALCIUM CHLORIDE 500 ML: 600; 310; 30; 20 INJECTION, SOLUTION INTRAVENOUS at 10:59

## 2018-01-01 RX ADMIN — SODIUM CHLORIDE 500 ML: 9 INJECTION, SOLUTION INTRAVENOUS at 09:15

## 2018-01-01 RX ADMIN — Medication 12.5 ML/KG/HR: at 13:20

## 2018-01-01 RX ADMIN — DEXTROSE AND SODIUM CHLORIDE: 5; 450 INJECTION, SOLUTION INTRAVENOUS at 10:59

## 2018-01-01 RX ADMIN — POTASSIUM & SODIUM PHOSPHATES POWDER PACK 280-160-250 MG 1 PACKET: 280-160-250 PACK at 11:16

## 2018-01-01 RX ADMIN — MAGNESIUM SULFATE HEPTAHYDRATE 2 G: 40 INJECTION, SOLUTION INTRAVENOUS at 05:25

## 2018-01-01 RX ADMIN — SODIUM CHLORIDE, PRESERVATIVE FREE 82 ML: 5 INJECTION INTRAVENOUS at 22:28

## 2018-01-01 RX ADMIN — POTASSIUM PHOSPHATE, MONOBASIC AND POTASSIUM PHOSPHATE, DIBASIC 15 MMOL: 224; 236 INJECTION, SOLUTION INTRAVENOUS at 12:18

## 2018-01-01 RX ADMIN — POTASSIUM & SODIUM PHOSPHATES POWDER PACK 280-160-250 MG 1 PACKET: 280-160-250 PACK at 12:22

## 2018-01-01 RX ADMIN — NOREPINEPHRINE BITARTRATE 0.24 MCG/KG/MIN: 1 INJECTION INTRAVENOUS at 17:03

## 2018-01-01 RX ADMIN — LEVETIRACETAM 1000 MG: 10 INJECTION INTRAVENOUS at 19:47

## 2018-01-01 RX ADMIN — HYDROMORPHONE HYDROCHLORIDE 0.4 MG: 1 INJECTION, SOLUTION INTRAMUSCULAR; INTRAVENOUS; SUBCUTANEOUS at 23:51

## 2018-01-01 RX ADMIN — LEVETIRACETAM 1000 MG: 500 TABLET, FILM COATED ORAL at 20:53

## 2018-01-01 RX ADMIN — LIDOCAINE HYDROCHLORIDE 5 ML: 20 SOLUTION ORAL; TOPICAL at 16:03

## 2018-01-01 RX ADMIN — ASPIRIN 81 MG: 81 TABLET, COATED ORAL at 11:58

## 2018-01-01 RX ADMIN — LEVETIRACETAM 1000 MG: 10 INJECTION INTRAVENOUS at 09:39

## 2018-01-01 RX ADMIN — Medication 12.5 ML/KG/HR: at 20:03

## 2018-01-01 RX ADMIN — POTASSIUM & SODIUM PHOSPHATES POWDER PACK 280-160-250 MG 1 PACKET: 280-160-250 PACK at 07:46

## 2018-01-01 RX ADMIN — LORAZEPAM 2 MG: 2 INJECTION INTRAMUSCULAR; INTRAVENOUS at 14:54

## 2018-01-01 RX ADMIN — LEVETIRACETAM 1000 MG: 500 TABLET, FILM COATED ORAL at 20:21

## 2018-01-01 RX ADMIN — PIPERACILLIN SODIUM,TAZOBACTAM SODIUM 4.5 G: 4; .5 INJECTION, POWDER, FOR SOLUTION INTRAVENOUS at 04:47

## 2018-01-01 RX ADMIN — PANTOPRAZOLE SODIUM 40 MG: 40 TABLET, DELAYED RELEASE ORAL at 08:01

## 2018-01-01 RX ADMIN — FENTANYL CITRATE 200 MCG: 50 INJECTION, SOLUTION INTRAMUSCULAR; INTRAVENOUS at 13:55

## 2018-01-01 RX ADMIN — VITAMIN D, TAB 1000IU (100/BT) 2000 UNITS: 25 TAB at 08:00

## 2018-01-01 RX ADMIN — PROPOFOL 50 MCG/KG/MIN: 10 INJECTION, EMULSION INTRAVENOUS at 16:00

## 2018-01-01 RX ADMIN — SODIUM CHLORIDE: 9 INJECTION, SOLUTION INTRAVENOUS at 07:45

## 2018-01-01 RX ADMIN — HYDROMORPHONE HYDROCHLORIDE 0.4 MG: 1 INJECTION, SOLUTION INTRAMUSCULAR; INTRAVENOUS; SUBCUTANEOUS at 02:10

## 2018-01-01 RX ADMIN — LEVETIRACETAM 1 G: 10 INJECTION INTRAVENOUS at 14:35

## 2018-01-01 RX ADMIN — PANTOPRAZOLE SODIUM 40 MG: 40 INJECTION, POWDER, FOR SOLUTION INTRAVENOUS at 09:12

## 2018-01-01 RX ADMIN — GADOBUTROL 7.5 ML: 604.72 INJECTION INTRAVENOUS at 12:10

## 2018-01-01 RX ADMIN — LEVETIRACETAM 1000 MG: 10 INJECTION INTRAVENOUS at 20:44

## 2018-01-01 RX ADMIN — Medication 10 MG: at 17:35

## 2018-01-01 RX ADMIN — CALCIUM 500 MG: 500 TABLET ORAL at 17:31

## 2018-01-01 RX ADMIN — Medication 12.5 ML/KG/HR: at 03:01

## 2018-01-01 RX ADMIN — INSULIN ASPART 1 UNITS: 100 INJECTION, SOLUTION INTRAVENOUS; SUBCUTANEOUS at 11:59

## 2018-01-01 RX ADMIN — MULTIVITAMIN 15 ML: LIQUID ORAL at 08:06

## 2018-01-01 RX ADMIN — POTASSIUM PHOSPHATE, MONOBASIC AND POTASSIUM PHOSPHATE, DIBASIC 20 MMOL: 224; 236 INJECTION, SOLUTION INTRAVENOUS at 09:02

## 2018-01-01 RX ADMIN — Medication 10 MG: at 23:00

## 2018-01-01 RX ADMIN — SODIUM BICARBONATE 25 MEQ/HR: 84 INJECTION, SOLUTION INTRAVENOUS at 07:45

## 2018-01-01 RX ADMIN — INSULIN ASPART 1 UNITS: 100 INJECTION, SOLUTION INTRAVENOUS; SUBCUTANEOUS at 07:49

## 2018-01-01 RX ADMIN — PHENYLEPHRINE HYDROCHLORIDE 200 MCG: 10 INJECTION, SOLUTION INTRAMUSCULAR; INTRAVENOUS; SUBCUTANEOUS at 14:04

## 2018-01-01 RX ADMIN — LEVETIRACETAM 1000 MG: 500 TABLET, FILM COATED ORAL at 20:05

## 2018-01-01 RX ADMIN — FUROSEMIDE 20 MG: 10 INJECTION, SOLUTION INTRAVENOUS at 11:16

## 2018-01-01 RX ADMIN — POTASSIUM CHLORIDE 20 MEQ: 400 INJECTION, SOLUTION INTRAVENOUS at 01:32

## 2018-01-01 RX ADMIN — SODIUM BICARBONATE: 84 INJECTION, SOLUTION INTRAVENOUS at 15:08

## 2018-01-01 RX ADMIN — SODIUM CHLORIDE: 9 INJECTION, SOLUTION INTRAVENOUS at 23:32

## 2018-01-01 RX ADMIN — MINERAL OIL AND WHITE PETROLATUM: 150; 830 OINTMENT OPHTHALMIC at 20:22

## 2018-01-01 RX ADMIN — SODIUM CHLORIDE: 9 INJECTION, SOLUTION INTRAVENOUS at 17:00

## 2018-01-01 RX ADMIN — PIPERACILLIN SODIUM,TAZOBACTAM SODIUM 4.5 G: 4; .5 INJECTION, POWDER, FOR SOLUTION INTRAVENOUS at 10:21

## 2018-01-01 RX ADMIN — Medication 12.5 ML/KG/HR: at 06:46

## 2018-01-01 RX ADMIN — PIPERACILLIN SODIUM,TAZOBACTAM SODIUM 4.5 G: 4; .5 INJECTION, POWDER, FOR SOLUTION INTRAVENOUS at 23:11

## 2018-01-01 RX ADMIN — SODIUM CHLORIDE 1000 ML: 9 INJECTION, SOLUTION INTRAVENOUS at 20:03

## 2018-01-01 RX ADMIN — VASOPRESSIN 4 UNITS/HR: 20 INJECTION INTRAVENOUS at 10:06

## 2018-01-01 RX ADMIN — NOREPINEPHRINE BITARTRATE 0.4 MCG/KG/MIN: 1 INJECTION INTRAVENOUS at 22:25

## 2018-01-01 RX ADMIN — MULTIVITAMIN 15 ML: LIQUID ORAL at 07:46

## 2018-01-01 RX ADMIN — HYDROMORPHONE HYDROCHLORIDE 0.4 MG: 1 INJECTION, SOLUTION INTRAMUSCULAR; INTRAVENOUS; SUBCUTANEOUS at 13:30

## 2018-01-01 RX ADMIN — CALCIUM 1250 MG: 500 TABLET ORAL at 11:58

## 2018-01-01 RX ADMIN — VANCOMYCIN HYDROCHLORIDE 1250 MG: 10 INJECTION, POWDER, LYOPHILIZED, FOR SOLUTION INTRAVENOUS at 18:23

## 2018-01-01 RX ADMIN — MULTIVITAMIN 15 ML: LIQUID ORAL at 09:12

## 2018-01-01 RX ADMIN — SODIUM CHLORIDE: 9 INJECTION, SOLUTION INTRAVENOUS at 20:04

## 2018-01-01 RX ADMIN — Medication 10 MG: at 09:12

## 2018-01-01 RX ADMIN — SENNOSIDES A AND B 5 ML: 415.36 LIQUID ORAL at 23:12

## 2018-01-01 RX ADMIN — SODIUM BICARBONATE 50 MEQ: 84 INJECTION, SOLUTION INTRAVENOUS at 17:31

## 2018-01-01 RX ADMIN — AMIODARONE HYDROCHLORIDE 150 MG: 1.5 INJECTION, SOLUTION INTRAVENOUS at 02:27

## 2018-01-01 RX ADMIN — DOCUSATE SODIUM 50 MG: 50 LIQUID ORAL at 23:11

## 2018-01-01 RX ADMIN — POTASSIUM & SODIUM PHOSPHATES POWDER PACK 280-160-250 MG 1 PACKET: 280-160-250 PACK at 16:59

## 2018-01-01 RX ADMIN — VITAMIN D, TAB 1000IU (100/BT) 2000 UNITS: 25 TAB at 11:57

## 2018-01-01 RX ADMIN — PHENYLEPHRINE HYDROCHLORIDE 200 MCG: 10 INJECTION, SOLUTION INTRAMUSCULAR; INTRAVENOUS; SUBCUTANEOUS at 14:08

## 2018-01-01 RX ADMIN — VITAMIN D, TAB 1000IU (100/BT) 2000 UNITS: 25 TAB at 09:09

## 2018-01-01 RX ADMIN — MULTIVITAMIN 15 ML: LIQUID ORAL at 08:01

## 2018-01-01 RX ADMIN — ROCURONIUM BROMIDE 10 MG: 10 INJECTION INTRAVENOUS at 15:13

## 2018-01-01 RX ADMIN — MINERAL OIL AND WHITE PETROLATUM: 150; 830 OINTMENT OPHTHALMIC at 16:17

## 2018-01-01 RX ADMIN — NOREPINEPHRINE BITARTRATE 0.18 MCG/KG/MIN: 1 INJECTION INTRAVENOUS at 17:40

## 2018-01-01 RX ADMIN — Medication 10 MG: at 18:21

## 2018-01-01 RX ADMIN — MULTIVITAMIN 15 ML: LIQUID ORAL at 16:58

## 2018-01-01 RX ADMIN — CALCIUM 500 MG: 500 TABLET ORAL at 09:12

## 2018-01-01 RX ADMIN — POTASSIUM PHOSPHATE, MONOBASIC AND POTASSIUM PHOSPHATE, DIBASIC 15 MMOL: 224; 236 INJECTION, SOLUTION INTRAVENOUS at 13:10

## 2018-01-01 RX ADMIN — Medication: at 06:46

## 2018-01-01 RX ADMIN — SODIUM CHLORIDE: 9 INJECTION, SOLUTION INTRAVENOUS at 01:20

## 2018-01-01 RX ADMIN — Medication 10 MG: at 08:07

## 2018-01-01 RX ADMIN — PROPOFOL 100 MG: 10 INJECTION, EMULSION INTRAVENOUS at 13:55

## 2018-01-01 RX ADMIN — PIPERACILLIN SODIUM,TAZOBACTAM SODIUM 4.5 G: 4; .5 INJECTION, POWDER, FOR SOLUTION INTRAVENOUS at 17:01

## 2018-01-01 RX ADMIN — Medication 4 MG/HR: at 15:53

## 2018-01-01 RX ADMIN — POTASSIUM PHOSPHATE, MONOBASIC AND POTASSIUM PHOSPHATE, DIBASIC 15 MMOL: 224; 236 INJECTION, SOLUTION INTRAVENOUS at 09:04

## 2018-01-01 RX ADMIN — VITAMIN D, TAB 1000IU (100/BT) 2000 UNITS: 25 TAB at 09:12

## 2018-01-01 RX ADMIN — SODIUM CHLORIDE 1000 ML: 9 INJECTION, SOLUTION INTRAVENOUS at 16:13

## 2018-01-01 RX ADMIN — POTASSIUM & SODIUM PHOSPHATES POWDER PACK 280-160-250 MG 1 PACKET: 280-160-250 PACK at 17:10

## 2018-01-01 RX ADMIN — POTASSIUM CHLORIDE 40 MEQ: 1.5 POWDER, FOR SOLUTION ORAL at 05:18

## 2018-01-01 RX ADMIN — PANTOPRAZOLE SODIUM 40 MG: 40 TABLET, DELAYED RELEASE ORAL at 07:47

## 2018-01-01 RX ADMIN — VITAMIN D, TAB 1000IU (100/BT) 2000 UNITS: 25 TAB at 07:46

## 2018-01-01 RX ADMIN — POTASSIUM & SODIUM PHOSPHATES POWDER PACK 280-160-250 MG 1 PACKET: 280-160-250 PACK at 12:55

## 2018-01-01 RX ADMIN — HYDROMORPHONE HYDROCHLORIDE 0.4 MG: 1 INJECTION, SOLUTION INTRAMUSCULAR; INTRAVENOUS; SUBCUTANEOUS at 09:21

## 2018-01-01 RX ADMIN — HYDROMORPHONE HYDROCHLORIDE 0.4 MG: 1 INJECTION, SOLUTION INTRAMUSCULAR; INTRAVENOUS; SUBCUTANEOUS at 05:09

## 2018-01-01 RX ADMIN — MINERAL OIL AND WHITE PETROLATUM: 150; 830 OINTMENT OPHTHALMIC at 08:25

## 2018-01-01 RX ADMIN — VANCOMYCIN HYDROCHLORIDE 1250 MG: 10 INJECTION, POWDER, LYOPHILIZED, FOR SOLUTION INTRAVENOUS at 17:56

## 2018-01-01 RX ADMIN — LORAZEPAM 2 MG: 2 INJECTION INTRAMUSCULAR; INTRAVENOUS at 15:10

## 2018-01-01 RX ADMIN — Medication 10 MG: at 07:47

## 2018-01-01 RX ADMIN — VANCOMYCIN HYDROCHLORIDE 1500 MG: 10 INJECTION, POWDER, LYOPHILIZED, FOR SOLUTION INTRAVENOUS at 11:16

## 2018-01-01 RX ADMIN — HYDROMORPHONE HYDROCHLORIDE 0.4 MG: 1 INJECTION, SOLUTION INTRAMUSCULAR; INTRAVENOUS; SUBCUTANEOUS at 04:25

## 2018-01-01 RX ADMIN — SODIUM CHLORIDE 1000 ML: 9 INJECTION, SOLUTION INTRAVENOUS at 00:20

## 2018-01-01 RX ADMIN — Medication 100 MG: at 13:55

## 2018-01-01 RX ADMIN — PIPERACILLIN SODIUM,TAZOBACTAM SODIUM 4.5 G: 4; .5 INJECTION, POWDER, FOR SOLUTION INTRAVENOUS at 18:00

## 2018-01-01 RX ADMIN — SODIUM CHLORIDE: 9 INJECTION, SOLUTION INTRAVENOUS at 13:11

## 2018-01-01 RX ADMIN — PHENYLEPHRINE HYDROCHLORIDE 0.5 MCG/KG/MIN: 10 INJECTION INTRAVENOUS at 14:38

## 2018-01-01 RX ADMIN — CALCIUM 500 MG: 500 TABLET ORAL at 18:12

## 2018-01-01 RX ADMIN — HYDROMORPHONE HYDROCHLORIDE 0.4 MG: 1 INJECTION, SOLUTION INTRAMUSCULAR; INTRAVENOUS; SUBCUTANEOUS at 08:00

## 2018-01-01 RX ADMIN — PHYTONADIONE 10 MG: 10 INJECTION, EMULSION INTRAMUSCULAR; INTRAVENOUS; SUBCUTANEOUS at 07:07

## 2018-01-01 RX ADMIN — PIPERACILLIN SODIUM,TAZOBACTAM SODIUM 4.5 G: 4; .5 INJECTION, POWDER, FOR SOLUTION INTRAVENOUS at 23:33

## 2018-01-01 RX ADMIN — ACETAMINOPHEN 650 MG: 325 TABLET, FILM COATED ORAL at 17:03

## 2018-01-01 RX ADMIN — CALCIUM 500 MG: 500 TABLET ORAL at 09:08

## 2018-01-01 RX ADMIN — CALCIUM 500 MG: 500 TABLET ORAL at 08:06

## 2018-01-01 RX ADMIN — MINERAL OIL AND WHITE PETROLATUM: 150; 830 OINTMENT OPHTHALMIC at 11:31

## 2018-01-01 RX ADMIN — PIPERACILLIN SODIUM,TAZOBACTAM SODIUM 4.5 G: 4; .5 INJECTION, POWDER, FOR SOLUTION INTRAVENOUS at 10:45

## 2018-01-01 RX ADMIN — CALCIUM 500 MG: 500 TABLET ORAL at 17:10

## 2018-01-01 RX ADMIN — ROCURONIUM BROMIDE 50 MG: 10 INJECTION INTRAVENOUS at 13:59

## 2018-01-01 RX ADMIN — PIPERACILLIN SODIUM,TAZOBACTAM SODIUM 4.5 G: 4; .5 INJECTION, POWDER, FOR SOLUTION INTRAVENOUS at 04:32

## 2018-01-01 RX ADMIN — SUGAMMADEX 150 MG: 100 INJECTION, SOLUTION INTRAVENOUS at 16:06

## 2018-01-01 RX ADMIN — DEXTROSE MONOHYDRATE: 100 INJECTION, SOLUTION INTRAVENOUS at 08:24

## 2018-01-01 RX ADMIN — CALCIUM 500 MG: 500 TABLET ORAL at 07:46

## 2018-01-01 RX ADMIN — VANCOMYCIN HYDROCHLORIDE 1500 MG: 10 INJECTION, POWDER, LYOPHILIZED, FOR SOLUTION INTRAVENOUS at 18:31

## 2018-01-01 RX ADMIN — VECURONIUM BROMIDE 0.8 MCG/KG/MIN: 1 INJECTION, POWDER, LYOPHILIZED, FOR SOLUTION INTRAVENOUS at 15:58

## 2018-01-01 RX ADMIN — LIDOCAINE HYDROCHLORIDE 100 MG: 20 INJECTION, SOLUTION INFILTRATION; PERINEURAL at 13:55

## 2018-01-01 RX ADMIN — POTASSIUM & SODIUM PHOSPHATES POWDER PACK 280-160-250 MG 1 PACKET: 280-160-250 PACK at 17:02

## 2018-01-01 RX ADMIN — CEFAZOLIN 2 G: 1 INJECTION, POWDER, FOR SOLUTION INTRAMUSCULAR; INTRAVENOUS at 14:30

## 2018-01-01 RX ADMIN — Medication: at 20:03

## 2018-01-01 RX ADMIN — HYDROMORPHONE HYDROCHLORIDE 0.4 MG: 1 INJECTION, SOLUTION INTRAMUSCULAR; INTRAVENOUS; SUBCUTANEOUS at 02:47

## 2018-01-01 RX ADMIN — ASPIRIN 81 MG: 81 TABLET, COATED ORAL at 09:08

## 2018-01-01 RX ADMIN — DIATRIZOATE MEGLUMINE AND DIATRIZOATE SODIUM 20 ML: 660; 100 SOLUTION ORAL; RECTAL at 18:59

## 2018-01-01 ASSESSMENT — VISUAL ACUITY
OU: NORMAL ACUITY
OU: NOT TESTABLE
OU: NORMAL ACUITY
OU: NOT TESTABLE
OU: NOT TESTABLE
OU: NORMAL ACUITY
OU: NORMAL ACUITY
OU: NOT TESTABLE
OU: NORMAL ACUITY
OU: NORMAL ACUITY
OU: NOT TESTABLE
OU: NORMAL ACUITY
OU: NOT TESTABLE
OU: NORMAL ACUITY
OU: NOT TESTABLE
OU: NORMAL ACUITY
OU: NOT TESTABLE
OU: NOT TESTABLE
OU: NORMAL ACUITY

## 2018-01-01 ASSESSMENT — ENCOUNTER SYMPTOMS
LIGHT-HEADEDNESS: 0
NECK PAIN: 1
VOMITING: 0
DIAPHORESIS: 0
SHORTNESS OF BREATH: 0
NAUSEA: 0
BLOOD IN STOOL: 0
WEAKNESS: 1
DIZZINESS: 0

## 2018-01-01 ASSESSMENT — ACTIVITIES OF DAILY LIVING (ADL)
DRESS: 0-->INDEPENDENT
BATHING: 2 - ASSISTIVE PERSON
SWALLOWING: 0 - SWALLOWS FOODS/LIQUIDS WITHOUT DIFFICULTY
EATING: 0 - INDEPENDENT
COMMUNICATION: 0 - UNDERSTANDS/COMMUNICATES WITHOUT DIFFICULTY
DRESS: 2 - ASSISTIVE PERSON
FALL_HISTORY_WITHIN_LAST_SIX_MONTHS: NO
WHICH_OF_THE_ABOVE_FUNCTIONAL_RISKS_HAD_A_RECENT_ONSET_OR_CHANGE?: AMBULATION;TRANSFERRING
TRANSFERRING: 0-->INDEPENDENT
CHANGE_IN_FUNCTIONAL_STATUS_SINCE_ONSET_OF_CURRENT_ILLNESS/INJURY: YES
TRANSFERRING: 2 - ASSISTIVE PERSON
COGNITION: 0 - NO COGNITION ISSUES REPORTED
TOILETING: 0 - INDEPENDENT
BATHING: 0-->INDEPENDENT
RETIRED_COMMUNICATION: 0-->UNDERSTANDS/COMMUNICATES WITHOUT DIFFICULTY
TOILETING: 0-->INDEPENDENT
RETIRED_EATING: 0-->INDEPENDENT
AMBULATION: 0-->INDEPENDENT
SWALLOWING: 0-->SWALLOWS FOODS/LIQUIDS WITHOUT DIFFICULTY
AMBULATION: 2 - ASSISTIVE PERSON

## 2018-01-01 ASSESSMENT — PAIN SCALES - GENERAL
PAINLEVEL: MILD PAIN (3)
PAINLEVEL: NO PAIN (0)
PAINLEVEL: NO PAIN (0)
PAINLEVEL: MILD PAIN (3)
PAINLEVEL: MILD PAIN (3)
PAINLEVEL: MODERATE PAIN (4)
PAINLEVEL: MILD PAIN (3)
PAINLEVEL: NO PAIN (0)
PAINLEVEL: MILD PAIN (3)
PAINLEVEL: NO PAIN (0)

## 2018-01-01 ASSESSMENT — PATIENT HEALTH QUESTIONNAIRE - PHQ9: SUM OF ALL RESPONSES TO PHQ QUESTIONS 1-9: 2

## 2018-01-03 PROBLEM — R42 LIGHTHEADEDNESS: Status: ACTIVE | Noted: 2018-01-01

## 2018-01-03 NOTE — PROGRESS NOTES
Oncology/Hematology Visit Note  Ermias 3, 2018    DIAGNOSIS:  Mr. Win is a 72 year old male with a hx of CHF, CKD, CAD w/ hx of STEMI and CABG. He met with Dr. Robles at the end of July for consultation regarding metastatic prostate cancer. He presented in May 2015 when he saw his PCP for back pain. He also had poor appetite, weight loss and fatigue.  CT showed diffuse bony mets. CT Chest showed sclerotic mets throughout the bones. He was given degarelix on 7/22 in Urology. He was having pelvic pain and was evaluated by Dr Cavazos would did not feel XRT was appropriate.  He started on docetaxel 7/31/15 at 75 mg/m2 and only had one dose before being hospitalized several times.     His PSA remained stable so he was continued only on Lupron for the rest of 2015.  In December 2015, Mainor met with Dr Robles and since his ECOG was back to a 1, they discussed re-challenging the Taxotere at a lower dose and he completed 5 cycles at 60 mg/m2. On 9/26/2016 he started Provenge off study. He received 3 doses of Provenge (last 10/28), on 3rd dose had fevers, chills, myalgias, was bedbound for a week due to these symptoms.    Mainor was briefly on Zytiga and prednisone in November-December 2016, however after one month his PSA went up, thus it was decided to add in Xofigo (radium 223) starting in Jan 2017.     Diagnosed with PE in Nov 2016. He had bleeding on Xarelto so was changed back to Lovenox.     Mainor developed diarrhea on and off in the spring of 2017, Zytiga was stopped on 3/31 as it was unsure what was contributing.  Mainor felt it was related to the Xofigo and that his dose was too high as his weight is always at least 10 pounds higher in clinic than at home.      At his last visit with Dr Robles, his PSA was rising again and re-started Taxotere on 8/15/17.    He continues on lupron every 3 mos and XGEVA.    INTERVAL HISTORY:  Mr. 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      Mr. Win has diarrhea for the last  3-4 days. He had 4 soft stools since this morning. He has episodes of nausea where he feels quite sick and has to throw up. He feels lightheaded. He has not had anything substantial to eat in 3 days. He has poor appetite, does not feel like cooking or going out to eat.     A complete 10-pt ROS is negative other than what is reported above      MEDICATIONS:   Current Outpatient Prescriptions   Medication Sig     dexamethasone (DECADRON) 4 MG tablet Take 1 tablet (4 mg) by mouth daily (with breakfast)     calcium carbonate (OS-RODNEY 500 MG [place]. [place]) 1250 MG tablet Take 1 tablet (1,250 mg) by mouth 2 times daily     predniSONE (DELTASONE) 5 MG tablet Take 1 tablet (5 mg) by mouth 2 times daily     enoxaparin (LOVENOX) 40 MG/0.4ML injection Inject 0.4 mLs (40 mg) Subcutaneous 2 times daily (Patient taking differently: Inject 40 mg Subcutaneous daily )     aspirin 81 MG tablet Take 1 tablet (81 mg) by mouth daily     Cholecalciferol (VITAMIN D) 2000 UNITS tablet Take 1 tablet by mouth daily     simvastatin (ZOCOR) 40 MG tablet TAKE ONE TABLET BY MOUTH AT BEDTIME     HYDROcodone-acetaminophen (NORCO) 5-325 MG per tablet Take 1 tablet by mouth every 6 hours as needed for moderate to severe pain Reported on 5/22/2017 (Patient not taking: Reported on 11/13/2017)     LORazepam (ATIVAN) 0.5 MG tablet Take 1 tablet (0.5 mg) by mouth every 4 hours as needed (Anxiety, Nausea/Vomiting or Sleep) (Patient not taking: Reported on 12/6/2017)     No current facility-administered medications for this visit.      Facility-Administered Medications Ordered in Other Visits   Medication     lidocaine 0.5 % injection 1 mL     hydrocortisone sodium succinate (solu-CORTEF) injection     glycopyrrolate (ROBINUL) injection     neostigmine (PROSTIGMINE) injection     protamine injection     albuterol (PROAIR HFA, PROVENTIL HFA, VENTOLIN HFA) inhaler          ALLERGIES:  No Known Allergies    PHYSICAL EXAMINATION:  Vitals: /67  Pulse 96  Temp  "97.8  F (36.6  C) (Oral)  Resp 16  Ht 1.676 m (5' 5.98\")  SpO2 100%   Wt Readings from Last 10 Encounters:   12/20/17 61.2 kg (135 lb)   12/06/17 61.5 kg (135 lb 8 oz)   11/22/17 61.2 kg (135 lb)   11/13/17 61.2 kg (135 lb)   11/08/17 61.2 kg (135 lb)   11/01/17 61.2 kg (135 lb)   10/18/17 60.8 kg (134 lb)   10/18/17 60.8 kg (134 lb)   09/25/17 59 kg (130 lb)   09/25/17 59 kg (130 lb)       GENERAL:  A pleasant person in no acute distress.  Mildly pale and thin looking  LYMPH NODES:  No peripheral lymphadenopathy noted in the supraclavicular or cervical regions.   LUNGS:  Clear to auscultation bilaterally.   HEART:  Regular rate and rhythm   ABDOMEN:  Bowel sounds are active.  Soft and nontender.  No hepatosplenomegaly or other masses appreciated.  LOWER EXTREMITIES:  Without pitting edema to the knees bilaterally.   NEUROLOGICAL:  Alert/orientated/able to answer all questions.  CN grossly intact.       LAB:   CBC RESULTS:   Recent Labs   Lab Test  01/03/18   1039   WBC  7.3   RBC  2.92*   HGB  10.3*   HCT  30.9*   MCV  106*   MCH  35.3*   MCHC  33.3   RDW  17.2*   PLT  108*     Recent Labs   Lab Test  01/03/18   1039  12/06/17   0835   NA  136  136   POTASSIUM  4.3  4.4   CHLORIDE  107  103   CO2  19*  21   ANIONGAP  11  12   GLC  101*  178*   BUN  13  22   CR  0.89  0.90   RODNEY  8.6  9.1     Liver Function Studies -   Recent Labs   Lab Test  01/03/18   1039   PROTTOTAL  7.4   ALBUMIN  3.7   BILITOTAL  1.4*   ALKPHOS  155*   AST  30   ALT  16      Ref. Range 8/1/2017 09:10 8/7/2017 12:46 8/15/2017 13:07 9/5/2017 12:39 9/25/2017 12:19 10/18/2017 11:25 11/1/2017 08:12 11/8/2017 12:28 12/6/2017 08:35 1/3/2018 10:39   PSA Latest Ref Range: 0 - 4 ug/L 741.00 (H)   655.00 (H) 480.00 (H) 479.00 (H) 389.00 (H) 379.00 (H) 580.00 (H) 474.00 (H)        Ref. Range 8/21/2015 14:05 9/7/2016 18:59 10/28/2016 09:42 12/1/2016 12:47 12/21/2016 11:49   Testosterone Total Latest Ref Range: 240 - 950 ng/dL 4 (L) 7 (L) 11 (L) <2... " (L) <2... (L)       MRI prostate 1/3/18  1. Evidence of prostate cancer status post chemotherapy with clear  involvement of the periprosthetic tissues on the right as well as the  seminal vesicles. At this time, there is not early enhancement or  diffusion restriction seen in the region to clearly indicate viable  malignancy, suggesting predominantly treated disease.  2. Extensive metastatic bone lesions correlating with bone scan and CT  exam from 7/12/2017.  3. Prominent pelvic lymph nodes measuring up to 8 mm left external  iliac, stable and potentially benign.    IMPRESSION/PLAN:   Metastatic castrate-resistant prostate cancer: He had progression on Zytiga and Xofigo in July 2017.  Now back on IV Taxotere every 3 weeks since August 2017. Has received six cycles thus far.     Three weeks ago his PSA had significantly increased from 379 --> 580, which was concerning for disease progression. We continued with cycle 6 of Taxotere and today his PSA has decreased to 474.     He is weak and dehydrated today. We will hold treatment, give him IVF and antiemetics, and have him return in 1-2 weeks.     At last visit we discussed further treatment options if he is progression on Taxotere, including a clinical trial with rucaparib. This would require tissue biopsy for next generation sequencing to check for BRCA mutation or other DNA homologous repair enzyme mutations. Prostate MRI today does not show evidence of viable tumor, so biopse of the prostate may not be high yield. If not enrolling in a trial, then enzalutamide is an option. We will discuss this further at follow-up visit.     Lupron q3 months due ~2/8/18.     Presyncope: by history he is dehydrated, with very little po intake over last few days and diarrhea. He has not lost consciousness but having episodes of lightheadedness with activity. No cardiac symptoms. ECG is normal. BG normal. Hgb at baseline ~10.     Will give him IVF and antiemetics at infusion today.  If he does not feel better then to ER. He declines direct admission.     Anemia: Hgb is 10.3 today, stable over last several months.  We need to keep him above 9 given his CAD.      Heme: PE dx 11/2016, continue Lovenox at 40 mg BID      Bone mets: on xgeva - had monthly for a year, now on q 3 months. Given 11/8/17, next due in February     CV: hx of CHF. No clinical s/s of failure.  Off metoprolol due to BP being too low.      This patient has been seen and evaluated by me, Bentley Robles MD along with Dr. Alirio Peters.  I have discussed with Dr. Peters  and agree with the findings and plan in this note which I have edited.      He has diarrhea     Over 45 min of direct face to face time spent with patient with more than 50% time spent in counseling and coordinating care.

## 2018-01-03 NOTE — LETTER
1/3/2018       RE: Oswaldo Win  [address]  [facility] [address]     Dear Colleague,    Thank you for referring your patient, Oswaldo Win, to the [facility]. Please see a copy of my visit note below.    Oncology/Hematology Visit Note  Ermias 3, 2018    DIAGNOSIS:  Mr. Win is a 72 year old male with a hx of CHF, CKD, CAD w/ hx of STEMI and CABG. He met with Dr. Robles at the end of July for consultation regarding metastatic prostate cancer. He presented in May 2015 when he saw his PCP for back pain. He also had poor appetite, weight loss and fatigue.  CT showed diffuse bony mets. CT Chest showed sclerotic mets throughout the bones. He was given degarelix on 7/22 in Urology. He was having pelvic pain and was evaluated by Dr Cavazos would did not feel XRT was appropriate.  He started on docetaxel 7/31/15 at 75 mg/m2 and only had one dose before being hospitalized several times.     His PSA remained stable so he was continued only on Lupron for the rest of 2015.  In December 2015, Mainor met with Dr Robles and since his ECOG was back to a 1, they discussed re-challenging the Taxotere at a lower dose and he completed 5 cycles at 60 mg/m2. On 9/26/2016 he started Provenge off study. He received 3 doses of Provenge (last 10/28), on 3rd dose had fevers, chills, myalgias, was bedbound for a week due to these symptoms.    Mainor was briefly on Zytiga and prednisone in November-December 2016, however after one month his PSA went up, thus it was decided to add in Xofigo (radium 223) starting in Jan 2017.     Diagnosed with PE in Nov 2016. He had bleeding on Xarelto so was changed back to Lovenox.     Mainor developed diarrhea on and off in the spring of 2017, Zytiga was stopped on 3/31 as it was unsure what was contributing.  Mainor felt it was related to the Xofigo and that his dose was too high as his weight is always at least 10 pounds higher in clinic than at home.      At his last visit with Dr Robles, his PSA was  rising again and re-started Taxotere on 8/15/17.    He continues on lupron every 3 mos and XGEVA.    INTERVAL HISTORY:  Mr. 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      Mr. Win has diarrhea for the last 3-4 days. He had 4 soft stools since this morning. He has episodes of nausea where he feels quite sick and has to throw up. He feels lightheaded. He has not had anything substantial to eat in 3 days. He has poor appetite, does not feel like cooking or going out to eat.     A complete 10-pt ROS is negative other than what is reported above      MEDICATIONS:   Current Outpatient Prescriptions   Medication Sig     dexamethasone (DECADRON) 4 MG tablet Take 1 tablet (4 mg) by mouth daily (with breakfast)     calcium carbonate (OS-RODNEY 500 MG [place]. CA) 1250 MG tablet Take 1 tablet (1,250 mg) by mouth 2 times daily     predniSONE (DELTASONE) 5 MG tablet Take 1 tablet (5 mg) by mouth 2 times daily     enoxaparin (LOVENOX) 40 MG/0.4ML injection Inject 0.4 mLs (40 mg) Subcutaneous 2 times daily (Patient taking differently: Inject 40 mg Subcutaneous daily )     aspirin 81 MG tablet Take 1 tablet (81 mg) by mouth daily     Cholecalciferol (VITAMIN D) 2000 UNITS tablet Take 1 tablet by mouth daily     simvastatin (ZOCOR) 40 MG tablet TAKE ONE TABLET BY MOUTH AT BEDTIME     HYDROcodone-acetaminophen (NORCO) 5-325 MG per tablet Take 1 tablet by mouth every 6 hours as needed for moderate to severe pain Reported on 5/22/2017 (Patient not taking: Reported on 11/13/2017)     LORazepam (ATIVAN) 0.5 MG tablet Take 1 tablet (0.5 mg) by mouth every 4 hours as needed (Anxiety, Nausea/Vomiting or Sleep) (Patient not taking: Reported on 12/6/2017)     No current facility-administered medications for this visit.      Facility-Administered Medications Ordered in Other Visits   Medication     lidocaine 0.5 % injection 1 mL     hydrocortisone sodium succinate (solu-CORTEF) injection     glycopyrrolate (ROBINUL)  "injection     neostigmine (PROSTIGMINE) injection     protamine injection     albuterol (PROAIR HFA, PROVENTIL HFA, VENTOLIN HFA) inhaler          ALLERGIES:  No Known Allergies    PHYSICAL EXAMINATION:  Vitals: /67  Pulse 96  Temp 97.8  F (36.6  C) (Oral)  Resp 16  Ht 1.676 m (5' 5.98\")  SpO2 100%   Wt Readings from Last 10 Encounters:   12/20/17 61.2 kg (135 lb)   12/06/17 61.5 kg (135 lb 8 oz)   11/22/17 61.2 kg (135 lb)   11/13/17 61.2 kg (135 lb)   11/08/17 61.2 kg (135 lb)   11/01/17 61.2 kg (135 lb)   10/18/17 60.8 kg (134 lb)   10/18/17 60.8 kg (134 lb)   09/25/17 59 kg (130 lb)   09/25/17 59 kg (130 lb)       GENERAL:  A pleasant person in no acute distress.  Mildly pale and thin looking  LYMPH NODES:  No peripheral lymphadenopathy noted in the supraclavicular or cervical regions.   LUNGS:  Clear to auscultation bilaterally.   HEART:  Regular rate and rhythm   ABDOMEN:  Bowel sounds are active.  Soft and nontender.  No hepatosplenomegaly or other masses appreciated.  LOWER EXTREMITIES:  Without pitting edema to the knees bilaterally.   NEUROLOGICAL:  Alert/orientated/able to answer all questions.  CN grossly intact.       LAB:   CBC RESULTS:   Recent Labs   Lab Test  01/03/18   1039   WBC  7.3   RBC  2.92*   HGB  10.3*   HCT  30.9*   MCV  106*   MCH  35.3*   MCHC  33.3   RDW  17.2*   PLT  108*     Recent Labs   Lab Test  01/03/18   1039  12/06/17   0835   NA  136  136   POTASSIUM  4.3  4.4   CHLORIDE  107  103   CO2  19*  21   ANIONGAP  11  12   GLC  101*  178*   BUN  13  22   CR  0.89  0.90   RODNEY  8.6  9.1     Liver Function Studies -   Recent Labs   Lab Test  01/03/18   1039   PROTTOTAL  7.4   ALBUMIN  3.7   BILITOTAL  1.4*   ALKPHOS  155*   AST  30   ALT  16      Ref. Range 8/1/2017 09:10 8/7/2017 12:46 8/15/2017 13:07 9/5/2017 12:39 9/25/2017 12:19 10/18/2017 11:25 11/1/2017 08:12 11/8/2017 12:28 12/6/2017 08:35 1/3/2018 10:39   PSA Latest Ref Range: 0 - 4 ug/L 741.00 (H)   655.00 (H) 480.00 " (H) 479.00 (H) 389.00 (H) 379.00 (H) 580.00 (H) 474.00 (H)        Ref. Range 8/21/2015 14:05 9/7/2016 18:59 10/28/2016 09:42 12/1/2016 12:47 12/21/2016 11:49   Testosterone Total Latest Ref Range: 240 - 950 ng/dL 4 (L) 7 (L) 11 (L) <2... (L) <2... (L)       MRI prostate 1/3/18  1. Evidence of prostate cancer status post chemotherapy with clear  involvement of the periprosthetic tissues on the right as well as the  seminal vesicles. At this time, there is not early enhancement or  diffusion restriction seen in the region to clearly indicate viable  malignancy, suggesting predominantly treated disease.  2. Extensive metastatic bone lesions correlating with bone scan and CT  exam from 7/12/2017.  3. Prominent pelvic lymph nodes measuring up to 8 mm left external  iliac, stable and potentially benign.    IMPRESSION/PLAN:   Metastatic castrate-resistant prostate cancer: He had progression on Zytiga and Xofigo in July 2017.  Now back on IV Taxotere every 3 weeks since August 2017. Has received six cycles thus far.     Three weeks ago his PSA had significantly increased from 379 --> 580, which was concerning for disease progression. We continued with cycle 6 of Taxotere and today his PSA has decreased to 474.     He is weak and dehydrated today. We will hold treatment, give him IVF and antiemetics, and have him return in 1-2 weeks.     At last visit we discussed further treatment options if he is progression on Taxotere, including a clinical trial with rucaparib. This would require tissue biopsy for next generation sequencing to check for BRCA mutation or other DNA homologous repair enzyme mutations. Prostate MRI today does not show evidence of viable tumor, so biopse of the prostate may not be high yield. If not enrolling in a trial, then enzalutamide is an option. We will discuss this further at follow-up visit.     Lupron q3 months due ~2/8/18.     Presyncope: by history he is dehydrated, with very little po intake over  last few days and diarrhea. He has not lost consciousness but having episodes of lightheadedness with activity. No cardiac symptoms. ECG is normal. BG normal. Hgb at baseline ~10.     Will give him IVF and antiemetics at infusion today. If he does not feel better then to ER. He declines direct admission.     Anemia: Hgb is 10.3 today, stable over last several months.  We need to keep him above 9 given his CAD.      Heme: PE dx 11/2016, continue Lovenox at 40 mg BID      Bone mets: on xgeva - had monthly for a year, now on q 3 months. Given 11/8/17, next due in February     CV: hx of CHF. No clinical s/s of failure.  Off metoprolol due to BP being too low.      This patient has been seen and evaluated by me, Bentley Robles MD along with Dr. Alirio Peters.  I have discussed with Dr. Peters  and agree with the findings and plan in this note which I have edited.      He has diarrhea     Over 45 min of direct face to face time spent with patient with more than 50% time spent in counseling and coordinating care.      Again, thank you for allowing me to participate in the care of your patient.      Sincerely,    Bentley Robles MD

## 2018-01-03 NOTE — MR AVS SNAPSHOT
"              After Visit Summary   1/3/2018    Oswaldo Win    MRN: [id number]           Patient Information     Date Of Birth          1945        Visit Information        Provider Department      1/3/2018 2:15 PM Bentley Robles MD [facility]        Today's Diagnoses     Lightheadedness    -  1    Prostate cancer 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)        Chronic systolic heart failure (H)        Metastatic bone tumor (H)           Follow-ups after your visit        Who to contact     If you have questions or need follow up information about today's clinic visit or your schedule please contact [facility] directly at [phone].  Normal or non-critical lab and imaging results will be communicated to you by [facility] Inchart, letter or phone within 4 business days after the clinic has received the results. If you do not hear from us within 7 days, please contact the clinic through [facility] Inchart or phone. If you have a critical or abnormal lab result, we will notify you by phone as soon as possible.  Submit refill requests through [facility] [employer] or call your pharmacy and they will forward the refill request to us. Please allow 3 business days for your refill to be completed.          Additional Information About Your Visit        MyChart Information     [employer] gives you secure access to your electronic health record. If you see a primary care provider, you can also send messages to your care team and make appointments. If you have questions, please call your primary care clinic.  If you do not have a primary care provider, please call [phone] and they will assist you.        Care EveryWhere ID     This is your Care EveryWhere ID. This could be used by other organizations to access your [place] medical records  [id number]        Your Vitals Were     Pulse Temperature Respirations Height Pulse Oximetry       96 97.8  F (36.6  C) (Oral) 16 1.676 m (5' 5.98\") 100%        Blood Pressure from Last " 3 Encounters:   01/03/18 107/67   12/20/17 (!) 86/58   12/06/17 119/62    Weight from Last 3 Encounters:   12/20/17 61.2 kg (135 lb)   12/06/17 61.5 kg (135 lb 8 oz)   11/22/17 61.2 kg (135 lb)              We Performed the Following     CBC with platelets differential     Comprehensive metabolic panel     EKG 12-lead complete w/read - Clinics     PSA tumor marker          Today's Medication Changes          These changes are accurate as of: 1/3/18  6:16 PM.  If you have any questions, ask your nurse or doctor.               These medicines have changed or have updated prescriptions.        Dose/Directions    enoxaparin 40 MG/0.4ML injection   Commonly known as:  LOVENOX   This may have changed:  when to take this   Used for:  Other chronic pulmonary embolism without acute cor pulmonale (H)        Dose:  40 mg   Inject 0.4 mLs (40 mg) Subcutaneous 2 times daily   Quantity:  60 Syringe   Refills:  3                Primary Care Provider Office Phone # Fax #    Lucrecia Collier [phone] [address] [place], [place] [id number]        Equal Access to Services     [facility] : Hadii jesse hall Sokwame, waesperanzada lulinh, qaybta kaalmichael knapp, peter moran . So [facility] [phone].    ATENCIÓN: Si habla español, tiene a mccall disposición servicios gratuitos de asistencia lingüística. Llame al [phone].    We comply with applicable federal civil rights laws and [place] laws. We do not discriminate on the basis of race, color, national origin, age, disability, sex, sexual orientation, or gender identity.            Thank you!     Thank you for choosing [facility]  for your care. Our goal is always to provide you with excellent care. Hearing back from our patients is one way we can continue to improve our services. Please take a few minutes to complete the written survey that you may receive in the mail after your visit with us. Thank you!              Your Updated Medication List - Protect others around you: Learn how to safely use, store and throw away your medicines at www.disposemymeds.org.          This list is accurate as of: 1/3/18  6:16 PM.  Always use your most recent med list.                   Brand Name Dispense Instructions for use Diagnosis    aspirin 81 MG tablet     90 tablet    Take 1 tablet (81 mg) by mouth daily    Acute on chronic combined systolic and diastolic congestive heart failure (H)       calcium carbonate 1250 MG tablet    OS-RODNEY 500 mg [place] Ca    120 tablet    Take 1 tablet (1,250 mg) by mouth 2 times daily    Hypocalcemia       dexamethasone 4 MG tablet    DECADRON    12 tablet    Take 1 tablet (4 mg) by mouth daily (with breakfast)    Prostate cancer 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), Metastatic bone tumor (H)       enoxaparin 40 MG/0.4ML injection    LOVENOX    60 Syringe    Inject 0.4 mLs (40 mg) Subcutaneous 2 times daily    Other chronic pulmonary embolism without acute cor pulmonale (H)       HYDROcodone-acetaminophen 5-325 MG per tablet    NORCO    30 tablet    Take 1 tablet by mouth every 6 hours as needed for moderate to severe pain Reported on 5/22/2017    Prostate cancer 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), Metastatic bone tumor (H)       LORazepam 0.5 MG tablet    ATIVAN    30 tablet    Take 1 tablet (0.5 mg) by mouth every 4 hours as needed (Anxiety, Nausea/Vomiting or Sleep)    Prostate cancer 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), Metastatic bone tumor (H)       predniSONE 5 MG tablet    DELTASONE    42 tablet    Take 1 tablet (5 mg) by mouth 2 times daily    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

## 2018-01-03 NOTE — MR AVS SNAPSHOT
After Visit Summary   1/3/2018    Oswaldo Win    MRN: [id number]           Patient Information     Date Of Birth          1945        Visit Information        Provider Department      1/3/2018 3:30 PM  28 ATC;  ONCOLOGY INFUSION [facility]        Today's Diagnoses     Prostate cancer 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)    -  1      Care Instructions    Contact Numbers  [facility]: [phone]  Triage: [phone] (Monday-Friday 8-4:30)  After Hours:  [phone]    Please call the [facility] Triage line if you experience a temperature greater than or equal to 100.4, shaking chills, have uncontrolled nausea, vomiting and/or diarrhea, dizziness, shortness of breath, chest pain, bleeding, unexplained bruising, or if you have any other new/concerning symptoms, questions or concerns.     If it is after hours, weekends, or holidays, please call [phone] to speak to someone regarding your questions or concerns.    If you are having any concerning symptoms or wish to speak to a provider before your next infusion visit, please call your care coordinator or triage to notify them so we can adequately serve you.     If you need a refill on a narcotic prescription or other medication, please call triage before your infusion appointment.             January 2018 Sunday Monday Tuesday Wednesday Thursday Friday Saturday        1     2     3     [facility]   10[facility]45 AM   (15 min.)   [facility] LAB DRAW   [facility] Lab Draw     MR PROSTATE   11:15 AM   (45 min.)   [id number]   [facility] MRI     P RETURN    2:00 PM   (30 min.)   Bentley Robles MD   [facility] ONC INFUSION 120    3:30 PM   (120 min.)    ONCOLOGY INFUSION   [facility] 4     5     6       7     8     9     10     11     12     13       14     15     16     17     18     19     20       21     22     23     24     25     26     27       28     29      30 31 February 2018 Sunday Monday Tuesday Wednesday Thursday Friday Saturday                       1     2     3       4     5     6     7     8     9     10       11     12     13     14     15     16     17       18     19     20     21     22     23     24       25     26     27     28                                 Lab Results:  Recent Results (from the past 12 hour(s))   PSA tumor marker    Collection Time: 01/03/18 10:39 AM   Result Value Ref Range    .00 (H) 0 - 4 ug/L   Comprehensive metabolic panel    Collection Time: 01/03/18 10:39 AM   Result Value Ref Range    Sodium 136 133 - 144 mmol/L    Potassium 4.3 3.4 - 5.3 mmol/L    Chloride 107 94 - 109 mmol/L    Carbon Dioxide 19 (L) 20 - 32 mmol/L    Anion Gap 11 3 - 14 mmol/L    Glucose 101 (H) 70 - 99 mg/dL    Urea Nitrogen 13 7 - 30 mg/dL    Creatinine 0.89 0.66 - 1.25 mg/dL    GFR Estimate 84 >60 mL/min/1.7m2    GFR Estimate If Black >90 >60 mL/min/1.7m2    Calcium 8.6 8.5 - 10.1 mg/dL    Bilirubin Total 1.4 (H) 0.2 - 1.3 mg/dL    Albumin 3.7 3.4 - 5.0 g/dL    Protein Total 7.4 6.8 - 8.8 g/dL    Alkaline Phosphatase 155 (H) 40 - 150 U/L    ALT 16 0 - 70 U/L    AST 30 0 - 45 U/L   CBC with platelets differential    Collection Time: 01/03/18 10:39 AM   Result Value Ref Range    WBC 7.3 4.0 - 11.0 10e9/L    RBC Count 2.92 (L) 4.4 - 5.9 10e12/L    Hemoglobin 10.3 (L) 13.3 - 17.7 g/dL    Hematocrit 30.9 (L) 40.0 - 53.0 %     (H) 78 - 100 fl    MCH 35.3 (H) 26.5 - 33.0 pg    MCHC 33.3 31.5 - 36.5 g/dL    RDW 17.2 (H) 10.0 - 15.0 %    Platelet Count 108 (L) 150 - 450 10e9/L    Diff Method Automated Method     % Neutrophils 75.2 %    % Lymphocytes 12.3 %    % Monocytes 10.0 %    % Eosinophils 1.2 %    % Basophils 0.5 %    % Immature Granulocytes 0.8 %    Nucleated RBCs 0 0 /100    Absolute Neutrophil 5.5 1.6 - 8.3 10e9/L    Absolute Lymphocytes 0.9 0.8 - 5.3 10e9/L    Absolute Monocytes 0.7 0.0 - 1.3 10e9/L     Absolute Eosinophils 0.1 0.0 - 0.7 10e9/L    Absolute Basophils 0.0 0.0 - 0.2 10e9/L    Abs Immature Granulocytes 0.1 0 - 0.4 10e9/L    Absolute Nucleated RBC 0.0    Glucose by meter    Collection Time: 01/03/18  3:25 PM   Result Value Ref Range    Glucose 126 (H) 70 - 99 mg/dL   EKG 12-lead complete w/read - Clinics    Collection Time: 01/03/18  4:31 PM   Result Value Ref Range    Interpretation ECG Click View Image link to view waveform and result               Follow-ups after your visit        Who to contact     If you have questions or need follow up information about today's clinic visit or your schedule please contact [facility] directly at [phone].  Normal or non-critical lab and imaging results will be communicated to you by PureWRXhart, letter or phone within 4 business days after the clinic has received the results. If you do not hear from us within 7 days, please contact the clinic through PureWRXhart or phone. If you have a critical or abnormal lab result, we will notify you by phone as soon as possible.  Submit refill requests through Casabu or call your pharmacy and they will forward the refill request to us. Please allow 3 business days for your refill to be completed.          Additional Information About Your Visit        PureWRXhart Information     Casabu gives you secure access to your electronic health record. If you see a primary care provider, you can also send messages to your care team and make appointments. If you have questions, please call your primary care clinic.  If you do not have a primary care provider, please call [phone] and they will assist you.        Care EveryWhere ID     This is your Care EveryWhere ID. This could be used by other organizations to access your [place] medical records  [id number]         Blood Pressure from Last 3 Encounters:   01/03/18 107/67   12/20/17 (!) 86/58   12/06/17 119/62    Weight from Last 3 Encounters:   12/20/17 61.2 kg (135  lb)   12/06/17 61.5 kg (135 lb 8 oz)   11/22/17 61.2 kg (135 lb)              We Performed the Following     Glucose by meter          Today's Medication Changes          These changes are accurate as of: 1/3/18  5:14 PM.  If you have any questions, ask your nurse or doctor.               These medicines have changed or have updated prescriptions.        Dose/Directions    enoxaparin 40 MG/0.4ML injection   Commonly known as:  LOVENOX   This may have changed:  when to take this   Used for:  Other chronic pulmonary embolism without acute cor pulmonale (H)        Dose:  40 mg   Inject 0.4 mLs (40 mg) Subcutaneous 2 times daily   Quantity:  60 Syringe   Refills:  3                Primary Care Provider Office Phone # Fax #    Lucrecia Collier [phone] [address] [facility] [address]        Equal Access to Services     JENNI [facility] Hospital[facility]ÓNICA : Hadjaswinder Ybarra, wajagjit hopkins, serenity pickensalmichael knapp, peter moran . So [place] -[phone].    ATENCIÓN: Si habla español, tiene a mccall disposición servicios gratuitos de asistencia lingüística. Llame al [phone].    We comply with applicable federal civil rights laws and [place] laws. We do not discriminate on the basis of race, color, national origin, age, disability, sex, sexual orientation, or gender identity.            Thank you!     Thank you for choosing [facility]  for your care. Our goal is always to provide you with excellent care. Hearing back from our patients is one way we can continue to improve our services. Please take a few minutes to complete the written survey that you may receive in the mail after your visit with us. Thank you!             Your Updated Medication List - Protect others around you: Learn how to safely use, store and throw away your medicines at www.disposemymeds.org.          This list is accurate as of: 1/3/18  5:14 PM.  Always use your most recent med  list.                   Brand Name Dispense Instructions for use Diagnosis    aspirin 81 MG tablet     90 tablet    Take 1 tablet (81 mg) by mouth daily    Acute on chronic combined systolic and diastolic congestive heart failure (H)       calcium carbonate 1250 MG tablet    OS-RODNEY 500 mg [place]. Ca    120 tablet    Take 1 tablet (1,250 mg) by mouth 2 times daily    Hypocalcemia       dexamethasone 4 MG tablet    DECADRON    12 tablet    Take 1 tablet (4 mg) by mouth daily (with breakfast)    Prostate cancer 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), Metastatic bone tumor (H)       enoxaparin 40 MG/0.4ML injection    LOVENOX    60 Syringe    Inject 0.4 mLs (40 mg) Subcutaneous 2 times daily    Other chronic pulmonary embolism without acute cor pulmonale (H)       HYDROcodone-acetaminophen 5-325 MG per tablet    NORCO    30 tablet    Take 1 tablet by mouth every 6 hours as needed for moderate to severe pain Reported on 5/22/2017    Prostate cancer 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), Metastatic bone tumor (H)       LORazepam 0.5 MG tablet    ATIVAN    30 tablet    Take 1 tablet (0.5 mg) by mouth every 4 hours as needed (Anxiety, Nausea/Vomiting or Sleep)    Prostate cancer 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), Metastatic bone tumor (H)       predniSONE 5 MG tablet    DELTASONE    42 tablet    Take 1 tablet (5 mg) by mouth 2 times daily    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

## 2018-01-03 NOTE — DISCHARGE INSTRUCTIONS
MRI Contrast Discharge Instructions    The IV contrast you received today will pass out of your body in your  urine. This will happen in the next 24 hours. You will not feel this process.  Your urine will not change color.    Drink at least 4 extra glasses of water or juice today (unless your doctor  has restricted your fluids). This reduces the stress on your kidneys.  You may take your regular medicines.    If you are on dialysis: It is best to have dialysis today.    If you have a reaction: Most reactions happen right away. If you have  any new symptoms after leaving the hospital (such as hives or swelling),  call your hospital at the correct number below. Or call your family doctor.  If you have breathing distress or wheezing, call 911.    Special instructions: ***    I have read and understand the above information.    Signature:______________________________________ Date:___________    Staff:__________________________________________ Date:___________     Time:__________    [facility] Departments:    ___[place]: [phone]  ___[facility]: [phone]  ___[place]: [phone] ___[facility]: [phone]  ___[facility]: [phone]  ___[facility]: [phone]  ___[phone]  ___[facility]: [phone]  ___[phone]

## 2018-01-03 NOTE — NURSING NOTE
"Oncology Rooming Note    January 3, 2018 1:55 PM   Oswaldo Win is a 72 year old male who presents for:    Chief Complaint   Patient presents with     Labs Only     Labs drawn via venipuncture     RECHECK     Prostate cancer metastatic to bone      Initial Vitals: BP 99/58  Pulse 109  Temp 97.8  F (36.6  C) (Oral)  Resp 16  Ht 1.676 m (5' 5.98\")  SpO2 99% Estimated body mass index is 21.8 kg/(m^2) as calculated from the following:    Height as of 9/25/17: 1.676 m (5' 5.98\").    Weight as of 12/20/17: 61.2 kg (135 lb). There is no height or weight on file to calculate BSA.  Moderate Pain (4) Comment: Ankles   No LMP for male patient.  Allergies reviewed: Yes  Medications reviewed: Yes    Medications: Medication refills not needed today.  Pharmacy name entered into [email or web address]: [facility] [place], [place] - [address]    Clinical concerns:  No new concerns  provider was notified.    5 minutes for nursing intake (face to face time)     Cecilia Panchal MA              "

## 2018-01-03 NOTE — PATIENT INSTRUCTIONS
Contact Numbers  [facility]: [phone]  Triage: [phone] (Monday-Friday 8-4:30)  After Hours:  [phone]    Please call the [facility] Triage line if you experience a temperature greater than or equal to 100.4, shaking chills, have uncontrolled nausea, vomiting and/or diarrhea, dizziness, shortness of breath, chest pain, bleeding, unexplained bruising, or if you have any other new/concerning symptoms, questions or concerns.     If it is after hours, weekends, or holidays, please call [phone] to speak to someone regarding your questions or concerns.    If you are having any concerning symptoms or wish to speak to a provider before your next infusion visit, please call your care coordinator or triage to notify them so we can adequately serve you.     If you need a refill on a narcotic prescription or other medication, please call triage before your infusion appointment.             January 2018 Sunday Monday Tuesday Wednesday Thursday Friday Saturday        1     2     3     [facility]   10[facility]45 [facility]   (15 min.)    [facility] LAB DRAW   [facility] Lab Draw     MR PROSTATE   11:15 AM   (45 min.)   [id number]   [facility] MRI     [facility] RETURN    2:00 PM   (30 min.)   Bentley Robles MD   [facility]     [facility] ONC INFUSION 120    3:30 PM   (120 min.)    ONCOLOGY INFUSION   [facility] 4     5     6       7     8     9     10     11     12     13       14     15     16     17     18     19     20       21     22     23     24     25     26     27       28     29     30     31 February 2018 Sunday Monday Tuesday Wednesday Thursday Friday Saturday                       1     2     3       4     5     6     7     8     9     10       11     12     13     14     15     16     17       18     19     20     21     22     23     24       25     26     27     28                                 Lab Results:  Recent  Results (from the past 12 hour(s))   PSA tumor marker    Collection Time: 01/03/18 10:39 AM   Result Value Ref Range    .00 (H) 0 - 4 ug/L   Comprehensive metabolic panel    Collection Time: 01/03/18 10:39 AM   Result Value Ref Range    Sodium 136 133 - 144 mmol/L    Potassium 4.3 3.4 - 5.3 mmol/L    Chloride 107 94 - 109 mmol/L    Carbon Dioxide 19 (L) 20 - 32 mmol/L    Anion Gap 11 3 - 14 mmol/L    Glucose 101 (H) 70 - 99 mg/dL    Urea Nitrogen 13 7 - 30 mg/dL    Creatinine 0.89 0.66 - 1.25 mg/dL    GFR Estimate 84 >60 mL/min/1.7m2    GFR Estimate If Black >90 >60 mL/min/1.7m2    Calcium 8.6 8.5 - 10.1 mg/dL    Bilirubin Total 1.4 (H) 0.2 - 1.3 mg/dL    Albumin 3.7 3.4 - 5.0 g/dL    Protein Total 7.4 6.8 - 8.8 g/dL    Alkaline Phosphatase 155 (H) 40 - 150 U/L    ALT 16 0 - 70 U/L    AST 30 0 - 45 U/L   CBC with platelets differential    Collection Time: 01/03/18 10:39 AM   Result Value Ref Range    WBC 7.3 4.0 - 11.0 10e9/L    RBC Count 2.92 (L) 4.4 - 5.9 10e12/L    Hemoglobin 10.3 (L) 13.3 - 17.7 g/dL    Hematocrit 30.9 (L) 40.0 - 53.0 %     (H) 78 - 100 fl    MCH 35.3 (H) 26.5 - 33.0 pg    MCHC 33.3 31.5 - 36.5 g/dL    RDW 17.2 (H) 10.0 - 15.0 %    Platelet Count 108 (L) 150 - 450 10e9/L    Diff Method Automated Method     % Neutrophils 75.2 %    % Lymphocytes 12.3 %    % Monocytes 10.0 %    % Eosinophils 1.2 %    % Basophils 0.5 %    % Immature Granulocytes 0.8 %    Nucleated RBCs 0 0 /100    Absolute Neutrophil 5.5 1.6 - 8.3 10e9/L    Absolute Lymphocytes 0.9 0.8 - 5.3 10e9/L    Absolute Monocytes 0.7 0.0 - 1.3 10e9/L    Absolute Eosinophils 0.1 0.0 - 0.7 10e9/L    Absolute Basophils 0.0 0.0 - 0.2 10e9/L    Abs Immature Granulocytes 0.1 0 - 0.4 10e9/L    Absolute Nucleated RBC 0.0    Glucose by meter    Collection Time: 01/03/18  3:25 PM   Result Value Ref Range    Glucose 126 (H) 70 - 99 mg/dL   EKG 12-lead complete w/read - Clinics    Collection Time: 01/03/18  4:31 PM   Result Value Ref Range     Interpretation ECG Click View Image link to view waveform and result

## 2018-01-03 NOTE — NURSING NOTE
Went to grab Pt from [address] to bring him to an exam room for his appt.  While walking, Pt got dizzy and lightheaded to the point where he couldn't keep walking.  Pt was assisted to the ground.  Pt then became nauseous and did vomit into an emesis bag.  Rapid response was called by staff. Pt reported he then felt better, so he was assisted into a wheelchair and brought to an exam room where he requested to lie on the exam table.  Vitals collected, and remained comparable to Pt norm.  Pt reported that he has been having these bouts of dizziness, lightheadedness and weakness followed by nausea since about 4am this morning and that it has happened about 3-4 times.  Per Dr Robles, EKG and blood glucose were collected. Blood glucose was 126 fasting. Pt has not ate since 430am.  He was provided two sandwiches from infusion area.     Lynette Turner LPN

## 2018-01-04 NOTE — PROGRESS NOTES
Infusion Nursing Note:  Oswaldo Win presents today for IV fluids.    Patient seen by provider today: Yes: Dr. Robles saw patient prior to infusion   present during visit today: Not Applicable.    Note: Patient refused aloxi.  He stated his nausea was better after eating.    TORB Dr. Robles/Sabiha Main, RN at 1630 on 1/3/18  Do not give Xgeva today.  Next dose is due on 2/8/18.    Intravenous Access:  Peripheral IV placed by vascular access.    Treatment Conditions:  Lab Results   Component Value Date    HGB 10.3 01/03/2018     Lab Results   Component Value Date    WBC 7.3 01/03/2018      Lab Results   Component Value Date    ANEU 5.5 01/03/2018     Lab Results   Component Value Date     01/03/2018      Lab Results   Component Value Date     01/03/2018                   Lab Results   Component Value Date    POTASSIUM 4.3 01/03/2018           Lab Results   Component Value Date    MAG 2.0 04/05/2017            Lab Results   Component Value Date    CR 0.89 01/03/2018                   Lab Results   Component Value Date    RODNEY 8.6 01/03/2018                Lab Results   Component Value Date    BILITOTAL 1.4 01/03/2018           Lab Results   Component Value Date    ALBUMIN 3.7 01/03/2018                    Lab Results   Component Value Date    ALT 16 01/03/2018           Lab Results   Component Value Date    AST 30 01/03/2018             Post Infusion Assessment:  Patient tolerated infusion without incident.  Patient stated he felt much better after infusion and was able to walk out of infusion.  Blood return noted pre and post infusion.  Site patent and intact, free from redness, edema or discomfort.  No evidence of extravasations.  Access discontinued per protocol.    Discharge Plan:   Patient declined prescription refills.  Discharge instructions reviewed with: Patient.  Patient and/or family verbalized understanding of discharge instructions and all questions answered.  Copy of AVS reviewed  with patient and/or family.  Next appointment to be scheduled.  Patient discharged in stable condition accompanied by: self.  Departure Mode: Ambulatory.  Patient walked out of infusion per his request.  He was offered a wheelchair.    Sabiha Main RN

## 2018-01-12 NOTE — PROGRESS NOTES
Oncology/Hematology Visit Note  Jan 12, 2018    DIAGNOSIS:  Mr. Win is a 72 year old male with a hx of CHF, CKD, CAD w/ hx of STEMI and CABG. He met with Dr. Robles at the end of July for consultation regarding metastatic prostate cancer. He presented in May 2015 when he saw his PCP for back pain. He also had poor appetite, weight loss and fatigue.  CT showed diffuse bony mets. CT Chest showed sclerotic mets throughout the bones. He was given degarelix on 7/22 in Urology. He was having pelvic pain and was evaluated by Dr Cavazos would did not feel XRT was appropriate.  He started on docetaxel 7/31/15 at 75 mg/m2 and only had one dose before being hospitalized several times.     His PSA remained stable so he was continued only on Lupron for the rest of 2015.  In December 2015, Mainor met with Dr Robles and since his ECOG was back to a 1, they discussed re-challenging the Taxotere at a lower dose and he completed 5 cycles at 60 mg/m2. On 9/26/2016 he started Provenge off study. He received 3 doses of Provenge (last 10/28), on 3rd dose had fevers, chills, myalgias, was bedbound for a week due to these symptoms.    Mainor was briefly on Zytiga and prednisone in November-December 2016, however after one month his PSA went up, thus it was decided to add in Xofigo (radium 223) starting in Jan 2017.     Diagnosed with PE in Nov 2016. He had bleeding on Xarelto so was changed back to Lovenox.     Mainor developed diarrhea on and off in the spring of 2017, Zytiga was stopped on 3/31 as it was unsure what was contributing.  Mainor felt it was related to the Xofigo and that his dose was too high as his weight is always at least 10 pounds higher in clinic than at home.      At his last visit with Dr Robles, his PSA was rising again and re-started Taxotere on 8/15/17.    He continues on lupron every 3 mos and XGEVA.    INTERVAL HISTORY:  Mr. 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      Mr. Win was seen in clinic last  "week for evaluation prior to treatment. He was having issues with nausea, diarrhea, poor po intake and became presyncopal during visit. EKG and BG were normal and it was felt to be caused by dehydration. Patient was given IVF and antiemetics. He returns today for follow up. He states diarrhea has improved. He had no BM the past couple days, but did have some diarrhea this morning. He has not been taking any anti-diarrheal medications but waiting to see if it resolves. He states that his nausea is better if he has some food or liquid in his stomach. On an empty stomach he has \"acidic\" feeling in stomach and emesis is mostly liquid. Is only using Tums as needed. He feels his po intake has improved since last visit. He has some mild dyspnea with walking to the bathroom, but this might have been slightly better recently. He says he can't stand too long due to weakness in his legs, but denies any dizziness or pre-syncopal episodes since last week.     A complete 10-pt ROS is negative other than what is reported above      MEDICATIONS:   Current Outpatient Prescriptions   Medication Sig     enoxaparin (LOVENOX) 60 MG/0.6ML injection      calcium carbonate (OS-RODNEY 500 MG [place]. [place]) 1250 MG tablet Take 1 tablet (1,250 mg) by mouth 2 times daily     HYDROcodone-acetaminophen (NORCO) 5-325 MG per tablet Take 1 tablet by mouth every 6 hours as needed for moderate to severe pain Reported on 5/22/2017     predniSONE (DELTASONE) 5 MG tablet Take 1 tablet (5 mg) by mouth 2 times daily     enoxaparin (LOVENOX) 40 MG/0.4ML injection Inject 0.4 mLs (40 mg) Subcutaneous 2 times daily (Patient taking differently: Inject 40 mg Subcutaneous daily )     aspirin 81 MG tablet Take 1 tablet (81 mg) by mouth daily     Cholecalciferol (VITAMIN D) 2000 UNITS tablet Take 1 tablet by mouth daily     simvastatin (ZOCOR) 40 MG tablet TAKE ONE TABLET BY MOUTH AT BEDTIME     dexamethasone (DECADRON) 4 MG tablet Take 1 tablet (4 mg) by mouth daily " "(with breakfast) (Patient not taking: Reported on 1/12/2018)     LORazepam (ATIVAN) 0.5 MG tablet Take 1 tablet (0.5 mg) by mouth every 4 hours as needed (Anxiety, Nausea/Vomiting or Sleep) (Patient not taking: Reported on 1/12/2018)     No current facility-administered medications for this visit.      Facility-Administered Medications Ordered in Other Visits   Medication     lidocaine 0.5 % injection 1 mL     hydrocortisone sodium succinate (solu-CORTEF) injection     glycopyrrolate (ROBINUL) injection     neostigmine (PROSTIGMINE) injection     protamine injection     albuterol (PROAIR HFA, PROVENTIL HFA, VENTOLIN HFA) inhaler          ALLERGIES:  No Known Allergies    PHYSICAL EXAMINATION:  Vitals: /67 (BP Location: Left arm, Patient Position: Sitting, Cuff Size: Adult Regular)  Pulse 102  Temp 95.8  F (35.4  C) (Tympanic)  Resp 16  Ht 1.676 m (5' 5.98\")  Wt 58.5 kg (129 lb)  SpO2 99%  BMI 20.83 kg/m2   Wt Readings from Last 10 Encounters:   01/12/18 58.5 kg (129 lb)   12/20/17 61.2 kg (135 lb)   12/06/17 61.5 kg (135 lb 8 oz)   11/22/17 61.2 kg (135 lb)   11/13/17 61.2 kg (135 lb)   11/08/17 61.2 kg (135 lb)   11/01/17 61.2 kg (135 lb)   10/18/17 60.8 kg (134 lb)   10/18/17 60.8 kg (134 lb)   09/25/17 59 kg (130 lb)       GENERAL:  A pleasant person in no acute distress.  Mildly pale and thin looking  LYMPH NODES:  No peripheral lymphadenopathy noted in the supraclavicular or cervical regions.   LUNGS:  Clear to auscultation bilaterally.   HEART:  Regular rate and rhythm   ABDOMEN:  Bowel sounds are active.  Soft and nontender.  No hepatosplenomegaly or other masses appreciated.  LOWER EXTREMITIES:  Without pitting edema to the knees bilaterally.   NEUROLOGICAL:  Alert/orientated/able to answer all questions.  CN grossly intact.       LAB:   None this visit    IMPRESSION/PLAN:   Metastatic castrate-resistant prostate cancer: He had progression on Zytiga and Xofigo in July 2017.  Now back on IV " "Taxotere every 3 weeks since August 2017. Has received six cycles thus far. Last dose was C6 on 12/6/17    His PSA significantly increased from 379 --> 580 after cycle 5, which was concerning for disease progression. After cycle 6, his PSA has decreased to 474.     Taxotere was delayed last weak due to weakness and dehydration. He is doing better this week but Oswaldo agrees that he might benefit from another week off. We discussed that perhaps he is not tolerating Taxotere very well and it might be time to consider other options such as enzalutamide.  We will set him up in 1 week for labs and a follow up visit. We will schedule infusion in the event we proceed with Taxotere. In the meantime, we will start looking into insurance coverage or approval for Enzalutamide so that we have another option. We will discuss this further at follow-up visit.     Lupron q3 months due ~2/8/18.     Anemia: Hgb is 10.3 at last visit. No labs today.  We need to keep him above 9 given his CAD.      Heme: PE dx 11/2016, continue Lovenox at 40 mg BID      Bone mets: on xgeva - had monthly for a year, now on q 3 months. Given 11/8/17, next due in February     CV: hx of CHF. No clinical s/s of failure.  Off metoprolol due to BP being too low.      Nausea: Discussed trial of Zantac for his \"acidic\" stomach. Oswaldo declined PPI given all the side effects he has heard about. He will think about this option.     Diarrhea: Encouraged him to try imodium or lomotil if needed. He is waiting for his body to recover on its own.    Jessica Jose PA-C  [facility]  [address] [place] [id number] [phone]    I saw patient and performed the ROS and exam myself.  The assessment and plan were mutually discussed and this note was edited to reflect my findings.  Sabrina Mcguire PA-C      "

## 2018-01-12 NOTE — NURSING NOTE
"Oncology Rooming Note    January 12, 2018 2:04 PM   Oswaldo Win is a 72 year old male who presents for:    Chief Complaint   Patient presents with     Oncology Clinic Visit     f/u Prostate ca     Initial Vitals: /67 (BP Location: Left arm, Patient Position: Sitting, Cuff Size: Adult Regular)  Pulse 102  Temp 95.8  F (35.4  C) (Tympanic)  Resp 16  Ht 1.676 m (5' 5.98\")  Wt 58.5 kg (129 lb)  SpO2 99%  BMI 20.83 kg/m2 Estimated body mass index is 20.83 kg/(m^2) as calculated from the following:    Height as of this encounter: 1.676 m (5' 5.98\").    Weight as of this encounter: 58.5 kg (129 lb). Body surface area is 1.65 meters squared.  No Pain (0) Comment: Data Unavailable   No LMP for male patient.  Allergies reviewed: Yes  Medications reviewed: Yes    Medications: Medication refills not needed today.  Pharmacy name entered into [facility]: [facility] [place], [place] - [address]    Clinical concerns: none Gabriela was NOT notified.    10 minutes for nursing intake (face to face time)     BEBE CESAR LPN            "

## 2018-01-12 NOTE — MR AVS SNAPSHOT
After Visit Summary   1/12/2018    Oswaldo Win    MRN: [id number]           Patient Information     Date Of Birth          1945        Visit Information        Provider Department      1/12/2018 2:10 PM Gabriela Mcguire PA-C [facility]         Follow-ups after your visit        Your next 10 appointments already scheduled     Jan 17, 2018  1:45 PM CST   [facility] Draw with  [facility]   [facility] ([facility])    [address] [place] [address]  [address]  [facility] [address] [phone]            Jan 17, 2018  2:10 PM CST   (Arrive by 1:55 PM)   Return Visit with POLI Bridges [facility] ([facility])    [address] [place] [address]  [address] [place] [address] [phone]              Who to contact     If you have questions or need follow up information about today's clinic visit or your schedule please contact [facility] directly at [phone].  Normal or non-critical lab and imaging results will be communicated to you by CloudAmboÂ®hart, letter or phone within 4 business days after the clinic has received the results. If you do not hear from us within 7 days, please contact the clinic through Avvenut or phone. If you have a critical or abnormal lab result, we will notify you by phone as soon as possible.  Submit refill requests through OpenSilo or call your pharmacy and they will forward the refill request to us. Please allow 3 business days for your refill to be completed.          Additional Information About Your Visit        CloudAmboÂ®hart Information     OpenSilo gives you secure access to your electronic health record. If you see a primary care provider, you can also send messages to your care team and make appointments. If you have questions, please call your primary care clinic.  If you do not have a primary care provider, please call [phone] and  "they will assist you.        Care EveryWhere ID     This is your Care EveryWhere ID. This could be used by other organizations to access your [facility] medical records  [id number]        Your Vitals Were     Pulse Temperature Respirations Height Pulse Oximetry BMI (Body Mass Index)    102 95.8  F (35.4  C) (Tympanic) 16 1.676 m (5' 5.98\") 99% 20.83 kg/m2       Blood Pressure from Last 3 Encounters:   01/12/18 102/67   01/03/18 107/67   12/20/17 (!) 86/58    Weight from Last 3 Encounters:   01/12/18 58.5 kg (129 lb)   12/20/17 61.2 kg (135 lb)   12/06/17 61.5 kg (135 lb 8 oz)              Today, you had the following     No orders found for display         Today's Medication Changes          These changes are accurate as of: 1/12/18  3:08 PM.  If you have any questions, ask your nurse or doctor.               These medicines have changed or have updated prescriptions.        Dose/Directions    * enoxaparin 60 MG/0.6ML injection   Commonly known as:  LOVENOX   This may have changed:  Another medication with the same name was changed. Make sure you understand how and when to take each.   Changed by:  Gabriela Mcguire PA-C        Refills:  0       * enoxaparin 40 MG/0.4ML injection   Commonly known as:  LOVENOX   This may have changed:  when to take this   Used for:  Other chronic pulmonary embolism without acute cor pulmonale (H)   Changed by:  Jessie Mancilla RN        Dose:  40 mg   Inject 0.4 mLs (40 mg) Subcutaneous 2 times daily   Quantity:  60 Syringe   Refills:  3       * Notice:  This list has 2 medication(s) that are the same as other medications prescribed for you. Read the directions carefully, and ask your doctor or other care provider to review them with you.             Primary Care Provider Office Phone # Fax #    Lucrecia Collier [phone] [address]  [facility] [address]        Equal Access to Services     BLADIMIR KEY AH: Hadii john Rivas " sandeep ambikazainab pickenspeter li. So [facility] [phone].    ATENCIÓN: Si ok arango, tiene a mccall disposición servicios gratuitos de asistencia lingüística. Marquise al [phone].    We comply with applicable federal civil rights laws and [place] laws. We do not discriminate on the basis of race, color, national origin, age, disability, sex, sexual orientation, or gender identity.            Thank you!     Thank you for choosing [facility]  for your care. Our goal is always to provide you with excellent care. Hearing back from our patients is one way we can continue to improve our services. Please take a few minutes to complete the written survey that you may receive in the mail after your visit with us. Thank you!             Your Updated Medication List - Protect others around you: Learn how to safely use, store and throw away your medicines at www.disposemymeds.org.          This list is accurate as of: 1/12/18  3:08 PM.  Always use your most recent med list.                   Brand Name Dispense Instructions for use Diagnosis    aspirin 81 MG tablet     90 tablet    Take 1 tablet (81 mg) by mouth daily    Acute on chronic combined systolic and diastolic congestive heart failure (H)       calcium carbonate 1250 MG tablet    OS-RODNEY 500 mg [place]. Ca    120 tablet    Take 1 tablet (1,250 mg) by mouth 2 times daily    Hypocalcemia       dexamethasone 4 MG tablet    DECADRON    12 tablet    Take 1 tablet (4 mg) by mouth daily (with breakfast)    Prostate cancer 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), Metastatic bone tumor (H)       * enoxaparin 60 MG/0.6ML injection    LOVENOX          * enoxaparin 40 MG/0.4ML injection    LOVENOX    60 Syringe    Inject 0.4 mLs (40 mg) Subcutaneous 2 times daily    Other chronic pulmonary embolism without acute cor pulmonale (H)       HYDROcodone-acetaminophen 5-325 MG per tablet    NORCO    30 tablet    Take 1 tablet by mouth every  6 hours as needed for moderate to severe pain Reported on 5/22/2017    Prostate cancer 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), Metastatic bone tumor (H)       LORazepam 0.5 MG tablet    ATIVAN    30 tablet    Take 1 tablet (0.5 mg) by mouth every 4 hours as needed (Anxiety, Nausea/Vomiting or Sleep)    Prostate cancer 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), Metastatic bone tumor (H)       predniSONE 5 MG tablet    DELTASONE    42 tablet    Take 1 tablet (5 mg) by mouth 2 times daily    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 Read the directions carefully, and ask your doctor or other care provider to review them with you.

## 2018-01-23 NOTE — ORAL ONC MGMT
Oral Chemotherapy Monitoring Program    Primary Oncologist: Travis  Primary Oncology Clinic: [facility]  Cancer Diagnosis: Prostate    Drug: Xtandi 40mg - 4 capsules (160mg) once daily  Start Date: Plan to start tomorrow (1/24) in the morning  Dose is appropriate for patients:  Renal Function and  Hepatic Function   Expected duration of therapy: Until disease progression or unacceptable toxicity    Drug Interaction Assessment: No clinically significant drug interactions -Simvastatin -Aspirin -Calcium [OTC] -Vitamin D [OTC]        Lab Monitoring Plan    Monitoring plan:   C1D1+   CMP, CBC C2D1+ Call, CMP, CBC C3D1+   C1D8+  C2D8+  C3D8+   C1D15+ Call C2D15+  C3D15+   C1D22+  C2D22+  C3D22+   CBC monthly for first 3 months, then every 2 months  CMP monthly for first 3 months, then every 2 months       Subjective/Objective:  Oswaldo Win is a 72 year old male seen in clinic for an initial visit for oral chemotherapy education.      ORAL CHEMOTHERAPY 11/14/2016 12/16/2016 2/3/2017 3/4/2017 6/15/2017 1/23/2018   Drug Name Zytiga (Abiraterone) Zytiga (Abiraterone) Zytiga (Abiraterone) Zytiga (Abiraterone) Zytiga (Abiraterone) Xtandi (Enzalutamide)   Current Dosage 1000mg 1000mg 1000mg 1000mg 1000mg 160mg   Current Schedule Daily Daily Daily Daily Daily Daily   Cycle Details Continuous Continuous Continuous Continuous Continuous Continuous   Start Date of Last Cycle 11/12/2016 12/12/2016 - 11/12/2016 - -   Planned next cycle start date - 1/11/2017 - - - 1/24/2018   Doses missed in last 2 weeks 0 0 0 0 0 -   Adherence Assessment - - Adherent Adherent Adherent -   Adverse Effects - Arthralgias - - No AE identified during assessment -   Arthralgias - Grade 1 - - - -   Pharmacist Intervention(arthralgias) - Yes - - - -   Home BPs not done all BPs<140/90 - - all BPs<140/90 -   Any new drug interactions? Yes No - - No No   Is the dose as ordered appropriate for the patient? - - - - - Yes       Last PHQ-2 Score on record:  "  PHQ-2 ( 1999 Pfizer) 1/23/2018 10/18/2017   Q1: Little interest or pleasure in doing things 0 0   Q2: Feeling down, depressed or hopeless 0 0   PHQ-2 Score 0 0       Patient does not report depression symptoms.      Vitals:  BP:   BP Readings from Last 1 Encounters:   01/23/18 96/90     Wt Readings from Last 1 Encounters:   01/23/18 57.6 kg (127 lb)     Estimated body surface area is 1.64 meters squared as calculated from the following:    Height as of an earlier encounter on 1/23/18: 1.676 m (5' 5.98\").    Weight as of an earlier encounter on 1/23/18: 57.6 kg (127 lb).      Labs:  Lab Results   Component Value Date     01/23/2018      Lab Results   Component Value Date    POTASSIUM 4.2 01/23/2018     Lab Results   Component Value Date    RODNEY 9.0 01/23/2018     Lab Results   Component Value Date    MAG 2.0 04/05/2017     Lab Results   Component Value Date    PHOS 2.3 04/05/2017     Lab Results   Component Value Date    ALBUMIN 3.8 01/23/2018     Lab Results   Component Value Date    BUN 15 01/23/2018     Lab Results   Component Value Date    CR 0.94 01/23/2018       Lab Results   Component Value Date    AST 38 01/23/2018     Lab Results   Component Value Date    ALT 15 01/23/2018     Lab Results   Component Value Date    BILITOTAL 1.1 01/23/2018       Lab Results   Component Value Date    WBC 4.4 01/23/2018     Lab Results   Component Value Date    HGB 9.5 01/23/2018     Lab Results   Component Value Date     01/23/2018     Lab Results   Component Value Date    ANEU 3.2 01/23/2018       Assessment:  Patient is appropriate to start therapy.    Plan:  Basic chemotherapy teaching was reviewed with the patient including indication, start date of therapy, dose, administration, adverse effects, missed doses, food and drug interactions, monitoring, side effect management, office contact information, and safe handling. Written materials were provided and all questions answered to patient stated " satisfaction.    Follow-Up:  CBC and CMP qmonth x 3 months then [id number] and prn.  Oral Chemotherapy Monitoring pharmacist follow up in 1 week.  Oncology appointment and labs scheduled 2/6/2018.       Thank you for the opportunity to participate in the care of the above patient,    Antony Vail, PharmD  Oral Chemotherapy Monitoring Program  [facility]  [phone]

## 2018-01-23 NOTE — Clinical Note
1/23/2018       RE: Oswaldo Win  [address]  [facility] [address]     Dear Colleague,    Thank you for referring your patient, Oswaldo Win, to the [facility]. Please see a copy of my visit note below.    No notes on file    Again, thank you for allowing me to participate in the care of your patient.      Sincerely,    Gabriela Mcguire PA-C

## 2018-01-23 NOTE — NURSING NOTE
Chief Complaint   Patient presents with     Blood Draw     Labs drawn from vpt by RN. Vs taken and pt checked in for appt     Labs collected from venipuncture by RN. Vitals taken. Checked in for appointment(s).    Ceci Zarate RN

## 2018-01-23 NOTE — NURSING NOTE
"Oncology Rooming Note    January 23, 2018 12:52 PM   Oswaldo Win is a 72 year old male who presents for:    Chief Complaint   Patient presents with     Blood Draw     Labs drawn from vpt by RN. Vs taken and pt checked in for appt     Oncology Clinic Visit     Return visit related to Prostrate Cancer     Initial Vitals: BP 96/90 (BP Location: Left arm, Patient Position: Sitting, Cuff Size: Adult Regular)  Pulse 102  Temp 97.7  F (36.5  C) (Oral)  Resp 16  Ht 1.676 m (5' 5.98\")  Wt 57.6 kg (127 lb)  SpO2 99%  BMI 20.51 kg/m2 Estimated body mass index is 20.51 kg/(m^2) as calculated from the following:    Height as of this encounter: 1.676 m (5' 5.98\").    Weight as of this encounter: 57.6 kg (127 lb). Body surface area is 1.64 meters squared.  No Pain (0) Comment: Data Unavailable   No LMP for male patient.  Allergies reviewed: Yes  Medications reviewed: Yes    Medications: Medication refills not needed today.  Pharmacy name entered into [facility]: [facility] [place], [place] - [address]    Clinical concerns: No new concerns. Provider was notified.    10 minutes for nursing intake (face to face time)     Amirah Angulo LPN            "

## 2018-01-23 NOTE — MR AVS SNAPSHOT
After Visit Summary   1/23/2018    Oswaldo Win    MRN: [id number]           Patient Information     Date Of Birth          1945        Visit Information        Provider Department      1/23/2018 12:30 PM Gabriela Mcguire PA-C [facility]        Today's Diagnoses     Metastatic bone tumor (H)           Follow-ups after your visit        Your next 10 appointments already scheduled     Feb 06, 2018  8:45 AM CST   [facility] DRAW   [facility] Lab Draw ([facility])    [address] [place] [address]  [address]  [facility] [address] [phone]            Feb 06, 2018  9:30 AM CST   (Arrive by 9:15 AM)   Return Visit with POLI Bridges [facility] ([facility])    [address] [place] [address]  [address]  [facility] [address] [phone]              Who to contact     If you have questions or need follow up information about today's clinic visit or your schedule please contact [facility] directly at [phone].  Normal or non-critical lab and imaging results will be communicated to you by MyChart, letter or phone within 4 business days after the clinic has received the results. If you do not hear from us within 7 days, please contact the clinic through [employer] or phone. If you have a critical or abnormal lab result, we will notify you by phone as soon as possible.  Submit refill requests through [employer] or call your pharmacy and they will forward the refill request to us. Please allow 3 business days for your refill to be completed.          Additional Information About Your Visit        MyChart Information     [employer] gives you secure access to your electronic health record. If you see a primary care provider, you can also send messages to your care team and make appointments. If you have questions, please call your primary care clinic.  If you do not  "have a primary care provider, please call [phone] and they will assist you.        Care EveryWhere ID     This is your Care EveryWhere ID. This could be used by other organizations to access your [place] medical records  [id number]        Your Vitals Were     Pulse Temperature Respirations Height Pulse Oximetry BMI (Body Mass Index)    102 97.7  F (36.5  C) (Oral) 16 1.676 m (5' 5.98\") 99% 20.51 kg/m2       Blood Pressure from Last 3 Encounters:   01/23/18 96/90   01/12/18 102/67   01/03/18 107/67    Weight from Last 3 Encounters:   01/23/18 57.6 kg (127 lb)   01/12/18 58.5 kg (129 lb)   12/20/17 61.2 kg (135 lb)              We Performed the Following     CBC with platelets differential     Comprehensive metabolic panel     PSA tumor marker          Today's Medication Changes          These changes are accurate as of: 1/23/18  2:40 PM.  If you have any questions, ask your nurse or doctor.               These medicines have changed or have updated prescriptions.        Dose/Directions    * enoxaparin 60 MG/0.6ML injection   Commonly known as:  LOVENOX   This may have changed:  Another medication with the same name was changed. Make sure you understand how and when to take each.   Changed by:  Gabriela Mcguire PA-C        Refills:  0       * enoxaparin 40 MG/0.4ML injection   Commonly known as:  LOVENOX   This may have changed:  when to take this   Used for:  Other chronic pulmonary embolism without acute cor pulmonale (H)   Changed by:  Jessie Mancilla RN        Dose:  40 mg   Inject 0.4 mLs (40 mg) Subcutaneous 2 times daily   Quantity:  60 Syringe   Refills:  3       * Notice:  This list has 2 medication(s) that are the same as other medications prescribed for you. Read the directions carefully, and ask your doctor or other care provider to review them with you.             Primary Care Provider Office Phone # Fax #    Lucrecia Collier [phone] [address] [facility] " [place] [address]        Equal Access to Services     [facility] YESI : Hadii jesse goetz isabel Ybarra, waesperanzada luqmarya, qaconcepcionta kamikkiinna knapp, peter palmaalanevelyn brock. So [facility] [phone].    ATENCIÓN: Si habla español, tiene a mccall disposición servicios gratuitos de asistencia lingüística. Llame al [phone].    We comply with applicable federal civil rights laws and [place] laws. We do not discriminate on the basis of race, color, national origin, age, disability, sex, sexual orientation, or gender identity.            Thank you!     Thank you for choosing [facility]  for your care. Our goal is always to provide you with excellent care. Hearing back from our patients is one way we can continue to improve our services. Please take a few minutes to complete the written survey that you may receive in the mail after your visit with us. Thank you!             Your Updated Medication List - Protect others around you: Learn how to safely use, store and throw away your medicines at www.disposemymeds.org.          This list is accurate as of: 1/23/18  2:40 PM.  Always use your most recent med list.                   Brand Name Dispense Instructions for use Diagnosis    aspirin 81 MG tablet     90 tablet    Take 1 tablet (81 mg) by mouth daily    Acute on chronic combined systolic and diastolic congestive heart failure (H)       calcium carbonate 1250 MG tablet    OS-RODNEY 500 mg [place]. Ca    120 tablet    Take 1 tablet (1,250 mg) by mouth 2 times daily    Hypocalcemia       dexamethasone 4 MG tablet    DECADRON    12 tablet    Take 1 tablet (4 mg) by mouth daily (with breakfast)    Prostate cancer 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), Metastatic bone tumor (H)       * enoxaparin 60 MG/0.6ML injection    LOVENOX          * enoxaparin 40 MG/0.4ML injection    LOVENOX    60 Syringe    Inject 0.4 mLs (40 mg) Subcutaneous 2 times daily    Other chronic pulmonary embolism without acute cor pulmonale (H)        enzalutamide 40 MG capsule    XTANDI    120 capsule    Take 4 capsules (160 mg) by mouth daily    Metastatic bone tumor (H)       HYDROcodone-acetaminophen 5-325 MG per tablet    NORCO    30 tablet    Take 1 tablet by mouth every 6 hours as needed for moderate to severe pain Reported on 5/22/2017    Prostate cancer 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), Metastatic bone tumor (H)       LORazepam 0.5 MG tablet    ATIVAN    30 tablet    Take 1 tablet (0.5 mg) by mouth every 4 hours as needed (Anxiety, Nausea/Vomiting or Sleep)    Prostate cancer 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), Metastatic bone tumor (H)       predniSONE 5 MG tablet    DELTASONE    42 tablet    Take 1 tablet (5 mg) by mouth 2 times daily    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 Read the directions carefully, and ask your doctor or other care provider to review them with you.

## 2018-01-23 NOTE — LETTER
1/23/2018       RE: Oswaldo Win  [address]  [facility] [address]     Dear Colleague,    Thank you for referring your patient, Oswaldo Win, to the [facility]. Please see a copy of my visit note below.    Please refer to Onc Management Note    Again, thank you for allowing me to participate in the care of your patient.      Sincerely,    Antony Vail RPH

## 2018-01-23 NOTE — PROGRESS NOTES
Oncology/Hematology Visit Note  Jan 23, 2018    DIAGNOSIS:  Mr. Win is a 72 year old male with a hx of CHF, CKD, CAD w/ hx of STEMI and CABG. He met with Dr. Robles at the end of July for consultation regarding metastatic prostate cancer. He presented in May 2015 when he saw his PCP for back pain. He also had poor appetite, weight loss and fatigue.  CT showed diffuse bony mets. CT Chest showed sclerotic mets throughout the bones. He was given degarelix on 7/22 in Urology. He was having pelvic pain and was evaluated by Dr Cavazos would did not feel XRT was appropriate.  He started on docetaxel 7/31/15 at 75 mg/m2 and only had one dose before being hospitalized several times.     His PSA remained stable so he was continued only on Lupron for the rest of 2015.  In December 2015, Mainor met with Dr Robles and since his ECOG was back to a 1, they discussed re-challenging the Taxotere at a lower dose and he completed 5 cycles at 60 mg/m2. On 9/26/2016 he started Provenge off study. He received 3 doses of Provenge (last 10/28), on 3rd dose had fevers, chills, myalgias, was bedbound for a week due to these symptoms.    Mainor was briefly on Zytiga and prednisone in November-December 2016, however after one month his PSA went up, thus it was decided to add in Xofigo (radium 223) starting in Jan 2017.     Diagnosed with PE in Nov 2016. He had bleeding on Xarelto so was changed back to Lovenox.     Mainor developed diarrhea on and off in the spring of 2017, Zytiga was stopped on 3/31 as it was unsure what was contributing.  Mainor felt it was related to the Xofigo and that his dose was too high as his weight is always at least 10 pounds higher in clinic than at home.      At his last visit with Dr Robles, his PSA was rising again and re-started Taxotere on 8/15/17.    He continues on lupron every 3 mos and XGEVA.  The last 2 cycles of Taxotere have been held due to weakness and he is here today for follow-up to discuss either continuing Taxotere or  "starting Xtandi.    INTERVAL HISTORY: Mainor arrives unaccompanied today and overall doing better.  The last 2 cycles of Taxotere have been held due to weakness and he feels that he has recovered well over the last week.  He admits to increased energy and his fatigue has been less.  Unfortunately, the snowstorm last night nearly trapped him in his house and he had to stop through the snow, which made him fatigued.  Likely, his neighbor had a shovel and he was able to get out of the driveway.  He admits that his appetite has been better and has been increasing his food and liquid intake.  He does admit to diarrhea for the last 3-4 weeks, however it has improved.  He does admit to one episode today and denies any hematochezia of bright red blood or melena.  Denies any recent antibiotics or recent traveling outside the country.  He has not tried any Imodium, however does have some at home.  He admits to one episode of vomiting yesterday, however states that this is chronic for him when he has not eaten in a while and the \"gases start to build up\".  He denies hematemesis and his symptoms are much better with eating.  Denies fever chills or new cough.  He does have chronic cough and his history of emphysema.  He is urinating normally.  Denies any new chest pain or shortness of breath.  Denies any skin changes denies headaches or vision changes, denies abdominal pain.    A complete 10-pt ROS is negative other than what is reported above      MEDICATIONS:   Current Outpatient Prescriptions   Medication Sig     enzalutamide (XTANDI) 40 MG capsule Take 4 capsules (160 mg) by mouth daily     enoxaparin (LOVENOX) 60 MG/0.6ML injection      dexamethasone (DECADRON) 4 MG tablet Take 1 tablet (4 mg) by mouth daily (with breakfast)     calcium carbonate (OS-RODNEY 500 MG [place]. CA) 1250 MG tablet Take 1 tablet (1,250 mg) by mouth 2 times daily     HYDROcodone-acetaminophen (NORCO) 5-325 MG per tablet Take 1 tablet by mouth every 6 hours " "as needed for moderate to severe pain Reported on 5/22/2017     predniSONE (DELTASONE) 5 MG tablet Take 1 tablet (5 mg) by mouth 2 times daily     LORazepam (ATIVAN) 0.5 MG tablet Take 1 tablet (0.5 mg) by mouth every 4 hours as needed (Anxiety, Nausea/Vomiting or Sleep)     enoxaparin (LOVENOX) 40 MG/0.4ML injection Inject 0.4 mLs (40 mg) Subcutaneous 2 times daily (Patient taking differently: Inject 40 mg Subcutaneous daily )     aspirin 81 MG tablet Take 1 tablet (81 mg) by mouth daily     Cholecalciferol (VITAMIN D) 2000 UNITS tablet Take 1 tablet by mouth daily     simvastatin (ZOCOR) 40 MG tablet TAKE ONE TABLET BY MOUTH AT BEDTIME     No current facility-administered medications for this visit.      Facility-Administered Medications Ordered in Other Visits   Medication     lidocaine 0.5 % injection 1 mL     hydrocortisone sodium succinate (solu-CORTEF) injection     glycopyrrolate (ROBINUL) injection     neostigmine (PROSTIGMINE) injection     protamine injection     albuterol (PROAIR HFA, PROVENTIL HFA, VENTOLIN HFA) inhaler          ALLERGIES:  No Known Allergies    PHYSICAL EXAMINATION:  Vitals: BP 96/90 (BP Location: Left arm, Patient Position: Sitting, Cuff Size: Adult Regular)  Pulse 102  Temp 97.7  F (36.5  C) (Oral)  Resp 16  Ht 1.676 m (5' 5.98\")  Wt 57.6 kg (127 lb)  SpO2 99%  BMI 20.51 kg/m2   Wt Readings from Last 10 Encounters:   01/23/18 57.6 kg (127 lb)   01/12/18 58.5 kg (129 lb)   12/20/17 61.2 kg (135 lb)   12/06/17 61.5 kg (135 lb 8 oz)   11/22/17 61.2 kg (135 lb)   11/13/17 61.2 kg (135 lb)   11/08/17 61.2 kg (135 lb)   11/01/17 61.2 kg (135 lb)   10/18/17 60.8 kg (134 lb)   10/18/17 60.8 kg (134 lb)       GENERAL:  A pleasant person in no acute distress.  Mildly pale and thin looking  LYMPH NODES:  No peripheral lymphadenopathy noted in the supraclavicular or cervical regions.   LUNGS:  Clear to auscultation bilaterally.   HEART:  Regular rate and rhythm   ABDOMEN:  Bowel sounds " are active.  Soft and nontender.  No hepatosplenomegaly or other masses appreciated.  LOWER EXTREMITIES:  Without pitting edema to the knees bilaterally.   NEUROLOGICAL:  Alert/orientated/able to answer all questions.  CN grossly intact.       LAB:   Results for USAMA MANRIQUE (MRN [id number]) as of 1/23/2018 15:23   1/23/2018 12:33   Sodium 135   Potassium 4.2   Chloride 106   Carbon Dioxide 19 (L)   Urea Nitrogen 15   Creatinine 0.94   GFR Estimate 78   GFR Estimate If Black >90   Calcium 9.0   Anion Gap 10   Albumin 3.8   Protein Total 7.7   Bilirubin Total 1.1   Alkaline Phosphatase 144   ALT 15   AST 38   .00 (H)   Glucose 140 (H)   WBC 4.4   Hemoglobin 9.5 (L)   Hematocrit 29.1 (L)   Platelet Count 113 (L)   RBC Count 2.91 (L)      MCH 32.6   MCHC 32.6   RDW 14.6   Diff Method Automated Method   % Neutrophils 71.4   % Lymphocytes 12.9   % Monocytes 10.2   % Eosinophils 4.5   % Basophils 0.5   % Immature Granulocytes 0.5   Nucleated RBCs 0   Absolute Neutrophil 3.2   Absolute Lymphocytes 0.6 (L)   Absolute Monocytes 0.5   Absolute Eosinophils 0.2   Absolute Basophils 0.0   Abs Immature Granulocytes 0.0   Absolute Nucleated RBC 0.0       IMPRESSION/PLAN:   Metastatic castrate-resistant prostate cancer: He had progression on Zytiga and Xofigo in July 2017.  Now back on IV Taxotere every 3 weeks since August 2017. Has received six cycles thus far. Last dose was C6 on 12/6/17. His PSA significantly increased from 379 --> 580 after cycle 5, which was concerning for disease progression. After cycle 6, his PSA has decreased to 474, however  Increased again at 664 today.  Alkaline phosphatase has decreased. Taxotere was delayed the last 2 weeks due to weakness and dehydration.  He is certainly doing much better today, however he would like to try Xtandi and hold off on further Taxotere for now. Discussed this with pharmacy and they will assist with the medication. We will have him return in 2 weeks for  "evaluation.  At that time he will also receive his Lupron and Xgeva injections.  --2/6/18 Sabrina Mcguire, labs, Xgeva, Lupron  .   Diarrhea: Still ongoing for 3-4 weeks, however improving.  Reiterated the importance of trying Imodium at home.  He also will try small meals several times a day.  No evidence of dehydration on labs today.    Anemia: Hemoglobin 9.5 today and this is similar to baseline.  We need to keep him above 9 given his CAD.      Heme: PE dx 11/2016, continue Lovenox at 40 mg BID      Bone mets: On Xgeva and will give in 2 weeks.    CV: hx of CHF. No clinical s/s of failure.  Off metoprolol due to BP being too low.      Nausea: Discussed trial of Zantac for his \"acidic\" stomach. Oswaldo declined PPI given all the side effects he has heard about.       Latoya Pond PA-C    I saw patient and performed the ROS and exam myself.  The assessment and plan were mutually discussed and this note was edited to reflect my findings.  Sabrina Mcguire PA-C      "

## 2018-01-23 NOTE — MR AVS SNAPSHOT
After Visit Summary   1/23/2018    Oswaldo Win    MRN: [id number]           Patient Information     Date Of Birth          1945        Visit Information        Provider Department      1/23/2018 2:52 PM Antony Vail [facility]        Today's Diagnoses     Prostate cancer 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)    -  1    Encounter for long-term (current) use of medications           Follow-ups after your visit        Your next 10 appointments already scheduled     Feb 06, 2018  8:45 AM CST   [facility] Lab Draw with [facility] LAB DRAW   [facility] Lab Draw ([facility])    [address] [place] [address]  [address]  [facility] [id number] [phone]            Feb 06, 2018  9:30 AM CST   (Arrive by 9:15 AM)   Return Visit with Gabriela Mcguire PA-C   [facility] Cancer [facility] ([facility])    [address] [place] [address]  [address]  [facility] [id number] [phone]              Future tests that were ordered for you today     Open Standing Orders        Priority Remaining Interval Expires Ordered    CBC with platelets and differential Routine 99/99 qmonth and prn 1/23/2019 1/23/2018    Comprehensive metabolic panel (BMP + Alb, Alk Phos, ALT, AST, Total. Bili, TP) Routine 99/99 qmonth and prn 1/23/2019 1/23/2018            Who to contact     If you have questions or need follow up information about today's clinic visit or your schedule please contact [facility] directly at [phone].  Normal or non-critical lab and imaging results will be communicated to you by MyChart, letter or phone within 4 business days after the clinic has received the results. If you do not hear from us within 7 days, please contact the clinic through MyChart or phone. If you have a critical or abnormal lab result, we will notify you by phone as soon as possible.  Submit refill requests through [employer] or call your pharmacy and they  will forward the refill request to us. Please allow 3 business days for your refill to be completed.          Additional Information About Your Visit        [employer] Information     [employer] gives you secure access to your electronic health record. If you see a primary care provider, you can also send messages to your care team and make appointments. If you have questions, please call your primary care clinic.  If you do not have a primary care provider, please call [phone] and they will assist you.        Care EveryWhere ID     This is your Care EveryWhere ID. This could be used by other organizations to access your [place] medical records  [id number]         Blood Pressure from Last 3 Encounters:   01/23/18 96/90   01/12/18 102/67   01/03/18 107/67    Weight from Last 3 Encounters:   01/23/18 57.6 kg (127 lb)   01/12/18 58.5 kg (129 lb)   12/20/17 61.2 kg (135 lb)                 Today's Medication Changes          These changes are accurate as of: 1/23/18  3:27 PM.  If you have any questions, ask your nurse or doctor.               These medicines have changed or have updated prescriptions.        Dose/Directions    * enoxaparin 60 MG/0.6ML injection   Commonly known as:  LOVENOX   This may have changed:  Another medication with the same name was changed. Make sure you understand how and when to take each.   Changed by:  Gabriela Mcguire PA-C        Refills:  0       * enoxaparin 40 MG/0.4ML injection   Commonly known as:  LOVENOX   This may have changed:  when to take this   Used for:  Other chronic pulmonary embolism without acute cor pulmonale (H)   Changed by:  Jessie Mancilla RN        Dose:  40 mg   Inject 0.4 mLs (40 mg) Subcutaneous 2 times daily   Quantity:  60 Syringe   Refills:  3       * Notice:  This list has 2 medication(s) that are the same as other medications prescribed for you. Read the directions carefully, and ask your doctor or other care provider to review them with you.              Primary Care Provider Office Phone # Fax #    Lucrecia Collier [phone] [address]  [facility] [address]        Equal Access to Services     [facility] : Hadii jesse goetz isabel Ybarra, waesperanzada luqmarya, qaybta kamikkida aria, peter baldwin laKikecayla brock. So [facility] [phone].    ATENCIÓN: Si habla español, tiene a mccall disposición servicios gratuitos de asistencia lingüística. Llame al [phone].    We comply with applicable federal civil rights laws and [place] laws. We do not discriminate on the basis of race, color, national origin, age, disability, sex, sexual orientation, or gender identity.            Thank you!     Thank you for choosing [facility]  for your care. Our goal is always to provide you with excellent care. Hearing back from our patients is one way we can continue to improve our services. Please take a few minutes to complete the written survey that you may receive in the mail after your visit with us. Thank you!             Your Updated Medication List - Protect others around you: Learn how to safely use, store and throw away your medicines at www.disposemymeds.org.          This list is accurate as of: 1/23/18  3:27 PM.  Always use your most recent med list.                   Brand Name Dispense Instructions for use Diagnosis    aspirin 81 MG tablet     90 tablet    Take 1 tablet (81 mg) by mouth daily    Acute on chronic combined systolic and diastolic congestive heart failure (H)       calcium carbonate 1250 MG tablet    OS-RODNEY 500 mg [place]. Ca    120 tablet    Take 1 tablet (1,250 mg) by mouth 2 times daily    Hypocalcemia       dexamethasone 4 MG tablet    DECADRON    12 tablet    Take 1 tablet (4 mg) by mouth daily (with breakfast)    Prostate cancer 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), Metastatic bone tumor (H)       * enoxaparin 60 MG/0.6ML injection    LOVENOX          * enoxaparin 40 MG/0.4ML injection    LOVENOX    60  Syringe    Inject 0.4 mLs (40 mg) Subcutaneous 2 times daily    Other chronic pulmonary embolism without acute cor pulmonale (H)       enzalutamide 40 MG capsule    XTANDI    120 capsule    Take 4 capsules (160 mg) by mouth daily    Metastatic bone tumor (H)       HYDROcodone-acetaminophen 5-325 MG per tablet    NORCO    30 tablet    Take 1 tablet by mouth every 6 hours as needed for moderate to severe pain Reported on 5/22/2017    Prostate cancer 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), Metastatic bone tumor (H)       LORazepam 0.5 MG tablet    ATIVAN    30 tablet    Take 1 tablet (0.5 mg) by mouth every 4 hours as needed (Anxiety, Nausea/Vomiting or Sleep)    Prostate cancer 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), Metastatic bone tumor (H)       predniSONE 5 MG tablet    DELTASONE    42 tablet    Take 1 tablet (5 mg) by mouth 2 times daily    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 Read the directions carefully, and ask your doctor or other care provider to review them with you.

## 2018-01-30 NOTE — TELEPHONE ENCOUNTER
Oral Chemotherapy Monitoring Program    Placed call to patient in follow up of Xtandi therapy.    Left message to please call back in follow up of therapy. No patient or drug names were mentioned.    Kristen Weiler, Pharmacy Intern  Oral Chemotherapy Monitoring Program   [facility]  [phone]

## 2018-02-02 NOTE — TELEPHONE ENCOUNTER
"Oral Chemotherapy Monitoring Program    Primary Oncologist: Dr. Robles  Primary Oncology Clinic: [facility]  Cancer Diagnosis: Prostate Cancer    Start Date: 1/24/2018  Therapy: Xtandi 160 mg (4 x 40 mg) QD continuously    Drug Interaction Assessment: Xtandi + simvastatin = could decrease the concentration of simvastatin. Sabrina is not concerned about drug interaction.    Lab Monitoring Plan  CBC monthly for first 3 months, then every 2 months CMP monthly for first 3 months, then every 2 months  Subjective/Objective:  Oswaldo Win is a 72 year old male contacted by phone for a follow-up visit for oral chemotherapy.  Mainor confirms that he has been taking the xtandi and has not missed any doses. He reports no side effects or changes since starting the medication. He says his fatigue has generally been low, but has not changed since starting the medication. Mainor says he has some diarrhea, but it has improved since eating more frequently during the day. He reports that his appetite is \"alright\" and has improved. He frequently eats fruits, vegetables, and protein shakes. He reports drinking about 40 ounces of fluid a day. Mainor is sleeping better at night, but still has some difficulties say asleep and frequently wakes up at 3 a.m.. If he does anything physical he has \"burning\" muscular pain. He denies edema and hot flashes. Mainor thanked me for the call.    ORAL CHEMOTHERAPY 11/14/2016 12/16/2016 2/3/2017 3/4/2017 6/15/2017 1/23/2018 2/2/2018   Drug Name Zytiga (Abiraterone) Zytiga (Abiraterone) Zytiga (Abiraterone) Zytiga (Abiraterone) Zytiga (Abiraterone) Xtandi (Enzalutamide) Xtandi (Enzalutamide)   Current Dosage 1000mg 1000mg 1000mg 1000mg 1000mg 160mg 160mg   Current Schedule Daily Daily Daily Daily Daily Daily Daily   Cycle Details Continuous Continuous Continuous Continuous Continuous Continuous Continuous   Start Date of Last Cycle 11/12/2016 12/12/2016 - 11/12/2016 - - -   Planned next cycle start date - " "1/11/2017 - - - 1/24/2018 1/24/2018   Doses missed in last 2 weeks 0 0 0 0 0 - 0   Adherence Assessment - - Adherent Adherent Adherent - Adherent   Adverse Effects - Arthralgias - - No AE identified during assessment - No AE identified during assessment   Arthralgias - Grade 1 - - - - -   Pharmacist Intervention(arthralgias) - Yes - - - - -   Home BPs not done all BPs<140/90 - - all BPs<140/90 - not needed   Any new drug interactions? Yes No - - No No No   Is the dose as ordered appropriate for the patient? - - - - - Yes Yes       Vitals:  BP:   BP Readings from Last 1 Encounters:   01/23/18 96/90     Wt Readings from Last 1 Encounters:   01/23/18 57.6 kg (127 lb)     Estimated body surface area is 1.64 meters squared as calculated from the following:    Height as of 1/23/18: 1.676 m (5' 5.98\").    Weight as of 1/23/18: 57.6 kg (127 lb).    Labs:  Results in Past 30 Days  Result Component Current Result Ref Range   Sodium 135 (1/23/2018) 133 - 144 mmol/L     Results in Past 30 Days  Result Component Current Result Ref Range   Potassium 4.2 (1/23/2018) 3.4 - 5.3 mmol/L     Results in Past 30 Days  Result Component Current Result Ref Range   Calcium 9.0 (1/23/2018) 8.5 - 10.1 mg/dL     No results found for Mag within last 30 days.     No results found for Phos within last 30 days.     Results in Past 30 Days  Result Component Current Result Ref Range   Albumin 3.8 (1/23/2018) 3.4 - 5.0 g/dL     Results in Past 30 Days  Result Component Current Result Ref Range   Urea Nitrogen 15 (1/23/2018) 7 - 30 mg/dL     Results in Past 30 Days  Result Component Current Result Ref Range   Creatinine 0.94 (1/23/2018) 0.66 - 1.25 mg/dL       Results in Past 30 Days  Result Component Current Result Ref Range   AST 38 (1/23/2018) 0 - 45 U/L     Results in Past 30 Days  Result Component Current Result Ref Range   ALT 15 (1/23/2018) 0 - 70 U/L     Results in Past 30 Days  Result Component Current Result Ref Range   Bilirubin Total 1.1 " (1/23/2018) 0.2 - 1.3 mg/dL       Results in Past 30 Days  Result Component Current Result Ref Range   WBC 4.4 (1/23/2018) 4.0 - 11.0 10e9/L     Results in Past 30 Days  Result Component Current Result Ref Range   Hemoglobin 9.5 (L) (1/23/2018) 13.3 - 17.7 g/dL     Results in Past 30 Days  Result Component Current Result Ref Range   Platelet Count 113 (L) (1/23/2018) 150 - 450 10e9/L     Results in Past 30 Days  Result Component Current Result Ref Range   Absolute Neutrophil 3.2 (1/23/2018) 1.6 - 8.3 10e9/L       Assessment:  Mainor is tolerating the first week of Xtandi well with minimal side effects. The fatigue and decreased appetite are possibly secondary to the Xtandi. Recommended he continue to drink plenty of fluids during the day (64 ounce). No other pharmacological interventions made.     Plan:  -Continue Xtandi 160 mg daily   -Monitor side effects  -Increase fluid intake     Follow-Up:  Review Sabrina appointment and labs on February 6th    Tamara Merino   Pharmacy Intern  [facility]   [phone]

## 2018-02-06 NOTE — NURSING NOTE
Xgeva injection given in right arm subcutaneously. Patient tolerated injection  Yuki Mcclure CMA

## 2018-02-06 NOTE — NURSING NOTE
"Oncology Rooming Note    February 6, 2018 9:06 AM   Oswaldo Win is a 72 year old male who presents for:    Chief Complaint   Patient presents with     Blood Draw     Oncology Clinic Visit     Prostate CA 2 week f/u     Initial Vitals: BP 92/63  Pulse 104  Temp 98.1  F (36.7  C)  Resp 18  Wt 57.6 kg (127 lb)  SpO2 98%  BMI 20.51 kg/m2 Estimated body mass index is 20.51 kg/(m^2) as calculated from the following:    Height as of 1/23/18: 1.676 m (5' 5.98\").    Weight as of this encounter: 57.6 kg (127 lb). Body surface area is 1.64 meters squared.  Data Unavailable Comment: ankle pain not rated   No LMP for male patient.  Allergies reviewed: Yes  Medications reviewed: Yes    Medications: Medication refills not needed today.  Pharmacy name entered into [facility]: [facility] [place], [place] - [address]    Clinical concerns: Patient states there are no new concerns to discuss with provider.  Sabrina Mcguire was not notified.       8 minutes for nursing intake (face to face time)     Netta Duran CMA              "

## 2018-02-06 NOTE — PROGRESS NOTES
Received call from pt. Pt states about 1 to 1.5 hours after he received his lupron and xgeva injections today he developed aches and pains all over his body. States the aches and pains seems to be muscular. Denies any fevers or chills. Has not experienced this in the past with xgeva and lupron injections. Took 1/2 norco around 1 PM and feels like the aches are starting to subside.  Discussed that body aches could possibly be from the xgeva, although, he has never experienced this in the past. Encouraged him to continue to monitor sx and contact clinic if body aches continue or develops any fevers, chills or new sx. Pt verbalized good understanding. Sabrina Mcguire PA-C updated.

## 2018-02-06 NOTE — MR AVS SNAPSHOT
After Visit Summary   2/6/2018    Oswaldo Win    MRN: [id number]           Patient Information     Date Of Birth          1945        Visit Information        Provider Department      2/6/2018 9:30 AM Gabriela Mcguire PA-C [facility]        Today's Diagnoses     Metastatic bone tumor (H)    -  1    Prostate cancer 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)           Follow-ups after your visit        Your next 10 appointments already scheduled     Feb 20, 2018  9:45 AM CST   [facility] Lab Draw with  Dura[facility] LAB DRAW   [facility] Lab Draw ([facility])    [address] [place] [address]  [address]  [facility] [address]   [phone]            Feb 20, 2018 10:20 AM CST   (Arrive by 10:05 AM)   Return Visit with Gabriela Mcguire PA-C   [facility] ([facility])    [address] [place] [address]  [address]  [facility] [address]   [phone]            Mar 21, 2018  9:30 AM CDT   [facility] Lab Draw with  DuraFizz LAB DRAW   [facility] Lab Draw ([facility])    [address] [place] [address]  [address]  [facility] [address]   [phone]            Mar 21, 2018 10:00 AM CDT   (Arrive by 9:45 AM)   Return Visit with Bentley Robles MD   [facility] ([facility])    [address] [place] [address]  [address]  [facility] [address]   [phone]              Who to contact     If you have questions or need follow up information about today's clinic visit or your schedule please contact [facility] directly at [phone].  Normal or non-critical lab and imaging results will be communicated to you by MyChart, letter or phone within 4 business days after the clinic has received the results. If you do not hear from us within 7 days, please contact the clinic through MyChart or phone. If you have a critical or abnormal lab result, we will notify you by  phone as soon as possible.  Submit refill requests through [employer]ic[employer] or call your pharmacy and they will forward the refill request to us. Please allow 3 business days for your refill to be completed.          Additional Information About Your Visit        [employer]icabit Information     [employer] gives you secure access to your electronic health record. If you see a primary care provider, you can also send messages to your care team and make appointments. If you have questions, please call your primary care clinic.  If you do not have a primary care provider, please call [phone] and they will assist you.        Care EveryWhere ID     This is your Care EveryWhere ID. This could be used by other organizations to access your [facility] medical records  [id number]        Your Vitals Were     Pulse Temperature Respirations Pulse Oximetry BMI (Body Mass Index)       104 98.1  F (36.7  C) 18 98% 20.51 kg/m2        Blood Pressure from Last 3 Encounters:   02/06/18 92/63   01/23/18 96/90   01/12/18 102/67    Weight from Last 3 Encounters:   02/06/18 57.6 kg (127 lb)   01/23/18 57.6 kg (127 lb)   01/12/18 58.5 kg (129 lb)              We Performed the Following     CBC with platelets differential     Comprehensive metabolic panel     PSA tumor marker          Today's Medication Changes          These changes are accurate as of 2/6/18 10:13 AM.  If you have any questions, ask your nurse or doctor.               These medicines have changed or have updated prescriptions.        Dose/Directions    enoxaparin 40 MG/0.4ML injection   Commonly known as:  LOVENOX   This may have changed:    - when to take this  - Another medication with the same name was removed. Continue taking this medication, and follow the directions you see here.   Used for:  Other chronic pulmonary embolism without acute cor pulmonale (H)        Dose:  40 mg   Inject 0.4 mLs (40 mg) Subcutaneous 2 times daily   Quantity:  60 Syringe   Refills:  3                 Primary Care Provider Office Phone # Fax #    Lucrecia Collier [phone] [address]  [facility] [address]        Equal Access to Services     BLADIMIR KEY : Hadii jesse goetz isabel Ybarra, waesperanzada luqmarya, qaybta kamikkida aria, peter baldwin laKikecayal brock. So [facility] [phone].    ATENCIÓN: Si habla español, tiene a mccall disposición servicios gratuitos de asistencia lingüística. Llame al [phone].    We comply with applicable federal civil rights laws and [place] laws. We do not discriminate on the basis of race, color, national origin, age, disability, sex, sexual orientation, or gender identity.            Thank you!     Thank you for choosing [facility]  for your care. Our goal is always to provide you with excellent care. Hearing back from our patients is one way we can continue to improve our services. Please take a few minutes to complete the written survey that you may receive in the mail after your visit with us. Thank you!             Your Updated Medication List - Protect others around you: Learn how to safely use, store and throw away your medicines at www.disposemymeds.org.          This list is accurate as of 2/6/18 10:13 AM.  Always use your most recent med list.                   Brand Name Dispense Instructions for use Diagnosis    aspirin 81 MG tablet     90 tablet    Take 1 tablet (81 mg) by mouth daily    Acute on chronic combined systolic and diastolic congestive heart failure (H)       calcium carbonate 1250 MG tablet    OS-RODNEY 500 mg [place]. Ca    120 tablet    Take 1 tablet (1,250 mg) by mouth 2 times daily    Hypocalcemia       dexamethasone 4 MG tablet    DECADRON    12 tablet    Take 1 tablet (4 mg) by mouth daily (with breakfast)    Prostate cancer 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), Metastatic bone tumor (H)       enoxaparin 40 MG/0.4ML injection    LOVENOX    60 Syringe    Inject 0.4 mLs (40 mg) Subcutaneous 2 times daily     Other chronic pulmonary embolism without acute cor pulmonale (H)       enzalutamide 40 MG capsule    XTANDI    120 capsule    Take 4 capsules (160 mg) by mouth daily    Metastatic bone tumor (H)       HYDROcodone-acetaminophen 5-325 MG per tablet    NORCO    30 tablet    Take 1 tablet by mouth every 6 hours as needed for moderate to severe pain Reported on 5/22/2017    Prostate cancer 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), Metastatic bone tumor (H)       LORazepam 0.5 MG tablet    ATIVAN    30 tablet    Take 1 tablet (0.5 mg) by mouth every 4 hours as needed (Anxiety, Nausea/Vomiting or Sleep)    Prostate cancer 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), Metastatic bone tumor (H)       predniSONE 5 MG tablet    DELTASONE    42 tablet    Take 1 tablet (5 mg) by mouth 2 times daily    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

## 2018-02-06 NOTE — NURSING NOTE
done by RN, pt tolerated well, labs collected and sent, VS taken and pt checked in for next appt.   Erin Bentley

## 2018-02-15 NOTE — ORAL ONC MGMT
Oral Chemotherapy Monitoring Program     Placed call to patient in follow up of Xtandi therapy.     Left message to please call back in follow-up of therapy. No patient or drug names were mentioned.     Alirio Rincon PharmD  [facility]  [phone]  February 15, 2018

## 2018-02-20 NOTE — IP AVS SNAPSHOT
MRN:[id number]                      After Visit Summary   2/20/2018    Oswaldo Win    MRN: [id number]           Thank you!     Thank you for choosing [facility] for your care. Our goal is always to provide you with excellent care. Hearing back from our patients is one way we can continue to improve our services. Please take a few minutes to complete the written survey that you may receive in the mail after you visit with us. Thank you!        Patient Information     Date Of Birth          1945        Designated Caregiver       Most Recent Value    Caregiver    Will someone help with your care after discharge? yes    Name of designated caregiver girlfriend    Phone number of caregiver home    Caregiver address home      About your hospital stay     You were admitted on:  February 20, 2018 You last received care in the:  Unit 6D Observation [facility]    You were discharged on:  February 21, 2018        Reason for your hospital stay       Left shoulder/arm pain                  Who to Call     For medical emergencies, please call 911.  For non-urgent questions about your medical care, please call your primary care provider or clinic, [phone]          Attending Provider     Provider Specialty    Ramin Arnett MD Emergency Medicine    [facility]       Primary Care Provider Office Phone # Fax #    Lucrecia Collier [phone]       When to contact your care team       Please go to your nearest emergency room If you were to have a change in the type of chest pain i.e more severe, lasting longer or radiating to your shoulder, arm, neck, jaw or back, shortness of breath or increased pain with breathing, coughing up blood, feel dizzy or lightheaded, or notice swelling in one leg.                  After Care Instructions     Activity       Your activity upon discharge: activity as tolerated            Diet       Follow this diet upon  discharge: Regular            Discharge Instructions       You were admitted for left arm/shoulder pain. Because or cardiovascular history, we did a cardiac work up including Troponins (a lab test to check if there were damage to the heart tissue) were checked and these were negative. We also obtained an Echocardiogram which was essentially normal. The chest pain you came into the emergency room for does not appear to be cardiac chest pain. Your pain appears to be related to musculoskeletal at this point.  I recommend continuing your home medications and it will be very important to follow up with your primary care doctor early next week or as needed.     Continue to drink plenty of fluids.                  Follow-up Appointments     Follow Up and recommended labs and tests       Follow up with primary care provider as needed.                  Your next 10 appointments already scheduled     Mar 06, 2018  9:45 AM CST   [facility] Draw with  [facility] DRAW   [facility] ([facility])    [address] [place] [address]  [address]  [facility] [address]   [phone]            Mar 06, 2018 10:20 AM CST   (Arrive by 10:05 AM)   Return Visit with Gabriela Mcguire PA-C   [facility] ([facility])    [address] [place] [address]  [address]  [facility] [address]   [phone]            Mar 21, 2018  9:30 AM CDT   [facility] Lab Draw with  [facility] LAB DRAW   [facility] ([facility])    [address] [place] [address]  [address]  [facility] [address]   [phone]            Mar 21, 2018 10:00 AM CDT   (Arrive by 9:45 AM)   Return Visit with Bentley Robles MD   [facility] ([facility])    [address] [place] [address]  [address]  [facility] [address]   [phone]              Pending Results     No orders found for last 3 day(s).            Statement of Approval      "Ordered          02/21/18 1222  I have reviewed and agree with all the recommendations and orders detailed in this document.  EFFECTIVE NOW     Approved and electronically signed by:  Glo Cuba PA-C             Admission Information     Date & Time Provider Department Dept. Phone    2/20/2018 Wilfredo Garcia MD Unit 6D Observation [facility] [place] [phone]      Your Vitals Were     Blood Pressure Pulse Temperature Respirations Height Weight    134/52 (BP Location: Left arm) 78 98.2  F (36.8  C) (Oral) 18 1.676 m (5' 6\") 55.8 kg (123 lb)    Pulse Oximetry BMI (Body Mass Index)                100% 19.85 kg/m2          MyChart Information     [employer] gives you secure access to your electronic health record. If you see a primary care provider, you can also send messages to your care team and make appointments. If you have questions, please call your primary care clinic.  If you do not have a primary care provider, please call [phone] and they will assist you.        Care EveryWhere ID     This is your Care EveryWhere ID. This could be used by other organizations to access your [facility] medical records  [id number]        Equal Access to Services     BLADIMIR KEY : Hadii jesse Ybarra, waesperanzada sandeep, qaybta kaalmada aria, peter brock. So [facility] [phone].    ATENCIÓN: Si habla español, tiene a mccall disposición servicios gratuitos de asistencia lingüística. Marquise al [phone].    We comply with applicable federal civil rights laws and [place] laws. We do not discriminate on the basis of race, color, national origin, age, disability, sex, sexual orientation, or gender identity.               Review of your medicines      CONTINUE these medicines which may have CHANGED, or have new prescriptions. If we are uncertain of the size of tablets/capsules you have at home, strength may be listed as something that might have changed.        Dose / Directions "    enoxaparin 40 MG/0.4ML injection   Commonly known as:  LOVENOX   This may have changed:  when to take this   Used for:  Other chronic pulmonary embolism without acute cor pulmonale (H)        Dose:  40 mg   Inject 0.4 mLs (40 mg) Subcutaneous 2 times daily   Quantity:  60 Syringe   Refills:  3         CONTINUE these medicines which have NOT CHANGED        Dose / Directions    aspirin 81 MG tablet   Used for:  Acute on chronic combined systolic and diastolic congestive heart failure (H)        Dose:  81 mg   Take 1 tablet (81 mg) by mouth daily   Quantity:  90 tablet   Refills:  3       calcium carbonate 1250 MG tablet   Commonly known as:  OS-RODNEY 500 mg [place]. 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), Prostate cancer 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), Metastatic bone tumor (H)        Dose:  5 mg   Take 1 tablet (5 mg) by mouth 2 times daily   Quantity:  42 tablet   Refills:  3       rosuvastatin 10 MG tablet   Commonly known as:  CRESTOR   Used for:  ASCVD (arteriosclerotic cardiovascular disease), Metastatic bone tumor (H), Prostate cancer 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)        Dose:  10 mg   Take 1 tablet (10 mg) by mouth daily   Quantity:  90 tablet   Refills:  1       simvastatin 40 MG tablet   Commonly known as:  ZOCOR   Used for:  Hyperlipidemia LDL goal <130        TAKE ONE TABLET BY MOUTH AT BEDTIME   Quantity:  90 tablet   Refills:  3       vitamin D 2000 UNITS tablet   Used for:  Vitamin D deficiency        Dose:  1 tablet   Take 1 tablet by mouth daily   Quantity:  100 tablet   Refills:  3                 Protect others around you: Learn how to safely use, store and throw away your medicines at www.disposemymeds.org.             Medication List: This is a list of all your medications and when to take them. Check marks below indicate your daily home schedule. Keep this list as a reference.      Medications           Morning Afternoon Evening Bedtime As Needed    aspirin 81 MG tablet   Take 1 tablet (81 mg) by mouth daily                                calcium carbonate 1250 MG tablet   Commonly known as:  OS-RODNEY 500 mg [place]. Ca   Take 1 tablet (1,250 mg) by mouth 2 times daily   Last time this was given:  1,250 mg on 2/21/2018 11:58 AM                                enoxaparin 40 MG/0.4ML injection   Commonly known as:  LOVENOX   Inject 0.4 mLs (40 mg) Subcutaneous 2 times daily                                enzalutamide 40 MG capsule   Commonly known as:  XTANDI   Take 4 capsules (160 mg) by mouth daily                                predniSONE 5 MG tablet   Commonly known as:  DELTASONE   Take 1 tablet (5 mg) by mouth 2 times daily   Last time this was given:  5 mg on 2/21/2018 11:58 AM                                rosuvastatin 10 MG tablet   Commonly known as:  CRESTOR   Take 1 tablet (10 mg) by mouth daily                                simvastatin 40 MG tablet   Commonly known as:  ZOCOR   TAKE ONE TABLET BY MOUTH AT BEDTIME   Last time this was given:  40 mg on 2/20/2018 11:47 PM                                vitamin D 2000 UNITS tablet   Take 1 tablet by mouth daily   Last time this was given:  2,000 Units on 2/21/2018 11:57 AM

## 2018-02-20 NOTE — IP AVS SNAPSHOT
Unit 6D Observation [address] [address]    Phone:  [phone]    Fax:  [phone]                                       After Visit Summary   2/20/2018    Oswaldo Win    MRN: [id number]           After Visit Summary Signature Page     I have received my discharge instructions, and my questions have been answered. I have discussed any challenges I see with this plan with the nurse or doctor.    ..........................................................................................................................................  Patient/Patient Representative Signature      ..........................................................................................................................................  Patient Representative Print Name and Relationship to Patient    ..................................................               ................................................  Date                                            Time    ..........................................................................................................................................  Reviewed by Signature/Title    ...................................................              ..............................................  Date                                                            Time

## 2018-02-20 NOTE — NURSING NOTE
"Oncology Rooming Note    February 20, 2018 10:12 AM   Oswaldo Win is a 72 year old male who presents for:    Chief Complaint   Patient presents with     Blood Draw      done in L hand     Initial Vitals: BP (!) 89/55  Pulse 94  Temp 98.8  F (37.1  C) (Oral)  Resp 14  Wt 55.8 kg (123 lb)  SpO2 100%  BMI 19.86 kg/m2 Estimated body mass index is 19.86 kg/(m^2) as calculated from the following:    Height as of 1/23/18: 1.676 m (5' 5.98\").    Weight as of this encounter: 55.8 kg (123 lb). Body surface area is 1.61 meters squared.  Mild Pain (3) Comment: ankles,    No LMP for male patient.  Allergies reviewed: Yes  Medications reviewed: Yes    Medications: Medication refills not needed today.  Pharmacy name entered into [facility]: [facility] [place], [place] - [address]    Clinical concerns: No new concerns. Provider was notified.    10 minutes for nursing intake (face to face time)     Amirah Angulo LPN            "

## 2018-02-20 NOTE — NURSING NOTE
Labs completed via  and vitals obtained without complication.    See flowsheets.    Patient was checked into next appt.    Provider was paged regarding pt's bp.    Netta Duran CMA (AAMA)

## 2018-02-20 NOTE — PROGRESS NOTES
Oncology/Hematology Visit Note  Feb 20, 2018    DIAGNOSIS:  Mr. Win is a 72 year old male with a hx of CHF, CKD, CAD w/ hx of STEMI and CABG. He met with Dr. Robles at the end of July for consultation regarding metastatic prostate cancer. He presented in May 2015 when he saw his PCP for back pain. He also had poor appetite, weight loss and fatigue.  CT showed diffuse bony mets. CT Chest showed sclerotic mets throughout the bones. He was given degarelix on 7/22 in Urology. He was having pelvic pain and was evaluated by Dr Cavazos would did not feel XRT was appropriate.  He started on docetaxel 7/31/15 at 75 mg/m2 and only had one dose before being hospitalized several times.     His PSA remained stable so he was continued only on Lupron for the rest of 2015.  In December 2015, Mainor met with Dr Robles and since his ECOG was back to a 1, they discussed re-challenging the Taxotere at a lower dose and he completed 5 cycles at 60 mg/m2. On 9/26/2016 he started Provenge off study. He received 3 doses of Provenge (last 10/28), on 3rd dose had fevers, chills, myalgias, was bedbound for a week due to these symptoms.    Mainor was briefly on Zytiga and prednisone in November-December 2016, however after one month his PSA went up, thus it was decided to add in Xofigo (radium 223) starting in Jan 2017.     Diagnosed with PE in Nov 2016. He had bleeding on Xarelto so was changed back to Lovenox.     Mainor developed diarrhea on and off in the spring of 2017, Zytiga was stopped on 3/31 as it was unsure what was contributing.  Mainor felt it was related to the Xofigo and that his dose was too high as his weight is always at least 10 pounds higher in clinic than at home.      At his last visit with Dr Robles, his PSA was rising again and re-started Taxotere on 8/15/17.    He continues on lupron every 3 mos and XGEVA.  Mainor finished 6 cycles of Taxotere, last dose 12/6/17.  He started to have more fatigue from chemo, thus it was stopped and he was given a  month break.      Mainor was suppose to start Xtandi on 1/24/18, but hasn't.    INTERVAL HISTORY: Mainor arrives unaccompanied today and overall doing better than our visit two weeks ago.  Since stopping the Taxotere in December he was dealing with fatigue/weakness, decreased appetite, some depression and only eating once a day which was leading to worsening fatigue and an acidy/nauseated stomach. He was also having loose stools daily.  The loose stools have slowed up and are finally formed in the last couple days.  His nausea is better with TUMS and eating more routinely throughout the day.  He admits he is not taking his lovenox routinely.  He also admits he has not started the Xtandi yet.  He wanted to get the GI issues in a better spot first.  He has been a little down, as its the middle of winter, but no severe depression.     Mainor and I had this almost exact conversation two weeks ago, so I probed a bit more about depression, finances and willingness to continue therapy.  No new concerns.  No new bone pains, some stiffness in the neck and shoulders, improved with stretching.     A complete 10-pt ROS is negative other than what is reported above      MEDICATIONS:   Current Outpatient Prescriptions   Medication Sig     calcium carbonate (OS-RODNEY 500 MG [place]. [place]) 1250 MG tablet Take 1 tablet (1,250 mg) by mouth 2 times daily     predniSONE (DELTASONE) 5 MG tablet Take 1 tablet (5 mg) by mouth 2 times daily     enoxaparin (LOVENOX) 40 MG/0.4ML injection Inject 0.4 mLs (40 mg) Subcutaneous 2 times daily (Patient taking differently: Inject 40 mg Subcutaneous daily )     aspirin 81 MG tablet Take 1 tablet (81 mg) by mouth daily     Cholecalciferol (VITAMIN D) 2000 UNITS tablet Take 1 tablet by mouth daily     rosuvastatin (CRESTOR) 10 MG tablet Take 1 tablet (10 mg) by mouth daily     enzalutamide (XTANDI) 40 MG capsule Take 4 capsules (160 mg) by mouth daily (Patient not taking: Reported on 2/20/2018)     simvastatin  (ZOCOR) 40 MG tablet TAKE ONE TABLET BY MOUTH AT BEDTIME     No current facility-administered medications for this visit.      Facility-Administered Medications Ordered in Other Visits   Medication     lidocaine 0.5 % injection 1 mL     hydrocortisone sodium succinate (solu-CORTEF) injection     glycopyrrolate (ROBINUL) injection     neostigmine (PROSTIGMINE) injection     protamine injection     albuterol (PROAIR HFA, PROVENTIL HFA, VENTOLIN HFA) inhaler          ALLERGIES:  No Known Allergies    PHYSICAL EXAMINATION:  Vitals: BP (!) 89/55  Pulse 94  Temp 98.8  F (37.1  C) (Oral)  Resp 14  Wt 55.8 kg (123 lb)  SpO2 100%  BMI 19.86 kg/m2   Wt Readings from Last 10 Encounters:   02/20/18 55.8 kg (123 lb)   02/06/18 57.6 kg (127 lb)   01/23/18 57.6 kg (127 lb)   01/12/18 58.5 kg (129 lb)   12/20/17 61.2 kg (135 lb)   12/06/17 61.5 kg (135 lb 8 oz)   11/22/17 61.2 kg (135 lb)   11/13/17 61.2 kg (135 lb)   11/08/17 61.2 kg (135 lb)   11/01/17 61.2 kg (135 lb)       GENERAL:  A pleasant person in no acute distress.  Mildly pale and thin looking  LYMPH NODES:  No peripheral lymphadenopathy noted in the supraclavicular or cervical regions.   LUNGS:  Clear to auscultation bilaterally.   HEART:  Regular rate and rhythm systolic murmur heard at apex  ABDOMEN:  Bowel sounds are active.  Soft and nontender.  No hepatosplenomegaly or other masses appreciated.  LOWER EXTREMITIES:  Without pitting edema to the knees bilaterally.   NEUROLOGICAL:  Alert/orientated/able to answer all questions.  CN grossly intact.       LAB:      1/23/2018 12:33 2/6/2018 08:54 2/20/2018 10:04   Sodium 135 138 141   Potassium 4.2 4.0 4.4   Chloride 106 108 111 (H)   Carbon Dioxide 19 (L) 20 21   Urea Nitrogen 15 15 18   Creatinine 0.94 0.84 1.01   GFR Estimate 78 89 72   GFR Estimate If Black >90 >90 87   Calcium 9.0 8.9 8.8   Anion Gap 10 11 9   Albumin 3.8 3.7 3.7   Protein Total 7.7 7.9 7.5   Bilirubin Total 1.1 0.9 0.6   Alkaline Phosphatase  "144 157 (H) 158 (H)   ALT 15 18 15   AST 38 40 38   .00 (H) 724.00 (H)    Glucose 140 (H) 160 (H) 116 (H)   WBC 4.4 3.2 (L) 3.5 (L)   Hemoglobin 9.5 (L) 9.8 (L) 9.4 (L)   Hematocrit 29.1 (L) 30.7 (L) 28.9 (L)   Platelet Count 113 (L) 119 (L) 112 (L)   RBC Count 2.91 (L) 3.06 (L) 3.01 (L)    100 96     IMPRESSION/PLAN:   Metastatic castrate-resistant prostate cancer: He had progression on Zytiga and Xofigo in July 2017.  Then started on Taxotere every 3 weeks from August through December of 2017. Has received six cycles thus far. Last dose was C6 on 12/6/17. We took a chemo break due to fatigue, dehydration, etc and Mainor is feeling better.  He was suppose to start on Xtandi on 1/24, but admits he didn't.  He has been dealing with GI issues and wanted them to clear.  He finally feels ready to start tomorrow.     I had this same conversation with Mainor last week.  I asked him if he wants to treat his cancer.  He does, but he just also was enjoying a break.  I offered him help financially, with meals, or anything else he might need, but he feels he is doing just fine.  Does have some seasonal depression which is getting better.  Has been out of the house this week.  Willing to start the Xtandi now.   --2/6/18 gave Miri Lupron  --labs/Sabrina in 2 weeks  --labs/Travis in 4-6 weeks  .   Diarrhea: Improved today- eating more routinely during the day and taking TUMS for acidic stomach. Encouraged daily TUMS as he isn't interested in H2 or PPI.     Anemia: Hemoglobin 9.5 today and this is similar to baseline.  We need to keep him above 9 given his CAD.      Heme: PE dx 11/2016, continue Lovenox at 40 mg BID.  Admitted he is only \"intermittently\" taking this.  Encouraged daily use.     Bone mets: No new bone pains, increased alk phos today, likely from progression.  Xgeva 2/6    CV: hx of CHF. No clinical s/s of failure.  Off metoprolol due to BP being too low.  Has been tachy in the  range since then, but BP " won't support re-starting.      Weight is down a few pounds.  Encouraged better intake.  Offered delivery meals, he declined.            Sabrina Mcguire PA-C

## 2018-02-20 NOTE — ED NOTES
"Triage Assessment & Note:    /59  Pulse 99  Temp 98.6  F (37  C) (Oral)  Resp 18  Ht 1.676 m (5' 6\")  Wt 55.8 kg (123 lb)  SpO2 99%  BMI 19.85 kg/m2    Patient presents with: c/o left arm pain. PT reports pain started in left shoulder and preceded down his left arm. Pt denies numbness tingling or weakness in the left arm.     Home Treatments/Remedies: None    Febrile / Afebrile? Afebrile    Duration of C/o:  4hrs     Panchito Kang  February 20, 2018      "

## 2018-02-20 NOTE — MR AVS SNAPSHOT
After Visit Summary   2/20/2018    Oswaldo Win    MRN: [id number]           Patient Information     Date Of Birth          1945        Visit Information        Provider Department      2/20/2018 10:20 AM Gabriela Mcguire PA-C [facility]         Follow-ups after your visit        Your next 10 appointments already scheduled     Mar 06, 2018  9:45 AM CST   [facility] with  [facility]   [facility] ([facility])    [address] [place] [address]  [address]  [facility] [address]   [phone]            Mar 06, 2018 10:20 AM CST   (Arrive by 10:05 AM)   Return Visit with Gabriela Mcguire PA-C   [facility] ([facility])    [address] [place] [address]  [address] [place] [address]   [phone]            Mar 21, 2018  9:30 AM CDT   [facility] Draw with  [facility]   [facility] ([facility])    [address] [place] [address]  [address] [place] [id number]   [phone]            Mar 21, 2018 10:00 AM CDT   (Arrive by 9:45 AM)   Return Visit with Bentley Robles MD   [facility] ([facility])    [address] [place] [address]  [address] [place] [address]   [phone]              Who to contact     If you have questions or need follow up information about today's clinic visit or your schedule please contact [facility] directly at [phone].  Normal or non-critical lab and imaging results will be communicated to you by MyChart, letter or phone within 4 business days after the clinic has received the results. If you do not hear from us within 7 days, please contact the clinic through MyChart or phone. If you have a critical or abnormal lab result, we will notify you by phone as soon as possible.  Submit refill requests through [employer] or call your pharmacy and they will  forward the refill request to us. Please allow 3 business days for your refill to be completed.          Additional Information About Your Visit        [employer] Information     [employer] gives you secure access to your electronic health record. If you see a primary care provider, you can also send messages to your care team and make appointments. If you have questions, please call your primary care clinic.  If you do not have a primary care provider, please call [phone] and they will assist you.        Care EveryWhere ID     This is your Care EveryWhere ID. This could be used by other organizations to access your [facility] medical records  [id number]        Your Vitals Were     Pulse Temperature Respirations Pulse Oximetry BMI (Body Mass Index)       94 98.8  F (37.1  C) (Oral) 14 100% 19.86 kg/m2        Blood Pressure from Last 3 Encounters:   02/20/18 (!) 89/55   02/06/18 92/63   01/23/18 96/90    Weight from Last 3 Encounters:   02/20/18 55.8 kg (123 lb)   02/06/18 57.6 kg (127 lb)   01/23/18 57.6 kg (127 lb)              Today, you had the following     No orders found for display         Today's Medication Changes          These changes are accurate as of 2/20/18 11:36 AM.  If you have any questions, ask your nurse or doctor.               These medicines have changed or have updated prescriptions.        Dose/Directions    enoxaparin 40 MG/0.4ML injection   Commonly known as:  LOVENOX   This may have changed:  when to take this   Used for:  Other chronic pulmonary embolism without acute cor pulmonale (H)        Dose:  40 mg   Inject 0.4 mLs (40 mg) Subcutaneous 2 times daily   Quantity:  60 Syringe   Refills:  3                Primary Care Provider Office Phone # Fax #    Lucrecia Collier [phone] [id number] [facility] [address]  [facility] [address]        Equal Access to Services     BLADIMIR KEY : Kelsey Ybarra, john hopkins, peter feliciano  myla palmamiguel la'aan ah. So [facility] [phone].    ATENCIÓN: Si anilla conchita, tiene a mccall disposición servicios gratuitos de asistencia lingüística. Marquise montoya [phone].    We comply with applicable federal civil rights laws and [place] laws. We do not discriminate on the basis of race, color, national origin, age, disability, sex, sexual orientation, or gender identity.            Thank you!     Thank you for choosing [facility]  for your care. Our goal is always to provide you with excellent care. Hearing back from our patients is one way we can continue to improve our services. Please take a few minutes to complete the written survey that you may receive in the mail after your visit with us. Thank you!             Your Updated Medication List - Protect others around you: Learn how to safely use, store and throw away your medicines at www.disposemymeds.org.          This list is accurate as of 2/20/18 11:36 AM.  Always use your most recent med list.                   Brand Name Dispense Instructions for use Diagnosis    aspirin 81 MG tablet     90 tablet    Take 1 tablet (81 mg) by mouth daily    Acute on chronic combined systolic and diastolic congestive heart failure (H)       calcium carbonate 1250 MG tablet    OS-RODNEY 500 mg 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

## 2018-02-20 NOTE — TELEPHONE ENCOUNTER
Pt seen in clinic this morning. He calls to say he went home and had chills then developed pain in both shoulders and left arm.  It felt like muscle strain but he doesn't know what he could have done to strain any. Now the pain just goes down his left arm to his wrist. Rates 3-4.  Denies chest pain or shortness of breath. He is quite concerned as he has h/o chronic heart failure and MI. He will report to ER for evaluation. He has someone who will bring him in. Report called. Will update care team.

## 2018-02-21 NOTE — ED NOTES
Bed: IN01  Expected date:   Expected time:   Means of arrival:   Comments:  Oswaldo Win [birth year]  Was seen at clinic this morning.  Pt is complaining of pain in his shoulders that is radiating down his left arm.

## 2018-02-21 NOTE — DISCHARGE SUMMARY
Discharge Summary    Oswaldo Win MRN# [id number]   YOB: 1945 Age: 72 year old     Date of Admission:  2/20/2018  Date of Discharge:  2/21/2018  Admitting Physician:  Wilfredo Garcia MD  Discharge Physician:  STEVE SPENCE  Discharging Service:  Emergency Department Observation Unit     Primary Provider: Lucrecia Collier          Discharge Diagnosis:   Left shoulder and arm pain             Discharge Disposition:   Discharged to home           Condition on Discharge:   Discharge condition: Stable   Code status on discharge:            Procedures:   Cardiology procedures perfromed:   ECHO             Discharge Medications:     Current Discharge Medication List      CONTINUE these medications which have NOT CHANGED    Details   rosuvastatin (CRESTOR) 10 MG tablet Take 1 tablet (10 mg) by mouth daily  Qty: 90 tablet, Refills: 1    Associated Diagnoses: ASCVD (arteriosclerotic cardiovascular disease); Metastatic bone tumor (H); Prostate cancer 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)      enzalutamide (XTANDI) 40 MG capsule Take 4 capsules (160 mg) by mouth daily  Qty: 120 capsule, Refills: 1    Associated Diagnoses: Metastatic bone tumor (H)      calcium carbonate (OS-RODENY 500 MG [place]. 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); Prostate cancer 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); Metastatic bone tumor (H)      enoxaparin (LOVENOX) 40 MG/0.4ML injection Inject 0.4 mLs (40 mg) Subcutaneous 2 times daily  Qty: 60 Syringe, Refills: 3    Associated Diagnoses: Other chronic pulmonary embolism without acute cor pulmonale (H)      aspirin 81 MG tablet Take 1 tablet (81 mg) by mouth daily  Qty: 90 tablet, Refills: 3    Associated Diagnoses: 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)      Cholecalciferol (VITAMIN D) 2000 UNITS tablet Take 1 tablet by mouth daily  Qty: 100 tablet, Refills: 3    Comments: Please update your records to reflect new contact info for this provider: [facility] Mail code [id number] [place] [address]  Phone: [phone]  Fax: [phone]  Associated Diagnoses: Vitamin D deficiency      simvastatin (ZOCOR) 40 MG tablet TAKE ONE TABLET BY MOUTH AT BEDTIME  Qty: 90 tablet, Refills: 3    Associated Diagnoses: Hyperlipidemia LDL goal <130                   Consultations:   No consultations were requested during this admission             Brief History of Illness:   Oswaldo Win is a 72 year old male with a history of CHF, CKD, CAD who presented in the ED with left shoulder/arm pain.           Hospital Course:   1. Left shoulder pain: Oswaldo Win is a 72 year old male with a history of CHF, CKD, CAD with history of STEMI and CABG, who presents to the ED due to left arm pain.  In the ED, EKG and serial troponin negative, left shoulder imaging including CXR and CT of cervical spine showed no new bony lesion or fracture related to metastatic prostate cancer. Resting echo was obtained which showed EF 45-50% and normal right ventricular function, chamber size, wall motion, and thickness. At this point, patient's chest pain does not appear to be cardiac chest pain and most likely appears to be related to musculoskeletal. Patient was offered physical therapy however he declined stating his pain has completely resolved. He was encouraged to follow with his PCP should his left arm pain returns. Patient stable and will be discharged to home. Patient expressed understanding of treatment plan.                  Final Day of Progress before Discharge:       Physical Exam:  Blood pressure 134/52, pulse 78, temperature 98.2  F (36.8  C), temperature source Oral, resp. rate 18, height 1.676 m (5' 6\"), " weight 55.8 kg (123 lb), SpO2 100 %.    EXAM:  Physical Exam   Constitutional: Pt is oriented to person, place, and time.Pt appears well-developed and well-nourished.   HENT: Unremakable  Head: Normocephalic and atraumatic.   Eyes: Conjunctivae are normal. Pupils are equal, round, and reactive to light.   Neck: Normal range of motion. Neck supple.   Cardiovascular: Normal rate, regular rhythm, normal heart sounds and intact distal pulses.    Pulmonary/Chest: Effort normal and breath sounds normal. No respiratory distress. Pt has no wheezes. Pt has no rales  Abdominal: Soft. Bowel sounds are normal. Pt exhibits no distension and no mass. No tenderness. Pt has no rebound and no guarding.   Musculoskeletal: Normal range of motion. Pt exhibits no edema.   Neurological: Pt is alert and oriented to person, place, and time. Normal reflexes.   Skin: Skin is warm and dry. No rash noted.   Psychiatric: Pt has a normal mood and affect. Behavior is normal. Judgment and thought content normal.             Data:  All laboratory data reviewed             Significant Results:   None  Results for orders placed or performed during the hospital encounter of 02/20/18   XR Chest 2 Views    Narrative    Exam:  XR CHEST 2 VW, 2/20/2018 7:26 PM    History: Chest pain;     Comparison:  2/23/2017    Findings:  PA and lateral view of the chest. The cardiomediastinal  silhouette is within normal limits. Mitral clips. Trachea is midline.  No pleural effusion or pneumothorax. Basilar atelectasis. No acute  airspace opacity. Progressed diffuse sclerotic metastases in the  vertebral bodies, clavicles, ribs, scapulas, and humeri.       Impression    Impression:    1. No acute airspace opacity.  2. Progressive diffuse sclerotic metastases involving the humeral  heads, vertebral bodies, scapulas, clavicles, and ribs.    I have personally reviewed the examination and initial interpretation  and I agree with the findings.    BALA JIMENEZ MD    Cervical spine CT w/o contrast    Narrative    CT CERVICAL SPINE W/O CONTRAST 2/20/2018 7:05 PM    Provided History: Bilateral arm and shoulder pain, history of  metastatic prostate cancer, evaluate for fractures, See above.    Comparison: CT 7/12/2017    Technique: Using multidetector thin collimation helical acquisition  technique, axial, coronal and sagittal CT images through the cervical  spine were obtained without intravenous contrast.     Findings:  Diffuse sclerotic bone metastases throughout the spine, skull base,  and visualized ribs. Also sclerotic metastases in the thyroid and  cricoid cartilage. The cervical vertebrae are normally aligned.  Straightened cervical lordosis possibly due to patient positioning or  muscle spasm. No acute fracture or subluxation. No prevertebral edema.  There is moderate disc height narrowing C5-6 and C6-7. The findings on  a level by level basis are as follows:    C2-3: No spinal canal or neural foraminal stenosis.    C3-4:  No spinal canal stenosis. Moderate left-sided neural foraminal  narrowing secondary to facet hypertrophy.    C4-5:  No spinal canal stenosis. Moderate right neural foraminal and  mild left neural foraminal narrowing secondary to facet hypertrophy.    C5-6:  Moderate spinal canal narrowing. Moderate bilateral neural  foraminal stenosis secondary to facet hypertrophy and osteophytes.    C6-7:  No spinal canal stenosis. Moderate left and mild right neural  foraminal stenosis narrowing secondary to uncinate and facet  hypertrophy.    C7-T1:  No spinal canal or neural foraminal stenosis.     No abnormality of the paraspinous soft tissues. Emphysematous changes  in the lung apices.      Impression    Impression:   1. No acute fracture or subluxation.  2. Diffuse sclerotic bone metastases throughout the spine, skull base,  and visualized ribs.  3. Multilevel cervical spondylosis greatest at C5-C6 with moderate  spinal canal narrowing.    I have personally  reviewed the examination and initial interpretation  and I agree with the findings.    DIAZ BYRNE MD   CBC with platelets differential   Result Value Ref Range    WBC 3.4 (L) 4.0 - 11.0 10e9/L    RBC Count 2.85 (L) 4.4 - 5.9 10e12/L    Hemoglobin 8.7 (L) 13.3 - 17.7 g/dL    Hematocrit 26.9 (L) 40.0 - 53.0 %    MCV 94 78 - 100 fl    MCH 30.5 26.5 - 33.0 pg    MCHC 32.3 31.5 - 36.5 g/dL    RDW 15.2 (H) 10.0 - 15.0 %    Platelet Count 104 (L) 150 - 450 10e9/L    Diff Method Automated Method     % Neutrophils 70.0 %    % Lymphocytes 16.3 %    % Monocytes 10.7 %    % Eosinophils 1.8 %    % Basophils 0.6 %    % Immature Granulocytes 0.6 %    Nucleated RBCs 0 0 /100    Absolute Neutrophil 2.4 1.6 - 8.3 10e9/L    Absolute Lymphocytes 0.6 (L) 0.8 - 5.3 10e9/L    Absolute Monocytes 0.4 0.0 - 1.3 10e9/L    Absolute Eosinophils 0.1 0.0 - 0.7 10e9/L    Absolute Basophils 0.0 0.0 - 0.2 10e9/L    Abs Immature Granulocytes 0.0 0 - 0.4 10e9/L    Absolute Nucleated RBC 0.0    Troponin I   Result Value Ref Range    Troponin I ES <0.015 0.000 - 0.045 ug/L   INR   Result Value Ref Range    INR 1.20 (H) 0.86 - 1.14   Partial thromboplastin time   Result Value Ref Range    PTT 31 22 - 37 sec   Comprehensive metabolic panel   Result Value Ref Range    Sodium 139 133 - 144 mmol/L    Potassium 4.0 3.4 - 5.3 mmol/L    Chloride 108 94 - 109 mmol/L    Carbon Dioxide 21 20 - 32 mmol/L    Anion Gap 10 3 - 14 mmol/L    Glucose 114 (H) 70 - 99 mg/dL    Urea Nitrogen 17 7 - 30 mg/dL    Creatinine 0.88 0.66 - 1.25 mg/dL    GFR Estimate 84 >60 mL/min/1.7m2    GFR Estimate If Black >90 >60 mL/min/1.7m2    Calcium 8.6 8.5 - 10.1 mg/dL    Bilirubin Total 0.7 0.2 - 1.3 mg/dL    Albumin 3.6 3.4 - 5.0 g/dL    Protein Total 7.4 6.8 - 8.8 g/dL    Alkaline Phosphatase 153 (H) 40 - 150 U/L    ALT 15 0 - 70 U/L    AST 37 0 - 45 U/L   Troponin I - Now then in 4 hours x 2    Result Value Ref Range    Troponin I ES <0.015 0.000 - 0.045 ug/L   Troponin I - Now  then in 4 hours x 2    Result Value Ref Range    Troponin I ES <0.015 0.000 - 0.045 ug/L   EKG 12 lead   Result Value Ref Range    Interpretation ECG Click View Image link to view waveform and result    Echo complete    Narrative    [id number]  [id number]  [id number]  [id number]^EPI           [facility],[place]  Echocardiography Laboratory  [address] [place] [address]     Name: USAMA MANRIQUE  MRN: [id number]  : 1945  Study Date: 2018 08:45 AM  Age: 72 yrs  Gender: Male  Patient Location: [facility]  Reason For Study: CP  Ordering Physician: DWIGHT ENGLISH  Performed By: Manny Avelar RDCS     BSA: 1.6 m2  Height: 66 in  Weight: 123 lb  HR: 74  BP: 134/52 mmHg  _____________________________________________________________________________  __        Procedure  Complete Portable Echo Adult.  _____________________________________________________________________________  __        Interpretation Summary  Left ventricular size is normal.  The Ejection Fraction is estimated at 45-50%.  Right ventricular function, chamber size, wall motion, and thickness are  normal.  S/P Mitral Clip. Clip on posterior leaflet is very mobile since placement. No  change in appearance. There is moserate to severe mitral regurgitation.  The inferior vena cava is normal.  No pericardial effusion is present.  _____________________________________________________________________________  __        Left Ventricle  Left ventricular size is normal. Left ventricular wall thickness is normal.  The Ejection Fraction is estimated at 45-50%. Left ventricular diastolic  function is indeterminate. Septal wall akinesis is present.     Right Ventricle  Right ventricular function, chamber size, wall motion, and thickness are  normal.     Atria  The right atria appears normal. Severe left atrial enlargement is present.     Mitral Valve  S/P Mitral Clip. Clip on posterior leaflet is very mobile since  placement. No  change in appearance. There is moserate to severe mitral regurgitation. The  peak mitral valve gradient is 10.7 mmHg.        Aortic Valve  Aortic valve is normal in structure and function.     Tricuspid Valve  The tricuspid valve is normal.     Pulmonic Valve  The pulmonic valve cannot be assessed.     Vessels  The aorta root is normal. The pulmonary artery cannot be assessed. The  inferior vena cava is normal.     Pericardium  No pericardial effusion is present.     _____________________________________________________________________________  __  MMode/2D Measurements & Calculations  IVSd: 0.81 cm  LVIDd: 5.6 cm  LVIDs: 4.2 cm  LVPWd: 0.90 cm  FS: 25.1 %     LV mass(C)d: 177.5 grams  LV mass(C)dI: 109.1 grams/m2  asc Aorta Diam: 3.0 cm  LA Volume (BP): 96.7 ml  RWT: 0.32        Doppler Measurements & Calculations  MV max PG: 10.7 mmHg  MV mean P.3 mmHg  MV V2 VTI: 40.9 cm  PA acc time: 0.10 sec     _____________________________________________________________________________  __           Report approved by: Seng Spear 2018 10:18 AM        *Note: Due to a large number of results and/or encounters for the requested time period, some results have not been displayed. A complete set of results can be found in Results Review.      Recent Results (from the past 48 hour(s))   Cervical spine CT w/o contrast    Narrative    CT CERVICAL SPINE W/O CONTRAST 2018 7:05 PM    Provided History: Bilateral arm and shoulder pain, history of  metastatic prostate cancer, evaluate for fractures, See above.    Comparison: CT 2017    Technique: Using multidetector thin collimation helical acquisition  technique, axial, coronal and sagittal CT images through the cervical  spine were obtained without intravenous contrast.     Findings:  Diffuse sclerotic bone metastases throughout the spine, skull base,  and visualized ribs. Also sclerotic metastases in the thyroid and  cricoid cartilage. The  cervical vertebrae are normally aligned.  Straightened cervical lordosis possibly due to patient positioning or  muscle spasm. No acute fracture or subluxation. No prevertebral edema.  There is moderate disc height narrowing C5-6 and C6-7. The findings on  a level by level basis are as follows:    C2-3: No spinal canal or neural foraminal stenosis.    C3-4:  No spinal canal stenosis. Moderate left-sided neural foraminal  narrowing secondary to facet hypertrophy.    C4-5:  No spinal canal stenosis. Moderate right neural foraminal and  mild left neural foraminal narrowing secondary to facet hypertrophy.    C5-6:  Moderate spinal canal narrowing. Moderate bilateral neural  foraminal stenosis secondary to facet hypertrophy and osteophytes.    C6-7:  No spinal canal stenosis. Moderate left and mild right neural  foraminal stenosis narrowing secondary to uncinate and facet  hypertrophy.    C7-T1:  No spinal canal or neural foraminal stenosis.     No abnormality of the paraspinous soft tissues. Emphysematous changes  in the lung apices.      Impression    Impression:   1. No acute fracture or subluxation.  2. Diffuse sclerotic bone metastases throughout the spine, skull base,  and visualized ribs.  3. Multilevel cervical spondylosis greatest at C5-C6 with moderate  spinal canal narrowing.    I have personally reviewed the examination and initial interpretation  and I agree with the findings.    DIAZ BYRNE MD   XR Chest 2 Views    Narrative    Exam:  XR CHEST 2 VW, 2/20/2018 7:26 PM    History: Chest pain;     Comparison:  2/23/2017    Findings:  PA and lateral view of the chest. The cardiomediastinal  silhouette is within normal limits. Mitral clips. Trachea is midline.  No pleural effusion or pneumothorax. Basilar atelectasis. No acute  airspace opacity. Progressed diffuse sclerotic metastases in the  vertebral bodies, clavicles, ribs, scapulas, and humeri.       Impression    Impression:    1. No acute airspace  opacity.  2. Progressive diffuse sclerotic metastases involving the humeral  heads, vertebral bodies, scapulas, clavicles, and ribs.    I have personally reviewed the examination and initial interpretation  and I agree with the findings.    BALA JIMENEZ MD                Pending Results:   Unresulted Labs Ordered in the Past 30 Days of this Admission     No orders found for last 61 day(s).                  Discharge Instructions and Follow-Up:     Discharge Procedure Orders  Reason for your hospital stay   Order Comments: Left shoulder/arm pain     Follow Up and recommended labs and tests   Order Comments: Follow up with primary care provider as needed.     Activity   Order Comments: Your activity upon discharge: activity as tolerated   Order Specific Question Answer Comments   Is discharge order? Yes      When to contact your care team   Order Comments: Please go to your nearest emergency room If you were to have a change in the type of chest pain i.e more severe, lasting longer or radiating to your shoulder, arm, neck, jaw or back, shortness of breath or increased pain with breathing, coughing up blood, feel dizzy or lightheaded, or notice swelling in one leg.     Discharge Instructions   Order Comments: You were admitted for left arm/shoulder pain. Because or cardiovascular history, we did a cardiac work up including Troponins (a lab test to check if there were damage to the heart tissue) were checked and these were negative. We also obtained an Echocardiogram which was essentially normal. The chest pain you came into the emergency room for does not appear to be cardiac chest pain. Your pain appears to be related to musculoskeletal at this point.  I recommend continuing your home medications and it will be very important to follow up with your primary care doctor early next week or as needed.     Continue to drink plenty of fluids.     Full Code     Diet   Order Comments: Follow this diet upon discharge:  Regular   Order Specific Question Answer Comments   Is discharge order? Yes             Attestation:  Glomiles Cuba PA-C      This patient was discussed with the Care Team in the OBS Unit.  The patient's chart was reviewed and the patient was also seen and evaluated by me.  The plan of care was discussed and reviewed with the Care Team.  The above documentation reflects the evaluation, medical decision making and plan under my supervision.    David Martinez MD, FACEP  [facility] Staff Emergency Physician

## 2018-02-21 NOTE — PLAN OF CARE
"Problem: Patient Care Overview  Goal: Discharge Needs Assessment  Outpatient/Observation goals to be met before discharge home:  /52 (BP Location: Left arm)  Pulse 78  Temp 98.2  F (36.8  C) (Oral)  Resp 18  Ht 1.676 m (5' 6\")  Wt 55.8 kg (123 lb)  SpO2 100%  BMI 19.85 kg/m2    - Serial troponins and stress test complete-Troponin negative x 3. Echo scheduled for am  - Seen and cleared by consultant if applicable-NO, cards consult in am  - Adequate pain control on oral analgesia-Denies pain  - Vital signs normal or at patient baseline-YES  - Safe disposition plan has been identified-NO           "

## 2018-02-21 NOTE — PLAN OF CARE
Problem: Patient Care Overview  Goal: Plan of Care/Patient Progress Review  Outpatient/Observation goals to be met before discharge home:    - Serial troponins and stress test complete-Troponin negative x 3. Echo scheduled for am  - Seen and cleared by consultant if applicable-NO, cards consult in am  - Adequate pain control on oral analgesia-Denies pain  - Vital signs normal or at patient baseline-YES  - Safe disposition plan has been identified-NO

## 2018-02-21 NOTE — H&P
ED OBSERVATION HISTORY & PHYSICAL    Admission Date:   Attending Physician:   Primary Care Physician:   NP/PA: Jessie Choudhury PA-C    REASON FOR ADMISSION:   Chief Complaint   Patient presents with     Arm Pain     left       HPI: Oswaldo Win is a 72 year old male with a history of CHF, CKD, CAD with history of STEMI and CABG, who presents to the ED due to left arm pain. Patient reports that the pain began yesterday (2/19) but only lasted for about 10 minutes. Today, the pain came back around 13:00 and has been constant. Patient states that the pain is in his left arm and radiates to the left shoulder and neck. He also notes some pain in the right shoulder but states that it went away immediately. Patient denies any dizziness or diaphoresis.      In the ED, EKG and troponin negative x 1, left shoulder imaging including CXR and CT of cervical spine showed no new bony lesion or fracture related to metastatic prostate cancer    On admission to the observation unit the patient was stable. His left arm pain is now gone, but was present from 1-9pm today. Not triggered or exacerbated by movement of the left arm or shoulder. No numbness into left upper extremity. No neck pain. Previously has fleeting left arm pain which he feels was more over musculature. No recent heavy lifting or trauma. No chest pain. No dyspnea. Some dyspepsia and loose stools recently. No fevers or new cold symptoms. Otherwise feeling well.    ROS:  CONSTITUTIONAL: Generally feels well. D  SKIN: Denies rash  EYES: Denies visual changes  EARS/NOSE/THROAT: Denies hearing loss, tinnitus, sinus pressure/drainage, PND, nasal congestion, runny nose, epistaxis, sore throat/mouth pain, change in taste, ear pain, bleeding gums, or hoarseness.  RESPIRATORY: Denies dyspnea  CARDIOVASCULAR: Denies palpitations, chest pain/pressure  GASTROINTESTINAL: Good appetite and PO intake  GENITOURINARY: Denies dysuria  MUSCULOSKELTAL: Left shoulder pain  NEUROLOGIC: Denies  headaches, dizziness, numbness or tingling of hands and feet, confusion, memory changes, lightheadedness/dizziness or difficulties with balance.  PSYCHIATRIC: Denies anxiety, depression, mental status changes, or change in mood.  HEME/LYMPH: Denies active bleeding, swollen nodes  VASCULAR ACCESS: Denies pain, redness, or discharge.    ROS negative other than the symptoms noted above.    History:    Past Medical History:   Diagnosis Date     Arthritis      ASCVD (arteriosclerotic cardiovascular disease) 4/10/2014     Closed L4 vertebral fracture (H) 2005    traumatic fracture     COPD (chronic obstructive pulmonary disease) (H)      Dyspnea on exertion      Fx tib/fib fol insrt ortho implnt/prosth/bone plt, right leg (H) 2005    rt tib/fib fracture s/p ORIF     Gastro-oesophageal reflux disease      Hx of right coronary artery stent placement 2014 4/2014 and redone 9/2014     Hyperlipidemia LDL goal < 70      Hypertension      Influenza A 3/27/2016     Musculoskeletal leg pain     from old leg injuries     Other chronic pain      Prostate cancer 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)      Pulmonary embolism (H) 11/8/16 November 2016     ST elevation MI (STEMI) (H) 4/5/2014     Stented coronary artery      Thrombosis of leg 10/2015    October 2015       Past Surgical History:   Procedure Laterality Date     ANESTHESIA OUT OF OR PERCUTANEOUS MITRAL VALVE REPAIR N/A 10/16/2015    Procedure: ANESTHESIA OUT OF OR PERCUTANEOUS MITRAL VALVE REPAIR;  Surgeon: GENERIC ANESTHESIA PROVIDER;  Location: [facility]     COLONOSCOPY       COLONOSCOPY N/A 1/13/2017    Procedure: COLONOSCOPY;  Surgeon: Telly Anderson MD;  Location: [facility]     COMBINED CYSTOSCOPY, RETROGRADES, EXCHANGE STENT URETER(S) Left 12/14/2015    Procedure: COMBINED CYSTOSCOPY, RETROGRADES, EXCHANGE STENT URETER(S);  Surgeon: Maame Ramirez MD;  Location: [facility]     DAVINCI BYPASS ARTERY CORONARY  7/1/2014    Procedure: DAVINCI BYPASS ARTERY CORONARY;  Surgeon:  Kike Coley MD;  Location: [facility]     ESOPHAGOSCOPY, GASTROSCOPY, DUODENOSCOPY (EGD), COMBINED N/A 1/13/2017    Procedure: COMBINED ESOPHAGOSCOPY, GASTROSCOPY, DUODENOSCOPY (EGD);  Surgeon: Telly Anderson MD;  Location: [facility]     EXCISE MASS LOWER EXTREMITY Right 2/5/2016    Procedure: EXCISE MASS LOWER EXTREMITY;  Surgeon: Miquel Figueredo MD;  Location: [facility]     HERNIORRHAPHY INGUINAL Left 1968     HERNIORRHAPHY INGUINAL Left 11/18/2014    Procedure: HERNIORRHAPHY INGUINAL;  Surgeon: Max Garza MD;  Location: [facility]     LASER HOLMIUM LITHOTRIPSY URETER(S), INSERT STENT, COMBINED N/A 7/27/2015    Procedure: COMBINED CYSTOSCOPY, URETEROSCOPY, LASER HOLMIUM LITHOTRIPSY URETER(S), INSERT STENT;  Surgeon: Maame Ramirez MD;  Location: [facility]     LASER HOLMIUM LITHOTRIPSY URETER(S), INSERT STENT, COMBINED Left 9/19/2016    Procedure: COMBINED CYSTOSCOPY, URETEROSCOPY, LASER HOLMIUM LITHOTRIPSY URETER(S), INSERT STENT;  Surgeon: Maame Ramirez MD;  Location: [facility]     OPEN REDUCTION INTERNAL FIXATION TIBIA  2005    RT Tib/Fib ORIF, crushed LT calc     TONSILLECTOMY & ADENOIDECTOMY  age 18       Family History   Problem Relation Age of Onset     HEART DISEASE Mother 50     CAB, pacemaker     HEART DISEASE Father 41     MI     HEART DISEASE Maternal Uncle      Breast Cancer Paternal Grandmother      Breast Cancer Paternal Aunt      CEREBROVASCULAR DISEASE No family hx of      DIABETES No family hx of        Social History     Social History     Marital status:      Spouse name: N/A     Number of children: 2     Years of education: N/A     Occupational History      Retired           Social History Main Topics     Smoking status: Former Smoker     Packs/day: 0.50     Years: 40.00     Types: Cigarettes     Quit date: 2/1/2000     Smokeless tobacco: Never Used     Alcohol use 0.0 oz/week     0 Standard drinks or equivalent per week       Comment: rare     Drug use: No     Sexual activity: Yes     Partners: Female     Other Topics Concern     Parent/Sibling W/ Cabg, Mi Or Angioplasty Before 65f 55m? Yes     father at age 44yrs      Social History Narrative    Mom  at age 93 from CHF    Dad  at age 41 from congenital heart defect    Sibling:  Sister 74 years ago in good health    Children two adult sons alive and well        Occupation:  , taught electronics and , and .    Musician          Current Facility-Administered Medications on File Prior to Encounter:  lidocaine 0.5 % injection 1 mL   hydrocortisone sodium succinate (solu-CORTEF) injection   glycopyrrolate (ROBINUL) injection   neostigmine (PROSTIGMINE) injection   protamine injection   albuterol (PROAIR HFA, PROVENTIL HFA, VENTOLIN HFA) inhaler     Current Outpatient Prescriptions on File Prior to Encounter:  rosuvastatin (CRESTOR) 10 MG tablet Take 1 tablet (10 mg) by mouth daily   enzalutamide (XTANDI) 40 MG capsule Take 4 capsules (160 mg) by mouth daily (Patient not taking: Reported on 2018)   calcium carbonate (OS-RODNEY 500 MG [place]. [place]) 1250 MG tablet Take 1 tablet (1,250 mg) by mouth 2 times daily   predniSONE (DELTASONE) 5 MG tablet Take 1 tablet (5 mg) by mouth 2 times daily   enoxaparin (LOVENOX) 40 MG/0.4ML injection Inject 0.4 mLs (40 mg) Subcutaneous 2 times daily (Patient taking differently: Inject 40 mg Subcutaneous daily )   aspirin 81 MG tablet Take 1 tablet (81 mg) by mouth daily   Cholecalciferol (VITAMIN D) 2000 UNITS tablet Take 1 tablet by mouth daily   simvastatin (ZOCOR) 40 MG tablet TAKE ONE TABLET BY MOUTH AT BEDTIME       Exam:  Vitals:  B/P: 124/58, T: 98.6, P: 78, R: 16    All vital signs were reviewed.  GENERAL APPEARENCE: Pleasant, generally appears well, A/O x4. NAD.  SKIN: Clean, dry, and intact without visible lesions, rash  HEENT: NCAT w/out masses, lesions, or abnormalities.   CARDIOVASCULAR: S1, S2 RRR.  Systolic murmur  RESPIRATORY: Respiratory effort WNL. CTA  bilaterally without crackles/rales/wheeze   GI: Active BS in all 4 quadrants. Abdomen soft and non-tender. No masses or hepatosplenomegaly.  : Deferred  MUSCULOSKELETAL: Extremities normal, no gross deformities noted  PV: No edema noted.   NEURO: CN II-XII grossly intact. Speech normal. Appropriate throughout interview.   HEME/LYMPH: No visible bleeding.  PSYCHIATRIC: Mentation and affect appear normal    Data:    Results for orders placed or performed during the hospital encounter of 02/20/18   XR Chest 2 Views    Narrative    Exam:  XR CHEST 2 VW, 2/20/2018 7:26 PM    History: Chest pain;     Comparison:  2/23/2017    Findings:  PA and lateral view of the chest. The cardiomediastinal  silhouette is within normal limits. Mitral clips. Trachea is midline.  No pleural effusion or pneumothorax. Basilar atelectasis. No acute  airspace opacity. Progressed diffuse sclerotic metastases in the  vertebral bodies, clavicles, ribs, scapulas, and humeri.       Impression    Impression:    1. No acute airspace opacity.  2. Progressive diffuse sclerotic metastases involving the humeral  heads, vertebral bodies, scapulas, clavicles, and ribs.    I have personally reviewed the examination and initial interpretation  and I agree with the findings.    BALA JIMENEZ MD   Cervical spine CT w/o contrast    Narrative    CT CERVICAL SPINE W/O CONTRAST 2/20/2018 7:05 PM    Provided History: Bilateral arm and shoulder pain, history of  metastatic prostate cancer, evaluate for fractures, See above.    Comparison: CT 7/12/2017    Technique: Using multidetector thin collimation helical acquisition  technique, axial, coronal and sagittal CT images through the cervical  spine were obtained without intravenous contrast.     Findings:  Diffuse sclerotic bone metastases throughout the spine, skull base,  and visualized ribs. Also sclerotic metastases in the thyroid and  cricoid  cartilage. The cervical vertebrae are normally aligned.  Straightened cervical lordosis possibly due to patient positioning or  muscle spasm. No acute fracture or subluxation. No prevertebral edema.  There is moderate disc height narrowing C5-6 and C6-7. The findings on  a level by level basis are as follows:    C2-3: No spinal canal or neural foraminal stenosis.    C3-4:  No spinal canal stenosis. Moderate left-sided neural foraminal  narrowing secondary to facet hypertrophy.    C4-5:  No spinal canal stenosis. Moderate right neural foraminal and  mild left neural foraminal narrowing secondary to facet hypertrophy.    C5-6:  Moderate spinal canal narrowing. Moderate bilateral neural  foraminal stenosis secondary to facet hypertrophy and osteophytes.    C6-7:  No spinal canal stenosis. Moderate left and mild right neural  foraminal stenosis narrowing secondary to uncinate and facet  hypertrophy.    C7-T1:  No spinal canal or neural foraminal stenosis.     No abnormality of the paraspinous soft tissues. Emphysematous changes  in the lung apices.      Impression    Impression:   1. No acute fracture or subluxation.  2. Diffuse sclerotic bone metastases throughout the spine, skull base,  and visualized ribs.  3. Multilevel cervical spondylosis greatest at C5-C6 with moderate  spinal canal narrowing.    I have personally reviewed the examination and initial interpretation  and I agree with the findings.    DIAZ BYRNE MD   CBC with platelets differential   Result Value Ref Range    WBC 3.4 (L) 4.0 - 11.0 10e9/L    RBC Count 2.85 (L) 4.4 - 5.9 10e12/L    Hemoglobin 8.7 (L) 13.3 - 17.7 g/dL    Hematocrit 26.9 (L) 40.0 - 53.0 %    MCV 94 78 - 100 fl    MCH 30.5 26.5 - 33.0 pg    MCHC 32.3 31.5 - 36.5 g/dL    RDW 15.2 (H) 10.0 - 15.0 %    Platelet Count 104 (L) 150 - 450 10e9/L    Diff Method Automated Method     % Neutrophils 70.0 %    % Lymphocytes 16.3 %    % Monocytes 10.7 %    % Eosinophils 1.8 %    % Basophils 0.6  %    % Immature Granulocytes 0.6 %    Nucleated RBCs 0 0 /100    Absolute Neutrophil 2.4 1.6 - 8.3 10e9/L    Absolute Lymphocytes 0.6 (L) 0.8 - 5.3 10e9/L    Absolute Monocytes 0.4 0.0 - 1.3 10e9/L    Absolute Eosinophils 0.1 0.0 - 0.7 10e9/L    Absolute Basophils 0.0 0.0 - 0.2 10e9/L    Abs Immature Granulocytes 0.0 0 - 0.4 10e9/L    Absolute Nucleated RBC 0.0    Troponin I   Result Value Ref Range    Troponin I ES <0.015 0.000 - 0.045 ug/L   INR   Result Value Ref Range    INR 1.20 (H) 0.86 - 1.14   Partial thromboplastin time   Result Value Ref Range    PTT 31 22 - 37 sec   Comprehensive metabolic panel   Result Value Ref Range    Sodium 139 133 - 144 mmol/L    Potassium 4.0 3.4 - 5.3 mmol/L    Chloride 108 94 - 109 mmol/L    Carbon Dioxide 21 20 - 32 mmol/L    Anion Gap 10 3 - 14 mmol/L    Glucose 114 (H) 70 - 99 mg/dL    Urea Nitrogen 17 7 - 30 mg/dL    Creatinine 0.88 0.66 - 1.25 mg/dL    GFR Estimate 84 >60 mL/min/1.7m2    GFR Estimate If Black >90 >60 mL/min/1.7m2    Calcium 8.6 8.5 - 10.1 mg/dL    Bilirubin Total 0.7 0.2 - 1.3 mg/dL    Albumin 3.6 3.4 - 5.0 g/dL    Protein Total 7.4 6.8 - 8.8 g/dL    Alkaline Phosphatase 153 (H) 40 - 150 U/L    ALT 15 0 - 70 U/L    AST 37 0 - 45 U/L   EKG 12 lead   Result Value Ref Range    Interpretation ECG Click View Image link to view waveform and result      *Note: Due to a large number of results and/or encounters for the requested time period, some results have not been displayed. A complete set of results can be found in Results Review.             EKG Interpretation:      EKG in ED  2/20/18 showed sinus rhythm      Assessment/Plan:  Oswaldo Win is a 72 year old male with a history of CHF, CKD, CAD with history of STEMI and CABG, who presents to the ED due to left arm pain.    1. Left shoulder pain  - EKG and trop negative in ED x 1. Continue to trend troponins x 3 to rule out ACS  - no bony lesions on imaging to explain his pain  - pain control with tylenol  prn    2. Hx mitral valve murmur  - resting echo in the morning, hesitant to pursue cardiac stress test if any abnormalities on resting echo  - cards consult post echo, consider stress test?    3. Metastatic prostate Ca  - hold xtandi for now, can resume as outpatient  - cont pred 5mg BID  - cont ca/vit D    4. Hx PE in 2016  - hasn't been taking lovenox 40mg BID as recommended, hold off for now in case of any interventions tomorrow    5. Hx CAD  - cont zocor and ASA 81mg/d    FEN:  -Regular diet as tolerated. Clear liquids after midnight in case of any further cardiac testing  -Monitor BMP and replace electrolytes per protocol    Prophy:  -No VTE prophy as patient is up ad ina and anticipate short observation stay   -Encourage ambulation as tolerated   -PRN Senna and Miralax    Consults:   - cardiology consult ordered for am to discuss plan after resting echo      DISPOSITION: pending further evaluation of pain      Jessie Choudhury PA-C  Emergency Department Observation Unit

## 2018-02-21 NOTE — PROVIDER NOTIFICATION
02/20/18 2205   Vitals   Temp 98.2  F (36.8  C)   Temp src Oral   Heart Rate 87   Heart Rate/Source Monitor   /52   BP Location Left arm   Resp 18     Pt a/o x 4; VSS, pt denies pain, headache, dizziness, n/v, or SOB. Pt ambulated around room, pt wants to sleep with his jeans on. Pt resting comfortably in bed with girlfriend at bedside. Will continue to monitor pt.

## 2018-02-21 NOTE — ED PROVIDER NOTES
History     Chief Complaint   Patient presents with     Arm Pain     left     HPI  Oswaldo Win is a 72 year old male with a history of CHF, CKD, CAD with history of STEMI and CABG, who presents to the ED due to left arm pain. Patient reports that the pain began yesterday (2/19) but only lasted for about 10 minutes. Today, the pain came back around 13:00 and has been constant. Patient states that the pain is in his left arm and radiates to the left shoulder and neck. He also notes some pain in the right shoulder but states that it went away immediately. Patient denies any dizziness or diaphoresis.     Current Facility-Administered Medications   Medication     [START ON 2/21/2018] aspirin EC EC tablet 81 mg     [START ON 2/21/2018] calcium carbonate (OS-RODNEY 500 mg [place]. Ca) tablet 1,250 mg     [START ON 2/21/2018] cholecalciferol (vitamin D3) tablet 2,000 Units     predniSONE (DELTASONE) tablet 5 mg     lidocaine 1 % 1 mL     lidocaine (LMX4) kit     sodium chloride (PF) 0.9% PF flush 3 mL     sodium chloride (PF) 0.9% PF flush 3 mL     nitroGLYcerin (NITROSTAT) sublingual tablet 0.4 mg     alum & mag hydroxide-simethicone (MYLANTA ES/MAALOX  ES) suspension 30 mL     acetaminophen (TYLENOL) tablet 650 mg     acetaminophen (TYLENOL) Suppository 650 mg     naloxone (NARCAN) injection 0.1-0.4 mg     simvastatin (ZOCOR) tablet 40 mg     Facility-Administered Medications Ordered in Other Encounters   Medication     lidocaine 0.5 % injection 1 mL     hydrocortisone sodium succinate (solu-CORTEF) injection     glycopyrrolate (ROBINUL) injection     neostigmine (PROSTIGMINE) injection     protamine injection     albuterol (PROAIR HFA, PROVENTIL HFA, VENTOLIN HFA) inhaler     Past Medical History:   Diagnosis Date     Arthritis      ASCVD (arteriosclerotic cardiovascular disease) 4/10/2014     Closed L4 vertebral fracture (H) 2005    traumatic fracture     COPD (chronic obstructive pulmonary disease) (H)      Dyspnea on  exertion      Fx tib/fib fol insrt ortho implnt/prosth/bone plt, right leg (H) 2005    rt tib/fib fracture s/p ORIF     Gastro-oesophageal reflux disease      Hx of right coronary artery stent placement 2014 4/2014 and redone 9/2014     Hyperlipidemia LDL goal < 70      Hypertension      Influenza A 3/27/2016     Musculoskeletal leg pain     from old leg injuries     Other chronic pain      Prostate cancer 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)      Pulmonary embolism (H) 11/8/16 November 2016     ST elevation MI (STEMI) (H) 4/5/2014     Stented coronary artery      Thrombosis of leg 10/2015    October 2015       Past Surgical History:   Procedure Laterality Date     ANESTHESIA OUT OF OR PERCUTANEOUS MITRAL VALVE REPAIR N/A 10/16/2015    Procedure: ANESTHESIA OUT OF OR PERCUTANEOUS MITRAL VALVE REPAIR;  Surgeon: GENERIC ANESTHESIA PROVIDER;  Location: [facility]     COLONOSCOPY       COLONOSCOPY N/A 1/13/2017    Procedure: COLONOSCOPY;  Surgeon: Telly Anderson MD;  Location: [facility]     COMBINED CYSTOSCOPY, RETROGRADES, EXCHANGE STENT URETER(S) Left 12/14/2015    Procedure: COMBINED CYSTOSCOPY, RETROGRADES, EXCHANGE STENT URETER(S);  Surgeon: Maame Ramirez MD;  [facility]     DAVINCI BYPASS ARTERY CORONARY  7/1/2014    Procedure: DAVINCI BYPASS ARTERY CORONARY;  Surgeon: Kike Coley MD;  [facility]     ESOPHAGOSCOPY, GASTROSCOPY, DUODENOSCOPY (EGD), COMBINED N/A 1/13/2017    Procedure: COMBINED ESOPHAGOSCOPY, GASTROSCOPY, DUODENOSCOPY (EGD);  Surgeon: Telly Anderson MD;  [facility]     EXCISE MASS LOWER EXTREMITY Right 2/5/2016    Procedure: EXCISE MASS LOWER EXTREMITY;  Surgeon: Miquel Figueredo MD;  [facility]     HERNIORRHAPHY INGUINAL Left 1968     HERNIORRHAPHY INGUINAL Left 11/18/2014    Procedure: HERNIORRHAPHY INGUINAL;  Surgeon: Max Garza MD;  [facility]     LASER HOLMIUM LITHOTRIPSY URETER(S), INSERT STENT, COMBINED N/A 7/27/2015    Procedure:  "COMBINED CYSTOSCOPY, URETEROSCOPY, LASER HOLMIUM LITHOTRIPSY URETER(S), INSERT STENT;  Surgeon: Maame Ramirez MD;  [facility]     LASER HOLMIUM LITHOTRIPSY URETER(S), INSERT STENT, COMBINED Left 9/19/2016    Procedure: COMBINED CYSTOSCOPY, URETEROSCOPY, LASER HOLMIUM LITHOTRIPSY URETER(S), INSERT STENT;  Surgeon: Maame Ramirez MD;  [facility]     OPEN REDUCTION INTERNAL FIXATION TIBIA  2005    RT Tib/Fib ORIF, crushed LT calc     TONSILLECTOMY & ADENOIDECTOMY  age 18       Family History   Problem Relation Age of Onset     HEART DISEASE Mother 50     CAB, pacemaker     HEART DISEASE Father 41     MI     HEART DISEASE Maternal Uncle      Breast Cancer Paternal Grandmother      Breast Cancer Paternal Aunt      CEREBROVASCULAR DISEASE No family hx of      DIABETES No family hx of        Social History   Substance Use Topics     Smoking status: Former Smoker     Packs/day: 0.50     Years: 40.00     Types: Cigarettes     Quit date: 2/1/2000     Smokeless tobacco: Never Used     Alcohol use 0.0 oz/week     0 Standard drinks or equivalent per week      Comment: rare     No Known Allergies    I have reviewed the Medications, Allergies, Past Medical and Surgical History, and Social History in the Epic system.    Review of Systems   Constitutional: Negative for diaphoresis.   Musculoskeletal: Positive for neck pain.        Positive for left arm pain that radiates to left shoulder and neck   Neurological: Negative for dizziness.   All other systems reviewed and are negative.      Physical Exam   BP: 131/59  Pulse: 99  Heart Rate: 78  Temp: 98.6  F (37  C)  Resp: 18  Height: 167.6 cm (5' 6\")  Weight: 55.8 kg (123 lb)  SpO2: 99 %      Physical Exam   Constitutional: He is oriented to person, place, and time. Vital signs are normal. He appears well-developed and well-nourished.  Non-toxic appearance. He does not appear ill. No distress.   HENT:   Head: Normocephalic and atraumatic. "   Mouth/Throat: Oropharynx is clear and moist. No oropharyngeal exudate.   Eyes: Conjunctivae and EOM are normal. Pupils are equal, round, and reactive to light. No scleral icterus.   Neck: Normal range of motion. Neck supple. No JVD present. No tracheal deviation present. No thyromegaly present.   Cardiovascular: Normal rate, regular rhythm and intact distal pulses.  Exam reveals no gallop and no friction rub.    Murmur (best over LL sternal border) heard.   Systolic murmur is present with a grade of 3/6   Pulmonary/Chest: Effort normal and breath sounds normal. No respiratory distress.   Abdominal: Soft. Bowel sounds are normal. He exhibits no distension, no pulsatile midline mass and no mass. There is no tenderness. There is no rigidity, no rebound and no guarding.   Musculoskeletal: Normal range of motion. He exhibits no edema or tenderness.   Lymphadenopathy:     He has no cervical adenopathy.   Neurological: He is alert and oriented to person, place, and time. He has normal strength. No cranial nerve deficit or sensory deficit.   Skin: Skin is warm and dry. No rash noted. No erythema. No pallor.   Psychiatric: He has a normal mood and affect. His behavior is normal.   Nursing note and vitals reviewed.      ED Course   6:04 PM  The patient was seen and examined by Dr. Arnett in Room HWB.   ED Course     Procedures             EKG Interpretation:      Interpreted by Ramin Arnett    Symptoms at time of EKG: arm pain   Rhythm: normal sinus   Rate: 80  Ectopy: none  Conduction: normal  ST Segments/ T Waves: QTc prolongation   Q Waves: none  Comparison to prior: Unchanged    Clinical Impression: non-specific EKG                XR Chest 2 Views   Final Result   Impression:     1. No acute airspace opacity.   2. Progressive diffuse sclerotic metastases involving the humeral   heads, vertebral bodies, scapulas, clavicles, and ribs.      I have personally reviewed the examination and initial interpretation    and I agree with the findings.      BALA JIMENEZ MD      Cervical spine CT w/o contrast   Final Result   Impression:    1. No acute fracture or subluxation.   2. Diffuse sclerotic bone metastases throughout the spine, skull base,   and visualized ribs.   3. Multilevel cervical spondylosis greatest at C5-C6 with moderate   spinal canal narrowing.      I have personally reviewed the examination and initial interpretation   and I agree with the findings.      DIAZ BYRNE MD             Labs Ordered and Resulted from Time of ED Arrival Up to the Time of Departure from the ED   CBC WITH PLATELETS DIFFERENTIAL - Abnormal; Notable for the following:        Result Value    WBC 3.4 (*)     RBC Count 2.85 (*)     Hemoglobin 8.7 (*)     Hematocrit 26.9 (*)     RDW 15.2 (*)     Platelet Count 104 (*)     Absolute Lymphocytes 0.6 (*)     All other components within normal limits   INR - Abnormal; Notable for the following:     INR 1.20 (*)     All other components within normal limits   COMPREHENSIVE METABOLIC PANEL - Abnormal; Notable for the following:     Glucose 114 (*)     Alkaline Phosphatase 153 (*)     All other components within normal limits   TROPONIN I   PARTIAL THROMBOPLASTIN TIME   PERIPHERAL IV CATHETER   PULSE OXIMETRY NURSING   CARDIAC CONTINUOUS MONITORING   PATIENT CARE ORDER            Assessments & Plan (with Medical Decision Making)   This patient presented to the emergency department complaining of left shoulder and arm pain.  He reports pain also seemed to go up into his neck.  It is been intermittent.  He does have a history of coronary artery disease but also has a history of metastatic prostate cancer with metastases to the bone.  Given history of metastatic cancer I did obtain a CT scan of the cervical spine to make sure that he did not have interval development of unstable fracture or a compression fracture that could be causing referred symptoms.  This demonstrated no evidence of any  significant acute change or evidence of fracture.  Initial 12-lead EKG was demonstrating no acute ischemic changes and troponin was negative initially but that given the patient's symptoms and past history I do feel a period of observation with serial troponin is warranted.  He also reported that he had an increase in the volume of his heart murmur according to his clinic provider.  I feel that most likely the patient would benefit from a resting echo to start with in the morning to ascertain whether there is been any change in valvular dynamics and have progressed to a provocative testing if indicated.  This was conveyed to the MARTY in the observation unit and he was transferred upstairs for further treatment.    This part of the medical record was transcribed by Smiley Martin, Medical Scribe, from a dictation done by Jonathan Arnett MD.     I have reviewed the nursing notes.    I have reviewed the findings, diagnosis, plan and need for follow up with the patient.    Current Discharge Medication List          Final diagnoses:   Chest pain   I, Smiley Martin, am serving as a trained medical scribe to document services personally performed by Jonathan Arnett MD, based on the provider's statements to me.   I, Jonathan Arnett MD, was physically present and have reviewed and verified the accuracy of this note documented by Smiley Martin.    2/20/2018   [facility], [place], EMERGENCY DEPARTMENT     Ramin Arnett MD  02/22/18 2330

## 2018-02-21 NOTE — PLAN OF CARE
"Problem: Patient Care Overview  Goal: Discharge Needs Assessment  Outpatient/Observation goals to be met before discharge home:  /52 (BP Location: Left arm)  Pulse 78  Temp 98.2  F (36.8  C) (Oral)  Resp 18  Ht 1.676 m (5' 6\")  Wt 55.8 kg (123 lb)  SpO2 100%  BMI 19.85 kg/m2    - Serial troponins and stress test complete-Troponin negative x 2. Echo scheduled for am  - Seen and cleared by consultant if applicable-NO, cards consult in am  - Adequate pain control on oral analgesia-Denies pain  - Vital signs normal or at patient baseline-YES  - Safe disposition plan has been identified-NO        "

## 2018-02-27 NOTE — TELEPHONE ENCOUNTER
Oral Chemotherapy Monitoring Program    Placed call to patient in follow up of Xtandi therapy. Called to verify if patient had started Xtandi therapy yet and check on initial side effects and adherence so far.    Left message to please call back in follow up of therapy. No patient or drug names were mentioned.    Kristen Weiler, Pharmacy Intern  Oral Chemotherapy Monitoring Program   [facility]  [phone]

## 2018-03-03 NOTE — TELEPHONE ENCOUNTER
Oral Chemo Monitoring Program     Placed call to patient regarding therapy.  Left message to please call back to determine start date or if the therapy had been started. No patient or drug names were mentioned.    Tamara Merino   Pharmacy Intern  [facility]   [phone]

## 2018-03-06 NOTE — NURSING NOTE
"Oncology Rooming Note    March 6, 2018 11:02 AM   Oswaldo Win is a 72 year old male who presents for:    Chief Complaint   Patient presents with     Labs Only     Labs drawn via venipuncture     Oncology Clinic Visit     Return: Prostate cancer metastatic to bone     Initial Vitals: BP 98/55  Pulse 87  Temp 98  F (36.7  C) (Oral)  Resp 16  Ht 1.676 m (5' 6\")  Wt 56.2 kg (124 lb)  SpO2 100%  BMI 20.01 kg/m2 Estimated body mass index is 20.01 kg/(m^2) as calculated from the following:    Height as of this encounter: 1.676 m (5' 6\").    Weight as of this encounter: 56.2 kg (124 lb). Body surface area is 1.62 meters squared.  Mild Pain (3) Comment: Bilateral   No LMP for male patient.  Allergies reviewed: Yes  Medications reviewed: Yes    Medications: Medication refills not needed today.  Pharmacy name entered into [employer]: [facility] [place], [place] - [address]    Clinical concerns: No concerns.   5 minutes for nursing intake (face to face time)     Janine Blood CMA              "

## 2018-03-06 NOTE — MR AVS SNAPSHOT
After Visit Summary   3/6/2018    Oswaldo Win    MRN: [id number]           Patient Information     Date Of Birth          1945        Visit Information        Provider Department      3/6/2018 10:20 AM Gabriela Mcguire PA-C [facility]        Today's Diagnoses     Prostate cancer 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)           Follow-ups after your visit        Your next 10 appointments already scheduled     Mar 21, 2018  9:30 AM CDT   [facility] Lab Draw with [facility] LAB DRAW   [facility] Lab Draw ([facility])    [address] [place] [address]  [address]  [facility] [id number] [phone]            Mar 21, 2018 10:00 AM CDT   (Arrive by 9:45 AM)   Return Visit with Bentley Robles MD   [facility] Cancer [facility] ([facility])    [address] [place] [address]  [address] [place] [id number] [phone]              Who to contact     If you have questions or need follow up information about today's clinic visit or your schedule please contact [facility]CER [facility] directly at [phone].  Normal or non-critical lab and imaging results will be communicated to you by iWitnesshart, letter or phone within 4 business days after the clinic has received the results. If you do not hear from us within 7 days, please contact the clinic through iWitnesshart or phone. If you have a critical or abnormal lab result, we will notify you by phone as soon as possible.  Submit refill requests through [employer] or call your pharmacy and they will forward the refill request to us. Please allow 3 business days for your refill to be completed.          Additional Information About Your Visit        MyChart Information     [employer] gives you secure access to your electronic health record. If you see a primary care provider, you can also send messages to your care team and make appointments. If you have questions, please call your primary care clinic.  If  "you do not have a primary care provider, please call [phone] and they will assist you.        Care EveryWhere ID     This is your Care EveryWhere ID. This could be used by other organizations to access your [facility] medical records  [id number]        Your Vitals Were     Pulse Temperature Respirations Height Pulse Oximetry BMI (Body Mass Index)    87 98  F (36.7  C) (Oral) 16 1.676 m (5' 6\") 100% 20.01 kg/m2       Blood Pressure from Last 3 Encounters:   03/06/18 98/55   02/20/18 134/52   02/20/18 (!) 89/55    Weight from Last 3 Encounters:   03/06/18 56.2 kg (124 lb)   02/20/18 55.8 kg (123 lb)   02/20/18 55.8 kg (123 lb)              We Performed the Following     CBC with platelets differential     Comprehensive metabolic panel     PSA tumor marker          Today's Medication Changes          These changes are accurate as of 3/6/18 12:36 PM.  If you have any questions, ask your nurse or doctor.               These medicines have changed or have updated prescriptions.        Dose/Directions    enoxaparin 40 MG/0.4ML injection   Commonly known as:  LOVENOX   This may have changed:  when to take this   Used for:  Other chronic pulmonary embolism without acute cor pulmonale (H)        Dose:  40 mg   Inject 0.4 mLs (40 mg) Subcutaneous 2 times daily   Quantity:  60 Syringe   Refills:  3                Primary Care Provider Office Phone # Fax #    Lucrecia Collier [phone]       [address]  [facility] [address]        Equal Access to Services     BLADIMIR KEY : Hadjaswinder hall Sokwame, waaxda luqadaha, qaybta kaalmada peter knapp idilinh brock. So [facility] [phone].    ATENCIÓN: Si habla español, tiene a mccall disposición servicios gratuitos de asistencia lingüística. Llame al [phone].    We comply with applicable federal civil rights laws and [place] laws. We do not discriminate on the basis of race, color, national origin, age, disability, sex, " sexual orientation, or gender identity.            Thank you!     Thank you for choosing [facility]  for your care. Our goal is always to provide you with excellent care. Hearing back from our patients is one way we can continue to improve our services. Please take a few minutes to complete the written survey that you may receive in the mail after your visit with us. Thank you!             Your Updated Medication List - Protect others around you: Learn how to safely use, store and throw away your medicines at www.disposemymeds.org.          This list is accurate as of 3/6/18 12:36 PM.  Always use your most recent med list.                   Brand Name Dispense Instructions for use Diagnosis    aspirin 81 MG tablet     90 tablet    Take 1 tablet (81 mg) by mouth daily    Acute on chronic combined systolic and diastolic congestive heart failure (H)       calcium carbonate 1250 MG tablet    OS-RODNEY 500 mg [place]. 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

## 2018-03-06 NOTE — PROGRESS NOTES
Oncology/Hematology Visit Note  Mar 6, 2018    REASON FOR VISIT:  Assessment on Xtandi    ONCOLOGY HISTORY:  Mr. Win is a 72 year old male with a hx of CHF, CKD, CAD w/ hx of STEMI and CABG. He met with Dr. Robles at the end of July for consultation regarding metastatic prostate cancer. He presented in May 2015 when he saw his PCP for back pain. He also had poor appetite, weight loss and fatigue.  CT showed diffuse bony mets. CT Chest showed sclerotic mets throughout the bones. He was given degarelix on 7/22 in Urology. He was having pelvic pain and was evaluated by Dr Cavazos would did not feel XRT was appropriate.  He started on docetaxel 7/31/15 at 75 mg/m2 and only had one dose before being hospitalized several times.     His PSA remained stable so he was continued only on Lupron for the rest of 2015.  In December 2015, Mainor met with Dr Robles and since his ECOG was back to a 1, they discussed re-challenging the Taxotere at a lower dose and he completed 5 cycles at 60 mg/m2. On 9/26/2016 he started Provenge off study. He received 3 doses of Provenge (last 10/28), on 3rd dose had fevers, chills, myalgias, was bedbound for a week due to these symptoms.    Mainor was briefly on Zytiga and prednisone in November-December 2016, however after one month his PSA went up, thus it was decided to add in Xofigo (radium 223) starting in Jan 2017.     Diagnosed with PE in Nov 2016. He had bleeding on Xarelto so was changed back to Lovenox.     Mainor developed diarrhea on and off in the spring of 2017, Zytiga was stopped on 3/31 as it was unsure what was contributing.  Mainor felt it was related to the Xofigo and that his dose was too high as his weight is always at least 10 pounds higher in clinic than at home.      At his last visit with Dr Robles, his PSA was rising again and re-started Taxotere on 8/15/17.    He continues on lupron every 3 mos and XGEVA.  Mainor finished 6 cycles of Taxotere, last dose 12/6/17.  He started to have more fatigue  from chemo, thus it was stopped and he was given a month break.      Mainor delayed started Xtandi until ~2/21/18    INTERVAL HISTORY: Mainor arrives unaccompanied today and overall doing better than our visit two weeks ago.  Since stopping the Taxotere in December he was dealing with fatigue/weakness, decreased appetite, some depression and only eating once a day which was leading to worsening fatigue and an acidy/nauseated stomach. In February he started to feel better physically, but didn't start the Xtandi until 2/21.  He did go inpatient on 2/20 with left arm pain, all cardiac etiology was ruled out.  He has not had left arm pain since.      Mainor feels the Xtandi is going well, its only been two weeks.  No new GI issues.  He feels his mood is getting a little better.  Has been coping with his terminal illness fairly well, however his mood was a little low during this brutal snowy last month.  Today he feels he is getting out of the house more and eating better, at least ~2 meals a day and some nutritional support.     No new concerns.  No new bone pains, some stiffness in the neck and shoulders, improved with stretching.     A complete 10-pt ROS is negative other than what is reported above      MEDICATIONS:   Current Outpatient Prescriptions   Medication Sig     enzalutamide (XTANDI) 40 MG capsule Take 4 capsules (160 mg) by mouth daily     calcium carbonate (OS-RODNEY 500 MG [place]. [place]) 1250 MG tablet Take 1 tablet (1,250 mg) by mouth 2 times daily     predniSONE (DELTASONE) 5 MG tablet Take 1 tablet (5 mg) by mouth 2 times daily     enoxaparin (LOVENOX) 40 MG/0.4ML injection Inject 0.4 mLs (40 mg) Subcutaneous 2 times daily (Patient taking differently: Inject 40 mg Subcutaneous daily )     aspirin 81 MG tablet Take 1 tablet (81 mg) by mouth daily     Cholecalciferol (VITAMIN D) 2000 UNITS tablet Take 1 tablet by mouth daily     simvastatin (ZOCOR) 40 MG tablet TAKE ONE TABLET BY MOUTH AT BEDTIME     rosuvastatin  "(CRESTOR) 10 MG tablet Take 1 tablet (10 mg) by mouth daily (Patient not taking: Reported on 3/6/2018)     No current facility-administered medications for this visit.      Facility-Administered Medications Ordered in Other Visits   Medication     lidocaine 0.5 % injection 1 mL     hydrocortisone sodium succinate (solu-CORTEF) injection     glycopyrrolate (ROBINUL) injection     neostigmine (PROSTIGMINE) injection     protamine injection     albuterol (PROAIR HFA, PROVENTIL HFA, VENTOLIN HFA) inhaler          ALLERGIES:  No Known Allergies    PHYSICAL EXAMINATION:  Vitals: BP 98/55  Pulse 87  Temp 98  F (36.7  C) (Oral)  Resp 16  Ht 1.676 m (5' 6\")  Wt 56.2 kg (124 lb)  SpO2 100%  BMI 20.01 kg/m2   Wt Readings from Last 10 Encounters:   03/06/18 56.2 kg (124 lb)   02/20/18 55.8 kg (123 lb)   02/20/18 55.8 kg (123 lb)   02/06/18 57.6 kg (127 lb)   01/23/18 57.6 kg (127 lb)   01/12/18 58.5 kg (129 lb)   12/20/17 61.2 kg (135 lb)   12/06/17 61.5 kg (135 lb 8 oz)   11/22/17 61.2 kg (135 lb)   11/13/17 61.2 kg (135 lb)       GENERAL:  A pleasant person in no acute distress.  Mildly pale and thin looking  LYMPH NODES:  No peripheral lymphadenopathy noted in the supraclavicular or cervical regions.   LUNGS:  Clear to auscultation bilaterally.   HEART:  Regular rate and rhythm systolic murmur heard at apex  ABDOMEN:  Bowel sounds are active.  Soft and nontender.  No hepatosplenomegaly or other masses appreciated.  LOWER EXTREMITIES:  Without pitting edema to the knees bilaterally.   NEUROLOGICAL:  Alert/orientated/able to answer all questions.  CN grossly intact.       LAB:   Results for USAMA MANRIQUE (MRN [id number]) as of 3/6/2018 12:27   2/20/2018 10:04 2/20/2018 18:40 3/6/2018 09:15   WBC 3.5 (L) 3.4 (L) 4.0   Hemoglobin 9.4 (L) 8.7 (L) 9.8 (L)   Hematocrit 28.9 (L) 26.9 (L) 29.5 (L)   Platelet Count 112 (L) 104 (L) 110 (L)   RBC Count 3.01 (L) 2.85 (L) 3.09 (L)   MCV 96 94 96        2/20/2018 10:04 2/20/2018 " "18:40 3/6/2018 09:15   Sodium 141 139 140   Potassium 4.4 4.0 4.2   Chloride 111 (H) 108 109   Carbon Dioxide 21 21 21   Urea Nitrogen 18 17 16   Creatinine 1.01 0.88 0.83   GFR Estimate 72 84 >90   GFR Estimate If Black 87 >90 >90   Calcium 8.8 8.6 8.8   Anion Gap 9 10 10   Albumin 3.7 3.6 3.7   Protein Total 7.5 7.4 7.6   Bilirubin Total 0.6 0.7 1.0   Alkaline Phosphatase 158 (H) 153 (H) 186 (H)   ALT 15 15 14   AST 38 37 31      1/23/2018 12:33 2/6/2018 08:54 3/6/2018 09:15   .00 (H) 724.00 (H) 707.00 (H)     IMPRESSION/PLAN:   Metastatic castrate-resistant prostate cancer: He had progression on Zytiga and Xofigo in July 2017.  Then started on Taxotere every 3 weeks from August through December of 2017. Has received six cycles thus far. Last dose was C6 on 12/6/17. We took a chemo break due to fatigue, dehydration, etc and Mainor is feeling better.  He was suppose to start on Xtandi on 1/24 and actually put it off until 2/21.  His main reason was he was just feeling sick of being sick.  However, he has desires to continue to treat his cancer and now is two weeks in to Xtandi and doing well.  LFTs OK today, alk phos up a bit, likely from bony involvement.  Will check labs in 2 weeks and 6 weeks.   -Lupron and Xgeva on 5/3 next  .   Diarrhea: Improved today- eating more routinely during the day and taking TUMS for acidic stomach. Encouraged daily TUMS as he isn't interested in H2 or PPI.     Anemia: Hemoglobin 9.8 today and this is similar to baseline, likely secondary to prostate involvement in the marrow.  We need to keep him above 9 given his CAD.      Heme: PE dx 11/2016, continue Lovenox at 40 mg BID.  Admitted he is only \"intermittently\" taking this.  Encouraged daily use.     Bone mets: No new bone pains, increased alk phos today, likely from progression.  Xgeva last 2/6    CV: hx of CHF. No clinical s/s of failure.  Off metoprolol due to BP being too low.  Has been tachy in the  range since then, " but BP won't support re-starting.      Weight is up a pound.  Encouraged continued working on good intake.  Offered delivery meals, he declined.            Sabrina Mcguire PA-C

## 2018-03-13 NOTE — TELEPHONE ENCOUNTER
Oral Chemotherapy Monitoring Program?    Attempted to contact patient today for follow up on oral chemotherapy. No answer.  Left voicemail for patient to please call me back at [phone] when able.  No patient or medication names were mentioned while leaving this message.  ?  Tamara Merino  Pharmacy Intern  [facility]  [phone]

## 2018-03-19 NOTE — PROGRESS NOTES
Received call from pt. Pt states since about last Tuesday has had a productive cough with white/clear sputum. Denies any blood in sputum. Sinus drainage is clear. Denies any fevers or chills. Feels the cold seems to be improving. Reports some dizziness with standing over the weekend but states this has subsided.  Reports 3-4 episodes of diarrhea today. States has been dealing with intermittent diarrhea since Christmas so this is not new. Has a decreased appetite. Is drinking about 40 oz fluids daily and drinking nutritional shakes 2x/day for the last couple of days. Denies any N/V. Has occasional wheezes. SOB remains the same. Pt is scheduled to see Dr. Robles on Wednesday with labs. Encouraged pt to keep this appointment as scheduled for follow up and to contact clinic in the interim with increased SOB, fevers, chills, or worsening cold sx. Pt verbalized understanding. Does not feel he needs to be seen earlier than Wednesday and will contact clinic in the interim with any new changes. Sabrina Mcguire PA-C updated and in agreement with plan. Per Sabrina's request, message was sent to scheduling to arrange IVFs after pt sees Dr. Robles on Wednesday so this can be given if needed.

## 2018-03-21 NOTE — LETTER
3/21/2018       RE: Oswaldo Win  [address]  [facility] [address]     Dear Colleague,    Thank you for referring your patient, Oswaldo Win, to the [facility]. Please see a copy of my visit note below.    Oncology/Hematology Visit Note  Mar 21, 2018    REASON FOR VISIT:  Assessment on Xtandi    ONCOLOGY HISTORY:  Mr. Win is a 72 year old male with a hx of CHF, CKD, CAD w/ hx of STEMI and CABG. He met with Dr. Robles at the end of July for consultation regarding metastatic prostate cancer. He presented in May 2015 when he saw his PCP for back pain. He also had poor appetite, weight loss and fatigue.  CT showed diffuse bony mets. CT Chest showed sclerotic mets throughout the bones. He was given degarelix on 7/22 in Urology. He was having pelvic pain and was evaluated by Dr Cavazos would did not feel XRT was appropriate.  He started on docetaxel 7/31/15 at 75 mg/m2 and only had one dose before being hospitalized several times.     His PSA remained stable so he was continued only on Lupron for the rest of 2015.  In December 2015, Mainor met with Dr Robles and since his ECOG was back to a 1, they discussed re-challenging the Taxotere at a lower dose and he completed 5 cycles at 60 mg/m2. On 9/26/2016 he started Provenge off study. He received 3 doses of Provenge (last 10/28), on 3rd dose had fevers, chills, myalgias, was bedbound for a week due to these symptoms.    Mainor was briefly on Zytiga and prednisone in November-December 2016, however after one month his PSA went up, thus it was decided to add in Xofigo (radium 223) starting in Jan 2017.     Diagnosed with PE in Nov 2016. He had bleeding on Xarelto so was changed back to Lovenox.     Mainor developed diarrhea on and off in the spring of 2017, Zytiga was stopped on 3/31 as it was unsure what was contributing.  Mainor felt it was related to the Xofigo and that his dose was too high as his weight is always at least 10 pounds higher in clinic than at home.       He was re-started Taxotere on 8/15/17 for progressive disease.   Mainor finished 6 cycles of Taxotere, last dose 12/6/17.    Mainor delayed started Xtandi until ~2/21/18. He continues on lupron every 3 mos and XGEVA.      INTERVAL HISTORY: Mainor arrives unaccompanied today.     Mainor feels the Xtandi is going well, its only been four weeks. However he caught cold recently and it has been getting better over the last few days. He had hard time     He has  No new GI issues.  He feels his mood is getting a little better.       No new concerns.  No new bone pains, some stiffness in the neck and shoulders, improved with stretching.     A complete 10-pt ROS is negative other than what is reported above      MEDICATIONS:   Current Outpatient Prescriptions   Medication Sig     enzalutamide (XTANDI) 40 MG capsule Take 4 capsules (160 mg) by mouth daily     calcium carbonate (OS-RODNEY 500 MG [place]. [place]) 1250 MG tablet Take 1 tablet (1,250 mg) by mouth 2 times daily     predniSONE (DELTASONE) 5 MG tablet Take 1 tablet (5 mg) by mouth 2 times daily     enoxaparin (LOVENOX) 40 MG/0.4ML injection Inject 0.4 mLs (40 mg) Subcutaneous 2 times daily (Patient taking differently: Inject 40 mg Subcutaneous daily )     aspirin 81 MG tablet Take 1 tablet (81 mg) by mouth daily     Cholecalciferol (VITAMIN D) 2000 UNITS tablet Take 1 tablet by mouth daily     simvastatin (ZOCOR) 40 MG tablet TAKE ONE TABLET BY MOUTH AT BEDTIME     enzalutamide (XTANDI) 40 MG capsule Take 4 capsules (160 mg) by mouth daily     rosuvastatin (CRESTOR) 10 MG tablet Take 1 tablet (10 mg) by mouth daily (Patient not taking: Reported on 3/6/2018)     No current facility-administered medications for this visit.      Facility-Administered Medications Ordered in Other Visits   Medication     lidocaine 0.5 % injection 1 mL     hydrocortisone sodium succinate (solu-CORTEF) injection     glycopyrrolate (ROBINUL) injection     neostigmine (PROSTIGMINE) injection     protamine  "injection     albuterol (PROAIR HFA, PROVENTIL HFA, VENTOLIN HFA) inhaler          ALLERGIES:  No Known Allergies    PHYSICAL EXAMINATION:  Vitals: /67  Pulse 94  Temp 97.7  F (36.5  C) (Oral)  Ht 1.626 m (5' 4\")  Wt 54 kg (119 lb)  SpO2 100%  BMI 20.43 kg/m2   Wt Readings from Last 10 Encounters:   03/21/18 54 kg (119 lb)   03/06/18 56.2 kg (124 lb)   02/20/18 55.8 kg (123 lb)   02/20/18 55.8 kg (123 lb)   02/06/18 57.6 kg (127 lb)   01/23/18 57.6 kg (127 lb)   01/12/18 58.5 kg (129 lb)   12/20/17 61.2 kg (135 lb)   12/06/17 61.5 kg (135 lb 8 oz)   11/22/17 61.2 kg (135 lb)       GENERAL:  A pleasant person in no acute distress.  Mildly pale and thin looking  LYMPH NODES:  No peripheral lymphadenopathy noted in the supraclavicular or cervical regions.   LUNGS:  Clear to auscultation bilaterally.   HEART:  Regular rate and rhythm systolic murmur heard at apex  ABDOMEN:  Bowel sounds are active.  Soft and nontender.  No hepatosplenomegaly or other masses appreciated.  LOWER EXTREMITIES:  Without pitting edema to the knees bilaterally.   NEUROLOGICAL:  Alert/orientated/able to answer all questions.  CN grossly intact.       LAB:   Recent Labs   Lab Test  03/21/18   0944  03/06/18   0915  02/20/18   1840  02/20/18   1004  02/06/18   0854   NA  138  140  139  141  138   POTASSIUM  4.0  4.2  4.0  4.4  4.0   CHLORIDE  106  109  108  111*  108   CO2  20  21  21  21  20   ANIONGAP  12  10  10  9  11   BUN  16  16  17  18  15   CR  0.96  0.83  0.88  1.01  0.84   GLC  110*  103*  114*  116*  160*   RODNEY  9.0  8.8  8.6  8.8  8.9     Recent Labs   Lab Test  04/05/17   0941  03/31/17   1411  03/15/17   1410  01/14/17   0808  01/13/17   1815  01/13/17   0801   MAG  2.0  2.2  2.4*  2.1  2.0  2.0   PHOS  2.3*  1.6*   --   2.0*  2.9  1.8*     Recent Labs   Lab Test  03/21/18   0944  03/06/18   0915  02/20/18   1840  02/20/18   1004  02/06/18   0854   WBC  2.9*  4.0  3.4*  3.5*  3.2*   HGB  10.5*  9.8*  8.7*  9.4*  9.8* "   PLT  110*  110*  104*  112*  119*   MCV  93  96  94  96  100   NEUTROPHIL  47.5  68.7  70.0  66.8  60.8     Recent Labs   Lab Test  03/21/18   0944  03/06/18   0915  02/20/18   1840   03/31/17   1411   12/21/16   1149  12/01/16   1247   BILITOTAL  1.3  1.0  0.7   < >  2.6*   < >  0.6  0.8   ALKPHOS  210*  186*  153*   < >  472*   < >  673*  649*   ALT  13  14  15   < >  16   < >  18  16   AST  34  31  37   < >  22   < >  24  22   ALBUMIN  4.0  3.7  3.6   < >  3.7   < >  3.5  3.8   LDH   --    --    --    --   409*   --   321*  348*    < > = values in this interval not displayed.     TSH   Date Value Ref Range Status   09/16/2015 3.18 0.40 - 4.00 mU/L Final     No results for input(s): CEA in the last 81816 hours.  Results for orders placed or performed during the hospital encounter of 02/20/18   XR Chest 2 Views    Narrative    Exam:  XR CHEST 2 VW, 2/20/2018 7:26 PM    History: Chest pain;     Comparison:  2/23/2017    Findings:  PA and lateral view of the chest. The cardiomediastinal  silhouette is within normal limits. Mitral clips. Trachea is midline.  No pleural effusion or pneumothorax. Basilar atelectasis. No acute  airspace opacity. Progressed diffuse sclerotic metastases in the  vertebral bodies, clavicles, ribs, scapulas, and humeri.       Impression    Impression:    1. No acute airspace opacity.  2. Progressive diffuse sclerotic metastases involving the humeral  heads, vertebral bodies, scapulas, clavicles, and ribs.    I have personally reviewed the examination and initial interpretation  and I agree with the findings.    BALA JIMENEZ MD   Cervical spine CT w/o contrast    Narrative    CT CERVICAL SPINE W/O CONTRAST 2/20/2018 7:05 PM    Provided History: Bilateral arm and shoulder pain, history of  metastatic prostate cancer, evaluate for fractures, See above.    Comparison: CT 7/12/2017    Technique: Using multidetector thin collimation helical acquisition  technique, axial, coronal and  sagittal CT images through the cervical  spine were obtained without intravenous contrast.     Findings:  Diffuse sclerotic bone metastases throughout the spine, skull base,  and visualized ribs. Also sclerotic metastases in the thyroid and  cricoid cartilage. The cervical vertebrae are normally aligned.  Straightened cervical lordosis possibly due to patient positioning or  muscle spasm. No acute fracture or subluxation. No prevertebral edema.  There is moderate disc height narrowing C5-6 and C6-7. The findings on  a level by level basis are as follows:    C2-3: No spinal canal or neural foraminal stenosis.    C3-4:  No spinal canal stenosis. Moderate left-sided neural foraminal  narrowing secondary to facet hypertrophy.    C4-5:  No spinal canal stenosis. Moderate right neural foraminal and  mild left neural foraminal narrowing secondary to facet hypertrophy.    C5-6:  Moderate spinal canal narrowing. Moderate bilateral neural  foraminal stenosis secondary to facet hypertrophy and osteophytes.    C6-7:  No spinal canal stenosis. Moderate left and mild right neural  foraminal stenosis narrowing secondary to uncinate and facet  hypertrophy.    C7-T1:  No spinal canal or neural foraminal stenosis.     No abnormality of the paraspinous soft tissues. Emphysematous changes  in the lung apices.      Impression    Impression:   1. No acute fracture or subluxation.  2. Diffuse sclerotic bone metastases throughout the spine, skull base,  and visualized ribs.  3. Multilevel cervical spondylosis greatest at C5-C6 with moderate  spinal canal narrowing.    I have personally reviewed the examination and initial interpretation  and I agree with the findings.    DIAZ BYRNE MD     *Note: Due to a large number of results and/or encounters for the requested time period, some results have not been displayed. A complete set of results can be found in Results Review.     Recent Labs   Lab Test  03/21/18   0944  03/06/18   0915   "02/06/18   0854  01/23/18   1233  01/03/18   1039   12/21/16   1149  12/01/16   1247  10/28/16   0942   09/07/16   1859   08/21/15   1405   PSA  795.00*  707.00*  724.00*  664.00*  474.00*   < >  424.22*  324.65*   --    < >  28.98*   < >  756.97*   Testosteron   --    --    --    --    --    --  <2 <2  11*   --   7*   --   4*    < > = values in this interval not displayed.          IMPRESSION/PLAN:   Metastatic castrate-resistant prostate cancer:   Mainor is tolerating enzalutamide better. He has no new complains. His PSA was not available at the time of visit but is only marginally higher than couple of weeks ago. We will continue with enzalutamide for now. I will have him return in 4 weeks to see Ms. Gabriela Shayla.     .   Cough with expectoration:   He had a real hard time for week to 10 days. I offered him a Zpack which had worked in the past. He notes that he is doing better and does not need it at this time.     Anemia: Hemoglobin 10.5 today and this is better than last visit still low likely secondary to prostate involvement in the marrow.  We need to keep him above 9 given his CAD.      Heme: PE dx 11/2016, continue Lovenox at 40 mg BID.  Admitted he is only \"intermittently\" taking this.  Encouraged daily use.     Bone mets: No new bone pains, increased alk phos today, likely from progression.  Xgeva last 2/6    CV: hx of CHF. No clinical s/s of failure.  Off metoprolol due to BP being too low.  Has been tachy in the  range since then, but BP won't support re-starting.      Weight is down 5 pounds.  Encouraged continued working on good intake.  Notes that he had hard time cooking or going out while he was feeling unwell.         Again, thank you for allowing me to participate in the care of your patient.      Sincerely,    Bentley Robles MD      "

## 2018-03-21 NOTE — NURSING NOTE
"Oncology Rooming Note    March 21, 2018 10:13 AM   Oswaldo Win is a 73 year old male who presents for:    Chief Complaint   Patient presents with     Oncology Clinic Visit     return patient visit for 4 week follow up related to prostate cancer      Initial Vitals: /67  Pulse 94  Temp 97.7  F (36.5  C) (Oral)  Ht 1.626 m (5' 4\")  Wt 54 kg (119 lb)  SpO2 100%  BMI 20.43 kg/m2 Estimated body mass index is 20.43 kg/(m^2) as calculated from the following:    Height as of this encounter: 1.626 m (5' 4\").    Weight as of this encounter: 54 kg (119 lb). Body surface area is 1.56 meters squared.  No Pain (0) Comment: Data Unavailable   No LMP for male patient.  Allergies reviewed: Yes  Medications reviewed: Yes    Medications: Medication refills not needed today.  Pharmacy name entered into [facility]: [facility] [place], [place] - [address]    Clinical concerns: no concerns dr. eaton was notified.    6 minutes for nursing intake (face to face time)     Parrish Barnes CMA              "

## 2018-03-21 NOTE — MR AVS SNAPSHOT
After Visit Summary   3/21/2018    Oswaldo Win    MRN: [id number]           Patient Information     Date Of Birth          1945        Visit Information        Provider Department      3/21/2018 10:00 AM Bentley Robles MD [facility]        Today's Diagnoses     Prostate cancer 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)    -  1       Follow-ups after your visit        Your next 10 appointments already scheduled     Apr 18, 2018  1:30 PM CDT   [facility] Draw with [facility] LAB DRAW   [facility] Lab Draw ([facility])    [address] [place] [address]  [address] [place] [id number] [phone]            Apr 18, 2018  2:10 PM CDT   (Arrive by 1:55 PM)   Return Visit with Gabriela Mcguire PA-C   [facility] ([facility])    [address] [place] [address]  [address] [place] [id number] [phone]              Who to contact     If you have questions or need follow up information about today's clinic visit or your schedule please contact [facility] directly at [phone].  Normal or non-critical lab and imaging results will be communicated to you by MyChart, letter or phone within 4 business days after the clinic has received the results. If you do not hear from us within 7 days, please contact the clinic through [employer] or phone. If you have a critical or abnormal lab result, we will notify you by phone as soon as possible.  Submit refill requests through [email or web address] or call your pharmacy and they will forward the refill request to us. Please allow 3 business days for your refill to be completed.          Additional Information About Your Visit        MyChart Information     [email or web address] gives you secure access to your electronic health record. If you see a primary care provider, you can also send messages to your care team and make appointments. If you have questions, please call your primary care clinic.  " If you do not have a primary care provider, please call [phone] and they will assist you.        Care EveryWhere ID     This is your Care EveryWhere ID. This could be used by other organizations to access your [facility] medical records  [id number]        Your Vitals Were     Pulse Temperature Height Pulse Oximetry BMI (Body Mass Index)       94 97.7  F (36.5  C) (Oral) 1.626 m (5' 4\") 100% 20.43 kg/m2        Blood Pressure from Last 3 Encounters:   03/21/18 110/67   03/06/18 98/55   02/20/18 134/52    Weight from Last 3 Encounters:   03/21/18 54 kg (119 lb)   03/06/18 56.2 kg (124 lb)   02/20/18 55.8 kg (123 lb)              Today, you had the following     No orders found for display         Today's Medication Changes          These changes are accurate as of 3/21/18 11:59 PM.  If you have any questions, ask your nurse or doctor.               These medicines have changed or have updated prescriptions.        Dose/Directions    enoxaparin 40 MG/0.4ML injection   Commonly known as:  LOVENOX   This may have changed:  when to take this   Used for:  Other chronic pulmonary embolism without acute cor pulmonale (H)        Dose:  40 mg   Inject 0.4 mLs (40 mg) Subcutaneous 2 times daily   Quantity:  60 Syringe   Refills:  3       * enzalutamide 40 MG capsule   Commonly known as:  XTANDI   This may have changed:  Another medication with the same name was added. Make sure you understand how and when to take each.   Used for:  Metastatic bone tumor (H)   Changed by:  Christiana Shell, [facility]        Dose:  160 mg   Take 4 capsules (160 mg) by mouth daily   Quantity:  120 capsule   Refills:  1       * enzalutamide 40 MG capsule   Commonly known as:  XTANDI   This may have changed:  You were already taking a medication with the same name, and this prescription was added. Make sure you understand how and when to take each.   Used for:  Prostate cancer 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), Metastatic bone tumor (H)   Changed by:  " Melyjuliannatheron Christiana BURNETT, [facility]        Dose:  160 mg   Take 4 capsules (160 mg) by mouth daily   Quantity:  120 capsule   Refills:  1       * Notice:  This list has 2 medication(s) that are the same as other medications prescribed for you. Read the directions carefully, and ask your doctor or other care provider to review them with you.         Where to get your medicines      These medications were sent to [place], [place] - [address]  [address], [facility] [address]    Hours:  TRANSPLANT PHONE NUMBER [phone] Phone:  [phone]     enzalutamide 40 MG capsule                Primary Care Provider Office Phone # Fax #    Lucrecia Collier [phone] [address]  [facility] [address]        Equal Access to Services     BLADIMIR KEY : Hadii aad ku hadasho Sokwame, waaxda luqadaha, qaybta kaalmada adeshayy, peter moran . So [facility] [phone].    ATENCIÓN: Si habla español, tiene a mccall disposición servicios gratuitos de asistencia lingüística. Marquise al [phone].    We comply with applicable federal civil rights laws and [place] laws. We do not discriminate on the basis of race, color, national origin, age, disability, sex, sexual orientation, or gender identity.            Thank you!     Thank you for choosing [facility]  for your care. Our goal is always to provide you with excellent care. Hearing back from our patients is one way we can continue to improve our services. Please take a few minutes to complete the written survey that you may receive in the mail after your visit with us. Thank you!             Your Updated Medication List - Protect others around you: Learn how to safely use, store and throw away your medicines at www.disposemymeds.org.          This list is accurate as of 3/21/18 11:59 PM.  Always use your most recent med list.                   Brand Name  Dispense Instructions for use Diagnosis    aspirin 81 MG tablet     90 tablet    Take 1 tablet (81 mg) by mouth daily    Acute on chronic combined systolic and diastolic congestive heart failure (H)       calcium carbonate 1250 MG tablet    OS-RODNEY 500 mg [place]. [place]    120 tablet    Take 1 tablet (1,250 mg) by mouth 2 times daily    Hypocalcemia       enoxaparin 40 MG/0.4ML injection    LOVENOX    60 Syringe    Inject 0.4 mLs (40 mg) Subcutaneous 2 times daily    Other chronic pulmonary embolism without acute cor pulmonale (H)       * enzalutamide 40 MG capsule    XTANDI    120 capsule    Take 4 capsules (160 mg) by mouth daily    Metastatic bone tumor (H)       * enzalutamide 40 MG capsule    XTANDI    120 capsule    Take 4 capsules (160 mg) by mouth daily    Prostate cancer 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), Metastatic bone tumor (H)       predniSONE 5 MG tablet    DELTASONE    42 tablet    Take 1 tablet (5 mg) by mouth 2 times daily    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), Prostate cancer 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), Metastatic bone tumor (H)       rosuvastatin 10 MG tablet    CRESTOR    90 tablet    Take 1 tablet (10 mg) by mouth daily    ASCVD (arteriosclerotic cardiovascular disease), Metastatic bone tumor (H), Prostate cancer 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)       simvastatin 40 MG tablet    ZOCOR    90 tablet    TAKE ONE TABLET BY MOUTH AT BEDTIME    Hyperlipidemia LDL goal <130       vitamin D 2000 UNITS tablet     100 tablet    Take 1 tablet by mouth daily    Vitamin D deficiency       * Notice:  This list has 2 medication(s) that are the same as other medications prescribed for you. Read the directions carefully, and ask your doctor or other care provider to review them with you.

## 2018-03-21 NOTE — NURSING NOTE
Chief Complaint   Patient presents with     Blood Draw     Labs only     Labs drawn by venipuncture.  See doc flow sheets for details.  Virginia Reagan CMA

## 2018-03-25 NOTE — PROGRESS NOTES
Oncology/Hematology Visit Note  Mar 21, 2018    REASON FOR VISIT:  Assessment on Xtandi    ONCOLOGY HISTORY:  Mr. Win is a 72 year old male with a hx of CHF, CKD, CAD w/ hx of STEMI and CABG. He met with Dr. Robles at the end of July for consultation regarding metastatic prostate cancer. He presented in May 2015 when he saw his PCP for back pain. He also had poor appetite, weight loss and fatigue.  CT showed diffuse bony mets. CT Chest showed sclerotic mets throughout the bones. He was given degarelix on 7/22 in Urology. He was having pelvic pain and was evaluated by Dr Cavazos would did not feel XRT was appropriate.  He started on docetaxel 7/31/15 at 75 mg/m2 and only had one dose before being hospitalized several times.     His PSA remained stable so he was continued only on Lupron for the rest of 2015.  In December 2015, Mainor met with Dr Robles and since his ECOG was back to a 1, they discussed re-challenging the Taxotere at a lower dose and he completed 5 cycles at 60 mg/m2. On 9/26/2016 he started Provenge off study. He received 3 doses of Provenge (last 10/28), on 3rd dose had fevers, chills, myalgias, was bedbound for a week due to these symptoms.    Mainor was briefly on Zytiga and prednisone in November-December 2016, however after one month his PSA went up, thus it was decided to add in Xofigo (radium 223) starting in Jan 2017.     Diagnosed with PE in Nov 2016. He had bleeding on Xarelto so was changed back to Lovenox.     Mainor developed diarrhea on and off in the spring of 2017, Zytiga was stopped on 3/31 as it was unsure what was contributing.  Mainor felt it was related to the Xofigo and that his dose was too high as his weight is always at least 10 pounds higher in clinic than at home.      He was re-started Taxotere on 8/15/17 for progressive disease.   Mainor finished 6 cycles of Taxotere, last dose 12/6/17.    Mainor delayed started Xtandi until ~2/21/18. He continues on lupron every 3 mos and XGEVA.      INTERVAL  "HISTORY: Mainor arrives unaccompanied today.     Mainor feels the Xtandi is going well, its only been four weeks. However he caught cold recently and it has been getting better over the last few days. He had hard time     He has  No new GI issues.  He feels his mood is getting a little better.       No new concerns.  No new bone pains, some stiffness in the neck and shoulders, improved with stretching.     A complete 10-pt ROS is negative other than what is reported above      MEDICATIONS:   Current Outpatient Prescriptions   Medication Sig     enzalutamide (XTANDI) 40 MG capsule Take 4 capsules (160 mg) by mouth daily     calcium carbonate (OS-RODNEY 500 MG [place]. [place]) 1250 MG tablet Take 1 tablet (1,250 mg) by mouth 2 times daily     predniSONE (DELTASONE) 5 MG tablet Take 1 tablet (5 mg) by mouth 2 times daily     enoxaparin (LOVENOX) 40 MG/0.4ML injection Inject 0.4 mLs (40 mg) Subcutaneous 2 times daily (Patient taking differently: Inject 40 mg Subcutaneous daily )     aspirin 81 MG tablet Take 1 tablet (81 mg) by mouth daily     Cholecalciferol (VITAMIN D) 2000 UNITS tablet Take 1 tablet by mouth daily     simvastatin (ZOCOR) 40 MG tablet TAKE ONE TABLET BY MOUTH AT BEDTIME     enzalutamide (XTANDI) 40 MG capsule Take 4 capsules (160 mg) by mouth daily     rosuvastatin (CRESTOR) 10 MG tablet Take 1 tablet (10 mg) by mouth daily (Patient not taking: Reported on 3/6/2018)     No current facility-administered medications for this visit.      Facility-Administered Medications Ordered in Other Visits   Medication     lidocaine 0.5 % injection 1 mL     hydrocortisone sodium succinate (solu-CORTEF) injection     glycopyrrolate (ROBINUL) injection     neostigmine (PROSTIGMINE) injection     protamine injection     albuterol (PROAIR HFA, PROVENTIL HFA, VENTOLIN HFA) inhaler          ALLERGIES:  No Known Allergies    PHYSICAL EXAMINATION:  Vitals: /67  Pulse 94  Temp 97.7  F (36.5  C) (Oral)  Ht 1.626 m (5' 4\")  Wt 54 kg " (119 lb)  SpO2 100%  BMI 20.43 kg/m2   Wt Readings from Last 10 Encounters:   03/21/18 54 kg (119 lb)   03/06/18 56.2 kg (124 lb)   02/20/18 55.8 kg (123 lb)   02/20/18 55.8 kg (123 lb)   02/06/18 57.6 kg (127 lb)   01/23/18 57.6 kg (127 lb)   01/12/18 58.5 kg (129 lb)   12/20/17 61.2 kg (135 lb)   12/06/17 61.5 kg (135 lb 8 oz)   11/22/17 61.2 kg (135 lb)       GENERAL:  A pleasant person in no acute distress.  Mildly pale and thin looking  LYMPH NODES:  No peripheral lymphadenopathy noted in the supraclavicular or cervical regions.   LUNGS:  Clear to auscultation bilaterally.   HEART:  Regular rate and rhythm systolic murmur heard at apex  ABDOMEN:  Bowel sounds are active.  Soft and nontender.  No hepatosplenomegaly or other masses appreciated.  LOWER EXTREMITIES:  Without pitting edema to the knees bilaterally.   NEUROLOGICAL:  Alert/orientated/able to answer all questions.  CN grossly intact.       LAB:   Recent Labs   Lab Test  03/21/18   0944  03/06/18   0915  02/20/18   1840  02/20/18   1004  02/06/18   0854   NA  138  140  139  141  138   POTASSIUM  4.0  4.2  4.0  4.4  4.0   CHLORIDE  106  109  108  111*  108   CO2  20  21  21  21  20   ANIONGAP  12  10  10  9  11   BUN  16  16  17  18  15   CR  0.96  0.83  0.88  1.01  0.84   GLC  110*  103*  114*  116*  160*   RODNEY  9.0  8.8  8.6  8.8  8.9     Recent Labs   Lab Test  04/05/17   0941  03/31/17   1411  03/15/17   1410  01/14/17   0808  01/13/17   1815  01/13/17   0801   MAG  2.0  2.2  2.4*  2.1  2.0  2.0   PHOS  2.3*  1.6*   --   2.0*  2.9  1.8*     Recent Labs   Lab Test  03/21/18   0944  03/06/18   0915  02/20/18   1840  02/20/18   1004  02/06/18   0854   WBC  2.9*  4.0  3.4*  3.5*  3.2*   HGB  10.5*  9.8*  8.7*  9.4*  9.8*   PLT  110*  110*  104*  112*  119*   MCV  93  96  94  96  100   NEUTROPHIL  47.5  68.7  70.0  66.8  60.8     Recent Labs   Lab Test  03/21/18   0944  03/06/18   0915  02/20/18   1840   03/31/17   1411   12/21/16   1149  12/01/16    1247   BILITOTAL  1.3  1.0  0.7   < >  2.6*   < >  0.6  0.8   ALKPHOS  210*  186*  153*   < >  472*   < >  673*  649*   ALT  13  14  15   < >  16   < >  18  16   AST  34  31  37   < >  22   < >  24  22   ALBUMIN  4.0  3.7  3.6   < >  3.7   < >  3.5  3.8   LDH   --    --    --    --   409*   --   321*  348*    < > = values in this interval not displayed.     TSH   Date Value Ref Range Status   09/16/2015 3.18 0.40 - 4.00 mU/L Final     No results for input(s): CEA in the last 88206 hours.  Results for orders placed or performed during the hospital encounter of 02/20/18   XR Chest 2 Views    Narrative    Exam:  XR CHEST 2 VW, 2/20/2018 7:26 PM    History: Chest pain;     Comparison:  2/23/2017    Findings:  PA and lateral view of the chest. The cardiomediastinal  silhouette is within normal limits. Mitral clips. Trachea is midline.  No pleural effusion or pneumothorax. Basilar atelectasis. No acute  airspace opacity. Progressed diffuse sclerotic metastases in the  vertebral bodies, clavicles, ribs, scapulas, and humeri.       Impression    Impression:    1. No acute airspace opacity.  2. Progressive diffuse sclerotic metastases involving the humeral  heads, vertebral bodies, scapulas, clavicles, and ribs.    I have personally reviewed the examination and initial interpretation  and I agree with the findings.    BALA JIMENEZ MD   Cervical spine CT w/o contrast    Narrative    CT CERVICAL SPINE W/O CONTRAST 2/20/2018 7:05 PM    Provided History: Bilateral arm and shoulder pain, history of  metastatic prostate cancer, evaluate for fractures, See above.    Comparison: CT 7/12/2017    Technique: Using multidetector thin collimation helical acquisition  technique, axial, coronal and sagittal CT images through the cervical  spine were obtained without intravenous contrast.     Findings:  Diffuse sclerotic bone metastases throughout the spine, skull base,  and visualized ribs. Also sclerotic metastases in the thyroid  and  cricoid cartilage. The cervical vertebrae are normally aligned.  Straightened cervical lordosis possibly due to patient positioning or  muscle spasm. No acute fracture or subluxation. No prevertebral edema.  There is moderate disc height narrowing C5-6 and C6-7. The findings on  a level by level basis are as follows:    C2-3: No spinal canal or neural foraminal stenosis.    C3-4:  No spinal canal stenosis. Moderate left-sided neural foraminal  narrowing secondary to facet hypertrophy.    C4-5:  No spinal canal stenosis. Moderate right neural foraminal and  mild left neural foraminal narrowing secondary to facet hypertrophy.    C5-6:  Moderate spinal canal narrowing. Moderate bilateral neural  foraminal stenosis secondary to facet hypertrophy and osteophytes.    C6-7:  No spinal canal stenosis. Moderate left and mild right neural  foraminal stenosis narrowing secondary to uncinate and facet  hypertrophy.    C7-T1:  No spinal canal or neural foraminal stenosis.     No abnormality of the paraspinous soft tissues. Emphysematous changes  in the lung apices.      Impression    Impression:   1. No acute fracture or subluxation.  2. Diffuse sclerotic bone metastases throughout the spine, skull base,  and visualized ribs.  3. Multilevel cervical spondylosis greatest at C5-C6 with moderate  spinal canal narrowing.    I have personally reviewed the examination and initial interpretation  and I agree with the findings.    DIAZ BYRNE MD     *Note: Due to a large number of results and/or encounters for the requested time period, some results have not been displayed. A complete set of results can be found in Results Review.     Recent Labs   Lab Test  03/21/18   0944  03/06/18   0915  02/06/18   0854  01/23/18   1233  01/03/18   1039   12/21/16   1149  12/01/16   1247  10/28/16   0942   09/07/16   1859   08/21/15   1405   PSA  795.00*  707.00*  724.00*  664.00*  474.00*   < >  424.22*  324.65*   --    < >  28.98*   < >   "756.97*   Testosteron   --    --    --    --    --    --  <2 <2  11*   --   7*   --   4*    < > = values in this interval not displayed.          IMPRESSION/PLAN:   Metastatic castrate-resistant prostate cancer:   Mainor is tolerating enzalutamide better. He has no new complains. His PSA was not available at the time of visit but is only marginally higher than couple of weeks ago. We will continue with enzalutamide for now. I will have him return in 4 weeks to see Ms. Gabriela Shayla.     .   Cough with expectoration:   He had a real hard time for week to 10 days. I offered him a Zpack which had worked in the past. He notes that he is doing better and does not need it at this time.     Anemia: Hemoglobin 10.5 today and this is better than last visit still low likely secondary to prostate involvement in the marrow.  We need to keep him above 9 given his CAD.      Heme: PE dx 11/2016, continue Lovenox at 40 mg BID.  Admitted he is only \"intermittently\" taking this.  Encouraged daily use.     Bone mets: No new bone pains, increased alk phos today, likely from progression.  Xgeva last 2/6    CV: hx of CHF. No clinical s/s of failure.  Off metoprolol due to BP being too low.  Has been tachy in the  range since then, but BP won't support re-starting.      Weight is down 5 pounds.  Encouraged continued working on good intake.  Notes that he had hard time cooking or going out while he was feeling unwell.       "

## 2018-04-04 NOTE — TELEPHONE ENCOUNTER
"Oral Chemotherapy Monitoring Program    Primary Oncologist: Dr. Robles  Primary Oncology Clinic: [facility]  Cancer Diagnosis: Prostate Cancer     Start Date: 1/24/2018  Therapy: Xtandi 160 mg (4 x 40 mg) QD continuously    Lab Monitoring Plan  CBC monthly for first 3 months, then every 2 months. CMP monthly for first 3 months, then every 2 months  Subjective/Objective:  Oswaldo Win is a 73 year old male contacted by phone for a follow-up visit for oral chemotherapy. Mainor reports that things are going well. He misses a dose \"here and there.\" He states that he does better if he takes the medication with a meal, but doesn't bring it along with him if he's not home in the morning and then misses a dose at times. We reviewed the importance of taking it regularly without missing doses. He denies fatigue. He has some diarrhea, but thinks it's more related to his diet than the medication. He only drinks about 40 ounces of water per day, but has recently started drinking vitamin water so hopes to increase his fluid intake overall. We talked about a recommended value of at least 64-80 ounces per day. In general, he has been trying to eat a little better and move around more throughout the day. He had no other questions or concerns, but hopes that his PSA will come down a bit more quickly. He thanked me for the call and care.    ORAL CHEMOTHERAPY 12/16/2016 2/3/2017 3/4/2017 6/15/2017 1/23/2018 2/2/2018 4/4/2018   Drug Name Zytiga (Abiraterone) Zytiga (Abiraterone) Zytiga (Abiraterone) Zytiga (Abiraterone) Xtandi (Enzalutamide) Xtandi (Enzalutamide) Xtandi (Enzalutamide)   Current Dosage 1000mg 1000mg 1000mg 1000mg 160mg 160mg 160mg   Current Schedule Daily Daily Daily Daily Daily Daily Daily   Cycle Details Continuous Continuous Continuous Continuous Continuous Continuous Continuous   Start Date of Last Cycle 12/12/2016 - 11/12/2016 - - - -   Planned next cycle start date 1/11/2017 - - - 1/24/2018 1/24/2018 -   Doses " "missed in last 2 weeks 0 0 0 0 - 0 more   Adherence Assessment - Adherent Adherent Adherent - Adherent Adherent   Adverse Effects Arthralgias - - No AE identified during assessment - No AE identified during assessment No AE identified during assessment   Arthralgias Grade 1 - - - - - -   Pharmacist Intervention(arthralgias) Yes - - - - - -   Home BPs all BPs<140/90 - - all BPs<140/90 - not needed not needed   Any new drug interactions? No - - No No No No   Is the dose as ordered appropriate for the patient? - - - - Yes Yes Yes     Vitals:  BP:   BP Readings from Last 1 Encounters:   03/21/18 110/67     Wt Readings from Last 1 Encounters:   03/21/18 54 kg (119 lb)     Estimated body surface area is 1.56 meters squared as calculated from the following:    Height as of 3/21/18: 1.626 m (5' 4\").    Weight as of 3/21/18: 54 kg (119 lb).    Labs:  _  Result Component Current Result Ref Range   Sodium 138 (3/21/2018) 133 - 144 mmol/L     _  Result Component Current Result Ref Range   Potassium 4.0 (3/21/2018) 3.4 - 5.3 mmol/L     _  Result Component Current Result Ref Range   Calcium 9.0 (3/21/2018) 8.5 - 10.1 mg/dL     No results found for Mag within last 30 days.     No results found for Phos within last 30 days.     _  Result Component Current Result Ref Range   Albumin 4.0 (3/21/2018) 3.4 - 5.0 g/dL     _  Result Component Current Result Ref Range   Urea Nitrogen 16 (3/21/2018) 7 - 30 mg/dL     _  Result Component Current Result Ref Range   Creatinine 0.96 (3/21/2018) 0.66 - 1.25 mg/dL       _  Result Component Current Result Ref Range   AST 34 (3/21/2018) 0 - 45 U/L     _  Result Component Current Result Ref Range   ALT 13 (3/21/2018) 0 - 70 U/L     _  Result Component Current Result Ref Range   Bilirubin Total 1.3 (3/21/2018) 0.2 - 1.3 mg/dL       _  Result Component Current Result Ref Range   WBC 2.9 (L) (3/21/2018) 4.0 - 11.0 10e9/L     _  Result Component Current Result Ref Range   Hemoglobin 10.5 (L) (3/21/2018) " 13.3 - 17.7 g/dL     _  Result Component Current Result Ref Range   Platelet Count 110 (L) (3/21/2018) 150 - 450 10e9/L     _  Result Component Current Result Ref Range   Absolute Neutrophil 1.4 (L) (3/21/2018) 1.6 - 8.3 10e9/L           Assessment:  Patient is doing well on Xtandi therapy, but needs to remember medication EVERY day and to increase water intake.    Plan:  Continue Xtandi 160 mg daily continuously.    Follow-Up:  4/18/2018 review appointment with Gabriela Rousseau Due:  5/28 (currently has two months of medication)    Sarah Bryan, Pharmacy Intern  Oral Chemotherapy Monitoring Program  [facility]  [phone]

## 2018-04-18 NOTE — NURSING NOTE
Chief Complaint   Patient presents with     Blood Draw     labs drawn via venipuncture by RN     /74 (BP Location: Left arm, Patient Position: Chair, Cuff Size: Adult Regular)  Pulse 89  Temp 97.8  F (36.6  C) (Oral)  Wt 53.1 kg (117 lb)  SpO2 100%  BMI 20.08 kg/m2    Vitals taken.  Labs collected and sent from left hand venipuncture. Pt tolerated well. Pt checked in for next appointment.    Rhonda Isabel RN

## 2018-04-18 NOTE — MR AVS SNAPSHOT
After Visit Summary   4/18/2018    Oswaldo Win    MRN: [id number]           Patient Information     Date Of Birth          1945        Visit Information        Provider Department      4/18/2018 2:10 PM Gabriela Mcguire PA-C [facility]        Today's Diagnoses     Prostate cancer 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)           Follow-ups after your visit        Your next 10 appointments already scheduled     May 04, 2018  9:00 AM CDT   [facility] Lab Draw with [facility] LAB DRAW   [facility] Draw ([facility])    [address] [place] [address]  [address] [place] [address] [phone]            May 04, 2018  9:30 AM CDT   (Arrive by 9:15 AM)   Return Visit with POLI Bridges [facility] ([facility])    [address] [place] [address]  [address] [place] [address] [phone]              Who to contact     If you have questions or need follow up information about today's clinic visit or your schedule please contact [facility] directly at [phone].  Normal or non-critical lab and imaging results will be communicated to you by [employer], letter or phone within 4 business days after the clinic has received the results. If you do not hear from us within 7 days, please contact the clinic through [employer] or phone. If you have a critical or abnormal lab result, we will notify you by phone as soon as possible.  Submit refill requests through [employer] or call your pharmacy and they will forward the refill request to us. Please allow 3 business days for your refill to be completed.          Additional Information About Your Visit        MyChart Information     [employer] gives you secure access to your electronic health record. If you see a primary care provider, you can also send messages to your care team and make appointments. If you have questions, please call your primary care clinic.  If  "you do not have a primary care provider, please call [phone] and they will assist you.        Care EveryWhere ID     This is your Care EveryWhere ID. This could be used by other organizations to access your [facility] medical records  [id number]        Your Vitals Were     Pulse Temperature Height Pulse Oximetry BMI (Body Mass Index)       89 97.8  F (36.6  C) (Oral) 1.626 m (5' 4\") 100% 20.08 kg/m2        Blood Pressure from Last 3 Encounters:   04/18/18 116/74   03/21/18 110/67   03/06/18 98/55    Weight from Last 3 Encounters:   04/18/18 53.1 kg (117 lb)   03/21/18 54 kg (119 lb)   03/06/18 56.2 kg (124 lb)              We Performed the Following     CBC with platelets differential     Comprehensive metabolic panel     PSA tumor marker          Today's Medication Changes          These changes are accurate as of 4/18/18  3:31 PM.  If you have any questions, ask your nurse or doctor.               These medicines have changed or have updated prescriptions.        Dose/Directions    enoxaparin 40 MG/0.4ML injection   Commonly known as:  LOVENOX   This may have changed:  when to take this   Used for:  Other chronic pulmonary embolism without acute cor pulmonale (H)        Dose:  40 mg   Inject 0.4 mLs (40 mg) Subcutaneous 2 times daily   Quantity:  60 Syringe   Refills:  3                Primary Care Provider Office Phone # Fax #    Lucrecia Collier [phone]       [address]  [facility] [address]        Equal Access to Services     BLADIMIR KEY AH: Hadii jesse ku hadasho Soariaali, waaxda luqadaha, qaybta kaalmada claraegyada, peter brock. So [facility] [phone].    ATENCIÓN: Si habla español, tiene a mccall disposición servicios gratuitos de asistencia lingüística. Llame al [phone].    We comply with applicable federal civil rights laws and [place] laws. We do not discriminate on the basis of race, color, national origin, age, disability, sex, sexual " orientation, or gender identity.            Thank you!     Thank you for choosing [facility]  for your care. Our goal is always to provide you with excellent care. Hearing back from our patients is one way we can continue to improve our services. Please take a few minutes to complete the written survey that you may receive in the mail after your visit with us. Thank you!             Your Updated Medication List - Protect others around you: Learn how to safely use, store and throw away your medicines at www.disposemymeds.org.          This list is accurate as of 4/18/18  3:31 PM.  Always use your most recent med list.                   Brand Name Dispense Instructions for use Diagnosis    aspirin 81 MG tablet     90 tablet    Take 1 tablet (81 mg) by mouth daily    Acute on chronic combined systolic and diastolic congestive heart failure (H)       calcium carbonate 1250 MG tablet    OS-RODNEY 500 mg [place]. Ca    120 tablet    Take 1 tablet (1,250 mg) by mouth 2 times daily    Hypocalcemia       enoxaparin 40 MG/0.4ML injection    LOVENOX    60 Syringe    Inject 0.4 mLs (40 mg) Subcutaneous 2 times daily    Other chronic pulmonary embolism without acute cor pulmonale (H)       * enzalutamide 40 MG capsule    XTANDI    120 capsule    Take 4 capsules (160 mg) by mouth daily    Metastatic bone tumor (H)       * enzalutamide 40 MG capsule    XTANDI    120 capsule    Take 4 capsules (160 mg) by mouth daily    Prostate cancer 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), Metastatic bone tumor (H)       predniSONE 5 MG tablet    DELTASONE    42 tablet    Take 1 tablet (5 mg) by mouth 2 times daily    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), Prostate cancer 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), Metastatic bone tumor (H)       rosuvastatin 10 MG tablet    CRESTOR    90 tablet    Take 1 tablet (10 mg) by mouth daily    ASCVD (arteriosclerotic cardiovascular disease), Metastatic bone tumor (H), Prostate cancer 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)        simvastatin 40 MG tablet    ZOCOR    90 tablet    TAKE ONE TABLET BY MOUTH AT BEDTIME    Hyperlipidemia LDL goal <130       vitamin D 2000 units tablet     100 tablet    Take 1 tablet by mouth daily    Vitamin D deficiency       * Notice:  This list has 2 medication(s) that are the same as other medications prescribed for you. Read the directions carefully, and ask your doctor or other care provider to review them with you.

## 2018-04-18 NOTE — NURSING NOTE
"Oncology Rooming Note    April 18, 2018 1:56 PM   Oswaldo Win is a 73 year old male who presents for:    Chief Complaint   Patient presents with     Blood Draw     labs drawn via venipuncture by RN     Oncology Clinic Visit     Prostate Cancer     Initial Vitals: /74 (BP Location: Left arm, Patient Position: Chair, Cuff Size: Adult Regular)  Pulse 89  Temp 97.8  F (36.6  C) (Oral)  Ht 1.626 m (5' 4\")  Wt 53.1 kg (117 lb)  SpO2 100%  BMI 20.08 kg/m2 Estimated body mass index is 20.08 kg/(m^2) as calculated from the following:    Height as of this encounter: 1.626 m (5' 4\").    Weight as of this encounter: 53.1 kg (117 lb). Body surface area is 1.55 meters squared.  Mild Pain (3) Comment: Data Unavailable   No LMP for male patient.  Allergies reviewed: Yes  Medications reviewed: Yes    Medications: Medication refills not needed today.  Pharmacy name entered into [facility]: [facility] [place], [place] - [address]    Clinical concerns: No New Concerns    5 minutes for nursing intake (face to face time)     CHRISTA Adams      "

## 2018-04-18 NOTE — PROGRESS NOTES
Oncology/Hematology Visit Note  Apr 18, 2018    REASON FOR VISIT:  Assessment on Xtandi    ONCOLOGY HISTORY:  Mr. Win is a 72 year old male with a hx of CHF, CKD, CAD w/ hx of STEMI and CABG. He met with Dr. Robles at the end of July for consultation regarding metastatic prostate cancer. He presented in May 2015 when he saw his PCP for back pain. He also had poor appetite, weight loss and fatigue.  CT showed diffuse bony mets. CT Chest showed sclerotic mets throughout the bones. He was given degarelix on 7/22 in Urology. He was having pelvic pain and was evaluated by Dr Cavazos would did not feel XRT was appropriate.  He started on docetaxel 7/31/15 at 75 mg/m2 and only had one dose before being hospitalized several times.     His PSA remained stable so he was continued only on Lupron for the rest of 2015.  In December 2015, Mainor met with Dr Robles and since his ECOG was back to a 1, they discussed re-challenging the Taxotere at a lower dose and he completed 5 cycles at 60 mg/m2. On 9/26/2016 he started Provenge off study. He received 3 doses of Provenge (last 10/28), on 3rd dose had fevers, chills, myalgias, was bedbound for a week due to these symptoms.    Mainor was briefly on Zytiga and prednisone in November-December 2016, however after one month his PSA went up, thus it was decided to add in Xofigo (radium 223) starting in Jan 2017.     Diagnosed with PE in Nov 2016. He had bleeding on Xarelto so was changed back to Lovenox.     Mainor developed diarrhea on and off in the spring of 2017, Zytiga was stopped on 3/31 as it was unsure what was contributing.  Mainor felt it was related to the Xofigo and that his dose was too high as his weight is always at least 10 pounds higher in clinic than at home.      He was re-started Taxotere on 8/15/17 for progressive disease.   Mainor finished 6 cycles of Taxotere, last dose 12/6/17.    CURRENT THERAPY:    Mainor delayed started Xtandi until ~2/21/18.   He continues on lupron every 3 mos and  XGEVA.      INTERVAL HISTORY: Mainor arrives unaccompanied today.     Mainor feels the Xtandi is going well, its about 7 weeks. He admits he forgets to take it about once a week and while he was sick with a URI last month he wasn't as consistent with it.   However he has been feeling really good and his NECK pain is gone this last week and he is moving better and eating better and feels his stamina is better.     He has  No new GI issues.  No diarrhea since Easter.     A complete 10-pt ROS is negative other than what is reported above      MEDICATIONS:   Current Outpatient Prescriptions   Medication Sig     aspirin 81 MG tablet Take 1 tablet (81 mg) by mouth daily     calcium carbonate (OS-RODNEY 500 MG [place]. CA) 1250 MG tablet Take 1 tablet (1,250 mg) by mouth 2 times daily     Cholecalciferol (VITAMIN D) 2000 UNITS tablet Take 1 tablet by mouth daily     enoxaparin (LOVENOX) 40 MG/0.4ML injection Inject 0.4 mLs (40 mg) Subcutaneous 2 times daily (Patient taking differently: Inject 40 mg Subcutaneous daily )     enzalutamide (XTANDI) 40 MG capsule Take 4 capsules (160 mg) by mouth daily     enzalutamide (XTANDI) 40 MG capsule Take 4 capsules (160 mg) by mouth daily     predniSONE (DELTASONE) 5 MG tablet Take 1 tablet (5 mg) by mouth 2 times daily     rosuvastatin (CRESTOR) 10 MG tablet Take 1 tablet (10 mg) by mouth daily     simvastatin (ZOCOR) 40 MG tablet TAKE ONE TABLET BY MOUTH AT BEDTIME     No current facility-administered medications for this visit.      Facility-Administered Medications Ordered in Other Visits   Medication     albuterol (PROAIR HFA, PROVENTIL HFA, VENTOLIN HFA) inhaler     glycopyrrolate (ROBINUL) injection     hydrocortisone sodium succinate (solu-CORTEF) injection     lidocaine 0.5 % injection 1 mL     neostigmine (PROSTIGMINE) injection     protamine injection          ALLERGIES:  No Known Allergies    PHYSICAL EXAMINATION:  Vitals: /74 (BP Location: Left arm, Patient Position: Chair, Cuff  "Size: Adult Regular)  Pulse 89  Temp 97.8  F (36.6  C) (Oral)  Ht 1.626 m (5' 4\")  Wt 53.1 kg (117 lb)  SpO2 100%  BMI 20.08 kg/m2   Wt Readings from Last 10 Encounters:   04/18/18 53.1 kg (117 lb)   03/21/18 54 kg (119 lb)   03/06/18 56.2 kg (124 lb)   02/20/18 55.8 kg (123 lb)   02/20/18 55.8 kg (123 lb)   02/06/18 57.6 kg (127 lb)   01/23/18 57.6 kg (127 lb)   01/12/18 58.5 kg (129 lb)   12/20/17 61.2 kg (135 lb)   12/06/17 61.5 kg (135 lb 8 oz)       GENERAL:  A pleasant person in no acute distress.  Mildly pale and thin looking  LYMPH NODES:  No peripheral lymphadenopathy noted in the supraclavicular or cervical regions.   LUNGS:  Clear to auscultation bilaterally.   HEART:  Regular rate and rhythm systolic murmur heard at apex  ABDOMEN:  Bowel sounds are active.  Soft and nontender.  No hepatosplenomegaly or other masses appreciated.  LOWER EXTREMITIES:  Without pitting edema to the knees bilaterally.   NEUROLOGICAL:  Alert/orientated/able to answer all questions.  CN grossly intact.       LAB:      3/6/2018 09:15 3/21/2018 09:44 4/18/2018 13:34   Sodium 140 138 140   Potassium 4.2 4.0 4.5   Chloride 109 106 109   Carbon Dioxide 21 20 22   Urea Nitrogen 16 16 16   Creatinine 0.83 0.96 0.84   GFR Estimate >90 77 89   GFR Estimate If Black >90 >90 >90   Calcium 8.8 9.0 9.2   Anion Gap 10 12 9   Albumin 3.7 4.0 3.7   Protein Total 7.6 8.3 7.6   Bilirubin Total 1.0 1.3 1.0   Alkaline Phosphatase 186 (H) 210 (H) 199 (H)   ALT 14 13 14   AST 31 34 37   .00 (H) 795.00 (H) 784.00 (H)   Glucose 103 (H) 110 (H) 106 (H)   WBC 4.0 2.9 (L) 3.7 (L)   Hemoglobin 9.8 (L) 10.5 (L) 10.2 (L)   Hematocrit 29.5 (L) 31.1 (L) 29.5 (L)   Platelet Count 110 (L) 110 (L) 108 (L)   RBC Count 3.09 (L) 3.33 (L) 3.10 (L)   MCV 96 93 95       IMPRESSION/PLAN:   Metastatic castrate-resistant prostate cancer:   Mainor is tolerating enzalutamide better. He admits he is missing a dose a week or so, but given how much better he is " "feeling the last ~2 weeks, he will work hard at taking it routinely.  His alk phos and PSA are trending better today, he physically is better -stamina, appetite, neck pain, etc.  These are all encouraging.  I am hopeful that he could stabilize his disease and enjoy another summer.  I want to keep a close eye on him- RTC on 5/4 for labs, lupron and Xgeva.    Anemia: Hemoglobin 10.2 today, stable, secondary to prostate involvement in the marrow.  We need to keep him above 9 given his CAD.      Heme: PE dx 11/2016, continue Lovenox at 40 mg BID.  Admitted he is only \"intermittently\" taking this.  Encouraged daily use.     Bone mets: No new bone pains, increased alk phos today, likely from progression.  Xgeva last 2/6, next 5/4    CV: hx of CHF. No clinical s/s of failure.  Off metoprolol due to BP being too low.  Has been tachy in the  range since then, but BP won't support re-starting.      Weight is down 7 pounds- dropped 5 with bad URI last month and now down another 2.  We spent a long time discussing this- he has tons of food from a neighbor as I have offered food and cleaning services to him. He feels he Is working hard on intake and will continue.     Sabrina Mcguire PA-C      "

## 2018-05-04 NOTE — MR AVS SNAPSHOT
After Visit Summary   5/4/2018    Oswaldo Win    MRN: [id number]           Patient Information     Date Of Birth          1945        Visit Information        Provider Department      5/4/2018 9:30 AM Gabriela Mcguire PA-C [facility]        Today's Diagnoses     Prostate cancer 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)    -  1       Follow-ups after your visit        Your next 10 appointments already scheduled     May 30, 2018  9:45 AM CDT   [facility] Lab Draw with  [facility] LAB DRAW   [facility] Lab Draw ([facility])    [address] [place] [address]  [address]  [employer] [address]   [phone]            May 30, 2018 10:20 AM CDT   (Arrive by 10:05 AM)   Return Visit with Gabriela Mcguire PA-C   [facility] ([facility])    [address] [place] [address]  [address]  [employer] [address]   [phone]            Jun 27, 2018 11:15 AM CDT   [facility] Lab Draw with  [facility] LAB DRAW   [facility] Lab Draw ([facility])    [address] [place] [address]  [address]  [employer] [address]   [phone]            Jun 27, 2018 11:40 AM CDT   (Arrive by 11:25 AM)   Return Visit with Gabriela Mcguire PA-C   [facility] ([facility])    [address] [place] [address]  [address]  [employer] [address]   [phone]            Jul 25, 2018  9:00 AM CDT   [facility] Lab Draw with [facility] LAB DRAW   [facility] Lab Draw ([facility])    [address] [place] [address]  [address]  [employer] [address]   [phone]            Jul 25, 2018 10:00 AM CDT   (Arrive by 9:45 AM)   Return Visit with Bentley Robles MD   [facility] ([facility])    [address] [place] [address]  [address]  [employer] [address]   [phone]              Who to contact     If you have questions or need follow up information about  "today's clinic visit or your schedule please contact [facility] directly at [phone].  Normal or non-critical lab and imaging results will be communicated to you by dBMEDxhart, letter or phone within 4 business days after the clinic has received the results. If you do not hear from us within 7 days, please contact the clinic through dBMEDxhart or phone. If you have a critical or abnormal lab result, we will notify you by phone as soon as possible.  Submit refill requests through Jet or call your pharmacy and they will forward the refill request to us. Please allow 3 business days for your refill to be completed.          Additional Information About Your Visit        dBMEDxharChicory Information     Jet gives you secure access to your electronic health record. If you see a primary care provider, you can also send messages to your care team and make appointments. If you have questions, please call your primary care clinic.  If you do not have a primary care provider, please call [phone] and they will assist you.        Care EveryWhere ID     This is your Care EveryWhere ID. This could be used by other organizations to access your [place] medical records  [id number]        Your Vitals Were     Pulse Temperature Respirations Height Pulse Oximetry BMI (Body Mass Index)    88 97.9  F (36.6  C) (Oral) 16 1.626 m (5' 4.02\") 100% 19.9 kg/m2       Blood Pressure from Last 3 Encounters:   05/04/18 105/58   04/18/18 116/74   03/21/18 110/67    Weight from Last 3 Encounters:   05/04/18 52.6 kg (116 lb)   04/18/18 53.1 kg (117 lb)   03/21/18 54 kg (119 lb)              We Performed the Following     CBC with platelets differential     Comprehensive metabolic panel     PSA tumor marker          Today's Medication Changes          These changes are accurate as of 5/4/18 11:59 PM.  If you have any questions, ask your nurse or doctor.               These medicines have changed or have updated prescriptions.  "       Dose/Directions    enoxaparin 40 MG/0.4ML injection   Commonly known as:  LOVENOX   This may have changed:  when to take this   Used for:  Other chronic pulmonary embolism without acute cor pulmonale (H)        Dose:  40 mg   Inject 0.4 mLs (40 mg) Subcutaneous 2 times daily   Quantity:  60 Syringe   Refills:  3                Primary Care Provider Office Phone # Fax #    Lucrecia Collier [phone]       [address] [facility] [address]        Equal Access to Services     BLADIMIR KEY : Hadii jesse ku hadasho Soomaali, waaxda luqadaha, qaybta kaalmada adeegyainna, peter moran . So [facility] [phone].    ATENCIÓN: Si habla español, tiene a mccall disposición servicios gratuitos de asistencia lingüística. [facility] [phone].    We comply with applicable federal civil rights laws and [place] laws. We do not discriminate on the basis of race, color, national origin, age, disability, sex, sexual orientation, or gender identity.            Thank you!     Thank you for choosing [facility]  for your care. Our goal is always to provide you with excellent care. Hearing back from our patients is one way we can continue to improve our services. Please take a few minutes to complete the written survey that you may receive in the mail after your visit with us. Thank you!             Your Updated Medication List - Protect others around you: Learn how to safely use, store and throw away your medicines at www.disposemymeds.org.          This list is accurate as of 5/4/18 11:59 PM.  Always use your most recent med list.                   Brand Name Dispense Instructions for use Diagnosis    aspirin 81 MG tablet     90 tablet    Take 1 tablet (81 mg) by mouth daily    Acute on chronic combined systolic and diastolic congestive heart failure (H)       calcium carbonate 500 tablet    OS-RODNEY 500 mg [place]. Ca    120 tablet    Take 1 tablet (1,250 mg) by mouth 2  times daily    Hypocalcemia       enoxaparin 40 MG/0.4ML injection    LOVENOX    60 Syringe    Inject 0.4 mLs (40 mg) Subcutaneous 2 times daily    Other chronic pulmonary embolism without acute cor pulmonale (H)       * enzalutamide 40 MG capsule    XTANDI    120 capsule    Take 4 capsules (160 mg) by mouth daily    Metastatic bone tumor (H)       * enzalutamide 40 MG capsule    XTANDI    120 capsule    Take 4 capsules (160 mg) by mouth daily    Prostate cancer 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), Metastatic bone tumor (H)       predniSONE 5 MG tablet    DELTASONE    42 tablet    Take 1 tablet (5 mg) by mouth 2 times daily    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), Prostate cancer 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), Metastatic bone tumor (H)       rosuvastatin 10 MG tablet    CRESTOR    90 tablet    Take 1 tablet (10 mg) by mouth daily    ASCVD (arteriosclerotic cardiovascular disease), Metastatic bone tumor (H), Prostate cancer 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)       simvastatin 40 MG tablet    ZOCOR    90 tablet    TAKE ONE TABLET BY MOUTH AT BEDTIME    Hyperlipidemia LDL goal <130       vitamin D 2000 units tablet     100 tablet    Take 1 tablet by mouth daily    Vitamin D deficiency       * Notice:  This list has 2 medication(s) that are the same as other medications prescribed for you. Read the directions carefully, and ask your doctor or other care provider to review them with you.

## 2018-05-04 NOTE — NURSING NOTE
1 Xgeva 120mg injection given in right arm subcutaneously. Patient tolerated injection well  Bebe Cesar LPN  1 Lupron 22.5mg injection given in right Glutaeus Jim  intramuscularly . Patient tolerated injection well.  BEBE CESAR LPN.

## 2018-05-04 NOTE — NURSING NOTE
Chief Complaint   Patient presents with     Blood Draw     labs drawn with vpt by rn.  vs taken     Labs drawn with vpt by rn.  Pt tolerated well.  VS taken.  Pt checked in for next appt.    Elvei Burger RN

## 2018-05-04 NOTE — NURSING NOTE
"Oncology Rooming Note    May 4, 2018 9:27 AM   Oswaldo Win is a 73 year old male who presents for:    Chief Complaint   Patient presents with     Blood Draw     labs drawn with vpt by rn.  vs taken     Oncology Clinic Visit     Return visit related to Prostate Cancer     Initial Vitals: /58 (BP Location: Left arm, Patient Position: Sitting, Cuff Size: Adult Small)  Pulse 88  Temp 97.9  F (36.6  C) (Oral)  Resp 16  Ht 1.626 m (5' 4.02\")  Wt 52.6 kg (116 lb)  SpO2 100%  BMI 19.9 kg/m2 Estimated body mass index is 19.9 kg/(m^2) as calculated from the following:    Height as of this encounter: 1.626 m (5' 4.02\").    Weight as of this encounter: 52.6 kg (116 lb). Body surface area is 1.54 meters squared.  Mild Pain (3) Comment: Data Unavailable   No LMP for male patient.  Allergies reviewed: Yes  Medications reviewed: Yes    Medications: Medication refills not needed today.  Pharmacy name entered into [facility]: [facility] [place], [place] - [address]    Clinical concerns: No new concerns. Provider was notified.    10 minutes for nursing intake (face to face time)     Amirah Angulo LPN            "

## 2018-05-04 NOTE — PROGRESS NOTES
Oncology/Hematology Visit Note  May 4, 2018    REASON FOR VISIT:  Assessment on Xtandi    ONCOLOGY HISTORY:  Mr. Win is a 72 year old male with a hx of CHF, CKD, CAD w/ hx of STEMI and CABG. He met with Dr. Robles at the end of July for consultation regarding metastatic prostate cancer. He presented in May 2015 when he saw his PCP for back pain. He also had poor appetite, weight loss and fatigue.  CT showed diffuse bony mets. CT Chest showed sclerotic mets throughout the bones. He was given degarelix on 7/22 in Urology. He was having pelvic pain and was evaluated by Dr Cavazos would did not feel XRT was appropriate.  He started on docetaxel 7/31/15 at 75 mg/m2 and only had one dose before being hospitalized several times.     His PSA remained stable so he was continued only on Lupron for the rest of 2015.  In December 2015, Mainor met with Dr Robles and since his ECOG was back to a 1, they discussed re-challenging the Taxotere at a lower dose and he completed 5 cycles at 60 mg/m2. On 9/26/2016 he started Provenge off study. He received 3 doses of Provenge (last 10/28), on 3rd dose had fevers, chills, myalgias, was bedbound for a week due to these symptoms.    Mainor was briefly on Zytiga and prednisone in November-December 2016, however after one month his PSA went up, thus it was decided to add in Xofigo (radium 223) starting in Jan 2017.     Diagnosed with PE in Nov 2016. He had bleeding on Xarelto so was changed back to Lovenox.     Mainor developed diarrhea on and off in the spring of 2017, Zytiga was stopped on 3/31 as it was unsure what was contributing.  Mainor felt it was related to the Xofigo and that his dose was too high as his weight is always at least 10 pounds higher in clinic than at home.      He was re-started Taxotere on 8/15/17 for progressive disease.   Mainor finished 6 cycles of Taxotere, last dose 12/6/17.    CURRENT THERAPY:    Mainor delayed started Xtandi until ~2/21/18.   He continues on lupron every 3 mos and  XGEVA.      INTERVAL HISTORY: Mainor arrives unaccompanied today.     Mainor feels the Xtandi is going well, its about 10 weeks.     He has had more joint/long bone discomfort that has come intermittently.  He is still having some diarrhea about ~2 times a week.  The other days his stools are completely normal.   He admits he forgets to take it about once a week and while he was sick with a URI last month he wasn't as consistent with it.   However he has been feeling really good and his NECK pain is gone this last week and he is moving better and eating better and feels his stamina is better.     He has  No new GI issues. He hasn't been getting out of the house as much as he would like and admits he is lazy. His weight is down a little as well, but he admits this is because he is too lazy to cook.     A complete 10-pt ROS is negative other than what is reported above      MEDICATIONS:   Current Outpatient Prescriptions   Medication Sig     calcium carbonate (OS-RODNEY 500 MG [place]. [place]) 1250 MG tablet Take 1 tablet (1,250 mg) by mouth 2 times daily     Cholecalciferol (VITAMIN D) 2000 UNITS tablet Take 1 tablet by mouth daily     enoxaparin (LOVENOX) 40 MG/0.4ML injection Inject 0.4 mLs (40 mg) Subcutaneous 2 times daily (Patient taking differently: Inject 40 mg Subcutaneous daily )     enzalutamide (XTANDI) 40 MG capsule Take 4 capsules (160 mg) by mouth daily     simvastatin (ZOCOR) 40 MG tablet TAKE ONE TABLET BY MOUTH AT BEDTIME     aspirin 81 MG tablet Take 1 tablet (81 mg) by mouth daily (Patient not taking: Reported on 5/4/2018)     enzalutamide (XTANDI) 40 MG capsule Take 4 capsules (160 mg) by mouth daily (Patient not taking: Reported on 5/4/2018)     predniSONE (DELTASONE) 5 MG tablet Take 1 tablet (5 mg) by mouth 2 times daily (Patient not taking: Reported on 5/4/2018)     rosuvastatin (CRESTOR) 10 MG tablet Take 1 tablet (10 mg) by mouth daily (Patient not taking: Reported on 5/4/2018)     No current  "facility-administered medications for this visit.      Facility-Administered Medications Ordered in Other Visits   Medication     albuterol (PROAIR HFA, PROVENTIL HFA, VENTOLIN HFA) inhaler     glycopyrrolate (ROBINUL) injection     hydrocortisone sodium succinate (solu-CORTEF) injection     lidocaine 0.5 % injection 1 mL     neostigmine (PROSTIGMINE) injection     protamine injection          ALLERGIES:  No Known Allergies    PHYSICAL EXAMINATION:  Vitals: /58 (BP Location: Left arm, Patient Position: Sitting, Cuff Size: Adult Small)  Pulse 88  Temp 97.9  F (36.6  C) (Oral)  Resp 16  Ht 1.626 m (5' 4.02\")  Wt 52.6 kg (116 lb)  SpO2 100%  BMI 19.9 kg/m2   Wt Readings from Last 10 Encounters:   05/04/18 52.6 kg (116 lb)   04/18/18 53.1 kg (117 lb)   03/21/18 54 kg (119 lb)   03/06/18 56.2 kg (124 lb)   02/20/18 55.8 kg (123 lb)   02/20/18 55.8 kg (123 lb)   02/06/18 57.6 kg (127 lb)   01/23/18 57.6 kg (127 lb)   01/12/18 58.5 kg (129 lb)   12/20/17 61.2 kg (135 lb)       GENERAL:  A pleasant person in no acute distress.  Mildly pale and thin looking  LYMPH NODES:  No peripheral lymphadenopathy noted in the supraclavicular or cervical regions.   LUNGS:  Clear to auscultation bilaterally.   HEART:  Regular rate and rhythm systolic murmur heard at apex  ABDOMEN:  Bowel sounds are active.  Soft and nontender.  No hepatosplenomegaly or other masses appreciated.  LOWER EXTREMITIES:  Without pitting edema to the knees bilaterally.   NEUROLOGICAL:  Alert/orientated/able to answer all questions.  CN grossly intact.       LAB:      3/21/2018 09:44 4/18/2018 13:34 5/4/2018 09:20   WBC 2.9 (L) 3.7 (L) 3.7 (L)   Hemoglobin 10.5 (L) 10.2 (L) 9.9 (L)   Hematocrit 31.1 (L) 29.5 (L) 29.0 (L)   Platelet Count 110 (L) 108 (L) 107 (L)   RBC Count 3.33 (L) 3.10 (L) 2.97 (L)   MCV 93 95 98        3/21/2018 09:44 4/18/2018 13:34 5/4/2018 09:20   Sodium 138 140 138   Potassium 4.0 4.5 3.8   Chloride 106 109 106   Carbon Dioxide " "20 22 22   Urea Nitrogen 16 16 15   Creatinine 0.96 0.84 0.88   GFR Estimate 77 89 85   GFR Estimate If Black >90 >90 >90   Calcium 9.0 9.2 9.5   Anion Gap 12 9 10   Albumin 4.0 3.7 3.7   Protein Total 8.3 7.6 7.5   Bilirubin Total 1.3 1.0 0.9   Alkaline Phosphatase 210 (H) 199 (H) 180 (H)   ALT 13 14 11   AST 34 37 40   .00 (H) 784.00 (H) 903.00 (H)     IMPRESSION/PLAN:   Metastatic castrate-resistant prostate cancer:   Mainor is tolerating enzalutamide better. He admits he is missing a dose a week or so, but given how much better he is feeling the last few weeks, he will work hard at taking it routinely.  His alk phos is trending better, but his PSA is worse today.  He describes himself as physically is better -stamina, appetite, neck pain, etc.  These are all encouraging but his weight is actually lower and I don't think he is getting out of the house.  I worry he is depressed, but he states he isn't - he is just tired of being tired and feeling like dying from prostate cancer is taking a lot longer than he initially though.  I was feeling hopeful that he could stabilize his disease and enjoy another summer but I am worried he isn't putting the effort into walking and eating and he may not feel much better.    --Lupron + Xgeva 5/4  --labs/veronica in 4 weeks to recheck PSA, if continues to rise, will need to discuss hospice vs cabazitaxel, which I worry he will not be strong enough for    Anemia: Hemoglobin 9.9 today, stable, secondary to prostate involvement in the marrow.  We need to keep him above 9 given his CAD.      Heme: PE dx 11/2016, continue Lovenox at 40 mg BID.  Admitted he is only \"intermittently\" taking this.  Encouraged daily use.     Bone mets: No new bone pains, improved alk phos.  Xgeva last 5/4    CV: hx of CHF. No clinical s/s of failure.  Off metoprolol due to BP being too low.  Has been tachy in the  range since then, but BP won't support re-starting.      Weight is low. Offered him " home meals and or appetite stimulants.  He feels he has an appetite and is eating fine twice a day, but just has been too lazy to make meals sometimes. Offered him a home RN and he declined.     Sabrina Mcguire PA-C

## 2018-05-15 NOTE — TELEPHONE ENCOUNTER
Oral Chemotherapy Monitoring Program?    Attempted to contact patient today for follow up on oral chemotherapy. No answer.  Left voicemail for patient to please call me back at [phone] when able.  No patient or medication names were mentioned while leaving this message.  ?  Tamara Merino  Pharmacy Intern  [facility]  [phone]

## 2018-05-30 NOTE — MR AVS SNAPSHOT
After Visit Summary   5/30/2018    Oswaldo Win    MRN: [id number]           Patient Information     Date Of Birth          1945        Visit Information        Provider Department      5/30/2018 10:20 AM Gabriela Mcguire PA-C [facility]        Today's Diagnoses     Other fatigue    -  1    Prostate cancer 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)           Follow-ups after your visit        Your next 10 appointments already scheduled     Jun 27, 2018 11:00 AM CDT   [facility] Lab Draw with  [facility] LAB DRAW   [facility] Lab Draw ([facility])    [address] [place] [address]  [address]  [facility] [address]   [phone]            Jun 27, 2018 11:30 AM CDT   (Arrive by 11:15 AM)   Return Visit with Bentley Robles MD   [facility] ([facility])    [address] [place] [address]  [address]  [facility] [address]   [phone]            Jul 25, 2018  9:00 AM CDT   [facility] Lab Draw with  [facility] LAB DRAW   [facility] Lab Draw ([facility])    [address] [place] [address]  [address]  [facility] [address]   [phone]            Jul 25, 2018 10:00 AM CDT   (Arrive by 9:45 AM)   Return Visit with Bentley Robles MD   [facility] ([facility])    [address] [place] [address]  [address]  [facility] [address]   [phone]              Who to contact     If you have questions or need follow up information about today's clinic visit or your schedule please contact [facility] directly at [phone].  Normal or non-critical lab and imaging results will be communicated to you by MyChart, letter or phone within 4 business days after the clinic has received the results. If you do not hear from us within 7 days, please contact the clinic through MyChart or phone. If you have a critical or abnormal lab result, we will notify you by phone as soon  "as possible.  Submit refill requests through [facility] or call your pharmacy and they will forward the refill request to us. Please allow 3 business days for your refill to be completed.          Additional Information About Your Visit        [employer] Information     [employer] gives you secure access to your electronic health record. If you see a primary care provider, you can also send messages to your care team and make appointments. If you have questions, please call your primary care clinic.  If you do not have a primary care provider, please call [phone] and they will assist you.        Care EveryWhere ID     This is your Care EveryWhere ID. This could be used by other organizations to access your [facility] medical records  [id number]        Your Vitals Were     Pulse Temperature Respirations Height Pulse Oximetry BMI (Body Mass Index)    94 98  F (36.7  C) (Oral) 16 1.626 m (5' 4\") 97% 19.91 kg/m2       Blood Pressure from Last 3 Encounters:   05/30/18 91/57   05/04/18 105/58   04/18/18 116/74    Weight from Last 3 Encounters:   05/30/18 52.6 kg (116 lb)   05/04/18 52.6 kg (116 lb)   04/18/18 53.1 kg (117 lb)              We Performed the Following     CBC with platelets differential     Comprehensive metabolic panel     [id number] Tumor Sequencing Assay     PSA tumor marker          Today's Medication Changes          These changes are accurate as of 5/30/18 11:59 PM.  If you have any questions, ask your nurse or doctor.               Start taking these medicines.        Dose/Directions    methylphenidate 5 MG tablet   Commonly known as:  RITALIN   Used for:  Prostate cancer 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), Other fatigue   Started by:  Gabriela Mcguire PA-C        Dose:  2.5 mg   Take 0.5 tablets (2.5 mg) by mouth 2 times daily   Quantity:  40 tablet   Refills:  0         These medicines have changed or have updated prescriptions.        Dose/Directions    enoxaparin 40 MG/0.4ML injection   Commonly known as:  " LOVENOX   This may have changed:  when to take this   Used for:  Other chronic pulmonary embolism without acute cor pulmonale (H)        Dose:  40 mg   Inject 0.4 mLs (40 mg) Subcutaneous 2 times daily   Quantity:  60 Syringe   Refills:  3            Where to get your medicines      Some of these will need a paper prescription and others can be bought over the counter.  Ask your nurse if you have questions.     Bring a paper prescription for each of these medications     methylphenidate 5 MG tablet                Primary Care Provider Office Phone # Fax #    Lucrecia Collier [phone] [address]  [facility] [address]        Equal Access to Services     [facility]: Hadii jesse Ybarra, wajagjit hopkins, serenity knapp, peter moran . So [facility] [phone].    ATENCIÓN: Si habla español, tiene a mccall disposición servicios gratuitos de asistencia lingüística. Ll[facility] [phone].    We comply with applicable federal civil rights laws and [place] laws. We do not discriminate on the basis of race, color, national origin, age, disability, sex, sexual orientation, or gender identity.            Thank you!     Thank you for choosing [facility]  for your care. Our goal is always to provide you with excellent care. Hearing back from our patients is one way we can continue to improve our services. Please take a few minutes to complete the written survey that you may receive in the mail after your visit with us. Thank you!             Your Updated Medication List - Protect others around you: Learn how to safely use, store and throw away your medicines at www.disposemymeds.org.          This list is accurate as of 5/30/18 11:59 PM.  Always use your most recent med list.                   Brand Name Dispense Instructions for use Diagnosis    aspirin 81 MG tablet     90 tablet    Take 1 tablet (81 mg) by mouth daily    Acute on  chronic combined systolic and diastolic congestive heart failure (H)       calcium carbonate 500 MG tablet    OS-RODNEY 500 mg [place]. [place]    120 tablet    Take 1 tablet (1,250 mg) by mouth 2 times daily    Hypocalcemia       enoxaparin 40 MG/0.4ML injection    LOVENOX    60 Syringe    Inject 0.4 mLs (40 mg) Subcutaneous 2 times daily    Other chronic pulmonary embolism without acute cor pulmonale (H)       * enzalutamide 40 MG capsule    XTANDI    120 capsule    Take 4 capsules (160 mg) by mouth daily    Metastatic bone tumor (H)       * enzalutamide 40 MG capsule    XTANDI    120 capsule    Take 4 capsules (160 mg) by mouth daily    Prostate cancer 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), Metastatic bone tumor (H)       * enzalutamide 40 MG capsule    XTANDI    120 capsule    Take 4 capsules (160 mg) by mouth daily    Prostate cancer 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), Metastatic bone tumor (H)       methylphenidate 5 MG tablet    RITALIN    40 tablet    Take 0.5 tablets (2.5 mg) by mouth 2 times daily    Prostate cancer 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), Other fatigue       predniSONE 5 MG tablet    DELTASONE    42 tablet    Take 1 tablet (5 mg) by mouth 2 times daily    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), Prostate cancer 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), Metastatic bone tumor (H)       rosuvastatin 10 MG tablet    CRESTOR    90 tablet    Take 1 tablet (10 mg) by mouth daily    ASCVD (arteriosclerotic cardiovascular disease), Metastatic bone tumor (H), Prostate cancer 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)       simvastatin 40 MG tablet    ZOCOR    90 tablet    TAKE ONE TABLET BY MOUTH AT BEDTIME    Hyperlipidemia LDL goal <130       vitamin D 2000 units tablet     100 tablet    Take 1 tablet by mouth daily    Vitamin D deficiency       * Notice:  This list has 3 medication(s) that are the same as other medications prescribed for you. Read the directions carefully, and ask your doctor or other care provider to review them with you.

## 2018-05-30 NOTE — NURSING NOTE
"Oncology Rooming Note    May 30, 2018 10:30 AM   Oswaldo Win is a 73 year old male who presents for:    Chief Complaint   Patient presents with     Blood Draw     pt here for venipuncture, vitals completed, pt checked in for appt.      Oncology Clinic Visit     Prostate Cancer     Initial Vitals: BP 91/57  Pulse 94  Temp 98  F (36.7  C) (Oral)  Resp 16  Ht 1.626 m (5' 4\")  Wt 52.6 kg (116 lb)  SpO2 97%  BMI 19.91 kg/m2 Estimated body mass index is 19.91 kg/(m^2) as calculated from the following:    Height as of this encounter: 1.626 m (5' 4\").    Weight as of this encounter: 52.6 kg (116 lb). Body surface area is 1.54 meters squared.  No Pain (0) Comment: Data Unavailable   No LMP for male patient.  Allergies reviewed: Yes  Medications reviewed: No, Pt. Stated that his med list is up to date    Medications: Medication refills not needed today.  Pharmacy name entered into [facility]: [facility] [place], [place] - [address]    Clinical concerns: No New Concerns    5 minutes for nursing intake (face to face time)     CHRISTA Adams      "

## 2018-05-30 NOTE — PROGRESS NOTES
Oncology/Hematology Visit Note  May 30, 2018    REASON FOR VISIT:  Assessment on Xtandi    ONCOLOGY HISTORY:  Mr. Win is a 72 year old male with a hx of CHF, CKD, CAD w/ hx of STEMI and CABG. He met with Dr. Robles at the end of July for consultation regarding metastatic prostate cancer. He presented in May 2015 when he saw his PCP for back pain. He also had poor appetite, weight loss and fatigue.  CT showed diffuse bony mets. CT Chest showed sclerotic mets throughout the bones. He was given degarelix on 7/22 in Urology. He was having pelvic pain and was evaluated by Dr Cavazos would did not feel XRT was appropriate.  He started on docetaxel 7/31/15 at 75 mg/m2 and only had one dose before being hospitalized several times.     His PSA remained stable so he was continued only on Lupron for the rest of 2015.  In December 2015, Mainor met with Dr Robles and since his ECOG was back to a 1, they discussed re-challenging the Taxotere at a lower dose and he completed 5 cycles at 60 mg/m2. On 9/26/2016 he started Provenge off study. He received 3 doses of Provenge (last 10/28), on 3rd dose had fevers, chills, myalgias, was bedbound for a week due to these symptoms.    Mainor was briefly on Zytiga and prednisone in November-December 2016, however after one month his PSA went up, thus it was decided to add in Xofigo (radium 223) starting in Jan 2017.     Diagnosed with PE in Nov 2016. He had bleeding on Xarelto so was changed back to Lovenox.     Mainor developed diarrhea on and off in the spring of 2017, Zytiga was stopped on 3/31 as it was unsure what was contributing.  Mainor felt it was related to the Xofigo and that his dose was too high as his weight is always at least 10 pounds higher in clinic than at home.      He was re-started Taxotere on 8/15/17 for progressive disease.   Mainor finished 6 cycles of Taxotere, last dose 12/6/17.    CURRENT THERAPY:    Mainor delayed started Xtandi until ~2/21/18.   He continues on lupron every 3 mos and  XGEVA.      INTERVAL HISTORY: Mainor arrives unaccompanied today.     Mainor feels the Xtandi is going well, he has fatigue and anorexia which likely are more related to his disease than the drug and have been ongoing.  No major nausea or stool changes with the Xtandi.     He has had more joint/long bone discomfort that has come intermittently.  He feels his stools are soft and formed, no recent diarrhea.  He admits he forgets to take the Xtandi about once a week.   HE denies depression, just down about his stamina.     He has  No new GI issues. He hasn't been getting out of the house as much as he would like and admits he is lazy. His weight is down a little as well, but he admits this is because he is too lazy to cook.     A complete 10-pt ROS is negative other than what is reported above      MEDICATIONS:   Current Outpatient Prescriptions   Medication Sig     aspirin 81 MG tablet Take 1 tablet (81 mg) by mouth daily (Patient not taking: Reported on 5/4/2018)     calcium carbonate (OS-RODNEY 500 MG [place]. CA) 1250 MG tablet Take 1 tablet (1,250 mg) by mouth 2 times daily     Cholecalciferol (VITAMIN D) 2000 UNITS tablet Take 1 tablet by mouth daily     enoxaparin (LOVENOX) 40 MG/0.4ML injection Inject 0.4 mLs (40 mg) Subcutaneous 2 times daily (Patient taking differently: Inject 40 mg Subcutaneous daily )     enzalutamide (XTANDI) 40 MG capsule Take 4 capsules (160 mg) by mouth daily     enzalutamide (XTANDI) 40 MG capsule Take 4 capsules (160 mg) by mouth daily (Patient not taking: Reported on 5/4/2018)     enzalutamide (XTANDI) 40 MG capsule Take 4 capsules (160 mg) by mouth daily     predniSONE (DELTASONE) 5 MG tablet Take 1 tablet (5 mg) by mouth 2 times daily (Patient not taking: Reported on 5/4/2018)     rosuvastatin (CRESTOR) 10 MG tablet Take 1 tablet (10 mg) by mouth daily (Patient not taking: Reported on 5/4/2018)     simvastatin (ZOCOR) 40 MG tablet TAKE ONE TABLET BY MOUTH AT BEDTIME     No current  "facility-administered medications for this visit.      Facility-Administered Medications Ordered in Other Visits   Medication     albuterol (PROAIR HFA, PROVENTIL HFA, VENTOLIN HFA) inhaler     glycopyrrolate (ROBINUL) injection     hydrocortisone sodium succinate (solu-CORTEF) injection     lidocaine 0.5 % injection 1 mL     neostigmine (PROSTIGMINE) injection     protamine injection          ALLERGIES:  No Known Allergies    PHYSICAL EXAMINATION:  Vitals: BP 91/57  Pulse 94  Temp 98  F (36.7  C) (Oral)  Resp 16  Ht 1.626 m (5' 4\")  Wt 52.6 kg (116 lb)  SpO2 97%  BMI 19.91 kg/m2   Wt Readings from Last 10 Encounters:   05/30/18 52.6 kg (116 lb)   05/04/18 52.6 kg (116 lb)   04/18/18 53.1 kg (117 lb)   03/21/18 54 kg (119 lb)   03/06/18 56.2 kg (124 lb)   02/20/18 55.8 kg (123 lb)   02/20/18 55.8 kg (123 lb)   02/06/18 57.6 kg (127 lb)   01/23/18 57.6 kg (127 lb)   01/12/18 58.5 kg (129 lb)       GENERAL:  A pleasant person in no acute distress.  Mildly pale and thin looking  LYMPH NODES:  No peripheral lymphadenopathy noted in the supraclavicular or cervical regions.   LUNGS:  Clear to auscultation bilaterally.   HEART:  Regular rate and rhythm systolic murmur heard at apex  ABDOMEN:  Bowel sounds are active.  Soft and nontender.  No hepatosplenomegaly or other masses appreciated.  LOWER EXTREMITIES:  Without pitting edema to the knees bilaterally.   NEUROLOGICAL:  Alert/orientated/able to answer all questions.  CN grossly intact.       LAB:        3/21/2018 09:44 4/18/2018 13:34 5/4/2018 09:20 5/30/2018 09:52   WBC 2.9 (L) 3.7 (L) 3.7 (L) 3.7 (L)   Hemoglobin 10.5 (L) 10.2 (L) 9.9 (L) 9.4 (L)   Hematocrit 31.1 (L) 29.5 (L) 29.0 (L) 27.6 (L)   Platelet Count 110 (L) 108 (L) 107 (L) 81 (L)   RBC Count 3.33 (L) 3.10 (L) 2.97 (L) 2.79 (L)   MCV 93 95 98 99   MCH 31.5 32.9 33.3 (H) 33.7 (H)   MCHC 33.8 34.6 34.1 34.1   RDW 16.9 (H) 17.8 (H) 16.7 (H) 15.9 (H)   Diff Method Automated Method Automated Method " "Automated Method Automated Method   % Neutrophils 47.5 59.2 63.1 63.3   % Lymphocytes 35.6 26.0 23.0 22.5   % Monocytes 12.1 11.8 11.2 11.0   % Eosinophils 3.8 2.2 1.9 2.1   % Basophils 0.3 0.5 0.5 0.3   % Immature Granulocytes 0.7 0.3 0.3 0.8   Nucleated RBCs 0 0 0 0   Absolute Neutrophil 1.4 (L) 2.2 2.3 2.4      4/18/2018 13:34 5/4/2018 09:20 5/30/2018 09:52   Sodium 140 138 140   Potassium 4.5 3.8 4.2   Chloride 109 106 110 (H)   Carbon Dioxide 22 22 23   Urea Nitrogen 16 15 14   Creatinine 0.84 0.88 0.88   GFR Estimate 89 85 85   GFR Estimate If Black >90 >90 >90   Calcium 9.2 9.5 9.1   Anion Gap 9 10 8   Albumin 3.7 3.7 3.8   Protein Total 7.6 7.5 7.4   Bilirubin Total 1.0 0.9 0.9   Alkaline Phosphatase 199 (H) 180 (H) 183 (H)   ALT 14 11 13   AST 37 40 44   .00 (H) 903.00 (H) 941.00 (H)     IMPRESSION/PLAN:   Metastatic castrate-resistant prostate cancer:   Mainor is tolerating enzalutamide without issues.   His alk phos is stable, but his PSA is worse today.  We reviewed his \"doubling time\" which in the past was 4-6 weeks and currently on the Xtandi is likely ~6 months.  Thus the Xtandi is slowing down his progression.  We discussed options today: 1. Stay on Xtandi for 1- 2 more months for predictable time 2. Switch to cabazitaxel 3. Hospice.   HE is not ready to \"throw in the towel\" but worries about chemo, thus decided to stay on Xtandi for another month and then decide.  We will send of Guardant 360 to see if he has any targetable mutations.    -RTC in 4 weeks  -Lupron last 5/4    Mood:  I worry he is depressed, but he states he isn't - he is just tired of being tired and feeling like dying from prostate cancer is taking a lot longer than he initially though.  I was feeling hopeful that he could stabilize his disease and enjoy another summer but I am worried he isn't putting the effort into walking and eating and he may not feel much better.      Bone: Xgeva q3m with Lupron.  Intermittent bone pains, " "not needing daily analgesics    Anemia: Hemoglobin 9. today, stable, secondary to prostate involvement in the marrow.  We need to keep him above 9 given his CAD.      Heme: PE dx 11/2016, continue Lovenox at 40 mg BID.  Admitted he is only \"intermittently\" taking this.  Encouraged daily use.     CV: hx of CHF. No clinical s/s of failure.  Off metoprolol due to BP being too low.  Has been tachy in the  range since then, but BP won't support re-starting.      Weight is low. Offered him home meals and or appetite stimulants.  He feels he has an appetite and is eating fine twice a day, but just has been too lazy to make meals sometimes. Offered him a home RN and he declined.     Sabrina Mcguire PA-C      "

## 2018-05-30 NOTE — NURSING NOTE
Chief Complaint   Patient presents with     Blood Draw     pt here for venipuncture, vitals completed, pt checked in for appt.      Lashawn Sue, CMA

## 2018-06-22 NOTE — TELEPHONE ENCOUNTER
Spoke with Mainor today about the cost of the Ibrance 21 ds at $14,349.72. Informed patient that there is a free drug program available. He shared his concern about giving me his private information. I assured him that I would keep everything private. He said he would see what he could come up with and will see me in a week.    Toni Jacobs  [facility]  Oncology Pharmacy [email or web address]  [phone] (phone  [phone] (fax

## 2018-06-27 NOTE — PROGRESS NOTES
Oncology/Hematology Visit Note  Jun 27, 2018    REASON FOR VISIT:  Assessment on Xtandi    ONCOLOGY HISTORY:  Mr. Win is a 72 year old male with a hx of CHF, CKD, CAD w/ hx of STEMI and CABG. He met with Dr. Robles at the end of July for consultation regarding metastatic prostate cancer. He presented in May 2015 when he saw his PCP for back pain. He also had poor appetite, weight loss and fatigue.  CT showed diffuse bony mets. CT Chest showed sclerotic mets throughout the bones. He was given degarelix on 7/22 in Urology. He was having pelvic pain and was evaluated by Dr Cavazos would did not feel XRT was appropriate.  He started on docetaxel 7/31/15 at 75 mg/m2 and only had one dose before being hospitalized several times.     His PSA remained stable so he was continued only on Lupron for the rest of 2015.  In December 2015, Mainor met with Dr Robles and since his ECOG was back to a 1, they discussed re-challenging the Taxotere at a lower dose and he completed 5 cycles at 60 mg/m2. On 9/26/2016 he started Provenge off study. He received 3 doses of Provenge (last 10/28), on 3rd dose had fevers, chills, myalgias, was bedbound for a week due to these symptoms.    Mainor was briefly on Zytiga and prednisone in November-December 2016, however after one month his PSA went up, thus it was decided to add in Xofigo (radium 223) starting in Jan 2017.     Diagnosed with PE in Nov 2016. He had bleeding on Xarelto so was changed back to Lovenox.     Mainor developed diarrhea on and off in the spring of 2017, Zytiga was stopped on 3/31 as it was unsure what was contributing.  Mainor felt it was related to the Xofigo and that his dose was too high as his weight is always at least 10 pounds higher in clinic than at home.      He was re-started Taxotere on 8/15/17 for progressive disease.   Mainor finished 6 cycles of Taxotere, last dose 12/6/17.    CURRENT THERAPY:    Mainor delayed started Xtandi until ~2/21/18.   He continues on lupron every 3 mos and  XGEVA.      INTERVAL HISTORY: Mainor arrives unaccompanied today.     Mainor again comes alone for this clinic visit.  He is having a little difficult time likely with disease progression.  He notes that he had been doing well except over the last week when he had a rough 3 days.  He had more aches in his thighs.  He did not feel like doing much.  He did take pain medication, and things got better.  He did not have energy or enthusiasm to get to the kitchen.  He was not eating much and lost a few pounds again.  He feels that, overall, he is doing reasonably well.  He has pain off and on, which is fleeting and moves around.  He can have pain in both his thighs or in his arms.     A complete 10-pt ROS is negative other than what is reported above      MEDICATIONS:   Current Outpatient Prescriptions   Medication Sig     enzalutamide (XTANDI) 40 MG capsule Take 4 capsules (160 mg) by mouth daily     HYDROcodone-acetaminophen (NORCO) 5-325 MG per tablet Take 1 tablet by mouth every 6 hours as needed for severe pain     aspirin 81 MG tablet Take 1 tablet (81 mg) by mouth daily (Patient not taking: Reported on 5/4/2018)     calcium carbonate (OS-RODNEY 500 MG [place]. [place]) 1250 MG tablet Take 1 tablet (1,250 mg) by mouth 2 times daily (Patient not taking: Reported on 6/27/2018)     Cholecalciferol (VITAMIN D) 2000 UNITS tablet Take 1 tablet by mouth daily (Patient not taking: Reported on 6/27/2018)     enoxaparin (LOVENOX) 40 MG/0.4ML injection Inject 0.4 mLs (40 mg) Subcutaneous 2 times daily (Patient not taking: Reported on 6/27/2018)     enzalutamide (XTANDI) 40 MG capsule Take 4 capsules (160 mg) by mouth daily (Patient not taking: Reported on 6/27/2018)     enzalutamide (XTANDI) 40 MG capsule Take 4 capsules (160 mg) by mouth daily (Patient not taking: Reported on 5/4/2018)     methylphenidate (RITALIN) 5 MG tablet Take 0.5 tablets (2.5 mg) by mouth 2 times daily (Patient not taking: Reported on 6/27/2018)     palbociclib  "(IBRANCE) 125 MG capsule CHEMO Take 1 capsule (125 mg) by mouth daily with food (Patient not taking: Reported on 6/27/2018)     predniSONE (DELTASONE) 5 MG tablet Take 1 tablet (5 mg) by mouth 2 times daily (Patient not taking: Reported on 5/4/2018)     rosuvastatin (CRESTOR) 10 MG tablet Take 1 tablet (10 mg) by mouth daily (Patient not taking: Reported on 5/4/2018)     simvastatin (ZOCOR) 40 MG tablet TAKE ONE TABLET BY MOUTH AT BEDTIME (Patient not taking: Reported on 6/27/2018)     No current facility-administered medications for this visit.      Facility-Administered Medications Ordered in Other Visits   Medication     albuterol (PROAIR HFA, PROVENTIL HFA, VENTOLIN HFA) inhaler     glycopyrrolate (ROBINUL) injection     hydrocortisone sodium succinate (solu-CORTEF) injection     lidocaine 0.5 % injection 1 mL     neostigmine (PROSTIGMINE) injection     protamine injection          ALLERGIES:  No Known Allergies    PHYSICAL EXAMINATION:  Vitals: /55  Pulse 96  Temp 98  F (36.7  C)  Resp 16  Ht 1.626 m (5' 4\")  Wt 50.3 kg (111 lb)  SpO2 100%  BMI 19.05 kg/m2   Wt Readings from Last 10 Encounters:   06/27/18 50.3 kg (111 lb)   05/30/18 52.6 kg (116 lb)   05/04/18 52.6 kg (116 lb)   04/18/18 53.1 kg (117 lb)   03/21/18 54 kg (119 lb)   03/06/18 56.2 kg (124 lb)   02/20/18 55.8 kg (123 lb)   02/20/18 55.8 kg (123 lb)   02/06/18 57.6 kg (127 lb)   01/23/18 57.6 kg (127 lb)       GENERAL:  A pleasant person in no acute distress.  Mildly pale and thin looking  LYMPH NODES:  No peripheral lymphadenopathy noted in the supraclavicular or cervical regions.   LUNGS:  Clear to auscultation bilaterally.   HEART:  Regular rate and rhythm systolic murmur heard at apex  ABDOMEN:  Bowel sounds are active.  Soft and nontender.  No hepatosplenomegaly or other masses appreciated.  LOWER EXTREMITIES:  Without pitting edema to the knees bilaterally.   NEUROLOGICAL:  Alert/orientated/able to answer all questions.  CN " grossly intact.       LAB:   Recent Labs   Lab Test  06/27/18   1638  05/30/18   0952  05/04/18   0920  04/18/18   1334  03/21/18   0944   NA  141  140  138  140  138   POTASSIUM  4.4  4.2  3.8  4.5  4.0   CHLORIDE  109  110*  106  109  106   CO2  22  23  22  22  20   ANIONGAP  10  8  10  9  12   BUN  15  14  15  16  16   CR  0.89  0.88  0.88  0.84  0.96   GLC  103*  108*  99  106*  110*   RODNEY  9.1  9.1  9.5  9.2  9.0     Recent Labs   Lab Test  04/05/17   0941  03/31/17   1411  03/15/17   1410  01/14/17   0808  01/13/17   1815  01/13/17   0801   MAG  2.0  2.2  2.4*  2.1  2.0  2.0   PHOS  2.3*  1.6*   --   2.0*  2.9  1.8*     Recent Labs   Lab Test  06/27/18   1638  05/30/18   0952  05/04/18   0920  04/18/18   1334  03/21/18   0944   WBC  3.7*  3.7*  3.7*  3.7*  2.9*   HGB  8.5*  9.4*  9.9*  10.2*  10.5*   PLT  97*  81*  107*  108*  110*   MCV  99  99  98  95  93   NEUTROPHIL  62.5  63.3  63.1  59.2  47.5     Recent Labs   Lab Test  06/27/18   1638  05/30/18   0952  05/04/18   0920   03/31/17   1411   12/21/16   1149  12/01/16   1247   BILITOTAL  0.7  0.9  0.9   < >  2.6*   < >  0.6  0.8   ALKPHOS  196*  183*  180*   < >  472*   < >  673*  649*   ALT  13  13  11   < >  16   < >  18  16   AST  42  44  40   < >  22   < >  24  22   ALBUMIN  3.6  3.8  3.7   < >  3.7   < >  3.5  3.8   LDH   --    --    --    --   409*   --   321*  348*    < > = values in this interval not displayed.     TSH   Date Value Ref Range Status   09/16/2015 3.18 0.40 - 4.00 mU/L Final     No results for input(s): CEA in the last 50858 hours.  Results for orders placed or performed during the hospital encounter of 02/20/18   XR Chest 2 Views    Narrative    Exam:  XR CHEST 2 VW, 2/20/2018 7:26 PM    History: Chest pain;     Comparison:  2/23/2017    Findings:  PA and lateral view of the chest. The cardiomediastinal  silhouette is within normal limits. Mitral clips. Trachea is midline.  No pleural effusion or pneumothorax. Basilar atelectasis. No  acute  airspace opacity. Progressed diffuse sclerotic metastases in the  vertebral bodies, clavicles, ribs, scapulas, and humeri.       Impression    Impression:    1. No acute airspace opacity.  2. Progressive diffuse sclerotic metastases involving the humeral  heads, vertebral bodies, scapulas, clavicles, and ribs.    I have personally reviewed the examination and initial interpretation  and I agree with the findings.    BALA JIMENEZ MD   Cervical spine CT w/o contrast    Narrative    CT CERVICAL SPINE W/O CONTRAST 2/20/2018 7:05 PM    Provided History: Bilateral arm and shoulder pain, history of  metastatic prostate cancer, evaluate for fractures, See above.    Comparison: CT 7/12/2017    Technique: Using multidetector thin collimation helical acquisition  technique, axial, coronal and sagittal CT images through the cervical  spine were obtained without intravenous contrast.     Findings:  Diffuse sclerotic bone metastases throughout the spine, skull base,  and visualized ribs. Also sclerotic metastases in the thyroid and  cricoid cartilage. The cervical vertebrae are normally aligned.  Straightened cervical lordosis possibly due to patient positioning or  muscle spasm. No acute fracture or subluxation. No prevertebral edema.  There is moderate disc height narrowing C5-6 and C6-7. The findings on  a level by level basis are as follows:    C2-3: No spinal canal or neural foraminal stenosis.    C3-4:  No spinal canal stenosis. Moderate left-sided neural foraminal  narrowing secondary to facet hypertrophy.    C4-5:  No spinal canal stenosis. Moderate right neural foraminal and  mild left neural foraminal narrowing secondary to facet hypertrophy.    C5-6:  Moderate spinal canal narrowing. Moderate bilateral neural  foraminal stenosis secondary to facet hypertrophy and osteophytes.    C6-7:  No spinal canal stenosis. Moderate left and mild right neural  foraminal stenosis narrowing secondary to uncinate and  facet  hypertrophy.    C7-T1:  No spinal canal or neural foraminal stenosis.     No abnormality of the paraspinous soft tissues. Emphysematous changes  in the lung apices.      Impression    Impression:   1. No acute fracture or subluxation.  2. Diffuse sclerotic bone metastases throughout the spine, skull base,  and visualized ribs.  3. Multilevel cervical spondylosis greatest at C5-C6 with moderate  spinal canal narrowing.    I have personally reviewed the examination and initial interpretation  and I agree with the findings.    DIAZ BYRNE MD     *Note: Due to a large number of results and/or encounters for the requested time period, some results have not been displayed. A complete set of results can be found in Results Review.     Recent Labs   Lab Test  06/27/18   1638  05/30/18   0952  05/04/18   0920  04/18/18   1334  03/21/18   0944   12/21/16   1149  12/01/16   1247  10/28/16   0942   09/07/16   1859   08/21/15   1405   PSA  1160.00*  941.00*  903.00*  784.00*  795.00*   < >  424.22*  324.65*   --    < >  28.98*   < >  756.97*   TESTOSTTOTAL   --    --    --    --    --    --  <2 <2  11*   --   7*   --   4*    < > = values in this interval not displayed.          IMPRESSION/PLAN:   Metastatic castrate-resistant prostate cancer:   Mainor is tolerating enzalutamide without issues.       He is gradually declining with diminishing strength and energy. Last week he was laying in bed with pain for 3 days. He notes that he could not get to kitchen. He did have a friend come in and help him with meal. He has been reluctant to accept help at home. He keeps reassuring that he is doing well. He is not taking pain medications on any regular basis.     We did get his mutation testing done by willie Martin which has revealed CCND1 amplification which would be susceptible to palbociclib. I have been working to get this medication approved for him. He did get a bank statement for me which I have passed on to my nurse. I  "did explain him that usually the foundations granting support are looking for a tax document. He notes that he had to request an extension for filing his taxes. I will have Toni follow up with him. He has expressed interest in pursuing. Till we get the medication approved, I will continue with enzalutamide for him.     -RTC in 4 weeks  -Lupron last 5/4      Bone: Xgeva q3m with Lupron.  Intermittent bone pains, not needing daily analgesics    He has pancytopenia from his disease and previous therapies but otherwise has stable labs.     Anemia: Hemoglobin 9. today, stable, secondary to prostate involvement in the marrow.  We need to keep him above 9 given his CAD.      Heme: PE dx 11/2016, continue Lovenox at 40 mg BID.  Admitted he is only \"intermittently\" taking this.  Encouraged daily use.     CV: hx of CHF. No clinical s/s of failure.  Off metoprolol due to BP being too low.  Has been tachy in the  range since then, but BP won't support re-starting.      Weight is low. Offered him home meals and or appetite stimulants.  He feels he has an appetite and is eating fine twice a day, but just has been too lazy to make meals sometimes. Offered him a home RN and he declined.       Over 25 min of direct face to face time spent with patient with more than 50% time spent in counseling and coordinating care.    "

## 2018-06-27 NOTE — NURSING NOTE
"Oncology Rooming Note    June 27, 2018 4:46 PM   Oswaldo Win is a 73 year old male who presents for:    Chief Complaint   Patient presents with     Oncology Clinic Visit     Return Prostate Ca     Blood Draw          Initial Vitals: /55  Pulse 96  Temp 98  F (36.7  C)  Resp 16  Ht 1.626 m (5' 4\")  Wt 50.3 kg (111 lb)  SpO2 100%  BMI 19.05 kg/m2 Estimated body mass index is 19.05 kg/(m^2) as calculated from the following:    Height as of this encounter: 1.626 m (5' 4\").    Weight as of this encounter: 50.3 kg (111 lb). Body surface area is 1.51 meters squared.  Mild Pain (3) Comment: Data Unavailable   No LMP for male patient.  Allergies reviewed: Yes  Medications reviewed: Yes    Medications: Medication refills not needed today.  Pharmacy name entered into EPIC:    [facility] - [place], [place] - [address]  [facility] - [place], [place] - [address]    Clinical concerns: no new concerns     6 minutes for nursing intake (face to face time)     Emily Cunningham CMA              "

## 2018-06-27 NOTE — LETTER
6/27/2018       RE: Oswaldo Win  [address]  [facility] [address]     Dear Colleague,    Thank you for referring your patient, Oswaldo Win, to the [facility]. Please see a copy of my visit note below.    Oncology/Hematology Visit Note  Jun 27, 2018    REASON FOR VISIT:  Assessment on Xtandi    ONCOLOGY HISTORY:  Mr. Win is a 72 year old male with a hx of CHF, CKD, CAD w/ hx of STEMI and CABG. He met with Dr. Robles at the end of July for consultation regarding metastatic prostate cancer. He presented in May 2015 when he saw his PCP for back pain. He also had poor appetite, weight loss and fatigue.  CT showed diffuse bony mets. CT Chest showed sclerotic mets throughout the bones. He was given degarelix on 7/22 in Urology. He was having pelvic pain and was evaluated by Dr Cavazos would did not feel XRT was appropriate.  He started on docetaxel 7/31/15 at 75 mg/m2 and only had one dose before being hospitalized several times.     His PSA remained stable so he was continued only on Lupron for the rest of 2015.  In December 2015, Mainor met with Dr Robles and since his ECOG was back to a 1, they discussed re-challenging the Taxotere at a lower dose and he completed 5 cycles at 60 mg/m2. On 9/26/2016 he started Provenge off study. He received 3 doses of Provenge (last 10/28), on 3rd dose had fevers, chills, myalgias, was bedbound for a week due to these symptoms.    Mainor was briefly on Zytiga and prednisone in November-December 2016, however after one month his PSA went up, thus it was decided to add in Xofigo (radium 223) starting in Jan 2017.     Diagnosed with PE in Nov 2016. He had bleeding on Xarelto so was changed back to Lovenox.     Mainor developed diarrhea on and off in the spring of 2017, Zytiga was stopped on 3/31 as it was unsure what was contributing.  Mainor felt it was related to the Xofigo and that his dose was too high as his weight is always at least 10 pounds higher in clinic than at home.       He was re-started Taxotere on 8/15/17 for progressive disease.   Mainor finished 6 cycles of Taxotere, last dose 12/6/17.    CURRENT THERAPY:    Mainor delayed started Xtandi until ~2/21/18.   He continues on lupron every 3 mos and XGEVA.      INTERVAL HISTORY: Mainor arrives unaccompanied today.      Dictation on: 06/27/2018  6:45 PM by: AKIRA HAYES [[id number]]     A complete 10-pt ROS is negative other than what is reported above      MEDICATIONS:   Current Outpatient Prescriptions   Medication Sig     enzalutamide (XTANDI) 40 MG capsule Take 4 capsules (160 mg) by mouth daily     HYDROcodone-acetaminophen (NORCO) 5-325 MG per tablet Take 1 tablet by mouth every 6 hours as needed for severe pain     aspirin 81 MG tablet Take 1 tablet (81 mg) by mouth daily (Patient not taking: Reported on 5/4/2018)     calcium carbonate (OS-RODNEY 500 MG [place]. [place]) 1250 MG tablet Take 1 tablet (1,250 mg) by mouth 2 times daily (Patient not taking: Reported on 6/27/2018)     Cholecalciferol (VITAMIN D) 2000 UNITS tablet Take 1 tablet by mouth daily (Patient not taking: Reported on 6/27/2018)     enoxaparin (LOVENOX) 40 MG/0.4ML injection Inject 0.4 mLs (40 mg) Subcutaneous 2 times daily (Patient not taking: Reported on 6/27/2018)     enzalutamide (XTANDI) 40 MG capsule Take 4 capsules (160 mg) by mouth daily (Patient not taking: Reported on 6/27/2018)     enzalutamide (XTANDI) 40 MG capsule Take 4 capsules (160 mg) by mouth daily (Patient not taking: Reported on 5/4/2018)     methylphenidate (RITALIN) 5 MG tablet Take 0.5 tablets (2.5 mg) by mouth 2 times daily (Patient not taking: Reported on 6/27/2018)     palbociclib (IBRANCE) 125 MG capsule CHEMO Take 1 capsule (125 mg) by mouth daily with food (Patient not taking: Reported on 6/27/2018)     predniSONE (DELTASONE) 5 MG tablet Take 1 tablet (5 mg) by mouth 2 times daily (Patient not taking: Reported on 5/4/2018)     rosuvastatin (CRESTOR) 10 MG tablet Take 1 tablet (10 mg) by mouth daily  "(Patient not taking: Reported on 5/4/2018)     simvastatin (ZOCOR) 40 MG tablet TAKE ONE TABLET BY MOUTH AT BEDTIME (Patient not taking: Reported on 6/27/2018)     No current facility-administered medications for this visit.      Facility-Administered Medications Ordered in Other Visits   Medication     albuterol (PROAIR HFA, PROVENTIL HFA, VENTOLIN HFA) inhaler     glycopyrrolate (ROBINUL) injection     hydrocortisone sodium succinate (solu-CORTEF) injection     lidocaine 0.5 % injection 1 mL     neostigmine (PROSTIGMINE) injection     protamine injection          ALLERGIES:  No Known Allergies    PHYSICAL EXAMINATION:  Vitals: /55  Pulse 96  Temp 98  F (36.7  C)  Resp 16  Ht 1.626 m (5' 4\")  Wt 50.3 kg (111 lb)  SpO2 100%  BMI 19.05 kg/m2   Wt Readings from Last 10 Encounters:   06/27/18 50.3 kg (111 lb)   05/30/18 52.6 kg (116 lb)   05/04/18 52.6 kg (116 lb)   04/18/18 53.1 kg (117 lb)   03/21/18 54 kg (119 lb)   03/06/18 56.2 kg (124 lb)   02/20/18 55.8 kg (123 lb)   02/20/18 55.8 kg (123 lb)   02/06/18 57.6 kg (127 lb)   01/23/18 57.6 kg (127 lb)       GENERAL:  A pleasant person in no acute distress.  Mildly pale and thin looking  LYMPH NODES:  No peripheral lymphadenopathy noted in the supraclavicular or cervical regions.   LUNGS:  Clear to auscultation bilaterally.   HEART:  Regular rate and rhythm systolic murmur heard at apex  ABDOMEN:  Bowel sounds are active.  Soft and nontender.  No hepatosplenomegaly or other masses appreciated.  LOWER EXTREMITIES:  Without pitting edema to the knees bilaterally.   NEUROLOGICAL:  Alert/orientated/able to answer all questions.  CN grossly intact.       LAB:   Recent Labs   Lab Test  06/27/18   1638  05/30/18   0952  05/04/18   0920  04/18/18   1334  03/21/18   0944   NA  141  140  138  140  138   POTASSIUM  4.4  4.2  3.8  4.5  4.0   CHLORIDE  109  110*  106  109  106   CO2  22  23  22  22  20   ANIONGAP  10  8  10  9  12   BUN  15  14  15  16  16   CR  0.89 "  0.88  0.88  0.84  0.96   GLC  103*  108*  99  106*  110*   RODNEY  9.1  9.1  9.5  9.2  9.0     Recent Labs   Lab Test  04/05/17   0941  03/31/17   1411  03/15/17   1410  01/14/17   0808  01/13/17   1815  01/13/17   0801   MAG  2.0  2.2  2.4*  2.1  2.0  2.0   PHOS  2.3*  1.6*   --   2.0*  2.9  1.8*     Recent Labs   Lab Test  06/27/18   1638  05/30/18   0952  05/04/18   0920  04/18/18   1334  03/21/18   0944   WBC  3.7*  3.7*  3.7*  3.7*  2.9*   HGB  8.5*  9.4*  9.9*  10.2*  10.5*   PLT  97*  81*  107*  108*  110*   MCV  99  99  98  95  93   NEUTROPHIL  62.5  63.3  63.1  59.2  47.5     Recent Labs   Lab Test  06/27/18   1638  05/30/18   0952  05/04/18   0920   03/31/17   1411   12/21/16   1149  12/01/16   1247   BILITOTAL  0.7  0.9  0.9   < >  2.6*   < >  0.6  0.8   ALKPHOS  196*  183*  180*   < >  472*   < >  673*  649*   ALT  13  13  11   < >  16   < >  18  16   AST  42  44  40   < >  22   < >  24  22   ALBUMIN  3.6  3.8  3.7   < >  3.7   < >  3.5  3.8   LDH   --    --    --    --   409*   --   321*  348*    < > = values in this interval not displayed.     TSH   Date Value Ref Range Status   09/16/2015 3.18 0.40 - 4.00 mU/L Final     No results for input(s): CEA in the last 12627 hours.  Results for orders placed or performed during the hospital encounter of 02/20/18   XR Chest 2 Views    Narrative    Exam:  XR CHEST 2 VW, 2/20/2018 7:26 PM    History: Chest pain;     Comparison:  2/23/2017    Findings:  PA and lateral view of the chest. The cardiomediastinal  silhouette is within normal limits. Mitral clips. Trachea is midline.  No pleural effusion or pneumothorax. Basilar atelectasis. No acute  airspace opacity. Progressed diffuse sclerotic metastases in the  vertebral bodies, clavicles, ribs, scapulas, and humeri.       Impression    Impression:    1. No acute airspace opacity.  2. Progressive diffuse sclerotic metastases involving the humeral  heads, vertebral bodies, scapulas, clavicles, and ribs.    I have  personally reviewed the examination and initial interpretation  and I agree with the findings.    BALA JIMENEZ MD   Cervical spine CT w/o contrast    Narrative    CT CERVICAL SPINE W/O CONTRAST 2/20/2018 7:05 PM    Provided History: Bilateral arm and shoulder pain, history of  metastatic prostate cancer, evaluate for fractures, See above.    Comparison: CT 7/12/2017    Technique: Using multidetector thin collimation helical acquisition  technique, axial, coronal and sagittal CT images through the cervical  spine were obtained without intravenous contrast.     Findings:  Diffuse sclerotic bone metastases throughout the spine, skull base,  and visualized ribs. Also sclerotic metastases in the thyroid and  cricoid cartilage. The cervical vertebrae are normally aligned.  Straightened cervical lordosis possibly due to patient positioning or  muscle spasm. No acute fracture or subluxation. No prevertebral edema.  There is moderate disc height narrowing C5-6 and C6-7. The findings on  a level by level basis are as follows:    C2-3: No spinal canal or neural foraminal stenosis.    C3-4:  No spinal canal stenosis. Moderate left-sided neural foraminal  narrowing secondary to facet hypertrophy.    C4-5:  No spinal canal stenosis. Moderate right neural foraminal and  mild left neural foraminal narrowing secondary to facet hypertrophy.    C5-6:  Moderate spinal canal narrowing. Moderate bilateral neural  foraminal stenosis secondary to facet hypertrophy and osteophytes.    C6-7:  No spinal canal stenosis. Moderate left and mild right neural  foraminal stenosis narrowing secondary to uncinate and facet  hypertrophy.    C7-T1:  No spinal canal or neural foraminal stenosis.     No abnormality of the paraspinous soft tissues. Emphysematous changes  in the lung apices.      Impression    Impression:   1. No acute fracture or subluxation.  2. Diffuse sclerotic bone metastases throughout the spine, skull base,  and visualized  ribs.  3. Multilevel cervical spondylosis greatest at C5-C6 with moderate  spinal canal narrowing.    I have personally reviewed the examination and initial interpretation  and I agree with the findings.    DIAZ BYRNE MD     *Note: Due to a large number of results and/or encounters for the requested time period, some results have not been displayed. A complete set of results can be found in Results Review.     Recent Labs   Lab Test  06/27/18   1638  05/30/18   0952  05/04/18   0920  04/18/18   1334  03/21/18   0944   12/21/16   1149  12/01/16   1247  10/28/16   0942   09/07/16   1859   08/21/15   1405   PSA  1160.00*  941.00*  903.00*  784.00*  795.00*   < >  424.22*  324.65*   --    < >  28.98*   < >  756.97*   TESTOSTTOTAL   --    --    --    --    --    --  <2 <2  11*   --   7*   --   4*    < > = values in this interval not displayed.          IMPRESSION/PLAN:   Metastatic castrate-resistant prostate cancer:   Mainor is tolerating enzalutamide without issues.       He is gradually declining with diminishing strength and energy. Last week he was laying in bed with pain for 3 days. He notes that he could not get to kitchen. He did have a friend come in and help him with meal. He has been reluctant to accept help at home. He keeps reassuring that he is doing well. He is not taking pain medications on any regular basis.     We did get his mutation testing done by [place] [id number] which has revealed CCND1 amplification which would be susceptible to palbociclib. I have been working to get this medication approved for him. He did get a bank statement for me which I have passed on to my nurse. I did explain him that usually the foundations granting support are looking for a tax document. He notes that he had to request an extension for filing his taxes. I will have Marnee follow up with him. He has expressed interest in pursuing. Till we get the medication approved, I will continue with enzalutamide for him.     -RTC in 4  "weeks  -Lupron last 5/4      Bone: Xgeva q3m with Lupron.  Intermittent bone pains, not needing daily analgesics    He has pancytopenia from his disease and previous therapies but otherwise has stable labs.     Anemia: Hemoglobin 9. today, stable, secondary to prostate involvement in the marrow.  We need to keep him above 9 given his CAD.      Heme: PE dx 11/2016, continue Lovenox at 40 mg BID.  Admitted he is only \"intermittently\" taking this.  Encouraged daily use.     CV: hx of CHF. No clinical s/s of failure.  Off metoprolol due to BP being too low.  Has been tachy in the  range since then, but BP won't support re-starting.      Weight is low. Offered him home meals and or appetite stimulants.  He feels he has an appetite and is eating fine twice a day, but just has been too lazy to make meals sometimes. Offered him a home RN and he declined.       Over 25 min of direct face to face time spent with patient with more than 50% time spent in counseling and coordinating care.      Again, thank you for allowing me to participate in the care of your patient.      Sincerely,    Bentley Robles MD      "

## 2018-06-27 NOTE — MR AVS SNAPSHOT
After Visit Summary   6/27/2018    Oswaldo Win    MRN: [id number]           Patient Information     Date Of Birth          1945        Visit Information        Provider Department      6/27/2018 5:00 PM Bentley Robles MD [facility]        Today's Diagnoses     Prostate cancer 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)    -  1    Metastatic bone tumor (H)        Other fatigue           Follow-ups after your visit        Your next 10 appointments already scheduled     Jul 10, 2018 12:00 PM CDT   [facility] with [facility] LAB DRAW   [facility] ([facility])    [address] [place] [address]  [address]  [facility] [address]   [phone]            Jul 10, 2018 12:30 PM CDT   (Arrive by 12:15 PM)   Return Visit with Gabriela Mcguire PA-C   [facility] ([facility])    [address] [place] [address]  [address]  [facility] [address]   [phone]            Jul 25, 2018  9:00 AM CDT   [facility] Draw with [facility] [employer] LAB DRAW   [facility] ([facility])    [address] [place] [address]  [address]  [facility] [address]   [phone]            Jul 25, 2018 10:00 AM CDT   (Arrive by 9:45 AM)   Return Visit with Bentley Robles MD   [facility] ([facility])    [address] [place] [address]  [address]  [facility] [address]   [phone]              Who to contact     If you have questions or need follow up information about today's clinic visit or your schedule please contact [facility] directly at [phone].  Normal or non-critical lab and imaging results will be communicated to you by MyChart, letter or phone within 4 business days after the clinic has received the results. If you do not hear from us within 7 days, please contact the clinic through MyChart or phone. If you have a critical or abnormal lab result, we  "will notify you by phone as soon as possible.  Submit refill requests through [employer] or call your pharmacy and they will forward the refill request to us. Please allow 3 business days for your refill to be completed.          Additional Information About Your Visit        bublhart Information     [employer] gives you secure access to your electronic health record. If you see a primary care provider, you can also send messages to your care team and make appointments. If you have questions, please call your primary care clinic.  If you do not have a primary care provider, please call [phone] and they will assist you.        Care EveryWhere ID     This is your Care EveryWhere ID. This could be used by other organizations to access your [place] medical records  [id number]        Your Vitals Were     Pulse Temperature Respirations Height Pulse Oximetry BMI (Body Mass Index)    96 98  F (36.7  C) 16 1.626 m (5' 4\") 100% 19.05 kg/m2       Blood Pressure from Last 3 Encounters:   06/27/18 104/55   05/30/18 91/57   05/04/18 105/58    Weight from Last 3 Encounters:   06/27/18 50.3 kg (111 lb)   05/30/18 52.6 kg (116 lb)   05/04/18 52.6 kg (116 lb)              Today, you had the following     No orders found for display      Information about OPIOIDS     PRESCRIPTION OPIOIDS: WHAT YOU NEED TO KNOW   We gave you an opioid (narcotic) pain medicine. It is important to manage your pain, but opioids are not always the best choice. You should first try all the other options your care team gave you. Take this medicine for as short a time (and as few doses) as possible.     These medicines have risks:    DO NOT drive when on new or higher doses of pain medicine. These medicines can affect your alertness and reaction times, and you could be arrested for driving under the influence (DUI). If you need to use opioids long-term, talk to your care team about driving.    DO NOT operate heave machinery    DO NOT do any other dangerous " activities while taking these medicines.     DO NOT drink any alcohol while taking these medicines.      If the opioid prescribed includes acetaminophen, DO NOT take with any other medicines that contain acetaminophen. Read all labels carefully. Look for the word  acetaminophen  or  Tylenol.  Ask your pharmacist if you have questions or are unsure.    You can get addicted to pain medicines, especially if you have a history of addiction (chemical, alcohol or substance dependence). Talk to your care team about ways to reduce this risk.    Store your pills in a secure place, locked if possible. We will not replace any lost or stolen medicine. If you don t finish your medicine, please throw away (dispose) as directed by your pharmacist. The [place] Pollution Control Agency has more information about safe disposal: https://www.pca.[facility].[place].us/living-green/managing-unwanted-medications.     All opioids tend to cause constipation. Drink plenty of water and eat foods that have a lot of fiber, such as fruits, vegetables, prune juice, apple juice and high-fiber cereal. Take a laxative (Miralax, milk of magnesia, Colace, Senna) if you don t move your bowels at least every other day.          Primary Care Provider Office Phone # Fax #    Lucrecia Collier [phone]       [address] [facility] [address]        Equal Access to Services     BLADIMIR KEY : Kelsey purcello Sokwame, waaxda luqadaha, qaybta kaalmada adeegyada, peter brock. So [facility] [phone].    ATENCIÓN: Si habla español, tiene a mccall disposición servicios gratuitos de asistencia lingüística. Llame al [phone].    We comply with applicable federal civil rights laws and [place] laws. We do not discriminate on the basis of race, color, national origin, age, disability, sex, sexual orientation, or gender identity.            Thank you!     Thank you for choosing [facility]  for your  care. Our goal is always to provide you with excellent care. Hearing back from our patients is one way we can continue to improve our services. Please take a few minutes to complete the written survey that you may receive in the mail after your visit with us. Thank you!             Your Updated Medication List - Protect others around you: Learn how to safely use, store and throw away your medicines at www.disposemymeds.org.          This list is accurate as of 6/27/18 11:59 PM.  Always use your most recent med list.                   Brand Name Dispense Instructions for use Diagnosis    aspirin 81 MG tablet     90 tablet    Take 1 tablet (81 mg) by mouth daily    Acute on chronic combined systolic and diastolic congestive heart failure (H)       calcium carbonate 500 MG tablet    OS-RODNEY 500 mg [place]. [place]    120 tablet    Take 1 tablet (1,250 mg) by mouth 2 times daily    Hypocalcemia       enoxaparin 40 MG/0.4ML injection    LOVENOX    60 Syringe    Inject 0.4 mLs (40 mg) Subcutaneous 2 times daily    Other chronic pulmonary embolism without acute cor pulmonale (H)       * enzalutamide 40 MG capsule    XTANDI    120 capsule    Take 4 capsules (160 mg) by mouth daily    Metastatic bone tumor (H)       * enzalutamide 40 MG capsule    XTANDI    120 capsule    Take 4 capsules (160 mg) by mouth daily    Prostate cancer 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), Metastatic bone tumor (H)       * enzalutamide 40 MG capsule    XTANDI    120 capsule    Take 4 capsules (160 mg) by mouth daily    Prostate cancer 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), Metastatic bone tumor (H)       HYDROcodone-acetaminophen 5-325 MG per tablet    NORCO     Take 1 tablet by mouth every 6 hours as needed for severe pain        methylphenidate 5 MG tablet    RITALIN    40 tablet    Take 0.5 tablets (2.5 mg) by mouth 2 times daily    Prostate cancer metastatic to bone (H), Other fatigue       palbociclib 125 MG capsule CHEMO    IBRANCE    30 capsule    Take 1 capsule (125 mg) by  mouth daily with food    Prostate cancer metastatic to bone (H), Metastatic bone tumor (H)       predniSONE 5 MG tablet    DELTASONE    42 tablet    Take 1 tablet (5 mg) by mouth 2 times daily    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), Prostate cancer metastatic to bone (H), Metastatic bone tumor (H)       rosuvastatin 10 MG tablet    CRESTOR    90 tablet    Take 1 tablet (10 mg) by mouth daily    ASCVD (arteriosclerotic cardiovascular disease), Metastatic bone tumor (H), Prostate cancer metastatic to bone (H)       simvastatin 40 MG tablet    ZOCOR    90 tablet    TAKE ONE TABLET BY MOUTH AT BEDTIME    Hyperlipidemia LDL goal <130       vitamin D 2000 units tablet     100 tablet    Take 1 tablet by mouth daily    Vitamin D deficiency       * Notice:  This list has 3 medication(s) that are the same as other medications prescribed for you. Read the directions carefully, and ask your doctor or other care provider to review them with you.

## 2018-06-27 NOTE — LETTER
6/27/2018     RE: Oswaldo Win  [address]  [facility] [address]     Dear Colleague,    Thank you for referring your patient, Oswaldo Win, to the [facility] at [facility]. Please see a copy of my visit note below.    Oncology/Hematology Visit Note  Jun 27, 2018    REASON FOR VISIT:  Assessment on Xtandi    ONCOLOGY HISTORY:  Mr. Win is a 72 year old male with a hx of CHF, CKD, CAD w/ hx of STEMI and CABG. He met with Dr. Robles at the end of July for consultation regarding metastatic prostate cancer. He presented in May 2015 when he saw his PCP for back pain. He also had poor appetite, weight loss and fatigue.  CT showed diffuse bony mets. CT Chest showed sclerotic mets throughout the bones. He was given degarelix on 7/22 in Urology. He was having pelvic pain and was evaluated by Dr Cavazos would did not feel XRT was appropriate.  He started on docetaxel 7/31/15 at 75 mg/m2 and only had one dose before being hospitalized several times.     His PSA remained stable so he was continued only on Lupron for the rest of 2015.  In December 2015, Mainor met with Dr Robles and since his ECOG was back to a 1, they discussed re-challenging the Taxotere at a lower dose and he completed 5 cycles at 60 mg/m2. On 9/26/2016 he started Provenge off study. He received 3 doses of Provenge (last 10/28), on 3rd dose had fevers, chills, myalgias, was bedbound for a week due to these symptoms.    Mainor was briefly on Zytiga and prednisone in November-December 2016, however after one month his PSA went up, thus it was decided to add in Xofigo (radium 223) starting in Jan 2017.     Diagnosed with PE in Nov 2016. He had bleeding on Xarelto so was changed back to Lovenox.     Mainor developed diarrhea on and off in the spring of 2017, Zytiga was stopped on 3/31 as it was unsure what was contributing.  Mainor felt it was related to the Xofigo and that his dose was too high as his weight is always  at least 10 pounds higher in clinic than at home.      He was re-started Taxotere on 8/15/17 for progressive disease.   Mainor finished 6 cycles of Taxotere, last dose 12/6/17.    CURRENT THERAPY:    Mainor delayed started Xtandi until ~2/21/18.   He continues on lupron every 3 mos and XGEVA.      INTERVAL HISTORY: Mainor arrives unaccompanied today.     Mainor again comes alone for this clinic visit.  He is having a little difficult time likely with disease progression.  He notes that he had been doing well except over the last week when he had a rough 3 days.  He had more aches in his thighs.  He did not feel like doing much.  He did take pain medication, and things got better.  He did not have energy or enthusiasm to get to the kitchen.  He was not eating much and lost a few pounds again.  He feels that, overall, he is doing reasonably well.  He has pain off and on, which is fleeting and moves around.  He can have pain in both his thighs or in his arms.     A complete 10-pt ROS is negative other than what is reported above      MEDICATIONS:   Current Outpatient Prescriptions   Medication Sig     enzalutamide (XTANDI) 40 MG capsule Take 4 capsules (160 mg) by mouth daily     HYDROcodone-acetaminophen (NORCO) 5-325 MG per tablet Take 1 tablet by mouth every 6 hours as needed for severe pain     aspirin 81 MG tablet Take 1 tablet (81 mg) by mouth daily (Patient not taking: Reported on 5/4/2018)     calcium carbonate (OS-RODNEY 500 MG [place]. [place]) 1250 MG tablet Take 1 tablet (1,250 mg) by mouth 2 times daily (Patient not taking: Reported on 6/27/2018)     Cholecalciferol (VITAMIN D) 2000 UNITS tablet Take 1 tablet by mouth daily (Patient not taking: Reported on 6/27/2018)     enoxaparin (LOVENOX) 40 MG/0.4ML injection Inject 0.4 mLs (40 mg) Subcutaneous 2 times daily (Patient not taking: Reported on 6/27/2018)     enzalutamide (XTANDI) 40 MG capsule Take 4 capsules (160 mg) by mouth daily (Patient not taking: Reported on 6/27/2018)  "    enzalutamide (XTANDI) 40 MG capsule Take 4 capsules (160 mg) by mouth daily (Patient not taking: Reported on 5/4/2018)     methylphenidate (RITALIN) 5 MG tablet Take 0.5 tablets (2.5 mg) by mouth 2 times daily (Patient not taking: Reported on 6/27/2018)     palbociclib (IBRANCE) 125 MG capsule CHEMO Take 1 capsule (125 mg) by mouth daily with food (Patient not taking: Reported on 6/27/2018)     predniSONE (DELTASONE) 5 MG tablet Take 1 tablet (5 mg) by mouth 2 times daily (Patient not taking: Reported on 5/4/2018)     rosuvastatin (CRESTOR) 10 MG tablet Take 1 tablet (10 mg) by mouth daily (Patient not taking: Reported on 5/4/2018)     simvastatin (ZOCOR) 40 MG tablet TAKE ONE TABLET BY MOUTH AT BEDTIME (Patient not taking: Reported on 6/27/2018)     No current facility-administered medications for this visit.      Facility-Administered Medications Ordered in Other Visits   Medication     albuterol (PROAIR HFA, PROVENTIL HFA, VENTOLIN HFA) inhaler     glycopyrrolate (ROBINUL) injection     hydrocortisone sodium succinate (solu-CORTEF) injection     lidocaine 0.5 % injection 1 mL     neostigmine (PROSTIGMINE) injection     protamine injection          ALLERGIES:  No Known Allergies    PHYSICAL EXAMINATION:  Vitals: /55  Pulse 96  Temp 98  F (36.7  C)  Resp 16  Ht 1.626 m (5' 4\")  Wt 50.3 kg (111 lb)  SpO2 100%  BMI 19.05 kg/m2   Wt Readings from Last 10 Encounters:   06/27/18 50.3 kg (111 lb)   05/30/18 52.6 kg (116 lb)   05/04/18 52.6 kg (116 lb)   04/18/18 53.1 kg (117 lb)   03/21/18 54 kg (119 lb)   03/06/18 56.2 kg (124 lb)   02/20/18 55.8 kg (123 lb)   02/20/18 55.8 kg (123 lb)   02/06/18 57.6 kg (127 lb)   01/23/18 57.6 kg (127 lb)       GENERAL:  A pleasant person in no acute distress.  Mildly pale and thin looking  LYMPH NODES:  No peripheral lymphadenopathy noted in the supraclavicular or cervical regions.   LUNGS:  Clear to auscultation bilaterally.   HEART:  Regular rate and rhythm " systolic murmur heard at apex  ABDOMEN:  Bowel sounds are active.  Soft and nontender.  No hepatosplenomegaly or other masses appreciated.  LOWER EXTREMITIES:  Without pitting edema to the knees bilaterally.   NEUROLOGICAL:  Alert/orientated/able to answer all questions.  CN grossly intact.       LAB:   Recent Labs   Lab Test  06/27/18   1638  05/30/18   0952  05/04/18   0920  04/18/18   1334  03/21/18   0944   NA  141  140  138  140  138   POTASSIUM  4.4  4.2  3.8  4.5  4.0   CHLORIDE  109  110*  106  109  106   CO2  22  23  22  22  20   ANIONGAP  10  8  10  9  12   BUN  15  14  15  16  16   CR  0.89  0.88  0.88  0.84  0.96   GLC  103*  108*  99  106*  110*   RODNEY  9.1  9.1  9.5  9.2  9.0     Recent Labs   Lab Test  04/05/17   0941  03/31/17   1411  03/15/17   1410  01/14/17   0808  01/13/17   1815  01/13/17   0801   MAG  2.0  2.2  2.4*  2.1  2.0  2.0   PHOS  2.3*  1.6*   --   2.0*  2.9  1.8*     Recent Labs   Lab Test  06/27/18   1638  05/30/18   0952  05/04/18   0920  04/18/18   1334  03/21/18   0944   WBC  3.7*  3.7*  3.7*  3.7*  2.9*   HGB  8.5*  9.4*  9.9*  10.2*  10.5*   PLT  97*  81*  107*  108*  110*   MCV  99  99  98  95  93   NEUTROPHIL  62.5  63.3  63.1  59.2  47.5     Recent Labs   Lab Test  06/27/18   1638  05/30/18   0952  05/04/18   0920   03/31/17   1411   12/21/16   1149  12/01/16   1247   BILITOTAL  0.7  0.9  0.9   < >  2.6*   < >  0.6  0.8   ALKPHOS  196*  183*  180*   < >  472*   < >  673*  649*   ALT  13  13  11   < >  16   < >  18  16   AST  42  44  40   < >  22   < >  24  22   ALBUMIN  3.6  3.8  3.7   < >  3.7   < >  3.5  3.8   LDH   --    --    --    --   409*   --   321*  348*    < > = values in this interval not displayed.     TSH   Date Value Ref Range Status   09/16/2015 3.18 0.40 - 4.00 mU/L Final     No results for input(s): CEA in the last 39299 hours.  Results for orders placed or performed during the hospital encounter of 02/20/18   XR Chest 2 Views    Narrative    Exam:  XR CHEST 2  VW, 2/20/2018 7:26 PM    History: Chest pain;     Comparison:  2/23/2017    Findings:  PA and lateral view of the chest. The cardiomediastinal  silhouette is within normal limits. Mitral clips. Trachea is midline.  No pleural effusion or pneumothorax. Basilar atelectasis. No acute  airspace opacity. Progressed diffuse sclerotic metastases in the  vertebral bodies, clavicles, ribs, scapulas, and humeri.       Impression    Impression:    1. No acute airspace opacity.  2. Progressive diffuse sclerotic metastases involving the humeral  heads, vertebral bodies, scapulas, clavicles, and ribs.    I have personally reviewed the examination and initial interpretation  and I agree with the findings.    BALA JIMENEZ MD   Cervical spine CT w/o contrast    Narrative    CT CERVICAL SPINE W/O CONTRAST 2/20/2018 7:05 PM    Provided History: Bilateral arm and shoulder pain, history of  metastatic prostate cancer, evaluate for fractures, See above.    Comparison: CT 7/12/2017    Technique: Using multidetector thin collimation helical acquisition  technique, axial, coronal and sagittal CT images through the cervical  spine were obtained without intravenous contrast.     Findings:  Diffuse sclerotic bone metastases throughout the spine, skull base,  and visualized ribs. Also sclerotic metastases in the thyroid and  cricoid cartilage. The cervical vertebrae are normally aligned.  Straightened cervical lordosis possibly due to patient positioning or  muscle spasm. No acute fracture or subluxation. No prevertebral edema.  There is moderate disc height narrowing C5-6 and C6-7. The findings on  a level by level basis are as follows:    C2-3: No spinal canal or neural foraminal stenosis.    C3-4:  No spinal canal stenosis. Moderate left-sided neural foraminal  narrowing secondary to facet hypertrophy.    C4-5:  No spinal canal stenosis. Moderate right neural foraminal and  mild left neural foraminal narrowing secondary to facet  hypertrophy.    C5-6:  Moderate spinal canal narrowing. Moderate bilateral neural  foraminal stenosis secondary to facet hypertrophy and osteophytes.    C6-7:  No spinal canal stenosis. Moderate left and mild right neural  foraminal stenosis narrowing secondary to uncinate and facet  hypertrophy.    C7-T1:  No spinal canal or neural foraminal stenosis.     No abnormality of the paraspinous soft tissues. Emphysematous changes  in the lung apices.      Impression    Impression:   1. No acute fracture or subluxation.  2. Diffuse sclerotic bone metastases throughout the spine, skull base,  and visualized ribs.  3. Multilevel cervical spondylosis greatest at C5-C6 with moderate  spinal canal narrowing.    I have personally reviewed the examination and initial interpretation  and I agree with the findings.    DAIZ BYRNE MD     *Note: Due to a large number of results and/or encounters for the requested time period, some results have not been displayed. A complete set of results can be found in Results Review.     Recent Labs   Lab Test  06/27/18   1638  05/30/18   0952  05/04/18   0920  04/18/18   1334  03/21/18   0944   12/21/16   1149  12/01/16   1247  10/28/16   0942   09/07/16   1859   08/21/15   1405   PSA  1160.00*  941.00*  903.00*  784.00*  795.00*   < >  424.22*  324.65*   --    < >  28.98*   < >  756.97*   TESTOSTTOTAL   --    --    --    --    --    --  <2 <2  11*   --   7*   --   4*    < > = values in this interval not displayed.          IMPRESSION/PLAN:   Metastatic castrate-resistant prostate cancer:   Mainor is tolerating enzalutamide without issues.       He is gradually declining with diminishing strength and energy. Last week he was laying in bed with pain for 3 days. He notes that he could not get to kitchen. He did have a friend come in and help him with meal. He has been reluctant to accept help at home. He keeps reassuring that he is doing well. He is not taking pain medications on any regular  "basis.     We did get his mutation testing done by [place] [id number] which has revealed CCND1 amplification which would be susceptible to palbociclib. I have been working to get this medication approved for him. He did get a bank statement for me which I have passed on to my nurse. I did explain him that usually the foundations granting support are looking for a tax document. He notes that he had to request an extension for filing his taxes. I will have Marnee follow up with him. He has expressed interest in pursuing. Till we get the medication approved, I will continue with enzalutamide for him.     -RTC in 4 weeks  -Lupron last 5/4      Bone: Xgeva q3m with Lupron.  Intermittent bone pains, not needing daily analgesics    He has pancytopenia from his disease and previous therapies but otherwise has stable labs.     Anemia: Hemoglobin 9. today, stable, secondary to prostate involvement in the marrow.  We need to keep him above 9 given his CAD.      Heme: PE dx 11/2016, continue Lovenox at 40 mg BID.  Admitted he is only \"intermittently\" taking this.  Encouraged daily use.     CV: hx of CHF. No clinical s/s of failure.  Off metoprolol due to BP being too low.  Has been tachy in the  range since then, but BP won't support re-starting.      Weight is low. Offered him home meals and or appetite stimulants.  He feels he has an appetite and is eating fine twice a day, but just has been too lazy to make meals sometimes. Offered him a home RN and he declined.     Over 25 min of direct face to face time spent with patient with more than 50% time spent in counseling and coordinating care.      Again, thank you for allowing me to participate in the care of your patient.      Sincerely,    Bentley Robles MD    "

## 2018-07-10 PROBLEM — R62.7 FAILURE TO THRIVE IN ADULT: Status: ACTIVE | Noted: 2018-01-01

## 2018-07-10 NOTE — ED PROVIDER NOTES
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  Patient reports that for the past week he has been feeling generally weak, which she describes as feeling like he has no strength and has difficulty with balance.  He denies any feeling of lightheadedness or feeling he is going to lose consciousness.  Patient states that he has been drinking fluids normally, but has not been eating well as he has had difficulty getting to the kitchen.  Patient lives by himself and has no health professionals visiting his home.  Patient is currently being treated with chemotherapy, last treatment was 4 days ago.  Patient denies any chest pain, nausea, vomiting, blood in his stool or black stools, or difficulty breathing.  The patient's friend states that he has had episodes similar to this in the past, but nothing as severe as today.    I have reviewed the Medications, Allergies, Past Medical and Surgical History, and Social History in the [employer] system.    Past Medical History:   Diagnosis Date     Arthritis      ASCVD (arteriosclerotic cardiovascular disease) 4/10/2014     Closed L4 vertebral fracture (H) 2005    traumatic fracture     COPD (chronic obstructive pulmonary disease) (H)      Dyspnea on exertion      Fx tib/fib fol insrt ortho implnt/prosth/bone plt, right leg (H) 2005    rt tib/fib fracture s/p ORIF     Gastro-oesophageal reflux disease      Hx of right coronary artery stent placement 2014 4/2014 and redone 9/2014     Hyperlipidemia LDL goal < 70      Hypertension      Influenza A 3/27/2016     Musculoskeletal leg pain     from old leg  injuries     Other chronic pain      Prostate cancer metastatic to bone (H)      Pulmonary embolism (H) 11/8/16 November 2016     ST elevation MI (STEMI) (H) 4/5/2014     Stented coronary artery      Thrombosis of leg 10/2015    October 2015       Past Surgical History:   Procedure Laterality Date     ANESTHESIA OUT OF OR PERCUTANEOUS MITRAL VALVE REPAIR N/A 10/16/2015    Procedure: ANESTHESIA OUT OF OR PERCUTANEOUS MITRAL VALVE REPAIR;  Surgeon: GENERIC ANESTHESIA PROVIDER;  Location: [facility] OR     COLONOSCOPY       COLONOSCOPY N/A 1/13/2017    Procedure: COLONOSCOPY;  Surgeon: Telly Anderson MD;  Location: [facility] GI     COMBINED CYSTOSCOPY, RETROGRADES, EXCHANGE STENT URETER(S) Left 12/14/2015    Procedure: COMBINED CYSTOSCOPY, RETROGRADES, EXCHANGE STENT URETER(S);  Surgeon: Maame Ramirez MD;  Location: [facility]     DAVINCI BYPASS ARTERY CORONARY  7/1/2014    Procedure: DAVINCI BYPASS ARTERY CORONARY;  Surgeon: Kike Coley MD;  Location: [facility] OR     ESOPHAGOSCOPY, GASTROSCOPY, DUODENOSCOPY (EGD), COMBINED N/A 1/13/2017    Procedure: COMBINED ESOPHAGOSCOPY, GASTROSCOPY, DUODENOSCOPY (EGD);  Surgeon: Telly Anderson MD;  Location: [facility] GI     EXCISE MASS LOWER EXTREMITY Right 2/5/2016    Procedure: EXCISE MASS LOWER EXTREMITY;  Surgeon: Miquel Figueredo MD;  Location: [facility]     HERNIORRHAPHY INGUINAL Left 1968     HERNIORRHAPHY INGUINAL Left 11/18/2014    Procedure: HERNIORRHAPHY INGUINAL;  Surgeon: Max Garza MD;  Location: [facility]     LASER HOLMIUM LITHOTRIPSY URETER(S), INSERT STENT, COMBINED N/A 7/27/2015    Procedure: COMBINED CYSTOSCOPY, URETEROSCOPY, LASER HOLMIUM LITHOTRIPSY URETER(S), INSERT STENT;  Surgeon: Maame Ramirez MD;  Location: [facility]     LASER HOLMIUM LITHOTRIPSY URETER(S), INSERT STENT, COMBINED Left 9/19/2016    Procedure: COMBINED CYSTOSCOPY, URETEROSCOPY, LASER HOLMIUM LITHOTRIPSY URETER(S), INSERT STENT;  Surgeon: Maame Ramirez MD;   Location: [facility] OR     OPEN REDUCTION INTERNAL FIXATION TIBIA  2005    RT Tib/Fib ORIF, crushed LT calc     TONSILLECTOMY & ADENOIDECTOMY  age 18       Family History   Problem Relation Age of Onset     HEART DISEASE Mother 50     CAB, pacemaker     HEART DISEASE Father 41     MI     HEART DISEASE Maternal Uncle      Breast Cancer Paternal Grandmother      Breast Cancer Paternal Aunt      Cerebrovascular Disease No family hx of      Diabetes No family hx of        Social History   Substance Use Topics     Smoking status: Former Smoker     Packs/day: 0.50     Years: 40.00     Types: Cigarettes     Quit date: 2/1/2000     Smokeless tobacco: Never Used     Alcohol use 0.0 oz/week     0 Standard drinks or equivalent per week      Comment: rare       Current Facility-Administered Medications   Medication     HYDROcodone-acetaminophen (NORCO) 5-325 MG per tablet 1 tablet     sodium chloride 0.9% infusion     Current Outpatient Prescriptions   Medication     aspirin 81 MG tablet     calcium carbonate (OS-RODNEY 500 MG [place]. [place]) 1250 MG tablet     Cholecalciferol (VITAMIN D) 2000 UNITS tablet     enoxaparin (LOVENOX) 40 MG/0.4ML injection     enzalutamide (XTANDI) 40 MG capsule     enzalutamide (XTANDI) 40 MG capsule     enzalutamide (XTANDI) 40 MG capsule     HYDROcodone-acetaminophen (NORCO) 5-325 MG per tablet     methylphenidate (RITALIN) 5 MG tablet     palbociclib (IBRANCE) 125 MG capsule CHEMO     predniSONE (DELTASONE) 5 MG tablet     rosuvastatin (CRESTOR) 10 MG tablet     simvastatin (ZOCOR) 40 MG tablet     Facility-Administered Medications Ordered in Other Encounters   Medication     albuterol (PROAIR HFA, PROVENTIL HFA, VENTOLIN HFA) inhaler     glycopyrrolate (ROBINUL) injection     hydrocortisone sodium succinate (solu-CORTEF) injection     lidocaine 0.5 % injection 1 mL     neostigmine (PROSTIGMINE) injection     protamine injection      No Known Allergies      Review of Systems   Respiratory: Negative for  "shortness of breath.    Cardiovascular: Negative for chest pain.   Gastrointestinal: Negative for blood in stool, nausea and vomiting.   Neurological: Positive for weakness (generalized). Negative for syncope and light-headedness.   All other systems reviewed and are negative.      Physical Exam   BP: 113/49  Pulse: 94  Temp: 98.5  F (36.9  C)  Resp: 12  Height: 170.2 cm (5' 7\")  Weight: 50.3 kg (111 lb)  SpO2: 99 %      Physical Exam   Constitutional: He is oriented to person, place, and time. No distress.   Cachectic   HENT:   Head: Atraumatic.   Mouth/Throat: Oropharynx is clear and moist. No oropharyngeal exudate.   Eyes: Pupils are equal, round, and reactive to light. No scleral icterus.   Neck: Neck supple.   Cardiovascular: Normal heart sounds.    Pulmonary/Chest: Breath sounds normal. No respiratory distress.   Abdominal: Soft. He exhibits no distension. There is no tenderness.   Musculoskeletal: He exhibits no tenderness.   Neurological: He is alert and oriented to person, place, and time.   Skin: Skin is warm. No rash noted. He is not diaphoretic.   Psychiatric: He has a normal mood and affect. His behavior is normal.       ED Course   9:51 PM  The patient was seen and examined by Erick Salinas MD in Room ED06.     ED Course     Procedures             EKG Interpretation:      Interpreted by Erick Salinas  Time reviewed: 2215  Symptoms at time of EKG: weakness  Rhythm: normal sinus   Rate: normal  Axis: normal  Ectopy: none  Conduction: normal  ST Segments/ T Waves: No ST-T wave changes  Q Waves: none  Comparison to prior: Unchanged    Clinical Impression: normal EKG    The Lactic acid level is elevated due to Suspect dehydration, at this time there is no sign of severe sepsis or septic shock.       Labs Ordered and Resulted from Time of ED Arrival Up to the Time of Departure from the ED   CBC WITH PLATELETS DIFFERENTIAL - Abnormal; Notable for the following:        Result Value    WBC 3.5 (*)     " RBC Count 2.27 (*)     Hemoglobin 7.3 (*)     Hematocrit 22.0 (*)     RDW 15.9 (*)     Platelet Count 94 (*)     All other components within normal limits   COMPREHENSIVE METABOLIC PANEL - Abnormal; Notable for the following:     Glucose 108 (*)     Alkaline Phosphatase 227 (*)     AST 60 (*)     All other components within normal limits   LACTIC ACID WHOLE BLOOD - Abnormal; Notable for the following:     Lactic Acid 2.9 (*)     All other components within normal limits   INR - Abnormal; Notable for the following:     INR 1.29 (*)     All other components within normal limits   UA MACROSCOPIC WITH REFLEX TO MICRO AND CULTURE - Abnormal; Notable for the following:     Ketones Urine 10 (*)     Protein Albumin Urine 10 (*)     Mucous Urine Present (*)     All other components within normal limits   TROPONIN I   RETICULOCYTE COUNT   RED BLOOD CELL PREPARE ORDER UNIT   ABO/RH TYPE AND SCREEN     Results for orders placed or performed during the hospital encounter of 07/09/18   XR Chest 2 Views    Narrative    1. No focal airspace opacities.  2. Stable diffuse sclerotic osseous metastases.   CBC with platelets differential   Result Value Ref Range    WBC 3.5 (L) 4.0 - 11.0 10e9/L    RBC Count 2.27 (L) 4.4 - 5.9 10e12/L    Hemoglobin 7.3 (L) 13.3 - 17.7 g/dL    Hematocrit 22.0 (L) 40.0 - 53.0 %    MCV 97 78 - 100 fl    MCH 32.2 26.5 - 33.0 pg    MCHC 33.2 31.5 - 36.5 g/dL    RDW 15.9 (H) 10.0 - 15.0 %    Platelet Count 94 (L) 150 - 450 10e9/L    Diff Method Automated Method     % Neutrophils 65.2 %    % Lymphocytes 24.1 %    % Monocytes 9.2 %    % Eosinophils 0.9 %    % Basophils 0.3 %    % Immature Granulocytes 0.3 %    Nucleated RBCs 0 0 /100    Absolute Neutrophil 2.3 1.6 - 8.3 10e9/L    Absolute Lymphocytes 0.8 0.8 - 5.3 10e9/L    Absolute Monocytes 0.3 0.0 - 1.3 10e9/L    Absolute Eosinophils 0.0 0.0 - 0.7 10e9/L    Absolute Basophils 0.0 0.0 - 0.2 10e9/L    Abs Immature Granulocytes 0.0 0 - 0.4 10e9/L    Absolute  Nucleated RBC 0.0    Comprehensive metabolic panel   Result Value Ref Range    Sodium 141 133 - 144 mmol/L    Potassium 3.6 3.4 - 5.3 mmol/L    Chloride 107 94 - 109 mmol/L    Carbon Dioxide 22 20 - 32 mmol/L    Anion Gap 12 3 - 14 mmol/L    Glucose 108 (H) 70 - 99 mg/dL    Urea Nitrogen 23 7 - 30 mg/dL    Creatinine 0.93 0.66 - 1.25 mg/dL    GFR Estimate 79 >60 mL/min/1.7m2    GFR Estimate If Black >90 >60 mL/min/1.7m2    Calcium 9.1 8.5 - 10.1 mg/dL    Bilirubin Total 1.0 0.2 - 1.3 mg/dL    Albumin 3.9 3.4 - 5.0 g/dL    Protein Total 7.4 6.8 - 8.8 g/dL    Alkaline Phosphatase 227 (H) 40 - 150 U/L    ALT 12 0 - 70 U/L    AST 60 (H) 0 - 45 U/L   Lactic acid whole blood   Result Value Ref Range    Lactic Acid 2.9 (H) 0.7 - 2.0 mmol/L   Troponin I   Result Value Ref Range    Troponin I ES <0.015 0.000 - 0.045 ug/L   INR   Result Value Ref Range    INR 1.29 (H) 0.86 - 1.14   UA reflex to Microscopic and Culture   Result Value Ref Range    Color Urine Yellow     Appearance Urine Clear     Glucose Urine Negative NEG^Negative mg/dL    Bilirubin Urine Negative NEG^Negative    Ketones Urine 10 (A) NEG^Negative mg/dL    Specific Gravity Urine 1.021 1.003 - 1.035    Blood Urine Negative NEG^Negative    pH Urine 5.5 5.0 - 7.0 pH    Protein Albumin Urine 10 (A) NEG^Negative mg/dL    Urobilinogen mg/dL Normal 0.0 - 2.0 mg/dL    Nitrite Urine Negative NEG^Negative    Leukocyte Esterase Urine Negative NEG^Negative    Source Clean catch urine     RBC Urine 1 0 - 2 /HPF    WBC Urine 1 0 - 5 /HPF    Transitional Epi <1 0 - 1 /HPF    Mucous Urine Present (A) NEG^Negative /LPF   EKG 12-lead, tracing only   Result Value Ref Range    Interpretation ECG Click View Image link to view waveform and result      *Note: Due to a large number of results and/or encounters for the requested time period, some results have not been displayed. A complete set of results can be found in Results Review.            Assessments & Plan (with Medical  Decision Making)   73-year-old patient with a history of metastatic prostate cancer currently on chemo presents with a chief complaint of weakness.  Troponin and EKG were unremarkable.  No evidence of any pneumonia or abnormality on chest x-ray.  Patient does have an elevated lactic acid.  Patient reports he has not been eating very much recently and I suspect this is secondary to to dehydration.  Patient was given a normal saline bolus.  He also has a low hemoglobin at 7.3.  It appears that he has been trending down for the last 2 months.  Recommended we do a rectal exam to the patient to assess for possibility of GI bleed.  Patient does not have a previous history of this and he refuses rectal exam here.  He states that when he has to poop next he will let us know so we can get a sample.  Patient does understand that there is a risk at a delay of diagnosis of a possible GI bleed.   Patient reports severe weakness and difficulty whenever he attempts to stand or ambulate.  Suspect his symptoms may be a combination of anemia as well as dehydration.  Discussed with hematology oncology fellow who accepted admission.  He agreed with plan to transfuse the patient 1 unit here.    I have reviewed the nursing notes.    I have reviewed the findings, diagnosis, plan and need for follow up with the patient.    New Prescriptions    No medications on file       Final diagnoses:   Generalized muscle weakness   Dehydration   Symptomatic anemia     I, Jay Ceja, am serving as a trained medical scribe to document services personally performed by Erick Salinas MD, based on the provider's statements to me.   IErick MD, was physically present and have reviewed and verified the accuracy of this note documented by Jay Ceja.    7/9/2018   [facility], [place], EMERGENCY DEPARTMENT     Erick Salinas DO  07/10/18 0206

## 2018-07-10 NOTE — PROGRESS NOTES
"[facility], [place]    Hematology / Oncology Progress Note     Assessment & Plan    Mr. Oswaldo Win is a 73-year-old gentleman with metastatic prostate cancer, CAD s/p STEMI and CABG and moderate/severe mitral regurgitation s/p mitraclip, also w/PE in Nov 2016, w/hx of poor compliance with physician visits and medications who is being admitted for generalized weakness and weight loss.      Weight loss  Generalized weakness.  Presenting to the hospital with 2-3 weeks of progressive weakness and weight loss. Appears cachectic on exam. Likely due to progression of underlying metastatic disease as noted below. No signs of infection on exam and no recent exertional symptoms concerning for cardiac etiology. UA/UC negative, CXR negative.  Pt has been afebrile.  Pt reports he has lost approximately 30 lbs in past 2-3 months. Per nursing, patient is very unsteady on feet.   - blood cultures pending    -PT/OT consults   -Nutrition consult   -Fluid resuscitation   - will start mirtazapine 15mg daily to encourage appetite stimulation, energy stimulation      Macrocytic anemia  Hgb steadily downtrending over last few months (10.8 in 4/2018). MCV upper 90's. Likely multi-factorial including nutritional deficiency and inflammatory anemia. Pt denies EtOH use. Underwent colonoscopy in 1/2017 after presenting w/GI bleeding, though no source discovered. S/p 1 unit pRBCs overnight on 7/9.   - transfuse to keep Hgb > 8 in setting of CAD     -B12/folate, iron studies, peripheral smear pending   -Consider outpt colonoscopy for further evaluation     Metastatic prostate cancer  Initially diagnosed in 2015 after presenting for back pain. Initial CT scan showing diffuse bony mets. Has been on numerous therapies including docetaxel, Lupron, Taxotere, Provenge, Xofigo. Per recent heme/onc clinic note (6/27/18 w/Dr. Robles), the patient was supposed to be on x7bhdgio Lupon and daily Xgeva. Patient reports he has \"not " "been the best\" about taking his oral chemotherapy pill.  Per Dr. Robles's note, clinic is working to get him Palbociclib chemotherapy due to CCND1 amplification.   -Defer to heme/onc team regarding prostate cancer tx     CAD s/p NSTEMI and CABG  Moderate/severe mitral regurgitation s/p Mitraclip  Has been noncompliant with cardiology visits. Last seen in clinic 6/2017. High risk given personal and family history. Currently without any exertional symptoms and appears euvolemic. Previously had been on metoprolol due to history of CHF, but has been held, as BP does not support restarting.   -cont PTA statin, hold ASA in setting of thrombocytopenia   -May need to consider cards consult for further evaluation     Hx of provoked pulmonary embolism  Occurring in 2016 in the setting of active prostate cancer. Initially on Lovenox. Unclear why the patient self-discontinued. Plts are 64k, putting him at somewhat increased risk of bleeding. Will need to consider restarting if active bleeding ruled out.      FEN Regular diet, NS @ 100 ml/hr  Ppx PCDs, ambulate  Code Full  Dispo Inpatient for further anemia work-up, PT/OT consults. Anticipate 3-5 day hospitalization.     # Pain Assessment:  Current Pain Score 7/10/2018   Patient currently in pain? denies   Pain score (0-10) -   Pain location -   Pain descriptors -   CPOT pain score -   Oswaldo gardienr pain level was assessed and he currently denies pain.      Discussed with staff attending, Dr. Pitt.     Chen Silva, DNP, APRN, CNP  Hematology/oncology  [id number]     Interval History     Mainor is seen and examined in his room this morning.  Denies headaches, chest pain, SOB, nausea, vomiting. Denies recent fevers, chills, dizziness, illness at home.  Reports progressive weakness, loss of appetite, generalized fatigue over past 2-3 months, worsening over past week. Denies urinary or bowel complaints. Denies any s/s bleeding.  He has not felt dizzy when standing, though does note he does " not feel he has much energy to get up and make him self meals and has noted he is not able to ambulate quite as well at home recently. He reports having neighbors and family who check in on him frequently.      Physical Exam   Temp: 97.1  F (36.2  C) Temp src: Oral BP: 100/42 Pulse: 66 Heart Rate: 58 Resp: 18 SpO2: 95 % O2 Device: None (Room air)    Vitals:    07/09/18 2117 07/10/18 0416   Weight: 50.3 kg (111 lb) 51.5 kg (113 lb 8.6 oz)     Vital Signs with Ranges  Temp:  [96.2  F (35.7  C)-98.8  F (37.1  C)] 97.1  F (36.2  C)  Pulse:  [66-94] 66  Heart Rate:  [58-79] 58  Resp:  [12-18] 18  BP: ()/(36-69) 100/42  SpO2:  [95 %-100 %] 95 %       Constitutional: Awake, alert, cooperative, cachetic appearing male, no apparent distress, and appears stated age.   Eyes: Lids and lashes normal, pupils equal, round and reactive to light, extra ocular muscles intact, sclera clear, conjunctiva normal.  ENT: Normocephalic, without obvious abnormality, atraumatic.  Respiratory: No increased work of breathing. No oxygen. LS clear, no crackles or wheezes   Cardiovascular: Regular rate, regular rhythm, no murmurs.  GI: Normal bowel sounds, soft, non-distended, non-tender.  Musculoskeletal: No edema B/L LE. No obvious joint swelling or deformities.   Neurologic: A&O x4, cranial nerves II-XII appear to be grossly intact.  No focal deficits.   Neuropsychiatric: Calm, normal eye contact, alert, normal affect memory for past and recent events intact and thought process normal.      Medications     sodium chloride 100 mL/hr at 07/10/18 0906       aspirin  81 mg Oral Daily     calcium carbonate  1 tablet Oral BID w/meals     cholecalciferol  2,000 Units Oral Daily     HYDROcodone-acetaminophen  1 tablet Oral Once     mirtazapine  15 mg Orally disintegrating tablet At Bedtime     rosuvastatin  10 mg Oral Daily       Data   Results for orders placed or performed during the hospital encounter of 07/09/18 (from the past 24 hour(s))    EKG 12-lead, tracing only   Result Value Ref Range    Interpretation ECG Click View Image link to view waveform and result    CBC with platelets differential   Result Value Ref Range    WBC 3.5 (L) 4.0 - 11.0 10e9/L    RBC Count 2.27 (L) 4.4 - 5.9 10e12/L    Hemoglobin 7.3 (L) 13.3 - 17.7 g/dL    Hematocrit 22.0 (L) 40.0 - 53.0 %    MCV 97 78 - 100 fl    MCH 32.2 26.5 - 33.0 pg    MCHC 33.2 31.5 - 36.5 g/dL    RDW 15.9 (H) 10.0 - 15.0 %    Platelet Count 94 (L) 150 - 450 10e9/L    Diff Method Automated Method     % Neutrophils 65.2 %    % Lymphocytes 24.1 %    % Monocytes 9.2 %    % Eosinophils 0.9 %    % Basophils 0.3 %    % Immature Granulocytes 0.3 %    Nucleated RBCs 0 0 /100    Absolute Neutrophil 2.3 1.6 - 8.3 10e9/L    Absolute Lymphocytes 0.8 0.8 - 5.3 10e9/L    Absolute Monocytes 0.3 0.0 - 1.3 10e9/L    Absolute Eosinophils 0.0 0.0 - 0.7 10e9/L    Absolute Basophils 0.0 0.0 - 0.2 10e9/L    Abs Immature Granulocytes 0.0 0 - 0.4 10e9/L    Absolute Nucleated RBC 0.0    Comprehensive metabolic panel   Result Value Ref Range    Sodium 141 133 - 144 mmol/L    Potassium 3.6 3.4 - 5.3 mmol/L    Chloride 107 94 - 109 mmol/L    Carbon Dioxide 22 20 - 32 mmol/L    Anion Gap 12 3 - 14 mmol/L    Glucose 108 (H) 70 - 99 mg/dL    Urea Nitrogen 23 7 - 30 mg/dL    Creatinine 0.93 0.66 - 1.25 mg/dL    GFR Estimate 79 >60 mL/min/1.7m2    GFR Estimate If Black >90 >60 mL/min/1.7m2    Calcium 9.1 8.5 - 10.1 mg/dL    Bilirubin Total 1.0 0.2 - 1.3 mg/dL    Albumin 3.9 3.4 - 5.0 g/dL    Protein Total 7.4 6.8 - 8.8 g/dL    Alkaline Phosphatase 227 (H) 40 - 150 U/L    ALT 12 0 - 70 U/L    AST 60 (H) 0 - 45 U/L   Lactic acid whole blood   Result Value Ref Range    Lactic Acid 2.9 (H) 0.7 - 2.0 mmol/L   Troponin I   Result Value Ref Range    Troponin I ES <0.015 0.000 - 0.045 ug/L   INR   Result Value Ref Range    INR 1.29 (H) 0.86 - 1.14   ABO/Rh type and screen   Result Value Ref Range    Units Ordered 1     ABO B     RH(D) Pos      Antibody Screen Pos (A)     Test Valid Only At          [facility]    Specimen Expires 07/12/2018     Crossmatch Red Blood Cells     Blood Bank Comment       Delay in availability of Red Cells called to PCU by  JE to Chelsi Taylor RN,[facility],at 0310. ZAHIRA      Antibody Identification ANTI-E      Antigen Type E Negative     Reticulocyte count   Result Value Ref Range    % Retic  0.5 - 2.0 %     Unable to complete test Add On request.  Provider Notified.    Absolute Retic  25 - 95 10e9/L     Unable to complete test Add On request.  Provider Notified.   Blood component   Result Value Ref Range    Unit Number [id number]     Blood Component Type Red Blood Cells Leukocyte Reduced     Division Number 00     Status of Unit Released to care unit 07/10/2018 0510     Blood Product Code Q4731P14     Unit Status ISS    XR Chest 2 Views    Narrative    Exam: XR CHEST 2 VW, 7/10/2018 12:49 AM    Indication: Weakness    Comparison: 2/20/2018, 2/23/2017, 11/8/2016.    Findings:   AP and lateral views of the chest were obtained. The cardiomediastinal  silhouette is within normal limits. Mitral clips in stable position.  Coronary stent. No pneumothorax or pleural effusion. No focal airspace  opacities. The visualized upper abdomen appears normal. Diffusely  sclerotic appearance of the bones similar to previous exams.      Impression    Impression:   1. No focal airspace opacities.  2. Stable diffuse sclerotic osseous metastases.    I have personally reviewed the examination and initial interpretation  and I agree with the findings.    DONIS ERAZO MD   UA reflex to Microscopic and Culture   Result Value Ref Range    Color Urine Yellow     Appearance Urine Clear     Glucose Urine Negative NEG^Negative mg/dL    Bilirubin Urine Negative NEG^Negative    Ketones Urine 10 (A) NEG^Negative mg/dL    Specific Gravity Urine 1.021 1.003 - 1.035    Blood Urine Negative NEG^Negative    pH Urine  5.5 5.0 - 7.0 pH    Protein Albumin Urine 10 (A) NEG^Negative mg/dL    Urobilinogen mg/dL Normal 0.0 - 2.0 mg/dL    Nitrite Urine Negative NEG^Negative    Leukocyte Esterase Urine Negative NEG^Negative    Source Clean catch urine     RBC Urine 1 0 - 2 /HPF    WBC Urine 1 0 - 5 /HPF    Transitional Epi <1 0 - 1 /HPF    Mucous Urine Present (A) NEG^Negative /LPF   CBC with platelets differential   Result Value Ref Range    WBC 2.9 (L) 4.0 - 11.0 10e9/L    RBC Count 2.54 (L) 4.4 - 5.9 10e12/L    Hemoglobin 8.0 (L) 13.3 - 17.7 g/dL    Hematocrit 23.7 (L) 40.0 - 53.0 %    MCV 93 78 - 100 fl    MCH 31.5 26.5 - 33.0 pg    MCHC 33.8 31.5 - 36.5 g/dL    RDW 16.8 (H) 10.0 - 15.0 %    Platelet Count 64 (L) 150 - 450 10e9/L    Diff Method Automated Method     % Neutrophils 61.4 %    % Lymphocytes 25.6 %    % Monocytes 9.8 %    % Eosinophils 1.8 %    % Basophils 0.7 %    % Immature Granulocytes 0.7 %    Nucleated RBCs 1 (H) 0 /100    Absolute Neutrophil 1.8 1.6 - 8.3 10e9/L    Absolute Lymphocytes 0.7 (L) 0.8 - 5.3 10e9/L    Absolute Monocytes 0.3 0.0 - 1.3 10e9/L    Absolute Eosinophils 0.1 0.0 - 0.7 10e9/L    Absolute Basophils 0.0 0.0 - 0.2 10e9/L    Abs Immature Granulocytes 0.0 0 - 0.4 10e9/L    Absolute Nucleated RBC 0.0    Vitamin B12   Result Value Ref Range    Vitamin B12 604 193 - 986 pg/mL   Folate   Result Value Ref Range    Folate 8.0 >5.4 ng/mL   Transferrin   Result Value Ref Range    Transferrin 215 210 - 360 mg/dL   Ferritin   Result Value Ref Range    Ferritin 2009 (H) 26 - 388 ng/mL   Iron and iron binding capacity   Result Value Ref Range    Iron 151 35 - 180 ug/dL    Iron Binding Cap 274 240 - 430 ug/dL    Iron Saturation Index 55 (H) 15 - 46 %   Lactic acid whole blood   Result Value Ref Range    Lactic Acid 2.8 (H) 0.7 - 2.0 mmol/L   Blood culture   Result Value Ref Range    Specimen Description Blood Right Hand     Special Requests Received in aerobic bottle only     Culture Micro PENDING    Basic  metabolic panel   Result Value Ref Range    Sodium 142 133 - 144 mmol/L    Potassium 3.9 3.4 - 5.3 mmol/L    Chloride 111 (H) 94 - 109 mmol/L    Carbon Dioxide 20 20 - 32 mmol/L    Anion Gap 11 3 - 14 mmol/L    Glucose 103 (H) 70 - 99 mg/dL    Urea Nitrogen 19 7 - 30 mg/dL    Creatinine 0.84 0.66 - 1.25 mg/dL    GFR Estimate 89 >60 mL/min/1.7m2    GFR Estimate If Black >90 >60 mL/min/1.7m2    Calcium 8.4 (L) 8.5 - 10.1 mg/dL   Magnesium   Result Value Ref Range    Magnesium 2.3 1.6 - 2.3 mg/dL   Phosphorus   Result Value Ref Range    Phosphorus 2.4 (L) 2.5 - 4.5 mg/dL   TSH   Result Value Ref Range    TSH 2.72 0.40 - 4.00 mU/L   Reticulocyte count   Result Value Ref Range    % Retic 3.2 (H) 0.5 - 2.0 %    Absolute Retic 81.8 25 - 95 10e9/L   Blood culture   Result Value Ref Range    Specimen Description Blood Right Hand     Special Requests Received in aerobic bottle only     Culture Micro PENDING      *Note: Due to a large number of results and/or encounters for the requested time period, some results have not been displayed. A complete set of results can be found in Results Review.       Recent Labs  Lab 07/10/18  0820 07/09/18  2255   WBC 2.9* 3.5*   HGB 8.0* 7.3*   MCV 93 97   PLT 64* 94*   INR  --  1.29*    141   POTASSIUM 3.9 3.6   CHLORIDE 111* 107   CO2 20 22   BUN 19 23   CR 0.84 0.93   ANIONGAP 11 12   RODNEY 8.4* 9.1   * 108*   ALBUMIN  --  3.9   PROTTOTAL  --  7.4   BILITOTAL  --  1.0   ALKPHOS  --  227*   ALT  --  12   AST  --  60*   TROPI  --  <0.015     Recent Results (from the past 24 hour(s))   XR Chest 2 Views    Narrative    Exam: XR CHEST 2 VW, 7/10/2018 12:49 AM    Indication: Weakness    Comparison: 2/20/2018, 2/23/2017, 11/8/2016.    Findings:   AP and lateral views of the chest were obtained. The cardiomediastinal  silhouette is within normal limits. Mitral clips in stable position.  Coronary stent. No pneumothorax or pleural effusion. No focal airspace  opacities. The visualized  upper abdomen appears normal. Diffusely  sclerotic appearance of the bones similar to previous exams.      Impression    Impression:   1. No focal airspace opacities.  2. Stable diffuse sclerotic osseous metastases.    I have personally reviewed the examination and initial interpretation  and I agree with the findings.    DONIS ERAZO MD

## 2018-07-10 NOTE — PROGRESS NOTES
"CLINICAL NUTRITION SERVICES - ASSESSMENT NOTE     Nutrition Prescription    RECOMMENDATIONS FOR MDs/PROVIDERS TO ORDER:  - Consider liberalization to \"Regular Diet\" to increase menu options    - Given chronic poor PO intake and severely malnourished status, consider trial of appetite stimulant vs appropriateness of initiating nutrition support.     Malnutrition Status:    Severe malnutrition in the context of chronic illness     Recommendations already ordered by Registered Dietitian (RD):  - Provided education of tips to increasing kcal and protein intake  - Ordered Boost Plus TID btw meals     Future/Additional Recommendations:  - Modify supplements per pt preference        REASON FOR ASSESSMENT  Oswaldo Win is a 73 year old male assessed by the dietitian for Admission Nutrition Risk Screen for unintentional loss of 10# or more in the past two months and reduced oral intake over the last month and Provider Order - \"Failure to thrive, deconditioning, metastatic prostate cancer\"    NUTRITION HISTORY  Pt reports poor appetite, though he notes he's never \"been a big eater\" in the past. Typical intake consists of 1-2 meals/day, rarely ever snacks. Usually consists of microwaveable meals or meals brought by friends/family. Pt has utilized oral nutrition supplements in the past, but has not recently.     CURRENT NUTRITION ORDERS  Diet: Low Saturated Fat/2400 mg Sodium  Intake/Tolerance: Pt ate omelet, potatoes, milk and coffee for breakfast this morning.     LABS  Phos: 2.4 (L)   Ferritin: 2009 (H)   Vitamin B12: 604     MEDICATIONS  Calcium carbonate, 500 mg BID   Vitamin D, 2000 IU daily   NS @ 100 ml/hr     ANTHROPOMETRICS  Height: 167.6 cm (5' 6\")  Most Recent Weight: 51.5 kg (113 lb 8.6 oz)    IBW: 64.5 kg  BMI: Underweight BMI <18.5  Weight History: Lost ~6 kg (11%) over the past 5 months.   Wt Readings from Last 10 Encounters:   07/10/18 51.5 kg (113 lb 8.6 oz)   06/27/18 50.3 kg (111 lb)   05/30/18 52.6 kg (116 " lb)   05/04/18 52.6 kg (116 lb)   04/18/18 53.1 kg (117 lb)   03/21/18 54 kg (119 lb)   03/06/18 56.2 kg (124 lb)   02/20/18 55.8 kg (123 lb)   02/20/18 55.8 kg (123 lb)   02/06/18 57.6 kg (127 lb)       Dosing Weight: 50 kg (actual, based on admit weight of 50.3 kg on 7/9/18)     ASSESSED NUTRITION NEEDS  Estimated Energy Needs: 1500 - 1750+ kcals/day (30 - 35 kcals/kg )  Justification: Underweight  Estimated Protein Needs: 75 - 100 grams protein/day (1.5 - 2 grams of pro/kg)  Justification: Increased needs and Repletion  Estimated Fluid Needs: 1 mL/kcal  Justification: Maintenance    PHYSICAL FINDINGS  See malnutrition section below.    MALNUTRITION  % Intake: </=75% for >/= 1 month (severe)  % Weight Loss: > 10% in 5 months (severe)  Subcutaneous Fat Loss: Facial region:, Upper arm, Lower arm and Thoracic/intercostal: Severe  Muscle Loss: Temporal, Facial & jaw region, Scapular bone, Thoracic region (clavicle, acromium bone, deltoid, trapezius, pectoral), Upper arm (bicep, tricep) and Lower arm  (forearm): Severe  Fluid Accumulation/Edema: None noted  Malnutrition Diagnosis: Severe malnutrition in the context of chronic illness     NUTRITION DIAGNOSIS  Inadequate oral intake related to lack of appetite as evidenced by eating 1-2 meals/day, 11% wt loss over past 5 months, observed severe fat and muscle loss.       INTERVENTIONS  Implementation  Nutrition Education - Provided education on tips to increase kcal and protein intake, encouraged pt to consume small snacks/supplements between meals as able.    Medical food supplement therapy - Ordered Boost Plus TID     Goals  Patient to consume % of nutritionally adequate meal trays TID, or the equivalent with supplements/snacks.     Monitoring/Evaluation  Progress toward goals will be monitored and evaluated per protocol.    Rasheeda Kingston RD, LD  Pager: [phone]

## 2018-07-10 NOTE — PLAN OF CARE
Problem: Patient Care Overview  Goal: Plan of Care/Patient Progress Review  Outcome: No Change    [id number]: VSS. Afebrile. Denied pain, nausea and vomiting. Phosphorous currently being replaced for a level of (2.4) with a recheck in the AM. Phosphorous running at half the rate due to patient c/o pain at the IV site. Vascular access looked at IV and got a good blood return/noted no problems with the IV. 500 cc bolus of NS given. Patient incontinent of urine and stool. Appetite fair to poor. Mirtazapine to start tonight to help increase energy and appetite. Patient very weak and could barely walk to the bathroom. Safety reasons only use commode till patient gets stronger. Bed alarm on for safety. Continue with POC.

## 2018-07-10 NOTE — PHARMACY-ADMISSION MEDICATION HISTORY
Admission medication history interview status for the 7/9/2018 admission is complete. See Epic admission navigator for allergy information, pharmacy, prior to admission medications and immunization status.     Medication history interview sources:  Patient, claims history, chart review    Changes made to PTA medication list (reason)  Added: None  Deleted: None  Changed: None    Additional medication history information (including reliability of information, actions taken by pharmacist):     1. Patient was a reliable historian. Knew what he takes and noted his adherence.   2. Patient confirmed allergies and reactions. They have no additional allergies to report.    3. PRNs    -Hydrocodone/acetaminophen 5-325 mg. Patient does not normally take this, but has been taking 0.5 tablets every 6 hours the past week due to headaches.   4. Patient reports poor adherence to the aspirin, only taking it every third day on average. Could not recall when his last dose was.   5. Patient reported that he was previously taking enzalutamide (claims history shows he picked up 30 day supply in May), however he is not taking this currently.   6. Patient not taking palbociclib PTA.    Prior to Admission medications    Medication Sig Last Dose Taking? Auth Provider   aspirin 81 MG tablet Take 1 tablet (81 mg) by mouth daily Past Week at Unknown time Yes Gabriela Mcguire PA-C   HYDROcodone-acetaminophen (NORCO) 5-325 MG per tablet Take 1 tablet by mouth every 6 hours as needed for severe pain Past Week at Unknown time Yes Reported, Patient   palbociclib (IBRANCE) 125 MG capsule CHEMO Take 1 capsule (125 mg) by mouth daily with food   Bentley Robles MD           Medication history completed by: MADIHA Munoz Student Pharmacist

## 2018-07-10 NOTE — ED NOTES
Per blood bank, antibodies found in type/screen. Due to delay in blood availability, pt to transfer to floor and transfuse unit inpatient. Gave report and discussed care plan with 7D RN.

## 2018-07-10 NOTE — PLAN OF CARE
Problem: Activity Intolerance (Adult)  Goal: Identify Related Risk Factors and Signs and Symptoms  Related risk factors and signs and symptoms are identified upon initiation of Human Response Clinical Practice Guideline (CPG).  Outcome: No Change  Pt admitted to 7D at 0410 from ED per cart for weakness. Has history of metastatic prostate Ca. Hbg 7.3. One unit PRBC infusing. Pt is a/o x 4. Denies pain. LS clear. Denies full skin assessment as he does not want to take off his pants. Has not been out of bed, but states his mobility is decreased. Reminded pt to call for assistance when getting up. Bed alarm on for safety. Will continue to monitor pt.

## 2018-07-10 NOTE — H&P
HEMATOLOGY/ONCOLOGY HISTORY AND PHYSICAL  Date and Time of Admission: 7/10/2018, 4:05 AM      CC: Weight loss, generalized weakness      History of Present Illness: Mr. Oswaldo Win is a 73-year-old gentleman with metastatic prostate cancer, CAD s/p STEMI and CABG and moderate/severe mitral regurgitation s/p mitraclip, also w/PE in Nov 2016, w/hx of poor compliance with physician visits and medications who presented to the ED with generalized weakness and weight loss.    The patient notes that for the past week he's been feeling increasingly weak and fatigued. He's been sleeping more and feels that he 'just doesn't have the strength to get around.' He has not fallen. He endorses poor appetite. He has not had any nausea, vomiting, abdominal pain, or changes in his stools. No fevers, chills, respiratory symptoms or urinary symptoms. He admits to poor PO intake. No chest pain or shortness of breath. He lives by himself in a 1 story house which has 4 stairs. Makes microwaveable meals and has friends who bring him food. No home nursing or other services. Has not been taking any medications except for 'a chemo med'. Last doctor visit was last month when he saw his oncologist.    He denies any recent worsening pain. He notes that he's down about 4 belt loops from losing weight.    In the ED he was given 1 L NS. Labs were notable for downtrending hgb (7.3 today, 8.5 in June and 9.4 in May). BMP stable.     ROS: A complete 10 pt ROS was performed and negative besides stated in the HPI.      Past Medical History:  Prostate cancer with bony metastases  Chronic pain  CAD s/p STEMI and stents  HTN/HPLD  GERD  Hx DVT/PE    Prior to Admission Medications:  Patient tells me he is only taking a chemotherapy medication 'off and on'. He denies taking any other daily medications.     Social History: Lives in a 1 story home in [facility]. Lives by himself. 4 stairs in the home. Makes microwaveable meals and notes that 'friends  "bring stuff over'. Has a sister whom lives close by. 2 kids, one of whom lives close by. Former smoker. 20 pack year history, quit in 2000. Nondrinker. No drug use.      Family History: Both parents with early heart disease.     Physical Exam:  BP (!) 96/39 (BP Location: Left arm)  Pulse 79  Temp 98.3  F (36.8  C) (Oral)  Resp 12  Ht 1.676 m (5' 6\")  Wt 51.5 kg (113 lb 8.6 oz)  SpO2 99%  BMI 18.33 kg/m2  General Appearance: Cachectic appearing elderly male lying comfortably in bed. Alert, responsive, pleasantly conversing. No acute distress.  Eyes: Noninjected, no scleral icterus.  HEENT: NC/AT, no rhinorrhea, oropharynx unremarkable. Dry mucous membranes. No JVD appreciated.  Respiratory: No increased work of breathing. No crackles or wheeze.  Cardiovascular: RRR, no murmur  GI: Thin abdomen. Bowel sounds present and normal. No abd tenderness. No rebound or guarding.   Lymph/Hematologic: No bruising noted.  Genitourinary: Deferred.  Skin: No rashes, petechiae or bruising.  Musculoskeletal: No joint erythema or swelling.   Neurologic: AAO ×3, no focal deficits appreciated. Moving all 4 extremities equally and bilaterally.   Psychiatric: Normal mood and affect      Labs/Imaging/Studies:  CBC: wbc 3.5, hgb 7.3, MCV 97, RDW 15.9, plt 94  BMP Normal  CMP Alk phos 227, AST 60, ALT 12, albumin 3.9  INR 1.29  Lactate 2.9  Trop negative  UA: ketones present     CXR: No focal airspace opacities. Stable diffuse sclerotic osseous mets.    EKG: Normal EKG.     Assessment and Plan:  Mr. Oswaldo Win is a 73-year-old gentleman with metastatic prostate cancer, CAD s/p STEMI and CABG and moderate/severe mitral regurgitation s/p mitraclip, also w/PE in Nov 2016, w/hx of poor compliance with physician visits and medications who is admitted with failure to thrive in the setting of progressively worsening macrocytic anemia.    Failure to thrive  Presenting to the hospital with 2-3 weeks of progressive weakness and weight loss. " Appears cachectic on exam today. Likely due to progression of underlying metastatic disease as noted below. No signs of infection on exam and no recent exertional symptoms concerning for cardiac etiology.    -PT/OT consults   -Nutrition consults   -Prealbumin, Mg, Phos, TSH   -Fluid resuscitation and rechecking lactate    Macrocytic anemia  Hgb steadily downtrending over last few months (10.8 in 4/2018). MCV upper 90's. Likely multi-factorial including nutritional deficiency and inflammatory anemia. Pt denies EtOH use. Underwent colonoscopy in 1/2017 after presenting w/GI bleeding, though no source discovered.    -1U pRBCs now given hgb <8, symptomatic and hx of CAD   -B12/folate, iron studies, peripheral smear pending   -Consider outpt colonoscopy for further evaluation    Metastatic prostate cancer  Initially diagnosed in 2015 after presenting for back pain. Initial CT scan showing diffuse bony mets. Has been on numerous therapies including docetaxel, Lupron, Taxotere, Provenge, Xofigo. Per recent heme/onc clinic note (6/27/18 w/Dr. Robles), the patient was supposed to be on [id number] Lupon and Xgeva. He is unsure if he's been taking either of these.    -Defer to heme/onc team regarding prostate cancer tx    CAD s/p NSTEMI and CABG  Moderate/severe mitral regurgitation s/p Mitraclip  Has been noncompliant with cardiology visits. Last seen in clinic 6/2017. High risk given personal and family history. Currently without any exertional symptoms and appears euvolemic.   -Restart asa 81 mg qD   -Restart statin   -May need to consider cards consult for further evaluation    Hx of provoked pulmonary embolism  Occurring in 2016 in the setting of active prostate cancer. Initially on Lovenox. Unclear why the patient self-discontinued. Plt today 94 putting him at somewhat increased risk of bleeding. Will need to consider restarting if active bleeding ruled out.      FEN Regular diet, NS @ 100 ml/hr  Ppx PCDs, ambulate  Code  Full  Dispo Inpatient for further anemia work-up, PT/OT consults      Raven Beal MD  Heme/Onc/BMT [facility]  Internal Medicine/Pediatrics  [phone]

## 2018-07-10 NOTE — ED TRIAGE NOTES
Pt states has had generalized weakness for the last week is currently having chem, states the meds didn't affect him like this on his other rounds, denies pain, nausea, has lost over 10 lbs in the last month and a half.

## 2018-07-11 PROBLEM — S06.5XAA SUBDURAL HEMATOMA (H): Status: ACTIVE | Noted: 2018-01-01

## 2018-07-11 NOTE — ANESTHESIA POSTPROCEDURE EVALUATION
Patient: Oswaldo Win    Procedure(s):  Left Reji Holes for Subdural Hematoma Evacuation  - Wound Class: I-Clean    Diagnosis:Left Subdural Hematoma   Diagnosis Additional Information: No value filed.    Anesthesia Type:  No value filed.    Note:  Anesthesia Post Evaluation    Patient location during evaluation: ICU  Patient participation: Unable to participate in evaluation secondary to underlying medical condition  Level of consciousness: obtunded/minimal responses  Pain management: unable to assess  Airway patency: patent  Cardiovascular status: acceptable  Respiratory status: acceptable and ETT  Hydration status: acceptable  PONV: unable to assess     Anesthetic complications: None          Last vitals:  Vitals:    07/11/18 1139 07/11/18 1308 07/11/18 1615   BP: 126/47 116/42    Pulse:      Resp: 18 20 (P) 18   Temp: 36.6  C (97.9  F) 36.3  C (97.4  F) (P) 36.8  C (98.2  F)   SpO2: 97% 96%          Electronically Signed By: Javad Zambrano MD  July 11, 2018  4:30 PM

## 2018-07-11 NOTE — BRIEF OP NOTE
[facility], [place]    Brief Operative Note    Pre-operative diagnosis: Left Subdural Hematoma   Post-operative diagnosis * No post-op diagnosis entered *  Procedure: Procedure(s):  Left Efren Holes for Subdural Hematoma Evacuation  - Wound Class: I-Clean  Surgeon: Surgeon(s) and Role:     * Javad Coats MD - Primary     * Kuldip Ordoñez MD - Resident - Assisting     * Ginna Perdomo MD - Resident - Assisting  Anesthesia: General   Estimated blood loss: 30 mL  Drains: Manuelito-Merritt  Specimens: * No specimens in log *  Findings:   Chronic blood with membrances.  Complications: None.  Implants: Frontal efren hole covered with Synthes cranial efren hole cover

## 2018-07-11 NOTE — OR NURSING
CXR for line placement was viewed by Dr Zambrano, patti OK to use. Plan is to keep pt on sedation and send to unit 4A following CT.

## 2018-07-11 NOTE — PLAN OF CARE
Problem: Patient Care Overview  Goal: Plan of Care/Patient Progress Review  OT: cancel, pt in OR.

## 2018-07-11 NOTE — OR NURSING
Pt to CT for exam prior to transfer to unit 4A. Taken to CT on monitors accompanied by ICU Float RN, RT, and NST. VSS, on sedation. Report given to RN on 4A.

## 2018-07-11 NOTE — PLAN OF CARE
Problem: Patient Care Overview  Goal: Plan of Care/Patient Progress Review  Outcome: Declining  Unable to wake pt up at 0730 this am.then nurse noted pt was confused. Report to NP about lethergy/confusion. LR 500ml bolus given as ordered. Head ct done. Now pt is transferred to OR.unable to do neuro exam.[email or web address].[email or web address].

## 2018-07-11 NOTE — PLAN OF CARE
Problem: Patient Care Overview  Goal: Plan of Care/Patient Progress Review  PT-7D- Cancel, per RN pt not appropriate for PT, pt is lethargic, minimally responsive, will be going to head CT shortly, and needs a unit of PRBCs.

## 2018-07-11 NOTE — OR NURSING
Sedation off briefly for neuro exam with Dr Gonzalez; pt moved both arms purposefully to sternal rub, not following commands. Pupils 2mm and reactive to light. Propofol back on per anesthesia. Labs sent, await availability of nurse and bed on unit 4A.  Dr Gonzalez was given the patient's son's phone number to call.

## 2018-07-11 NOTE — ANESTHESIA CARE TRANSFER NOTE
Patient: Oswaldo Win    Procedure(s):  Left Penuelas Holes for Subdural Hematoma Evacuation  - Wound Class: I-Clean    Diagnosis: Left Subdural Hematoma   Diagnosis Additional Information: No value filed.    Anesthesia Type:   No value filed.     Note:  Airway :ETT and Ventilator  Patient transferred to:PACU  Comments: To recovery via ambu bag with CRNA. Propofol gtt on. Connected to ventilator. VSS. Report to RN. Platelet's infusing. Reversal dosed in OR.Handoff Report: Identifed the Patient, Identified the Reponsible Provider, Reviewed the pertinent medical history, Discussed the surgical course, Reviewed Intra-OP anesthesia mangement and issues during anesthesia, Set expectations for post-procedure period and Allowed opportunity for questions and acknowledgement of understanding      Vitals: (Last set prior to Anesthesia Care Transfer)    CRNA VITALS  7/11/2018 1540 - 7/11/2018 1625      7/11/2018             Pulse: 73    ART BP: 133/61    SpO2: 98 %                Electronically Signed By: STEVE Florence CRNA  July 11, 2018  4:25 PM

## 2018-07-11 NOTE — OP NOTE
Date of Procedure: 7/11/18    Pre-operative diagnosis: chronic subdural hematoma with brain compression    Post-operative diagnosis: chronic subdural hematoma with brain compression    Procedure: left efren holes for drainage of subdural hematoma    Anesthesia: General endotracheal anesthesia    Complications: None    Estimated blood loss: 30cc    Findings: chronic subdural hematoma with membranes     Surgeon: Javad Coats MD    Assistants: Ginna Perdomo MD; Kuldip Ordoñez MD     Indications for procedure: Mr. Win is a 73 year old man with significant comorbidities, including metastatic prostate cancer, coronary artery disease, CABG, pulmonary embolism, who had altered mental status. Head CT showed a chronic left subdural hematoma with 17mm rightward shift. He is indicated for above surgery. Surgery was discussed with son. Risks include bleeding, infection, wound complications, re-accumulation. He agrees to proceed.    Description of procedure:  After pre-operative laboratory value and informed consent were verified, patient was brought to the operating room. He underwent general anesthesia and intubation. Antibiotics and keppra were administered.    Arterial and central lines were placed by our anesthesia colleagues. He was also started on platelets transfusion due to low platelets.     The patient was placed in a supine position, with the head turned to the right, exposing the left cranium. Two 3 cm incisions were planned in the anterior and superior aspects of the superior temporal line.    Time out was performed. The incision was infiltrated with 1% lidocaine with dilute epinephrine. #10 blade was used to incise the scalp down to the periosteum. Hemostasis was achieved using monopolar cautery. The incision was retracted using weilander retractors.    Efren holes were placed at the center of the incision. Dural opening was made using cautery. Immediately upon duratomy, brown, chronic appearing blood gushed out of  the efren hole.    Durotomy was also performed in the posterior incision. The same brown, chronic appearing, blood was observed.    The subdural space was amply irrigated extensively from both anterior and posterior efren holes until clear irrigant was observed. There was still small amount of oozing from the posterior efren hole. Surgiflow was then sprinkled into the posterior efren hole and a carlos was used for tamponade. A 7 Jordanian KORIN drain was inserted into the subdural space at the posterior bur hole. The drain was secured to the skin using 3-0 nylon stitch. Gelfoam was then placed inside the posterior efren hole which was closed with 2-0 vicryl stitches in an interrupted and inverted fashion for the subgaleal layer and 3-0 nylon interrupted stitches for the skin.  The anterior efren hole was closed with Synthes cranial plate, followed by 2'0 vicryl stitches in an interrupted and inverted fashion for the subgaleal layer and 3-0 nylon stitches in a running fashion for the skin.     At the end of the procedure, all needle, sponge, and instrument count was correct x2.    Patient was then transferred to the [facility]. He remained intubated and was transferred to PACU.    Dr. Coats was immediately available throughout the entire procedure.    I, Dr. Javad Coats, was present for the key portions of the procedure and immediately available for the entire operation.      -----------------------------------  Ginna Perdomo MD, MS  Neurosurgery PGY-2

## 2018-07-11 NOTE — ANESTHESIA PROCEDURE NOTES
Arterial Line Procedure Note  Staff:     Anesthesiologist:  DUSTY ARIZMENDI  Location: In OR Before Induction  Procedure Start/Stop Times:      7/11/2018 2:52 PM    patient identified, IV checked, site marked, risks and benefits discussed, informed consent, monitors and equipment checked, pre-op evaluation and at physician/surgeon's request      Correct Patient: Yes      Correct Position: Yes      Correct Site: Yes      Correct Procedure: Yes      Correct Laterality:  Yes    Site Marked:  Yes  Line Placement:     Procedure:  Arterial Line    Insertion Site:  Radial    Insertion laterality:  Left    Skin Prep: Chloraprep      Patient Prep: patient draped, mask, sterile gloves, hat and hand hygiene      Local skin infiltration:  None    Ultrasound Guided?: No      Catheter size:  20 gauge, 12 cm    Cath secured with: anchor securement device      Dressing:  Tegaderm    Complications:  None obvious    Arterial waveform: Yes      IBP within 10% of NIBP: Yes

## 2018-07-11 NOTE — ANESTHESIA PROCEDURE NOTES
Central Line Procedure Note  Staff:     Anesthesiologist:  DUSTY ARIZMENDI  Location: In OR after induction  Procedure Start/Stop Times:     patient identified, IV checked, site marked, risks and benefits discussed, informed consent, monitors and equipment checked, pre-op evaluation and at physician/surgeon's request      Correct Patient: Yes      Correct Position: Yes      Correct Site: Yes      Correct Procedure: Yes      Correct Laterality:  Yes    Site Marked:  Yes  Line Placement:     Procedure:  Central Line    Insertion laterality:  Right    Insertion site:  Internal Jugular    Position:  Trendelenburg      Maximal Sterile Barriers: All elements of maximal sterile barrier technique followed      (Maximal sterile barriers include:   Sterile gown, Sterile Gloves, Mask, Cap, Whole body draped, hand hygiene and acceptable skin prep).Skin Prep: Chloraprep         Injection Technique:  Ultrasound guided    Sterile Ultrasound Technique:  Sterile probe cover and Sterile gel    Vein evaluated via U/S for patency/adequacy of catheter insertion and is adequate.  Using realtime U/S imaging the vein was punctured, and needle was observed entering vein on U/S      A permanent image is NOT entered into the patient's record.      Local skin infiltration:  None    Catheter size:  9 Fr, 10 cm, Introducer    Catheter length at skin (cm):  15    Cath secured with: suture      Dressing:  Tegaderm and Biopatch    Complications:  None obvious    Blood aspirated all lumens: Yes      All Lumens Flushed: Yes      Verification method:  Placement to be verified post-op

## 2018-07-11 NOTE — PROGRESS NOTES
[facility], [place]    Hematology / Oncology Progress Note     Assessment & Plan    Mr. Oswaldo Win is a 73-year-old gentleman with metastatic prostate cancer, CAD s/p STEMI and CABG and moderate/severe mitral regurgitation s/p mitraclip, also w/PE in Nov 2016, w/hx of poor compliance with physician visits and medications who is being admitted for generalized weakness and weight loss.      Weight loss  Generalized weakness.  Presenting to the hospital with 2-3 weeks of progressive weakness and weight loss. Appears cachectic on exam. Likely due to progression of underlying metastatic disease as noted below. No signs of infection on exam and no recent exertional symptoms concerning for cardiac etiology. UA/UC negative, CXR negative.  Pt has been afebrile.  Pt reports he has lost approximately 30 lbs in past 2-3 months. Per nursing, patient is very unsteady on feet.  Blood cultures NGTD.   -PT/OT consults   -Nutrition consult   -Fluid resuscitation   - started mirtazapine 15mg daily on 7/10 to encourage appetite stimulation, energy stimulation.  Will d/c today 7/11 in setting of acute AMS     Encephalopathy 2/2 L SDH.  Pt noted to be very lethargic this AM, disoriented to time, place, person. Did not receive any pain medications last night. Did receive 15mg mirtazapine last evening, which may be contributing, but seems unlikely, only causes AMS in 2% of people per [employer].  Does not appear to be infected, UA/UC negative, recent chest xray clear, blood cultures NGTD. Neuros intact this AM, able to follow commands, equal strength bilaterally.  - head CT x 7/11 w/o contrast demonstrates large L subdural hematoma with uncal brain herniation  - Neurosurgery consulted, pt to be transferred to OR for urgent decompression of SDH.   - 2 units plts ordered STAT to be transfused, as patients plts 47k this AM.   - will repeat INR, fib, PTT   - will transfer to 4 A ICU post op   - obtain hepatic panel,  "ammonia level   - avoid sedating medications  - d/c mirtazapine      Macrocytic anemia  Thrombocytopenia.   Hgb steadily downtrending over last few months (10.8 in 4/2018). MCV upper 90's. Likely multi-factorial including nutritional deficiency and inflammatory anemia. Pt denies EtOH use. Underwent colonoscopy in 1/2017 after presenting w/GI bleeding, though no source discovered. S/p 1 unit pRBCs overnight on 7/9.   - transfuse to keep Hgb > 8 in setting of CAD     -B12/folate, iron studies, peripheral smear    -Consider outpt colonoscopy for further evaluation     Metastatic prostate cancer  Initially diagnosed in 2015 after presenting for back pain. Initial CT scan showing diffuse bony mets. Has been on numerous therapies including docetaxel, Lupron, Taxotere, Provenge, Xofigo. Per recent heme/onc clinic note (6/27/18 w/Dr. Robles), the patient was supposed to be on [id number] Lupon and daily Xgeva. Patient reports he has \"not been the best\" about taking his oral chemotherapy pill.  Per Dr. Robles's note, clinic is working to get him Palbociclib chemotherapy due to CCND1 amplification.   - Holding Xgeva at this time      CAD s/p NSTEMI and CABG  Moderate/severe mitral regurgitation s/p Mitraclip  Has been noncompliant with cardiology visits. Last seen in clinic 6/2017. High risk given personal and family history. Currently without any exertional symptoms and appears euvolemic. Previously had been on metoprolol due to history of CHF, but has been held, as BP does not support restarting.   -cont PTA statin, hold ASA in setting of thrombocytopenia   -May need to consider cards consult for further evaluation     Hx of provoked pulmonary embolism  Occurring in 2016 in the setting of active prostate cancer. Initially on Lovenox. Unclear why the patient self-discontinued. Plts are 64k on admission, putting him at somewhat increased risk of bleeding. Will need to consider restarting if active bleeding ruled out.      FEN Regular " "diet, NS @ 100 ml/hr  Ppx PCDs, ambulate  Code Full -discussed with patient on  Dispo: pending resolution of SDH, will plan to transfer patient to neurosurgery service today (7/11).      # Pain Assessment:  Current Pain Score 7/11/2018   Patient currently in pain? denies   Pain score (0-10) -   Pain location -   Pain descriptors -   CPOT pain score -   Oswaldo gardiner pain level was assessed and he currently denies pain.      Discussed with staff attending, Dr. Prabhakar.     Chen Silva, DNP, APRN, CNP  Hematology/oncology  [id number]     Interval History     Mainor is seen and examined in his room this morning. No acute events overnight, though patient is extremely lethargic and minimally responsive this morning on exam, though is able to actively follow commands. He is disoriented to person, place, situation.  Denies pain.  Admits to feeling \"foggy\".  Denies headaches, chest pain, SOB, n/v. Per nursing, patient was very unsteady on feet last evening, requiring 2-3 people to transfer patient to bathroom.     Re assessed patient this afternoon, pt unable to open eyes or respond to questions.  He demonstrates weakness in upper and lower extremity and is hyper reflexive on exam.     Updated son, Manjit via telephone.     Physical Exam   Temp: 96.3  F (35.7  C) Temp src: Oral BP: 116/45 Pulse: 71 Heart Rate: 81 Resp: 20 SpO2: 98 % O2 Device: None (Room air)    Vitals:    07/09/18 2117 07/10/18 0416 07/11/18 0900   Weight: 50.3 kg (111 lb) 51.5 kg (113 lb 8.6 oz) 52 kg (114 lb 10.2 oz)     Vital Signs with Ranges  Temp:  [96.3  F (35.7  C)-98.3  F (36.8  C)] 96.3  F (35.7  C)  Pulse:  [71] 71  Heart Rate:  [58-81] 81  Resp:  [16-20] 20  BP: (100-123)/(42-60) 116/45  SpO2:  [95 %-100 %] 98 %  I/O last 3 completed shifts:  In: 2296.3 [P.O.:240; I.V.:1238.3; IV Piggyback:500]  Out: 100 [Urine:100]    Constitutional: lethargic appearing, chronically ill appearing cachetic appearing male seen lying in bed.  Minimally responsive to " questions, though cooperative and able to follow commands.   Eyes: Lids and lashes normal, pupils equal, round and reactive to light, extra ocular muscles intact, sclera clear, conjunctiva normal.  ENT: Normocephalic, without obvious abnormality, atraumatic.  Respiratory: No increased work of breathing. No oxygen. LS clear, no crackles or wheezes   Cardiovascular: Regular rate, regular rhythm, no murmurs.  GI: Normal bowel sounds, soft, non-distended, non-tender.  Musculoskeletal: No edema B/L LE. No obvious joint swelling or deformities.   Neurologic: disoriented to place, time, situation. cranial nerves II-XII appear to be grossly intact.  No focal deficits.   Neuropsychiatric: Calm, normal eye contact, alert, normal affect memory for past and recent events intact and thought process normal.      Medications     dextrose 5% and 0.45% NaCl         cholecalciferol  2,000 Units Oral Daily     dextrose 5% and 0.45% NaCl         lactated ringers  500 mL Intravenous Once     oyster shell calcium  1 tablet Oral BID w/meals     rosuvastatin  10 mg Oral Daily       Data   Results for orders placed or performed during the hospital encounter of 07/09/18 (from the past 24 hour(s))   Basic metabolic panel   Result Value Ref Range    Sodium 142 133 - 144 mmol/L    Potassium 3.6 3.4 - 5.3 mmol/L    Chloride 113 (H) 94 - 109 mmol/L    Carbon Dioxide 17 (L) 20 - 32 mmol/L    Anion Gap 12 3 - 14 mmol/L    Glucose 89 70 - 99 mg/dL    Urea Nitrogen 12 7 - 30 mg/dL    Creatinine 0.82 0.66 - 1.25 mg/dL    GFR Estimate >90 >60 mL/min/1.7m2    GFR Estimate If Black >90 >60 mL/min/1.7m2    Calcium 8.6 8.5 - 10.1 mg/dL   Phosphorus   Result Value Ref Range    Phosphorus 2.7 2.5 - 4.5 mg/dL   Hepatic panel   Result Value Ref Range    Bilirubin Direct 0.1 0.0 - 0.2 mg/dL    Bilirubin Total 1.0 0.2 - 1.3 mg/dL    Albumin 3.0 (L) 3.4 - 5.0 g/dL    Protein Total 6.1 (L) 6.8 - 8.8 g/dL    Alkaline Phosphatase 181 (H) 40 - 150 U/L    ALT 11 0 -  70 U/L    AST 56 (H) 0 - 45 U/L   CBC with platelets differential   Result Value Ref Range    WBC 2.9 (L) 4.0 - 11.0 10e9/L    RBC Count 2.43 (L) 4.4 - 5.9 10e12/L    Hemoglobin 7.5 (L) 13.3 - 17.7 g/dL    Hematocrit 21.8 (L) 40.0 - 53.0 %    MCV 90 78 - 100 fl    MCH 30.9 26.5 - 33.0 pg    MCHC 34.4 31.5 - 36.5 g/dL    RDW 17.1 (H) 10.0 - 15.0 %    Platelet Count 47 (LL) 150 - 450 10e9/L    Diff Method Automated Method     % Neutrophils 66.6 %    % Lymphocytes 22.3 %    % Monocytes 8.4 %    % Eosinophils 1.7 %    % Basophils 0.3 %    % Immature Granulocytes 0.7 %    Nucleated RBCs 0 0 /100    Absolute Neutrophil 1.9 1.6 - 8.3 10e9/L    Absolute Lymphocytes 0.6 (L) 0.8 - 5.3 10e9/L    Absolute Monocytes 0.2 0.0 - 1.3 10e9/L    Absolute Eosinophils 0.1 0.0 - 0.7 10e9/L    Absolute Basophils 0.0 0.0 - 0.2 10e9/L    Abs Immature Granulocytes 0.0 0 - 0.4 10e9/L    Absolute Nucleated RBC 0.0      *Note: Due to a large number of results and/or encounters for the requested time period, some results have not been displayed. A complete set of results can be found in Results Review.       Recent Labs  Lab 07/11/18  0754 07/11/18  0602 07/10/18  0820 07/09/18  4558   WBC 2.9*  --  2.9* 3.5*   HGB 7.5*  --  8.0* 7.3*   MCV 90  --  93 97   PLT 47*  --  64* 94*   INR  --   --   --  1.29*   NA  --  142 142 141   POTASSIUM  --  3.6 3.9 3.6   CHLORIDE  --  113* 111* 107   CO2  --  17* 20 22   BUN  --  12 19 23   CR  --  0.82 0.84 0.93   ANIONGAP  --  12 11 12   RODNEY  --  8.6 8.4* 9.1   GLC  --  89 103* 108*   ALBUMIN  --  3.0*  --  3.9   PROTTOTAL  --  6.1*  --  7.4   BILITOTAL  --  1.0  --  1.0   ALKPHOS  --  181*  --  227*   ALT  --  11  --  12   AST  --  56*  --  60*   TROPI  --   --   --  <0.015     No results found for this or any previous visit (from the past 24 hour(s)).

## 2018-07-11 NOTE — PLAN OF CARE
"Problem: Activity Intolerance (Adult)  Goal: Identify Related Risk Factors and Signs and Symptoms  Related risk factors and signs and symptoms are identified upon initiation of Human Response Clinical Practice Guideline (CPG).   Outcome: No Change  /60 (BP Location: Left arm)  Pulse 71  Temp 96.5  F (35.8  C) (Oral)  Resp 16  Ht 1.676 m (5' 6\")  Wt 51.5 kg (113 lb 8.6 oz)  SpO2 100%  BMI 18.33 kg/m2  Pt's AVSS, maintaining sat's in the upper 90's while on RA,and denied pain all shift. Pt is up to the commode with assist of 1-2 and incontinent of urine at times. Pt need a lot of encourage to increase oral and food intake. Pt also need encouragement to turn while in bed. Pt refused repositioning over night. PIV intact with MIVF infusing at 100ml/h. Keep monitoring pt as ordered and notify MD with any new changes.    07/10/18 1521   Activity Intolerance   Related Risk Factors (Activity Intolerance) deconditioned state;fluid/electrolyte imbalance;functional decline;generalized weakness;medication side effects   Signs and Symptoms (Activity Intolerance) other (see comments)  (gait imbalance)          "

## 2018-07-11 NOTE — CONSULTS
[facility]       NEUROSURGERY CONSULTATION NOTE    This consultation was requested by Chen Silva NP from the Heme/Onc service.    Reason for Consultation: left subdural    HPI:  Mr. Oswaldo Win is a 73-year-old gentleman with metastatic prostate cancer, CAD s/p STEMI and CABG and moderate/severe mitral regurgitation s/p mitraclip, also with pulmonary embolism in Nov 2016 with stated poor compliance with physician visits and medications who presented to the ED with generalized weakness and weight loss.    For altered mental status, a CT scan was obtained that revealed a left subdural hematoma. No history was able to be provided by the patient due altered state. Patient was thrombocytopenic today at 47. Last dose of aspirin was 07/10/18 at 0900.     PAST MEDICAL HISTORY:   Past Medical History:   Diagnosis Date     Arthritis      ASCVD (arteriosclerotic cardiovascular disease) 4/10/2014     Closed L4 vertebral fracture (H) 2005    traumatic fracture     COPD (chronic obstructive pulmonary disease) (H)      Dyspnea on exertion      Fx tib/fib fol insrt ortho implnt/prosth/bone plt, right leg (H) 2005    rt tib/fib fracture s/p ORIF     Gastro-oesophageal reflux disease      Hx of right coronary artery stent placement 2014 4/2014 and redone 9/2014     Hyperlipidemia LDL goal < 70      Hypertension      Influenza A 3/27/2016     Musculoskeletal leg pain     from old leg injuries     Other chronic pain      Prostate cancer metastatic to bone (H)      Pulmonary embolism (H) 11/8/16 November 2016     ST elevation MI (STEMI) (H) 4/5/2014     Stented coronary artery      Thrombosis of leg 10/2015    October 2015     PAST SURGICAL HISTORY:   Past Surgical History:   Procedure Laterality Date     ANESTHESIA OUT OF OR PERCUTANEOUS MITRAL VALVE REPAIR N/A 10/16/2015    Procedure: ANESTHESIA OUT OF OR PERCUTANEOUS MITRAL VALVE REPAIR;  Surgeon: GENERIC ANESTHESIA  PROVIDER;  Location: [facility] OR     COLONOSCOPY       COLONOSCOPY N/A 1/13/2017    Procedure: COLONOSCOPY;  Surgeon: Telly Anderson MD;  Location: [facility] GI     COMBINED CYSTOSCOPY, RETROGRADES, EXCHANGE STENT URETER(S) Left 12/14/2015    Procedure: COMBINED CYSTOSCOPY, RETROGRADES, EXCHANGE STENT URETER(S);  Surgeon: Maame Ramirez MD;  Location: [facility] OR     DAVINCI BYPASS ARTERY CORONARY  7/1/2014    Procedure: DAVINCI BYPASS ARTERY CORONARY;  Surgeon: Kike Coley MD;  Location: [facility]     ESOPHAGOSCOPY, GASTROSCOPY, DUODENOSCOPY (EGD), COMBINED N/A 1/13/2017    Procedure: COMBINED ESOPHAGOSCOPY, GASTROSCOPY, DUODENOSCOPY (EGD);  Surgeon: Telly Anderson MD;  Location: [facility] GI     EXCISE MASS LOWER EXTREMITY Right 2/5/2016    Procedure: EXCISE MASS LOWER EXTREMITY;  Surgeon: Miquel Figueredo MD;  Location: [facility]     HERNIORRHAPHY INGUINAL Left 1968     HERNIORRHAPHY INGUINAL Left 11/18/2014    Procedure: HERNIORRHAPHY INGUINAL;  Surgeon: Max Garza MD;  Location: [facility] OR     LASER HOLMIUM LITHOTRIPSY URETER(S), INSERT STENT, COMBINED N/A 7/27/2015    Procedure: COMBINED CYSTOSCOPY, URETEROSCOPY, LASER HOLMIUM LITHOTRIPSY URETER(S), INSERT STENT;  Surgeon: Maame Ramirez MD;  Location: [facility]     LASER HOLMIUM LITHOTRIPSY URETER(S), INSERT STENT, COMBINED Left 9/19/2016    Procedure: COMBINED CYSTOSCOPY, URETEROSCOPY, LASER HOLMIUM LITHOTRIPSY URETER(S), INSERT STENT;  Surgeon: Maame Ramirez MD;  Location: [facility]     OPEN REDUCTION INTERNAL FIXATION TIBIA  2005    RT Tib/Fib ORIF, crushed LT calc     TONSILLECTOMY & ADENOIDECTOMY  age 18     FAMILY HISTORY:   Family History   Problem Relation Age of Onset     HEART DISEASE Mother 50     CAB, pacemaker     HEART DISEASE Father 41     MI     Breast Cancer Paternal Grandmother      HEART DISEASE Maternal Uncle      Breast Cancer Paternal Aunt      Cerebrovascular Disease No family hx of      Diabetes No family hx  "of      SOCIAL HISTORY:   Social History   Substance Use Topics     Smoking status: Former Smoker     Packs/day: 0.50     Years: 40.00     Types: Cigarettes     Quit date: 2/1/2000     Smokeless tobacco: Never Used     Alcohol use 0.0 oz/week     0 Standard drinks or equivalent per week      Comment: rare     MEDICATIONS:  No current outpatient prescriptions on file.     Allergies:  Allergies   Allergen Reactions     Blood Transfusion Related (Informational Only) Other (See Comments)     Patient has a history of a significant allergic transfusion reaction.  Consult with Blood Bank MD before ordering components.     ROS: 10 point ROS of systems including Constitutional, Eyes, Respiratory, Cardiovascular, Gastroenterology, Genitourinary, Integumentary, Muscularskeletal, Psychiatric were all negative except for pertinent positives noted in my HPI.    Physical exam:   Blood pressure 116/42, pulse 71, temperature 97.4  F (36.3  C), temperature source Oral, resp. rate 20, height 1.676 m (5' 6\"), weight 52 kg (114 lb 10.2 oz), SpO2 96 %.  Somnolent. Arousable to noxious stimuli. Pupils reactive bilaterally and 2 mm. Localizes x 4 extremities. Breathing comfortably on room air. Oriented x 0.     IMAGING:  CT head. Left sided subdural hematoma, acute on chronic.     LABS:   BMP with hyperchloremia and low bicarbonate of 17  CBC with anemia of 7.5, leukopenia and thrombocytopenia of 47   INR from 7/9 of 1.2     ASSESSMENT:  Acute on chronic subdural hematoma with possible ongoing seizures in context of coagulopathy.     RECOMMENDATIONS:  Stat OR for subdural evacuation  Keppra load  2U platelet now   Desmopressin  NPO   Will admit to 4A post-op    The patient was discussed with Dr. Kuldip Ordoñez, neurosurgery chief resident, and he agrees with the above.    Lam \"Manan\" MD [place]   Neurosurgery, PGY-2    Please contact neurosurgery resident on call with questions.    Dial * * [phone], enter [phone] when prompted.         "

## 2018-07-11 NOTE — ANESTHESIA PREPROCEDURE EVALUATION
Anesthesia Evaluation     .             ROS/MED HX    ENT/Pulmonary:       Neurologic:       Cardiovascular:     (+) hypertension----stent,. Taking blood thinners : . . MOURA, . :. valvular problems/murmurs type: MR .       METS/Exercise Tolerance:     Hematologic:         Musculoskeletal:         GI/Hepatic:         Renal/Genitourinary:         Endo:         Psychiatric:         Infectious Disease:         Malignancy:         Other:                      Allergies   Allergen Reactions     Blood Transfusion Related (Informational Only) Other (See Comments)     Patient has a history of a significant allergic transfusion reaction.  Consult with Blood Bank MD before ordering components.     Recent Results (from the past 4320 hour(s))   Echo complete    Narrative    [id number]  [id number]  [id number]  [id number]^TAMEKA^DWIGHT^[facility],[place]  Echocardiography Laboratory  [address] [place] [id number]     Name: USAMA MANRIQUE  MRN: [id number]  : 1945  Study Date: 2018 08:45 AM  Age: 72 yrs  Gender: Male  Patient Location: [facility]  Reason For Study: CP  Ordering Physician: DWGIHT ENGLISH  Performed By: Manny Avelar RDCS     BSA: 1.6 m2  Height: 66 in  Weight: 123 lb  HR: 74  BP: 134/52 mmHg  _____________________________________________________________________________  __        Procedure  Complete Portable Echo Adult.  _____________________________________________________________________________  __        Interpretation Summary  Left ventricular size is normal.  The Ejection Fraction is estimated at 45-50%.  Right ventricular function, chamber size, wall motion, and thickness are  normal.  S/P Mitral Clip. Clip on posterior leaflet is very mobile since placement. No  change in appearance. There is moserate to severe mitral regurgitation.  The inferior vena cava is normal.  No pericardial effusion is  present.  _____________________________________________________________________________  __        Left Ventricle  Left ventricular size is normal. Left ventricular wall thickness is normal.  The Ejection Fraction is estimated at 45-50%. Left ventricular diastolic  function is indeterminate. Septal wall akinesis is present.     Right Ventricle  Right ventricular function, chamber size, wall motion, and thickness are  normal.     Atria  The right atria appears normal. Severe left atrial enlargement is present.     Mitral Valve  S/P Mitral Clip. Clip on posterior leaflet is very mobile since placement. No  change in appearance. There is moserate to severe mitral regurgitation. The  peak mitral valve gradient is 10.7 mmHg.        Aortic Valve  Aortic valve is normal in structure and function.     Tricuspid Valve  The tricuspid valve is normal.     Pulmonic Valve  The pulmonic valve cannot be assessed.     Vessels  The aorta root is normal. The pulmonary artery cannot be assessed. The  inferior vena cava is normal.     Pericardium  No pericardial effusion is present.     _____________________________________________________________________________  __  MMode/2D Measurements & Calculations  IVSd: 0.81 cm  LVIDd: 5.6 cm  LVIDs: 4.2 cm  LVPWd: 0.90 cm  FS: 25.1 %     LV mass(C)d: 177.5 grams  LV mass(C)dI: 109.1 grams/m2  asc Aorta Diam: 3.0 cm  LA Volume (BP): 96.7 ml  RWT: 0.32        Doppler Measurements & Calculations  MV max PG: 10.7 mmHg  MV mean P.3 mmHg  MV V2 VTI: 40.9 cm  PA acc time: 0.10 sec     _____________________________________________________________________________  __           Report approved by: Seng Spear 2018 10:18 AM        PAST MEDICAL HISTORY:   Past Medical History:   Diagnosis Date     Arthritis      ASCVD (arteriosclerotic cardiovascular disease) 4/10/2014     Closed L4 vertebral fracture (H)     traumatic fracture     COPD (chronic obstructive pulmonary disease) (H)       Dyspnea on exertion      Fx tib/fib fol insrt ortho implnt/prosth/bone plt, right leg (H) 2005    rt tib/fib fracture s/p ORIF     Gastro-oesophageal reflux disease      Hx of right coronary artery stent placement 2014 4/2014 and redone 9/2014     Hyperlipidemia LDL goal < 70      Hypertension      Influenza A 3/27/2016     Musculoskeletal leg pain     from old leg injuries     Other chronic pain      Prostate cancer metastatic to bone (H)      Pulmonary embolism (H) 11/8/16 November 2016     ST elevation MI (STEMI) (H) 4/5/2014     Stented coronary artery      Thrombosis of leg 10/2015    October 2015       PAST SURGICAL HISTORY:   Past Surgical History:   Procedure Laterality Date     ANESTHESIA OUT OF OR PERCUTANEOUS MITRAL VALVE REPAIR N/A 10/16/2015    Procedure: ANESTHESIA OUT OF OR PERCUTANEOUS MITRAL VALVE REPAIR;  Surgeon: GENERIC ANESTHESIA PROVIDER;  Location: [facility]     COLONOSCOPY       COLONOSCOPY N/A 1/13/2017    Procedure: COLONOSCOPY;  Surgeon: Telly Anderson MD;  Location: [facility]     COMBINED CYSTOSCOPY, RETROGRADES, EXCHANGE STENT URETER(S) Left 12/14/2015    Procedure: COMBINED CYSTOSCOPY, RETROGRADES, EXCHANGE STENT URETER(S);  Surgeon: Maame Ramirez MD;  Location: [facility]     DAVINCI BYPASS ARTERY CORONARY  7/1/2014    Procedure: DAVINCI BYPASS ARTERY CORONARY;  Surgeon: Kike Coley MD;  Location: [facility]     ESOPHAGOSCOPY, GASTROSCOPY, DUODENOSCOPY (EGD), COMBINED N/A 1/13/2017    Procedure: COMBINED ESOPHAGOSCOPY, GASTROSCOPY, DUODENOSCOPY (EGD);  Surgeon: Telly Anderson MD;  Location: [facility]     EXCISE MASS LOWER EXTREMITY Right 2/5/2016    Procedure: EXCISE MASS LOWER EXTREMITY;  Surgeon: Miquel Figueredo MD;  Location: [facility]     HERNIORRHAPHY INGUINAL Left 1968     HERNIORRHAPHY INGUINAL Left 11/18/2014    Procedure: HERNIORRHAPHY INGUINAL;  Surgeon: Max Garza MD;  Location: [facility]     LASER HOLMIUM LITHOTRIPSY URETER(S), INSERT STENT,  COMBINED N/A 7/27/2015    Procedure: COMBINED CYSTOSCOPY, URETEROSCOPY, LASER HOLMIUM LITHOTRIPSY URETER(S), INSERT STENT;  Surgeon: Maame Ramirez MD;  Location: [facility]     LASER HOLMIUM LITHOTRIPSY URETER(S), INSERT STENT, COMBINED Left 9/19/2016    Procedure: COMBINED CYSTOSCOPY, URETEROSCOPY, LASER HOLMIUM LITHOTRIPSY URETER(S), INSERT STENT;  Surgeon: Maame Ramirez MD;  Location: [facility]     OPEN REDUCTION INTERNAL FIXATION TIBIA  2005    RT Tib/Fib ORIF, crushed LT calc     TONSILLECTOMY & ADENOIDECTOMY  age 18       FAMILY HISTORY:   Family History   Problem Relation Age of Onset     HEART DISEASE Mother 50     CAB, pacemaker     HEART DISEASE Father 41     MI     Breast Cancer Paternal Grandmother      HEART DISEASE Maternal Uncle      Breast Cancer Paternal Aunt      Cerebrovascular Disease No family hx of      Diabetes No family hx of        SOCIAL HISTORY:   Social History   Substance Use Topics     Smoking status: Former Smoker     Packs/day: 0.50     Years: 40.00     Types: Cigarettes     Quit date: 2/1/2000     Smokeless tobacco: Never Used     Alcohol use 0.0 oz/week     0 Standard drinks or equivalent per week      Comment: rare     Lab Results   Component Value Date    WBC 2.9 (L) 07/11/2018    HGB 6.4 (LL) 07/11/2018    HCT 21.8 (L) 07/11/2018    PLT 47 (LL) 07/11/2018    CHOL 196 04/20/2016    TRIG 137 04/20/2016    HDL 56 04/20/2016    ALT 11 07/11/2018    AST 56 (H) 07/11/2018     07/11/2018    BUN 12 07/11/2018    CO2 17 (L) 07/11/2018    TSH 2.72 07/10/2018    PSA 1160.00 (H) 06/27/2018    INR 1.29 (H) 07/09/2018     Prescriptions Prior to Admission   Medication Sig Dispense Refill Last Dose     aspirin 81 MG tablet Take 1 tablet (81 mg) by mouth daily 90 tablet 3 Past Week at Unknown time     HYDROcodone-acetaminophen (NORCO) 5-325 MG per tablet Take 1 tablet by mouth every 6 hours as needed for severe pain   Past Week at Unknown time     palbociclib (IBRANCE) 125 MG  capsule CHEMO Take 1 capsule (125 mg) by mouth daily with food 30 capsule 0                       Anesthesia Plan      History & Physical Review  History and physical reviewed and following examination; no interval change.    ASA Status:  5 emergent.    NPO Status:  > 8 hours    Plan for General and RSI with Intravenous induction. Maintenance will be Balanced.      Additional equipment: 2nd IV, Videolaryngoscope, Arterial Line and Central Line      Postoperative Care      Consents  Anesthetic plan, risks, benefits and alternatives discussed with:  Implied consent/emergency..                          .

## 2018-07-12 NOTE — PLAN OF CARE
Problem: Patient Care Overview  Goal: Plan of Care/Patient Progress Review  PT 4A: CANCEL; pt with current activity orders to lie flat. Will reschedule.

## 2018-07-12 NOTE — PROGRESS NOTES
[facility] Nurse Inpatient Wound Assessment   Reason for consultation: Evaluate and treat Coccyx wound     Assessment  Intergluteal cleft wound due to Moisture Associated Skin Damage (MASD)- small fissure  Status: initial assessment  Coccyx epidermis is intact without any erythema. Noted a pinpoint dimple on coccyx.    Treatment Plan  Intergluteal cleft wound: Daily  and PRN    Cleanse the area gently with soft cloth.    Apply critic aid paste.     With repeat application, do not scrub the paste, only remove soiled paste and reapply.    If complete removal of paste is necessary use baby oil/mineral oil and soft wash cloth.  Orders Written  Recommended provider order: NA  WOC Nurse follow-up plan:signing off  Nursing to notify the Provider(s) and re-consult the WOC Nurse if wound(s) deteriorates or new skin concern.    Patient History  According to provider note(s):  Quirino is a 73-year-old gentleman with metastatic prostate cancer, CAD s/p STEMI and CABG and moderate/severe mitral regurgitation s/p mitraclip, also w/PE in Nov 2016, w/hx of poor compliance with physician visits and medications who presented to the ED with generalized weakness and weight loss.    Objective Data  Containment of urine/stool: Indwelling catheter    Active Diet Order    Active Diet Order      Low Saturated Fat Na <2400 mg    Output:   I/O last 3 completed shifts:  In: 3374.48 [I.V.:1360.48]  Out: 2770 [Urine:2697; Drains:43; Blood:30]    Risk Assessment:   Sensory Perception: 2-->very limited  Moisture: 3-->occasionally moist  Activity: 1-->bedfast  Mobility: 2-->very limited  Nutrition: 2-->probably inadequate  Friction and Shear: 1-->problem  Orderick Score: 11                          Labs:   Recent Labs  Lab 07/12/18  0519  07/11/18  0602 07/10/18  0820   ALBUMIN  --   --  3.0*  --    PREALB  --   --   --  17   HGB 9.9*  < >  --  8.0*   INR 1.37*  < >  --   --    WBC 2.7*  < >  --  2.9*   < > = values in this interval not displayed.    Physical  Exam  Skin inspection: focused sacrum/coccyx and buttocks    Wound Location:  Intergluteal cleft  Date of last photo Not taken  Wound History: Pt has been turned Q 2 hrs, has guzman's catheter in place.  Measurements (length x width x depth, in cm) two 0.3 cm x 0.1 cm  x  < 0.1 cm   Wound Base: 100% dermis  Tunneling N/A  Undermining N/A  Palpation of the wound bed: normal   Periwound skin: intact  Color: normal and consistent with surrounding tissue  Temperature: normal   Drainage:, none  Description of drainage:   Odor: none  Pain: absent and unable to assess due to  sedation      Interventions  Current support surface: Standard  Low air loss mattress  Current off-loading measures: Pillows under calves  Visual inspection of wound(s) completed  Wound Care: done per plan of care  Supplies: at bedside  Education provided: importance of repositioning, plan of care and wound progress  Discussed plan of care with Family and Nurse    Prabha Mcclain RN

## 2018-07-12 NOTE — PROGRESS NOTES
Pt ETT advanced 4 cm per MD order. ETT is at 26 cm at lip. Pt tolerated well. Will continue to monitor.

## 2018-07-12 NOTE — CONSULTS
[facility], [place]    Neuro Critical Care Consultation   Oswaldo Win MRN# [id number]   YOB: 1945 Age: 73 year old       Date of Admission:  7/9/2018  Date of Consult (When I saw the patient): 07/12/18      Assessment & Plan   Oswaldo Win is a 73 year old male who was admitted on 7/9/2018. I was asked by neurosurgery to see the patient for medical management while patient continues to require ICU care.    NEURO:   #POD #1 Left Reji hole SDH evaculation, Head imaging revealed likely chronic SDH, patient with report of fall 2-3 weeks.  - surgical drains per neurosurgery.  - precedex for sedation prn  - RASS 0 to -1  - pain medication prn as ordered  - PT/OT    PULM:  #Intubation, SBT today, plan for extubation  - 100% FiO2 to treat post operative pneumocephalus  - continuous pulse oximetry while in ICU  - Goal SpO2 > 92%  - supplemental O2 prn to maintain oxygenation goals    CV:  H/O CAD s/p STEMI, s/p CABG, Mitral regurgitation s/p mitraclip.  - SBP < 140  - prn hydralazine/labetalol to maintain SBP goal  - continuous cardiac monitoring while in ICU    GI:  NPO while intubated.  OGT in place.  - dietician consult given history of malnutrition/failure to thrive.  - NJT per dietician today  -GI prophylaxis as indicated.    - scheduled senna docusate.    RENAL:  - Gamez catheter in place.  May be difficult placement due to Prostate CA  - mIVF as ordered, may TKO once TF initiated.  - Electrolyte protocols while in the ICU  - I/O monitoring    ENDOCRINE:  No acute issues.  -Goal is normoglycemia.  Avoid hypoglycemia/aggressive correction of BGL.    HEME/ID:  H/O progressive prostate cancer with bony metastatic disease.  Recent oncology note indicated progressive disease and current recommendations for Lupron Q3 months, XGEVA, and Xtandi treatment courses, however also noted was that patient was not currently taking any medications regularly.  H/O PE, prescribed Lovenox for  treatment without consistent use. Previous treatment on Xarelto with complication of bleeding.  -Continue to monitor for infectious complications.      PROPHYLAXIS:   -DVT prophylaxis with pneumo-boots.   Chemical DVT prophylaxis contraindicated in acute post operative phase of care.  -GI prophylaxis not indicated.       Mai Aguero [facility]    Code Status    Full Code    Reason for Consult   Reason for consult: I was asked by neurosurgery to evaluate this patient for medical management while in the Neuro ICU post efren hole evacuation of SDH.    Primary Care Physician   Lucrecia Collier    Chief Complaint   Failure to thrive and Subacute SDH    History is obtained from the electronic health record and patient's family    History of Present Illness   Oswaldo Win is a 73 year old male with history of metastatic prostate cancer, CAD s/p stemi and CABG, mitral regurgitation s/p mitraclip, PE in November 2016 with history of poor medical plan complicance who presented with generalized weakness and weight loss on 7/10.      Per patient's family, he had been having slow decline in strength and abilities to care for himself and get out of bed for the past week.  He was admitted under the Heme/Onc service for these symptoms and further work up.  At the time of admission to the hospital he had denied any falls, n/v, abd pain.  He had not had fevers, chills, respiratory or urinary symptoms per the initial H&P.  He had endorsed decreased appetite for the months prior to hospital admit.    On 7/11 patient was noted to have acute deterioration of mental status and Head CT was obtained.  CT demonstrated large subacute Subdural hematoma with herniation.  Neurosurgery consult and patient was taken for emergent OR evacuation.    Admitted to Neuro ICU under Neurosurgery service for medically complex cares.  Patient intubated at time of assessment.    Past Medical History   I have reviewed this patient's medical history and  updated it with pertinent information if needed.   Past Medical History:   Diagnosis Date     Arthritis      ASCVD (arteriosclerotic cardiovascular disease) 4/10/2014     Closed L4 vertebral fracture (H) 2005    traumatic fracture     COPD (chronic obstructive pulmonary disease) (H)      Dyspnea on exertion      Fx tib/fib fol insrt ortho implnt/prosth/bone plt, right leg (H) 2005    rt tib/fib fracture s/p ORIF     Gastro-oesophageal reflux disease      Hx of right coronary artery stent placement 2014 4/2014 and redone 9/2014     Hyperlipidemia LDL goal < 70      Hypertension      Influenza A 3/27/2016     Musculoskeletal leg pain     from old leg injuries     Other chronic pain      Prostate cancer metastatic to bone (H)      Pulmonary embolism (H) 11/8/16 November 2016     ST elevation MI (STEMI) (H) 4/5/2014     Stented coronary artery      Thrombosis of leg 10/2015    October 2015       Past Surgical History   I have reviewed this patient's surgical history and updated it with pertinent information if needed.  Past Surgical History:   Procedure Laterality Date     ANESTHESIA OUT OF OR PERCUTANEOUS MITRAL VALVE REPAIR N/A 10/16/2015    Procedure: ANESTHESIA OUT OF OR PERCUTANEOUS MITRAL VALVE REPAIR;  Surgeon: GENERIC ANESTHESIA PROVIDER;  Location: [facility] OR     SONIA HOLE(S) EVACUATE HEMATOMA SUBDURAL Left 7/11/2018    Procedure: SONIA HOLE(S) EVACUATE HEMATOMA SUBDURAL;  Left Sonia Holes for Subdural Hematoma Evacuation ;  Surgeon: Javad Coats MD;  Location: [facility] OR     COLONOSCOPY       COLONOSCOPY N/A 1/13/2017    Procedure: COLONOSCOPY;  Surgeon: Telly Anderson MD;  Location: [facility] GI     COMBINED CYSTOSCOPY, RETROGRADES, EXCHANGE STENT URETER(S) Left 12/14/2015    Procedure: COMBINED CYSTOSCOPY, RETROGRADES, EXCHANGE STENT URETER(S);  Surgeon: Maame Ramirez MD;  [facility]     DAVINCI BYPASS ARTERY CORONARY  7/1/2014    Procedure: DAVINCI BYPASS ARTERY CORONARY;  Surgeon: Vianca  Kike Raman MD;  [facility]     ESOPHAGOSCOPY, GASTROSCOPY, DUODENOSCOPY (EGD), COMBINED N/A 1/13/2017    Procedure: COMBINED ESOPHAGOSCOPY, GASTROSCOPY, DUODENOSCOPY (EGD);  Surgeon: Telly Anderson MD;  [facility]: [facility]     EXCISE MASS LOWER EXTREMITY Right 2/5/2016    Procedure: EXCISE MASS LOWER EXTREMITY;  Surgeon: Miquel Figueredo MD;  [facility]     HERNIORRHAPHY INGUINAL Left 1968     HERNIORRHAPHY INGUINAL Left 11/18/2014    Procedure: HERNIORRHAPHY INGUINAL;  Surgeon: Max Garza MD;  [facility]     LASER HOLMIUM LITHOTRIPSY URETER(S), INSERT STENT, COMBINED N/A 7/27/2015    Procedure: COMBINED CYSTOSCOPY, URETEROSCOPY, LASER HOLMIUM LITHOTRIPSY URETER(S), INSERT STENT;  Surgeon: Maame Ramirez MD;  [facility]     LASER HOLMIUM LITHOTRIPSY URETER(S), INSERT STENT, COMBINED Left 9/19/2016    Procedure: COMBINED CYSTOSCOPY, URETEROSCOPY, LASER HOLMIUM LITHOTRIPSY URETER(S), INSERT STENT;  Surgeon: Maame Ramirez MD;  [facility]     OPEN REDUCTION INTERNAL FIXATION TIBIA  2005    RT Tib/Fib ORIF, crushed LT calc     TONSILLECTOMY & ADENOIDECTOMY  age 18       Prior to Admission Medications   Prior to Admission Medications   Prescriptions Last Dose Informant Patient Reported? Taking?   HYDROcodone-acetaminophen (NORCO) 5-325 MG per tablet Past Week at Unknown time  Yes Yes   Sig: Take 1 tablet by mouth every 6 hours as needed for severe pain   aspirin 81 MG tablet Past Week at Unknown time  No Yes   Sig: Take 1 tablet (81 mg) by mouth daily   palbociclib (IBRANCE) 125 MG capsule CHEMO   No No   Sig: Take 1 capsule (125 mg) by mouth daily with food      Facility-Administered Medications: None     Allergies   Allergies   Allergen Reactions     Blood Transfusion Related (Informational Only) Other (See Comments)     Patient has a history of a significant allergic transfusion reaction.  Consult with Blood Bank MD before ordering components.  "      Social History   I have reviewed this patient's social history and updated it with pertinent information if needed. Oswaldo Win  reports that he quit smoking about 18 years ago. His smoking use included Cigarettes. He has a 20.00 pack-year smoking history. He has never used smokeless tobacco. He reports that he drinks alcohol. He reports that he does not use illicit drugs.    Family History   I have reviewed this patient's family history and updated it with pertinent information if needed.   Family History   Problem Relation Age of Onset     HEART DISEASE Mother 50     CAB, pacemaker     HEART DISEASE Father 41     MI     Breast Cancer Paternal Grandmother      HEART DISEASE Maternal Uncle      Breast Cancer Paternal Aunt      Cerebrovascular Disease No family hx of      Diabetes No family hx of        Review of Systems   Patient intubated at time of assessment, unable to provide ROS.    Physical Exam   Temp: 101.1  F (38.4  C) Temp src: Axillary BP: 122/57   Heart Rate: 92 Resp: 22 SpO2: 99 % O2 Device: Mechanical Ventilator Oxygen Delivery: 10 LPM  Vital Signs with Ranges  Temp:  [97.4  F (36.3  C)-101.9  F (38.8  C)] 101.1  F (38.4  C)  Heart Rate:  [50-92] 92  Resp:  [12-27] 22  BP: (116-140)/(42-88) 122/57  MAP:  [58 mmHg-106 mmHg] 76 mmHg  Arterial Line BP: ()/(41-71) 118/55  FiO2 (%):  [40 %-100 %] 100 %  SpO2:  [96 %-100 %] 99 %  117 lbs 4.56 oz    Heart Rate: 92, Blood pressure 122/57, pulse 71, temperature 101.1  F (38.4  C), temperature source Axillary, resp. rate 22, height 1.676 m (5' 6\"), weight 53.2 kg (117 lb 4.6 oz), SpO2 99 %.  117 lbs 4.56 oz    HEENT:  Normocephalic, surgical dressing intact, drains in place.  PERRLA.  Nares are clear.  Mouth without lesions.  Neck:  Supple, non-tender, without lymphadenopathy.  Heart:  Regular rate and rhythm without murmur, rub or gallop.  Lungs:  Clear to auscultation bilaterally.  No wheezes, rhonchi or rales.  Abdomen:  Soft, non-tender, " non-distended.  Normal bowel sounds.  Skin:  Warm and dry, good capillary refill.  Extremities:  Good radial and dorsalis pedis pulses bilaterally, no edema, cyanosis or clubbing.    NEUROLOGICAL EXAMINATION:     MENTAL STATUS:   Lethargic, followed commands with lower extremities, no following with other assessment requests.    CRANIAL NERVES: PERRL. Right gaze preference.  Cough/gag intact, oculocephalics intact and crosses midline with head movement.       MOTOR: unable to assess antigravity movement in upper extremities, patient unable to participate with exam.  Spontaneously moves lower extremities antigravity.    SENSORY:  WILLIAM    REFLEXES: 2+ throughout.  There was no clonus present.  Toes down-going.    CEREBELLAR FUNCTIONING: WILLIAM     STATION AND GAIT: deferred.    Data   All new lab and imaging data was personally reviewed by me.  CT: 7/11/2018 1900  1. New postoperative changes of efren hole evacuation of subdural  hematoma over the left frontal lobe with placement of subdural drain.  There is new hyperdense blood in the posterior segment of the  evacuated hematoma.   2. Postoperative pneumocephalus with maximum thickness of 28 mm and  mass effect on the left frontal lobe.  3. There is a 14 mm the left-to-right midline shift at the level of  septum pellucidum, previously 17 mm. No appreciable change in  subfalcine and left uncal herniation.  4. Redistribution of blood products versus new hemorrhage including  the subdural hematoma along the falx and subarachnoid blood in the  left right region sulci  CMP  Recent Labs  Lab 07/12/18  0519 07/11/18  2347 07/11/18  1519 07/11/18  1425 07/11/18  0602 07/10/18  0820 07/09/18  2255   * 144 141 141 142 142 141   POTASSIUM 3.8 3.5 4.5 3.3* 3.6 3.9 3.6   CHLORIDE 114* 114*  --   --  113* 111* 107   CO2 17* 17*  --   --  17* 20 22   ANIONGAP 13 13  --   --  12 11 12   * 120* 158* 107* 89 103* 108*   BUN 11 12  --   --  12 19 23   CR 0.92 0.85  --   --   0.82 0.84 0.93   GFRESTIMATED 80 88  --   --  >90 89 79   GFRESTBLACK >90 >90  --   --  >90 >90 >90   RODNEY 7.6* 7.8*  --   --  8.6 8.4* 9.1   MAG 1.9  --   --   --   --  2.3  --    PHOS 2.4*  --   --   --  2.7 2.4*  --    PROTTOTAL  --   --   --   --  6.1*  --  7.4   ALBUMIN  --   --   --   --  3.0*  --  3.9   BILITOTAL  --   --   --   --  1.0  --  1.0   ALKPHOS  --   --   --   --  181*  --  227*   AST  --   --   --   --  56*  --  60*   ALT  --   --   --   --  11 -- 12     CBC  Recent Labs  Lab 07/12/18 0519 07/11/18  2347 07/11/18  1705 07/11/18  1519 07/11/18  1444   WBC 2.7* 3.6* 4.0  --  2.1*   RBC 3.20* 3.81* 3.88*  --  2.18*   HGB 9.9* 11.5* 12.0* 10.5* 6.8*   HCT 28.9* 34.3* 34.5*  --  19.9*   MCV 90 90 89  --  91   MCH 30.9 30.2 30.9  --  31.2   MCHC 34.3 33.5 34.8  --  34.2   RDW 16.4* 16.2* 15.4*  --  16.7*   * 149* 187  --  81*     INR  Recent Labs  Lab 07/12/18  0519 07/11/18  2347 07/11/18  1705 07/11/18  1444   INR 1.37* 1.38* 1.40* 1.51*     Arterial Blood Gas  Recent Labs  Lab 07/12/18  0519 07/12/18  0229 07/11/18  2356 07/11/18  1519   PH 7.42 7.43 7.42 7.20*   PCO2 31* 19* 30* 45   PO2 246* 150* 305* 138*   HCO3 20* 13* 19* 17*   O2PER 100.0 100.0 100.0 40.0       I have discussed the following assessment and plan with the Dr. Tyson who is in agreement with initial plan and will follow up with further consultation recommendations.    Mai Aguero MSN, [facility]  NeuroCritical Care   Pager: [phone]

## 2018-07-12 NOTE — PLAN OF CARE
Problem: Patient Care Overview  Goal: Plan of Care/Patient Progress Review  OT 4AB: Hold - Pt remains intubated/sedated no immediate ROM concerns. Will reschedule OT evaluation.

## 2018-07-12 NOTE — PLAN OF CARE
Problem: Patient Care Overview  Goal: Plan of Care/Patient Progress Review  Outcome: Improving  D=Pt remain vented and sedated. Heart rate in 80s no ectopy,SBP in 100-120s.On Propofol at 40mcg/kg/min weaned to off at 0615.Head dsg dry intact, head KORIN to bulb suction(thumbprint) w/ small amt of serosanguinous drainage. 1(5) pack of cryo was given,NS infusing at 75cc/hr.Pt was put on PS at 0545  Tolerating well,has small amt of pinkish thick secretions. Pt kept flat.Urine output 30cc/hr.Neuro check done every hour SEE FLOW SHEETS for details. Pt able to follow commands at 0700 during NSG team rounds.Febrile, Blood /urine/and sputum was sent for cultures  P=Continue to closely monitor pt

## 2018-07-12 NOTE — PROCEDURES
Bridle Placement:   Reason for bridle placement: securement of FT   Medicine delivered during procedure: lubricating jelly   Procedure: Successful   Location of top of clip on FT: @ 100 cm marker   Condition of nose/skin at time of bridle placement: Unremarkable   Face to Face time with patient: 5 minutes.  Tavia Rivas RD, SAVANA, [facility]  Neuro ICU  Pager: [phone]

## 2018-07-12 NOTE — PLAN OF CARE
Problem: Restraint for Non-Violent/Non-Self-Destructive Behavior  Goal: Prevent/Manage Potential Problems  Maintain safety of patient and others during period of restraint.  Promote psychological and physical wellbeing.  Prevent injury to skin and involved body parts.  Promote nutrition, hydration, and elimination.   Outcome: No Change  D=Pt remain vented and sedated. Pt soft restraints off during cares reapplied for pts safety  P=Continue to closely monitor the need for restraints

## 2018-07-12 NOTE — PROGRESS NOTES
CLINICAL NUTRITION SERVICES - REASSESSMENT NOTE     Nutrition Prescription    Recommendations already ordered by Registered Dietitian (RD):  Impact Peptide @ goal 45 ml/hr (1080 ml/day) to provide 1620 kcals (32 kcal/kg/day), 102 g PRO (2 g/kg/day), 832 ml free H2O, 69 g Fat (50% from MCTs), 151 g CHO and no Fiber daily.   Free Flush Water: 30 ml q4h (for tube patency)   - Start @ 10 ml/hr and advance to goal by 10 ml q8h  - Monitor lytes (K+, Mg, and Phos), patient at higher risk for refeeding.     Future/Additional Recommendations:  1. Monitor WOCN notes for staging of pressure injury and need for vitamin/mineral supplementation.      2. If able/appropriate order metabolic cart study to better understand estimated needs.   *Refer to RD note on 7/10 for full nutrition assessment and recommendations.      NEW FINDINGS   Information obtained from patient's family:  - Pt's son noticed that his father had poor PO the week PTA. Patient's son also stated his father had poor PO intake over the past 1-2 months.     Labs: M.9 (WNL), Phos: 2.4 (L), K+: 3.8 (WNL); Urinary Ketones: 10 (Abnormal)--may indicate poor PO intake      Skin:  WOCN began following on : Evaluate and treat coccyx wound-- intergluteal cleft wound due to Moisture Associated Skin Damage (MASD)- small fissure; Status: initial assessment     INTERVENTIONS  Implementation  Collaboration with other providers-- Spoke with team during round in AM.   Enteral Nutrition - Initiate (see above)   Feeding tube flush- See above     Monitoring/Evaluation  Progress toward goals will be monitored and evaluated per protocol.    Jessie Olivas, RD, LD

## 2018-07-12 NOTE — PHARMACY-VANCOMYCIN DOSING SERVICE
Pharmacy Vancomycin Initial Note  Date of Service 2018  Patient's  1945  73 year old, male    Indication: Sepsis    Current estimated CrCl = Estimated Creatinine Clearance: 53 mL/min (based on Cr of 0.92).    Creatinine for last 3 days  2018: 10:55 PM Creatinine 0.93 mg/dL  7/10/2018:  8:20 AM Creatinine 0.84 mg/dL  2018:  6:02 AM Creatinine 0.82 mg/dL; 11:47 PM Creatinine 0.85 mg/dL  2018:  5:19 AM Creatinine 0.92 mg/dL    Recent Vancomycin Level(s) for last 3 days  No results found for requested labs within last 72 hours.      Vancomycin IV Administrations (past 72 hours)      No vancomycin orders with administrations in past 72 hours.                Nephrotoxins and other renal medications (Future)    Start     Dose/Rate Route Frequency Ordered Stop    18 1645  piperacillin-tazobactam (ZOSYN) 4.5 g vial to attach to  mL bag     Comments:  Dose adjusted per renal dosing policy.  Estimated CrCl = >40 mL/min.     4.5 g  over 30 Minutes Intravenous EVERY 6 HOURS 18 1618            Contrast Orders - past 72 hours     None                Plan:  1.  Start vancomycin load 1500 once then 1250mg Q24H  2.  Goal Trough Level: 15-20 mg/L   3.  Pharmacy will check trough levels as appropriate in 1-3 Days.    4. Serum creatinine levels will be ordered daily for the first week of therapy and at least twice weekly for subsequent weeks.    5. Bristol method utilized to dose vancomycin therapy: Combination of methods    Lauren Bella, Pharmacy Resident

## 2018-07-12 NOTE — PHARMACY-CONSULT NOTE
Pharmacy Tube Feeding Consult    Medication reviewed for administration by feeding tube and for potential food/drug interactions.    Recommendation: No changes are needed at this time. Changed Senna-docusate to liquid formulation for ease of administration      Pharmacy will continue to follow as new medications are ordered.    Lauren Bella, Pharmacy Resident

## 2018-07-12 NOTE — PROGRESS NOTES
"S: Unable to provide    O:  Exam:  General: intubated  Pulm: mechanical ventilation   Mental status: eyes open to stimuli  Cranial nerves: pupils equal and reactive  Motor: localizes in BUE, withdrawing in BLE    A:  73 M s/p efren holes for drainage of chronic subdural hematoma; guarded prognosis    P:  Operation: Status post left efren holes and drainage for subdural hematoma; Sutures out: 7/25  Pulmonary: wean to extubate; continue 100% FiO2 until extubated   Drains: subdural KORIN drain   Imaging: post-op head CT pending  Pain: controlled   Anti-epileptics: keppra 1g BID  Blood pressure goals: SBP < 140   Diet: NPO  Hematological goals: Platelets > 100k, INR < 1.5, Hemoglobin > 7; continue checking coags and CBC q12h  Blood glucose control: controlled  Antibiotics: demi-op  PT/OT: pending critical illness  DVT prophylaxis: Sequential compression devices  Ulcer prophylaxis: protonix   DISPO:  pending given critical illness   Barriers: Extubation, Surgical drain, evaluation by therapies, ambulating, BM/flatus, voiding without a Gamez, tolerating PO diet, pain controlled on PO medications    Fritz \"Manan\" MD [place]   Neurosurgery, PGY-2    Please contact neurosurgery resident on call with questions.    Dial * * [phone], enter [phone] when prompted.     "

## 2018-07-12 NOTE — PROVIDER NOTIFICATION
Pt not following commands, withdrawing in all extremities to pain.  Previous exam pt was squeezing hands to command.  See flowsheets.  Neurosurgery (YAJAIRA Roman) and Neuro Crit (Kilo Packer) notified.  Kilo Packer MD came to see pt and pt slightly squeezed MDs hands.    Pt continues to not follow commands for 1600 exam.  Neurocrit notified.      P: Stat head CT

## 2018-07-12 NOTE — MR AVS SNAPSHOT
After Visit Summary   7/12/2018    Oswaldo Win    MRN: [id number]           Patient Information     Date Of Birth          1945        Visit Information        Provider Department      7/12/2018 6:45 PM [facility] EEG TECH 4 [facility] EEG        Today's Diagnoses     Seizure-like activity (H)    -  1       Follow-ups after your visit        Your next 10 appointments already scheduled     Jul 13, 2018  7:00 AM CDT   24 Hour Video Visit with [facility] EEG TECH 4   [facility] EEG ([facility])    [facility]  [address] [facility] [address]   [phone]           [place]: Your appointment is scheduled at [facility]. [address] [place] [address]            Jul 25, 2018  9:00 AM CDT   [facility] Lab Draw with  [employer] LAB DRAW   [facility] ([facility])    [address] [place] [address]  [address]  [facility] [id number] [phone]            Jul 25, 2018 10:00 AM CDT   (Arrive by 9:45 AM)   Return Visit with Bentley Robles MD   [facility] ([facility])    [address] [place] [address]  [address]  [facility] [id number] [phone]              Who to contact     Please call your clinic at [phone] to:    Ask questions about your health    Make or cancel appointments    Discuss your medicines    Learn about your test results    Speak to your doctor            Additional Information About Your Visit        MyChart Information     Guidekickt is an electronic gateway that provides easy, online access to your medical records. With natue, you can request a clinic appointment, read your test results, renew a prescription or communicate with your care team.     To sign up for Guidekickt visit the website at [email or web address]   You will be asked to enter the access code listed below, as well as some personal information. Please follow the directions  to create your username and password.     Your access code is: [email or web address]  Expires: 10/9/2018  6:30 AM     Your access code will  in 90 days. If you need help or a new code, please contact your [facility] or call [phone] for assistance.        Care EveryWhere ID     This is your Care EveryWhere ID. This could be used by other organizations to access your [facility] medical records  [id number]         Blood Pressure from Last 3 Encounters:   18 93/66   18 104/55   18 91/57    Weight from Last 3 Encounters:   18 53.2 kg (117 lb 4.6 oz)   18 50.3 kg (111 lb)   18 52.6 kg (116 lb)              Today, you had the following     No orders found for display         Today's Medication Changes      Notice     This visit is during an admission. Changes to the med list made in this visit will be reflected in the After Visit Summary of the admission.             Primary Care Provider Office Phone # Fax #    Lucrecia Collier [phone]       [address]  [facility] [address]        Equal Access to Services     BLADIMIR KEY : Hadjaswinder purcello Sokwame, waaxda luqadaha, qaybta kaalmada adeegyada, peter brock. So [facility] [phone].    ATENCIÓN: Si habla español, tiene a mccall disposición servicios gratuitos de asistencia lingüística. Llame al [phone].    We comply with applicable federal civil rights laws and [place] laws. We do not discriminate on the basis of race, color, national origin, age, disability, sex, sexual orientation, or gender identity.            Thank you!     Thank you for choosing [facility]  for your care. Our goal is always to provide you with excellent care. Hearing back from our patients is one way we can continue to improve our services. Please take a few minutes to complete the written survey that you may receive in the mail after your visit with us. Thank you!              Your Updated Medication List - Protect others around you: Learn how to safely use, store and throw away your medicines at www.disposemymeds.org.      Notice     This visit is during an admission. Changes to the med list made in this visit will be reflected in the After Visit Summary of the admission.

## 2018-07-12 NOTE — PLAN OF CARE
Problem: Restraint for Non-Violent/Non-Self-Destructive Behavior  Goal: Prevent/Manage Potential Problems  Maintain safety of patient and others during period of restraint.  Promote psychological and physical wellbeing.  Prevent injury to skin and involved body parts.  Promote nutrition, hydration, and elimination.   Outcome: No Change  Continue soft wrist restraints for safety to keep pt from pulling at lines and tubes while intubated.

## 2018-07-13 NOTE — MR AVS SNAPSHOT
After Visit Summary   7/13/2018    Oswaldo Win    MRN: [id number]           Patient Information     Date Of Birth          1945        Visit Information        Provider Department      7/13/2018 7:00 AM [facility] EEG TECH 4 [facility] EEG        Today's Diagnoses     Subdural hematoma (H)    -  1       Follow-ups after your visit        Your next 10 appointments already scheduled     Jul 17, 2018  7:00 AM CDT   24 Hour Video Visit with [facility] EEG TECH 4   [facility] EEG ([facility])    [facility]  [address] [facility] [address]   [phone]           [place]: Your appointment is scheduled at [facility]. [address] [place] [address]            Jul 25, 2018  9:00 AM CDT   [facility] Lab Draw with  [employer] LAB DRAW   [facility] ([facility])    [address] [place] [address]  [address]  [facility] [id number] [phone]            Jul 25, 2018 10:00 AM CDT   (Arrive by 9:45 AM)   Return Visit with Bentley Robles MD   [facility] ([facility])    [address] [place] [address]  [address]  [facility] [id number] [phone]              Who to contact     Please call your clinic at [phone] to:    Ask questions about your health    Make or cancel appointments    Discuss your medicines    Learn about your test results    Speak to your doctor            Additional Information About Your Visit        MyChart Information     Swisht is an electronic gateway that provides easy, online access to your medical records. With GlobeRanger, you can request a clinic appointment, read your test results, renew a prescription or communicate with your care team.     To sign up for Swisht visit the website at [email or web address]   You will be asked to enter the access code listed below, as well as some personal information. Please follow the directions to  create your username and password.     Your access code is: [email or web address]  Expires: 10/9/2018  6:30 AM     Your access code will  in 90 days. If you need help or a new code, please contact your [facility] or call [phone] for assistance.        Care EveryWhere ID     This is your Care EveryWhere ID. This could be used by other organizations to access your [facility] medical records  [id number]         Blood Pressure from Last 3 Encounters:   18 110/52   18 104/55   18 91/57    Weight from Last 3 Encounters:   18 53.2 kg (117 lb 4.6 oz)   18 50.3 kg (111 lb)   18 52.6 kg (116 lb)              Today, you had the following     No orders found for display         Today's Medication Changes      Notice     This visit is during an admission. Changes to the med list made in this visit will be reflected in the After Visit Summary of the admission.             Primary Care Provider Office Phone # Fax #    Lucrecia Collier [phone]       [address] [facility] [address]        Equal Access to Services     BLADIMIR KEY : Hadjaswinder purcello Sokwame, waaxda luqadaha, qaybta kaalmada adeparishda, peter brock. So [facility] [phone].    ATENCIÓN: Si habla español, tiene a mccall disposición servicios gratuitos de asistencia lingüística. Llame al [phone].    We comply with applicable federal civil rights laws and [place] laws. We do not discriminate on the basis of race, color, national origin, age, disability, sex, sexual orientation, or gender identity.            Thank you!     Thank you for choosing [facility]  for your care. Our goal is always to provide you with excellent care. Hearing back from our patients is one way we can continue to improve our services. Please take a few minutes to complete the written survey that you may receive in the mail after your visit with us. Thank you!              Your Updated Medication List - Protect others around you: Learn how to safely use, store and throw away your medicines at www.disposemymeds.org.      Notice     This visit is during an admission. Changes to the med list made in this visit will be reflected in the After Visit Summary of the admission.

## 2018-07-13 NOTE — PLAN OF CARE
Problem: Patient Care Overview  Goal: Plan of Care/Patient Progress Review  Outcome: Declining  Neuro: Tmax 102.5 axillary, follows commands to squeeze in upper extremities, intermittently wiggles toes, lethargic.  Stat head CT this evening for changed exam of not following commands, see note.  Resp: Pressure supported until 1200 and switched to CMV for tachypnea, fio2 100%  Cardio: 1 L NS bolus given for hypotension, norepi started, tachycardia up to 115, 12 lead ECG, troponin drawn  GI: NJ placed, no BM  : Agmez, low urine output, less than <30 cc/hr  Access: CVC, arterial line, 2 PIVs  Drips: Norepi, Normal Saline  KORIN to thumbprint suction.  PRN dilaudid for pain.  Precedex started briefly for discomfort and discontinued for hypotension.  Pt's son at bedside and his spouse throughout the day.    P: No longer need HOB flat for pneumocephalus, start tube feeds this evening.  Continue current cares. Notify neuro critical care with concerns.

## 2018-07-13 NOTE — PROGRESS NOTES
EEG CLINICAL NEUROPHYSIOLOGY PRELIMINARY REPORT    First hour of video-EEG reviewed. Six Hz posterior dominant rhythm with moderate amounts of diffuse delta. Attenuation of posterior dominant rhythm over left hemisphere. Infrequent generalized periodic sharp waves.    Study consistent with moderate diffuse encephalopathy. Additional evidence of focal cortical and subcortical dysfunction over left hemisphere consistent with known subdural collection and/or its effects. Thus far no evidence of seizures or seizure tendency. Will continue video-EEG monitoring. Full report to follow.    Phil Chavis MD  Pager [phone]

## 2018-07-13 NOTE — PROGRESS NOTES
"   07/13/18 1100   Quick Adds   Type of Visit Initial Occupational Therapy Evaluation   Living Environment   Lives With alone   Living Arrangements house   Living Environment Comment Pt intubated with AMS and difficult to obtain living environment and PLOF. No family present for OT evaluation.  Per MD note, \"Patient reports that for the past week he has been feeling generally weak, which he describes as feeling like he has no strength and has difficulty with balance.\" Pt reports poor nutrition due to difficulty ambulating to the kitchen.  Per MD note, pt was living in his house alone with no [facility] services.    Self-Care   Activity/Exercise/Self-Care Comment Per MD note, \"Patient reports that for the past week he has been feeling generally weak, which he describes as feeling like he has no strength and has difficulty with balance.\" Pt reports poor nutrition due to difficulty ambulating to the kitchen.     Functional Level Prior   Fall history within last six months yes   Prior Functional Level Comment Per notes, pt with fall in the bathroom prior to admission.    General Information   Onset of Illness/Injury or Date of Surgery - Date 07/10/18   Patient/Family Goals Statement none stated    Precautions/Limitations other (see comments)  (cranitiomy precautions)   General Observations Activity orders: Bedrest with HOB 30 degrees   Cognitive Status Examination   Cognitive Comment Pt arouses to voice and delayed motor planning/processing noted. Pt following 50% of simple commands and nodding yes/no to communicate. Per nods head no to place/situation.    Visual Perception   Visual Perception Comments No diplopia observed however further vision assessment limited to level of alertness.    Range of Motion (ROM)   ROM Comment PROM to BUEs/LEs WFL   Strength   Strength Comments Pt with antigravity strength in R traps, bicpes, triceps, and decreased R  strength. Pt with generalized weakness in LUE.    Mobility   Bed Mobility " "Comments not appropriate for OOB activity    Activities of Daily Living Analysis   Impairments Contributing to Impaired Activities of Daily Living balance impaired;cognition impaired;coordination impaired;strength decreased;post surgical precautions;postural control impaired;ROM decreased   General Therapy Interventions   Planned Therapy Interventions IADL retraining;ADL retraining;balance training;bed mobility training;cognition;fine motor coordination training;strengthening;ROM;transfer training;home program guidelines;progressive activity/exercise;risk factor education   Clinical Impression   Criteria for Skilled Therapeutic Interventions Met yes, treatment indicated   OT Diagnosis decreased ADLI    Influenced by the following impairments R sided weakness, deconditioning, cognitive impairments    Assessment of Occupational Performance 3-5 Performance Deficits   Identified Performance Deficits bed mobility, dressing, bathing, toilet, home management    Clinical Decision Making (Complexity) Low complexity   Therapy Frequency 3 times/wk   Predicted Duration of Therapy Intervention (days/wks) 3 weeks    Anticipated Discharge Disposition Transitional Care Facility   Risks and Benefits of Treatment have been explained. Yes   Patient, Family & other staff in agreement with plan of care Yes   Clinical Impression Comments Pt presents with R sided weakness, deconditioning, cognitive impairments leading to decreased ADL I. Pt to benefit from skilled OT intervention to address the above stated deficits.    [facility] leaselockPAC TM \"6 Clicks\"   2016, Trustees of [facility], under license to [employer].  All rights reserved.   6 Clicks Short Forms Daily Activity Inpatient Short Form   [facility] AM-PAC  \"6 Clicks\" Daily Activity Inpatient Short Form   1. Putting on and taking off regular lower body clothing? 1 - Total   2. Bathing (including washing, rinsing, drying)? 1 - Total   3. Toileting, which includes " using toilet, bedpan or urinal? 1 - Total   4. Putting on and taking off regular upper body clothing? 1 - Total   5. Taking care of personal grooming such as brushing teeth? 1 - Total   6. Eating meals? 1 - Total   Daily Activity Raw Score (Score out of 24.Lower scores equate to lower levels of function) 6   Total Evaluation Time   Total Evaluation Time (Minutes) 5

## 2018-07-13 NOTE — CONSULTS
Cardiology Consult         Date of Service (when I saw the patient): 07/13/18    ASSESSMENT & PLAN:   73 year old male with a history of CAD s/p inferior STEMi 4/2015 s/p BMS pRCA and CAB L-LAD 6/2014, NSTEMi in 11/2014 with re stenosis of his BMS s/p KIN,MR s/p mitral clip with residual moderate to severe MR, metastatic prostate cancer provoked DVT and PE  whose course was complicated by a fall in the bathroom and subdural hematoma s/p evacuation now POD #1 postop course complicated by fever and hypotension mild troponin leak and transient non specific st changes on ECG    - likely demand ischemia in the setting of fever hypotension   - he has a history of PE and has some septal wall motion abnormalities that were present in the past on bedside echo some signs of RV pressure overload IVC 2.0cm on the vent  -trend troponin tonight   - formal echo in the am   - per cardiology keep MAP>65     Case to be formally staffed in the am    Inocencia Virgen   Cardiology fellow, PGY-5    REASON FOR CONSULT: elevated trop, hypotension    History of Present Illness   Oswaldo Win is a 73 year old male with a history of CAD s/p inferior STEMi 4/2015 s/p BMS pRCA and CAB L-LAD 6/2014, NSTEMi in 11/2014 with re stenosis of his BMS s/p KIN,MR s/p mitral clip with residual moderate to severe MR, metastatic prostate cancer provoked DVT and PE  whose course was complicated by a fall in the bathroom and subdural hematoma s/p evacuation now POD #1 postop course complicated by fever and hypotension .    Last echo had shown an EF of 45-50% septal wall akinesis normal RV function, moderate to severe MR  Throughout today he has been progressively more hypotensive and was febrile yesterday. The primary team started norepi and vanc and zosyn assuming a septic etiology. An ECG was ordered that showed NSR tachy in the 110's and some non specific T wave changes in the inferior leads repeat ECG later tonight with normalized segments.  Troponin was 1.17 (pre procedure it was undetectable).            Past Medical History    I have reviewed this patient's medical history and updated it with pertinent information if needed.   Past Medical History:   Diagnosis Date     Arthritis      ASCVD (arteriosclerotic cardiovascular disease) 4/10/2014     Closed L4 vertebral fracture (H) 2005    traumatic fracture     COPD (chronic obstructive pulmonary disease) (H)      Dyspnea on exertion      Fx tib/fib fol insrt ortho implnt/prosth/bone plt, right leg (H) 2005    rt tib/fib fracture s/p ORIF     Gastro-oesophageal reflux disease      Hx of right coronary artery stent placement 2014 4/2014 and redone 9/2014     Hyperlipidemia LDL goal < 70      Hypertension      Influenza A 3/27/2016     Musculoskeletal leg pain     from old leg injuries     Other chronic pain      Prostate cancer metastatic to bone (H)      Pulmonary embolism (H) 11/8/16 November 2016     ST elevation MI (STEMI) (H) 4/5/2014     Stented coronary artery      Thrombosis of leg 10/2015    October 2015       Past Surgical History   I have reviewed this patient's surgical history and updated it with pertinent information if needed.  Past Surgical History:   Procedure Laterality Date     ANESTHESIA OUT OF OR PERCUTANEOUS MITRAL VALVE REPAIR N/A 10/16/2015    Procedure: ANESTHESIA OUT OF OR PERCUTANEOUS MITRAL VALVE REPAIR;  Surgeon: GENERIC ANESTHESIA PROVIDER;  Location: [facility]     SONIA HOLE(S) EVACUATE HEMATOMA SUBDURAL Left 7/11/2018    Procedure: SONIA HOLE(S) EVACUATE HEMATOMA SUBDURAL;  Left Sonia Holes for Subdural Hematoma Evacuation ;  Surgeon: Javad Coats MD;  Location: [facility]     COLONOSCOPY       COLONOSCOPY N/A 1/13/2017    Procedure: COLONOSCOPY;  Surgeon: Telly Anderson MD;  Location: [facility]     COMBINED CYSTOSCOPY, RETROGRADES, EXCHANGE STENT URETER(S) Left 12/14/2015    Procedure: COMBINED CYSTOSCOPY, RETROGRADES, EXCHANGE STENT URETER(S);  Surgeon: Ashley  Maame Hooper MD;  Location: [facility]     DAVINCI BYPASS ARTERY CORONARY  7/1/2014    Procedure: DAVINCI BYPASS ARTERY CORONARY;  Surgeon: Kike Coley MD;  Location: [facility]     ESOPHAGOSCOPY, GASTROSCOPY, DUODENOSCOPY (EGD), COMBINED N/A 1/13/2017    Procedure: COMBINED ESOPHAGOSCOPY, GASTROSCOPY, DUODENOSCOPY (EGD);  Surgeon: Telly Anderson MD;  Location: [facility]     EXCISE MASS LOWER EXTREMITY Right 2/5/2016    Procedure: EXCISE MASS LOWER EXTREMITY;  Surgeon: Miquel Figueredo MD;  Location: [facility]     HERNIORRHAPHY INGUINAL Left 1968     HERNIORRHAPHY INGUINAL Left 11/18/2014    Procedure: HERNIORRHAPHY INGUINAL;  Surgeon: Max Garza MD;  Location: [facility]     LASER HOLMIUM LITHOTRIPSY URETER(S), INSERT STENT, COMBINED N/A 7/27/2015    Procedure: COMBINED CYSTOSCOPY, URETEROSCOPY, LASER HOLMIUM LITHOTRIPSY URETER(S), INSERT STENT;  Surgeon: Maame Ramirez MD;  Location: [facility]     LASER HOLMIUM LITHOTRIPSY URETER(S), INSERT STENT, COMBINED Left 9/19/2016    Procedure: COMBINED CYSTOSCOPY, URETEROSCOPY, LASER HOLMIUM LITHOTRIPSY URETER(S), INSERT STENT;  Surgeon: Maame Ramirez MD;  Location: [facility]     OPEN REDUCTION INTERNAL FIXATION TIBIA 2005    RT Tib/Fib ORIF, crushed LT calc     TONSILLECTOMY & ADENOIDECTOMY  age 18       Prior to Admission Medications   Prior to Admission Medications   Prescriptions Last Dose Informant Patient Reported? Taking?   HYDROcodone-acetaminophen (NORCO) 5-325 MG per tablet Past Week at Unknown time  Yes Yes   Sig: Take 1 tablet by mouth every 6 hours as needed for severe pain   aspirin 81 MG tablet Past Week at Unknown time  No Yes   Sig: Take 1 tablet (81 mg) by mouth daily   palbociclib (IBRANCE) 125 MG capsule CHEMO   No No   Sig: Take 1 capsule (125 mg) by mouth daily with food      Facility-Administered Medications: None     Allergies   Allergies   Allergen Reactions     Blood Transfusion Related (Informational Only) Other (See  Comments)     Patient has a history of a significant allergic transfusion reaction.  Consult with Blood Bank MD before ordering components.       Social History   I have reviewed this patient's social history and updated it with pertinent information if needed. Oswaldo Win  reports that he quit smoking about 18 years ago. His smoking use included Cigarettes. He has a 20.00 pack-year smoking history. He has never used smokeless tobacco. He reports that he drinks alcohol. He reports that he does not use illicit drugs.    Family History   I have reviewed this patient's family history and updated it with pertinent information if needed.   Family History   Problem Relation Age of Onset     HEART DISEASE Mother 50     CAB, pacemaker     HEART DISEASE Father 41     MI     Breast Cancer Paternal Grandmother      HEART DISEASE Maternal Uncle      Breast Cancer Paternal Aunt      Cerebrovascular Disease No family hx of      Diabetes No family hx of        Review of Systems Limited by sedation and intubation     Physical Exam   Temp: 99.4  F (37.4  C) Temp src: Axillary BP: 93/66   Heart Rate: 105 Resp: 30 SpO2: 96 % O2 Device: Mechanical Ventilator    Vital Signs with Ranges  Temp:  [98.9  F (37.2  C)-102.5  F (39.2  C)] 99.4  F (37.4  C)  Heart Rate:  [] 105  Resp:  [12-30] 30  BP: ()/(57-66) 93/66  MAP:  [50 mmHg-86 mmHg] 80 mmHg  Arterial Line BP: ()/(38-61) 112/61  FiO2 (%):  [100 %] 100 %  SpO2:  [94 %-100 %] 96 %  117 lbs 4.56 oz    GEN: sedated intubated with EEG leads   CV: RRR systolic murmur at apex, JVP 8cm  CHEST: decreased breath sounds at bases, ventilated  ABD: soft, NT/ND, NABS  : no flank/suprapubic tenderness  LE with no edema     Data   Data reviewed today:    ECG tonight shows NSR with normalized T wave and ST segments      ECG 17:54- TW inverssion in V3-V5, III      Baseline ECG- NSR       Echo 2/20/18  Left ventricular size is normal.  The Ejection Fraction is estimated at  45-50%.  Right ventricular function, chamber size, wall motion, and thickness are normal.  S/P Mitral Clip. Clip on posterior leaflet is very mobile since placement. No  change in appearance. There is moserate to severe mitral regurgitation.  The inferior vena cava is normal.  No pericardial effusion is present.        Recent Labs  Lab 07/12/18  1814 07/12/18  1302 07/12/18  0519 07/11/18  2347 07/11/18  1705 07/11/18  1519  07/11/18  0602  07/09/18  2255   WBC  --  2.1* 2.7* 3.6* 4.0  --   < >  --   < > 3.5*   HGB  --  11.1* 9.9* 11.5* 12.0* 10.5*  < >  --   < > 7.3*   MCV  --  88 90 90 89  --   < >  --   < > 97   PLT  --  107* 115* 149* 187  --   < >  --   < > 94*   INR  --   --  1.37* 1.38* 1.40*  --   < >  --   --  1.29*   NA  --   --  145* 144  --  141  < > 142  < > 141   POTASSIUM  --   --  3.8 3.5  --  4.5  < > 3.6  < > 3.6   CHLORIDE  --   --  114* 114*  --   --   --  113*  < > 107   CO2  --   --  17* 17*  --   --   --  17*  < > 22   BUN  --   --  11 12  --   --   --  12  < > 23   CR  --   --  0.92 0.85  --   --   --  0.82  < > 0.93   ANIONGAP  --   --  13 13  --   --   --  12  < > 12   RODNEY  --   --  7.6* 7.8*  --   --   --  8.6  < > 9.1   GLC  --   --  124* 120*  --  158*  < > 89  < > 108*   ALBUMIN  --   --   --   --   --   --   --  3.0*  --  3.9   PROTTOTAL  --   --   --   --   --   --   --  6.1*  --  7.4   BILITOTAL  --   --   --   --   --   --   --  1.0  --  1.0   ALKPHOS  --   --   --   --   --   --   --  181*  --  227*   ALT  --   --   --   --   --   --   --  11  --  12   AST  --   --   --   --   --   --   --  56*  --  60*   TROPI 1.171*  --   --   --   --   --   --   --   --  <0.015   < > = values in this interval not displayed.    Recent Results (from the past 24 hour(s))   CT Head w/o Contrast    Narrative    CT HEAD W/O CONTRAST 7/12/2018 4:42 AM    Provided History: F/u head CT s/p efren holes for drainage of subdural  hematoma    Comparison: Head CT 7/11/2018     Technique: Using multidetector  thin collimation helical acquisition  technique, axial, coronal and sagittal CT images from the skull base  to the vertex were obtained without intravenous contrast.     Findings:    Post surgical changes of left frontoparietal efren holes and left  parietal approach subdural drainage catheter with tip overlying the  left inferior frontal gyrus for evacuation of left hemispheric  subdural hematoma. Decreased left mixed attenuation left hemispheric  subdural hematoma, which measures up to 9 mm in maximal thickness,  previously 15 mm. Decreased 2 to 3 mm thickness left parafalcine  subdural hematoma component. Decreased rightward 9 mm rightward  midline shift, previously 14 mm. Decreased rightward subfalcine  herniation and resolution of left uncal herniation.    Small scattered subarachnoid hemorrhage over the left hemisphere  convexities. Slightly decreased dilatation of the right lateral  ventricle. Persistent hypoattenuation surrounding the right lateral  ventricle, secondary to transependymal CSF flow. Postoperative  pneumocephalus.    Gray-white matter differentiation is intact. Scattered calvarial  sclerotic osseous metastases. Paranasal sinuses and mastoid air cells  are clear.      Impression    IMPRESSION:  1. Decreased partially evacuated left hemispheric subdural hematoma  and associated rightward midline shift, rightward subfalcine  herniation and obstructive hydrocephalus. Resolution of left uncal  herniation.  2. Small scattered subarachnoid hemorrhage overlying the left parietal  convexities.    I have personally reviewed the examination and initial interpretation  and I agree with the findings.    DUSTY ALEMAN MD   XR Chest Port 1 View    Narrative    EXAM: XR CHEST PORT 1 VW  7/12/2018 6:44 AM     HISTORY:  ETT, febrile;        COMPARISON:    TECHNIQUE:  PA and lateral radiographs  of the chest.    Findings:   Supine frontal views of chest.   The tip of right IJ catheter now projects over SVC.  The  endotracheal tube tip projects over the upper trachea. Mitral  clips in stable position.  Cardiac silhouette and pulmonary vasculatures are within normal  limits. Previously seen Ill-defined multifocal upper lung predominant  opacities is unchanged. No appreciable pneumothorax or pleural  effusion. Redemonstration of diffuse sclerotic  osseous metastases. Visualized upper abdomen is unremarkable.      Impression    Impression:   1. The tip of right IJ catheter now projects SVC demonstrating  appropriate anatomic position.   2. Unchanged Upper lung predominant ill-defined multifocal lung field  opacities demonstrating airspace and interstitial disease.  Differential includes postoperative atelectasis, pulmonary edema,  infection.  3. Unchanged diffuse sclerotic osseous metastases.    I have personally reviewed the examination and initial interpretation  and I agree with the findings.    JEANNINE GUZMÁN MD   XR Abdomen Port 1 View    Narrative    XR ABDOMEN PORT 1 VW  7/12/2018 3:33 PM      HISTORY: Verify small bowel feeding tube bedside placement;     COMPARISON: 1/3/2018, 7/12/2017, earlier 7/12/2018    FINDINGS: Postpyloric enteric tube tip projects over the proximal  jejunum. Diffusely sclerotic bones. No acute fracture. Nonobstructive  bowel gas pattern. Pelvic phleboliths.      Impression    IMPRESSION:   1. Enteric tube tip projects over the proximal jejunum.  2. Diffusely sclerotic bones, consistent with bony carcinomatosis,  given history of prostate adenocarcinoma    I have personally reviewed the examination and initial interpretation  and I agree with the findings.    MARIANNE JIMÉNEZ MD   CT Head w/o Contrast    Narrative    Head CT without contrast    History:  f/u SDH, not following commands; .   ICD-10:  Comparison: Most recently same day 7/12/2018 at 0430 hours and  7/11/2018     Technique: Axial noncontrast CT images are obtained in a spiral  fashion from the foramen magnum to the vertex with  axial and coronal  reconstructions, and viewed with brain and bone windows.    Findings: There is progressively decreasing extra-axial large amount  of pneumocephalus and hyperdense hemorrhage over the last 2  examinations, with minimally decreased mass effect from the subdural  hematoma along the left cerebral convexity compared to the examination  earlier in the day, with continued moderate midline shift of about 6  mm to the right, but no evidence of recurrence of uncal herniation. No  overt cerebral infarction. Left sided drainage catheter is grossly  unchanged in position with tip in the anterior subdural space. Small  amount of subarachnoid hemorrhage also present within the sulci. No  evident hydrocephalus.  The ventricles are not significantly changed  in size. The basal cisterns are now grossly patent.      Impression    Impression:    1. Decreasing pneumocephalus with slightly decreased midline shift  compared to earlier in the day, and no uncal herniation at this time.   2. No significant change in size of the left subdural hemorrhage with  subdural drain.  3. Small amount of subarachnoid hemorrhage persists along the left  convexity.     BOBO QUINTERO MD

## 2018-07-13 NOTE — PROGRESS NOTES
"Social Work: Assessment with Discharge Plan    Patient Name:  Oswaldo Win  :  1945  Age:  73 year old  MRN:  [id number]  Risk/Complexity Score:  Filed Complexity Screen Score: 9  Completed assessment with:  Pt's son and daughter-in-law    Presenting Information   Reason for Referral:  Discharge plan  Date of Intake:  2018  Referral Source:   Auto Case Finding  Decision Maker:  Pt, at baseline  Alternate Decision Maker:  Per [facility] policy, Pt's son would be his surrogate decision-maker.   Health Care Directive:  Patient considering completing; per family, Pt had started to complete a HCD, but did not yet sign it.   Living Situation:  House; Pt lives alone in his own home.  Previous Functional Status:  Independent; Pt had been able to ambulate on his own. Per family, Pt had been a \"pack rat\" and did not appear to managing well at home on his own. They feel that he was not getting enough nutrition, but do think he was able to take his medications appropriately.  Patient and family understanding of hospitalization:  Pt's family is aware of Pt's current workup.  Cultural/Language/Spiritual Considerations:  Per family, Pt does not have any [employer] affiliation.  Adjustment to Illness:  Pt's family is concerned about his current condition and do not feel that he can be safe to return home alone.    Physical Health  Reason for Admission:    1. Generalized muscle weakness    2. Dehydration    3. Symptomatic anemia      Services Needed/Recommended:  TCU    Mental Health/Chemical Dependency  Diagnosis:  None noted.  Support/Services in Place:  N/A  Services Needed/Recommended:  N/A    Support System  Significant relationship at present time:  Son, daughter-in-law, brother and sister-in-law  Family of origin is available for support:  Pt's son and daughter-in-law live 50 miles away and have limited ability to be physically available to Pt.   Other support available:  Pt has a couple good friends nearby who have " been supportive.  Gaps in support system:  Pt does not have 24-hour care available at home.  Patient is caregiver to:  None     Provider Information   Primary Care Physician:  Lucrecia Collier   [phone]   Clinic:  [address] [place] [id number]      :  None    Financial   Income Source:  Retired  Financial Concerns:  None noted  Insurance:    Payor/Plan Subscriber Name Rel Member # Group #   [employer] - [employer] MED[employer]* USAMA MANRIQUE  [id number]       [id number]       Discharge Plan   Patient and family discharge goal:  Pt's family is hopeful that Pt will improve to have a good quality of life in a safe setting.  Provided education on discharge plan:  YES  Patient agreeable to discharge plan:  YES  A list of Medicare Certified Facilities was provided to the patient and/or family to encourage patient choice. Patient's choices for facility are:  Pt's family would prefer a facility in [place] where Pt's mother had been. They could not recall the name, however Writer offered to assist in locating the appropriate facility.  Will NH provide Skilled rehabilitation or complex medical:  YES  General information regarding anticipated insurance coverage and possible out of pocket cost was discussed. Patient and patient's family are aware patient may incur the cost of transportation to the facility, pending insurance payment: YES  Barriers to discharge:  Medical clearance, facility arrangements.    Discharge Recommendations   Anticipated Disposition:  Facility:  TCU  Transportation Needs:  Other:  pending needs at time of transport  Name of Transportation Company and Phone:  N/A    Additional comments   Writer met with Pt's son and daughter-in-law at bedside for psychosocial assessment and discharge planning. Pt's family is agreeable to recommendations for TCU. They feel that Pt will also need a new long-term living situation as they do not feel he will be safe to return home. They state that they have  been concerned about his ability to care for himself at home, even prior to hospitalization. Pt's family feels that TCU rehab will be a good segue to living in either an assisted living or continuum of care facility. [place] and he has friends in the area. SW will follow-up with family to help identify the facility where Pt's mother had stayed in [place], or another appropriate facility.        Renetta Balderas, [facility]  ICU   Pager: [phone]

## 2018-07-13 NOTE — PROGRESS NOTES
[facility]  Neuro Critical Care Service  Neuro-ICU Progress Note       Admission Date: 7/9/2018  Date of Service:07/13/2018   Hospital Day: 5                                                 24 hour event summary:                                       --Patient with fever late afternoon and sputum gram stain findings, empiric antibiotics initiated (vanc/zosyn).  Hypotension overnight which required pressor initiation (levophed).  T-wave inversion appreciated on telemetry, troponins trended and increased, cardiology consulted and bedside echo revealed no acute pathology, formal echo ordered and patient with cvp monitoring and continued troponin trends.        Assessment and Plan:                                         #. Neurological:  #POD 2 left burrhole SDH evacuation.  Pneumocephalus appreciated on imaging post op, patient treated with 100% FiO2 and HOB kept flat, subsequent scan shows improvement.    #encephalopathy, decreased LOC associated with fever, CT head improved and vEEG initiated, preliminary report without evidence of seizures.  - continue vEEG x24 hours  - Keppra 1000mg BID prophylaxis  - surgical drains per neurosurgery  - pain control with prn dilaudid, acetaminophen  - precedex for sedation, goal RASS 0   - PT/OT when appropriate    #. Respiratory:   #Respiratory failure in setting of expanding chronic subdural hematoma  Intubated, CMV/12/450/+5/16, overbreathing ventilator and on PS 7/5 this morning.  Concern for PE with CT angiogram of chest negative.  CXR stable without evidence of infiltrates or significant consolidations.  -- daily spontaneous breathing trials  --Patient is not-ready for weaning from neurological standpoint    #. Cardiovascular:  #H/o CAD with STEMI and CABG, previously not on any medications.  #Troponemia with t-wave changes on telemetry overnight.  Troponin 2.08.    #Hypotension, cannot rule out acute change due to demand ischemia in setting of  fever and metabolic stressors.  Bedside echo obtained with some indication of RV strain and septal wall abnormalities, h/o PE so chest CT angio completed with no evidence of PE.    - Cardiology consult, appreciate recommendations  - SBP < 140  - MAP > 65.    - norepinephrine to maintain MAP goal.    #. Endocrine:   No acute issues.  -no indication for SSI, will continue to monitor.    #. Heme/ID:   H/O progressive prostate CA with bony metastatic disease.  H/O nonocclusive DVT in 2015 and segmental PE in 2016, prescribed lovenox and states he is not always compliant.  - CT angio chest negative for PE  - U/S BLE pending  - anticoagulation and ASA held due to SDH and recent surgery.    #Fever:  Pan culture pending, UA looked concerning for UTI, Sputum with findings of mixed gram positive and gram negative bacteria on gram stain.  Final cultures pending.  Blood cultures no growth to date.  - Acetaminophen prn for fever.    - repeat pan culture in 48 hours if patient spikes new fever  - Vancomycin/Zosyn empiric coverage, will narrow per culture results.  - continue to monitor for other infectious signs or complications    #. GI:  #malnutrition  - history of poor oral intake and malnutrition over past few months.    - NJT placed yesterday by dietician, appreciate TF recs and will advance TF per protocol.  -- TF as ordered  -- Bowel Regimen as ordered  -- will consider SLP evaluation after extubation.    #. Renal:  Gamez catheter in place.  Likely difficult catheter placement and/or retention issues per history of Prostate CA and reports of urinary incontinence and difficulties at home prior to admission.    -- Strict I&Os  -- Continue maintenance IVF as ordered.  -- Electrolyte replacement protocols as ordered  -- continue IVF as ordered, will consider tko after TF at goal.    #. Skin/Musculoskeletal:  -- No issues, high risk for skin breakdown due to poor nutritional status and bedbound capacity at this time.    #.  "PT/OT/Speech:  --hold evaluations      #. ICU prophylaxis:   --Stress ulcer prophylaxis not indicated  --VAP: per protocol  --DVT prophylaxis: mechanical only at this time.    #. Lines  Left IJTL  Left radial a-line  PIV x3  Gamez catheter  Surgical drains   NGT right nostril  ETT    CODE STATUS: Full Code  ================================================================  Physical Examination:   Vitals: Blood pressure 95/51, pulse 71, temperature 97.7  F (36.5  C), temperature source Axillary, resp. rate 11, height 1.676 m (5' 6\"), weight 53.2 kg (117 lb 4.6 oz), SpO2 99 %.  Tmax: Temp (24hrs), Av.8  F (37.7  C), Min:97.7  F (36.5  C), Max:102.5  F (39.2  C)    Vital Signs with Ranges  Temp:  [97.7  F (36.5  C)-102.5  F (39.2  C)] 97.7  F (36.5  C)  Heart Rate:  [] 101  Resp:  [11-30] 11  BP: ()/(51-66) 95/51  MAP:  [50 mmHg-85 mmHg] 71 mmHg  Arterial Line BP: ()/(10-65) 105/54  FiO2 (%):  [100 %] 100 %  SpO2:  [82 %-100 %] 99 %   I&O:  I/O this shift:  In: 122.08 [I.V.:112.08]  Out: -   I/O last 3 completed shifts:  In: 4023.28 [I.V.:2793.28; NG/GT:160; IV Piggyback:1000]  Out: 711 [Urine:483; Drains:228] I/O since admission:      Glascow Coma Scale  Opens to pain, not applied to face  =  2 points  None  =  1 point  Purposeful movement to painful stimulus  =  5 points  _____________  Total: 8T    General Appearance:   No acute distress    Neuro:       Mental Status Exam:   Lethargic, intubated. Minimal interaction with exam.       Cranial Nerves:  PERRL. EOMI; no nystagmus. Blinks to threat from all directions in both eyes.  Facial movements symmetrical.        Motor:  Antigravity spontaneous movement in bilateral lower extremities.  No antigravity movement in BUE.             Reflexes:  2+ throughout.       Sensory:  WILLIAM                   Coordination:   WILLIAM       Gait:  deferred    Cardiovascular: Regular rate and rhythm, no m/r/g  Lungs: bilateral diminished LS.  Otherwise clear.  Abdomen: " Soft, not tender, not distended  Extremities: No clubbing, cyanosis, no edema     --Ventilation Mode: CPAP/PS  (Continuous positive airway pressure with Pressure Support)  FiO2 (%): 100 %  Rate Set (breaths/minute): 12 breaths/min  Tidal Volume Set (mL): 450 mL  PEEP (cm H2O): 5 cmH2O  Pressure Support (cm H2O): 7 cmH2O  Oxygen Concentration (%): 100 %  Resp: 11  --  Recent Labs   Lab Test  07/12/18   0519  07/12/18   0229  07/11/18   2356  07/11/18   1519   PH  7.42  7.43  7.42  7.20*   PCO2  31*  19*  30*  45   PO2  246*  150*  305*  138*   HCO3  20*  13*  19*  17*   O2PER  100.0  100.0  100.0  40.0        Medications:   Scheduled Meds:    cholecalciferol  2,000 Units Oral Daily     docusate  50 mg Oral or Feeding Tube At Bedtime     levETIRAcetam  1,000 mg Intravenous Q12H     multivitamins with minerals  15 mL Per Feeding Tube Daily     oyster shell calcium  1 tablet Oral BID w/meals     piperacillin-tazobactam  4.5 g Intravenous Q6H     potassium & sodium phosphates  1 packet Oral or Feeding Tube 4x Daily     rosuvastatin  10 mg Oral Daily     sennosides  5 mL Oral or Feeding Tube At Bedtime     sodium chloride (PF)  3 mL Intracatheter Q8H     vancomycin (VANCOCIN) IV  1,250 mg Intravenous Q24H     PRN Meds: acetaminophen, IV fluid REPLACEMENT ONLY, hydrALAZINE, HYDROmorphone, labetalol, lidocaine 4%, lidocaine (buffered or not buffered), magnesium sulfate, naloxone, potassium chloride, potassium chloride with lidocaine, potassium chloride, potassium chloride, potassium phosphate (KPHOS) in D5W IV, potassium phosphate (KPHOS) in D5W IV, potassium phosphate (KPHOS) in D5W IV, potassium phosphate (KPHOS) in D5W IV, sodium chloride (PF)       Data:   ROUTINE IP LABS (Last six results)  CBC RESULTS:     Recent Labs   Lab Test  07/13/18   0353  07/12/18   1955  07/12/18   1302  07/12/18   0519  07/11/18   2347  07/11/18   1705   WBC  5.4  3.5*  2.1*  2.7*  3.6*  4.0   RBC  3.57*  3.51*  3.63*  3.20*  3.81*  3.88*    HGB  10.9*  10.9*  11.1*  9.9*  11.5*  12.0*   HCT  32.5*  31.5*  32.1*  28.9*  34.3*  34.5*   PLT  85*  103*  107*  115*  149*  187     Basic Metabolic Panel:  Recent Labs   Lab Test  07/13/18   0353  07/12/18   0519  07/11/18   2347  07/11/18   1519  07/11/18   1425  07/11/18   0602  07/10/18   0820  07/09/18   2255   NA  143  145*  144  141  141  142  142  141   POTASSIUM  3.5  3.8  3.5  4.5  3.3*  3.6  3.9  3.6   CHLORIDE  116*  114*  114*   --    --   113*  111*  107   CO2  15*  17*  17*   --    --   17*  20  22   BUN  17  11  12   --    --   12  19  23   CR  0.91  0.92  0.85   --    --   0.82  0.84  0.93   GLC  124*  124*  120*  158*  107*  89  103*  108*   RODNEY  6.8*  7.6*  7.8*   --    --   8.6  8.4*  9.1     Liver panel:  Recent Labs   Lab Test  07/11/18   0602  07/09/18   2255  06/27/18   1638  05/30/18   0952  05/04/18   0920   PROTTOTAL  6.1*  7.4  7.8  7.4  7.5   ALBUMIN  3.0*  3.9  3.6  3.8  3.7   BILITOTAL  1.0  1.0  0.7  0.9  0.9   ALKPHOS  181*  227*  196*  183*  180*   AST  56*  60*  42  44  40   ALT  11  12  13  13  11     INR  Recent Labs   Lab Test  07/12/18   0519  07/11/18   2347  07/11/18   1705  07/11/18   1444   INR  1.37*  1.38*  1.40*  1.51*      Thyroid Panel:  Recent Labs   Lab Test  07/10/18   0820  09/16/15   0339   TSH  2.72  3.18      Vitamin B12:   Recent Labs   Lab Test  07/10/18   0820   B12  604      Troponin I:   Recent Labs   Lab Test  07/13/18   0353  07/13/18   0057  07/12/18   1814  07/09/18   2255  02/21/18   0105  02/20/18   2221  02/20/18   1840  02/24/17   0550   TROPI  2.080*  2.046*  1.171*  <0.015  <0.015  <0.015  <0.015  <0.015  The 99th percentile for upper reference range is 0.045 ug/L.  Troponin values in   the range of 0.045 - 0.120 ug/L may be associated with risks of adverse   clinical events.       CPK:   Recent Labs   Lab Test  09/07/16   1859  10/16/15   1309  08/10/15   1628  07/07/14   0605  07/05/14   0353  07/03/14   0314   CKT  75  90  78  180  426*   1933*     Ammonia:   Recent Labs   Lab Test  07/11/18   1057   BENITO  12     Most Recent 6 Bacteria Isolates From Any Culture (See EPIC Reports for Culture Details):  Recent Labs   Lab Test  07/12/18   2044  07/12/18   1955  07/12/18   0601  07/12/18   0600  07/10/18   0831  07/10/18   0820   CULT  No growth after 9 hours  No growth after 9 hours  PENDING  No growth after 23 hours  No growth after 23 hours  No growth after 3 days  No growth after 3 days        IMAGING:   All imaging studies were reviewed personally  CT head:   7/12/2018:   1. Decreasing pneumocephalus with slightly decreased midline shift  compared to earlier in the day, and no uncal herniation at this time.   2. No significant change in size of the left subdural hemorrhage with  subdural drain.  3. Small amount of subarachnoid hemorrhage persists along the left  convexity.  CT angiogram Chest:  7/13/2018  1. No pulmonary embolism identified.  2. Moderate pleural effusions bilaterally, right greater than left.  Dependent atelectasis in both lungs, with mild superimposed  groundglass opacities which could represent pulmonary edema or  infection.  3. Small amount of ascites.  4. Diffuse sclerotic osseous metastases are stable.       Hospital Course: procedure, complication, diagnosis, treatment:   Admission Date: 7/9/2018    Intubation Date: 7/10/2018  Surgeries / Procedures:   -- Left efren hole evacuation of chronic subdural hematoma.    Patient was seen and assessed with Dr. Tyson who is in agreement with assessment and plan.    Mai Aguero RN, ACNP  Neurocritical Care  07/13/18  7:46 AM

## 2018-07-13 NOTE — PLAN OF CARE
Problem: Patient Care Overview  Goal: Plan of Care/Patient Progress Review  Discharge Planner OT   Patient plan for discharge: difficult to address due to communication barriers, no family present   Current status: OT evaluation completed. Pt nodding yes/no and following simple commands ~50%. Pt disoriented to place/situation. Pt presents with R sided weakness, cognitive deficits, and deconditioning. Pt tolerated AAROM/PROM to all extremities to maintain ROM and joint integrity for ADLs. Pt on CPAP 100% FiO2 PEEP 5, HR 100s O2 100% MAP >65.   Barriers to return to prior living situation: prior deconditioning and poor activity tolerance, R sided weakness, cognitive deficits   Recommendations for discharge: TCU   Rationale for recommendations: Pt below baseline in ADLs and mobility, pt would benefit from continued rehab to return to PLOF.        Entered by: Vanessa Irizarry 07/13/2018 10:58 AM

## 2018-07-13 NOTE — PROGRESS NOTES
EEG CLINICAL NEUROPHYSIOLOGY PRELIMINARY REPORT    Video-EEG through 0600 today reviewed. Continues with evidence of moderate diffuse encephalopathy. Additional evidence of focal cortical and subcortical dysfunction over left hemisphere consistent with known subdural collection or its effects. Generlaized periodic sharp waves less persistent after midnight suggesting perhaps some improvement in encephalopathy. No evidence of ongoing seizures.    Full report to follow.    Phil Chavis MD  Pager [phone]

## 2018-07-13 NOTE — PLAN OF CARE
D: 73 year old male POD#2 L [place] hole SDH evacuation    I/A:   Neuro: PERRL, opens eyes to voice, follows commands, squeezes hands, wiggles toes, at times nods head yes/no, gives thumbs up, prn Dilaudid 0.2 mg x 1, 0.4 mg x 3, Precedex restarted at 0316 rate 0.2 mcg/kg/hr, stopped at 0440 d/t concern that increased need for norepi led to PSVT at 0420. EEG monitoring started at 2000.  Resp: CMV for tachypnea, FiO2 100% for pneumocephalus  Cardio: Cardiac consult and chest CT at 2200. Norepi at 0.3 mcg/kg/min for SBP < 90. Tachycardia up to 115, ST depression, 12 lead ECG @ 1800, troponin trended, CVP 10, 12, approx. 20 sec PSVT with a few NJ intervals up to rate of 193 at 0420, resolved spontaneously, asymptomatic.  GI/:TF started at 0000, advance to 20 mL/hr at 0800. No BM this shift, bowel meds given. Gamez, adequate urine output  Access: CVC, arterial line, CVP, 2 PIVs  Drips: Norepi, MIV NS @ 100  Drains: KORIN to thumbprint suction. Head incision was oozing, dressing removed for EEG application, quick clot applied.  Labs: Blood cultures drawn. Platelets 85, one of two units of platelets transfusing. Calcium gluconate given.  Please see flowsheets for for vitals and assessments.    P: Continue to monitor and treat as ordered. Offer support to patient and family as able.

## 2018-07-13 NOTE — PROGRESS NOTES
Neurosurgery Progress Note    S: Denies headache. Chest hurts.    O:  Exam:  General: intubated  Pulm: mechanical ventilation   Mental status: eyes open to stimuli  Cranial nerves: pupils equal and reactive, EOM intact  Motor: follows commands in all extremities    A:  73 M s/p efren holes for drainage of chronic subdural hematoma likely secondary to fall and coagulopathy. Concern for developing septic or cardiogenic shock. Guarded prognosis.    P:  Operation: Status post left efren holes and drainage for subdural hematoma; Sutures out: 7/25  Neuro: EEG, no seizures  Pneumocephalus: lay flat and 100% Fio2 while intubated  Drains: subdural KORIN drain  Imaging: post-op head CT completed   Pain: controlled, precedex gtt for agitation with ETT in place  Anti-epileptics: keppra 1g BID  Blood pressure goals: SBP < 140, on levophed to maintain pressures  Diet: NPO  Hematological goals: Platelets > 100k, INR < 1.5, Hemoglobin > 7   Blood glucose control: controlled  Infectious: vancomycin and zosyn for possible septic shock, if continuous fevers, would consider LP  PT/OT: pending critical illness  DVT prophylaxis: Sequential compression devices  Ulcer prophylaxis: protonix   DISPO:  pending given critical illness   Barriers: Surgical drain, evaluation by therapies, critical illness    -----------------------------------  Ginna Perdomo MD, MS  Neurosurgery PGY-2

## 2018-07-13 NOTE — PLAN OF CARE
Problem: Patient Care Overview  Goal: Plan of Care/Patient Progress Review  4A-PT: Hold: PT consult received and appreciated. Pt with continued bedrest orders. OT to follow for range, PT to hold, will continue to follow and initiate when appropriate.

## 2018-07-14 NOTE — PLAN OF CARE
Problem: Patient Care Overview  Goal: Plan of Care/Patient Progress Review  Outcome: No Change  Neuro: Tmax 99.8 axillary, lethargic, arouses to voice, squeezes hands and wiggles toes  Resp: CMV fio2 100%, pressures supported this am  Cardio: Sinus tachycardia up to 115, levophed to keep SB>90, trending troponin, bilateral LE ultrasound to rule out DVT done  GI: NJ with tube feeds, no BM  : Exchanged guzman, 30 cc/hr   Access: CVC, PIV, arterial line (dampened at 1800, OSVALDO MD with exchange this evening)  Drips: Levophed, precedex, normal saline  Skin: Incision head, fissure gluteal cleft  PRN dilaudid for pain  2 units of platelets given this am.  Pt's spouse and his wife at bedside throughout the day.  P: Continue current cares.  Notify neurosurgery/neurocritical care for concerns.

## 2018-07-14 NOTE — PROCEDURES
EEG #:  -2      DATE OF RECORDING/SERVICE DATE:  07/13/2018      TYPE OF STUDY:  Inpatient video EEG monitoring.      DURATION OF STUDY:  23 hours 55 minutes 35 seconds.      HISTORY:  Day 2 of video EEG monitoring in Oswaldo Win, a 73-year-old with multiple medical problems who presents with altered mental status.  Imaging showed chronic left subdural hematoma which has been evacuated, but he continues encephalopathic.  EEG is performed to evaluate encephalopathy and to evaluate for potential seizures during study.        MEDICATIONS:  The patient was treated with intermittent dexmedetom at 0.2 mcg per kg per hour.  The drip was not continuous.  He is also being treated with levetiracetam.      TECHNICAL SUMMARY:  EEG was recorded from scalp electrodes placed according to the 10-20 International System.  Additional electrodes were utilized for referencing, artifact detection, and recording from other cerebral regions.  Video was continuously recorded.  Video was reviewed for clinical correlation and to assist with EEG interpretation.      FINDINGS:  Staff examined the scalp and found craniotomy defects extending from F3-C3, from P3-01, and a efren hole in midline.      When patient is aggressively stimulated with ICU protocol, transient 7 Hz posterior dominant rhythm is noted which is somewhat attenuated over the right hemisphere.  Some irregular delta is seen, which is more persistent over the left.  When this patient is let alone quietly, delta becomes more persistent posterior dominant rhythm disappears.  Runs of triphasic activity, irregular, at a rate of 1 Hz or less wax and wane.  These symptoms become more persistent when patient is aroused.      OTHER INTERICTAL ABNORMALITIES:  No clear epileptiform discharges.      ICTAL ABNORMALITIES:  No electrographic seizures.      IMPRESSION:  Abnormal.  Moderate diffuse encephalopathy.  There is evidence of additional focal cortical and subcortical dysfunction  over the left hemisphere, consistent with known subdural collection or its effects.  Epileptiform discharges or seizures were not recorded in this study.  No substantial change from yesterday's recordings.         RAMANDEEP NAVARRO MD             D: 2018   T: 2018   MT: CHARLI      Name:     USAMA MANRIQUE   MRN:      [id number]        Account:        [id number]   :      1945           Procedure Date: 2018      Document: [id number]

## 2018-07-14 NOTE — PROGRESS NOTES
S: Denies headache.    O:  Exam:  General: intubated  Pulm: mechanical ventilation   Mental status: eyes open spontaneously, nods head to questions  Cranial nerves: pupils equal and reactive, EOM intact  Motor: follows commands in all extremities    A:  73 M s/p efren holes for drainage of chronic subdural hematoma likely secondary to fall and coagulopathy. Concern for developing septic or cardiogenic shock. Guarded prognosis.    P:  Operation: Status post left efren holes and drainage for subdural hematoma; Sutures out: 7/25  Neuro: EEG, no seizures  Cardiology: MAP > 65 per cardiology; currently achieving with levophed   Pulmonary: lay flat and 100% Fio2 while intubated for treatment of pneumocephalus; wean ventilator when off pressors   Drains: subdural KORIN drain  Imaging: post-op head CT completed   Pain: controlled, precedex gtt for agitation with ETT in place  Anti-epileptics: keppra 1g BID  Blood pressure goals: SBP < 140, on levophed to maintain pressures  Diet: NPO  Hematological goals: Platelets > 100k, INR < 1.5, Hemoglobin > 7   Blood glucose control: controlled  Infectious: vancomycin and zosyn for possible septic shock, if continuous fevers, would consider LP  PT/OT: pending critical illness  DVT prophylaxis: Sequential compression devices due to bleeding risk   Ulcer prophylaxis: protonix   DISPO:  pending given critical illness   Barriers: Surgical drain, evaluation by therapies, critical illness      Contact the neurosurgery resident on call with questions.    Dial * * * [phone]: Enter job code [id number] when prompted.    Gene Galeana MD  Neurosurgery PGY3  Agree with resident's note.  Seen and examined today with team.  Tavon King

## 2018-07-14 NOTE — PROCEDURES
EEG #:  -3      DATE OF RECORDING/SERVICE DATE:  07/14/2018       TYPE OF STUDY:  Inpatient video EEG monitoring.      DURATION OF STUDY:  13 hours, 50 minutes, 24 seconds.      HISTORY:  Day #3 of video EEG monitoring in Oswaldo Win, a 73-year-old with multiple medical problems who presents with altered mental status.  Imaging showed chronic left subdural hematoma which has been evacuated, but he continues encephalopathic.  EEG is performed to evaluate encephalopathy and to evaluate for potential seizures.        MEDICATIONS:  The patient was treated intermittently with dexmedetom at 0.2 mcg per kg per hour.  He is also being treated with levetiracetam.     TECHNICAL SUMMARY: This continuous video- EEG monitoring procedure was performed with 23 scalp electrodes in 10-20 electrode system placements, and additional scalp, precordial and other surface electrodes used for electrical referencing and artifact detection.  Video monitoring was utilized and periodically reviewed by EEG technologists and the physician for electroclinical correlations.    FINDINGS:  Technicians examine scalp and find a craniotomy scar extending between F3 and O1 on the left.  There is also a midline efren hole.  A 7 Hz posterior dominant rhythm is seen on occasion over the right hemisphere and is poorly sustained.  This activity is attenuated over the left hemisphere.  Irregular diffuse delta is noted even when patient is clearly awake.  During drowsiness, delta is more persistent.  In both wakefulness and drowsiness, delta is more persistent on the left than on the right and at times, even during wakefulness, has a persistence exceeding 60% on the left.  EEG is clearly reactive.  Occasional generalized periodic sharp waves are seen in brief runs occurring at the rate of about 1 Hz.  These have a triphasic morphology and they wax and wane.      OTHER INTERICTAL ABNORMALITIES:  No clear epileptiform discharges.      ICTAL ABNORMALITIES:   No electrographic seizures.      IMPRESSION:  Abnormal.  Moderate diffuse encephalopathy.  This may in part be related to the intermittent anesthetic drips that are being employed.  There is evidence of additional focal cortical and subcortical dysfunction over the left hemisphere, consistent with known subdural collection or its effects.  Epileptiform discharges or seizures were not recorded in this study.  There has not been substantial change over the 3 days of recording.         RAMANDEEP NAVARRO MD             D: 2018   T: 2018   MT: CHARLI      Name:     USAMA MANRIQUE   MRN:      [id number]        Account:        [id number]   :      1945           Procedure Date: 2018      Document: [id number]

## 2018-07-14 NOTE — PLAN OF CARE
Problem: Patient Care Overview  Goal: Plan of Care/Patient Progress Review  Outcome: No Change  D/I: Patient on unit 4A Surgical/Neuro ICU   Neuro- Pt alert. PERRL. Purposeful movement of all extremities. Restless, pulling at lines - soft wrist restraints in place.   CV- HR tachy at times in 100s. Tele SR/ST with occasional PVCs. Also had run of 8 beats SVT; asymptomatic. MD aware.  Pulm- Mostly tolerating vent. Frequent ET suctioning for thin white secretions. LS clear/diminished.   GI- BS active. Large watery BM overnight.   - Gamez in place with appx 20-30ml output per hr. Urine is dark lucía.   Gtts- Levo titrated down to 0.14mcg/kg/min. Precedex increased to 0.3mcg/kg/min this am d/t restlessness.   Skin- Head surgical incision is KAZ, clean, approximated. Pink area on gluteal cleft. Barrier cream applied  Pain- PT has appeared comfortable most of night. Restlessness this am. PRN IV dilaudid x1 effective.  IV's - Triple lumen IJ in place; patent. R PIV patent. R radial art line with good waveform  See flow sheets for further interventions and assessments.   1 unit platelets administered this am. Replacing Kand phos.  A: Stable   P: Continue to monitor pt closely. Notify MD of significant changes

## 2018-07-14 NOTE — PHARMACY-VANCOMYCIN DOSING SERVICE
Pharmacy Vancomycin Note  Date of Service 2018  Patient's  1945   73 year old, male    Indication: Sepsis  Goal Trough Level: 15-20 mg/L  Day of Therapy: 3  Current Vancomycin regimen:  1250 mg IV q24h    Current estimated CrCl = Estimated Creatinine Clearance: 71.5 mL/min (based on Cr of 0.82).    Creatinine for last 3 days  2018: 11:47 PM Creatinine 0.85 mg/dL  2018:  5:19 AM Creatinine 0.92 mg/dL  2018:  3:53 AM Creatinine 0.91 mg/dL  2018:  3:47 AM Creatinine 0.76 mg/dL;  9:26 AM Creatinine 0.82 mg/dL    Recent Vancomycin Levels (past 3 days)  2018:  4:43 PM Vancomycin Level 11.3 mg/L    Vancomycin IV Administrations (past 72 hours)                   vancomycin (VANCOCIN) 1,250 mg in sodium chloride 0.9 % 250 mL intermittent infusion (mg) 1,250 mg New Bag 18 1823     1,250 mg New Bag 18 1756    vancomycin (VANCOCIN) 1,500 mg in sodium chloride 0.9 % 250 mL intermittent infusion (mg) 1,500 mg New Bag 18 1831                Nephrotoxins and other renal medications (Future)    Start     Dose/Rate Route Frequency Ordered Stop    18 1700  vancomycin (VANCOCIN) 1,250 mg in sodium chloride 0.9 % 250 mL intermittent infusion      1,250 mg  over 90 Minutes Intravenous EVERY 24 HOURS 18 1648      18 1800  norepinephrine (LEVOPHED) 16 mg in D5W 250 mL infusion      0.03-0.4 mcg/kg/min × 52 kg (Dosing Weight)  1.5-19.5 mL/hr  Intravenous CONTINUOUS 18 1755      18 1645  piperacillin-tazobactam (ZOSYN) 4.5 g vial to attach to  mL bag     Comments:  Dose adjusted per renal dosing policy.  Estimated CrCl = >40 mL/min.     4.5 g  over 30 Minutes Intravenous EVERY 6 HOURS 18 1618               Contrast Orders - past 72 hours (72h ago through future)    Start     Dose/Rate Route Frequency Ordered Stop    18 2230  iopamidol (ISOVUE-370) solution 51 mL      51 mL Intravenous ONCE 18           Interpretation of levels and current regimen:  Trough level is  Subtherapeutic    Has serum creatinine changed > 50% in last 72 hours: No    Urine output:  good urine output    Renal Function: Stable    Plan:  1.  Increase Dose to vancomycin 1500mg IV q24 hours  2.  Pharmacy will check trough levels as appropriate in 1-3 Days.    3. Serum creatinine levels will be ordered daily for the first week of therapy and at least twice weekly for subsequent weeks.      Kelvin Daley, PharmD, BCPS        .

## 2018-07-14 NOTE — PROCEDURES
[facility]  Neurosurgery Procedure Note    Name of Procedure: Right-Sided Arterial Line Placement    Date of Procedure: July 14, 2018    Description of Procedure  The patient's original left radial arterial line had failed. Due to his ongoing pressor requirements, it was relocated to the right radial artery. The patient was placed in the supine position and his right wrist was hyperextended. He was then given Precedix and 0.9 mg of Dilaudid. The puncture site was then prepped and draped in the usual sterile fashion and 3 mL of 1% Lidocaine was infiltrated into skin and subcutaneous tissue overlying the radial artery. A 20-gauge needle was then advanced through the patient's skin and subcutaneous tissue and entered into the patient's radial artery. Strong pulsatile blood flow was immediately observed coming from the needle. A guidewire was then advanced through the needle. The needle was subsequently removed over the guidewire, after which an arterial catheter was advanced over the guidewire. The guidewire was then removed and the catheter was attached to a transducer. It was subsequently sutured into place. After cleaning the site of entry, the patient's wrist was relaxed and a sterile Tegaderm dressing was applied. There were not any immediate complications and the patient tolerated the procedure well.    Findings: A good waveform was observed on the monitor and the arterial line blood pressure readings matched those obtained via the blood pressure cuff.      Gene Galeana MD  Neurosurgery PGY3

## 2018-07-14 NOTE — MR AVS SNAPSHOT
After Visit Summary   2018    Oswaldo Win    MRN: [id number]           Patient Information     Date Of Birth          1945        Visit Information        Provider Department      2018 7:00 AM [facility] EEG TECH 4 P EEG        Today's Diagnoses     Seizure-like activity (H)    -  1       Follow-ups after your visit        Your next 10 appointments already scheduled     2018  9:00 AM CDT   [facility] with  [facility]   [facility] ([facility])    [address] [place] [address]  [address]  [facility] [address] [phone]            2018 10:00 AM CDT   (Arrive by 9:45 AM)   Return Visit with Bentley Robles MD   [facility] ([facility])    [address] [place] [address]  [address] [place] [id number] [phone]              Who to contact     Please call your clinic at [phone] to:    Ask questions about your health    Make or cancel appointments    Discuss your medicines    Learn about your test results    Speak to your doctor            Additional Information About Your Visit        MyChart Information     Access [employer] is an electronic gateway that provides easy, online access to your medical records. With [email or web address], you can request a clinic appointment, read your test results, renew a prescription or communicate with your care team.     To sign up for Access [employer] visit the website at [email or web address]   You will be asked to enter the access code listed below, as well as some personal information. Please follow the directions to create your username and password.     Your access code is: [email or web address]  Expires: 10/9/2018  6:30 AM     Your access code will  in 90 days. If you need help or a new code, please contact your [facility] or call [phone] for assistance.        Care EveryWhere ID     This is your Care EveryWhere ID. This could  be used by other organizations to access your [place] medical records  [id number]         Blood Pressure from Last 3 Encounters:   07/14/18 101/75   06/27/18 104/55   05/30/18 91/57    Weight from Last 3 Encounters:   07/14/18 64 kg (141 lb 1.5 oz)   06/27/18 50.3 kg (111 lb)   05/30/18 52.6 kg (116 lb)              Today, you had the following     No orders found for display         Today's Medication Changes      Notice     This visit is during an admission. Changes to the med list made in this visit will be reflected in the After Visit Summary of the admission.             Primary Care Provider Office Phone # Fax #    Lucrecia Collier [phone] [address] [facility] [address]        Equal Access to Services     [facility] Hospital[facility]NICA : Kelsey hall Sokwame, wajagjit luqadaha, qazainab kaalmainna knapp, peter moran . So [facility] [phone].    ATENCIÓN: Si habla español, tiene a mccall disposición servicios gratuitos de asistencia lingüística. Marquise al [phone].    We comply with applicable federal civil rights laws and [place] laws. We do not discriminate on the basis of race, color, national origin, age, disability, sex, sexual orientation, or gender identity.            Thank you!     Thank you for choosing [facility]  for your care. Our goal is always to provide you with excellent care. Hearing back from our patients is one way we can continue to improve our services. Please take a few minutes to complete the written survey that you may receive in the mail after your visit with us. Thank you!             Your Updated Medication List - Protect others around you: Learn how to safely use, store and throw away your medicines at www.disposemymeds.org.      Notice     This visit is during an admission. Changes to the med list made in this visit will be reflected in the After Visit Summary of the admission.

## 2018-07-14 NOTE — PROGRESS NOTES
[facility]  Neuro Critical Care Service  Neuro-ICU Progress Note       Admission Date: 7/9/2018  Date of Service:07/14/2018   Hospital Day: 6                                                 24 hour event summary and changes:                                       CT head pending and spontaneous breathing trial and possible esxtubation tomorrow or Monday  CXR showing increased layer pleural effusions, Diurese a bit based on today's CXR  Arterial line placed  Patient has been afebrile, still requiring pressors 0.16 from 0.26 yesterday, continue weaning  EEG showing focal dysfunction but no seizures, will discontinue  Mg2+ low protocol changed to high  Low UO, urine culture still pending, no changes today will continue to monitor  Continue pressors weaning  Trop checks discontinued                Assessment and Plan:                                         #. Neurological:  #POD 3 left burrhole SDH evacuation.  Pneumocephalus appreciated on imaging post op, patient treated with 100% FiO2 and HOB kept flat, subsequent scan shows improvement.    #encephalopathy, decreased LOC associated with fever, CT head improved and vEEG initiated, preliminary report without evidence of seizures.  - continue vEEG x24 hours  - Keppra 1000mg BID prophylaxis  - surgical drains per neurosurgery  - pain control with prn dilaudid, acetaminophen  - precedex for sedation, goal RASS 0   - PT/OT when appropriate    #. Respiratory:   #Respiratory failure in setting of expanding chronic subdural hematoma  Intubated, CMV/12/450/+5/16, overbreathing ventilator and on PS 7/5 this morning.  Concern for PE with CT angiogram of chest negative.  CXR stable without evidence of infiltrates or significant consolidations.  -- daily spontaneous breathing trials  -- Weaning trial today and possible extubation    #. Cardiovascular:  #DEMAND ISCHEMIA  #H/o CAD with STEMI and CABG, previously not on any medications.  #Troponemia with  t-wave changes on telemetry overnight.  Troponin 2.08.    #Hypotension, cannot rule out acute change due to demand ischemia in setting of fever and metabolic stressors.  Bedside echo obtained with some indication of RV strain and septal wall abnormalities, h/o PE so chest CT angio completed with no evidence of PE.    - Cardiology consult, appreciate recommendations  - SBP < 140  - MAP > 65.    - norepinephrine to maintain MAP goal.  - IV lasix today given low output and fluid overload status     #. Endocrine:   No acute issues.  -no indication for SSI, will continue to monitor.    #. Heme/ID:   H/O progressive prostate CA with bony metastatic disease.  H/O nonocclusive DVT in 2015 and segmental PE in 2016, prescribed lovenox and states he is not always compliant.  - CT angio chest negative for PE  - U/S BLE negative  - anticoagulation and ASA held due to SDH and recent surgery.    #Fever:  Pan culture pending, UA looked concerning for UTI, Sputum with findings of mixed gram positive and gram negative bacteria on gram stain.  Final cultures pending.  Blood cultures no growth to date.  - Acetaminophen prn for fever.    - repeat pan culture in 48 hours if patient spikes new fever  - Vancomycin/Zosyn empiric coverage, will narrow per culture results.  - continue to monitor for other infectious signs or complications    #. GI:  #malnutrition  - history of poor oral intake and malnutrition over past few months.    - NJT placed yesterday by dietician, appreciate TF recs and will advance TF per protocol.  -- TF as ordered  -- Bowel Regimen as ordered  -- will consider SLP evaluation after extubation.    #. Renal:  Gamez catheter in place.  Likely difficult catheter placement and/or retention issues per history of Prostate CA and reports of urinary incontinence and difficulties at home prior to admission.    -- Strict I&Os  -- Continue maintenance IVF as ordered.  -- Electrolyte replacement protocols as ordered  -- continue  "IVF as ordered, will consider tko after TF at goal.  - IV lasix today given low output and fluid overload status     #. Skin/Musculoskeletal:  -- No issues, high risk for skin breakdown due to poor nutritional status and bedbound capacity at this time.    #. PT/OT/Speech:  --hold evaluations      #. ICU prophylaxis:   --Stress ulcer prophylaxis not indicated  --VAP: per protocol  --DVT prophylaxis: mechanical only at this time.    #. Lines  Left IJTL  Right radial a-line  PIV x3  Gamez catheter  Surgical drains   NGT right nostril  ETT        CODE STATUS: Full Code  ================================================================  Physical Examination:   Vitals: Blood pressure 107/64, pulse 71, temperature 96.2  F (35.7  C), temperature source Axillary, resp. rate 16, height 1.676 m (5' 6\"), weight 64 kg (141 lb 1.5 oz), SpO2 98 %.  Tmax: Temp (24hrs), Av.8  F (37.7  C), Min:97.7  F (36.5  C), Max:102.5  F (39.2  C)    Vital Signs with Ranges  Temp:  [96.2  F (35.7  C)-99.8  F (37.7  C)] 96.2  F (35.7  C)  Heart Rate:  [] 105  Resp:  [14-20] 16  BP: ()/(57-67) 107/64  MAP:  [35 mmHg-93 mmHg] 66 mmHg  Arterial Line BP: ()/(33-83) 102/49  FiO2 (%):  [100 %] 100 %  SpO2:  [83 %-100 %] 98 %   I&O:     I/O last 3 completed shifts:  In: 5452.7 [I.V.:3204.7; NG/GT:965]  Out: 1016 [Urine:738; Drains:278] I/O since admission:      Glascow Coma Scale  Opens to pain, not applied to face  =  2 points  None  =  1 point  Purposeful movement to painful stimulus  =  5 points  _____________  Total: 8T    General Appearance:   No acute distress    Neuro:       Mental Status Exam:   Lethargic, intubated. Minimal interaction with exam.       Cranial Nerves:  PERRL. EOMI; no nystagmus. Blinks to threat from all directions in both eyes.  Facial movements symmetrical.        Motor:  Antigravity spontaneous movement in bilateral lower extremities.  No antigravity movement in BUE.             Reflexes:  2+ " throughout.       Sensory:  WILLIAM                   Coordination:   WILLIAM       Gait:  deferred    Cardiovascular: Regular rate and rhythm, no m/r/g  Lungs: bilateral diminished LS.  Otherwise clear.  Abdomen: Soft, not tender, not distended  Extremities: No clubbing, cyanosis, no edema     --Ventilation Mode: CMV/AC  (Continuous Mandatory Ventilation/ Assist Control)  FiO2 (%): 100 %  Rate Set (breaths/minute): 12 breaths/min  Tidal Volume Set (mL): 450 mL  PEEP (cm H2O): 5 cmH2O  Pressure Support (cm H2O): 7 cmH2O  Oxygen Concentration (%): 100 %  Resp: 16  --  Recent Labs   Lab Test  07/12/18   0519  07/12/18   0229  07/11/18   2356  07/11/18   1519   PH  7.42  7.43  7.42  7.20*   PCO2  31*  19*  30*  45   PO2  246*  150*  305*  138*   HCO3  20*  13*  19*  17*   O2PER  100.0  100.0  100.0  40.0        Medications:   Scheduled Meds:    cholecalciferol  2,000 Units Oral Daily     docusate  50 mg Oral or Feeding Tube At Bedtime     levETIRAcetam  1,000 mg Oral BID     multivitamins with minerals  15 mL Per Feeding Tube Daily     oyster shell calcium  1 tablet Oral BID w/meals     pantoprazole (PROTONIX) IV  40 mg Intravenous Daily with breakfast     piperacillin-tazobactam  4.5 g Intravenous Q6H     potassium & sodium phosphates  1 packet Oral or Feeding Tube 4x Daily     rosuvastatin  10 mg Oral Daily     sennosides  5 mL Oral or Feeding Tube At Bedtime     sodium chloride (PF)  3 mL Intracatheter Q8H     sodium chloride         vancomycin (VANCOCIN) IV  1,250 mg Intravenous Q24H     PRN Meds: acetaminophen, IV fluid REPLACEMENT ONLY, hydrALAZINE, HYDROmorphone, labetalol, lidocaine 4%, lidocaine (buffered or not buffered), magnesium sulfate, naloxone, potassium chloride, potassium chloride with lidocaine, potassium chloride, potassium chloride, potassium phosphate (KPHOS) in D5W IV, potassium phosphate (KPHOS) in D5W IV, potassium phosphate (KPHOS) in D5W IV, potassium phosphate (KPHOS) in D5W IV, sodium chloride  (PF)       Data:   ROUTINE IP LABS (Last six results)  CBC RESULTS:     Recent Labs   Lab Test  07/14/18   0347  07/13/18   2043  07/13/18   1050  07/13/18   0353  07/12/18   1955  07/12/18   1302  07/12/18   0519  07/11/18   2347   WBC  3.8*   --    --   5.4  3.5*  2.1*  2.7*  3.6*   RBC  3.17*   --    --   3.57*  3.51*  3.63*  3.20*  3.81*   HGB  9.6*   --    --   10.9*  10.9*  11.1*  9.9*  11.5*   HCT  28.9*   --    --   32.5*  31.5*  32.1*  28.9*  34.3*   PLT  86*  106*  111*  85*  103*  107*  115*  149*     Basic Metabolic Panel:  Recent Labs   Lab Test  07/14/18 0347 07/13/18   0353  07/12/18   0519  07/11/18   2347  07/11/18   1519  07/11/18   1425  07/11/18   0602  07/10/18   0820   NA  144  143  145*  144  141  141  142  142   POTASSIUM  3.2*  3.5  3.8  3.5  4.5  3.3*  3.6  3.9   CHLORIDE  116*  116*  114*  114*   --    --   113*  111*   CO2  19*  15*  17*  17*   --    --   17*  20   BUN  25  17  11  12   --    --   12  19   CR  0.76  0.91  0.92  0.85   --    --   0.82  0.84   GLC  163*  124*  124*  120*  158*  107*  89  103*   RODNEY  7.4*  6.8*  7.6*  7.8*   --    --   8.6  8.4*     Liver panel:  Recent Labs   Lab Test  07/11/18   0602  07/09/18   2255  06/27/18   1638  05/30/18   0952  05/04/18   0920   PROTTOTAL  6.1*  7.4  7.8  7.4  7.5   ALBUMIN  3.0*  3.9  3.6  3.8  3.7   BILITOTAL  1.0  1.0  0.7  0.9  0.9   ALKPHOS  181*  227*  196*  183*  180*   AST  56*  60*  42  44  40   ALT  11  12  13  13  11     INR  Recent Labs   Lab Test  07/12/18   0519  07/11/18   2347  07/11/18   1705  07/11/18   1444   INR  1.37*  1.38*  1.40*  1.51*      Thyroid Panel:  Recent Labs   Lab Test  07/10/18   0820  09/16/15   0339   TSH  2.72  3.18      Vitamin B12:   Recent Labs   Lab Test  07/10/18   0820   B12  604      Troponin I:   Recent Labs   Lab Test  07/14/18   0347  07/13/18   2231  07/13/18   1634  07/13/18   1050  07/13/18   0353  07/13/18   0057  07/12/18   1814  07/09/18   2255   TROPI  1.002*  1.389*  1.725*   1.870*  2.080*  2.046*  1.171*  <0.015     CPK:   Recent Labs   Lab Test  09/07/16   1859  10/16/15   1309  08/10/15   1628  07/07/14   0605  07/05/14   0353  07/03/14   0314   CKT  75  90  78  180  426*  1933*     Ammonia:   Recent Labs   Lab Test  07/11/18   1057   BENITO  12     Most Recent 6 Bacteria Isolates From Any Culture (See EPIC Reports for Culture Details):  Recent Labs   Lab Test  07/12/18   2044  07/12/18   1955  07/12/18   0601  07/12/18   0600  07/10/18   0831  07/10/18   0820   CULT  No growth after 2 days  No growth after 2 days  No growth  Culture in progress  No growth after 2 days  No growth after 2 days  No growth after 4 days  No growth after 4 days        IMAGING:   All imaging studies were reviewed personally  CT head:   7/12/2018:   1. Decreasing pneumocephalus with slightly decreased midline shift  compared to earlier in the day, and no uncal herniation at this time.   2. No significant change in size of the left subdural hemorrhage with  subdural drain.  3. Small amount of subarachnoid hemorrhage persists along the left  convexity.  CT angiogram Chest:  7/13/2018  1. No pulmonary embolism identified.  2. Moderate pleural effusions bilaterally, right greater than left.  Dependent atelectasis in both lungs, with mild superimposed  groundglass opacities which could represent pulmonary edema or  infection.  3. Small amount of ascites.  4. Diffuse sclerotic osseous metastases are stable.       Hospital Course: procedure, complication, diagnosis, treatment:   Admission Date: 7/9/2018    Intubation Date: 7/10/2018  Surgeries / Procedures:   -- Left efren hole evacuation of chronic subdural hematoma.    Patient was seen and assessed with Dr. Tyson who is in agreement with assessment and plan.    Conner Coelho MD/PhD  [facility] Neurology  [phone]

## 2018-07-14 NOTE — PROGRESS NOTES
EEG CLINICAL NEUROPHYSIOLOGY PRELIMINARY REPORT    Video-EEG through 0600 today reviewed. Continues with evidence of moderate diffuse encephalopathy. Intermittent triphasic activity waxes and wanes. There is evidence of additional focal cerebral dysfunction over left hemispehre, consistent with recent evacuation of subdural collection in this area. No interictal epileptiform discharges and no electrographic seizures. No evidence of nonconvulsive status epilepticus. Full report to follow.    Phil Chavis MD  Pager [phone]

## 2018-07-14 NOTE — PLAN OF CARE
Problem: Restraint for Non-Violent/Non-Self-Destructive Behavior  Goal: Prevent/Manage Potential Problems  Maintain safety of patient and others during period of restraint.  Promote psychological and physical wellbeing.  Prevent injury to skin and involved body parts.  Promote nutrition, hydration, and elimination.   Outcome: No Change  Continue soft wrist restraints for safety, to keep from pulling at lines and tube.

## 2018-07-14 NOTE — PROCEDURES
EEG #:  -1      DATE OF STUDY:  2018.      TYPE OF STUDY:  Inpatient video EEG monitoring.        DURATION OF RECORDIN hours 21 minutes 16 seconds.      HISTORY:  Day 1 of video EEG monitoring in Oswaldo Win, a 73-year-old with multiple medical problems.  He presented with altered mental status.  A CT scan of head showed chronic left subdural hematoma which has been evacuated.  He continues with encephalopathy.  EEG is performed as part of evaluation for encephalopathy and potential seizures.  The patient is being treated with propofol.      TECHNICAL SUMMARY:  EEG was recorded from scalp electrodes placed according to the 10-20 international system.  Additional electrodes were utilized for referencing, artifact detection, and recording from other cerebral regions.  Video was continuously recorded.  Video was reviewed for clinical correlation and to assist with EEG interpretation.      FINDINGS:  Staff examined scalp and finds craniotomy defects extending from F3 to C3 and from P3 to 01, as well as a bur hole in the midline.          FINDINGS:  A 7 Hz posterior dominant rhythm was occasionally seen over the right hemisphere, but it was poorly sustained.  Irregular theta and delta was seen over the right hemisphere as well.  These activities were attenuated over the left hemisphere, where lower amplitude slower irregular delta activity predominated.  There were a few brief runs of generalized periodic sharp waves with a triphasic morphology that would recur at approximately 1.5 Hz.  These, however, were poorly sustained.  They waxed and waned.  They may have been somewhat more persistent when patient was alerted.        OTHER INTERICTAL ABNORMALITIES:  No epileptiform discharges.      ICTAL ABNORMALITIES:  No electrographic seizures.      IMPRESSION:  Abnormal.  There is evidence of moderate diffuse encephalopathy.  There is evidence of additional focal cortical and subcortical dysfunction over  left hemisphere consistent with known subdural collection or its effects.  There were no electrographic seizures during this study.         RAMANDEEP NAVARRO MD             D: 2018   T: 2018   MT: CHARLI      Name:     USAMA MANRIQUE   MRN:      [id number]        Account:        [id number]   :      1945           Procedure Date: 2018      Document: [id number]

## 2018-07-15 NOTE — PHARMACY-VANCOMYCIN DOSING SERVICE
Pharmacy Empiric Dose Change Per Policy  Original Dose Ordered: Vancomycin 1500 mg q24h  Dose Changed To: Intermittent dosing    This dose change was based on acute worsening of renal function. Will check a vancomycin level 24 hours after last dose.     Creatinine Clearance= Estimated Creatinine Clearance: 44.4 mL/min (based on Cr of 1.32).     Will continue to follow and modify dosage according to levels, organ function and clinical condition    Conner Romeo, PharmD, MS

## 2018-07-15 NOTE — PROGRESS NOTES
Neuroscience Intensive Care Progress Note    07/15/2018    Oswaldo Win is a 73 year old year old male with a left frontoparietal subdural hematoma with midline shift and herniation, status post bur hole evacuation on .     Problem List  1. Respiratory failure, intubated and mechanically ventilated  2. Shock, presumed septic, on vasopressors  3. Presumed urosepsis, on vancomycin and Zosyn  4. Troponin leak     24 Hour Vital Signs Summary:  Temperatures:  Current - Temp: 97.6  F (36.4  C); Max - Temp  Av.5  F (36.4  C)  Min: 96  F (35.6  C)  Max: 98.4  F (36.9  C)  Respiration range: Resp  Av.1  Min: 20  Max: 40  Pulse range: No Data Recorded  Blood pressure range: Systolic (24hrs), Av , Min:45 , Max:148   ; Diastolic (24hrs), Av, Min:24, Max:107    Pulse oximetry range: SpO2  Av.2 %  Min: 50 %  Max: 100 %    Ventilator Settings  Ventilation Mode: CMV/AC  (Continuous Mandatory Ventilation/ Assist Control)  FiO2 (%): 40 %  Rate Set (breaths/minute): 20 breaths/min  Tidal Volume Set (mL): 500 mL  PEEP (cm H2O): 18 cmH2O  Pressure Support (cm H2O): 7 cmH2O  Oxygen Concentration (%): 100 %  Resp: 20      Intake/Output Summary (Last 24 hours) at 07/15/18 1726  Last data filed at 07/15/18 1700   Gross per 24 hour   Intake          5929.19 ml   Output             1387 ml   Net          4542.19 ml       Arterial Line BP: ()/() 68/63  MAP:  [48 mmHg-155 mmHg] 66 mmHg  BP - Mean:  [] 63  CVP:  [24 mmHg] 24 mmHg    Current Medications:    artificial tears   Both Eyes 4x Daily     cholecalciferol  2,000 Units Oral Daily     insulin aspart  1-6 Units Subcutaneous Q4H     levETIRAcetam  1,000 mg Oral BID     lidocaine (PF)         midazolam  5 mg Intravenous Once     multivitamins with minerals  15 mL Per Feeding Tube Daily     oyster shell calcium  1 tablet Oral BID w/meals     pantoprazole  40 mg Oral QAM AC     piperacillin-tazobactam  4.5 g Intravenous Q6H     rosuvastatin  10 mg  "Oral Daily     sodium bicarbonate         sodium chloride (PF)  3 mL Intracatheter Q8H     vancomycin place marcus - receiving intermittent dosing  1 each Does not apply See Admin Instructions       PRN Medications:  acetaminophen, IV fluid REPLACEMENT ONLY, glucose **OR** dextrose **OR** glucagon, hydrALAZINE, HYDROmorphone, labetalol, lidocaine 4%, lidocaine (buffered or not buffered), magnesium sulfate, magnesium sulfate, naloxone, potassium chloride, potassium chloride with lidocaine, potassium chloride, potassium chloride, potassium phosphate (KPHOS) in D5W IV, potassium phosphate (KPHOS) in D5W IV, potassium phosphate (KPHOS) in D5W IV, potassium phosphate (KPHOS) in D5W IV, sodium chloride (PF)    Infusions:    dexmedetomidine Stopped (07/15/18 1400)     IV fluid REPLACEMENT ONLY       fentaNYL 50 mcg/hr (07/15/18 1600)     midazolam 4 mg/hr (07/15/18 1553)     norepinephrine 0.14 mcg/kg/min (07/15/18 1450)     sodium bicabonate in 5% dextrose for infusion 100 mL/hr at 07/15/18 1508     NaCl       vecuronium (NORCURON) infusion ADULT 0.8 mcg/kg/min (07/15/18 1558)       Allergies   Allergen Reactions     Blood Transfusion Related (Informational Only) Other (See Comments)     Patient has a history of a significant allergic transfusion reaction.  Consult with Blood Bank MD before ordering components.       Physical Examination:  /59  Pulse 71  Temp 97.6  F (36.4  C) (Axillary)  Resp 20  Ht 1.676 m (5' 6\")  Wt 64 kg (141 lb 1.5 oz)  SpO2 (!) 85%  BMI 22.77 kg/m2    Eyes open but does not clearly fixate on examiner. Moves all limbs well but the left hemibody better than the right hemibody. Follows simple commands with repeated prompting.       Labs/Studies:  Recent Labs   Lab Test  07/15/18   1401  07/15/18   0339  07/14/18   1643  07/14/18   0926  07/14/18   0347   NA  154*  146*   --   146*  144   POTASSIUM  5.8*  4.2  4.5   --   3.2*   CHLORIDE  118*  119*   --    --   116*   CO2  20  12*   --    " --   19*   ANIONGAP  16*  15*   --    --   9   GLC  190*  224*   --    --   163*   BUN  38*  33*   --    --   25   CR  1.32*  0.98   --   0.82  0.76   RODNEY  7.7*  7.7*   --    --   7.4*   WBC  6.2  4.3   --    --   3.8*   RBC  2.67*  2.87*   --    --   3.17*   HGB  8.2*  8.6*   --    --   9.6*   PLT  99*  76*   --   87*  86*       Recent Labs   Lab Test  07/15/18   1401  07/15/18   0339  07/14/18   0926  07/12/18   0519  07/11/18   2347   INR  2.06*  1.88*  1.87*  1.37*  1.38*   PTT  46*   --    --   31  31           Recent Labs  Lab 07/15/18  1646 07/15/18  1401 07/15/18  0651 07/15/18  0618 07/14/18  2209   PH 7.19*  --  7.40 Canceled, Test credited 7.39   PCO2 29*  --  22* Canceled, Test credited 24*   PO2 271*  --  76* Canceled, Test credited 156*   HCO3 11*  --  14* Canceled, Test credited 14*   O2PER 100 100.0 40.0 Canceled, Test credited 50       Assessment/Plan:  #. Neurological:  Left burrhole SDH evacuation on 7/11.      #Encephalopathy, decreased LOC associated with fever, CT head improved and vEEG initiated, preliminary report without evidence of seizures  - Keppra 1000mg BID prophylaxis  - surgical drains per neurosurgery  - pain control with prn dilaudid, acetaminophen  - midazolam for sedation (hold Precedex because of hypotension)  - PT/OT when appropriate     #. Respiratory:   #Respiratory failure in setting of expanding chronic subdural hematoma  Intubated and mechanically ventilated   -Maximized ventilator settings due to hemodynamic and respiratory decompensation, will reduce PEEP and FIO2 settings through the night  -Will keep vecuronium paralysis until PEEP 5 and FIO2 40     #. Cardiovascular:  #DEMAND ISCHEMIA  #H/o CAD with STEMI and CABG, previously not on any medications.  #Troponemia with t-wave changes on telemetry overnight.  Troponin 2.08.    #Hypotension, cannot rule out acute change due to demand ischemia in setting of fever and metabolic stressors.  Bedside echo obtained with some  indication of RV strain and septal wall abnormalities, h/o PE so chest CT angio completed with no evidence of PE.    - Cardiology consult, appreciate recommendations  - SBP < 140  - MAP > 65.    - Norepinephrine to maintain MAP goal.  - IV lasix today given low output and fluid overload status      #. Endocrine:   No acute issues.  -no indication for SSI, will continue to monitor.     #. Heme/ID:   H/O progressive prostate CA with bony metastatic disease.  H/O nonocclusive DVT in 2015 and segmental PE in 2016, prescribed lovenox and states he is not always compliant.  - CT angio chest negative for PE  - U/S BLE negative  - anticoagulation and ASA held due to SDH and recent surgery  - BID CBC  - Transfuse for goal platelet of >100     #Fever:  Pan culture pending, UA looked concerning for UTI, Sputum with findings of mixed gram positive and gram negative bacteria on gram stain.  Final cultures pending.  Blood cultures no growth to date.  - Acetaminophen prn for fever.    - repeat pan culture in 48 hours if patient spikes new fever  - Vancomycin/Zosyn empiric coverage, will narrow per culture results  - continue to monitor for other infectious signs or complications     #. GI:  #malnutrition  - History of poor oral intake and malnutrition over past few months  - NJT placed  - TF on hold for now  - Bowel Regimen as ordered  - Will consider SLP evaluation after extubation  - General surgery consulted for free fluid in abdomen      #. Renal:  Gamez catheter in place.  Likely difficult catheter placement and/or retention issues per history of Prostate CA and reports of urinary incontinence and difficulties at home prior to admission.    -- Strict I&Os  -- Continue maintenance IVF as ordered.  -- Electrolyte replacement protocols as ordered  -- continue IVF as ordered, will consider tko after TF at goal.    #. Skin/Musculoskeletal:  -- No issues, high risk for skin breakdown due to poor nutritional status and bedbound  capacity at this time.     #. PT/OT/Speech:  -- Hold evaluations       #. ICU prophylaxis:   --Stress ulcer prophylaxis not indicated  --VAP: per protocol  --DVT prophylaxis: mechanical only at this time.     #. Lines  Left IJTL  Right radial a-line  PIV x3  Gamez catheter  Surgical drains   NGT right nostril  ETT     CODE STATUS: Full Code

## 2018-07-15 NOTE — PLAN OF CARE
Problem: Patient Care Overview  Goal: Plan of Care/Patient Progress Review  S:  Neuro checks consistent, PS trial failed,   B:  Pt wakes promptly and follows commands all 4 ext equally.  JERMAN, denied pain except for mild post neck pain for which he doesn't want any pain med.          Weaned Fi02 from 100 to 60 then 50.  PS trial x 45 min failing when MV up to 16 w/ RR mid 30s.           Towards end of shift pt w/ frequent cough which sounds productive but minimal secretions out via ETT and mouth and above ETT cuff.  RT in to assess and said no cuff leak, ETT at same marking-----NSURG notified and will order CXR.          Copious watery diarrhea.        Titrated norepi from .22 to .08 mcg to keep map 65, pt frequently moving about in bed so BP especially variable.   A:  As noted.  R:  Cont w/ care plan

## 2018-07-15 NOTE — CONSULTS
"General Surgery Consultation Note    Oswaldo Win  MRN: [id number]  male  73 year old    Chief Complaint:  \"Abd free fluid of unknown origin\"    HPI:  73 year old male w/ CAD s/p STEMI & CABG, moderate/severe MR s/p mitraclip, PE 11/2016, metastatic prostate cancer, poor compliance w/ physician visits & meds, on neurosurgery service for large subdural hematoma w/ herniation seen on HCT 7/11 s/p L efren holes for drainage. Pt reportedly had been progressing towards extubation today vs. tomorrow but acutely decompensated this afternoon with severe metabolic acidosis, rising lactate (3.5 > 7.5 > 12.2 > 9.8), increased pressor requirement (0.4 norepi), increased vent settings, acute renal failure to undergo CRRT, recently febrile (likely attributed to UTI) on broad-spectrum abx for several days. CT C/A/P obtained, and general surgery consulted for finding of abd free fluid w/o clear etiology.     Patient Active Problem List   Diagnosis     Advanced directives, counseling/discussion     CARDIOVASCULAR SCREENING; LDL GOAL LESS THAN 160     HL (hearing loss)     ASCVD (arteriosclerotic cardiovascular disease)     Musculoskeletal leg pain     Hyperlipidemia with target LDL less than 70     S/P coronary artery bypass graft x 1     NSTEMI (non-ST elevated myocardial infarction) (H)     History of non-ST elevation myocardial infarction (NSTEMI)     Inguinal hernia, incarcerated     Metastatic bone tumor (H)     Prostate cancer metastatic to bone (H)     SOB (shortness of breath)     Nausea vomiting and diarrhea     Chronic systolic heart failure (HCC)     HFrEF (heart failure with reduced ejection fraction) (H)     Mitral valve prolapse     Mitral regurgitation     Kidney stone     Long-term (current) use of anticoagulants [Z79.01]     Deep vein thrombosis (DVT) (HCC) [I82.409]     Influenza     Examination of participant in clinical trial     Pulmonary embolism (H)     GI bleed     Anemia     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)     Lightheadedness     Failure to thrive in adult     Subdural hematoma (H)     Past Surgical History:   Past Surgical History:   Procedure Laterality Date     ANESTHESIA OUT OF OR PERCUTANEOUS MITRAL VALVE REPAIR N/A 10/16/2015    Procedure: ANESTHESIA OUT OF OR PERCUTANEOUS MITRAL VALVE REPAIR;  Surgeon: GENERIC ANESTHESIA PROVIDER;  Location: [facility]     SONIA HOLE(S) EVACUATE HEMATOMA SUBDURAL Left 7/11/2018    Procedure: SONIA HOLE(S) EVACUATE HEMATOMA SUBDURAL;  Left Sonia Holes for Subdural Hematoma Evacuation ;  Surgeon: Javad Coats MD;  Location: [facility]     COLONOSCOPY       COLONOSCOPY N/A 1/13/2017    Procedure: COLONOSCOPY;  Surgeon: Telly Anderson MD;  Location: [facility]     COMBINED CYSTOSCOPY, RETROGRADES, EXCHANGE STENT URETER(S) Left 12/14/2015    Procedure: COMBINED CYSTOSCOPY, RETROGRADES, EXCHANGE STENT URETER(S);  Surgeon: Maame Ramirez MD;  [facility]     DAVINCI BYPASS ARTERY CORONARY  7/1/2014    Procedure: DAVINCI BYPASS ARTERY CORONARY;  Surgeon: Kike Coley MD;  Location: [facility]     ESOPHAGOSCOPY, GASTROSCOPY, DUODENOSCOPY (EGD), COMBINED N/A 1/13/2017    Procedure: COMBINED ESOPHAGOSCOPY, GASTROSCOPY, DUODENOSCOPY (EGD);  Surgeon: Telly Anderson MD;  Location: [facility]     EXCISE MASS LOWER EXTREMITY Right 2/5/2016    Procedure: EXCISE MASS LOWER EXTREMITY;  Surgeon: Miquel Figueredo MD;  [facility]     HERNIORRHAPHY INGUINAL Left 1968     HERNIORRHAPHY INGUINAL Left 11/18/2014    Procedure: HERNIORRHAPHY INGUINAL;  Surgeon: Max Garza MD;  Location: [facility]     LASER HOLMIUM LITHOTRIPSY URETER(S), INSERT STENT, COMBINED N/A 7/27/2015    Procedure: COMBINED CYSTOSCOPY, URETEROSCOPY, LASER HOLMIUM LITHOTRIPSY URETER(S), INSERT STENT;  Surgeon: Maame Ramirez MD;  [facility]: [facility]     LASER HOLMIUM LITHOTRIPSY URETER(S), INSERT STENT, COMBINED Left 9/19/2016    Procedure: COMBINED CYSTOSCOPY,  URETEROSCOPY, LASER HOLMIUM LITHOTRIPSY URETER(S), INSERT STENT;  Surgeon: Maame Ramirez MD;  Location: [facility]     OPEN REDUCTION INTERNAL FIXATION TIBIA  2005    RT Tib/Fib ORIF, crushed LT calc     TONSILLECTOMY & ADENOIDECTOMY  age 18     Past Medical History:   Past Medical History:   Diagnosis Date     Arthritis      ASCVD (arteriosclerotic cardiovascular disease) 4/10/2014     Closed L4 vertebral fracture (H) 2005    traumatic fracture     COPD (chronic obstructive pulmonary disease) (H)      Dyspnea on exertion      Fx tib/fib fol insrt ortho implnt/prosth/bone plt, right leg (H) 2005    rt tib/fib fracture s/p ORIF     Gastro-oesophageal reflux disease      Hx of right coronary artery stent placement 2014 4/2014 and redone 9/2014     Hyperlipidemia LDL goal < 70      Hypertension      Influenza A 3/27/2016     Musculoskeletal leg pain     from old leg injuries     Other chronic pain      Prostate cancer metastatic to bone (H)      Pulmonary embolism (H) 11/8/16 November 2016     ST elevation MI (STEMI) (H) 4/5/2014     Stented coronary artery      Thrombosis of leg 10/2015    October 2015     Social History:   Social History   Substance Use Topics     Smoking status: Former Smoker     Packs/day: 0.50     Years: 40.00     Types: Cigarettes     Quit date: 2/1/2000     Smokeless tobacco: Never Used     Alcohol use 0.0 oz/week     0 Standard drinks or equivalent per week      Comment: rare     Family History:   Family History   Problem Relation Age of Onset     HEART DISEASE Mother 50     CAB, pacemaker     HEART DISEASE Father 41     MI     Breast Cancer Paternal Grandmother      HEART DISEASE Maternal Uncle      Breast Cancer Paternal Aunt      Cerebrovascular Disease No family hx of      Diabetes No family hx of       Allergies:   Allergies   Allergen Reactions     Blood Transfusion Related (Informational Only) Other (See Comments)     Patient has a history of a significant allergic  "transfusion reaction.  Consult with Blood Bank MD before ordering components.     Active Medications:   No current outpatient prescriptions on file.     ROS:  Otherwise negative    Physical Examination:   Vital Signs: /85  Pulse 71  Temp 97.6  F (36.4  C) (Axillary)  Resp 20  Ht 1.676 m (5' 6\")  Wt 64 kg (141 lb 1.5 oz)  SpO2 (!) 89%  BMI 22.77 kg/m2  GEN: intubated & sedated, paralyzed on vecuronium gtt  EENT: normal  Chest: ventilated  Abdomen: soft, minimally distended, no abnormal masses noted  Extremities: WWP, mildly edematous throughout all extremities; R femoral HD line in place    Labs/Imaging/Other:  Results for orders placed or performed during the hospital encounter of 07/09/18 (from the past 24 hour(s))   XR Chest Port 1 View    Narrative    XR CHEST PORT 1 VW  7/14/2018 8:26 PM      HISTORY: eval ett;     COMPARISON: Chest x-ray same day 0223 hours    TECHNIQUE: Single upright frontal view the chest    FINDINGS: Endotracheal tube measures 2.8 cm superior to the bhargav.  Bilateral small pleural effusions with associated left lower lobe  consolidation/atelectasis. No pneumothorax. Cardiac mediastinal  silhouette is within normal limits. Trachea is midline. Incompletely  visualized feeding tube projecting over the esophagus and stomach.  Right-sided central venous catheter with tip projecting over the low  SVC. Unchanged appearance of diffuse bony sclerosis consistent with  patient's history of osteoblastic metastases. The upper abdomen is  unremarkable.      Impression    IMPRESSION:   1. Endotracheal tube projects 3.8 cm superior to the bhargav.  2. Small bilateral pleural effusions with associated lower lobe  consolidation/atelectasis.    I have personally reviewed the examination and initial interpretation  and I agree with the findings.    DEVONTE YANEZ MD   TEG without Heparinase   Result Value Ref Range    R time until clot forms 10.3 (H) 5 - 10 Minute    K time to spec clot " strength 4.3 (H) 1 - 3 Minute    Angle rate of clot strength 42.4 (L) 53 - 72 Degrees    MA maximum clot strength 48.2 (L) 50 - 70 mm    CI hypocoagulation index 7.0 (H) 0.0 - 3.0 Ratio    G actual clot strength 4.7 4.5 - 11.0 Kd/sc    LY30 lysis at 30 minutes 0 0 - 8 %    LY60 lysis at 60 minutes 0.0 0 - 15 %   Blood gas arterial   Result Value Ref Range    pH Arterial 7.39 7.35 - 7.45 pH    pCO2 Arterial 24 (L) 35 - 45 mm Hg    pO2 Arterial 156 (H) 80 - 105 mm Hg    Bicarbonate Arterial 14 (L) 21 - 28 mmol/L    Base Deficit Art 9.1 mmol/L    FIO2 50    Platelets prepare order unit   Result Value Ref Range    Blood Component Type PLT Pheresis     Units Ordered 1    Blood component   Result Value Ref Range    Unit Number [id number]     Blood Component Type PlateletPheresis,LeukoRed Irrad (Part 2)     Division Number 00     Status of Unit Released to care unit     Blood Product Code J0710B30     Unit Status ISS    Plasma prepare order unit   Result Value Ref Range    Blood Component Type Plasma     Units Ordered 1    Blood component   Result Value Ref Range    Unit Number [id number]     Blood Component Type Apheresis Plasma Thawed     Division Number 00     Status of Unit Released to care unit 07/15/2018 0113     Blood Product Code S5259O10     Unit Status ISS    UA with Microscopic reflex to Culture   Result Value Ref Range    Color Urine Canceled, Test credited     Appearance Urine Canceled, Test credited     Glucose Urine Canceled, Test credited (A) NEG^Negative mg/dL    Bilirubin Urine Canceled, Test credited (A) NEG^Negative    Ketones Urine Canceled, Test credited (A) NEG^Negative mg/dL    Specific Gravity Urine Canceled, Test credited 1.003 - 1.035    Blood Urine Canceled, Test credited (A) NEG^Negative    pH Urine Canceled, Test credited 5.0 - 7.0 pH    Protein Albumin Urine Canceled, Test credited (A) NEG^Negative mg/dL    Urobilinogen mg/dL Canceled, Test credited 0.0 - 2.0 mg/dL    Nitrite Urine  Canceled, Test credited (A) NEG^Negative    Leukocyte Esterase Urine Canceled, Test credited (A) NEG^Negative    Source Canceled, Test credited     WBC Urine Canceled, Test credited (A) OTO5^0 - 5 /HPF    RBC Urine Canceled, Test credited 0 - 2 /HPF   XR Chest Port 1 View    Narrative    Exam: XR CHEST PORT 1 VW, 7/15/2018 4:48 AM    Indication: Intubated    Comparison: 7/14/2018, 7/13/2018, 7/12/2018    Findings:   A single AP view of the chest was obtained. The endotracheal tube tip  projects over the mid/lower trachea. The feeding tube passes below the  field-of-view. The right IJ catheter tip projects over the mid SVC.  Stable position of mitral clips. The cardiac mediastinal silhouette is  within normal limits. No pneumothorax. Stable small pleural effusions.  Unchanged retrocardiac opacities. Diffuse skeletal osseous metastases  are stable.      Impression    Impression:   1. Stable support devices.  2. Unchanged retrocardiac atelectasis/consolidation and small pleural  effusions.    I have personally reviewed the examination and initial interpretation  and I agree with the findings.    RENAY SHAHID MD   Basic metabolic panel   Result Value Ref Range    Sodium 146 (H) 133 - 144 mmol/L    Potassium 4.2 3.4 - 5.3 mmol/L    Chloride 119 (H) 94 - 109 mmol/L    Carbon Dioxide 12 (L) 20 - 32 mmol/L    Anion Gap 15 (H) 3 - 14 mmol/L    Glucose 224 (H) 70 - 99 mg/dL    Urea Nitrogen 33 (H) 7 - 30 mg/dL    Creatinine 0.98 0.66 - 1.25 mg/dL    GFR Estimate 75 >60 mL/min/1.7m2    GFR Estimate If Black >90 >60 mL/min/1.7m2    Calcium 7.7 (L) 8.5 - 10.1 mg/dL   CBC with platelets differential   Result Value Ref Range    WBC 4.3 4.0 - 11.0 10e9/L    RBC Count 2.87 (L) 4.4 - 5.9 10e12/L    Hemoglobin 8.6 (L) 13.3 - 17.7 g/dL    Hematocrit 26.4 (L) 40.0 - 53.0 %    MCV 92 78 - 100 fl    MCH 30.0 26.5 - 33.0 pg    MCHC 32.6 31.5 - 36.5 g/dL    RDW 18.4 (H) 10.0 - 15.0 %    Platelet Count 76 (L) 150 - 450 10e9/L    Diff  Method Manual Differential     % Neutrophils 84.6 %    % Lymphocytes 10.0 %    % Monocytes 3.6 %    % Eosinophils 0.9 %    % Basophils 0.0 %    % Myelocytes 0.9 %    Absolute Neutrophil 3.6 1.6 - 8.3 10e9/L    Absolute Lymphocytes 0.4 (L) 0.8 - 5.3 10e9/L    Absolute Monocytes 0.2 0.0 - 1.3 10e9/L    Absolute Eosinophils 0.0 0.0 - 0.7 10e9/L    Absolute Basophils 0.0 0.0 - 0.2 10e9/L    Absolute Myelocytes 0.0 0 10e9/L    Anisocytosis Moderate     Poikilocytosis Marked     Efren Cells Marked     Platelet Estimate Confirming automated cell count    INR   Result Value Ref Range    INR 1.88 (H) 0.86 - 1.14   Magnesium (AM Draw)   Result Value Ref Range    Magnesium 2.1 1.6 - 2.3 mg/dL   Phosphorus (AM Draw)   Result Value Ref Range    Phosphorus 3.0 2.5 - 4.5 mg/dL   Fibrinogen activity   Result Value Ref Range    Fibrinogen 280 200 - 420 mg/dL   Procalcitonin   Result Value Ref Range    Procalcitonin 10.13 (HH) ng/ml   CT Head w/o Contrast    Narrative    CT HEAD W/O CONTRAST 7/15/2018 5:43 AM    Provided History: F/u SDH    Comparison: 7/12/2018, 7/11/2018.    Technique: Using multidetector thin collimation helical acquisition  technique, axial, coronal and sagittal CT images from the skull base  to the vertex were obtained without intravenous contrast.     Findings:    Stable left calvarial efren holes with subdural drain for evacuation of  subdural hematoma overlying the left cerebral convexity. The left  subdural hematoma again measures 5 mm in thickness. No midline shift,  mass effect, or new intracranial hemorrhage. Resolution of  pneumocephalus. Decreased conspicuity of tiny subarachnoid hemorrhage  overlying the left convexity. There is however a new low-density  extra-axial fluid collection overlying the right frontal lobe  measuring 10.2 mm in thickness.    The gray to white matter differentiation of the cerebral hemispheres  is preserved. The basal cisterns are patent. The visualized paranasal  sinuses are  clear. Trace right mastoid fluid. Diffuse osteoblastic  metastatic lesions are unchanged.         Impression    Impression:   1. Stable left subdural hematoma status post evacuation with  resolution of previously seen midline shift and pneumocephalus.  Decreased conspicuity of trace left convexity subarachnoid hemorrhage.  2. New low-density extra-axial fluid collection overlying the right  cerebrum most suggestive of effusion or hygroma.    I have personally reviewed the examination and initial interpretation  and I agree with the findings.    ROMAN BOX MD   Platelets prepare order unit   Result Value Ref Range    Blood Component Type PLT Pheresis     Units Ordered 2    Blood component   Result Value Ref Range    Unit Number [id number]     Blood Component Type PltPheresis LeukoReduced VolRedIrradiated     Division Number 00     Status of Unit Released to care unit 07/15/2018 0955     Blood Product Code [id number]     Unit Status ISS    Blood component   Result Value Ref Range    Unit Number [id number]     Blood Component Type PltPheresis LeukoRed VolRed Irrad (Part 2)     Division Number 00     Status of Unit Released to care unit 07/15/2018 0955     Blood Product Code S2778K35     Unit Status ISS    Cryoprecipitate prepare order unit   Result Value Ref Range    Blood Component Type Cryoprecipitate     Units Ordered 5    Blood component   Result Value Ref Range    Unit Number [id number]     Blood Component Type 5 cryoprecipitate units pooled     Division Number 00     Status of Unit Released to care unit 07/15/2018 0802     Blood Product Code [id number]     Unit Status ISS    Plasma prepare order unit   Result Value Ref Range    Blood Component Type Plasma     Units Ordered 1    Blood component   Result Value Ref Range    Unit Number [id number]     Blood Component Type Apheresis Plasma Thawed     Division Number 00     Status of Unit Released to care unit 07/15/2018 0739     Blood Product Code [id number]      Unit Status ISS    Blood gas arterial   Result Value Ref Range    pH Arterial Canceled, Test credited 7.35 - 7.45 pH    pCO2 Arterial Canceled, Test credited 35 - 45 mm Hg    pO2 Arterial Canceled, Test credited 80 - 105 mm Hg    Bicarbonate Arterial Canceled, Test credited 21 - 28 mmol/L    Base Excess Art Canceled, Test credited mmol/L    Base Deficit Art Canceled, Test credited mmol/L    FIO2 Canceled, Test credited    Lactic acid whole blood   Result Value Ref Range    Lactic Acid Canceled, Test credited 0.7 - 2.0 mmol/L   Blood gas arterial   Result Value Ref Range    pH Arterial 7.40 7.35 - 7.45 pH    pCO2 Arterial 22 (L) 35 - 45 mm Hg    pO2 Arterial 76 (L) 80 - 105 mm Hg    Bicarbonate Arterial 14 (L) 21 - 28 mmol/L    Base Deficit Art 9.6 mmol/L    FIO2 40.0    Lactic acid whole blood   Result Value Ref Range    Lactic Acid 3.5 (H) 0.7 - 2.0 mmol/L   Hemoglobin A1c   Result Value Ref Range    Hemoglobin A1C 4.9 0 - 5.6 %   UA with Microscopic reflex to Culture   Result Value Ref Range    Color Urine Red     Appearance Urine Cloudy     Glucose Urine 30 (A) NEG^Negative mg/dL    Bilirubin Urine Negative NEG^Negative    Ketones Urine 10 (A) NEG^Negative mg/dL    Specific Gravity Urine 1.021 1.003 - 1.035    Blood Urine Large (A) NEG^Negative    pH Urine 6.0 5.0 - 7.0 pH    Protein Albumin Urine 100 (A) NEG^Negative mg/dL    Urobilinogen mg/dL Normal 0.0 - 2.0 mg/dL    Nitrite Urine Negative NEG^Negative    Leukocyte Esterase Urine Moderate (A) NEG^Negative    Source Catheterized Urine     WBC Urine 81 (H) 0 - 5 /HPF    RBC Urine >182 (H) 0 - 2 /HPF   Urine Culture Aerobic Bacterial   Result Value Ref Range    Specimen Description Catheterized Urine     Special Requests Specimen received in preservative     Culture Micro PENDING    Lactic acid whole blood (Every 6 Hrs x3)   Result Value Ref Range    Lactic Acid 7.5 (HH) 0.7 - 2.0 mmol/L   pH venous   Result Value Ref Range    Ph Venous 7.20 (L) 7.32 - 7.43  pH   Clostridium difficile toxin B PCR   Result Value Ref Range    Specimen Description Feces     C Diff Toxin B PCR Negative NEG^Negative   UA reflex to Microscopic and Culture   Result Value Ref Range    Color Urine Yellow     Appearance Urine Slightly Cloudy     Glucose Urine Negative NEG^Negative mg/dL    Bilirubin Urine Negative NEG^Negative    Ketones Urine Negative NEG^Negative mg/dL    Specific Gravity Urine 1.012 1.003 - 1.035    Blood Urine Large (A) NEG^Negative    pH Urine 5.5 5.0 - 7.0 pH    Protein Albumin Urine 100 (A) NEG^Negative mg/dL    Urobilinogen mg/dL Normal 0.0 - 2.0 mg/dL    Nitrite Urine Negative NEG^Negative    Leukocyte Esterase Urine Moderate (A) NEG^Negative    Source Catheterized Urine     RBC Urine >182 (H) 0 - 2 /HPF    WBC Urine 170 (H) 0 - 5 /HPF   Lactic acid whole blood (Every 6 Hrs x3)   Result Value Ref Range    Lactic Acid 12.2 (HH) 0.7 - 2.0 mmol/L   Blood gas venous and oxyhgb   Result Value Ref Range    Ph Venous 7.12 (LL) 7.32 - 7.43 pH    PCO2 Venous 52 (H) 40 - 50 mm Hg    PO2 Venous 34 25 - 47 mm Hg    Bicarbonate Venous 17 (L) 21 - 28 mmol/L    FIO2 100.0     Oxyhemoglobin Venous 44 %    Base Deficit Venous 11.7 mmol/L   Basic metabolic panel   Result Value Ref Range    Sodium 154 (H) 133 - 144 mmol/L    Potassium 5.8 (H) 3.4 - 5.3 mmol/L    Chloride 118 (H) 94 - 109 mmol/L    Carbon Dioxide 20 20 - 32 mmol/L    Anion Gap 16 (H) 3 - 14 mmol/L    Glucose 190 (H) 70 - 99 mg/dL    Urea Nitrogen 38 (H) 7 - 30 mg/dL    Creatinine 1.32 (H) 0.66 - 1.25 mg/dL    GFR Estimate 53 (L) >60 mL/min/1.7m2    GFR Estimate If Black 64 >60 mL/min/1.7m2    Calcium 7.7 (L) 8.5 - 10.1 mg/dL   CBC with platelets   Result Value Ref Range    WBC 6.2 4.0 - 11.0 10e9/L    RBC Count 2.67 (L) 4.4 - 5.9 10e12/L    Hemoglobin 8.2 (L) 13.3 - 17.7 g/dL    Hematocrit 25.2 (L) 40.0 - 53.0 %    MCV 94 78 - 100 fl    MCH 30.7 26.5 - 33.0 pg    MCHC 32.5 31.5 - 36.5 g/dL    RDW 19.3 (H) 10.0 - 15.0 %     Platelet Count 99 (L) 150 - 450 10e9/L   INR   Result Value Ref Range    INR 2.06 (H) 0.86 - 1.14   Magnesium   Result Value Ref Range    Magnesium 2.4 (H) 1.6 - 2.3 mg/dL   Phosphorus   Result Value Ref Range    Phosphorus 5.4 (H) 2.5 - 4.5 mg/dL   Partial thromboplastin time   Result Value Ref Range    PTT 46 (H) 22 - 37 sec   Troponin I   Result Value Ref Range    Troponin I ES 1.196 (HH) 0.000 - 0.045 ug/L   XR Chest Port 1 View    Narrative    Exam: XR CHEST PORT 1 , 7/15/2018 2:29 PM    Indication: code;     Comparison: Radiograph on 7/15/2018 at 0310     Findings:   Single frontal view of chest.  The tip of endotracheal tube projects 5.7 cm above the bhargav. Right  IJ catheter stable in position. Two enteric tubes course below the  left hemidiaphragm and out of  the field-of-view.  Small layering bilateral pleural effusion with overlying atelectasis  and/or consolidation. Cardiac silhouette is stable. Stable mitral  clips. No appreciable pneumothorax. Redemonstration of diffuse  sclerotic metastases.      Impression    Impression:   1.The tip of endotracheal tube projects 5.7 cm above the bhargav.  2. Small bilateral layering pleural effusion with overlying  atelectasis and/or consolidation, not significantly changed.    I have personally reviewed the examination and initial interpretation  and I agree with the findings.    DEVONTE YANEZ MD   XR Abdomen Port 1 View    Narrative    Exam: XR ABDOMEN PORT 1 , 7/15/2018 2:31 PM    Indication: OG placement;     Comparison: Radiograph on 7/12/2018.    Findings:   Single frontal view of abdomen. Left abdomen and lower pelvis is  collimated off the field-of-view.  The sidehole of NG tube projects over the stomach. The tip of feeding  tube is out of field of view but likely in the proximal jejunum.  Nonobstructive bowel gas pattern. Diffuse sclerotic osseous  metastases. Small bilateral pleural effusion.      Impression    Impression:   1.The sidehole of NG  tube projects over the stomach.   2.The tip of feeding tube is out of field of view but likely in the  proximal jejunum.    I have personally reviewed the examination and initial interpretation  and I agree with the findings.    DEVONTE YANEZ MD   CT Chest Pulmonary Embolism w Contrast    Narrative    EXAMINATION: CT CHEST PULMONARY EMBOLISM W CONTRAST, 7/15/2018 3:39 PM    CLINICAL HISTORY: Lactic acidosis;     COMPARISON: CT on 7/12/2018, radiograph on  7/14/2018    TECHNIQUE: CT imaging obtained through the chest with contrast.  Coronal and axial MIP reformatted images obtained. Three-dimensional  (3D) post-processed angiographic images were reconstructed, archived  to PACS and used in interpretation of this study.     CONTRAST: 86 ml isovue 370 IV    FINDINGS:    Lines and tubes: Endotracheal tube in mid thoracic trachea. NG tube  tip terminating in the stomach. NJ tube tip courses below the field of  view.    Vessels: No central filling defect consistent with a pulmonary  embolism.  No aortic aneurysm.     Mediastinum: Central tracheobronchial tree is patent. Heart size is  normal. No pericardial effusion.  Normal thoracic vasculature.  Multiple prominent and enlarged mediastinal lymph nodes for example 11  mm short axis right paratracheal lymph node (series 5, image 55)  similar to prior.    Lungs: Moderate to large bilateral pleural effusion with overlying  atelectasis and/or consolidation. Upper lung predominant confluent  centrilobular emphysematous changes. Extensive subcutaneous edema.    Bones and soft tissues: Redemonstration of extensive sclerotic  metastatic lesions. No acute osseous abnormality.    Upper abdomen: Please refer to same day CT of abdomen and pelvis for  further details.      Impression    IMPRESSION:   1. No central filling defect consistent with a pulmonary embolism.   2. Moderate to large bilateral pleural effusion with overlying  atelectasis and/or consolidation.  3. Substantial  sclerotic metastatic osseous lesion.    I have personally reviewed the examination and initial interpretation  and I agree with the findings.    DEVONTE YANEZ MD   CT Chest/Abdomen/Pelvis w Contrast    Narrative    Preliminary report:  This is a preliminary resident interpretation. Full report to follow.        Impression    IMPRESSION:   1. New hypoattenuating lesions in the liver suggestive of metastatic  disease.  2. Moderate amount of free fluid in the abdomen.  3. No pneumatosis or portal venous gas. Normal caliber of small bowel  or colon.  4. Nonspecific wall thickening of a nondistended gallbladder and  pericholecystic fluid in the context of ascites.  5.Extensive sclerotic osseous lesion.  5. Please refer to same day CT of chest for further findings including  moderate bilateral pleural effusion.   Comprehensive metabolic panel   Result Value Ref Range    Sodium 150 (H) 133 - 144 mmol/L    Potassium 5.1 3.4 - 5.3 mmol/L    Chloride 117 (H) 94 - 109 mmol/L    Carbon Dioxide 13 (L) 20 - 32 mmol/L    Anion Gap 20 (H) 3 - 14 mmol/L    Glucose 154 (H) 70 - 99 mg/dL    Urea Nitrogen 40 (H) 7 - 30 mg/dL    Creatinine 1.40 (H) 0.66 - 1.25 mg/dL    GFR Estimate 49 (L) >60 mL/min/1.7m2    GFR Estimate If Black 60 (L) >60 mL/min/1.7m2    Calcium 7.6 (L) 8.5 - 10.1 mg/dL    Bilirubin Total 3.0 (H) 0.2 - 1.3 mg/dL    Albumin 2.4 (L) 3.4 - 5.0 g/dL    Protein Total 5.3 (L) 6.8 - 8.8 g/dL    Alkaline Phosphatase 180 (H) 40 - 150 U/L    ALT 48 0 - 70 U/L     (H) 0 - 45 U/L   Lipase   Result Value Ref Range    Lipase 164 73 - 393 U/L   Amylase   Result Value Ref Range    Amylase 57 30 - 110 U/L   Blood gas arterial   Result Value Ref Range    pH Arterial 7.19 (LL) 7.35 - 7.45 pH    pCO2 Arterial 29 (L) 35 - 45 mm Hg    pO2 Arterial 271 (H) 80 - 105 mm Hg    Bicarbonate Arterial 11 (L) 21 - 28 mmol/L    Base Deficit Art 15.8 mmol/L    FIO2 100    Lactic acid whole blood   Result Value Ref Range    Lactic Acid 9.8  (HH) 0.7 - 2.0 mmol/L     *Note: Due to a large number of results and/or encounters for the requested time period, some results have not been displayed. A complete set of results can be found in Results Review.       Admitting Diagnosis:   1. Generalized muscle weakness    2. Dehydration    3. Symptomatic anemia      Assessment & Plan:  73 year old male w/ large subdural hematoma w/ herniation POD4 s/p evacuation admitted to neuro ICU for presumed urosepsis, on vanc & Zosyn. Pt acutely decompensated today as evidenced by increase in pressor requirement & vent settings, acute renal failure likely requiring CRRT, rise in lactate. General surgery consulted for imaging finding of abd free fluid. Radiology unable to locate source of free fluid though there is evidence of 3rd spacing throughout exam (pt is malnourished). CT doesn't show any evidence of vascular compromise of the bowel (no bowel wall thickening, pneumatosis, free air, portal venous gas). Recommend d/c tube feeds; serial abd exams. General surgery will follow.      D/w chief resident who d/w staff    Leanna Gupta MD/MPH  Plastic Surgery PGY2

## 2018-07-15 NOTE — CONSULTS
Nephrology Initial Consult  July 15, 2018      Oswaldo Win MRN:[id number] YOB: 1945  Date of Admission:7/9/2018  Primary care provider: Lucrecia Collier  Requesting physician: Javad Coats MD    ASSESSMENT AND RECOMMENDATIONS:   74 yo male with metastatic prostate cancer, CAD s/p CABG, hx of PE admitted for weakness who underwent operative treatment for subdural hematoma on 7/11 developed sudden hemodynamic compromise and renal failure.    Recommendations  Initiate CRRT with no volume removal    CHAR  Patient with oliguric, heading to anuric CHAR due to acute hypotension and circulatory shock.   Etiology of shock is unclear - sepsis vs obstructive vs hypovolemic.  Given abrupt change in clinical status, severe lactic acidosis, pH < 7.2, Potassium 5.8, minimal UOP, and hemodynamic instability - plan for CRRT.     #Shock  Primary team evaluating. Started Broad Specturm Abx and evaluating for intraabdominal complications.    Recommendations were communicated to primary team face to face.    Discussed with Dr. Chambers by phone.    Gagandeep Oro MD   [phone].      REASON FOR CONSULT: Acidemia, decreasing UOP    HISTORY OF PRESENT ILLNESS:  74 yo man with metastatic prostate cancer, CAD s/p CABG, hx of PE admitted for weakness who underwent operative treatment for subdural hematoma on 7/11 who has had a complicated post op coarse with difficulty with extubation and hypotension. There has been concern for UTI vs other infection and thus the patient has been on pip-tazo and Vanc for the past 72 hours.   Per the patient's son, he is had waxing and waning mental status and has been delirious.   Over the course of this morning and afternoon, he has developed rapid and sudden hemodynamic compromise and lactic acidosis. He had a near code where bradycardia and hypotension (BPs recorded 50/30) was so severe that code blue was almost initiated, but he responded to vasopressors.   At the time of my exam,  he is intubated and sedated. Minimal UOP. He is on Norepi support and mechanical ventilation. As far as family knows, no previous CKD history.    PAST MEDICAL HISTORY:  Reviewed via EMR and family on 07/15/2018     Past Medical History:   Diagnosis Date     Arthritis      ASCVD (arteriosclerotic cardiovascular disease) 4/10/2014     Closed L4 vertebral fracture (H) 2005    traumatic fracture     COPD (chronic obstructive pulmonary disease) (H)      Dyspnea on exertion      Fx tib/fib fol insrt ortho implnt/prosth/bone plt, right leg (H) 2005    rt tib/fib fracture s/p ORIF     Gastro-oesophageal reflux disease      Hx of right coronary artery stent placement 2014 4/2014 and redone 9/2014     Hyperlipidemia LDL goal < 70      Hypertension      Influenza A 3/27/2016     Musculoskeletal leg pain     from old leg injuries     Other chronic pain      Prostate cancer metastatic to bone (H)      Pulmonary embolism (H) 11/8/16 November 2016     ST elevation MI (STEMI) (H) 4/5/2014     Stented coronary artery      Thrombosis of leg 10/2015    October 2015       Past Surgical History:   Procedure Laterality Date     ANESTHESIA OUT OF OR PERCUTANEOUS MITRAL VALVE REPAIR N/A 10/16/2015    Procedure: ANESTHESIA OUT OF OR PERCUTANEOUS MITRAL VALVE REPAIR;  Surgeon: GENERIC ANESTHESIA PROVIDER;  [facility]: [facility]     SONIA HOLE(S) EVACUATE HEMATOMA SUBDURAL Left 7/11/2018    Procedure: SONIA HOLE(S) EVACUATE HEMATOMA SUBDURAL;  Left Sonia Holes for Subdural Hematoma Evacuation ;  Surgeon: Javad Coats MD;  [facility]     COLONOSCOPY       COLONOSCOPY N/A 1/13/2017    Procedure: COLONOSCOPY;  Surgeon: Telly Anderson MD;  Location: [facility]     COMBINED CYSTOSCOPY, RETROGRADES, EXCHANGE STENT URETER(S) Left 12/14/2015    Procedure: COMBINED CYSTOSCOPY, RETROGRADES, EXCHANGE STENT URETER(S);  Surgeon: Maame Ramirez MD;  [facility]     DAVINCI BYPASS ARTERY CORONARY  7/1/2014    Procedure: DAVINCI  BYPASS ARTERY CORONARY;  Surgeon: Kike Coley MD;  [facility]     ESOPHAGOSCOPY, GASTROSCOPY, DUODENOSCOPY (EGD), COMBINED N/A 1/13/2017    Procedure: COMBINED ESOPHAGOSCOPY, GASTROSCOPY, DUODENOSCOPY (EGD);  Surgeon: Telly Anderson MD;  [facility]     EXCISE MASS LOWER EXTREMITY Right 2/5/2016    Procedure: EXCISE MASS LOWER EXTREMITY;  Surgeon: Miquel Figueredo MD;  [facility]     HERNIORRHAPHY INGUINAL Left 1968     HERNIORRHAPHY INGUINAL Left 11/18/2014    Procedure: HERNIORRHAPHY INGUINAL;  Surgeon: Max Garza MD;  [facility]     LASER HOLMIUM LITHOTRIPSY URETER(S), INSERT STENT, COMBINED N/A 7/27/2015    Procedure: COMBINED CYSTOSCOPY, URETEROSCOPY, LASER HOLMIUM LITHOTRIPSY URETER(S), INSERT STENT;  Surgeon: Maame Ramirez MD;  [facility]     LASER HOLMIUM LITHOTRIPSY URETER(S), INSERT STENT, COMBINED Left 9/19/2016    Procedure: COMBINED CYSTOSCOPY, URETEROSCOPY, LASER HOLMIUM LITHOTRIPSY URETER(S), INSERT STENT;  Surgeon: Maame Ramirez MD;  [facility]     OPEN REDUCTION INTERNAL FIXATION TIBIA  2005    RT Tib/Fib ORIF, crushed LT calc     TONSILLECTOMY & ADENOIDECTOMY  age 18        MEDICATIONS:  PTA Meds  Prior to Admission medications    Medication Sig Last Dose Taking? Auth Provider   aspirin 81 MG tablet Take 1 tablet (81 mg) by mouth daily Past Week at Unknown time Yes Gabriela Mcguire PA-C   HYDROcodone-acetaminophen (NORCO) 5-325 MG per tablet Take 1 tablet by mouth every 6 hours as needed for severe pain Past Week at Unknown time Yes Reported, Patient   palbociclib (IBRANCE) 125 MG capsule CHEMO Take 1 capsule (125 mg) by mouth daily with food   Bentley Robles MD      Current Meds    artificial tears   Both Eyes 4x Daily     cholecalciferol  2,000 Units Oral Daily     insulin aspart  1-6 Units Subcutaneous Q4H     levETIRAcetam  1,000 mg Oral BID     midazolam  5 mg Intravenous Once     multivitamins with  minerals  15 mL Per Feeding Tube Daily     oyster shell calcium  1 tablet Oral BID w/meals     pantoprazole  40 mg Oral QAM AC     piperacillin-tazobactam  4.5 g Intravenous Q6H     rosuvastatin  10 mg Oral Daily     sodium bicarbonate         sodium chloride (PF)  3 mL Intracatheter Q8H     vancomycin place marcus - receiving intermittent dosing  1 each Does not apply See Admin Instructions     Infusion Meds    dexmedetomidine Stopped (07/15/18 1400)     IV fluid REPLACEMENT ONLY       fentaNYL 50 mcg/hr (07/15/18 1600)     midazolam 4 mg/hr (07/15/18 1553)     norepinephrine 0.14 mcg/kg/min (07/15/18 1450)     sodium bicabonate in 5% dextrose for infusion 100 mL/hr at 07/15/18 1508     NaCl       vecuronium (NORCURON) infusion ADULT 0.8 mcg/kg/min (07/15/18 1558)       ALLERGIES:    Allergies   Allergen Reactions     Blood Transfusion Related (Informational Only) Other (See Comments)     Patient has a history of a significant allergic transfusion reaction.  Consult with Blood Bank MD before ordering components.       REVIEW OF SYSTEMS:  A comprehensive of systems was not obtained as patient intubated/sedated.    SOCIAL HISTORY:   Social History     Social History     Marital status:      Spouse name: N/A     Number of children: 2     Years of education: N/A     Occupational History      Retired           Social History Main Topics     Smoking status: Former Smoker     Packs/day: 0.50     Years: 40.00     Types: Cigarettes     Quit date: 2000     Smokeless tobacco: Never Used     Alcohol use 0.0 oz/week     0 Standard drinks or equivalent per week      Comment: rare     Drug use: No     Sexual activity: Yes     Partners: Female     Other Topics Concern     Parent/Sibling W/ Cabg, Mi Or Angioplasty Before 65f 55m? Yes     father at age 44yrs      Social History Narrative    Mom  at age 93 from CHF    Dad  at age 41 from congenital heart defect    Sibling:  Sister 74  "years ago in good health    Children two adult sons alive and well        Occupation:  , taught electronics and , and .    Musician      Reviewed with patient's family.    FAMILY MEDICAL HISTORY:   Family History   Problem Relation Age of Onset     HEART DISEASE Mother 50     CAB, pacemaker     HEART DISEASE Father 41     MI     Breast Cancer Paternal Grandmother      HEART DISEASE Maternal Uncle      Breast Cancer Paternal Aunt      Cerebrovascular Disease No family hx of      Diabetes No family hx of      Reviewed with patient's family.    PHYSICAL EXAM:   Temp  Av.2  F (36.8  C)  Min: 96  F (35.6  C)  Max: 102.5  F (39.2  C)  Arterial Line MAP (mmHg)  Av.7 mmHg  Min: 35 mmHg  Max: 155 mmHg  Arterial Line BP  Min: 59/44  Max: 158/153      Pulse  Av  Min: 66  Max: 94 Resp  Av.1  Min: 9  Max: 40  SpO2  Av.8 %  Min: 50 %  Max: 100 %  FiO2 (%)  Av.6 %  Min: 40 %  Max: 100 %    CVP (mmHg): 24 mmHg (while flat in bed)  /85  Pulse 71  Temp 97.6  F (36.4  C) (Axillary)  Resp 20  Ht 1.676 m (5' 6\")  Wt 64 kg (141 lb 1.5 oz)  SpO2 (!) 89%  BMI 22.77 kg/m2   Date 07/15/18 0700 - 18 0659   Shift 8689-4059 0782-1508 9533-6598 24 Hour Total   I  N  T  A  K  E   I.V. 979.86 1050  2029.86    NG/   160    Enteral 315 0  315    Blood Components 426   426    Shift Total  (mL/kg) 1880.86  (29.39) 1050  (16.41)  2930.86  (45.8)   O  U  T  P  U  T   Urine 155   155    Drains 75   75    Shift Total  (mL/kg) 230  (3.59)   230  (3.59)   Weight (kg) 64 64 64 64        Admit Weight: 50.3 kg (111 lb)     GENERAL APPEARANCE: Sedated, intubated  EYES: No scleral icterus  Endo: No goiter,   Lymphatics: no cervical  LAD  Pulmonary: mech vent, coarse breath sounds bilaterally  CV: regular rhythm, tachycardic, no rub   - Edema - none  GI: distended, no bruising  MS: no evidence of inflammation in joints  : guzman in place  SKIN: no rash, cool to " touch  NEURO: Sedated and paralyzed     LABS:   [facility]  Recent Labs  Lab 07/15/18  1401 07/15/18  0339 07/14/18  1643 07/14/18  0926 07/14/18  0347 07/13/18  0353  07/11/18  0602  07/09/18  2255   * 146*  --  146* 144 143  < > 142  < > 141   POTASSIUM 5.8* 4.2 4.5  --  3.2* 3.5  < > 3.6  < > 3.6   CHLORIDE 118* 119*  --   --  116* 116*  < > 113*  < > 107   CO2 20 12*  --   --  19* 15*  < > 17*  < > 22   ANIONGAP 16* 15*  --   --  9 12  < > 12  < > 12   * 224*  --   --  163* 124*  < > 89  < > 108*   BUN 38* 33*  --   --  25 17  < > 12  < > 23   CR 1.32* 0.98  --  0.82 0.76 0.91  < > 0.82  < > 0.93   GFRESTIMATED 53* 75  --   --  >90 81  < > >90  < > 79   GFRESTBLACK 64 >90  --   --  >90 >90  < > >90  < > >90   RODNEY 7.7* 7.7*  --   --  7.4* 6.8*  < > 8.6  < > 9.1   MAG 2.4* 2.1 2.3  --  1.7 1.7  < >  --   < >  --    PHOS 5.4* 3.0  --   --  1.5* 2.3*  < > 2.7  < >  --    PROTTOTAL  --   --   --   --   --   --   --  6.1*  --  7.4   ALBUMIN  --   --   --   --   --   --   --  3.0*  --  3.9   BILITOTAL  --   --   --   --   --   --   --  1.0  --  1.0   ALKPHOS  --   --   --   --   --   --   --  181*  --  227*   AST  --   --   --   --   --   --   --  56*  --  60*   ALT  --   --   --   --   --   --   --  11  --  12   < > = values in this interval not displayed.  CBC  Recent Labs  Lab 07/15/18  1401 07/15/18  0339 07/14/18  0926 07/14/18  0347  07/13/18  0353   HGB 8.2* 8.6*  --  9.6*  --  10.9*   WBC 6.2 4.3  --  3.8*  --  5.4   RBC 2.67* 2.87*  --  3.17*  --  3.57*   HCT 25.2* 26.4*  --  28.9*  --  32.5*   MCV 94 92  --  91  --  91   MCH 30.7 30.0  --  30.3  --  30.5   MCHC 32.5 32.6  --  33.2  --  33.5   RDW 19.3* 18.4*  --  17.5*  --  17.4*   PLT 99* 76* 87* 86*  < > 85*   < > = values in this interval not displayed.  INR  Recent Labs  Lab 07/15/18  1401 07/15/18  0339 07/14/18  0926 07/12/18  0519 07/11/18  2347 07/11/18  1705   INR 2.06* 1.88* 1.87* 1.37* 1.38* 1.40*   PTT 46*  --   --  31 31 43*     ABG  Recent  Labs  Lab 07/15/18  1401 07/15/18  0651 07/15/18  0618 07/14/18  2209 07/12/18  0519   PH  --  7.40 Canceled, Test credited 7.39 7.42   PCO2  --  22* Canceled, Test credited 24* 31*   PO2  --  76* Canceled, Test credited 156* 246*   HCO3  --  14* Canceled, Test credited 14* 20*   O2PER 100.0 40.0 Canceled, Test credited 50 100.0      URINE STUDIES  Recent Labs   Lab Test  07/15/18   1157  07/15/18   0738  07/15/18   0335  07/14/18   0052   COLOR  Yellow  Red  Canceled, Test credited  Yellow   APPEARANCE  Slightly Cloudy  Cloudy  Canceled, Test credited  Cloudy   URINEGLC  Negative  30*  Canceled, Test credited*  Negative   URINEBILI  Negative  Negative  Canceled, Test credited*  Negative   URINEKETONE  Negative  10*  Canceled, Test credited*  10*   SG  1.012  1.021  Canceled, Test credited  1.035   UBLD  Large*  Large*  Canceled, Test credited*  Large*   URINEPH  5.5  6.0  Canceled, Test credited  6.0   PROTEIN  100*  100*  Canceled, Test credited*  30*   NITRITE  Negative  Negative  Canceled, Test credited*  Negative   LEUKEST  Moderate*  Moderate*  Canceled, Test credited*  Small*   RBCU  >182*  >182*  Canceled, Test credited  >182*   WBCU  170*  81*  Canceled, Test credited*  21*     Recent Labs   Lab Test  08/10/15   1050   UTPG  1.78*     PTH  Recent Labs   Lab Test  09/18/15   0450   PTHI  1162*     IRON STUDIES  Recent Labs   Lab Test  07/10/18   0820  07/23/15   0905   IRON  151  74   FEB  274  360   IRONSAT  55*  20   NICKY  2009*  399*     IMAGING:  CT C/A/P - Prelim no PE, no intrabd hemorrhage.     Gagandeep Oro MD

## 2018-07-15 NOTE — PROCEDURES
Procedure : Left femoral arterial line  Consent : Emergent       Procedure performed under sterile precautions.   Ultrasound guidance used to locate the femoral artery bifurcation.   No immediate complication noticed.   Line secured with skin suture.  Tegaderm used for infection prevention.       Dr. Tyson  present for supervision.    Roger Mejía   Fellow

## 2018-07-15 NOTE — PROGRESS NOTES
"S: more agitated overnight requiring increased precedex. tachypneic. unable to provide verbal subjective.     O:  Exam:  General: intubated  Pulm: mechanical ventilation   Mental status: eyes closed, grimaces. opens eyes to noxious stimuli.   Cranial nerves: pupils equal and reactive  Motor: follows commands x 4.     Assessment:   #subdural hematoma s/p evacuation with no reaccumulation   #pneumocephalus, improved   #respiratory failure, ongoing   #presumed urospesis, ongoing   #coagulopathy and thrombocytopenia, requiring ongoing transfusions   #lactic acidosis     Plan by problem:   Neuro:   Post-operative pain: pain controlled with precedex gtt   Neurological exam frequency: q2h   Anti-epileptics: keppra  Sutures out: 7/25   Drains: plan to discontinue lily galvan after coagulopathy improved   Required imaging: repeat head CT on 7/16     Cardiovascular  Blood pressure goals: Maintain MAPs > 65 with levophed as needed and wean as able      Pulmonary:   Continue mechanical ventilator for ongoing urosepsis     Gastrointestinal: NPO. continue enteral feeds     Renal:   Continue guzman for strict intake and output monitoring     Heme:   Currently correcting platelets and INR  Full coagulation panel in PM 7/15    Infectious:   Treating presumed urosepsis with vancomycin and zosyn     PT/OT: pending  DVT prophylaxis: Sequential compression devices; no chemoprophylaxis due to bleeding risk   Ulcer prophylaxis: protonix while intubated   DISPO: 4A ICU; discharge uncertain due to critical illness   Barriers: Surgical drain, evaluation by therapies, ambulating, BM/flatus, voiding without a Guzman, tolerating PO diet, pain controlled  on PO medications, resolution of critical illness     Fritz \"Manan\" MD [place]   Neurosurgery, PGY-2    Please contact neurosurgery resident on call with questions.    Dial * * [phone], enter [phone] when prompted.     Agree with resident's note.  Seen and examined today with team.  Tavon Banegas" King

## 2018-07-15 NOTE — PROCEDURES
07/15/2018     Supervising Physician : Dr. Roger Mejía    Procedure Insertion of temporary dual lumen dialysis catheter  Indications Acute Renal Failure  Consent obtained   The indications and risks of the temporary dialysis catheter placement, including infection, bleeding severe enough to require transfusion, unintended trauma to adjacent organs or blood vessels, pneumothorax, or death, were explained, understood and agreed. Consent form was signed by the patient (or proxy) and a copy is in the paper chart.   Physician Gagandeep Oro MD   Patient prepped and draped in sterile fashion. R Femoral Vein identified with ultrasound, anesthesia with 1% lidocaine, vessel cannulated with 18 g needle under direct ultrasound guidance, Using Seldinger technique dialysis catheter was placed in the R Groin.  Blood flow in both ports. Patient tolerated with no apparent complications.      Xray for placement pending.      Gagandeep Oro MD  [facility] Nephrology Fellow  [facility]  Department of Medicine  Division of Renal Disease and Hypertension  [phone]

## 2018-07-15 NOTE — PLAN OF CARE
Problem: Patient Care Overview  Goal: Plan of Care/Patient Progress Review  Outcome: No Change  D/I: Patient on unit 4A Surgical/Neuro ICU   Neuro- Pt alert. PERRL. Purposeful movement of all extremities. Restless, pulling at lines - soft wrist restraints in place.   CV- HR tachy at times in 100s. Tele SR/ST with occasional PVCs.   Pulm- Mostly tolerating vent. Frequent ET suctioning for thin white secretions. LS coarse/diminished.   GI- BS active. Rectal pouch with moderate amount of watery stool output.   - Gamez in place with consistently low output. MD aware. Urine is dark lucía.   Gtts- Levo titrated down to 0.06mcg/kg/min. Precedex at 0.2 mcg/kg/min.   Skin- Head surgical incision is KAZ, clean, approximated. Pink area on gluteal cleft. Barrier cream applied  Pain- Pt restless much of night. PRN IV dilaudid x2 effective effective for pain.  IV's - Triple lumen IJ in place; patent. R radial art line no longer functioning this am  See flow sheets for further interventions and assessments.   1 unit platelets and 1 unit plasma administered overnight. 500cc fluid bolus this am for elevated lactic acid  A: Stable   P: Continue to monitor pt closely. Notify MD of significant changes

## 2018-07-16 NOTE — PROGRESS NOTES
CLINICAL NUTRITION SERVICES - BRIEF NOTE     Nutrition Prescription    RECOMMENDATIONS FOR MDs/PROVIDERS TO ORDER:  Notify RD if/when appropriate to resume TF.    Recommendations already ordered by Registered Dietitian (RD):  Given concern for bowel ischemia vs perforation, discontinued TF orders per MD     Future/Additional Recommendations:  If unable to resume TF within 5-7 days, consider initiation of PN   Obtain metabolic cart study as appropriate        Nutrition Progress Note - F/u for progress toward previous nutrition POC   (see previous 7/10 reassessment for details).     Rasheeda Kingston RD, LD  Neuro ICU Pager: [phone]

## 2018-07-16 NOTE — PLAN OF CARE
Pt was hemodynamically unstable. Vasopressin titrated, and at Max rate, levophed titrated PRN, phenylephrine added, with no improvement noted. At 1359, pt continues having a pause in HR, ART line dampened, unable to adequately obtain cuff pressure. MD notified in room to assess. ABG unstable, lactaid up to 20, CO2 of 10, MD notified. BG low this AM at 48, D10 started. Cryo, plasma, platelets, and PRBC transfused. Yumiko machine running and discontinued at 1402 due to pt decompensating. MD in room to talk to family. Family decided to switch pt to comfort care. PRN ativan given for comfort, pressers stopped. RT terminally extubated pt. Time of death 1516, life source notified.

## 2018-07-16 NOTE — CONSULTS
CARDIOLOGY CONSULTATION    Name: Oswaldo Win MRN: [id number]     Age: 73 year old   YOB: 1945            HPI:   Reason for Consultation: Shock    History is obtained from chart review.    Oswaldo Win is a 73 year old male with a history of CAD s/p inferior STEMI 4/2015 s/p BMS pRCA and CAB L-LAD 6/2014, NSTEMI in 11/2014 with re-stenosis of his BMS s/p KIN,MR s/p mitral clip with residual moderate to severe MR, metastatic prostate cancer provoked DVT and PE.    Currently admitted after a fall in the bathroom and subdural hematoma s/p evacuation on 7/11. His post op course was complicated by mild Tn elevation which was thought to be secondary to demand ischemia.     Earlier today, he started becoming hypotensive, was initiated on Norepi (currently at 0.4). His UOP decreased, SCr jimenez, and lactate jimenez to 12. CTPE was negative. Cardiology consulted for shock.    At the time of initial evaluation, his HR was 110s (sinus), BP was 100s/50s (MAP ~65) on 0.4 NE. Limited bedside echo revealed: No pericardial effusion, LVEF ~50%, severely dilated RV, mod-sev RV dysfunction, RV pressure and volume overload and mod-sev MR.     SGC was floated (please see procedure note for details). In summary, it revealed severely elevated RA pressure, moderate pulm HTN, PVR of 7.6, CI 2.2, SVR of 1016 (on 0.4 NE).            Past Medical History:     Past Medical History:   Diagnosis Date     Arthritis      ASCVD (arteriosclerotic cardiovascular disease) 4/10/2014     Closed L4 vertebral fracture (H) 2005    traumatic fracture     COPD (chronic obstructive pulmonary disease) (H)      Dyspnea on exertion      Fx tib/fib fol insrt ortho implnt/prosth/bone plt, right leg (H) 2005    rt tib/fib fracture s/p ORIF     Gastro-oesophageal reflux disease      Hx of right coronary artery stent placement 2014 4/2014 and redone 9/2014     Hyperlipidemia LDL goal < 70      Hypertension      Influenza A 3/27/2016     Musculoskeletal  leg pain     from old leg injuries     Other chronic pain      Prostate cancer metastatic to bone (H)      Pulmonary embolism (H) 11/8/16 November 2016     ST elevation MI (STEMI) (H) 4/5/2014     Stented coronary artery      Thrombosis of leg 10/2015    October 2015             Past Surgical History:      Past Surgical History:   Procedure Laterality Date     ANESTHESIA OUT OF OR PERCUTANEOUS MITRAL VALVE REPAIR N/A 10/16/2015    Procedure: ANESTHESIA OUT OF OR PERCUTANEOUS MITRAL VALVE REPAIR;  Surgeon: GENERIC ANESTHESIA PROVIDER;  Location: [facility]     SONIA HOLE(S) EVACUATE HEMATOMA SUBDURAL Left 7/11/2018    Procedure: SONIA HOLE(S) EVACUATE HEMATOMA SUBDURAL;  Left [place] Holes for Subdural Hematoma Evacuation ;  Surgeon: Javad Coats MD;  [facility]     COLONOSCOPY       COLONOSCOPY N/A 1/13/2017    Procedure: COLONOSCOPY;  Surgeon: Telly Anderson MD;  Location: [facility]     COMBINED CYSTOSCOPY, RETROGRADES, EXCHANGE STENT URETER(S) Left 12/14/2015    Procedure: COMBINED CYSTOSCOPY, RETROGRADES, EXCHANGE STENT URETER(S);  Surgeon: Maame Ramirez MD;  [facility]     DAVINCI BYPASS ARTERY CORONARY  7/1/2014    Procedure: DAVINCI BYPASS ARTERY CORONARY;  Surgeon: Kike Coley MD;  [facility]     ESOPHAGOSCOPY, GASTROSCOPY, DUODENOSCOPY (EGD), COMBINED N/A 1/13/2017    Procedure: COMBINED ESOPHAGOSCOPY, GASTROSCOPY, DUODENOSCOPY (EGD);  Surgeon: Telly Anderson MD;  [facility]     EXCISE MASS LOWER EXTREMITY Right 2/5/2016    Procedure: EXCISE MASS LOWER EXTREMITY;  Surgeon: Miquel Figueredo MD;  [facility]     HERNIORRHAPHY INGUINAL Left 1968     HERNIORRHAPHY INGUINAL Left 11/18/2014    Procedure: HERNIORRHAPHY INGUINAL;  Surgeon: Max Garza MD;  [facility]     LASER HOLMIUM LITHOTRIPSY URETER(S), INSERT STENT, COMBINED N/A 7/27/2015    Procedure: COMBINED CYSTOSCOPY, URETEROSCOPY, LASER HOLMIUM LITHOTRIPSY URETER(S), INSERT STENT;   Surgeon: Maame Ramirez MD;  [facility]     LASER HOLMIUM LITHOTRIPSY URETER(S), INSERT STENT, COMBINED Left 9/19/2016    Procedure: COMBINED CYSTOSCOPY, URETEROSCOPY, LASER HOLMIUM LITHOTRIPSY URETER(S), INSERT STENT;  Surgeon: Maame Ramirez MD;  [facility]     OPEN REDUCTION INTERNAL FIXATION TIBIA  2005    RT Tib/Fib ORIF, crushed LT calc     TONSILLECTOMY & ADENOIDECTOMY  age 18             Social History:     Social History   Substance Use Topics     Smoking status: Former Smoker     Packs/day: 0.50     Years: 40.00     Types: Cigarettes     Quit date: 2/1/2000     Smokeless tobacco: Never Used     Alcohol use 0.0 oz/week     0 Standard drinks or equivalent per week      Comment: rare             Family History:     Family History   Problem Relation Age of Onset     HEART DISEASE Mother 50     CAB, pacemaker     HEART DISEASE Father 41     MI     Breast Cancer Paternal Grandmother      HEART DISEASE Maternal Uncle      Breast Cancer Paternal Aunt      Cerebrovascular Disease No family hx of      Diabetes No family hx of              Allergies:     Allergies   Allergen Reactions     Blood Transfusion Related (Informational Only) Other (See Comments)     Patient has a history of a significant allergic transfusion reaction.  Consult with Blood Bank MD before ordering components.             Medications:     Prescriptions Prior to Admission   Medication Sig Dispense Refill Last Dose     aspirin 81 MG tablet Take 1 tablet (81 mg) by mouth daily 90 tablet 3 Past Week at Unknown time     HYDROcodone-acetaminophen (NORCO) 5-325 MG per tablet Take 1 tablet by mouth every 6 hours as needed for severe pain   Past Week at Unknown time     palbociclib (IBRANCE) 125 MG capsule CHEMO Take 1 capsule (125 mg) by mouth daily with food 30 capsule 0              Review of Systems:   Unable to obtain         Exam:   /59  Pulse 71  Temp 97.5  F (36.4  C) (Axillary)  Resp 20  Ht 1.676 m (5'  "6\")  Wt 64 kg (141 lb 1.5 oz)  SpO2 98%  BMI 22.77 kg/m2  GEN: intubated and sedated  Neck: JVP ~15 cm  LUNGS: Mechanical breath sounds  HEART: S1S2 audible, regular, +tachy,  ABDOMEN: Soft, nt, nd. +BS  EXTREMITIES: Warm calves, +DPs, 1+ bilateral LE edema             Data:   ROUTINE ICU LABS (Last four results)  CMP  Recent Labs  Lab 07/15/18  2202 07/15/18  1946 07/15/18  1626 07/15/18  1401 07/15/18  0339 07/14/18  1643  07/14/18  0347  07/11/18  0602  07/09/18  2255   * 149* 150* 154* 146*  --   < > 144  < > 142  < > 141   POTASSIUM 5.0 4.9 5.1 5.8* 4.2 4.5  --  3.2*  < > 3.6  < > 3.6   CHLORIDE 117* 116* 117* 118* 119*  --   --  116*  < > 113*  < > 107   CO2 12* 14* 13* 20 12*  --   --  19*  < > 17*  < > 22   ANIONGAP 21* 19* 20* 16* 15*  --   --  9  < > 12  < > 12   * 144* 154* 190* 224*  --   --  163*  < > 89  < > 108*   BUN 39* 41* 40* 38* 33*  --   --  25  < > 12  < > 23   CR 1.33* 1.37* 1.40* 1.32* 0.98  --   < > 0.76  < > 0.82  < > 0.93   GFRESTIMATED 53* 51* 49* 53* 75  --   --  >90  < > >90  < > 79   GFRESTBLACK 64 61 60* 64 >90  --   --  >90  < > >90  < > >90   RODNEY 8.0* 7.6* 7.6* 7.7* 7.7*  --   --  7.4*  < > 8.6  < > 9.1   MAG 2.3  --   --  2.4* 2.1 2.3  --  1.7  < >  --   < >  --    PHOS 4.7*  --   --  5.4* 3.0  --   --  1.5*  < > 2.7  < >  --    PROTTOTAL 5.1*  --  5.3*  --   --   --   --   --   --  6.1*  --  7.4   ALBUMIN 2.4*  --  2.4*  --   --   --   --   --   --  3.0*  --  3.9   BILITOTAL 2.7*  --  3.0*  --   --   --   --   --   --  1.0  --  1.0   ALKPHOS 176*  --  180*  --   --   --   --   --   --  181*  --  227*   *  --  184*  --   --   --   --   --   --  56*  --  60*   *  --  48  --   --   --   --   --   --  11  --  12   < > = values in this interval not displayed.  CBC  Recent Labs  Lab 07/15/18  2202 07/15/18  1946 07/15/18  1401 07/15/18  0339   WBC 5.8 5.7 6.2 4.3   RBC 2.87* 2.93* 2.67* 2.87*   HGB 8.6* 8.9* 8.2* 8.6*   HCT 26.5* 27.3* 25.2* 26.4*   MCV 92 " 93 94 92   MCH 30.0 30.4 30.7 30.0   MCHC 32.5 32.6 32.5 32.6   RDW 19.6* 19.6* 19.3* 18.4*   PLT 79* 88* 99* 76*     INR  Recent Labs  Lab 07/15/18  1946 07/15/18  1401 07/15/18  0339 07/14/18  0926   INR 2.57* 2.06* 1.88* 1.87*     Arterial Blood Gas  Recent Labs  Lab 07/15/18  2109 07/15/18  1947 07/15/18  1946 07/15/18  1808 07/15/18  1646  07/15/18  0651   PH  --   --  7.21* 7.22* 7.19*  --  7.40   PCO2  --   --  30* 32* 29*  --  22*   PO2  --   --  91 104 271*  --  76*   HCO3  --   --  12* 13* 11*  --  14*   O2PER 80 80 80 80 100  < > 40.0   < > = values in this interval not displayed.           Imaging:   Echocardiogram:  7/13/18  Borderline (EF 50-55%) reduced left ventricular function is present. Mild to  moderate right ventricular dilation is present. Global right ventricular  function is moderately to severely reduced.     Two Mitraclips are visualized. The medial clip is only partially attached to  the anterior mitral valve. There are two, medial > lateral, mild-moderate  regurgitant jets.     Trivial pericardial effusion is present.     This study was compared with the study from 02/21/2018. The RV has dilated  further and the RV function has deteriorated.             Assessment and Recomendations:   Assessment:  73 year old male with a history of CAD s/p inferior STEMI 4/2015 s/p BMS pRCA and CAB L-LAD 6/2014, NSTEMI in 11/2014 with re-stenosis of his BMS s/p KIN,MR s/p mitral clip with residual moderate to severe MR, metastatic prostate cancer provoked DVT and PE. Currently admitted after a fall in the bathroom and subdural hematoma s/p evacuation on 7/11. His post op course was complicated by mild Tn elevation which was thought to be secondary to demand ischemia.     Earlier today, he started becoming hypotensive, was initiated on Norepi (currently at 0.4). His UOP decreased, SCr jimenez, and lactate jimenez to 12. CTPE was negative. Cardiology consulted for shock.    At the time of initial evaluation, his  HR was 110s (sinus), BP was 100s/50s (wide pulse pressure, MAP ~65) on 0.4 NE. Limited bedside echo revealed: No pericardial effusion, LVEF ~50%, severely dilated RV, mod-sev RV dysfunction, RV pressure and volume overload and mod-sev MR. GIBBS was floated (please see procedure note for details). In summary, it revealed severely elevated RA pressure, moderate pulm HTN, PVR of 7.6, CI 2.2, SVR of 1016 (on 0.4 NE).     He has high PVR, with a dilated, dysfunctional RV and a rising CVP. He has been ~18L positive this admission, ~8L over the last couple of days. With regards to his systemic circulation however, he is vasopalegic, as evidenced by SVR of ~1000 on 0.4 NE. Some of this could be secondary to his acidemia and liver dysfunction. Currently he doesn't really have any evidence of infection and is already on broad spectrum antibiotics.    Recommendations:  - In order to improve his RV forward flow and LV preload, we would recommend starting him on inhaled Nitric (20 ppm).  - Would recommend taking off volume as tolerated by his MAP's (CVVHD already set up).   - For now, will recommend q4H hemodynamic monitoring (CVP, PA, SvO2, CI, SVR).     Patient seen and discussed with Dr. Montoya    Thank you very much for allowing us to participate in Mr. Win's care.     Stephan Brandt MD  Cardiology Fellow  [phone]      Late entry - patient seen and examined on 7/15/18  I have reviewed today's vital signs, notes, medications, labs and imaging. I have also seen and examined the patient and agree with the findings and plan as outlined above.    Tahmina Montoya MD  Section Head - Advanced Heart Failure, Transplantation and Mechanical Circulatory Support  Co-Director - [facility]  Associate Professor of Medicine, [facility]

## 2018-07-16 NOTE — PROGRESS NOTES
CRRT INITIATION NOTE    Consent for CRRT Completed:  YES  Patient s Vascular Access: Catheter              Placement Confirmed: YES  Manufacture:  [employer]  Model:  LaTherm Elite   Length/Persian Size:  12F x 24cm   Flush Volume:  A/V  1.5ml    DATA:  Procedure:  CVVHDF  Start Time:  2047  Machine#:  4  Filter:  [id number]  Blood Flow:  180  ml/min  Pre-Replacement Solution:  PrismaSol 2/3.5  Post-Replacement Solution: PrismaSol 2/3.5  Dialysate Solution:  PrismaSol 2/3.5  Pre-Replacement Solution Rate:  800 mL/hr  Post-Replacement Solution Rate:  200 mL/hr  Dialysate Flow Rate:  800 mL/hr   Patient Removal Rate:  0 mL/hr  Anticoagulation Type and Rate:  0    ASSESSMENT:  How Patient Tolerated Initiation:   Vital Signs:  . /57 (81). RR 20. 92% on 80 FiO2  Initial Pressures:  Access:  -49  Filter:  141  Return:  99  TMP:  53  Change in Filter Pressure:  30      INTERVENTIONS:  Blood warmer applied. Consent verified. New dressing placed.     PLAN:  Goal of net neg 100ml/hr.

## 2018-07-16 NOTE — PHARMACY-VANCOMYCIN DOSING SERVICE
Pharmacy Vancomycin Note  Date of Service 2018  Patient's  1945   73 year old, male    Indication: Sepsis  Goal Trough Level: 15-20 mg/L  Day of Therapy: 5  Current Vancomycin regimen:  Intermittent dosing    Renal: CRRT initiated 7/15 pm    Recent Vancomycin Levels (past 3 days)  2018:  4:43 PM Vancomycin Level 11.3 mg/L  2018:  9:48 AM Vancomycin Level 21.4 mg/L    Vancomycin IV Administrations (past 72 hours)                   vancomycin (VANCOCIN) 1,500 mg in sodium chloride 0.9 % 250 mL intermittent infusion (mg) 1,500 mg New Bag 07/15/18 111    vancomycin (VANCOCIN) 1,250 mg in sodium chloride 0.9 % 250 mL intermittent infusion (mg) 1,250 mg New Bag 18 1823     1,250 mg New Bag 18 175                Nephrotoxins and other renal medications (Future)    Start     Dose/Rate Route Frequency Ordered Stop    18 1300  vancomycin (VANCOCIN) 1,250 mg in sodium chloride 0.9 % 250 mL intermittent infusion      1,250 mg  over 90 Minutes Intravenous EVERY 24 HOURS 18 1249      07/15/18 2245  piperacillin-tazobactam (ZOSYN) 4.5 g in sodium chloride 0.9 % 50 mL intermittent infusion      4.5 g  50 mL/hr over 1 Hours Intravenous EVERY 6 HOURS 07/15/18 2005      07/15/18 2215  vasopressin (VASOSTRICT) 40 Units in D5W 40 mL infusion      0.5-4 Units/hr  0.5-4 mL/hr  Intravenous CONTINUOUS 07/15/18 2214      18 1800  norepinephrine (LEVOPHED) 16 mg in D5W 250 mL infusion      0.03-0.4 mcg/kg/min × 52 kg (Dosing Weight)  1.5-19.5 mL/hr  Intravenous CONTINUOUS 18 1755               Contrast Orders - past 72 hours (72h ago through future)    Start     Dose/Rate Route Frequency Ordered Stop    07/15/18 1900  diatrizoate meglumine-sodium (GASTROGRAFIN; GASTROVIEW) 66-10 % solution 120 mL      120 mL Oral or G tube ONCE 07/15/18 1852 07/15/18 1859    07/15/18 1500  iopamidol (ISOVUE-370) solution 86 mL      86 mL Intravenous ONCE 07/15/18 1459 07/15/18 153           Interpretation of levels and current regimen:  Trough level is slightly above the goal range 22 hours after the most recent dose of 1500 mg.  CRRT has since been initiated 7/15 pm so starting on q24h scheduled doses would be appropriate.    Plan:  1.  Schedule vancomycin 1250 mg IV q24h  2.  Pharmacy will check trough levels as appropriate in 1-3 Days.    3. Serum creatinine levels will be ordered daily for the first week of therapy and at least twice weekly for subsequent weeks.      Jennie Bennett, PharmD  pager [phone]

## 2018-07-16 NOTE — PROGRESS NOTES
"CARDIOLOGY PROGRESS NOTE    SUBJECTIVE:  He remains intubated and sedated. He has had intermittent episodes of afib with rapid ventricular rate as well as worsening hypoxia and hypotension. On CVVHD but some difficulty pulling fluid due to hypotension.     ROS unable to obtain as he is intubated and sedated.      OBJECTIVE:  Vital signs:  /59  Pulse 71  Temp 96.4  F (35.8  C) (Bladder)  Resp 28  Ht 1.676 m (5' 6\")  Wt 68.5 kg (151 lb 0.2 oz)  SpO2 (!) 83%  BMI 24.37 kg/m2      Intake/Output Summary (Last 24 hours) at 07/16/18 1441  Last data filed at 07/16/18 1400   Gross per 24 hour   Intake          6426.05 ml   Output             2779 ml   Net          3647.05 ml         Hemodynamics:  BSA 1.79  0800: MAP 86, CVP 19, PA 53/26/40, CO/CI 4.5/2.5, SVR 1191      PHYSICAL EXAM:  Gen: intubated, sedated, appears chronically ill  HEENT: ETT in place  Resp: ventilated breath sounds, bilateral scattered rales  CVS: mildly tacyhcardic, regular rhythm, no murmurs or gallops  Abdo: soft, nondistended  Extremities: warm, 1+ edema  Neuro: sedated, minimally response to noxious stimuli      Recent Labs   Lab Test  07/16/18   1359  07/16/18   1138  07/16/18   0948   HGB  7.0*  6.6*  6.7*   HCT  21.5*  19.6*  19.9*   WBC  2.2*  2.7*  3.0*   MCV  91  91  90   MCH  29.7  30.6  30.3   MCHC  32.6  33.7  33.7   RDW  19.2*  19.5*  19.3*   PLT  73*  43*  49*   NA   --   145*  145*   POTASSIUM   --   4.0  4.0   CHLORIDE   --   107  107   CO2   --   13*  15*   BUN   --   28  30   CR   --   1.09  1.08   GLC   --   123*  159*   RODNEY   --   8.4*  8.4*   ALBUMIN   --    --   2.5*   BILITOTAL   --    --   3.2*   ALKPHOS   --    --   162*   AST   --    --   1690*   ALT   --    --   550*         Micro:    Images:      Current Facility-Administered Medications   Medication Dose Route Frequency     artificial tears   Both Eyes 4x Daily     cholecalciferol  2,000 Units Oral Daily     insulin aspart  1-6 Units Subcutaneous Q4H     " levETIRAcetam  1,000 mg Oral BID     midazolam  5 mg Intravenous Once     multivitamins with minerals  15 mL Per Feeding Tube Daily     oyster shell calcium  1 tablet Oral BID w/meals     pantoprazole  40 mg Oral QAM AC     [START ON 7/17/2018] phytonadione  10 mg Intravenous Daily     piperacillin-tazobactam  4.5 g Intravenous Q6H     rosuvastatin  10 mg Oral Daily     sodium chloride (PF)  3 mL Intracatheter Q8H     sodium chloride         vancomycin (VANCOCIN) IV  1,250 mg Intravenous Q24H     Current Facility-Administered Medications   Medication Last Rate     IV fluid REPLACEMENT ONLY 45 mL/hr at 07/16/18 0824     dialysate for CVVHD & CVVHDF (PrismaSol BGK 2/3.5) 12.5 mL/kg/hr (07/16/18 1320)     fentaNYL 25 mcg/hr (07/16/18 1300)     norepinephrine 0.2 mcg/kg/min (07/16/18 1406)     phenylephrine IV infusion ADULT       replacement solution for CVVHD & CVVHDF (PrismaSol BGK 2/3.5) 200 mL/hr at 07/16/18 0646     replacement solution for CVVHD & CVVHDF (PrismaSol BGK 2/3.5) 12.5 mL/kg/hr (07/16/18 1320)     vasopressin (PITRESSIN) infusion ADULT (40 mL) 4 Units/hr (07/16/18 1349)         ASSESSMENT/PLAN:  73 M with a PMH of CAD with STEMI in 2014, CAB (LIMA-LAD), metastatic prostate cancer, DVT/PE who presented with altered mental status and was found to have subdural hematoma. He is s/p efren holes for SDH on 7/11. His course has been complicated by worsening respiratory failure requiring intubation. Cardiology was consulted for shock.    Shock:  - echo shows LVEF 50%, dilated and dysfunctional RV, Mitraclips in place with moderate MR  - [place] hemodynamics are consistent with elevated right-side filling pressure and pulmonary hypertension with preserved cardiac output and relatively low SVR despite high dose vasoactive medications  - for his pulmonary hypertension he has been started on inhaled nitric oxide at 20 ppm, would recommend increasing this to 40 ppm to maximize benefit  - he is currently on vasopressin  and norepinepherine for blood pressure support, would recommend starting phenylepherine for additional BP support and weaning down norepinepherine as tolerated which may help with his tachycarrhythmias  - Volume removal as tolerated via CVVHD  - Will continue hemodynamic monitoring q4H  - Family meeting planned to discuss goals of care given his multiple severe medical problems in the setting of metastatic cancer with overall poor prognosis    Seen and staffed with Dr. Montoya.    Wilfredo Rao MD  Advanced Heart Failure Fellow  [phone]      Late entry - patient seen and examined on 7/16/18  I have reviewed today's vital signs, notes, medications, labs and imaging. I have also seen and examined the patient and agree with the findings and plan as outlined above.    Tahmina Montoya MD  Section Head - Advanced Heart Failure, Transplantation and Mechanical Circulatory Support  Co-Director - Adult Congenital and Cardiovascular [facility]etics [facility]  Associate Professor of Medicine, [facility]

## 2018-07-16 NOTE — PROCEDURES
PROCEDURES PERFORMED:   Right Heart Catheterization    INDICATION:  Oswaldo Win is a 73M with PMH of CAD, MVP (s/p Mitraclip placement), prostate ca, DVT/PE, pAF, admitted after a fall and SDH (evacuated). RHC performed for hemodynamic assessment and monitoring in the setting of shock and worsening hypoxia.       DESCRIPTION:  - Sterile prep and procedure.  - Location with Sheaths: Rt IJ MAC introducer  - A standard 18 guage needle with ultrasound guidance was used to establish vascular access using a modified Seldinger technique.  - 7Fr [place] beverley catheter floated through the MAC introducer  - Estimated blood loss: < 5 ml        Procedures:    HEMODYNAMICS:  - MAP 66  - RA 19   - RV 58/19  - PA 58/35/43   - PCW 15 (no tall V waves noted)   - PA sat 43%     - Clarisse CO 2.2   - Clarisse CI 3.7   - SVR 1016 (on NE 0.4)  - PVR 7.6     Sheath Removal:  Sheath sutured, swan beverley catheter locked at 55 cm at the hub.      COMPLICATIONS:  1. None    SUMMARY:   - Severely elevated right sided filling pressures (19 mmHg)  - Elevated PCWP (15 mmHg)  - Moderate pulm HTN (mean PA 43 mmHg)  - Severely elevated pulmonary vascular resistance (PVR 7.6)  - Clarisse CO 3.7, CI 2.2       RECOMMENDATIONS:  - Will recommend starting inhaled Nitric at 20 ppm, while closely monitoring hemodynamics  - Will recommend volume removal with CVVHD as tolerated by MAP.         Stephan Brandt MD  Cardiovascular Disease Fellow  Pager: [phone]

## 2018-07-16 NOTE — PROGRESS NOTES
Contacted Dr. Oro in regards to fluid removal rate. Cardiology would like to increase fluid removal rate d/t RV function. Dr. Oro ordered fluid removal rate of net neg 100ml for 4 more hours (until 0300) then increase to net neg 150ml/hr.

## 2018-07-16 NOTE — PROGRESS NOTES
Nephrology Progress Note  07/16/2018         Late Note Entry. Unfortunately patient has already passed away at the time of writing this note.     Assessment & Recommendations:   Oswaldo Win is a 73 year old year old male, with PMHx of metastatic prostate cancer, CAD s/p CABG, hx of PE admitted for weakness who underwent operative treatment for subdural hematoma on 7/11 developed sudden hemodynamic compromise and renal failure.     1- CHAR:  - Patient developed anuric CHAR due to acute hypotension and circulatory shock, now Patient with oliguric  - Etiology of shock is unclear sepsis  Hypovolemic but given abrupt change in clinical status (severe lactic acidosis, pH < 7.2, Potassium 5.8, minimal UOP, and hemodynamic instability) places sepsis at the top of the list.  - C/W CRRT, UF goal  cc/hr if BP tolerated, patient still on 2 pressors, patient still volume overloaded.   - Hold Bicarbonate drip, Ph: 7.3. Bicarb: ~10, continue monitoring.  - Adjust Vancomycin dose, adjust other medications accordingly.  - Avoid nephrotoxic medications.       2- Shock:  Primary team evaluating. Started Broad Specturm Abx and evaluating for intraabdominal complications.    3- Blood pressure:  - Patient on 2 pressors, continue as needed.     4- Metabolic acidosis:  - Lactic acidosis  - Patient is on CRRT, holding Bicarb for now.    5- Hypernatremia:  - Mild, improving  - Patient is volume overloaded on CRRT    6- Anemia:   - Normocytic anemia  - To transfuse 1 unit of PRBC today  - daily monitoring    7- Bone mineral disease:  - Corrected Ca: WNL, Phos: high normal,        Recommendations were communicated to primary team via verbal communication    Seen and discussed with Dr. Shari Cason MD   [phone]    Interval History :   In the last 24 hours Oswaldo Win 72 yo man with metastatic prostate cancer, CAD s/p CABG, hx of PE admitted for weakness who underwent operative treatment for subdural hematoma on  "7/11 who has had a complicated post op coarse with difficulty with extubation and hypotension. There has been concern for UTI vs other infection and thus the patient has been on pip-tazo and Vanc for the past 72 hours.   Per the patient's son, he is had waxing and waning mental status and has been delirious.   Over the course of this morning and afternoon, he has developed rapid and sudden hemodynamic compromise and lactic acidosis.       Review of Systems:   Not able to obtain    Physical Exam:   I/O last 3 completed shifts:  In: 6427.05 [I.V.:3852.05; Other:6; NG/GT:125]  Out: 2779 [Urine:152; Emesis/NG output:125; Drains:156; Other:1746; Stool:600]   BP 90/65 (BP Location: Left arm)  Pulse 71  Temp 96.6  F (35.9  C) (Bladder)  Resp (!) 31  Ht 1.676 m (5' 6\")  Wt 68.5 kg (151 lb 0.2 oz)  SpO2 (!) 68%  BMI 24.37 kg/m2     GENERAL APPEARANCE: Lethargic  EYES: scleral icterus present, pupils equal  HENT: Poor dental hygiene  PULM: lungs harsh rales R>L to auscultation, decrease air entry lung base B/L  CV: regular rhythm, normal rate, no rub     -JVD positive     -edema +1   GI: soft, No tender, bowel sounds are positive  INTEGUMENT: no cyanosis, no rash  NEURO:  Lethargic,      Labs:   All labs reviewed by me  Electrolytes/Renal -   Recent Labs   Lab Test  07/16/18   1359  07/16/18   1138  07/16/18   0948   07/16/18   0341   07/15/18   2202   NA  144  145*  145*   < >  149*   < >  150*   POTASSIUM  4.6  4.0  4.0   < >  4.3   < >  5.0   CHLORIDE  107  107  107   < >  112*   < >  117*   CO2  10*  13*  15*   < >  19*   < >  12*   BUN  27  28  30   < >  33*   < >  39*   CR  1.03  1.09  1.08   < >  1.18   < >  1.33*   GLC  91  123*  159*   < >  85   < >  104*   RODNEY  8.5  8.4*  8.4*   < >  7.7*   < >  8.0*   MAG  2.3   --   2.3   --   1.9   --   2.3   PHOS   --    --   3.9   --   5.1*   --   4.7*    < > = values in this interval not displayed.       CBC -   Recent Labs   Lab Test  07/16/18   1359  07/16/18   1138  " 07/16/18   0948   WBC  2.2*  2.7*  3.0*   HGB  7.0*  6.6*  6.7*   PLT  73*  43*  49*       LFTs -   Recent Labs   Lab Test  07/16/18   0948  07/16/18   0341  07/15/18   2202   ALKPHOS  162*  169*  176*   BILITOTAL  3.2*  2.9*  2.7*   ALT  550*  622*  267*   AST  1690*  1765*  768*   PROTTOTAL  5.2*  5.0*  5.1*   ALBUMIN  2.5*  2.4*  2.4*       Iron Panel -   Recent Labs   Lab Test  07/10/18   0820  07/23/15   0905   IRON  151  74   IRONSAT  55*  20   NICKY  2009*  399*         Imaging:  All imaging studies reviewed by me.     Current Medications:    artificial tears   Both Eyes 4x Daily     sodium chloride (PF)  3 mL Intracatheter Q8H     sodium chloride           IV fluid REPLACEMENT ONLY 45 mL/hr at 07/16/18 0824     [id number] Stopped (07/16/18 1453)     Imani Cason MD   Nephrology Fellow  Pager: [phone]

## 2018-07-16 NOTE — PROGRESS NOTES
General Surgery Brief Note    Full consult to follow.  Called by neuro ICU team to see Mr Win in regards to his acutely worsened overall clinical status since this morning, including severe metabolic acidosis and hypotension with evidence of free fluid in peritoneum on CT CAP.  Briefly, patient is a 73M with metastatic prostate cancer who was admitted 7/10 for weakness and weight loss who developed altered mental status prompting evacuation of a chronic-appearing SDH by the neurosurgery team.  He has subsequently been been critically ill in the neuro ICU, requiring mechanical ventilation and vasopressors.  He has recently been febrile (likely related to UTI) and has been on broad-spectrum abx for the last few days.      He became acutely acidotic and hypotensive earlier today, which was accompanied by a rise in his serum lactate from 3.5 to 12.2 between 0700 and 1400.  ABG data for the last several days demonstrates serially low bicarb and accompanying low pCO2, suggesting ongoing respiratory compensation for metabolic acidosis.  A broad workup obtained today included a CT CAP, which demonstrated some free fluid in the abdomen, concerning for bowel ischemia or perforation, prompting general surgery consultation.    The patient was seen and evaluated by the general surgery team.  No family present at bedside.  Currently the patient is paralyzed on a vecuronium gtt.  His abdomen is flat vs minimally distended but soft, although this exam is difficult to interpret given his pharmacologic paralysis.  His UOP has tapered off over the day today and his Cr has risen from 0.98 this AM to 1.4 this afternoon.  A R femoral HD line has recently been placed.  He has also had a rise in his plateau pressures from 12 mmHg to 28 mmHg this afternoon.    Patient's overall picture is concerning but it is not clear that his clinical decline is due to abdominal pathology.  His CT does not demonstrate significant bowel wall thickening or  pneumatosis and no free air or portal venous gas is appreciated, making vascular compromise of the small bowel less likely.  The radiologist did comment that the tip of his NJT appears to be outside the lumen of the jejunum but thought this to be an artifact.  A bedside contrast NJT study did not appear to demonstrate free spillage of contrast into the peritoneal cavity.  Abdominal compartment syndrome is also a possibility, but also unlikely given his exam and lack of significant distention.  Primary team is planning to follow bladder pressures, which is appropriate in this setting.    Team will re-examine patient serially overnight.  Recommend holding tube feeds for the time being.  Discussed with general surgery staff.    Trung Judge MD, PGY-7  General Surgery Chief Resident  [phone]

## 2018-07-16 NOTE — PROGRESS NOTES
Surgery Progress Note  Oswaldo Win  [id number]    S:  73M with history of metastatic prostate cancer admitted 7/10 for weakness and weight loss with acutely worsened clinical status including severe metabolic acidosis and hypotension with evidence of free fluid in peritoneum on CT chest/abdomen/pelvis. Surgery consulted because imaging concerning for bowel ischemia or perforation with potential need for surgical intervention. No surgical intervention at this point due to clinical instability and lack of definitive evidence that his decline is due to abdominal pathology.     Serial abdominal exams overnight have been relatively unchanged. Patient is intubated, sedated, and currently unresponsive. Status continuing to decline overnight.    O:  Temp:  [94.1  F (34.5  C)-97.5  F (36.4  C)] 95.9  F (35.5  C)  Heart Rate:  [] 96  Resp:  [20-36] 29  BP: ()/() 104/59  MAP:  [36 mmHg-91 mmHg] 72 mmHg  Arterial Line BP: ()/(30-72) 111/53  FiO2 (%):  [40 %-80 %] 70 %  SpO2:  [50 %-100 %] 96 %    I/O last 3 completed shifts:  In: 5765.96 [I.V.:3541.96; Other:5; NG/GT:255]  Out: 2144 [Urine:292; Emesis/NG output:100; Drains:191; Other:961; Stool:600]    Gen: Intubated and sedated.  Resp: On ventilatory with FiO2 80% and PEEP of 8 (previously 5)  CV: RRR, oscillating between NSR and sinus tachycardia per telemetry monitoring  Abd: soft, non-tender, non-distended. No involuntary guarding with palpation.  Ext: warm and well perfused.    BMP  Recent Labs  Lab 07/16/18  1138 07/16/18  0948 07/16/18  0836 07/16/18  0816 07/16/18  0734   * 145* 150*  --  151*   POTASSIUM 4.0 4.0 4.0  --  4.2   CHLORIDE 107 107 112*  --  112*   RODNEY 8.4* 8.4* 8.2*  --  8.2*   CO2 13* 15* 12*  --  14*   BUN 28 30 32*  --  34*   CR 1.09 1.08 1.16  --  1.22   * 159* 194* 34* 48*     CBC  Recent Labs  Lab 07/16/18  0948 07/16/18  0734 07/16/18  0341 07/15/18  2351   WBC 3.0* 3.1* 3.6* 5.4   RBC 2.21* 2.38* 2.50* 2.94*    HGB 6.7* 7.3* 7.5* 8.8*   HCT 19.9* 21.5* 22.7* 27.0*   MCV 90 90 91 92   MCH 30.3 30.7 30.0 29.9   MCHC 33.7 34.0 33.0 32.6   RDW 19.3* 19.5* 19.2* 19.5*   PLT 49* 57* 89* 62*     INR  Recent Labs  Lab 07/16/18  0734 07/16/18  0341 07/15/18  2351 07/15/18  1946   INR 2.57* 2.52* 2.83* 2.57*        Imaging  CT chest/abdomen/pelvis from 7/15:   - New hypoattenuating lesions in liver suggestive of metastatic disease  - Moderate free fluid in abdomen   - No pneumatosis or portal venous gas  - Nonspecific wall thickening of nondistended gallbladder and pericholecystic fluid in the context of ascites  - Extensive sclerotic osseous lesions          A/P: Oswaldo Win is a 73 year old male with history of metastatic prostate cancer admitted 7/10 for weakness and weight loss, and since admission his status has steadily declined. He has been critically ill in the neuro ICU, mechanically ventilated and requiring pressors, and has recently become acutely acidotic and hypotensive. General surgery was consulted due to concern for possible bowel perforation or ischemic colitis as the cause of his sudden decline, but given review of CT chest/abdomen/pelvis and multifactorial issues, it is less likely that his status can be attributed to abdominal pathology primarily. Given his worsening status and continued instability, combined with lower suspicion for acute intraabdominal pathology at this time, surgery is not currently indicated  - Serial abdominal exams  - If serial abdominal exams remain stable, surgery will sign off at this time  - Please do not hesitate to call with questions    Discussed with Dr. Quintana, chief resident who staffed with attending Dr. Merida.    Fara Mancuso MD   PGY-1 Surgery Resident  Pager: [phone]

## 2018-07-16 NOTE — PROGRESS NOTES
CRRT STATUS NOTE    DATA:  Time:  5:14 AM  Pressures WNL:  YES  Filter Status:  WDL    Problems Reported/Alarms Noted:  None    Supplies Present:  YES    ASSESSMENT:  Patient Net Fluid Balance:  + 667  Vital Signs:  Hypothermic. Intermittent a-fib with RVR. Titrating levo and vaso for MAP >65. 50% FiO2.   Labs:  Lactic- 11.0. Elevated LFTs. Elevated Coags.   Goals of Therapy:  0-150ml/hr     INTERVENTIONS:   Received 1 cryo, 1 plts, and 1 plasma. Amio bolus x1.      PLAN:  Net neg 150/hr despite pressor requirement d/t RV function. Contact CRRT at [phone] with questions or concerns.

## 2018-07-16 NOTE — PROGRESS NOTES
S: Development of hemodynamic instability.    O:  Exam:  General: intubated, sedated  Pulm: mechanical ventilation, NO infusion   Mental status: eyes closed, grimaces to noxious stimuli.  Cranial nerves: pupils equal and reactive  Motor: withdrawals in b/l LEs to noxious stimuli    Assessment:   #subdural hematoma s/p evacuation with no reaccumulation   #pneumocephalus, improved   #respiratory failure, ongoing   #presumed urospesis, ongoing   #coagulopathy and thrombocytopenia, requiring ongoing transfusions   #lactic acidosis due to acute renal failure   #decompensated CHF with poor right ventricular function    Plan by problem:   Neuro:   Post-operative pain: fentanyl gtt   Sedation: versed gtt PRN, NO for respiratory pressures; ok for vecuronium if needed for pulmonary status   Neurological exam frequency: q1h   Anti-epileptics: keppra  Sutures out: 7/25  Drains: discontinue KORIN pending coagulopathy correction  Required imaging: repeat head CT ordered    Cardiovascular  Blood pressure goals: Maintain MAPs > 65; levophed gtt (secondary if needed) and vasopressin gtt (primary)  Amiodarone for atrial fibrillation   Continue troponin trending   Aiming for CVP goal of 8   [place] pressures q4h    Pulmonary:   Continue mechanical ventilator for ongoing urosepsis; not able to wean currently  NO infusing for pulmonary hypertension    Gastrointestinal: NPO. Tube feeds to be held due to risk of bowel necrosis. Continue to trend liver labs.     Renal:   Continue guzman for strict intake and output monitoring   Concentrated all IV medications  No mIVF  CRRT to correct acid/base status; CRRT & lasix to remove fluid    Heme:   Gentle correction of INR and platelets; goal continues with platelet of > 100k and INR < 1.5   Check platelets q8h and INR qday     Infectious   Treating presumed urosepsis with vancomycin and zosyn     Endocrine  Treating hyperglycemia with sliding scale insulin     PT/OT: pending  DVT prophylaxis:  "Sequential compression devices; no chemoprophylaxis due to bleeding risk   Ulcer prophylaxis: protonix while intubated   DISPO: 4A ICU; discharge uncertain due to critical illness   Barriers: Surgical drain, evaluation by therapies, ambulating, voiding without a Gamez, tolerating PO diet, pain controlled  on PO medications, resolution of critical illness    Fritz \"Manan\" MD Lucinda   Neurosurgery, PGY-2    Please contact neurosurgery resident on call with questions.    Dial * * [phone], enter [phone] when prompted.     "

## 2018-07-16 NOTE — PLAN OF CARE
Problem: Patient Care Overview  Goal: Plan of Care/Patient Progress Review  S: Rising lactic acidosis following by hemodynamic compromise midafternoon  B:  At start of shift lactic acid was elevated and pt was tackypneic which was assessed by [facility] w/ decision to increase precedex and start fent gtt to reduce what was felt to be vent dysynchrony.  Multiple blood components given in am to treat INR of 1.88 and platelets which were less than 100.       Pt was drowsy but was arousable and followed commands briskly in all 4 ext nodding yes/no appropriately to simple questions.       Lactic acid was rechecked at 1000 and was elevated.         After being up in chair x 1 3/4 hour pt initially appeared to be asleep w/ head leaning forward.  Attempted to rouse pt unsuccessfully, pupils checked briefly appeared reactive bilaterally. Son was in room.   Assisted pt back to bed w/ mechanical lift.  After placing pt in bed, pt was noted to be in a junctional rhythm in low 60s which was a change from his tackycardia all am and his SBP 60's-70s.  MDs notified immediately.   Norepi restarted, pt ambued on 100% , unable to obtain good reading on Sp02 despite alternate sites attempted as pt extremities cold. Epi given  VBG w/ pH 7.1.  2 amps bicarb given followed by bicarb gtt. Labs sent which indicated rising lactic acid.  Vent settings increased to maximize p02.       Pt started on vec gtt as well as midaz gtt.       Pt sent for chest, abd and pelvis CT.  Later a head CT was done.      Art line and dialysis access placed.       NSURG resident updated son  A:  As noted, see record of code sheet and flow sheet.  R:  Cont w/ care plan.

## 2018-07-16 NOTE — PROGRESS NOTES
MD DEATH PRONOUNCEMENT    Called to pronounce Oswaldo Win dead.    Physical Exam: Unresponsive to noxious stimuli, Spontaneous respirations absent, Breath sounds absent, Carotid pulse absent, Heart sounds absent, Pupillary light reflex absent and Corneal blink reflex absent    Patient was pronounced dead at 15:15 PM, 2018.    Active Problems:    Failure to thrive in adult    Subdural hematoma (H)       Infectious disease present?: NO    Communicable disease present? (examples: HIV, chicken pox, TB, Ebola, CJD) :  NO    Multi-drug resistant organism present? (example: MRSA): NO    Please consider an autopsy if any of the following exist:  NO Unexpected or unexplained death during or following any dental, medical, or surgical diagnostic treatment procedures.   NO Death of mother at or up to seven days after delivery.     NO All  and pediatric deaths.     NO Death where the cause is sufficiently obscure to delay completion of the death certificate.   NO Deaths in which autopsy would confirm a suspected illness/condition that would affect surviving family members or recipients of transplanted organs.     The following deaths must be reported to the 's Office:  NO A death that may be due entirely or in part to any factors other than natural disease (recent surgery, recent trauma, suspected abuse/neglect).   NO A death that may be an accident, suicide, or homicide.     NO Any sudden, unexpected death in which there is no prior history of significant heart disease or any other condition associated with sudden death.   NO A death under suspicious, unusual, or unexpected circumstances.    NO Any death which is apparently due to natural causes but in which the  does not have a personal physician familiar with the patient s medical history, social, or environmental situation or the circumstances of the terminal event.   NO Any death apparently due to Sudden Infant Death Syndrome.     NO Deaths  that occur during, in association with, or as consequences of a diagnostic, therapeutic, or anesthetic procedure.   NO Any death in which a fracture of a major bone has occurred within the past (6) six months.   NO A death of persons note seen by their physician within 120 days of demise.     NO Any death in which the  was an inmate of a public institution or was in the custody of Law Enforcement personnel.   NO  All unexpected deaths of children   NO Solid organ donors   NO Unidentified bodies   NO Deaths of persons whose bodies are to be cremated or otherwise disposed of so that the bodies will later be unavailable for examination;   NO Deaths unattended by a physician outside of a licensed healthcare facility or licensed residential hospice program   NO Deaths occurring within 24 hours of arrival to a health care facility if death is unexpected.    NO Deaths associated with the decedent s employment.   NO Deaths attributed to acts of terrorism.   NO Any death in which there is uncertainty as to whether it is a medical examiner s care should be discussed with the medical investigator.        Body disposition: Autopsy was discussed with family member:  Son in person.  Permission for autopsy was declined.  Body released to the  home.    Mai Aguero RN, ACNP  Neurocritical Care  18  3:22 PM

## 2018-07-16 NOTE — DISCHARGE SUMMARY
[facility], [place]    Neurology Stroke Discharge Summary    Date of Admission: 2018  Date of Discharge: 2018    Disposition: Patient  during this admission  Primary Care Physician: Lucrecia Collier      Admission Diagnosis:   Failure to Thrive    Discharge Diagnosis:   Cardiogenic Shock.    Problem Leading to Hospitalization (from hospitals):   Brief Hospital Course:  Patient was admitted to the Heme/Once service on  with progressive weakness and functional decline.  He had acute decline in his responsiveness on 7/10 which prompted a head CT to be performed.  He was found to have significant subdural hematoma, likely chronic, with midline shift and was emergently taken to the OR for efren hole evacuation by neurosurgery.      He was admitted to the ICU post operatively and was evaluated by the Neuro ICU service.  He was encephalopathic initially but had stable vital signs with the exception of a fever.  Pan culture of blood, sputum and urine was obtained and it was thought initially that the patient had a urinary tract infection.  He was started on broad spectrum antibiotics and his fever resolved.  His infectious presentation was complicated by his recent chemotherapy regimen.      He required escalation of interventions over Saturday and  as he had laboratory evidence of worsening liver function with coagulopathy, lactic acidosis and acute renal failure, thought to be due to acute decompensation of right ventricular function and global hypoperfusion.  Escalation of cares included: higher ventilatory support including nitrous oxide and paralytic use to reduce heart strain, renal consult and decision for CRRT initiation, vasopressor support with norepinephrine and vasopressin and continued antibiotic therapy.  His coagulopathy required multiple transfusions of blood products and his laboratory status remained guarded despite transfusions.    Despite aggressive  therapies, he remained unstable and family was updated with the progress from Ghanshyam night to Monday.  They expressed understanding of the grave prognosis and made the patient's code status DNR on Ghanshyam night.  On assessment and discussion on Monday, they again expressed understanding that despite aggressive medical therapies the patient may not survive the hospitalization.      The patient had continued decline of hemodynamic status and third pressor agent was ordered for continued support.  At that time the patient's family determined that prognosis was grave and that they wished to withdraw further cares.     Patient was palliatively extubated at 1514 on Monday, July 16th.  Time of death 1516.  Please see associated death note.      Please see H&P dated 7/9/2018 for further details about presentation.    PHYSICAL EXAMINATION  Unresponsive to noxious stimuli, Spontaneous respirations absent, Breath sounds absent, Carotid pulse absent, Heart sounds absent, Pupillary light reflex absent and Corneal blink reflex absent.    Patient was seen and discussed with the Attending, Dr. Gonzales.    Mai Aguero  Pager: [phone]

## 2018-07-16 NOTE — PROGRESS NOTES
"SPIRITUAL HEALTH SERVICES  SPIRITUAL ASSESSMENT Progress Note  [facility] ([place]) 4A   ON-CALL VISIT    REFERRAL SOURCE: Stat call from nurse for chaplaincy support     Pt Mainor was intubated and sedated but I was able to engage his family in a brief reflective conversation at the pt's bedside. The pt's son shared that prayer \"isn't really our thing\" so I assured them of continued [facility] availability and offered to just let them be present with each other and the pt.    PLAN: No follow up care anticipated.    Javy Gonzales   Intern  Pager [phone]    "

## 2018-07-16 NOTE — PLAN OF CARE
Problem: Patient Care Overview  Goal: Plan of Care/Patient Progress Review  Outcome: Declining  D/I/A: Patient admitted on 7/9/18 for left frontoparietal subdural hematoma with midline shift and herniation, status post bur hole evacuation on 7/11.   Neuro- Pupils 2 mm, round, sluggish. Withdraws extremities X 4 to pain. Subgaleal KORIN in place, but no suction per order. Routine head CT completed at 06:15.   CV- Echo completed. [place] catheter inserted by MD at 2100 and [place] trended throughout night. Latest CI 2.0. ST with frequent PVCs until 01:40 when patient flipped into SVT (-170). MD notified and Amiodarone 150 mg IV bolus given with good effect. HR currently . Hypothermic, [place] blankets (warming) applied under and over patient. See flow sheets for blood products given to reverse coagulopathy. CVP 14-18. MAP > 65 maintained with Levo and Vaso gtt.   Resp- Lung sounds clear, diminished. Scant sputum. Vent mode CMV. ABG trended. FiO2 weaned to 40% and PEEP weaned to 5.0. Nitric oxide 20 PPM to augment CO.   GI- Abdomen soft. Bowel sounds hypoactive X 4. NG to LIS. Intra abdominal pressures trended (13-14). Continues to have watery stools, rectal tube in place.   - [place] patent, 5-20 mL urine per hour. CRRT running with net neg 150 mL/hr as goal.   P: Continue to Monitor and notify MD of any changes.

## 2018-07-17 LAB
GLUCOSE BLDC GLUCOMTR-MCNC: 243 MG/DL (ref 70–99)
GLUCOSE BLDC GLUCOMTR-MCNC: 44 MG/DL (ref 70–99)
INTERPRETATION ECG - MUSE: NORMAL
LAB SCANNED RESULT: ABNORMAL

## 2018-07-17 NOTE — CONSULTS
Consult received. Unfortunately the patient  prior to completion of the consultation.    OLILE Brown M.D.  [facility] Service Staff  [phone]

## 2018-07-18 LAB
BACTERIA SPEC CULT: NO GROWTH
Lab: NORMAL
SPECIMEN SOURCE: NORMAL

## 2019-01-01 NOTE — TELEPHONE ENCOUNTER
Prior Authorization Approval      Date Range of Authorization: 1/14/2017 thru 1/14/2018  Medication: Lovenox 120mg  Approved Dose/Quantity: 21  Diagnosis: Personal history of venous thrombosis and embolism (Z86.718)  Reference #:  Insurance Company: [employer] Part D  Insurance I/D #: [id number]  Expected CoPay: $ 559.62  
Prior Authorization Approval     Date Range of Authorization: 1/14/2017 thru 1/14/2018  Medication: Lovenox 120mg  Approved Dose/Quantity: 21  Diagnosis: Personal history of venous thrombosis and embolism (Z86.718)  Reference #:  Insurance Company: [employer] Part D  Insurance I/D #: [id number]  Expected CoPay: $ 559.62  
Prior Authorization Approval    Date Range of Authorization: 1/14/2017 thru 1/14/2018  Medication: Lovenox 120mg  Approved Dose/Quantity: 21  Diagnosis: Personal history of venous thrombosis and embolism (Z86.718)  Reference #:  Insurance Company: [employer] Part D  Insurance I/D #: [id number]  Expected CoPay: $ 559.62        
<<-----Click here for Discharge Medication Review

## 2019-09-03 NOTE — PROGRESS NOTES
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 Flex sig biopsies have been normal. C diff positive in 6/19, on po vancomycin. She has a history of multiple viral illnesses including influenza, norovirus in 3/2019 and coronovirus this admission. Most likely cause of loose stools is villous atrophy due to these prior infections. NG feeds have been held due to vomiting and loose stools. She is on TPN to meet fluid maintenance and nutritional need. She has had some skin breakdown around anus and on buttocks and perineum. Will continue to monitor and use supportive care and pain medications as needed.     Patient is s/p U-CART therapy as a bridge to bone marrow transplant. BMT was 8/22 with matched-sibling BMT. Last BM aspirate performed on 8/13 with no myeloblasts, lymphoblasts, or hematogones. Conditioning chemotherapy with busulfan day-7 to day -5, melphalan day-3, day -2. VOD prophylaxis started on day -7. GVHD prophylaxis: Tacrolimus started Day -3 and methotrexate on days +1, +3, +6, +11. GCSF started Day +5.     Abe has a previous history of thrombi and has received lovenox in the past. She has a history of portal vein thrombosis which has not been seen in previous abdominal u/s. Abdominal u/s on 8/27 and 8/30 showed biliary sludge but patent vessels and no evidence of VOD or evidence of ascites. She had upper extremity doppler as well as ultrasound of her iliacs/IVC/aorta by vascular which doesn't show any evidence of deep venous thrombosis in the right and left internal jugular, innominate, and subclavian veins. Due to concern for atretic central vasculature, CT chest and neck performed on 8/15/2019 with occlusion of major arteries seen and many collaterals formed with edema of the mediastinum and lower neck and axillary tissues. Likely due to chronic thrombi. She is s/p tPA prior to BMT, also s/p heparin and now on prophylactic lovenox. Due to no significant changes on CT venogram after tPA therapy, it is most likely that occlusion from chronic thrombi are due to fibrosis. Thus, she will continue to be treated with prophylactic dose of lovenox instead of treatment dose of lovenox. Lovenox was held 8/29 due to increased coags PT/PTT/INR, but will be restarted today.    Abe has had repeated elevated BP above her 95th percentile (100/55). Current BP regimen is nifedipine prn, lasix 11mg tid, aldactone 10mg bid,  labetolol 40mg bid, amlodipine 2.5mg BID. Renal ultrasound on 8/29 was negative for renal artery thrombosis or stenosis as etiology of hypertension.     Left femoral broviac repaired 8/27 by IR. Patient initially started on vanc/cefepime; however on 8/27 broadened coverage with meropenem and vancomycin due to patient's altered mental status and concern for infection. Blood cultures have been negative to date. Meropenem and vancomycin dc'ed on 8/29 and zosyn was started (8/30-).     Abe continues to have some shaking/tremors. Neurology was consulted however is not concerned for seizures as etiology of the tremors. No EEG was obtained. Tremors are side effect of tacrolimus. Tacro level was normal 8.4 on 8/29.     Sodium was elevated 8/29 to 152, we decreased TPN rate by 50% and ran at 20cc/hr with D5 at 20cc/hr to slowly correct. Na was corrected in TPN down to 146 today    Bili at 3.3 with direct of 2.6 and  /  - continue to monitor as may need to revise plan for day +11 MTX    Changed morphine to PRN but patient noted to be in pain during mouth care with spots in her mouth therefore changed back to q4h    Heme/Onc  - parameters 8/30  - Lovenox subQ 1 mg/kg QD (prophylactic dosing)   - VOD prophylaxis: Glutamine 1g BID, Ursodiol 55mg BID; HOLD heparin 4U/kg/hr due to lovenox ppx  - GVHD Prophylaxis: Tacrolimus .03mg/kg/day    MTX 15mg/m2 on Day +1 +3 +6 +11 ---> Day 11 depends on LFTs  - Neupogen 5 mcg/kg QD  - s/p UCART 7/2, MRD on day +27 showed 86% engraftment and negative for disease  - s/p IVIG 8/6, IgG level on 8/15 652, 8/19 786, will recheck Qweek  - s/p IVIG 8/23  - s/p tPA (8/16-8/21)  - s/p Heparin 20U/kg (24hr) following tPA  - s/p conditioning with Busulfan 12mg Q6 [id number] (day-7 to day-4), melphalon 34mg on day-3 and day -2  - CT venogram head/neck on 8/15 chronic thrombi, repeat CT venogram 8/21 unchanged  - CXR 8/28 trace left pleural effusion    ID:  - Zosyn (08/29-  - s/p Meropenem (08/26-08/29)  - s/p vancomycin 230mg IV q6 (8/24-8/29)  - acyclovir oral liquid 165mg Q6hr (15mg/kg)  - vancomycin oral liquid 110mg Q12hr (10mg/kg)  - micafungin IV 22 mg daily (2mg/kg)  - chlorhexidine 15mL swish and spit TID  - s/p cefepime 570mg IV q8 (8/24 - 8/25)  - s/p bactrim oral liquid 28mg Monday and Tuesday BID (9am, 9pm), given until day -2, due day +28  - s/p levofloxacin IV 110mg BID (10mg/kg) q12    Neuro:  - history of methotrexate leukoencephalopathy s/p Keppra  - Morphine 0.05 mg/kg q4h  - s/p keppra 110mg IV BID until day -3 (with busulfan)  - Neuro consult for tremors, not concerned for seizures due to normal mental status, no postictal state    Cardio:  - Persistent, refractory HTN  - labetalol 40mg BID  - lasix 11mg TID  - aldactone 10mg bid  - amlodipine 2.5mg PO daily  - nifedipine 1mg PO q4 PRN for blood pressures > 100/55  - ECHO 8/30 normal, stable from prior    FENGI  - NPO - discontinued NG feeds due to vomiting  - TPN - 20mL/hr, D5 20cc/hr (correct Na)  - Cleared for PO feeds, speech and swallow following for therapy  - ondansetron IV 1.7mg Q8hr (0.15mg/kg/dose)  - pantopazole IV 11mg (1mg/kg/dose)  - lorazepam IV 0.22mg Q6hr PRN for nausea (0.02mg/kg/dose)  - vitamin K 5mg PO weekly  - monitor I/Os  - LFTs elevated, abdominal u/s 8/27 and 8/30 normal    Skin  - monitor skin breakdown at perineum, possibly anal mucositis    Pain  - Morphine 0.05 mg/kg q4h    Access: SLM, DL left femoral Broviac (replaced 8/27) CRRT RESTART NOTE    Reason for Restart:  Traveled to test   Error Code:  None    Patient s Vascular Access: Catheter                   Placement Confirmed:  YES  Manufacture:  [employer]   Model:  ShantEcolibrium Elite  Length/[place] Size:  12F x 24cm  Flush Volume:  A/V 1.5ml    DATA:  Procedure:  CVVHDF  Start Time:  0704  Machine#:  2  Filter:  [id number]  Blood Flow:   180 mL/min  Pre-Replacement Solution:  PrismaSol 2/3.5  Post-Replacement Solution:  PrismaSol 2/3.5  Dialysate Solution:  PrismaSol 2/3.5  Pre-Replacement Solution Rate:  800mL/hr  Post-Replacement Solution Rate:  200mL/hr  Dialysate Flow Rate:  800mL/hr  Patient Removal Rate:  180 mL/hr  Anticoagulation Type and Rate:  0    ASSESSMENT:  How Patient Tolerated Restart:  fair  Vital Signs:  . /50 (65) 98% on 50% FiO2.   Initial Pressures:  Access:  -50  Filter:  111  Return:  68  TMP:  53  Change in Filter Pressure:  9    INTERVENTIONS:  Blood warmer placed.     PLAN:  Continue with POC. Contact CRRT resource at [phone] with questions.          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 Flex sig biopsies have been normal. C diff positive in 6/19, on po vancomycin. She has a history of multiple viral illnesses including influenza, norovirus in 3/2019 and coronovirus this admission. Most likely cause of loose stools is villous atrophy due to these prior infections. NG feeds have been held due to vomiting and loose stools. She is on TPN to meet fluid maintenance and nutritional need. She has had some skin breakdown around anus and on buttocks and perineum. Will continue to monitor and use supportive care and pain medications as needed.     Patient is s/p U-CART therapy as a bridge to bone marrow transplant. BMT was 8/22 with matched-sibling BMT. Last BM aspirate performed on 8/13 with no myeloblasts, lymphoblasts, or hematogones. Conditioning chemotherapy with busulfan day-7 to day -5, melphalan day-3, day -2. VOD prophylaxis started on day -7. GVHD prophylaxis: Tacrolimus started Day -3 and methotrexate on days +1, +3, +6, +11. GCSF started Day +5.     Abe has a previous history of thrombi and has received lovenox in the past. She has a history of portal vein thrombosis which has not been seen in previous abdominal u/s. Abdominal u/s on 8/27 and 8/30 showed biliary sludge but patent vessels and no evidence of VOD or evidence of ascites. She had upper extremity doppler as well as ultrasound of her iliacs/IVC/aorta by vascular which doesn't show any evidence of deep venous thrombosis in the right and left internal jugular, innominate, and subclavian veins. Due to concern for atretic central vasculature, CT chest and neck performed on 8/15/2019 with occlusion of major arteries seen and many collaterals formed with edema of the mediastinum and lower neck and axillary tissues. Likely due to chronic thrombi. She is s/p tPA prior to BMT, also s/p heparin and now on prophylactic lovenox. Due to no significant changes on CT venogram after tPA therapy, it is most likely that occlusion from chronic thrombi are due to fibrosis. Thus, she will continue to be treated with prophylactic dose of lovenox instead of treatment dose of lovenox. Lovenox was held 8/29 due to increased coags PT/PTT/INR, but will be restarted today.    Abe has had repeated elevated BP above her 95th percentile (100/55). Current BP regimen is nifedipine prn, lasix 11mg tid, aldactone 10mg bid,  labetolol 40mg bid, amlodipine 2.5mg BID. Renal ultrasound on 8/29 was negative for renal artery thrombosis or stenosis as etiology of hypertension.     Left femoral broviac repaired 8/27 by IR. Patient initially started on vanc/cefepime; however on 8/27 broadened coverage with meropenem and vancomycin due to patient's altered mental status and concern for infection. Blood cultures have been negative to date. Meropenem and vancomycin dc'ed on 8/29 and zosyn was started (8/30-).     Abe continues to have some shaking/tremors. Neurology was consulted however is not concerned for seizures as etiology of the tremors. No EEG was obtained. Tremors are side effect of tacrolimus. Tacro level was normal 8.4 on 8/29.     Sodium was elevated 8/29 to 152, we decreased TPN rate by 50% and ran at 20cc/hr with D5 at 20cc/hr to slowly correct. Na was corrected in TPN down to 146 today    Bili at 3.3 with direct of 2.6 and  /  - continue to monitor as may need to revise plan for day +11 MTX    Changed morphine to PRN but patient noted to be in pain during mouth care with spots in her mouth therefore changed back to q4h    Heme/Onc  - parameters 8/30  - Lovenox subQ 1 mg/kg QD (prophylactic dosing)   - VOD prophylaxis: Glutamine 1g BID, Ursodiol 55mg BID; HOLD heparin 4U/kg/hr due to lovenox ppx  - GVHD Prophylaxis: Tacrolimus .03mg/kg/day    MTX 15mg/m2 on Day +1 +3 +6 +11 ---> Day 11 on hold  - Neupogen 5 mcg/kg QD  - s/p UCART 7/2, MRD on day +27 showed 86% engraftment and negative for disease  - s/p IVIG 8/6, IgG level on 8/15 652, 8/19 786, will recheck Qweek  - s/p IVIG 8/23  - s/p tPA (8/16-8/21)  - s/p Heparin 20U/kg (24hr) following tPA  - s/p conditioning with Busulfan 12mg Q6 [id number] (day-7 to day-4), melphalon 34mg on day-3 and day -2  - CT venogram head/neck on 8/15 chronic thrombi, repeat CT venogram 8/21 unchanged  - CXR 8/28 trace left pleural effusion    ID:  - Zosyn (08/29-  - s/p Meropenem (08/26-08/29)  - s/p vancomycin 230mg IV q6 (8/24-8/29)  - acyclovir oral liquid 165mg Q6hr (15mg/kg)  - vancomycin oral liquid 110mg Q12hr (10mg/kg)  - micafungin IV 22 mg daily (2mg/kg)  - chlorhexidine 15mL swish and spit TID  - s/p cefepime 570mg IV q8 (8/24 - 8/25)  - s/p bactrim oral liquid 28mg Monday and Tuesday BID (9am, 9pm), given until day -2, due day +28  - s/p levofloxacin IV 110mg BID (10mg/kg) q12    Neuro:  - history of methotrexate leukoencephalopathy s/p Keppra  - Morphine 0.05 mg/kg q4h  - s/p keppra 110mg IV BID until day -3 (with busulfan)  - Neuro consult for tremors, not concerned for seizures due to normal mental status, no postictal state    Cardio:  - Persistent, refractory HTN  - labetalol 40mg BID  - lasix 11mg TID  - aldactone 10mg bid  - amlodipine 2.5mg PO BID  - nifedipine 1mg PO q4 PRN for blood pressures > 115/70  - ECHO 8/30 normal, stable from prior    FENGI  - NPO - discontinued NG feeds due to vomiting  - TPN 40mL/hr + 7mL/hr of Lipids  - Cleared for PO feeds, speech and swallow following for therapy  - ondansetron IV 1.7mg Q8hr (0.15mg/kg/dose)  - pantopazole IV 11mg (1mg/kg/dose)  - lorazepam IV 0.22mg Q6hr PRN for nausea (0.02mg/kg/dose)  - vitamin K 5mg PO weekly  - monitor I/Os  - LFTs elevated, abdominal u/s 8/27 and 8/30 normal    Skin  - monitor skin breakdown at perineum, possibly anal mucositis    Pain  - Morphine 0.05 mg/kg q4h    Access: SLM, DL left femoral Broviac (replaced 8/27)

## 2021-05-29 ENCOUNTER — RECORDS - HEALTHEAST (OUTPATIENT)
Dept: ADMINISTRATIVE | Facility: CLINIC | Age: 76
End: 2021-05-29

## 2022-03-21 NOTE — PROCEDURES
Small Bowel Feeding Tube Placement Assessment  Reason for Feeding Tube Placement: provider request for post-pyloric feeding tube   Cortrak Start Time: 2:14   Cortrak End Time: 2:23  Medicine Delivered During Procedure: Lidocaine   Placement Successful: Presume post-pyloric (pending AXR confirmation).     Procedure Complications: NA  Final Placement Dwayne at exit of nare 99 cm  Face to Face time with patient: 20 minutes     Tavia Rivas RD, SAVANA, [facility]  Neuro ICU  Pager: [phone]       21-Mar-2022 07:46

## 2022-10-03 NOTE — PROGRESS NOTES
Oncology/Hematology Visit Note  Feb 6, 2018    DIAGNOSIS:  Mr. Win is a 72 year old male with a hx of CHF, CKD, CAD w/ hx of STEMI and CABG. He met with Dr. Robles at the end of July for consultation regarding metastatic prostate cancer. He presented in May 2015 when he saw his PCP for back pain. He also had poor appetite, weight loss and fatigue.  CT showed diffuse bony mets. CT Chest showed sclerotic mets throughout the bones. He was given degarelix on 7/22 in Urology. He was having pelvic pain and was evaluated by Dr Cavazos would did not feel XRT was appropriate.  He started on docetaxel 7/31/15 at 75 mg/m2 and only had one dose before being hospitalized several times.     His PSA remained stable so he was continued only on Lupron for the rest of 2015.  In December 2015, Mainor met with Dr Robles and since his ECOG was back to a 1, they discussed re-challenging the Taxotere at a lower dose and he completed 5 cycles at 60 mg/m2. On 9/26/2016 he started Provenge off study. He received 3 doses of Provenge (last 10/28), on 3rd dose had fevers, chills, myalgias, was bedbound for a week due to these symptoms.    Mainor was briefly on Zytiga and prednisone in November-December 2016, however after one month his PSA went up, thus it was decided to add in Xofigo (radium 223) starting in Jan 2017.     Diagnosed with PE in Nov 2016. He had bleeding on Xarelto so was changed back to Lovenox.     Mainor developed diarrhea on and off in the spring of 2017, Zytiga was stopped on 3/31 as it was unsure what was contributing.  Mainor felt it was related to the Xofigo and that his dose was too high as his weight is always at least 10 pounds higher in clinic than at home.      At his last visit with Dr Robles, his PSA was rising again and re-started Taxotere on 8/15/17.    He continues on lupron every 3 mos and XGEVA.  Mainor finished 6 cycles of Taxotere, last dose 12/6/17.  He started to have more fatigue from chemo, thus it was stopped and he was given a  month break.  Mainor started Xtandi on 1/24/18.    INTERVAL HISTORY: Mainor arrives unaccompanied today and overall doing better than our visit two weeks ago.  Since stopping the Taxotere in December he was dealing with fatigue/weakness, decreased appetite, some depression and only eating once a day which was leading to worsening fatigue and an acidy/nauseated stomach. He was also having loose stools daily.  The loose stools have slowed up and are finally formed in the last couple days.  His nausea is better with TUMS and eating more routinely throughout the day.  He admits he is not taking his lovenox routinely.  He also admits he has not started the Xtandi yet.  He wanted to get the GI issues in a better spot first.  He has been a little down, as its the middle of winter, but no severe depression.  No new bone pains.     A complete 10-pt ROS is negative other than what is reported above      MEDICATIONS:   Current Outpatient Prescriptions   Medication Sig     enzalutamide (XTANDI) 40 MG capsule Take 4 capsules (160 mg) by mouth daily     calcium carbonate (OS-RODNEY 500 MG [place]) 1250 MG tablet Take 1 tablet (1,250 mg) by mouth 2 times daily     predniSONE (DELTASONE) 5 MG tablet Take 1 tablet (5 mg) by mouth 2 times daily     enoxaparin (LOVENOX) 40 MG/0.4ML injection Inject 0.4 mLs (40 mg) Subcutaneous 2 times daily (Patient taking differently: Inject 40 mg Subcutaneous daily )     aspirin 81 MG tablet Take 1 tablet (81 mg) by mouth daily     Cholecalciferol (VITAMIN D) 2000 UNITS tablet Take 1 tablet by mouth daily     simvastatin (ZOCOR) 40 MG tablet TAKE ONE TABLET BY MOUTH AT BEDTIME     dexamethasone (DECADRON) 4 MG tablet Take 1 tablet (4 mg) by mouth daily (with breakfast) (Patient not taking: Reported on 2/6/2018)     HYDROcodone-acetaminophen (NORCO) 5-325 MG per tablet Take 1 tablet by mouth every 6 hours as needed for moderate to severe pain Reported on 5/22/2017 (Patient not taking: Reported on 2/6/2018)      LORazepam (ATIVAN) 0.5 MG tablet Take 1 tablet (0.5 mg) by mouth every 4 hours as needed (Anxiety, Nausea/Vomiting or Sleep) (Patient not taking: Reported on 2/6/2018)     No current facility-administered medications for this visit.      Facility-Administered Medications Ordered in Other Visits   Medication     lidocaine 0.5 % injection 1 mL     hydrocortisone sodium succinate (solu-CORTEF) injection     glycopyrrolate (ROBINUL) injection     neostigmine (PROSTIGMINE) injection     protamine injection     albuterol (PROAIR HFA, PROVENTIL HFA, VENTOLIN HFA) inhaler          ALLERGIES:  No Known Allergies    PHYSICAL EXAMINATION:  Vitals: BP 92/63  Pulse 104  Temp 98.1  F (36.7  C)  Resp 18  Wt 57.6 kg (127 lb)  SpO2 98%  BMI 20.51 kg/m2   Wt Readings from Last 10 Encounters:   02/06/18 57.6 kg (127 lb)   01/23/18 57.6 kg (127 lb)   01/12/18 58.5 kg (129 lb)   12/20/17 61.2 kg (135 lb)   12/06/17 61.5 kg (135 lb 8 oz)   11/22/17 61.2 kg (135 lb)   11/13/17 61.2 kg (135 lb)   11/08/17 61.2 kg (135 lb)   11/01/17 61.2 kg (135 lb)   10/18/17 60.8 kg (134 lb)       GENERAL:  A pleasant person in no acute distress.  Mildly pale and thin looking  LYMPH NODES:  No peripheral lymphadenopathy noted in the supraclavicular or cervical regions.   LUNGS:  Clear to auscultation bilaterally.   HEART:  Regular rate and rhythm   ABDOMEN:  Bowel sounds are active.  Soft and nontender.  No hepatosplenomegaly or other masses appreciated.  LOWER EXTREMITIES:  Without pitting edema to the knees bilaterally.   NEUROLOGICAL:  Alert/orientated/able to answer all questions.  CN grossly intact.       LAB:      1/23/2018 12:33 2/6/2018 08:54   Sodium 135 138   Potassium 4.2 4.0   Chloride 106 108   Carbon Dioxide 19 (L) 20   Urea Nitrogen 15 15   Creatinine 0.94 0.84   GFR Estimate 78 89   GFR Estimate If Black >90 >90   Calcium 9.0 8.9   Anion Gap 10 11   Albumin 3.8 3.7   Protein Total 7.7 7.9   Bilirubin Total 1.1 0.9   Alkaline  "Phosphatase 144 157 (H)   ALT 15 18   AST 38 40   .00 (H)    Glucose 140 (H) 160 (H)   WBC 4.4 3.2 (L)   Hemoglobin 9.5 (L) 9.8 (L)   Hematocrit 29.1 (L) 30.7 (L)   Platelet Count 113 (L) 119 (L)   RBC Count 2.91 (L) 3.06 (L)    100   MCH 32.6 32.0   MCHC 32.6 31.9   RDW 14.6 14.9   Diff Method Automated Method Automated Method   % Neutrophils 71.4 60.8   % Lymphocytes 12.9 20.6   % Monocytes 10.2 8.9   % Eosinophils 4.5 8.2   % Basophils 0.5 0.9   % Immature Granulocytes 0.5 0.6   Nucleated RBCs 0 0   Absolute Neutrophil 3.2 1.9        11/8/2017 12:28 12/6/2017 08:35 1/3/2018 10:39 1/23/2018 12:33 2/6/2018 08:54   .00 (H) 580.00 (H) 474.00 (H) 664.00 (H) 724.00 (H)   IMPRESSION/PLAN:   Metastatic castrate-resistant prostate cancer: He had progression on Zytiga and Xofigo in July 2017.  Then started on Taxotere every 3 weeks from August through December of 2017. Has received six cycles thus far. Last dose was C6 on 12/6/17. We took a chemo break due to fatigue, dehydration, etc and Mainor is feeling better.  He was suppose to start on Xtandi on 1/24, but admits he didn't.  He has been dealing with GI issues and wanted them to clear.  He finally feels ready and will start tomorrow on 2/7.   --2/6/18 gave Xgeva, Lupron  --labs/Sabrina in 2 weeks  --labs/Travis in 4-6 weeks  .   Diarrhea: Improved today- eating more routinely during the day and taking TUMS for acidic stomach. Encouraged daily TUMS as he isn't interested in H2 or PPI.     Anemia: Hemoglobin 9.5 today and this is similar to baseline.  We need to keep him above 9 given his CAD.      Heme: PE dx 11/2016, continue Lovenox at 40 mg BID.  Admitted he is only \"intermittently\" taking this.  Encouraged daily use.     Bone mets: No new bone pains, increased alk phos today, likely from progression.  Xgeva 2/6    CV: hx of CHF. No clinical s/s of failure.  Off metoprolol due to BP being too low.  Has been tachy in the  range since then, but BP " won't support re-starting.           Sabrina Mcguire PA-C       97.9

## 2023-10-24 NOTE — IP AVS SNAPSHOT
[facility] [address] Observation [address] [address]    Phone:  [phone]    Fax:  [phone]                                       After Visit Summary   2/23/2017    Oswaldo Win    MRN: [id number]           After Visit Summary Signature Page     I have received my discharge instructions, and my questions have been answered. I have discussed any challenges I see with this plan with the nurse or doctor.    ..........................................................................................................................................  Patient/Patient Representative Signature      ..........................................................................................................................................  Patient Representative Print Name and Relationship to Patient    ..................................................               ................................................  Date                                            Time    ..........................................................................................................................................  Reviewed by Signature/Title    ...................................................              ..............................................  Date                                                            Time           Tazorac Counseling:  Patient advised that medication is irritating and drying.  Patient may need to apply sparingly and wash off after an hour before eventually leaving it on overnight.  The patient verbalized understanding of the proper use and possible adverse effects of tazorac.  All of the patient's questions and concerns were addressed.

## (undated) DEVICE — PACK CRANIOTOMY

## (undated) DEVICE — WIPES FOLEY CARE SURESTEP PROVON DFC100

## (undated) DEVICE — DRAPE U SPLIT 74X120" 29440

## (undated) DEVICE — PREP POVIDONE IODINE SCRUB 7.5% 4OZ APL82212

## (undated) DEVICE — SU ETHILON 3-0 PS-1 18" 1663H

## (undated) DEVICE — SU NUROLON 4-0 TF CR 8X18" C584D

## (undated) DEVICE — SU VICRYL 2-0 CT-2 CR 8X18" J726D

## (undated) DEVICE — PAD CHUX UNDERPAD 23X24" 7136

## (undated) DEVICE — GLOVE PROTEXIS MICRO 7.5  2D73PM75

## (undated) DEVICE — SPONGE COTTONOID 1/2X1 1/2" 20-06S

## (undated) DEVICE — PREP POVIDONE IODINE SOLUTION 10% 4OZ

## (undated) DEVICE — PERFORATOR 14MM CODMAN

## (undated) DEVICE — GLOVE PROTEXIS BLUE W/NEU-THERA 8.5  2D73EB85

## (undated) DEVICE — DRSG TELFA 3X8" 1238

## (undated) DEVICE — SPONGE COTTONOID 1/2X1/2" 20-04S

## (undated) DEVICE — LABEL MEDICATION SYSTEM 3303-P

## (undated) DEVICE — CATH TRAY FOLEY SURESTEP 16FR W/URNE MTR STLK LATEX A303316A

## (undated) DEVICE — ESU CORD BIPOLAR AND IRR TUBING AESCULAP US355

## (undated) DEVICE — ESU GROUND PAD ADULT W/CORD E7507

## (undated) DEVICE — SOL RINGERS LACTATED 1000ML BAG 07953-09

## (undated) DEVICE — SYR 10ML LL W/O NDL 302995

## (undated) DEVICE — LINEN TOWEL PACK X6 WHITE 5487

## (undated) DEVICE — SOL WATER IRRIG 1000ML BOTTLE 2F7114

## (undated) DEVICE — PACK SET-UP STD 9102

## (undated) DEVICE — SUCTION MANIFOLD DORNOCH ULTRA CART UL-CL500

## (undated) DEVICE — PREP CHLORAPREP CLEAR 3ML 260400

## (undated) DEVICE — DRAPE SHEET HALF 40X60" 9358

## (undated) DEVICE — DRSG PRIMAPORE 03 1/8X6" 66000318

## (undated) DEVICE — SPONGE SURGIFOAM 100 1974

## (undated) DEVICE — LINEN TOWEL PACK X30 5481

## (undated) DEVICE — DRSG PRIMAPORE 02X3" 7133

## (undated) DEVICE — DRAIN JACKSON PRATT RESERVOIR 100ML SU130-1305

## (undated) DEVICE — DRAIN JACKSON PRATT 10FR ROUND SU130-1321

## (undated) DEVICE — DRAPE POUCH IRR 1016

## (undated) DEVICE — PREP SKIN SCRUB TRAY 4461A

## (undated) RX ORDER — GLYCOPYRROLATE 0.2 MG/ML
INJECTION, SOLUTION INTRAMUSCULAR; INTRAVENOUS
Status: DISPENSED
Start: 2018-01-01

## (undated) RX ORDER — LABETALOL HYDROCHLORIDE 5 MG/ML
INJECTION, SOLUTION INTRAVENOUS
Status: DISPENSED
Start: 2018-01-01

## (undated) RX ORDER — CEFAZOLIN SODIUM 1 G/3ML
INJECTION, POWDER, FOR SOLUTION INTRAMUSCULAR; INTRAVENOUS
Status: DISPENSED
Start: 2018-01-01

## (undated) RX ORDER — AMINOPHYLLINE 25 MG/ML
INJECTION, SOLUTION INTRAVENOUS
Status: DISPENSED
Start: 2017-02-24

## (undated) RX ORDER — PHENYLEPHRINE HCL IN 0.9% NACL 1 MG/10 ML
SYRINGE (ML) INTRAVENOUS
Status: DISPENSED
Start: 2018-01-01

## (undated) RX ORDER — FENTANYL CITRATE 50 UG/ML
INJECTION, SOLUTION INTRAMUSCULAR; INTRAVENOUS
Status: DISPENSED
Start: 2017-01-13

## (undated) RX ORDER — EPHEDRINE SULFATE 50 MG/ML
INJECTION, SOLUTION INTRAMUSCULAR; INTRAVENOUS; SUBCUTANEOUS
Status: DISPENSED
Start: 2018-01-01

## (undated) RX ORDER — PROPOFOL 10 MG/ML
INJECTION, EMULSION INTRAVENOUS
Status: DISPENSED
Start: 2018-01-01

## (undated) RX ORDER — FENTANYL CITRATE 50 UG/ML
INJECTION, SOLUTION INTRAMUSCULAR; INTRAVENOUS
Status: DISPENSED
Start: 2018-01-01

## (undated) RX ORDER — FUROSEMIDE 10 MG/ML
INJECTION INTRAMUSCULAR; INTRAVENOUS
Status: DISPENSED
Start: 2018-01-01

## (undated) RX ORDER — SIMETHICONE 40MG/0.6ML
SUSPENSION, DROPS(FINAL DOSAGE FORM)(ML) ORAL
Status: DISPENSED
Start: 2017-01-13

## (undated) RX ORDER — SODIUM CHLORIDE 9 MG/ML
INJECTION, SOLUTION INTRAVENOUS
Status: DISPENSED
Start: 2018-01-01

## (undated) RX ORDER — DEXAMETHASONE SODIUM PHOSPHATE 4 MG/ML
INJECTION, SOLUTION INTRA-ARTICULAR; INTRALESIONAL; INTRAMUSCULAR; INTRAVENOUS; SOFT TISSUE
Status: DISPENSED
Start: 2018-01-01

## (undated) RX ORDER — BACITRACIN 50000 [IU]/1
INJECTION, POWDER, FOR SOLUTION INTRAMUSCULAR
Status: DISPENSED
Start: 2018-01-01

## (undated) RX ORDER — DIPHENHYDRAMINE HYDROCHLORIDE 50 MG/ML
INJECTION INTRAMUSCULAR; INTRAVENOUS
Status: DISPENSED
Start: 2017-01-13

## (undated) RX ORDER — REGADENOSON 0.08 MG/ML
INJECTION, SOLUTION INTRAVENOUS
Status: DISPENSED
Start: 2017-02-24

## (undated) RX ORDER — ESMOLOL HYDROCHLORIDE 10 MG/ML
INJECTION INTRAVENOUS
Status: DISPENSED
Start: 2018-01-01

## (undated) RX ORDER — ETOMIDATE 2 MG/ML
INJECTION INTRAVENOUS
Status: DISPENSED
Start: 2018-01-01